# Patient Record
Sex: FEMALE | Race: WHITE | NOT HISPANIC OR LATINO | Employment: OTHER | URBAN - METROPOLITAN AREA
[De-identification: names, ages, dates, MRNs, and addresses within clinical notes are randomized per-mention and may not be internally consistent; named-entity substitution may affect disease eponyms.]

---

## 2017-02-28 ENCOUNTER — OFFICE VISIT (OUTPATIENT)
Dept: FAMILY MEDICINE | Facility: CLINIC | Age: 82
End: 2017-02-28

## 2017-02-28 VITALS
TEMPERATURE: 97.1 F | BODY MASS INDEX: 21.86 KG/M2 | DIASTOLIC BLOOD PRESSURE: 74 MMHG | OXYGEN SATURATION: 97 % | RESPIRATION RATE: 16 BRPM | HEIGHT: 66 IN | HEART RATE: 62 BPM | WEIGHT: 136 LBS | SYSTOLIC BLOOD PRESSURE: 143 MMHG

## 2017-02-28 DIAGNOSIS — H61.23 CERUMINOSIS, BILATERAL: Primary | ICD-10-CM

## 2017-02-28 ASSESSMENT — PAIN SCALES - GENERAL: PAINLEVEL: NO PAIN (0)

## 2017-02-28 NOTE — PROGRESS NOTES
Elizabeth Ruggiero is a 89 year old female who presents today for evaluation and possible removal of bilateral cerumen.  She states the right ear is worse than the left, she has had them lavaged in the past, 6-7 years ago.    Elizabeth resides at [facility] on , she is a  of one year, her   of CHF.   She moved to [facility] 6 months ago, from [place] where she lived with her  of 39 years.  She raised 6 children, a blended family.  Her first   young of hear disease.  She says that the move is an adjustment, but she is doing well.     Her medical history is delightfully benign, she has arthritis in her back and some in her hands, no pain but just some stiffness now and then.    Review Of Systems  Skin: negative  Eyes: negative  Ears/Nose/Throat: negative  Dentition, excellent, has regular dental cleanings  Respiratory: No shortness of breath, dyspnea on exertion, cough, or hemoptysis  Cardiovascular: negative  Gastrointestinal: negative  Genitourinary: negative  Musculoskeletal: negative, as above  Neurologic: negative  Psychiatric: negative  Hematologic/Lymphatic/Immunologic:DVT lower extremity on coumadin  Endocrine: negative    Past Medical History   Diagnosis Date     Arthritis      DVT of lower extremity (deep venous thrombosis) (H)      Past Surgical History   Procedure Laterality Date     Repair left femur       Social History     Social History     Marital status:      Spouse name: N/A     Number of children: 6     Years of education: through 4 years college     Occupational History     Not on file.     Social History Main Topics     Smoking status: Never Smoker     Smokeless tobacco: Not on file     Alcohol use No     Drug use: No     Sexual activity: Not Currently      Comment:   one year ago no other partners     Other Topics Concern     Not on file     Social History Narrative     No narrative on file     History reviewed. No pertinent family history.    /74  "(BP Location: Right arm, Patient Position: Chair, Cuff Size: Adult Regular)  Pulse 62  Temp 97.1  F (36.2  C)  Resp 16  Ht 5' 6\" (167.6 cm)  Wt 136 lb (61.7 kg)  LMP   SpO2 97%  Breastfeeding? No  BMI 21.95 kg/m2    Exam:  Constitutional: healthy, alert and no distress  Head: Normocephalic. No masses, lesions, tenderness or abnormalities  Neck: Neck supple. No adenopathy. Thyroid symmetric, normal size,, Carotids without bruits.  ENT: ENT exam normal, no neck nodes or sinus tenderness.  Bilateral cerument  : Deferred  Musculoskeletal: extremities normal- no gross deformities noted, gait normal and normal muscle tone  Skin: no suspicious lesions or rashes  Psychiatric: mentation appears normal and affect normal/bright  Hematologic/Lymphatic/Immunologic: Normal cervical lymph nodes    Assessment/Plan:  1. Ceruminosis, bilateral    - REMOVE IMPACTED CERUMEN- bilateral ear lavage per NP    RTC prn  "

## 2017-02-28 NOTE — MR AVS SNAPSHOT
"              After Visit Summary   2017    Elizabeth Ruggiero    MRN: [id number]           Patient Information     Date Of Birth          1926        Visit Information        Provider Department      2017 2:00 PM Sheyla Anderson, NP [facility] Nurse Practitioner [facility]        Today's Diagnoses     Ceruminosis, bilateral    -  1       Follow-ups after your visit        Who to contact     Please call your clinic at [phone] to:    Ask questions about your health    Make or cancel appointments    Discuss your medicines    Learn about your test results    Speak to your doctor   If you have compliments or concerns about an experience at your clinic, or if you wish to file a complaint, please contact [facility] Patient Relations at [phone] or email us at [email or web address]         Additional Information About Your Visit        MyChart Information     [employer] is an electronic gateway that provides easy, online access to your medical records. With [employer], you can request a clinic appointment, read your test results, renew a prescription or communicate with your care team.     To sign up for [employer] visit the website at [email or web address]   You will be asked to enter the access code listed below, as well as some personal information. Please follow the directions to create your username and password.     Your access code is: [email or web address]  Expires: 2017  4:47 PM     Your access code will  in 90 days. If you need help or a new code, please contact your [facility] or call [phone] for assistance.        Care EveryWhere ID     This is your Care EveryWhere ID. This could be used by other organizations to access your [facility] medical records  [id number]        Your Vitals Were     Pulse Temperature Respirations Height Pulse Oximetry       62 97.1  F (36.2  C) 16 5' 6\" (167.6 cm) 97%     Breastfeeding? BMI " (Body Mass Index)                No 21.95 kg/m2           Blood Pressure from Last 3 Encounters:   02/28/17 143/74    Weight from Last 3 Encounters:   02/28/17 136 lb (61.7 kg)              We Performed the Following     REMOVE HOA LAZCANO        Primary Care Provider Office Phone # Fax #    Sheyla AndersonFADI [phone]       [facility] [address] [address]        Thank you!     Thank you for choosing [facility] NURSE PRACTITIONER [facility]  for your care. Our goal is always to provide you with excellent care. Hearing back from our patients is one way we can continue to improve our services. Please take a few minutes to complete the written survey that you may receive in the mail after your visit with us. Thank you!             Your Updated Medication List - Protect others around you: Learn how to safely use, store and throw away your medicines at www.disposemymeds.org.          This list is accurate as of: 2/28/17  4:47 PM.  Always use your most recent med list.                   Brand Name Dispense Instructions for use    COUMADIN PO          TYLENOL PO

## 2017-12-05 ENCOUNTER — TELEPHONE (OUTPATIENT)
Dept: FAMILY MEDICINE | Facility: CLINIC | Age: 82
End: 2017-12-05

## 2019-02-15 ENCOUNTER — NURSING HOME VISIT (OUTPATIENT)
Dept: GERIATRICS | Facility: CLINIC | Age: 84
End: 2019-02-15
Payer: COMMERCIAL

## 2019-02-15 VITALS
HEART RATE: 77 BPM | BODY MASS INDEX: 23.31 KG/M2 | SYSTOLIC BLOOD PRESSURE: 107 MMHG | OXYGEN SATURATION: 95 % | TEMPERATURE: 97 F | RESPIRATION RATE: 18 BRPM | WEIGHT: 144.4 LBS | DIASTOLIC BLOOD PRESSURE: 53 MMHG

## 2019-02-15 DIAGNOSIS — Z71.89 ADVANCED DIRECTIVES, COUNSELING/DISCUSSION: ICD-10-CM

## 2019-02-15 DIAGNOSIS — R53.81 PHYSICAL DECONDITIONING: ICD-10-CM

## 2019-02-15 DIAGNOSIS — I48.20 CHRONIC ATRIAL FIBRILLATION (H): ICD-10-CM

## 2019-02-15 DIAGNOSIS — R29.898 TRANSIENT WEAKNESS OF LEFT LOWER EXTREMITY: Primary | ICD-10-CM

## 2019-02-15 DIAGNOSIS — M48.00 SPINAL STENOSIS, UNSPECIFIED SPINAL REGION: ICD-10-CM

## 2019-02-15 DIAGNOSIS — E55.9 VITAMIN D DEFICIENCY: ICD-10-CM

## 2019-02-15 DIAGNOSIS — M81.0 AGE-RELATED OSTEOPOROSIS WITHOUT CURRENT PATHOLOGICAL FRACTURE: ICD-10-CM

## 2019-02-15 PROBLEM — D62 POSTOPERATIVE ANEMIA DUE TO ACUTE BLOOD LOSS: Status: ACTIVE | Noted: 2019-02-15

## 2019-02-15 PROBLEM — I34.0 SEVERE MITRAL REGURGITATION: Status: ACTIVE | Noted: 2019-02-14

## 2019-02-15 PROBLEM — G81.94 ACUTE LEFT HEMIPARESIS (H): Status: ACTIVE | Noted: 2019-02-09

## 2019-02-15 PROCEDURE — 99310 SBSQ NF CARE HIGH MDM 45: CPT | Performed by: NURSE PRACTITIONER

## 2019-02-15 RX ORDER — METOPROLOL TARTRATE 25 MG/1
25 TABLET, FILM COATED ORAL 2 TIMES DAILY
COMMUNITY
End: 2019-05-08

## 2019-02-15 RX ORDER — ALENDRONATE SODIUM 70 MG/1
70 TABLET ORAL
COMMUNITY
Start: 2019-05-05 | End: 2020-11-06

## 2019-02-15 NOTE — PROGRESS NOTES
"[facility]  PRIMARY CARE PROVIDER AND CLINIC: Gelacio Downs MD, [facility]  Chief Complaint   Patient presents with     Hospital F/U     [facility] Medical Record Number:  [id number]  [facility] where encounter [facility] [[id number]]    HPI:    Elizabeth Ruggiero is a 89 year old  (6/19/1926),admitted to the above facility from  [facility].  Hospital stay 2/9/2019 through 2/14/2019.  Admitted to this facility for  rehab, medical management and nursing care.  HPI information obtained from: facility chart records, facility staff, patient report, [facility] Epic chart review and Care Everywhere [facility] chart review.    She has a medical history significant for afib on coumadin, history of DVT, spinal stenosis, osteoporosis and was hospitalized after presenting to the ED with LLE weakness and numbness that started after her yoga class. Her symptoms quickly resolved. CT head negative for acute process. Neurology consulted and felt low risk for stroke. Symptoms felt to be possible radiculopathy or TIA.   INR was 3.7 on admission and coumadin was held X 1 day.     Current issues are:         Transient weakness of left lower extremity-reports feeling well with complete resolution of LLE weakness and numbness. She thinks her symptoms were do to an \"intense yoga class.\"  Chronic atrial fibrillation (H)-INR 2.1 today after coumadin 2.5 mg last night. Usual dose is 3.75 mg on MWFSun, 2.5 mg the other days.No bleeding.  HR: 77-91    Spinal stenosis, unspecified spinal region-denies back pain. Reports this has been well managed with exercise.   Age-related osteoporosis without current pathological fracture  Vitamin D deficiency  Physical deconditioning-up ad marily in her room with walker. Requires supervision while ambulating in the halls. Independent with toileting.     CODE STATUS/ADVANCE DIRECTIVES DISCUSSION:   CPR/Full code   Patient's living condition: [facility]    ALLERGIES:Food  PAST MEDICAL HISTORY:  has a past medical history of Arthritis, Atrial fibrillation (H), DVT of lower extremity (deep venous thrombosis) (H), and Vitamin D deficiency.  PAST SURGICAL HISTORY:  has a past surgical history that includes repair left femur; appendectomy; and fracture surgery.  FAMILY HISTORY: family history includes Breast Cancer in her sister; Colon Cancer in her mother; Diabetes in her sister.  SOCIAL HISTORY:  reports that  has never smoked. She does not have any smokeless tobacco history on file. She reports that she does not drink alcohol or use drugs.    Post Discharge Medication Reconciliation Status: discharge medications reconciled, continue medications without change.  Current Outpatient Medications   Medication Sig Dispense Refill     Acetaminophen (TYLENOL PO) Take 325 mg by mouth every 6 hours as needed        alendronate (FOSAMAX) 70 MG tablet Take 70 mg by mouth every 7 days SUNDAYS       calcium carbonate-vitamin D (OSCAL W/D) 500-200 MG-UNIT tablet Take 1 tablet by mouth 2 times daily       metoprolol tartrate (LOPRESSOR) 25 MG tablet Take 12.5 mg by mouth 2 times daily       Multiple Vitamins-Minerals (OCUVITE EYE + MULTI PO) Take 1 tablet by mouth daily       Warfarin Sodium (COUMADIN PO) Take by mouth daily 3.75 mg MWFSun  2.5 mg TThSat         ROS:  10 point ROS of systems including Constitutional, Eyes, Respiratory, Cardiovascular, Gastroenterology, Genitourinary, Integumentary, Musculoskeletal, Psychiatric were all negative except for pertinent positives noted in my HPI.    Exam:  /53   Pulse 77   Temp 97  F (36.1  C)   Resp 18   Wt 65.5 kg (144 lb 6.4 oz)   SpO2 95%   BMI 23.31 kg/m    GENERAL APPEARANCE:  Alert, in no distress, appears younger than her age  ENT:  Mouth and posterior oropharynx normal, moist mucous membranes, [place]  EYES:  EOM normal, conjunctiva and lids normal, PERRL  NECK:  No adenopathy,masses or thyromegaly  RESP:   respiratory effort and palpation of chest normal, lungs clear to auscultation , no respiratory distress  CV:  Palpation and auscultation of heart done , regular rate and rhythm, no murmur, no edema, +2 pedal pulses  ABDOMEN:  normal bowel sounds, soft, nontender, no hepatosplenomegaly or other masses  M/S:   gait steady with walker. CAR with good strength. No joint inflammation  SKIN:  no rashes or open areas  PSYCH:  oriented X 3, memory impaired , affect and mood normal    Lab/Diagnostic data:  [facility] labs:  PANEL BASIC METABOLIC (BMP) (02/14/2019 4:44 AM CST)  PANEL BASIC METABOLIC (BMP) (02/14/2019 4:44 AM CST)   Component Value Ref Range Performed At Pathologist Signature   Sodium 138 135 - 148 mEq/L [facility]     Potassium 4.4 3.5 - 5.3 mEq/L [facility]     Chloride 105 92 - 108 mEq/L [facility]     CO2 23 22 - 30 mEq/L [facility]     AnGap 10 8 - 16 mEq/L [facility]     Glucose 83 70 - 100 mg/dL [facility]     BUN 28 (H) 8 - 23 mg/dL [facility]     Creatinine 0.96 0.50 - 1.00 mg/dL [facility]     Calcium 9.6 8.2 - 9.6 mg/dL [facility]     eGFR,  59 (L)Comment: Calculated using CKD-EPI equation >=60 ml/min/1.73m2 [facility]     eGFR, Non- 52 (L)Comment: Calculated using CKD-EPI equation >=60 ml/min/1.73m2 [facility]     Basic Metabolic Panel Performed at: [facility]  Comment:   [facility]  [address] [place] [id number]     [facility]      CBC WITH PLTS/AUTO DIFF (02/14/2019 4:44 AM CST)  CBC WITH PLTS/AUTO DIFF (02/14/2019 4:44 AM CST)   Component Value Ref Range Performed At Pathologist Signature   WBC 6.66 4.00 - 10.00 k/cmm [facility]     RBC 3.78 (L) 3.90 - 5.20 m/cmm [facility]     Hgb 11.6 11.5 - 15.7 g/dL [facility]     Hematocrit 35.4 34.0 - 45.0 % [facility]     MCV 93.7 80.0 - 100.0 fL [facility]     MCH 30.7 25.0 - 32.0 pg [facility]     MCHC 32.8 31.0 - 36.0 g/dL [facility]     RDW 14.2 11.5 - 14.5 % [facility]     Plt 151 150 - 400 k/cmm [facility]     MPV 11.5 6.5 - 12.5 fL [facility]     NRBC 0.0 0.0  - 0.0 % [facility]     Automated Abs Neutrophil 3.42Comment: Preliminary ANC, final result to follow. 1.70 - 6.50 k/cmm [facility]     Abs Immature Granulocyte 0.03Comment: The Immature Granulocyte Absolute count contains metamyelocytes and myelocytes. 0.00 - 0.09 k/cmm [facility]     Abs Neutrophil 3.42 1.70 - 6.50 k/cmm [facility]     Abs Lymphocyte 2.00 0.80 - 4.00 k/cmm [facility]     Abs Monocyte 0.92 0.20 - 1.00 k/cmm [facility]     Abs Eosinophil 0.22 0.00 - 0.60 k/cmm [facility]     Abs Basophil 0.07 0.00 - 0.20 k/cmm [facility]     CBC Plt and Diff Performed at: [facility]  Comment:   [facility]  [address] [place] [address]     [facility]        ASSESSMENT / PLAN:  (R29.898) Transient weakness of left lower extremity  (primary encounter diagnosis)  Comment: resolved. Etiology unclear. Possible TIA  Plan: continue current meds. Therapies. Neurology follow up at [facility] in 1 week.     (I48.2) Chronic atrial fibrillation (H)  Comment: rate controlled. INR therapeutic  Plan: continue metoprolol. Coumadin 2.5-3.75 mg daily. INR 2/19/2019    (M48.00) Spinal stenosis, unspecified spinal region  Comment: pain well managed  Plan: tylenol prn. Therapies    (M81.0) Age-related osteoporosis without current pathological fracture  (E55.9) Vitamin D deficiency  Comment: chronic  Plan: continue fosamax, calcium/vitamin D    (R53.81) Physical deconditioning  Comment: due to acute issue and hospitalization  Plan: PHYSICAL THERAPY/OT. Anticipate short rehab stay and return to [place].     (Z71.89) Advanced directives, counseling/discussion  Comment: she does not have a healthcare directive and requests Full Code  Plan: POLST completed        Electronically signed by:  SENIA Lockett CNP

## 2019-02-15 NOTE — LETTER
"    2/15/2019        RE: Elizabeth Ruggiero  [address]  [facility] [address]        [facility]  PRIMARY CARE PROVIDER AND CLINIC: Gelacio Downs MD, [facility]  Chief Complaint   Patient presents with     Hospital F/U     [place] Medical Record Number:  [id number]  [facility] of Service where encounter took place[facility] [[id number]]    HPI:    Elizabeth Ruggiero is a 89 year old  (6/19/1926),admitted to the above facility from  [facility].  Hospital stay 2/9/2019 through 2/14/2019.  Admitted to this facility for  rehab, medical management and nursing care.  HPI information obtained from: facility chart records, facility staff, patient report, [facility] chart review and Care Everywhere [facility] chart review.    She has a medical history significant for afib on coumadin, history of DVT, spinal stenosis, osteoporosis and was hospitalized after presenting to the ED with LLE weakness and numbness that started after her yoga class. Her symptoms quickly resolved. CT head negative for acute process. Neurology consulted and felt low risk for stroke. Symptoms felt to be possible radiculopathy or TIA.   INR was 3.7 on admission and coumadin was held X 1 day.     Current issues are:         Transient weakness of left lower extremity-reports feeling well with complete resolution of LLE weakness and numbness. She thinks her symptoms were do to an \"intense yoga class.\"  Chronic atrial fibrillation (H)-INR 2.1 today after coumadin 2.5 mg last night. Usual dose is 3.75 mg on MWFSun, 2.5 mg the other days.No bleeding.  HR: 77-91    Spinal stenosis, unspecified spinal region-denies back pain. Reports this has been well managed with exercise.   Age-related osteoporosis without current pathological fracture  Vitamin D deficiency  Physical deconditioning-up ad marily in her room with walker. Requires supervision while ambulating in the halls. Independent with toileting.     CODE " STATUS/ADVANCE DIRECTIVES DISCUSSION:   CPR/Full code   Patient's living condition: Lives at [facility]    ALLERGIES:Food  PAST MEDICAL HISTORY:  has a past medical history of Arthritis, Atrial fibrillation (H), DVT of lower extremity (deep venous thrombosis) (H), and Vitamin D deficiency.  PAST SURGICAL HISTORY:  has a past surgical history that includes repair left femur; appendectomy; and fracture surgery.  FAMILY HISTORY: family history includes Breast Cancer in her sister; Colon Cancer in her mother; Diabetes in her sister.  SOCIAL HISTORY:  reports that  has never smoked. She does not have any smokeless tobacco history on file. She reports that she does not drink alcohol or use drugs.    Post Discharge Medication Reconciliation Status: discharge medications reconciled, continue medications without change.  Current Outpatient Medications   Medication Sig Dispense Refill     Acetaminophen (TYLENOL PO) Take 325 mg by mouth every 6 hours as needed        alendronate (FOSAMAX) 70 MG tablet Take 70 mg by mouth every 7 days SUNDAYS       calcium carbonate-vitamin D (OSCAL W/D) 500-200 MG-UNIT tablet Take 1 tablet by mouth 2 times daily       metoprolol tartrate (LOPRESSOR) 25 MG tablet Take 12.5 mg by mouth 2 times daily       Multiple Vitamins-Minerals (OCUVITE EYE + MULTI PO) Take 1 tablet by mouth daily       Warfarin Sodium (COUMADIN PO) Take by mouth daily 3.75 mg MWFSun  2.5 mg TThSat         ROS:  10 point ROS of systems including Constitutional, Eyes, Respiratory, Cardiovascular, Gastroenterology, Genitourinary, Integumentary, Musculoskeletal, Psychiatric were all negative except for pertinent positives noted in my HPI.    Exam:  /53   Pulse 77   Temp 97  F (36.1  C)   Resp 18   Wt 65.5 kg (144 lb 6.4 oz)   SpO2 95%   BMI 23.31 kg/m     GENERAL APPEARANCE:  Alert, in no distress, appears younger than her age  ENT:  Mouth and posterior oropharynx normal, moist mucous membranes,  [place]  EYES:  EOM normal, conjunctiva and lids normal, PERRL  NECK:  No adenopathy,masses or thyromegaly  RESP:  respiratory effort and palpation of chest normal, lungs clear to auscultation , no respiratory distress  CV:  Palpation and auscultation of heart done , regular rate and rhythm, no murmur, no edema, +2 pedal pulses  ABDOMEN:  normal bowel sounds, soft, nontender, no hepatosplenomegaly or other masses  M/S:   gait steady with walker. CAR with good strength. No joint inflammation  SKIN:  no rashes or open areas  PSYCH:  oriented X 3, memory impaired , affect and mood normal    Lab/Diagnostic data:  [facility] labs:  PANEL BASIC METABOLIC (BMP) (02/14/2019 4:44 AM CST)  PANEL BASIC METABOLIC (BMP) (02/14/2019 4:44 AM CST)   Component Value Ref Range Performed At Pathologist [facility]   Sodium 138 135 - 148 mEq/L [facility]     Potassium 4.4 3.5 - 5.3 mEq/L [facility]     Chloride 105 92 - 108 mEq/L [facility]     CO2 23 22 - 30 mEq/L [facility]     AnGap 10 8 - 16 mEq/L [facility]     Glucose 83 70 - 100 mg/dL [facility]     BUN 28 (H) 8 - 23 mg/dL [facility]     Creatinine 0.96 0.50 - 1.00 mg/dL [facility]     Calcium 9.6 8.2 - 9.6 mg/dL [facility]     eGFR,  59 (L)Comment: Calculated using CKD-EPI equation >=60 ml/min/1.73m2 [facility]     eGFR, Non- 52 (L)Comment: Calculated using CKD-EPI equation >=60 ml/min/1.73m2 [facility]     Basic Metabolic Panel Performed at: [facility]  Comment:   [facility]  [address] [place] [address]     [facility]      CBC WITH PLTS/AUTO DIFF (02/14/2019 4:44 AM CST)  CBC WITH PLTS/AUTO DIFF (02/14/2019 4:44 AM CST)   Component Value Ref Range Performed At Pathologist [facility]   WBC 6.66 4.00 - 10.00 k/cmm [facility]     RBC 3.78 (L) 3.90 - 5.20 m/cmm [facility]     Hgb 11.6 11.5 - 15.7 g/dL [facility]     Hematocrit 35.4 34.0 - 45.0 % [facility]     MCV 93.7 80.0 - 100.0 fL [facility]     MCH 30.7 25.0 - 32.0 pg [facility]     MCHC 32.8 31.0 - 36.0 g/dL [facility]     RDW 14.2  11.5 - 14.5 % [facility]     Plt 151 150 - 400 k/cmm [facility]     MPV 11.5 6.5 - 12.5 fL [facility]     NRBC 0.0 0.0 - 0.0 % [facility]     Automated Abs Neutrophil 3.42Comment: Preliminary ANC, final result to follow. 1.70 - 6.50 k/cmm [facility]     Abs Immature Granulocyte 0.03Comment: The Immature Granulocyte Absolute count contains metamyelocytes and myelocytes. 0.00 - 0.09 k/cmm [facility]     Abs Neutrophil 3.42 1.70 - 6.50 k/cmm [facility]     Abs Lymphocyte 2.00 0.80 - 4.00 k/cmm [facility]     Abs Monocyte 0.92 0.20 - 1.00 k/cmm [facility]     Abs Eosinophil 0.22 0.00 - 0.60 k/cmm [facility]     Abs Basophil 0.07 0.00 - 0.20 k/cmm [facility]     CBC Plt and Diff Performed at: [facility]  Comment:   [facility]  [address] [place] [address]     [facility]        ASSESSMENT / PLAN:  (R29.898) Transient weakness of left lower extremity  (primary encounter diagnosis)  Comment: resolved. Etiology unclear. Possible TIA  Plan: continue current meds. Therapies. Neurology follow up at [facility] in 1 week.     (I48.2) Chronic atrial fibrillation (H)  Comment: rate controlled. INR therapeutic  Plan: continue metoprolol. Coumadin 2.5-3.75 mg daily. INR 2/19/2019    (M48.00) Spinal stenosis, unspecified spinal region  Comment: pain well managed  Plan: tylenol prn. Therapies    (M81.0) Age-related osteoporosis without current pathological fracture  (E55.9) Vitamin D deficiency  Comment: chronic  Plan: continue fosamax, calcium/vitamin D    (R53.81) Physical deconditioning  Comment: due to acute issue and hospitalization  Plan: PHYSICAL THERAPY/OT. Anticipate short rehab stay and [place] to [place].     (Z71.89) Advanced directives, counseling/discussion  Comment: she does not have a healthcare directive and requests Full Code  Plan: POLST completed        Electronically signed by:  SENIA Lockett CNP                Sincerely,        SENIA Lockett CNP

## 2019-02-18 VITALS
SYSTOLIC BLOOD PRESSURE: 118 MMHG | OXYGEN SATURATION: 98 % | RESPIRATION RATE: 16 BRPM | HEART RATE: 79 BPM | WEIGHT: 142.6 LBS | TEMPERATURE: 96.1 F | BODY MASS INDEX: 23.02 KG/M2 | DIASTOLIC BLOOD PRESSURE: 82 MMHG

## 2019-02-19 ENCOUNTER — DOCUMENTATION ONLY (OUTPATIENT)
Dept: OTHER | Facility: CLINIC | Age: 84
End: 2019-02-19

## 2019-02-19 ENCOUNTER — DISCHARGE SUMMARY NURSING HOME (OUTPATIENT)
Dept: GERIATRICS | Facility: CLINIC | Age: 84
End: 2019-02-19
Payer: COMMERCIAL

## 2019-02-19 DIAGNOSIS — E55.9 VITAMIN D DEFICIENCY: ICD-10-CM

## 2019-02-19 DIAGNOSIS — I48.20 CHRONIC ATRIAL FIBRILLATION (H): ICD-10-CM

## 2019-02-19 DIAGNOSIS — M48.00 SPINAL STENOSIS, UNSPECIFIED SPINAL REGION: ICD-10-CM

## 2019-02-19 DIAGNOSIS — R53.81 PHYSICAL DECONDITIONING: ICD-10-CM

## 2019-02-19 DIAGNOSIS — R29.898 TRANSIENT WEAKNESS OF LEFT LOWER EXTREMITY: Primary | ICD-10-CM

## 2019-02-19 DIAGNOSIS — M81.0 AGE-RELATED OSTEOPOROSIS WITHOUT CURRENT PATHOLOGICAL FRACTURE: ICD-10-CM

## 2019-02-19 PROCEDURE — 99316 NF DSCHRG MGMT 30 MIN+: CPT | Performed by: NURSE PRACTITIONER

## 2019-02-19 NOTE — PROGRESS NOTES
Documentation of Face-to-Face and Certification for Home Health Services     Patient: Elizabeth Ruggiero   YOB: 1926  MR Number: [id number]  Today's Date: 2/19/2019    I certify that patient: Elizabeth Ruggiero is under my care and that I, or a nurse practitioner or physician's assistant working with me, had a face-to-face encounter that meets the physician face-to-face encounter requirements with this patient on: 2/19/2019.    This encounter with the patient was in whole, or in part, for the following medical condition, which is the primary reason for home health care: LLE weakness, possible TIA.    I certify that, based on my findings, the following services are medically necessary home health services: Nursing, Occupational Therapy and Physical Therapy.    My clinical findings support the need for the above services because: Nurse is needed: To assess VS, neuro status after changes in medications or other medical regimen., To provide assessment and oversight required in the home to assure adherence to the medical plan due to: complex medication regimen, at risk for rehospitalizataion. and To provide caregiver training to assist with: med management.., Occupational Therapy Services are needed to assess and treat cognitive ability and address ADL safety due to impairment in functional status. and Physical Therapy Services are needed to assess and treat the following functional impairments: gait instability, limited endurance, fall risk.    Further, I certify that my clinical findings support that this patient is homebound (i.e. absences from home require considerable and taxing effort and are for medical reasons or Islam services or infrequently or of short duration when for other reasons) because: Requires assistance of another person or specialized equipment to access medical services because patient: Is unable to exit home safely on own due to: gait instability, fall risk. and Is unable to walk  greater than 150 feet without rest...    Based on the above findings. I certify that this patient is confined to the home and needs intermittent skilled nursing care, physical therapy and/or speech therapy.  The patient is under my care, and I have initiated the establishment of the plan of care.  This patient will be followed by a physician who will periodically review the plan of care.  Physician/Provider to provide follow up care: Gelacio Downs    Responsible Medicare certified PECOS Physician: Electronically signed by Dr. Mckenna Hunter MD, and only signing for initial order. Please send all follow up questions and concerns or needed follow up signatures to the PCP Gelacio Downs.    Physician Signature: See electronic signature associated with these discharge orders.  Date: 2/19/2019

## 2019-02-19 NOTE — PROGRESS NOTES
[place] [facility] DISCHARGE SUMMARY    PATIENT'S NAME: Elizabeth Ruggiero  YOB: 1926  MEDICAL RECORD NUMBER:  [id number]  Place of [facility] where encounter took place[facility] [[id number]]    PRIMARY CARE PROVIDER AND CLINIC RESPONSIBLE AFTER TRANSFER: [facility] [address] / [place] [address]     TRANSFERRING PROVIDERS: SENIA Lockett CNP, Mckenna Hunter MD  DATE OF SNF ADMISSION:  February / 14 / 2019  DATE OF SNF (anticipated) DISCHARGE: February / 21 / 2019  DISCHARGE DISPOSITION: Non [facility]G Provider   RECENT HOSPITALIZATION/ED:  [place]  [facility] [place] 2[place]14[place].     CODE STATUS/ADVANCE DIRECTIVES DISCUSSION:   CPR/Full code      Allergies   Allergen Reactions     Food      PN: LW FI1: vegetarian  dairy ok LW FI2:     Condition on Discharge:  Improving.  Cognitive Scores: SLUMS 25/30    Equipment: walker    DISCHARGE DIAGNOSIS:   1. Transient weakness of left lower extremity    2. Chronic atrial fibrillation (H)    3. Spinal stenosis, unspecified spinal region    4. Age-related osteoporosis without current pathological fracture    5. Vitamin D deficiency    6. Physical deconditioning        HPI Nursing Facility Course:  HPI information obtained from: facility chart records, facility staff, patient report, [facility] chart review and Care Everywhere [facility] chart review.  She came to this facility for short term rehab and medical management following hospitalization for LLE weakness and numbness that started after her yoga class. Symptoms quickly resolved while she was in the ED. CT head negative for acute process. Neurology consulted and felt low risk for stroke. Symptoms felt to be possible radiculopathy or TIA. INR was 3.7 on hospital admission and coumadin was held X 1 day.     She has worked with PHYSICAL THERAPY and OT with good progress. Ambulating 150 ft with walker. Independent with toileting and dressing.  Requires supervision to stand by assist with bathing. Garvey 32/56, indicating fair balance.   ASSESSMENT / PLAN:  (R29.898) Transient weakness of left lower extremity  (primary encounter diagnosis)  Comment: no recurrent issues. She's feeling well and ready to return home.   Plan: discharge back to [facility]. Home services and follow up as below.     (I48.2) Chronic atrial fibrillation (H)  Comment: rate controlled: 79-91    BPs: 118/82, 111/75, 115/75  INR 2.6 today on her usual home dose of coumadin.   Plan: continue metoprolol. Continue coumadin 3.75 mg MWFSun, 2.5 mg the other days of the week. INR in 1 week at PCP follow up appointment.     (M48.00) Spinal stenosis, unspecified spinal region  Comment: chronic back pain,  well managed  Plan: continue tylenol prn. Therapies.     (M81.0) Age-related osteoporosis without current pathological fracture  (E55.9) Vitamin D deficiency  Comment: chronic  Plan: continue fosamax, calcium/vitamin D    (R53.81) Physical deconditioning  Comment: progress in therapies as above  Plan: home  therapies for ongoing gait training, strengthening and ADL safety      PAST MEDICAL HISTORY:  has a past medical history of Arthritis, Atrial fibrillation (H), DVT of lower extremity (deep venous thrombosis) (H), and Vitamin D deficiency.    DISCHARGE MEDICATIONS:  Current Outpatient Medications   Medication Sig Dispense Refill     Acetaminophen (TYLENOL PO) Take 325 mg by mouth every 6 hours as needed        alendronate (FOSAMAX) 70 MG tablet Take 70 mg by mouth every 7 days SUNDAYS       calcium carbonate-vitamin D (OSCAL W/D) 500-200 MG-UNIT tablet Take 1 tablet by mouth 2 times daily       metoprolol tartrate (LOPRESSOR) 25 MG tablet Take 12.5 mg by mouth 2 times daily       Multiple Vitamins-Minerals (OCUVITE EYE + MULTI PO) Take 1 tablet by mouth daily       Warfarin Sodium (COUMADIN PO) Take by mouth daily 3.75 mg MWFSun  2.5 mg TThSat         MEDICATION CHANGES/RATIONALE:   Coumadin  dosed for goal INR 2-3  Controlled medications sent with patient:   not applicable/none     ROS:    4 point ROS including Respiratory, CV, GI and , other than that noted in the HPI,  is negative    Physical Exam:   Vitals: /82   Pulse 79   Temp 96.1  F (35.6  C)   Resp 16   Wt 64.7 kg (142 lb 9.6 oz)   SpO2 98%   BMI 23.02 kg/m    BMI= Body mass index is 23.02 kg/m .  GENERAL APPEARANCE:  Alert, in no distress, appears younger than her age  ENT:  Mouth and posterior oropharynx normal, moist mucous membranes, [place]  EYES:  EOM normal, conjunctiva and lids normal  NECK:  No adenopathy,masses or thyromegaly  RESP:  respiratory effort and palpation of chest normal, lungs clear to auscultation , no respiratory distress  CV:  Palpation and auscultation of heart done , regular rate and rhythm, no murmur, no edema, +2 pedal pulses  ABDOMEN:  soft, nontender, no hepatosplenomegaly or other masses  M/S:   gait steady with walker. CAR with good strength. No joint inflammation  SKIN:  no rashes or open areas  PSYCH:  oriented X 3, memory impaired , affect and mood normal     DISCHARGE PLAN:  Occupational Therapy, Physical Therapy, Registered Nurse, Home Health Aide and From:  [facility]  Patient instructed to follow-up with:  PCP in 7 days    Neurology at [facility] as scheduled.     Current [place] scheduled appointments:  No future appointments.  MTM referral needed and placed by this provider: No    Pending labs: None  SNF labs   [facility] labs:  PANEL BASIC METABOLIC (BMP) (02/14/2019 4:44 AM CST)  PANEL BASIC METABOLIC (BMP) (02/14/2019 4:44 AM CST)   Component Value Ref Range Performed At Pathologist Signature   Sodium 138 135 - 148 mEq/L [facility]     Potassium 4.4 3.5 - 5.3 mEq/L [facility]     Chloride 105 92 - 108 mEq/L [facility]     CO2 23 22 - 30 mEq/L [facility]     AnGap 10 8 - 16 mEq/L [facility]     Glucose 83 70 - 100 mg/dL [facility]     BUN 28 (H) 8 - 23 mg/dL [facility]     Creatinine 0.96 0.50 - 1.00 mg/dL [facility]  [facility]     Calcium 9.6 8.2 - 9.6 mg/dL [facility]     eGFR,  59 (L)Comment: Calculated using CKD-EPI equation >=60 ml/min/1.73m2 [facility]     eGFR, Non- 52 (L)Comment: Calculated using CKD-EPI equation >=60 ml/min/1.73m2 [facility]     Basic Metabolic Panel Performed at: [facility]  Comment:   [facility]  [address] [place] [address]      [facility]        CBC WITH PLTS/AUTO DIFF (02/14/2019 4:44 AM CST)         CBC WITH PLTS/AUTO DIFF (02/14/2019 4:44 AM CST)   Component Value Ref Range Performed At [facility]   WBC 6.66 4.00 - 10.00 k/cmm [facility]     RBC 3.78 (L) 3.90 - 5.20 m/cmm [facility]     Hgb 11.6 11.5 - 15.7 g/dL [facility]     Hematocrit 35.4 34.0 - 45.0 % [facility]     MCV 93.7 80.0 - 100.0 fL [facility]     MCH 30.7 25.0 - 32.0 pg [facility]     MCHC 32.8 31.0 - 36.0 g/dL [facility]     RDW 14.2 11.5 - 14.5 % [facility]     Plt 151 150 - 400 k/cmm [facility]     MPV 11.5 6.5 - 12.5 fL [facility]     NRBC 0.0 0.0 - 0.0 % [facility]     Automated Abs Neutrophil 3.42Comment: Preliminary ANC, final result to follow. 1.70 - 6.50 k/cmm [facility]     Abs Immature Granulocyte 0.03Comment: The Immature Granulocyte Absolute count contains metamyelocytes and myelocytes. 0.00 - 0.09 k/cmm [facility]     Abs Neutrophil 3.42 1.70 - 6.50 k/cmm [facility]     Abs Lymphocyte 2.00 0.80 - 4.00 k/cmm [facility]     Abs Monocyte 0.92 0.20 - 1.00 k/cmm [facility]     Abs Eosinophil 0.22 0.00 - 0.60 k/cmm [facility]     Abs Basophil 0.07 0.00 - 0.20 k/cmm [facility]     CBC Plt and Diff Performed at: [facility]  Comment:   [facility]  [address] [place] [address]      [facility]              Discharge Treatments: None    TOTAL DISCHARGE TIME:   Greater than 30 minutes  Electronically signed by:  SENIA Lockett CNP

## 2019-02-19 NOTE — LETTER
2/19/2019        RE: Elizabeth Ruggiero  [address]  [facility] [address]          [place] [facility] DISCHARGE SUMMARY    PATIENT'S NAME: Elizabeth Ruggiero  YOB: 1926  MEDICAL RECORD NUMBER:  [id number]  Place of Service where encounter took place[facility] [[id number]]    PRIMARY CARE PROVIDER AND CLINIC RESPONSIBLE AFTER TRANSFER: [facility] [address] / [place] [address]     TRANSFERRING PROVIDERS: SENIA Lockett CNP, Mckenna Hunter MD  DATE OF SNF ADMISSION:  February / 14 / 2019  DATE OF SNF (anticipated) DISCHARGE: February / 21 / 2019  DISCHARGE DISPOSITION: Non [facility] Provider   RECENT HOSPITALIZATION/ED:  Hospital  [facility] stay 2/9/19 to 2/14/19.     CODE STATUS/ADVANCE DIRECTIVES DISCUSSION:   CPR/Full code      Allergies   Allergen Reactions     Food      PN: LW FI1: vegetarian  dairy ok LW FI2:     Condition on Discharge:  Improving.  Cognitive Scores: SLUMS 25/30    Equipment: walker    DISCHARGE DIAGNOSIS:   1. Transient weakness of left lower extremity    2. Chronic atrial fibrillation (H)    3. Spinal stenosis, unspecified spinal region    4. Age-related osteoporosis without current pathological fracture    5. Vitamin D deficiency    6. Physical deconditioning        HPI Nursing Facility Course:  HPI information obtained from: facility chart records, facility staff, patient report, [facility] chart review and Care Everywhere [facility] chart review.  She came to this facility for short term rehab and medical management following hospitalization for LLE weakness and numbness that started after her yoga class. Symptoms quickly resolved while she was in the ED. CT head negative for acute process. Neurology consulted and felt low risk for stroke. Symptoms felt to be possible radiculopathy or TIA. INR was 3.7 on hospital admission and coumadin was held X 1 day.     She has worked with PHYSICAL THERAPY and OT  with good progress. Ambulating 150 ft with walker. Independent with toileting and dressing. Requires supervision to stand by assist with bathing. Garvey 32/56, indicating fair balance.   ASSESSMENT / PLAN:  (R29.898) Transient weakness of left lower extremity  (primary encounter diagnosis)  Comment: no recurrent issues. She's feeling well and ready to return home.   Plan: discharge back to [facility]. Home services and follow up as below.     (I48.2) Chronic atrial fibrillation (H)  Comment: rate controlled: 79-91    BPs: 118/82, 111/75, 115/75  INR 2.6 today on her usual home dose of coumadin.   Plan: continue metoprolol. Continue coumadin 3.75 mg MWFSun, 2.5 mg the other days of the week. INR in 1 week at PCP follow up appointment.     (M48.00) Spinal stenosis, unspecified spinal region  Comment: chronic back pain,  well managed  Plan: continue tylenol prn. Therapies.     (M81.0) Age-related osteoporosis without current pathological fracture  (E55.9) Vitamin D deficiency  Comment: chronic  Plan: continue fosamax, calcium/vitamin D    (R53.81) Physical deconditioning  Comment: progress in therapies as above  Plan: home  therapies for ongoing gait training, strengthening and ADL safety      PAST MEDICAL HISTORY:  has a past medical history of Arthritis, Atrial fibrillation (H), DVT of lower extremity (deep venous thrombosis) (H), and Vitamin D deficiency.    DISCHARGE MEDICATIONS:  Current Outpatient Medications   Medication Sig Dispense Refill     Acetaminophen (TYLENOL PO) Take 325 mg by mouth every 6 hours as needed        alendronate (FOSAMAX) 70 MG tablet Take 70 mg by mouth every 7 days SUNDAYS       calcium carbonate-vitamin D (OSCAL W/D) 500-200 MG-UNIT tablet Take 1 tablet by mouth 2 times daily       metoprolol tartrate (LOPRESSOR) 25 MG tablet Take 12.5 mg by mouth 2 times daily       Multiple Vitamins-Minerals (OCUVITE EYE + MULTI PO) Take 1 tablet by mouth daily       Warfarin Sodium (COUMADIN PO) Take by  mouth daily 3.75 mg MWFSun  2.5 mg TThSat         MEDICATION CHANGES/RATIONALE:   Coumadin dosed for goal INR 2-3  Controlled medications sent with patient:   not applicable/none     ROS:    4 point ROS including Respiratory, CV, GI and , other than that noted in the HPI,  is negative    Physical Exam:   Vitals: /82   Pulse 79   Temp 96.1  F (35.6  C)   Resp 16   Wt 64.7 kg (142 lb 9.6 oz)   SpO2 98%   BMI 23.02 kg/m     BMI= Body mass index is 23.02 kg/m .  GENERAL APPEARANCE:  Alert, in no distress, appears younger than her age  ENT:  Mouth and posterior oropharynx normal, moist mucous membranes, [place]  EYES:  EOM normal, conjunctiva and lids normal  NECK:  No adenopathy,masses or thyromegaly  RESP:  respiratory effort and palpation of chest normal, lungs clear to auscultation , no respiratory distress  CV:  Palpation and auscultation of heart done , regular rate and rhythm, no murmur, no edema, +2 pedal pulses  ABDOMEN:  soft, nontender, no hepatosplenomegaly or other masses  M/S:   gait steady with walker. CAR with good strength. No joint inflammation  SKIN:  no rashes or open areas  PSYCH:  oriented X 3, memory impaired , affect and mood normal     DISCHARGE PLAN:  Occupational Therapy, Physical Therapy, Registered Nurse, Home Health Aide and From:  [facility]  Patient instructed to follow-up with:  PCP in 7 days    Ne[place] at [facility] as scheduled.     Current [place] scheduled appointments:  No future appointments.  MTM referral needed and placed by this provider: No    Pending labs: None  SNF labs   [facility] labs:  PANEL BASIC METABOLIC (BMP) (02/14/2019 4:44 AM CST)         PANEL BASIC METABOLIC (BMP) (02/14/2019 4:44 AM CST)   Component Value Ref Range Performed At Pathologist Signature   Sodium 138 135 - 148 mEq/L [facility]     Potassium 4.4 3.5 - 5.3 mEq/L [facility]     Chloride 105 92 - 108 mEq/L [facility]     CO2 23 22 - 30 mEq/L [facility]     AnGap 10 8 - 16 mEq/L [facility]     Glucose 83 70 -  100 mg/dL [facility]     BUN 28 (H) 8 - 23 mg/dL [facility]     Creatinine 0.96 0.50 - 1.00 mg/dL [facility]     Calcium 9.6 8.2 - 9.6 mg/dL [facility]     eGFR,  59 (L)Comment: Calculated using CKD-EPI equation >=60 ml/min/1.73m2 [facility]     eGFR, Non- 52 (L)Comment: Calculated using CKD-EPI equation >=60 ml/min/1.73m2 [facility]     Basic Metabolic Panel Performed at: [facility]  Comment:   [facility]  [address] [place] [id number]      [facility]        CBC WITH PLTS/AUTO DIFF (02/14/2019 4:44 AM CST)         CBC WITH PLTS/AUTO DIFF (02/14/2019 4:44 AM CST)   Component Value Ref Range Performed At [facility]   WBC 6.66 4.00 - 10.00 k/cmm [facility]     RBC 3.78 (L) 3.90 - 5.20 m/cmm [facility]     Hgb 11.6 11.5 - 15.7 g/dL [facility]     Hematocrit 35.4 34.0 - 45.0 % [facility]     MCV 93.7 80.0 - 100.0 fL [facility]     MCH 30.7 25.0 - 32.0 pg [facility]     MCHC 32.8 31.0 - 36.0 g/dL [facility]     RDW 14.2 11.5 - 14.5 % [facility]     Plt 151 150 - 400 k/cmm [facility]     MPV 11.5 6.5 - 12.5 fL [facility]     NRBC 0.0 0.0 - 0.0 % [facility]     Automated Abs Neutrophil 3.42Comment: Preliminary ANC, final result to follow. 1.70 - 6.50 k/cmm [facility]     Abs Immature Granulocyte 0.03Comment: The Immature Granulocyte Absolute count contains metamyelocytes and myelocytes. 0.00 - 0.09 k/cmm [facility]     Abs Neutrophil 3.42 1.70 - 6.50 k/cmm [facility]     Abs Lymphocyte 2.00 0.80 - 4.00 k/cmm [facility]     Abs Monocyte 0.92 0.20 - 1.00 k/cmm [facility]     Abs Eosinophil 0.22 0.00 - 0.60 k/cmm [facility]     Abs Basophil 0.07 0.00 - 0.20 k/cmm [facility]     CBC Plt and Diff Performed at: [facility]  Comment:   [facility]  [address] [place] [address]      [facility]              Discharge Treatments: None    TOTAL DISCHARGE TIME:   Greater than 30 minutes  Electronically signed by:  SENIA Lockett CNP        Documentation of Face-to-Face and Certification for Home Health  Services     Patient: Elizabeth Ruggiero   YOB: 1926  MR Number: [id number]  Today's Date: 2/19/2019    I certify that patient: Elizabeth Ruggiero is under my care and that I, or a nurse practitioner or physician's assistant working with me, had a face-to-face encounter that meets the physician face-to-face encounter requirements with this patient on: 2/19/2019.    This encounter with the patient was in whole, or in part, for the following medical condition, which is the primary reason for home health care: LLE weakness, possible TIA.    I certify that, based on my findings, the following services are medically necessary home health services: Nursing, Occupational Therapy and Physical Therapy.    My clinical findings support the need for the above services because: Nurse is needed: To assess VS, neuro status after changes in medications or other medical regimen., To provide assessment and oversight required in the home to assure adherence to the medical plan due to: complex medication regimen, at risk for rehospitalizataion. and To provide caregiver training to assist with: med management.., Occupational Therapy Services are needed to assess and treat cognitive ability and address ADL safety due to impairment in functional status. and Physical Therapy Services are needed to assess and treat the following functional impairments: gait instability, limited endurance, fall risk.    Further, I certify that my clinical findings support that this patient is homebound (i.e. absences from home require considerable and taxing effort and are for medical reasons or Restorationist services or infrequently or of short duration when for other reasons) because: Requires assistance of another person or specialized equipment to access medical services because patient: Is unable to exit home safely on own due to: gait instability, fall risk. and Is unable to walk greater than 150 feet without rest...    Based on the above findings. I  certify that this patient is confined to the home and needs intermittent skilled nursing care, physical therapy and/or speech therapy.  The patient is under my care, and I have initiated the establishment of the plan of care.  This patient will be followed by a physician who will periodically review the plan of care.  Physician/Provider to provide follow up care: Gelacio Downs    Responsible Medicare certified PECOS Physician: Electronically signed by Dr. Mckenna Hunter MD, and only signing for initial order. Please send all follow up questions and concerns or needed follow up signatures to the PCP Gelacio Downs.    Physician Signature: See electronic signature associated with these discharge orders.  Date: 2/19/2019        Sincerely,        SENIA Lockett CNP

## 2019-03-09 ENCOUNTER — HOSPITAL ENCOUNTER (EMERGENCY)
Facility: CLINIC | Age: 84
Discharge: HOME | End: 2019-03-09
Attending: EMERGENCY MEDICINE | Admitting: EMERGENCY MEDICINE
Payer: COMMERCIAL

## 2019-03-09 ENCOUNTER — APPOINTMENT (OUTPATIENT)
Dept: GENERAL RADIOLOGY | Facility: CLINIC | Age: 84
End: 2019-03-09
Attending: EMERGENCY MEDICINE
Payer: COMMERCIAL

## 2019-03-09 VITALS
TEMPERATURE: 97.4 F | SYSTOLIC BLOOD PRESSURE: 120 MMHG | DIASTOLIC BLOOD PRESSURE: 79 MMHG | HEART RATE: 90 BPM | WEIGHT: 138 LBS | RESPIRATION RATE: 10 BRPM | HEIGHT: 66 IN | BODY MASS INDEX: 22.18 KG/M2 | OXYGEN SATURATION: 98 %

## 2019-03-09 DIAGNOSIS — I48.91 ATRIAL FIBRILLATION WITH RAPID VENTRICULAR RESPONSE (H): ICD-10-CM

## 2019-03-09 DIAGNOSIS — R03.0 ELEVATED BLOOD PRESSURE READING: ICD-10-CM

## 2019-03-09 LAB
ANION GAP SERPL CALCULATED.3IONS-SCNC: 6 MMOL/L (ref 3–14)
BASOPHILS # BLD AUTO: 0 10E9/L (ref 0–0.2)
BASOPHILS NFR BLD AUTO: 0.5 %
BUN SERPL-MCNC: 30 MG/DL (ref 7–30)
CALCIUM SERPL-MCNC: 8.8 MG/DL (ref 8.5–10.1)
CHLORIDE SERPL-SCNC: 108 MMOL/L (ref 94–109)
CO2 SERPL-SCNC: 23 MMOL/L (ref 20–32)
CREAT SERPL-MCNC: 1.03 MG/DL (ref 0.52–1.04)
DIFFERENTIAL METHOD BLD: NORMAL
EOSINOPHIL # BLD AUTO: 0.1 10E9/L (ref 0–0.7)
EOSINOPHIL NFR BLD AUTO: 1.1 %
ERYTHROCYTE [DISTWIDTH] IN BLOOD BY AUTOMATED COUNT: 14.6 % (ref 10–15)
GFR SERPL CREATININE-BSD FRML MDRD: 47 ML/MIN/{1.73_M2}
GLUCOSE SERPL-MCNC: 101 MG/DL (ref 70–99)
HCT VFR BLD AUTO: 37.2 % (ref 35–47)
HGB BLD-MCNC: 12.3 G/DL (ref 11.7–15.7)
IMM GRANULOCYTES # BLD: 0 10E9/L (ref 0–0.4)
IMM GRANULOCYTES NFR BLD: 0.3 %
INR PPP: 3.47 (ref 0.86–1.14)
INTERPRETATION ECG - MUSE: NORMAL
LYMPHOCYTES # BLD AUTO: 1.2 10E9/L (ref 0.8–5.3)
LYMPHOCYTES NFR BLD AUTO: 14.2 %
MCH RBC QN AUTO: 30.7 PG (ref 26.5–33)
MCHC RBC AUTO-ENTMCNC: 33.1 G/DL (ref 31.5–36.5)
MCV RBC AUTO: 93 FL (ref 78–100)
MONOCYTES # BLD AUTO: 0.7 10E9/L (ref 0–1.3)
MONOCYTES NFR BLD AUTO: 7.9 %
NEUTROPHILS # BLD AUTO: 6.6 10E9/L (ref 1.6–8.3)
NEUTROPHILS NFR BLD AUTO: 76 %
NRBC # BLD AUTO: 0 10*3/UL
NRBC BLD AUTO-RTO: 0 /100
PLATELET # BLD AUTO: 156 10E9/L (ref 150–450)
POTASSIUM SERPL-SCNC: 4.6 MMOL/L (ref 3.4–5.3)
RBC # BLD AUTO: 4.01 10E12/L (ref 3.8–5.2)
SODIUM SERPL-SCNC: 137 MMOL/L (ref 133–144)
TROPONIN I SERPL-MCNC: <0.015 UG/L (ref 0–0.04)
WBC # BLD AUTO: 8.7 10E9/L (ref 4–11)

## 2019-03-09 PROCEDURE — 25000125 ZZHC RX 250: Performed by: EMERGENCY MEDICINE

## 2019-03-09 PROCEDURE — 85025 COMPLETE CBC W/AUTO DIFF WBC: CPT | Performed by: EMERGENCY MEDICINE

## 2019-03-09 PROCEDURE — 93005 ELECTROCARDIOGRAM TRACING: CPT

## 2019-03-09 PROCEDURE — 71046 X-RAY EXAM CHEST 2 VIEWS: CPT

## 2019-03-09 PROCEDURE — 84484 ASSAY OF TROPONIN QUANT: CPT | Performed by: EMERGENCY MEDICINE

## 2019-03-09 PROCEDURE — 99285 EMERGENCY DEPT VISIT HI MDM: CPT | Mod: 25

## 2019-03-09 PROCEDURE — 80048 BASIC METABOLIC PNL TOTAL CA: CPT | Performed by: EMERGENCY MEDICINE

## 2019-03-09 PROCEDURE — 85610 PROTHROMBIN TIME: CPT | Performed by: EMERGENCY MEDICINE

## 2019-03-09 PROCEDURE — 96374 THER/PROPH/DIAG INJ IV PUSH: CPT

## 2019-03-09 RX ORDER — METOPROLOL TARTRATE 1 MG/ML
5 INJECTION, SOLUTION INTRAVENOUS ONCE
Status: COMPLETED | OUTPATIENT
Start: 2019-03-09 | End: 2019-03-09

## 2019-03-09 RX ADMIN — METOPROLOL TARTRATE 5 MG: 5 INJECTION, SOLUTION INTRAVENOUS at 18:34

## 2019-03-09 ASSESSMENT — ENCOUNTER SYMPTOMS
HEADACHES: 0
FEVER: 0
SHORTNESS OF BREATH: 0
COUGH: 1

## 2019-03-09 ASSESSMENT — MIFFLIN-ST. JEOR: SCORE: 1052.71

## 2019-03-09 NOTE — ED TRIAGE NOTES
Brought in by EMS from [facility] for high blood pressure from RN.  Complex was getting diastolic BP in the 110's, however EMS had BP of 144/89.  Denies any chest pain or shortness of breath.  Does have Hx of atrial fibrillation running 115-130.  Did have an increase of Metoprolol yesterday from 1/2 to full tab and this morning was first time taking full tab.

## 2019-03-09 NOTE — ED PROVIDER NOTES
"  History     Chief Complaint:  Hypertension    HPI   lEizabeth Ruggiero is a 89 year old female, with a history of atrial fibrillation, deep vein thrombosis and who is on coumadin, who presents to the ED via EMS for evaluation of hypertension. The patient reports having an elevated blood pressure 5 days ago along with an extreme cough. The patient states she was seen by a nurse at her facility today where they checked her blood pressure, which was elevated.  They were concerned and called EMS to bring her in to the ED. The patient states she feels fine and denies having headaches, chest pain, shortness of breath, or any other symptoms other than occasionally feeling palpitations.  She continues to have a cough but denies fever.       Allergies:  No known drug allergies     Medications:    Fosamax  Lopressor  Coumadin    Past Medical History:    Osteoporosis  Atrial fibrillation  Arthritis  Deep venous thrombosis    Past Surgical History:    Appendectomy  Left femur repair    Family History:    Colon cancer  Breast cancer  Diabetes    Social History:  Smoking status: Never  Alcohol use: No  Marital Status:   [2]     Review of Systems   Constitutional: Negative for fever.   Respiratory: Positive for cough. Negative for shortness of breath.    Cardiovascular: Negative for chest pain.   Neurological: Negative for headaches.   All other systems reviewed and are negative.      Physical Exam     Patient Vitals for the past 24 hrs:   BP Temp Temp src Pulse Heart Rate Resp SpO2 Height Weight   03/09/19 1900 120/79 -- -- 90 80 10 98 % -- --   03/09/19 1845 112/82 -- -- 99 90 (!) 31 96 % -- --   03/09/19 1836 (!) 101/94 -- -- 94 -- 18 97 % -- --   03/09/19 1733 137/69 97.4  F (36.3  C) Oral 102 -- 14 95 % 1.676 m (5' 6\") 62.6 kg (138 lb)     Physical Exam  Nursing note and vitals reviewed.  Constitutional:  Oriented to person, place, and time. Cooperative.   HENT:   Nose:    Nose normal.   Mouth/Throat:   Mucous membranes " are normal.   Eyes:    Conjunctivae normal and EOM are normal.      Pupils are equal, round, and reactive to light.   Neck:    Trachea normal.   Cardiovascular:  Tachycardic rate, irregularly irregular rhythm, normal heart sounds and normal pulses. No murmur heard.  Pulmonary/Chest:  Effort normal and breath sounds normal.   Abdominal:   Soft. Normal appearance and bowel sounds are normal.      There is no tenderness.      There is no rebound and no CVA tenderness.   Musculoskeletal:  Extremities atraumatic x 4.   Lymphadenopathy:  No cervical adenopathy.   Neurological:   Alert and oriented to person, place, and time. Normal strength.      No cranial nerve deficit or sensory deficit. GCS eye subscore is 4. GCS verbal subscore is 5. GCS motor subscore is 6.   Skin:    Skin is intact. No rash noted.   Psychiatric:   Normal mood and affect.  Emergency Department Course   ECG (17:43:14):  Rate 107 bpm. DE interval *. QRS duration 78. QT/QTc 318/424. P-R-T axes * -41 11. Atrial fibrillation with rapid ventricular response. Left axis deviation. Voltage criteria for left ventricular hypertrophy. Nonspecific ST abnormality. Abnormal ECG.  Interpreted at 1745 by Sandoval Perry MD.    Imaging:  Radiographic findings were communicated with the patient who voiced understanding of the findings.  XR Chest, 2 views:  Two views of the chest are performed. Chronic interstitial  lung changes are present without focal infiltrate or lung  consolidation. Small right and trace left pleural effusions are noted.  Heart is enlarged. No pneumothorax.    Imaging independently reviewed and agree with radiologist interpretation.    Laboratory:  CBC: WNL. (WBC 8.7, HGB 12.3, )     BMP: Glucose 101 (H), GFR 47 (L), o/w WNL (Creatinine 1.03)    Troponin: <0.015    INR: 3.47 (H)    Interventions:  1834: Metoprolol 5 mg IV     Emergency Department Course:  The patient arrived in the emergency department via EMS.  Past medical records,  nursing notes, and vitals reviewed.  1744: I performed an exam of the patient and obtained history, as documented above.  IV inserted and blood drawn.  The patient was sent for a chest x-ray while in the emergency department, findings above.    1915: I rechecked the patient. Findings and plan explained to the Patient. Patient discharged home with instructions regarding supportive care, medications, and reasons to return. The importance of close follow-up was reviewed.   Impression & Plan    Medical Decision Making:  This is a 89-year-old female who was sent here by EMS due to some elevated blood pressures at her residence. She has absolutely no symptoms though of end organ damage, which is reassuring.  She also has normal blood pressure here.  I proceeded with with the above workup nonetheless, and she was provided 1 small dose of IV metoprolol to help slow down her heart rate.  All of her workup is unremarkable, and her blood pressure remained normal and her heart rate returned to a normal rate as well.  Therefore at this point I feel that she is safe for discharge.  I recommended close outpatient follow-up with her physician and certainly returning with any concerns or worsening symptoms.    Diagnosis:    ICD-10-CM   1. Elevated blood pressure reading R03.0   2. Atrial fibrillation with rapid ventricular response (H) I48.91     Disposition:  discharged to home    Patrice George  3/9/2019    EMERGENCY DEPARTMENT  Scribe Disclosure:  I, Patrice George, am serving as a scribe at 5:44 PM on 3/9/2019 to document services personally performed by Sandoval Perry MD found based on my observations and the provider's statements to me.       Sandoval Perry MD  03/09/19 9479

## 2019-03-09 NOTE — ED AVS SNAPSHOT
Emergency Department  [address] [place] [id number]  Phone:  [phone]  Fax:  [phone]                                    Elizabeth Ruggiero   MRN: [id number]    Department:   Emergency Department   Date of Visit:  3/9/2019           After Visit Summary Signature Page    I have received my discharge instructions, and my questions have been answered. I have discussed any challenges I see with this plan with the nurse or doctor.    ..........................................................................................................................................  Patient/Patient Representative Signature      ..........................................................................................................................................  Patient Representative Print Name and Relationship to Patient    ..................................................               ................................................  Date                                   Time    ..........................................................................................................................................  Reviewed by Signature/Title    ...................................................              ..............................................  Date                                               Time          22EPIC Rev 08/18

## 2019-04-23 ENCOUNTER — APPOINTMENT (OUTPATIENT)
Dept: GENERAL RADIOLOGY | Facility: CLINIC | Age: 84
DRG: 871 | End: 2019-04-23
Attending: EMERGENCY MEDICINE
Payer: COMMERCIAL

## 2019-04-23 ENCOUNTER — HOSPITAL ENCOUNTER (INPATIENT)
Facility: CLINIC | Age: 84
LOS: 2 days | Discharge: HOME HEALTH | DRG: 871 | End: 2019-04-25
Attending: EMERGENCY MEDICINE | Admitting: HOSPITALIST
Payer: COMMERCIAL

## 2019-04-23 ENCOUNTER — APPOINTMENT (OUTPATIENT)
Dept: CARDIOLOGY | Facility: CLINIC | Age: 84
DRG: 871 | End: 2019-04-23
Attending: PHYSICIAN ASSISTANT
Payer: COMMERCIAL

## 2019-04-23 DIAGNOSIS — R50.9 FEVER IN ADULT: ICD-10-CM

## 2019-04-23 DIAGNOSIS — J18.9 PNEUMONIA OF RIGHT MIDDLE LOBE DUE TO INFECTIOUS ORGANISM: ICD-10-CM

## 2019-04-23 DIAGNOSIS — Z92.29 HX OF LONG-TERM (CURRENT) USE OF ANTICOAGULANTS: ICD-10-CM

## 2019-04-23 DIAGNOSIS — I48.91 ATRIAL FIBRILLATION WITH RVR (H): ICD-10-CM

## 2019-04-23 DIAGNOSIS — J96.01 ACUTE RESPIRATORY FAILURE WITH HYPOXIA (H): ICD-10-CM

## 2019-04-23 DIAGNOSIS — I48.92 ATRIAL FLUTTER WITH RAPID VENTRICULAR RESPONSE (H): Primary | ICD-10-CM

## 2019-04-23 DIAGNOSIS — A41.9 SEPSIS, DUE TO UNSPECIFIED ORGANISM: ICD-10-CM

## 2019-04-23 LAB
ALBUMIN SERPL-MCNC: 3.3 G/DL (ref 3.4–5)
ALBUMIN UR-MCNC: 30 MG/DL
ALP SERPL-CCNC: 62 U/L (ref 40–150)
ALT SERPL W P-5'-P-CCNC: 57 U/L (ref 0–50)
ANION GAP SERPL CALCULATED.3IONS-SCNC: 10 MMOL/L (ref 3–14)
ANION GAP SERPL CALCULATED.3IONS-SCNC: 13 MMOL/L (ref 3–14)
APPEARANCE UR: CLEAR
APTT PPP: 52 SEC (ref 22–37)
AST SERPL W P-5'-P-CCNC: 70 U/L (ref 0–45)
BASE DEFICIT BLDV-SCNC: 0.5 MMOL/L
BASOPHILS # BLD AUTO: 0 10E9/L (ref 0–0.2)
BASOPHILS NFR BLD AUTO: 0.5 %
BILIRUB SERPL-MCNC: 1.7 MG/DL (ref 0.2–1.3)
BILIRUB UR QL STRIP: NEGATIVE
BUN SERPL-MCNC: 19 MG/DL (ref 7–30)
BUN SERPL-MCNC: 20 MG/DL (ref 7–30)
CA-I SERPL ISE-MCNC: 4.4 MG/DL (ref 4.4–5.2)
CALCIUM SERPL-MCNC: 8.4 MG/DL (ref 8.5–10.1)
CALCIUM SERPL-MCNC: 9 MG/DL (ref 8.5–10.1)
CHLORIDE SERPL-SCNC: 95 MMOL/L (ref 94–109)
CHLORIDE SERPL-SCNC: 97 MMOL/L (ref 94–109)
CO2 SERPL-SCNC: 18 MMOL/L (ref 20–32)
CO2 SERPL-SCNC: 20 MMOL/L (ref 20–32)
COLOR UR AUTO: YELLOW
CREAT SERPL-MCNC: 0.66 MG/DL (ref 0.52–1.04)
CREAT SERPL-MCNC: 0.72 MG/DL (ref 0.52–1.04)
DIFFERENTIAL METHOD BLD: ABNORMAL
EOSINOPHIL # BLD AUTO: 0 10E9/L (ref 0–0.7)
EOSINOPHIL NFR BLD AUTO: 0 %
ERYTHROCYTE [DISTWIDTH] IN BLOOD BY AUTOMATED COUNT: 14.4 % (ref 10–15)
FLUAV+FLUBV AG SPEC QL: NEGATIVE
FLUAV+FLUBV AG SPEC QL: NEGATIVE
GFR SERPL CREATININE-BSD FRML MDRD: 73 ML/MIN/{1.73_M2}
GFR SERPL CREATININE-BSD FRML MDRD: 76 ML/MIN/{1.73_M2}
GLUCOSE SERPL-MCNC: 140 MG/DL (ref 70–99)
GLUCOSE SERPL-MCNC: 238 MG/DL (ref 70–99)
GLUCOSE UR STRIP-MCNC: NEGATIVE MG/DL
HCO3 BLDV-SCNC: 23 MMOL/L (ref 21–28)
HCT VFR BLD AUTO: 39.3 % (ref 35–47)
HGB BLD-MCNC: 13.4 G/DL (ref 11.7–15.7)
HGB UR QL STRIP: ABNORMAL
IMM GRANULOCYTES # BLD: 0 10E9/L (ref 0–0.4)
IMM GRANULOCYTES NFR BLD: 0.4 %
INR PPP: 3.23 (ref 0.86–1.14)
INTERPRETATION ECG - MUSE: NORMAL
KETONES UR STRIP-MCNC: NEGATIVE MG/DL
LACTATE BLD-SCNC: 1.7 MMOL/L (ref 0.7–2)
LACTATE BLD-SCNC: 2.5 MMOL/L (ref 0.7–2)
LACTATE BLD-SCNC: 4.3 MMOL/L (ref 0.7–2)
LEUKOCYTE ESTERASE UR QL STRIP: NEGATIVE
LYMPHOCYTES # BLD AUTO: 1.4 10E9/L (ref 0.8–5.3)
LYMPHOCYTES NFR BLD AUTO: 16.6 %
MCH RBC QN AUTO: 31.2 PG (ref 26.5–33)
MCHC RBC AUTO-ENTMCNC: 34.1 G/DL (ref 31.5–36.5)
MCV RBC AUTO: 92 FL (ref 78–100)
MONOCYTES # BLD AUTO: 1.1 10E9/L (ref 0–1.3)
MONOCYTES NFR BLD AUTO: 13.2 %
MUCOUS THREADS #/AREA URNS LPF: PRESENT /LPF
NEUTROPHILS # BLD AUTO: 5.8 10E9/L (ref 1.6–8.3)
NEUTROPHILS NFR BLD AUTO: 69.3 %
NITRATE UR QL: NEGATIVE
NRBC # BLD AUTO: 0 10*3/UL
NRBC BLD AUTO-RTO: 0 /100
NT-PROBNP SERPL-MCNC: 4923 PG/ML (ref 0–1800)
NT-PROBNP SERPL-MCNC: 5811 PG/ML (ref 0–1800)
O2/TOTAL GAS SETTING VFR VENT: ABNORMAL %
PCO2 BLDV: 33 MM HG (ref 40–50)
PH BLDV: 7.45 PH (ref 7.32–7.43)
PH UR STRIP: 6 PH (ref 5–7)
PLATELET # BLD AUTO: 123 10E9/L (ref 150–450)
PO2 BLDV: 40 MM HG (ref 25–47)
POTASSIUM SERPL-SCNC: 4 MMOL/L (ref 3.4–5.3)
POTASSIUM SERPL-SCNC: 4 MMOL/L (ref 3.4–5.3)
PROT SERPL-MCNC: 6.8 G/DL (ref 6.8–8.8)
RBC # BLD AUTO: 4.29 10E12/L (ref 3.8–5.2)
RBC #/AREA URNS AUTO: 51 /HPF (ref 0–2)
SODIUM SERPL-SCNC: 126 MMOL/L (ref 133–144)
SODIUM SERPL-SCNC: 127 MMOL/L (ref 133–144)
SOURCE: ABNORMAL
SP GR UR STRIP: 1.02 (ref 1–1.03)
SPECIMEN SOURCE: NORMAL
TROPONIN I SERPL-MCNC: 0.03 UG/L (ref 0–0.04)
UROBILINOGEN UR STRIP-MCNC: NORMAL MG/DL (ref 0–2)
WBC # BLD AUTO: 8.3 10E9/L (ref 4–11)
WBC #/AREA URNS AUTO: 1 /HPF (ref 0–5)

## 2019-04-23 PROCEDURE — 83880 ASSAY OF NATRIURETIC PEPTIDE: CPT | Performed by: EMERGENCY MEDICINE

## 2019-04-23 PROCEDURE — 80048 BASIC METABOLIC PNL TOTAL CA: CPT | Performed by: PHYSICIAN ASSISTANT

## 2019-04-23 PROCEDURE — 85610 PROTHROMBIN TIME: CPT | Performed by: EMERGENCY MEDICINE

## 2019-04-23 PROCEDURE — 40000275 ZZH STATISTIC RCP TIME EA 10 MIN

## 2019-04-23 PROCEDURE — 25000125 ZZHC RX 250

## 2019-04-23 PROCEDURE — 99291 CRITICAL CARE FIRST HOUR: CPT | Performed by: PHYSICIAN ASSISTANT

## 2019-04-23 PROCEDURE — 93306 TTE W/DOPPLER COMPLETE: CPT

## 2019-04-23 PROCEDURE — 96361 HYDRATE IV INFUSION ADD-ON: CPT

## 2019-04-23 PROCEDURE — 87804 INFLUENZA ASSAY W/OPTIC: CPT | Performed by: EMERGENCY MEDICINE

## 2019-04-23 PROCEDURE — 25000125 ZZHC RX 250: Performed by: EMERGENCY MEDICINE

## 2019-04-23 PROCEDURE — 83605 ASSAY OF LACTIC ACID: CPT | Performed by: PHYSICIAN ASSISTANT

## 2019-04-23 PROCEDURE — 83605 ASSAY OF LACTIC ACID: CPT | Performed by: EMERGENCY MEDICINE

## 2019-04-23 PROCEDURE — 80053 COMPREHEN METABOLIC PANEL: CPT | Performed by: EMERGENCY MEDICINE

## 2019-04-23 PROCEDURE — 25000128 H RX IP 250 OP 636: Performed by: HOSPITALIST

## 2019-04-23 PROCEDURE — 99207 ZZC APP CREDIT; MD BILLING SHARED VISIT: CPT | Performed by: HOSPITALIST

## 2019-04-23 PROCEDURE — 99285 EMERGENCY DEPT VISIT HI MDM: CPT | Mod: 25

## 2019-04-23 PROCEDURE — 94660 CPAP INITIATION&MGMT: CPT

## 2019-04-23 PROCEDURE — 82803 BLOOD GASES ANY COMBINATION: CPT | Performed by: EMERGENCY MEDICINE

## 2019-04-23 PROCEDURE — 84484 ASSAY OF TROPONIN QUANT: CPT | Performed by: EMERGENCY MEDICINE

## 2019-04-23 PROCEDURE — 25000132 ZZH RX MED GY IP 250 OP 250 PS 637: Performed by: EMERGENCY MEDICINE

## 2019-04-23 PROCEDURE — 25800030 ZZH RX IP 258 OP 636: Performed by: EMERGENCY MEDICINE

## 2019-04-23 PROCEDURE — 71046 X-RAY EXAM CHEST 2 VIEWS: CPT

## 2019-04-23 PROCEDURE — 82330 ASSAY OF CALCIUM: CPT | Performed by: PHYSICIAN ASSISTANT

## 2019-04-23 PROCEDURE — 25000128 H RX IP 250 OP 636: Performed by: EMERGENCY MEDICINE

## 2019-04-23 PROCEDURE — 96375 TX/PRO/DX INJ NEW DRUG ADDON: CPT

## 2019-04-23 PROCEDURE — 87040 BLOOD CULTURE FOR BACTERIA: CPT | Performed by: EMERGENCY MEDICINE

## 2019-04-23 PROCEDURE — 83880 ASSAY OF NATRIURETIC PEPTIDE: CPT | Performed by: PHYSICIAN ASSISTANT

## 2019-04-23 PROCEDURE — 94640 AIRWAY INHALATION TREATMENT: CPT | Mod: 76

## 2019-04-23 PROCEDURE — 87086 URINE CULTURE/COLONY COUNT: CPT | Performed by: EMERGENCY MEDICINE

## 2019-04-23 PROCEDURE — 36415 COLL VENOUS BLD VENIPUNCTURE: CPT

## 2019-04-23 PROCEDURE — 93306 TTE W/DOPPLER COMPLETE: CPT | Mod: 26 | Performed by: INTERNAL MEDICINE

## 2019-04-23 PROCEDURE — 85025 COMPLETE CBC W/AUTO DIFF WBC: CPT | Performed by: EMERGENCY MEDICINE

## 2019-04-23 PROCEDURE — 94640 AIRWAY INHALATION TREATMENT: CPT

## 2019-04-23 PROCEDURE — 81001 URINALYSIS AUTO W/SCOPE: CPT | Performed by: EMERGENCY MEDICINE

## 2019-04-23 PROCEDURE — 21000001 ZZH R&B HEART CARE

## 2019-04-23 PROCEDURE — 93005 ELECTROCARDIOGRAM TRACING: CPT

## 2019-04-23 PROCEDURE — 96365 THER/PROPH/DIAG IV INF INIT: CPT

## 2019-04-23 PROCEDURE — 36415 COLL VENOUS BLD VENIPUNCTURE: CPT | Performed by: PHYSICIAN ASSISTANT

## 2019-04-23 PROCEDURE — 96367 TX/PROPH/DG ADDL SEQ IV INF: CPT

## 2019-04-23 PROCEDURE — 85730 THROMBOPLASTIN TIME PARTIAL: CPT | Performed by: EMERGENCY MEDICINE

## 2019-04-23 RX ORDER — ONDANSETRON 2 MG/ML
4 INJECTION INTRAMUSCULAR; INTRAVENOUS ONCE
Status: COMPLETED | OUTPATIENT
Start: 2019-04-23 | End: 2019-04-23

## 2019-04-23 RX ORDER — DILTIAZEM HYDROCHLORIDE 5 MG/ML
10 INJECTION INTRAVENOUS EVERY 6 HOURS PRN
Status: DISCONTINUED | OUTPATIENT
Start: 2019-04-23 | End: 2019-04-25 | Stop reason: HOSPADM

## 2019-04-23 RX ORDER — HYDRALAZINE HYDROCHLORIDE 20 MG/ML
10 INJECTION INTRAMUSCULAR; INTRAVENOUS EVERY 4 HOURS PRN
Status: DISCONTINUED | OUTPATIENT
Start: 2019-04-23 | End: 2019-04-25 | Stop reason: HOSPADM

## 2019-04-23 RX ORDER — CEFTRIAXONE 2 G/1
2 INJECTION, POWDER, FOR SOLUTION INTRAMUSCULAR; INTRAVENOUS EVERY 24 HOURS
Status: DISCONTINUED | OUTPATIENT
Start: 2019-04-24 | End: 2019-04-25 | Stop reason: HOSPADM

## 2019-04-23 RX ORDER — CEFTRIAXONE 2 G/1
2 INJECTION, POWDER, FOR SOLUTION INTRAMUSCULAR; INTRAVENOUS ONCE
Status: COMPLETED | OUTPATIENT
Start: 2019-04-23 | End: 2019-04-23

## 2019-04-23 RX ORDER — IPRATROPIUM BROMIDE AND ALBUTEROL SULFATE 2.5; .5 MG/3ML; MG/3ML
3 SOLUTION RESPIRATORY (INHALATION)
Status: DISCONTINUED | OUTPATIENT
Start: 2019-04-23 | End: 2019-04-23

## 2019-04-23 RX ORDER — AMOXICILLIN 250 MG
2 CAPSULE ORAL 2 TIMES DAILY PRN
Status: DISCONTINUED | OUTPATIENT
Start: 2019-04-23 | End: 2019-04-25 | Stop reason: HOSPADM

## 2019-04-23 RX ORDER — PROCHLORPERAZINE MALEATE 5 MG
5 TABLET ORAL EVERY 6 HOURS PRN
Status: DISCONTINUED | OUTPATIENT
Start: 2019-04-23 | End: 2019-04-25 | Stop reason: HOSPADM

## 2019-04-23 RX ORDER — BISACODYL 10 MG
10 SUPPOSITORY, RECTAL RECTAL DAILY PRN
Status: DISCONTINUED | OUTPATIENT
Start: 2019-04-23 | End: 2019-04-25 | Stop reason: HOSPADM

## 2019-04-23 RX ORDER — ONDANSETRON 2 MG/ML
4 INJECTION INTRAMUSCULAR; INTRAVENOUS EVERY 6 HOURS PRN
Status: DISCONTINUED | OUTPATIENT
Start: 2019-04-23 | End: 2019-04-25 | Stop reason: HOSPADM

## 2019-04-23 RX ORDER — DILTIAZEM HYDROCHLORIDE 5 MG/ML
10 INJECTION INTRAVENOUS ONCE
Status: COMPLETED | OUTPATIENT
Start: 2019-04-23 | End: 2019-04-23

## 2019-04-23 RX ORDER — PROCHLORPERAZINE 25 MG
12.5 SUPPOSITORY, RECTAL RECTAL EVERY 12 HOURS PRN
Status: DISCONTINUED | OUTPATIENT
Start: 2019-04-23 | End: 2019-04-25 | Stop reason: HOSPADM

## 2019-04-23 RX ORDER — ACETAMINOPHEN 325 MG/1
650 TABLET ORAL ONCE
Status: COMPLETED | OUTPATIENT
Start: 2019-04-23 | End: 2019-04-23

## 2019-04-23 RX ORDER — MV-MIN/FA/VIT K/LUTEIN/ZEAXANT 200MCG-5MG
1 CAPSULE ORAL 2 TIMES DAILY
COMMUNITY
End: 2020-11-06

## 2019-04-23 RX ORDER — METHYLPREDNISOLONE SODIUM SUCCINATE 125 MG/2ML
125 INJECTION, POWDER, LYOPHILIZED, FOR SOLUTION INTRAMUSCULAR; INTRAVENOUS ONCE
Status: COMPLETED | OUTPATIENT
Start: 2019-04-23 | End: 2019-04-23

## 2019-04-23 RX ORDER — LEVALBUTEROL 1.25 MG/.5ML
1.25 SOLUTION, CONCENTRATE RESPIRATORY (INHALATION) EVERY 4 HOURS PRN
Status: DISCONTINUED | OUTPATIENT
Start: 2019-04-23 | End: 2019-04-25 | Stop reason: HOSPADM

## 2019-04-23 RX ORDER — NALOXONE HYDROCHLORIDE 0.4 MG/ML
.1-.4 INJECTION, SOLUTION INTRAMUSCULAR; INTRAVENOUS; SUBCUTANEOUS
Status: DISCONTINUED | OUTPATIENT
Start: 2019-04-23 | End: 2019-04-25 | Stop reason: HOSPADM

## 2019-04-23 RX ORDER — IPRATROPIUM BROMIDE AND ALBUTEROL SULFATE 2.5; .5 MG/3ML; MG/3ML
SOLUTION RESPIRATORY (INHALATION)
Status: COMPLETED
Start: 2019-04-23 | End: 2019-04-23

## 2019-04-23 RX ORDER — ACETAMINOPHEN 325 MG/1
650 TABLET ORAL EVERY 4 HOURS PRN
Status: DISCONTINUED | OUTPATIENT
Start: 2019-04-23 | End: 2019-04-25 | Stop reason: HOSPADM

## 2019-04-23 RX ORDER — AZITHROMYCIN 500 MG/1
500 INJECTION, POWDER, LYOPHILIZED, FOR SOLUTION INTRAVENOUS ONCE
Status: DISCONTINUED | OUTPATIENT
Start: 2019-04-23 | End: 2019-04-23

## 2019-04-23 RX ORDER — ONDANSETRON 4 MG/1
4 TABLET, ORALLY DISINTEGRATING ORAL EVERY 6 HOURS PRN
Status: DISCONTINUED | OUTPATIENT
Start: 2019-04-23 | End: 2019-04-25 | Stop reason: HOSPADM

## 2019-04-23 RX ORDER — SODIUM CHLORIDE 9 MG/ML
INJECTION, SOLUTION INTRAVENOUS CONTINUOUS
Status: DISCONTINUED | OUTPATIENT
Start: 2019-04-23 | End: 2019-04-23

## 2019-04-23 RX ORDER — GUAIFENESIN/DEXTROMETHORPHAN 100-10MG/5
10 SYRUP ORAL EVERY 4 HOURS PRN
Status: DISCONTINUED | OUTPATIENT
Start: 2019-04-23 | End: 2019-04-25 | Stop reason: HOSPADM

## 2019-04-23 RX ORDER — AMOXICILLIN 250 MG
1 CAPSULE ORAL 2 TIMES DAILY PRN
Status: DISCONTINUED | OUTPATIENT
Start: 2019-04-23 | End: 2019-04-25 | Stop reason: HOSPADM

## 2019-04-23 RX ADMIN — DILTIAZEM HYDROCHLORIDE 10 MG: 5 INJECTION INTRAVENOUS at 17:43

## 2019-04-23 RX ADMIN — METHYLPREDNISOLONE SODIUM SUCCINATE 125 MG: 125 INJECTION, POWDER, FOR SOLUTION INTRAMUSCULAR; INTRAVENOUS at 16:44

## 2019-04-23 RX ADMIN — AZITHROMYCIN MONOHYDRATE 500 MG: 500 INJECTION, POWDER, LYOPHILIZED, FOR SOLUTION INTRAVENOUS at 18:21

## 2019-04-23 RX ADMIN — ONDANSETRON 4 MG: 2 INJECTION INTRAMUSCULAR; INTRAVENOUS at 18:52

## 2019-04-23 RX ADMIN — IPRATROPIUM BROMIDE AND ALBUTEROL SULFATE 3 ML: .5; 3 SOLUTION RESPIRATORY (INHALATION) at 19:23

## 2019-04-23 RX ADMIN — IPRATROPIUM BROMIDE AND ALBUTEROL SULFATE 3 ML: .5; 3 SOLUTION RESPIRATORY (INHALATION) at 16:43

## 2019-04-23 RX ADMIN — CEFTRIAXONE SODIUM 2 G: 2 INJECTION, POWDER, FOR SOLUTION INTRAMUSCULAR; INTRAVENOUS at 17:47

## 2019-04-23 RX ADMIN — PROCHLORPERAZINE EDISYLATE 5 MG: 5 INJECTION INTRAMUSCULAR; INTRAVENOUS at 19:26

## 2019-04-23 RX ADMIN — SODIUM CHLORIDE, POTASSIUM CHLORIDE, SODIUM LACTATE AND CALCIUM CHLORIDE 1000 ML: 600; 310; 30; 20 INJECTION, SOLUTION INTRAVENOUS at 16:45

## 2019-04-23 RX ADMIN — ACETAMINOPHEN 650 MG: 325 TABLET, FILM COATED ORAL at 16:44

## 2019-04-23 ASSESSMENT — MIFFLIN-ST. JEOR
SCORE: 1045.75
SCORE: 1061.79

## 2019-04-23 ASSESSMENT — ENCOUNTER SYMPTOMS
VOMITING: 0
NAUSEA: 0
COUGH: 1
WHEEZING: 1
ABDOMINAL PAIN: 0
SHORTNESS OF BREATH: 1

## 2019-04-23 ASSESSMENT — ACTIVITIES OF DAILY LIVING (ADL): ADLS_ACUITY_SCORE: 18

## 2019-04-23 NOTE — ED TRIAGE NOTES
"Pt arrives through triage with daughter. C/O weakness, dehydration, cough, \"her primary is concerned about pneumonia or a virus.\"  "

## 2019-04-23 NOTE — PHARMACY-ADMISSION MEDICATION HISTORY
Admission medication history interview status for the 4/23/2019  admission is complete. See EPIC admission navigator for prior to admission medications     Medication history source reliability:Good    Actions taken by pharmacist (provider contacted, etc): Interviewed patient who manages her own medications at home.     Additional medication history information not noted on PTA med list : Minimal appetite last few days and diarrhea may contribute to her elevated INR on admission. Usually takes alendronate on Sundays, but this week she took it today (Tuesday 4/23).    Medication reconciliation/reorder completed by provider prior to medication history? No    Time spent in this activity: 15 minutes    Prior to Admission medications    Medication Sig Last Dose Taking? Auth Provider   Acetaminophen (TYLENOL PO) Take 325 mg by mouth every 6 hours as needed  4/23/2019 at am Yes Reported, Patient   alendronate (FOSAMAX) 70 MG tablet Take 70 mg by mouth every 7 days SUNDAYS 4/23/2019 at am Yes Reported, Patient   calcium carbonate-vitamin D (OSCAL W/D) 500-200 MG-UNIT tablet Take 1 tablet by mouth 2 times daily 4/23/2019 at am Yes Reported, Patient   metoprolol tartrate (LOPRESSOR) 25 MG tablet Take 12.5 mg by mouth 2 times daily 4/23/2019 at am Yes Reported, Patient   Multiple Vitamins-Minerals (PRESERVISION AREDS 2+MULTI VIT) CAPS Take 1 tablet by mouth 2 times daily 4/23/2019 at am Yes Unknown, Entered By History   Warfarin Sodium (COUMADIN PO) Take by mouth daily 2.5 mg daily 4/22/2019 at pm Yes Reported, Patient     Cheyenne Fuentes, PharmD   [phone]

## 2019-04-23 NOTE — ED PROVIDER NOTES
"  History     Chief Complaint:  Generalized weakness    MIMI Ruggiero is a 89 year old female, with a history of atrial fibrillation, who presents with generalized weakness and cough. The patient states that she has been feeling unwell for approximately a week with cough and cold like symptoms, but 2 days ago, the patient noted some wheezing with cough. She had contacted her doctor who had referred her to the ED for potential pneumonia and dehydration work up and treatment. Here, the patient denies any other associated symptoms including fever, chest pain, abdominal pain, nausea, and vomiting. She additionally denies any changes in shortness of breath with exertion, history of asthma, smoking, or COPD.     Allergies:  Food      Medications:    Fosamax  Metoprolol  Coumadin     Past Medical History:    Arthritis  Atrial fibrillation  DVT  Hyperlipidemia  Acute left hemiparesis    Past Surgical History:    Appendectomy  Fracture surgery  Repair left femur    Family History:    Colon cancer  Breast cancer  Diabetes    Social History:  Negative for alcohol use.  Negative for tobacco use.   Marital Status:   [5]     Review of Systems   Respiratory: Positive for cough, shortness of breath and wheezing.    Cardiovascular: Negative for chest pain.   Gastrointestinal: Negative for abdominal pain, nausea and vomiting.   All other systems reviewed and are negative.      Physical Exam   First Vitals:  BP: 139/84  Heart Rate: 148  Temp: 99.3  F (37.4  C)  Resp: 18  Height: 167.6 cm (5' 6\")  Weight: 63.5 kg (140 lb)  SpO2: 94 %    Physical Exam  General: Alert, appears well-developed and well-nourished. Cooperative.     In mild distress  HEENT:  Head:  Atraumatic  Ears:  External ears are normal  Mouth/Throat:  Oropharynx is without erythema or exudate and mucous membranes are dry.   Eyes:   Conjunctivae normal and EOM are normal. No scleral icterus.  CV:  Tachycardic irregular rate, irregular rhythm, normal heart " sounds and radial pulses are 2+ and symmetric.  No murmur.  Resp:  Breath sounds are coarse bilaterally with inspiratory and expiratory wheezing.     Non-labored, no retractions or accessory muscle use  GI:  Abdomen is soft, no distension, no tenderness. No rebound or guarding.  No CVA tenderness bilaterally  MS:  Normal range of motion. No edema.    Normal strength in all 4 extremities.     Back atraumatic.    No midline cervical, thoracic, or lumbar tenderness  Skin:  Warm and dry.  No rash or lesions noted.  Neuro: Alert. Normal strength.  GCS: 15  Psych:  Normal mood and affect.  Lymph: No anterior or posterior cervical lymphadenopathy noted.       Emergency Department Course   ECG:  Indication: generalized weakness  Time: 1612  Vent. Rate 120 bpm. NV interval *. QRS duration 86. QT/QTc 320/452. P-R-T axis * 86 31. Atrial flutter with variable AV block. Abnormal ECG. Read time: 1615. No significant changes to EKG dated 3/9/19.      Imaging:  Radiographic findings were communicated with the patient who voiced understanding of the findings.  XR Chest 2 views:   Enlarged cardiac silhouette is again seen. Likely mild  pulmonary edema. Patchy opacity in the RIGHT midlung suggests  pneumonia. Likely trace bilateral pleural effusions. No pneumothorax  seen on either side. As per radiology.     Laboratory:  CBC: WBC: 8.3, HGB: 13.4, PLT: 123 (L)  CMP: Glucose 140 (H), Sodium: 127 (L), Bilirubin total: 1.7 (H), Albumin 3.3 (L), ALT: 57 (H), AST: 70 (H), o/w WNL (Creatinine: 0.66)    Blood gas venous: pH: 7.45 (H), CO2: 33 (L)  INR: 3.23 (H)  Influenza A/B: negative   BNP: 4923 (H)  Lactic acid: 1.7  Troponin: 0.032  PTT: 52 (H)  Blood culture: Pending    Interventions:    1643 Duoneb, 3 mL, nebulization  1644 Tylenol, 650 mg, PO  1644 Solu-Medrol, 125 mg, IV  1645 0.9% Sodium Chloride Bolus, 1L, IV   Azithromycin and Rocephin: ordered     Emergency Department Course:  Nursing notes and vitals reviewed. EKG was obtained,  findings above.      1602  I performed an exam of the patient as documented above.     IV inserted. Medicine administered as documented above. Blood drawn. This was sent to the lab for further testing, results above.    The patient was sent for a chest while in the emergency department, findings above.     1705 I rechecked the patient and discussed the results of her workup thus far.     1710  I consulted with Dr. Serrano of the hospitalist services. They are in agreement to accept the patient for admission.    Findings and plan explained to the Patient who consents to admission. Discussed the patient with Dr. Serrano, who will admit the patient to a medical tele bed for further monitoring, evaluation, and treatment.       Impression & Plan      Medical Decision Making:  Patient is a 89-year-old female with a history of atrial fibrillation on chronic anticoagulation who presents with weakness, dehydration, cough, and fatigue.  Patient with diffuse bilateral inspiratory and expiratory wheezing as well as shortness of breath & hypoxia.  On room air, patient had desaturations into the mid 80s.  She was started on 2 L of oxygen by nasal cannula.  Influenza swab negative.  Chest x-ray concerning for right middle lobe pneumonia.  Patient with no prior history of ACS nor heart failure.  Her BNP is mildly elevated at 4923.  Patient has no signs of fluid overload on exam with no lower extremity edema.  I do have concern patient's presentation is due to sepsis and likely right middle lobe pneumonia with a temperature of 101.5  F.  No signs of severe sepsis or septic shock.  She persisted in a fib with RVR here in ED and ultimately was given 10mg IV diltiazem with proper rate control of her a fib. She was given Tylenol as well as antibiotics of ceftriaxone and azithromycin.  We will continue to monitor her INR closely since she is receiving macrolide.  Due to the diffuse wheezing give the patient a dose of methylprednisolone  as well as nebulization treatments here.  She has no prior history of COPD or smoking.  Ultimately, due to combination of symptoms as well as need for oxygen, patient will be admitted to the hospital.  Spoke with Dr. Serrano of the hospitalist service who agreed to inpatient admission at this time.    Diagnosis:    ICD-10-CM    1. Pneumonia of right middle lobe due to infectious organism (H) J18.1 Comprehensive metabolic panel     Troponin I (now)     BNP   2. Fever in adult R50.9    3. Sepsis, due to unspecified organism (H) A41.9    4. Atrial fibrillation with RVR (H) I48.91    5. Hx of long-term (current) use of anticoagulants Z79.01        Disposition:  Admitted to Dr. Serrano     I, Yu Trinidad, am serving as a scribe on 4/23/2019 at 4:35 PM to personally document services performed by Dwight Mendieta MD based on my observations and the provider's statements to me.      Yu Trinidad  4/23/2019    EMERGENCY DEPARTMENT       Dwight Mendieta MD  04/23/19 1817       Dwight Mendieta MD  04/23/19 1819

## 2019-04-23 NOTE — ED NOTES
Bed: ED10  Expected date:   Expected time:   Means of arrival:   Comments:  Triage - virus vs dehydration vs PNA

## 2019-04-23 NOTE — ED NOTES
"[facility]  ED Nurse Handoff Report    ED Chief complaint: Generalized Weakness (Weakness, dehydration, cough, \"her primary is concerned about pneumonia or a virus.\")      ED Diagnosis:   Final diagnoses:   Pneumonia of right middle lobe due to infectious organism (H)   Fever in adult   Sepsis, due to unspecified organism (H)   Atrial fibrillation with RVR (H)   Hx of long-term (current) use of anticoagulants   Acute respiratory failure with hypoxia (H)       Code Status: Needs to be determined    Allergies:   Allergies   Allergen Reactions     Food      PN: LW FI1: vegetarian  dairy ok LW FI2:       Activity level - Baseline/Home:  Independent     Activity Level - Current:   Stand with Assist     Needed?: No    Isolation: No  Infection: Not Applicable  Bariatric?: No    Vital Signs:   Vitals:    04/23/19 1604 04/23/19 1616 04/23/19 1701 04/23/19 1715   BP: 139/84   (!) 126/100   Pulse:    130   Resp: 18  23 14   Temp: 99.3  F (37.4  C) 101.5  F (38.6  C)     TempSrc: Oral Rectal     SpO2: 94%  (!) 86% 95%   Weight: 63.5 kg (140 lb)      Height: 1.676 m (5' 6\")          Cardiac Rhythm: ,   Cardiac  Cardiac Rhythm: Atrial fibrillation    Pain level: 0-10 Pain Scale: 0    Is this patient confused?: No   Does this patient have a guardian?  No         If yes, is there guardianship documents in the Epic \"Code/ACP\" activity?  N/A         Guardian Notified?  N/A  [place] - Suicide Severity Rating Scale Completed?  Yes  If yes, what color did the patient score?  White    Patient Report: Initial Complaint: weakness  Focused Assessment: audible crackles and cough; pt reports increase of weakness in the last couple days \"I am not usually like this and I'm usually up and about\"     Tests Performed: labs (cultures) and imaging   Abnormal Results:   Labs Ordered and Resulted from Time of ED Arrival Up to the Time of Departure from the ED   CBC WITH PLATELETS DIFFERENTIAL - Abnormal; Notable for the " following components:       Result Value    Platelet Count 123 (*)     All other components within normal limits   COMPREHENSIVE METABOLIC PANEL - Abnormal; Notable for the following components:    Sodium 127 (*)     Glucose 140 (*)     Bilirubin Total 1.7 (*)     Albumin 3.3 (*)     ALT 57 (*)     AST 70 (*)     All other components within normal limits   ROUTINE UA WITH MICROSCOPIC - Abnormal; Notable for the following components:    Blood Urine Moderate (*)     Protein Albumin Urine 30 (*)     RBC Urine 51 (*)     Mucous Urine Present (*)     All other components within normal limits   INR - Abnormal; Notable for the following components:    INR 3.23 (*)     All other components within normal limits   PARTIAL THROMBOPLASTIN TIME - Abnormal; Notable for the following components:    PTT 52 (*)     All other components within normal limits   BLOOD GAS VENOUS - Abnormal; Notable for the following components:    Ph Venous 7.45 (*)     PCO2 Venous 33 (*)     All other components within normal limits   NT PROBNP INPATIENT - Abnormal; Notable for the following components:    N-Terminal Pro BNP Inpatient 4,923 (*)     All other components within normal limits   LACTIC ACID WHOLE BLOOD   TROPONIN I   PULSE OXIMETRY NURSING   CARDIAC CONTINUOUS MONITORING   MEASURE URINE OUTPUT   PATIENT CARE ORDER   URINE CULTURE AEROBIC BACTERIAL   BLOOD CULTURE   BLOOD CULTURE   INFLUENZA A/B ANTIGEN       Treatments provided: abx, fluids    Family Comments: daughter at bedside, supportive     OBS brochure/video discussed/provided to patient/family: N/A            ED Medications:   Medications   ipratropium - albuterol 0.5 mg/2.5 mg/3 mL (DUONEB) neb solution 3 mL (3 mLs Nebulization Given 4/23/19 3907)   cefTRIAXone (ROCEPHIN) 2 g vial to attach to  ml bag for ADULTS or NS 50 ml bag for PEDS (has no administration in time range)   azithromycin (ZITHROMAX) 500 mg vial to attach to  mL bag (has no administration in time range)    diltiazem (CARDIZEM) injection 10 mg (has no administration in time range)   lactated ringers BOLUS 1,905 mL (1,000 mLs Intravenous New Bag 4/23/19 1645)   methylPREDNISolone sodium succinate (solu-MEDROL) injection 125 mg (125 mg Intravenous Given 4/23/19 1644)   acetaminophen (TYLENOL) tablet 650 mg (650 mg Oral Given 4/23/19 1644)       Drips infusing?:  Yes    For the majority of the shift this patient was Green.   Interventions performed were .    Severe Sepsis OR Septic Shock Diagnosis Present:   Yes    Per the ED Provider, Time Zero for severe sepsis or septic shock is:      3 Hour Severe Sepsis Bundle Completion:  1. Initial Lactic Acid Result:   Recent Labs   Lab Test 04/23/19  1622   LACT 1.7     2. Blood Cultures before Antibiotics: Yes  3. Broad Spectrum Antibiotics Administered:     Anti-infectives (From now, onward)    Start     Dose/Rate Route Frequency Ordered Stop    04/23/19 1707  cefTRIAXone (ROCEPHIN) 2 g vial to attach to  ml bag for ADULTS or NS 50 ml bag for PEDS      2 g  over 30 Minutes Intravenous ONCE 04/23/19 1706      04/23/19 1707  azithromycin (ZITHROMAX) 500 mg vial to attach to  mL bag      500 mg  over 1 Hours Intravenous ONCE 04/23/19 1706          4. 1000 ml of IV fluids have been given so far      6 Hour Severe Sepsis Bundle Completion:    1. Repeat Lactic Acid Level: Not drawn  2. Patient currently on Vasopressors =  No    To be done/followed up on inpatient unit:  Zithromax needs to be hung     ED NURSE PHONE NUMBER: *[phone]

## 2019-04-24 ENCOUNTER — APPOINTMENT (OUTPATIENT)
Dept: PHYSICAL THERAPY | Facility: CLINIC | Age: 84
DRG: 871 | End: 2019-04-24
Attending: HOSPITALIST
Payer: COMMERCIAL

## 2019-04-24 ENCOUNTER — APPOINTMENT (OUTPATIENT)
Dept: GENERAL RADIOLOGY | Facility: CLINIC | Age: 84
DRG: 871 | End: 2019-04-24
Attending: PHYSICIAN ASSISTANT
Payer: COMMERCIAL

## 2019-04-24 LAB
ANION GAP SERPL CALCULATED.3IONS-SCNC: 8 MMOL/L (ref 3–14)
BACTERIA SPEC CULT: NO GROWTH
BASOPHILS # BLD AUTO: 0 10E9/L (ref 0–0.2)
BASOPHILS NFR BLD AUTO: 1.1 %
BUN SERPL-MCNC: 18 MG/DL (ref 7–30)
CALCIUM SERPL-MCNC: 8.3 MG/DL (ref 8.5–10.1)
CHLORIDE SERPL-SCNC: 101 MMOL/L (ref 94–109)
CO2 SERPL-SCNC: 24 MMOL/L (ref 20–32)
CREAT SERPL-MCNC: 0.69 MG/DL (ref 0.52–1.04)
DIFFERENTIAL METHOD BLD: ABNORMAL
EOSINOPHIL # BLD AUTO: 0 10E9/L (ref 0–0.7)
EOSINOPHIL NFR BLD AUTO: 0 %
ERYTHROCYTE [DISTWIDTH] IN BLOOD BY AUTOMATED COUNT: 14.1 % (ref 10–15)
GFR SERPL CREATININE-BSD FRML MDRD: 75 ML/MIN/{1.73_M2}
GLUCOSE SERPL-MCNC: 134 MG/DL (ref 70–99)
HCT VFR BLD AUTO: 35.2 % (ref 35–47)
HGB BLD-MCNC: 12.1 G/DL (ref 11.7–15.7)
IMM GRANULOCYTES # BLD: 0 10E9/L (ref 0–0.4)
IMM GRANULOCYTES NFR BLD: 0.3 %
INR PPP: 3.44 (ref 0.86–1.14)
LACTATE BLD-SCNC: 1.9 MMOL/L (ref 0.7–2)
LYMPHOCYTES # BLD AUTO: 0.9 10E9/L (ref 0.8–5.3)
LYMPHOCYTES NFR BLD AUTO: 25.9 %
Lab: NORMAL
MCH RBC QN AUTO: 31.3 PG (ref 26.5–33)
MCHC RBC AUTO-ENTMCNC: 34.4 G/DL (ref 31.5–36.5)
MCV RBC AUTO: 91 FL (ref 78–100)
MONOCYTES # BLD AUTO: 0.1 10E9/L (ref 0–1.3)
MONOCYTES NFR BLD AUTO: 3.4 %
NEUTROPHILS # BLD AUTO: 2.4 10E9/L (ref 1.6–8.3)
NEUTROPHILS NFR BLD AUTO: 69.3 %
NRBC # BLD AUTO: 0 10*3/UL
NRBC BLD AUTO-RTO: 0 /100
PLATELET # BLD AUTO: 109 10E9/L (ref 150–450)
POTASSIUM SERPL-SCNC: 4 MMOL/L (ref 3.4–5.3)
RBC # BLD AUTO: 3.87 10E12/L (ref 3.8–5.2)
SODIUM SERPL-SCNC: 133 MMOL/L (ref 133–144)
SPECIMEN SOURCE: NORMAL
WBC # BLD AUTO: 3.5 10E9/L (ref 4–11)

## 2019-04-24 PROCEDURE — 25000132 ZZH RX MED GY IP 250 OP 250 PS 637: Performed by: INTERNAL MEDICINE

## 2019-04-24 PROCEDURE — 97116 GAIT TRAINING THERAPY: CPT | Mod: GP

## 2019-04-24 PROCEDURE — 21000001 ZZH R&B HEART CARE

## 2019-04-24 PROCEDURE — 36415 COLL VENOUS BLD VENIPUNCTURE: CPT | Performed by: PHYSICIAN ASSISTANT

## 2019-04-24 PROCEDURE — 80048 BASIC METABOLIC PNL TOTAL CA: CPT | Performed by: HOSPITALIST

## 2019-04-24 PROCEDURE — 85610 PROTHROMBIN TIME: CPT | Performed by: HOSPITALIST

## 2019-04-24 PROCEDURE — 71046 X-RAY EXAM CHEST 2 VIEWS: CPT

## 2019-04-24 PROCEDURE — 97161 PT EVAL LOW COMPLEX 20 MIN: CPT | Mod: GP

## 2019-04-24 PROCEDURE — 25000132 ZZH RX MED GY IP 250 OP 250 PS 637: Performed by: HOSPITALIST

## 2019-04-24 PROCEDURE — 25800030 ZZH RX IP 258 OP 636: Performed by: HOSPITALIST

## 2019-04-24 PROCEDURE — 36415 COLL VENOUS BLD VENIPUNCTURE: CPT | Performed by: HOSPITALIST

## 2019-04-24 PROCEDURE — 25000128 H RX IP 250 OP 636: Performed by: HOSPITALIST

## 2019-04-24 PROCEDURE — 85025 COMPLETE CBC W/AUTO DIFF WBC: CPT | Performed by: HOSPITALIST

## 2019-04-24 PROCEDURE — 99232 SBSQ HOSP IP/OBS MODERATE 35: CPT | Performed by: INTERNAL MEDICINE

## 2019-04-24 PROCEDURE — 83605 ASSAY OF LACTIC ACID: CPT | Performed by: PHYSICIAN ASSISTANT

## 2019-04-24 RX ORDER — WARFARIN SODIUM 2.5 MG/1
TABLET ORAL
Start: 2019-04-26 | End: 2019-04-24

## 2019-04-24 RX ORDER — METOPROLOL TARTRATE 25 MG/1
25 TABLET, FILM COATED ORAL 2 TIMES DAILY
Status: DISCONTINUED | OUTPATIENT
Start: 2019-04-24 | End: 2019-04-25 | Stop reason: HOSPADM

## 2019-04-24 RX ORDER — CEFUROXIME AXETIL 500 MG/1
500 TABLET ORAL 2 TIMES DAILY
Qty: 12 TABLET | Refills: 0 | Status: ON HOLD | OUTPATIENT
Start: 2019-04-24 | End: 2019-05-01

## 2019-04-24 RX ORDER — WARFARIN SODIUM 2.5 MG/1
TABLET ORAL
Status: ON HOLD
Start: 2019-04-26 | End: 2019-05-01

## 2019-04-24 RX ORDER — AZITHROMYCIN 250 MG/1
250 TABLET, FILM COATED ORAL DAILY
Qty: 3 TABLET | Refills: 0 | Status: SHIPPED | OUTPATIENT
Start: 2019-04-25 | End: 2019-04-29

## 2019-04-24 RX ADMIN — METOPROLOL TARTRATE 25 MG: 25 TABLET ORAL at 21:00

## 2019-04-24 RX ADMIN — METOPROLOL TARTRATE 12.5 MG: 25 TABLET, FILM COATED ORAL at 09:24

## 2019-04-24 RX ADMIN — METOPROLOL TARTRATE 12.5 MG: 25 TABLET, FILM COATED ORAL at 13:33

## 2019-04-24 RX ADMIN — CEFTRIAXONE SODIUM 2 G: 2 INJECTION, POWDER, FOR SOLUTION INTRAMUSCULAR; INTRAVENOUS at 17:28

## 2019-04-24 RX ADMIN — AZITHROMYCIN MONOHYDRATE 250 MG: 500 INJECTION, POWDER, LYOPHILIZED, FOR SOLUTION INTRAVENOUS at 18:24

## 2019-04-24 ASSESSMENT — ACTIVITIES OF DAILY LIVING (ADL)
ADLS_ACUITY_SCORE: 14
ADLS_ACUITY_SCORE: 14
ADLS_ACUITY_SCORE: 18
ADLS_ACUITY_SCORE: 13
ADLS_ACUITY_SCORE: 19
ADLS_ACUITY_SCORE: 13

## 2019-04-24 ASSESSMENT — MIFFLIN-ST. JEOR: SCORE: 1008.75

## 2019-04-24 NOTE — SIGNIFICANT EVENT
Patient in respiratory distress. Course lung sounds, SOB with accessory muscle use, and restless. O2 needs increased from 4L NC to 10L oxymask. RRT called.

## 2019-04-24 NOTE — PROVIDER NOTIFICATION
Patient arrived to floor with course lung sounds, tachypnea, & SOB. Neb given. Spoke with Dr. Serrano, SUKHWINDER zhao'sari.

## 2019-04-24 NOTE — CODE/RAPID RESPONSE
[facility]  House [facility] RRT Note  4/23/2019   Time Called: 1935  RRT called for: respiratory distress  Code Status: Full Code    Assessment & Plan   I was paged to the bedside to evaluate Ms. Elizabeth Ruggiero for an acute episode of respiratory distress. Upon arrival the patient appears distressed, oximask in place 10L, BP 110s/60s, [id number], tachypnea to 30s-40s, and audible respiratory secretions. AAOx3, talking in short sentences. She appears volume up on exam as she has JVD, trace BLE edema, and became slightly hypertensive from earlier at 130s-140s/70s.    Diagnosis:  - Acute hypoxic respiratory failure related acute cardiogenic pulmonary edema and pneumonia  - Lactic acidosis likely related to hypoperfusion from acute pulmonary edema and possibly mixed sepsis due to pneumonia    Interventions ordered/provided:  - Nasopharyngeal suction  - Initiate BiPAP 14/6/35%  - Labs: BMP unchanged, Lactate 1.7 --> 4.3 --> 2.5, pro-BNP 4,923 --> 5,811  - STAT ECHO to evaluate for CHF - EF >70%, mod LVH, hyperndynamic LV fxn,l LA severely dilated, Mod-severe-severe MR with bileaflet prolapse, mild/mod TR, similar to ECHO from 2014.  - Monitor on BiPAP and will reassess her in 1-2 hours to determine if she needs diuresis if BPs allow    At the conclusion of this RRT, the patient appears calm, tachypnea improved to RR ~25, and MAPs 60-65. She will remain on CSC at this time. If she decompensates she will be moved to a higher level of care.    2300 ADDENDUM:  With ECHO returned and EF >70% will withhold diuresis at this time and allow BiPAP. Lactate trending down from 4.3 -->2.5 with BiPAP. Recheck 2 hours after last draw. Continue current abx, could consider changing Rocephin to Cefepime if need to broaden. CXR in AM. I visited pt who was resting in NAD, weaned off BiPAP to oximask. Appears much improved from earlier visit during RRT.     Interval History   Ms. 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     Her history is significant for:  Past Medical History:   Diagnosis Date     Arthritis      Atrial fibrillation (H)      DVT of lower extremity (deep venous thrombosis) (H)      Vitamin D deficiency      Past Surgical History:   Procedure Laterality Date     APPENDECTOMY       FRACTURE SURGERY       repair left femur         Allergies   Allergies   Allergen Reactions     Food      PN: LW FI1: vegetarian  dairy ok LW FI2:       Physical Exam   Physical Exam   Constitutional: She is oriented to person, place, and time. She appears well-developed and well-nourished. She appears distressed.   HENT:   Head: Normocephalic and atraumatic.   Eyes: EOM are normal.   Neck: JVD present.   Cardiovascular: An irregularly irregular rhythm present. Exam reveals distant heart sounds.   Pulmonary/Chest: Accessory muscle usage present. Tachypnea noted. She is in respiratory distress.   Diffuse anterior congestion and rhonchi   Abdominal: Soft. Normal appearance.   Neurological: She is alert and oriented to person, place, and time.   Skin: Skin is warm and intact. She is diaphoretic.   Trace BLE edema.   Psychiatric: Her mood appears anxious.       Vital Signs with Ranges:  Temp:  [97.5  F (36.4  C)-101.5  F (38.6  C)] 97.5  F (36.4  C)  Pulse:  [] 79  Heart Rate:  [] 81  Resp:  [14-38] 24  BP: ()/() 93/49  FiO2 (%):  [35 %] 35 %  SpO2:  [86 %-98 %] 96 %  No intake/output data recorded.    ABG:  -  Recent Labs   Lab 04/23/19  1629   O2PER NC       Troponin:    Recent Labs   Lab Test 04/23/19  1622   TROPI 0.032       IMAGING: (X-ray/CT/MRI)   Recent Results (from the past 24 hour(s))   XR  Chest 2 Views    Narrative    XR CHEST 2 VW 4/23/2019 4:40 PM    COMPARISON: 3/9/2019    HISTORY: Wheezing, fever, shortness of breath.      Impression    IMPRESSION: Enlarged cardiac silhouette is again seen. Likely mild  pulmonary edema. Patchy opacity in the RIGHT midlung suggests  pneumonia. Likely trace bilateral pleural effusions. No pneumothorax  seen on either side.    CHRIS GARDUNO MD       CBC with Diff:  Recent Labs   Lab Test 04/23/19  1622   WBC 8.3   HGB 13.4   MCV 92   *   INR 3.23*      No results found for: RETICABSCT  No results found for: RETP    Lactic Acid:    Lactic Acid   Date Value Ref Range Status   04/23/2019 4.3 (HH) 0.7 - 2.0 mmol/L Final     Comment:     Critical Value called to and read back by  KOTA MANCINI IN [facility] AT 2017 BY MS     04/23/2019 1.7 0.7 - 2.0 mmol/L Final     No results found for: LACTS     Comprehensive Metabolic Panel:  Recent Labs   Lab 04/23/19  2002 04/23/19  1622   * 127*   POTASSIUM 4.0 4.0   CHLORIDE 95 97   CO2 18* 20   ANIONGAP 13 10   * 140*   BUN 20 19   CR 0.72 0.66   GFRESTIMATED 73 76   GFRESTBLACK 84 88   CHRIS 8.4* 9.0   PROTTOTAL  --  6.8   ALBUMIN  --  3.3*   BILITOTAL  --  1.7*   ALKPHOS  --  62   AST  --  70*   ALT  --  57*       INR:    Recent Labs   Lab Test 04/23/19  1622   INR 3.23*       D-DIMER:  No components found for: DDIMER    BNP:  No results found for: BNP    UA:  Recent Labs   Lab 04/23/19  1713   COLOR Yellow   APPEARANCE Clear   URINEGLC Negative   URINEBILI Negative   URINEKETONE Negative   SG 1.019   UBLD Moderate*   URINEPH 6.0   PROTEIN 30*   NITRITE Negative   LEUKEST Negative   RBCU 51*   WBCU 1       Time Spent on this Encounter   I spent 80 minutes (1935 - 2055) of critical care time on the unit/floor managing the care of Elizabeth Ruggiero. Upon evaluation, this patient had a high probability of imminent or life-threatening deterioration due to acute respiratory failure, which required my direct attention,  intervention, and personal management. 100% of my time was spent at the bedside counseling the patient and/or coordinating care regarding services listed in this note. RRT conducted in association with SENIA Torres, CNP.    Debbi Javier PA-C  Hospitalist/House [facility]  Pager: [phone]

## 2019-04-24 NOTE — PHARMACY-ANTICOAGULATION SERVICE
Clinical Pharmacy - Warfarin Dosing Consult     Pharmacy has been consulted to manage this patient s warfarin therapy.  Indication: Atrial Fibrillation  Therapy Goal: INR 2-3  Warfarin Prior to Admission: Yes  Warfarin PTA Regimen: 2.5 mg daily  Significant drug interactions: azithromycin     INR   Date Value Ref Range Status   04/23/2019 3.23 (H) 0.86 - 1.14 Final   03/09/2019 3.47 (H) 0.86 - 1.14 Final       Recommend warfarin no dose today.  Pharmacy will monitor Elizabeth Ruggiero daily and order warfarin doses to achieve specified goal.      Please contact pharmacy as soon as possible if the warfarin needs to be held for a procedure or if the warfarin goals change.

## 2019-04-24 NOTE — PROGRESS NOTES
04/24/19 0910   Quick Adds   Type of Visit Initial PT Evaluation   Living Environment   Lives With alone   Living Arrangements apartment   Home Accessibility no concerns   Transportation Anticipated family or friend will provide   Living Environment Comment resides at the [facility] living. Has a life alert like call pendant that she wears.    Self-Care   Usual Activity Tolerance good   Current Activity Tolerance moderate   Regular Exercise Yes  (exercises from home PT)   Equipment Currently Used at Home walker, rolling   Functional Level Prior   Ambulation 0-->independent   Transferring 0-->independent   Toileting 0-->independent   Bathing 0-->independent   Communication 0-->understands/communicates without difficulty   Swallowing 0-->swallows foods/liquids without difficulty   Cognition 0 - no cognition issues reported   Fall history within last six months yes   Number of times patient has fallen within last six months 1   Which of the above functional risks had a recent onset or change? none   General Information   Onset of Illness/Injury or Date of Surgery - Date 04/24/19   Referring Physician Lobo Serrano MD   Patient/Family Goals Statement to return home with continued home PT.    Pertinent History of Current Problem (include personal factors and/or comorbidities that impact the POC) 89y/o F admitted with acute hypoxic resp. Failure likely secondary to right middle lob pneumonia with sepsis. Atrial fib/flutter with RVR. 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, history of severe MR, and history of TIA    Precautions/Limitations fall precautions   General Observations sitting up in her chair, NAD   Cognitive Status Examination   Orientation orientation to person, place and time   Level of Consciousness alert   Follows Commands and Answers Questions 100% of the time   Personal Safety and Judgment at  "risk behaviors demonstrated   Cognitive Comment pt very vividly verbalizes her past medical history and current medical status   Pain Assessment   Patient Currently in Pain No   Range of Motion (ROM)   ROM Comment ext x 4 WFL   Strength   Strength Comments able to easily move bilateral UE's against gravity, bilateral hip flexion 4-/5, bilateral quads and ankle DF: 5/5   Bed Mobility   Bed Mobility Comments NT-pt in chair at beginning of session and requesting to remain in the chair   Transfer Skills   Transfer Comments sit to stand with SBA with and without a FWW multiple surfaces   Gait   Gait Comments amb with FWW 30 ft with close SBA with FWW without her shoes-pt requesting to put her shoes on that have her right heel lift. Close SBA, flexed forward trunk, decreased stability with turing.    Balance   Balance Comments IND with sitting balance, SBA with static standing balance with FWW   Muscle Tone   Muscle Tone no deficits were identified   General Therapy Interventions   Planned Therapy Interventions balance training;transfer training;gait training;strengthening;bed mobility training   Clinical Impression   Criteria for Skilled Therapeutic Intervention yes, treatment indicated   PT Diagnosis impaired gait   Influenced by the following impairments impaired activity tolerance, impaired strength, impaired balance   Functional limitations due to impairments impaired independence with amb without an assistive device   Clinical Presentation Stable/Uncomplicated   Clinical Presentation Rationale based on clinical judgement   Clinical Decision Making (Complexity) Low complexity   Therapy Frequency` daily   Predicted Duration of Therapy Intervention (days/wks) 4 days   Anticipated Discharge Disposition Home with Home Therapy   Risk & Benefits of therapy have been explained Yes   Patient, Family & other staff in agreement with plan of care Yes   [facility] AM-PAC TM \"6 Clicks\"   2016, [employer] " "[employer], under license to [employer].  All rights reserved.   6 Clicks Short Forms Basic Mobility Inpatient Short Form   [facility] AM-PAC  \"6 Clicks\" V.2 Basic Mobility Inpatient Short Form   1. Turning from your back to your side while in a flat bed without using bedrails? 3 - A Little   2. Moving from lying on your back to sitting on the side of a flat bed without using bedrails? 3 - A Little   3. Moving to and from a bed to a chair (including a wheelchair)? 3 - A Little   4. Standing up from a chair using your arms (e.g., wheelchair, or bedside chair)? 3 - A Little   5. To walk in hospital room? 3 - A Little   6. Climbing 3-5 steps with a railing? 3 - A Little   Basic Mobility Raw Score (Score out of 24.Lower scores equate to lower levels of function) 18   Total Evaluation Time   Total Evaluation Time (Minutes) 15     "

## 2019-04-24 NOTE — PLAN OF CARE
Discharge Planner PT  Pt seen from 0910-0945.   Patient plan for discharge: return home with continued home PT  Current status: PT consult received. Chart reviewed. PT evaluation and treatment completed. 89y/o F admitted with acute hypoxic resp. Failure likely secondary to right middle lob pneumonia with sepsis. Atrial fib/flutter with RVR. PLOF: Pt resides at the [facility] independent living facility. Pt reports she occasionally will use her FWW and currently is receiving home PT.     Currently: Demos bilateral hip weakness. SBA with sit to stand transfers with FWW. Amb 100ft x 2+ 30ft with FWW and close SBA with her shoe on (right heel lift). Trialed amb with railing in the hallway (pt reports she does this at home)-pt demos decreased balance required min A, also demo decreased step length and right hand in high guarded position. Pt abandoned her walker in the room and performed furniture walking to her recliner-required bilateral UE support for stability on furniture. Discussed with pt recommend use of walker for all mobility currently for safety with balance.   Barriers to return to prior living situation: impaired balance, decreased activity tolerance  Recommendations for discharge: home with continued home PT  Rationale for recommendations: Anticipate pt will continue to improve with her mobility for return home to her independent living facility based on her current mobility. Pt was encouraged to walk 3-4x/day with staff while in the hospital to improve her activity tolerance.        Entered by: Agnes Allen 04/24/2019 6:19 PM

## 2019-04-24 NOTE — PROGRESS NOTES
[facility]    Sepsis Evaluation Progress Note    Date of Service: 04/23/2019    I was called to see Elizabeth Ruggiero due to RRT and ordered a lactate. She is known to have an infection.     Physical Exam    Vital Signs:  Temp: 97.5  F (36.4  C) Temp src: Oral BP: 135/65 Pulse: 93 Heart Rate: 80 Resp: 30 SpO2: 91 % O2 Device: Oxymask Oxygen Delivery: 10 LPM    Lab:  Lactic Acid   Date Value Ref Range Status   04/23/2019 4.3 (HH) 0.7 - 2.0 mmol/L Final     Comment:     Critical Value called to and read back by  KOTA MANCINI IN [facility] AT 2017 BY MS         The patient is at baseline mental status.    The rest of their physical exam is significant for JVD, tachypnea, diffusely rhonchorous lungs, and respiratory distress.    Assessment and Plan    The SIRS and exam findings are likely due to hypoperfusion from fluid volume overload, there is no sign of sepsis at this time.    Disposition: The patient will remain on the current unit. We will continue to monitor this patient closely and transfer to CCU if her condition worsens.    Debbi Javier PA-C

## 2019-04-24 NOTE — PLAN OF CARE
Pt has been pain free with V/signs stable and maintain her O2 sats in the mid 90s on Rm air. Tolerated diet well. Pt will not receive any coumadin this evening since am INR 3.44.  Plans were made by Dr Brower and Pt's daughter ( Brittney) that Pt will be able to be discharged tomorrow morning at 9am. Remains in Chronic A-fib in the 90's

## 2019-04-24 NOTE — PROGRESS NOTES
[facility]    Medicine Progress Note - Hospitalist Service       Date of Admission:  4/23/2019  Assessment & Plan   Ms. 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    Acute hypoxic respiratory failure likely secondary to right middle lobe pneumonia with sepsis and reactive airway disease. Chest x-ray showed right middle lobe pneumonia.  -RRT noted last night with patient having elevated lactic acid and requiring BIPAP  -patient feeling much better now and has been weaned off oxygen but still coughing and feeling short of breath  -continue with Rocephin and azithromycin  -monitor one more night in the hospital  -continue p.r.n. cough medication  -PT and SW consulted, patient already has home PT twice per week  -Follow up on blood culture.   -continue xopenex nebs PRN (received one dose of solumedrol in ED but no additional steroids with no ongoing significant wheezing)  -If continues to do well can likely go home tomorrow and continue home PT     Mild hyponatremia, likely poor po intake and SIADH secondary to pneumonia - resolved.       Atrial fibrillation/flutter with rapid ventricular rate.  PTA was on lopressor 25 mg BID (initially thought to be 12.5 BID but patient clarified it was recently increased to 25 mg BID - home medication list updated). INR was supratherapeutic on admission.    -increase metoprolol to new dose of 25 mg BID  -monitor blood pressure and heart rate on this new higher dose  -pharmacy dosing the Coumadin.      History of severe mitral regurgitation.    Elevated BNP. Last echo  was from 2014 with EF of 65% and severe MR.  She has never been evaluated by Cardiology. Echo here showed normal EF, moderate LVH, severely dilated left atrium, moderate to severe mitral regurgitation with possible torn cord attached to anterior leaflet.  -Clinically does not look volume overloaded.     -will need outpatient Cardiology followup.   Osteoporosis.  She is on outpatient Fosamax weekly.     Mild thrombocytopenia. This is likely from pneumonia and sepsis.  Her baseline platelet count has been around 150s to 180s.  -can follow up with PCP to make sure this returns to normal     Microscopic hematuria.  She denies any gross hematuria and this was from a cathed specimen so could be traumatic.  Would suggest repeat a UA with PCP followup and if persistent, she might need urologic evaluation.       Diet: 2 Gram Sodium Diet  Room Service    DVT Prophylaxis: Warfarin  W[place]ley Catheter: not present  Code Status: Full Code      Disposition Plan   Expected discharge: Tomorrow, recommended to prior living arrangement once antibiotic plan established and O2 use less than 1 liters/minute.  Entered: Fadi Brower MD 04/24/2019, 1:09 PM       The patient's care was discussed with the Patient.    Fadi Brower MD  Hospitalist Service  [facility]    ______________________________________________________________________    Interval History   Feeling better. Still has cough and shortness of breath but improving.  No fevers but did feel a little warm.  No chest pain. Tolerating diet without nausea but did have some upset stomach yesterday. Reviewed RRT. Hoping to go home tomorrow.    Data reviewed today: I reviewed all medications, new labs and imaging results over the last 24 hours. I personally reviewed the chest x-ray image(s) showing right middle lobe infiltrate improving.    Physical Exam   Vital Signs: Temp: 97.5  F (36.4  C) Temp src: Oral BP: 123/68 Pulse: 73 Heart Rate: 88 Resp: 18 SpO2: 98 %  O2 Device: None (Room air) Oxygen Delivery: 3 LPM  Weight: 128 lbs 4.92 oz  Constitutional: Awake, alert, cooperative, improving  Respiratory: Crackles right middle lung field, minimal to no wheezing  Cardiovascular: Regular rate and irregularly irregular rhythm, normal S1 and S2, and 3/6 systolic murmur at the apex  GI: Normal bowel sounds, soft, non-distended, non-tender  Skin/Integumen: No rashes, no cyanosis, 1+ nonpitting leg edema bilaterally  Other:      Data   Recent Labs   Lab 04/24/19  0542 04/23/19 2002 04/23/19  1622   WBC 3.5*  --  8.3   HGB 12.1  --  13.4   MCV 91  --  92   *  --  123*   INR 3.44*  --  3.23*    126* 127*   POTASSIUM 4.0 4.0 4.0   CHLORIDE 101 95 97   CO2 24 18* 20   BUN 18 20 19   CR 0.69 0.72 0.66   ANIONGAP 8 13 10   CHRIS 8.3* 8.4* 9.0   * 238* 140*   ALBUMIN  --   --  3.3*   PROTTOTAL  --   --  6.8   BILITOTAL  --   --  1.7*   ALKPHOS  --   --  62   ALT  --   --  57*   AST  --   --  70*   TROPI  --   --  0.032     Recent Results (from the past 24 hour(s))   XR Chest 2 Views    Narrative    XR CHEST 2 VW 4/23/2019 4:40 PM    COMPARISON: 3/9/2019    HISTORY: Wheezing, fever, shortness of breath.      Impression    IMPRESSION: Enlarged cardiac silhouette is again seen. Likely mild  pulmonary edema. Patchy opacity in the RIGHT midlung suggests  pneumonia. Likely trace bilateral pleural effusions. No pneumothorax  seen on either side.    CHRIS GARDUNO MD     Medications     - MEDICATION INSTRUCTIONS -       - MEDICATION INSTRUCTIONS -       - MEDICATION INSTRUCTIONS -       Warfarin Therapy Reminder         azithromycin  250 mg Intravenous Q24H     cefTRIAXone  2 g Intravenous Q24H     metoprolol tartrate  12.5 mg Oral Once     metoprolol tartrate  25 mg Oral BID     sodium chloride (PF)  3 mL Intracatheter Q8H     warfarin-No DOSE today  1 each Does not apply no dose today (warfarin)

## 2019-04-24 NOTE — CONSULTS
Care Transition Initial Assessment - RN        Met with: Patient.  DATA   Active Problems:    Right middle lobe pneumonia (H)    Atrial flutter with rapid ventricular response (H)       Cognitive Status: alert.        Contact information and PCP information verified: Yes. Dr Downs  Lives With: alone    Insurance concerns: No Insurance issues identified  ASSESSMENT  Patient currently receives the following services:  Lives at [facility]. Uses no services. Receives 2 meals/day, otherwise does her own breakfast. Independent with medication set up and all ADL's. Has a [employer] necklace. Her daughter drives to appointments and takes her grocery shopping.   0800 4/25: Addendum: patient did not tell this writer but per PT she has some home PT       Identified issues/concerns regarding health management: none    PLAN  Financial costs for the patient include none .  Patient given options and choices for discharge yes. Declines any additional services .  Patient/family is agreeable to the plan?  Yes:   Patient anticipates discharging to  [facility] .      Patient anticipates needs for home equipment: No  Plan/Disposition: Home resume home PT   Appointments: tbd      Care  (CTS) will continue to follow as needed.

## 2019-04-24 NOTE — H&P
Admitted:     04/23/2019      PRIMARY CARE PHYSICIAN:  Gelacio Downs MD      CHIEF COMPLAINT:  Cough, shortness of breath, weakness.      HISTORY OF PRESENT ILLNESS:  History was reviewed from the patient and her daughter present by the bedside.  Ms. Elizabeth Ruggiero is a pleasant 89-year-old female who looks much younger for her age with past medical history significant for osteoporosis, hypertension, atrial fibrillation, MR who presented to the ER today with above complaints.  Her cough actually has been going on for almost a month, was seen by her primary care provider a month ago and then 5 days ago was suspected to be postnasal drip or viral and she was prescribed over-the-counter Robitussin-DM.  However, over past few days she has had worsening cough, shortness of breath and has been wheezing and feels profoundly weak, so she presented to ER for further evaluation.  She has also been having a fever, was admitted with a temperature of 101.5 in the ER.  She denies any chills or rigors.  No chest pain.  She does not significantly feel short of breath at baseline.  Denies any pain.  Abdomen reports a normal bowel and bladder habit.  No leg swelling.  Here in the ER, she was seen by Dr. Mendieta.  She was noted to be tachycardic, febrile and hypoxic to 86% on room air and a chest x-ray was suggestive of pneumonia.  She was given Rocephin, azithromycin, Solu-Medrol, DuoNebs and a dose of diltiazem as well.  Hospitalist was requested admission for further evaluation.      REVIEW OF SYSTEMS:  A 10-point review of system was done and were negative apart from those mentioned in the history of present illness.      PAST MEDICAL HISTORY:   1.  Atrial fibrillation on chronic anticoagulation.   2.  Hypertension.   3.  Osteoporosis.   4.  History of recurrent DVT.   5.  History of severe MR.   6.  Spinal stenosis.   7.  History of TIA.      MEDICATIONS PRIOR TO ADMISSION:  Medications Prior to Admission   Medication Sig Dispense  Refill Last Dose     Acetaminophen (TYLENOL PO) Take 325 mg by mouth every 6 hours as needed    4/23/2019 at am     alendronate (FOSAMAX) 70 MG tablet Take 70 mg by mouth every 7 days SUNDAYS   4/23/2019 at am     calcium carbonate-vitamin D (OSCAL W/D) 500-200 MG-UNIT tablet Take 1 tablet by mouth 2 times daily   4/23/2019 at am     metoprolol tartrate (LOPRESSOR) 25 MG tablet Take 12.5 mg by mouth 2 times daily   4/23/2019 at am     Multiple Vitamins-Minerals (PRESERVISION AREDS 2+MULTI VIT) CAPS Take 1 tablet by mouth 2 times daily   4/23/2019 at am     Warfarin Sodium (COUMADIN PO) Take by mouth daily 2.5 mg daily   4/22/2019 at pm         ALLERGIES:  No known drug allergies.      SOCIAL HISTORY:  She denies smoking, alcohol or illicit drug use.      FAMILY HISTORY:  Reviewed and not pertinent to current presentation.      PHYSICAL EXAMINATION:   GENERAL:  The patient is conscious, alert, oriented x3, lying comfortably in bed in no apparent distress.   VITAL SIGNS:  Temperature 101.5, heart rate in the 120s, blood pressure is 150/119, saturation 86% on room air, 95% on 2 liters.   HEENT:  Pupils are equal and reactive to light and accommodation.  Extraocular movements are intact.  Oral mucosa is moist.   NECK:  Supple, no raised JVD.   RESPIRATORY:  She has bilateral expiratory wheezing present.  No significant crepitations.   CARDIOVASCULAR:  Normal S1-S2, tachycardic, irregularly irregular rhythm, not sure about murmur, was difficult to appreciate because of the wheezing.   ABDOMEN:  Soft, nontender, nondistended, no guarding, rigidity or rebound tenderness.   LOWER EXTREMITIES:  With no edema.   NEUROLOGIC:  No focal neurological deficits noted.  Cranial nerves II-XII grossly intact.   PSYCHIATRIC:  Normal mood and affect.      LABORATORY AND IMAGING:  Reviewed in Epic.  CMP notable for sodium of 127, creatinine 0.66.  Lactate normal at 1.7.  BNP elevated at 4923.  Troponin I within normal limits at 0.032.   VBG with pCO2 of 33.  CBC with WBC of 8.3, hemoglobin 13.4, platelet 123.  INR supratherapeutic at 3.23.  UA mostly unremarkable except for some microscopic hematuria.  Blood and urine cultures are pending.  Influenza negative.      Chest x-ray reviewed by me shows a right middle lobe infiltrate concerning for pneumonia.      EKG reviewed by me shows atrial flutter with rapid ventricular rate.      ASSESSMENT AND PLAN:  Ms. Elizabeth Ruggiero is a pleasant 89-year-old female with a past medical history significant for hypertension, osteoporosis, history of AFib and recurrent DVT on chronic anticoagulation, history of severe MR, history of TIA, who presented to the ER with complaints of shortness of breath, wheezing and cough and noted with pneumonia and atrial flutter with rapid ventricular rate.     1.  Acute hypoxic respiratory failure likely secondary to right middle lobe pneumonia.    - We will admit her as an inpatient ; she clinically does not look septic and is not in significant respiratory distress, has required a couple of liters of nasal cannula oxygen.  We will try to wean oxygen down.  We will continue with Rocephin and azithromycin p.r.n. cough medication.  We will get a PT evaluation and  for disposition planning.  Follow up on blood culture.     # Mild hyponatremia, likely poor po intake, secondary to pneumonia.    -  Although she has history of severe MR does not clinically look volume overloaded and I wanted to cautiously start NS at 50 ml/hr but on arrival to the floor she was requiring 4 lts NC oxygen and did receive Ringer Lactate bolus in ED  - so will hold off on further IV hydration at this time  - repeat BMP in am     3.  Atrial flutter with rapid ventricular rate.    - We will resume her prior to admission Lopressor 12.5 mg p.o. b.i.d.  Rapid rate is probably precipitated by pneumonia.  Will have diltiazem 10 mg IV h. p.r.n. for heart rate more than 120.  If continues to be in rapid  rate might have to start Cardizem drip.  INR is supratherapeutic.  We will have pharmacy dose the Coumadin.     4.  Elevated BNP.   5.  History of severe mitral regurgitation.    - Clinically does not look volume overloaded.  Last echo was from  with EF of 65% and severe MR.  She has never been evaluated by Cardiology.  We will repeat echocardiogram and she probably will need outpatient Cardiology followup.     6.  Osteoporosis.  She is on outpatient Fosamax weekly.   7.  Mild thrombocytopenia.  Her baseline platelet count has been around 150s to 180s and will repeat a CBC in a.m.   8.  Microscopic hematuria.  She denies any gross hematuria.  Would suggest repeat a UA in few days or on PCP followup and if persistent, she might need urologic evaluation.   9.  CODE STATUS:  This was discussed and she wishes to be full code.   10.  We will have her on Xopenex nebs for wheezing and she did receive a dose of Solu-Medrol in the ER.         WALT JOEL MD             D: 2019   T: 2019   MT: JACKIE      Name:     CHOLO TA   MRN:      [id number]        Account:      [id number]   :      1926        Admitted:     2019                   Document: [id number]       cc: Gelacio Downs MD

## 2019-04-25 VITALS
OXYGEN SATURATION: 94 % | WEIGHT: 145.6 LBS | SYSTOLIC BLOOD PRESSURE: 120 MMHG | BODY MASS INDEX: 23.4 KG/M2 | DIASTOLIC BLOOD PRESSURE: 84 MMHG | RESPIRATION RATE: 18 BRPM | HEART RATE: 90 BPM | HEIGHT: 66 IN | TEMPERATURE: 97.5 F

## 2019-04-25 LAB — INR PPP: 2.54 (ref 0.86–1.14)

## 2019-04-25 PROCEDURE — 99207 ZZC NON-BILLABLE SERV PER CHARTING: CPT | Performed by: INTERNAL MEDICINE

## 2019-04-25 PROCEDURE — 85610 PROTHROMBIN TIME: CPT | Performed by: HOSPITALIST

## 2019-04-25 PROCEDURE — 36415 COLL VENOUS BLD VENIPUNCTURE: CPT | Performed by: HOSPITALIST

## 2019-04-25 PROCEDURE — 25000132 ZZH RX MED GY IP 250 OP 250 PS 637: Performed by: INTERNAL MEDICINE

## 2019-04-25 RX ADMIN — METOPROLOL TARTRATE 25 MG: 25 TABLET ORAL at 08:21

## 2019-04-25 ASSESSMENT — ACTIVITIES OF DAILY LIVING (ADL)
ADLS_ACUITY_SCORE: 17

## 2019-04-25 ASSESSMENT — MIFFLIN-ST. JEOR: SCORE: 1087.19

## 2019-04-25 NOTE — DISCHARGE SUMMARY
[facility]  Hospitalist Discharge Summary       Date of Admission:  4/23/2019  Date of Discharge:  4/25/2019  Discharging Provider: Fadi Brower MD      Discharge Diagnoses   Pneumonia of right middle lobe due to infectious organism (H)  Sepsis, due to unspecified organism (H)  Atrial fibrillation with RVR (H)  Hx of long-term (current) use of anticoagulants  Acute respiratory failure with hypoxia (H)  Atrial flutter with rapid ventricular response (H)    Follow-ups Needed After Discharge   Follow-up Appointments     Follow-up and recommended labs and tests       Follow up with primary care provider, Gelaico Downs, within 7 days to   evaluate medication change and for hospital follow- up.  No follow up labs   or test are needed.  Follow up with INR on Friday 4/26 for continued adjustments of coumadin   for goal INR 2-3.  Follow up with cardiology in 2-4 weeks for continued monitoring of your   mitral regurgitation from the mitral valve in your heart.             Unresulted Labs Ordered in the Past 30 Days of this Admission     Date and Time Order Name Status Description    4/23/2019 1609 Blood culture Preliminary     4/23/2019 1609 Blood culture Preliminary       These results will be called out if they become positive. Patient discharged on azithromycin and ceftin.    Discharge Disposition   Discharged to home with resumption of home care  Condition at discharge: Stable    Hospital Course   Ms. 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    Acute hypoxic respiratory failure likely secondary  to right middle lobe pneumonia with sepsis and reactive airway disease. Chest x-ray showed right middle lobe pneumonia. With IV fluids for sepsis had worsening of respiratory status requiring BIPAP. Improved and off oxygen by the time of discharge.  -discharge with ceftin and azithromycin  -resume home PT twice per week  -continue xopenex nebs PRN (received one dose of solumedrol in ED but no additional steroids with no ongoing significant wheezing)  -follow up with PCP within 1 week for post hospital follow up     Mild hyponatremia, likely poor po intake and SIADH secondary to pneumonia - resolved.       Atrial fibrillation/flutter with rapid ventricular rate.   PTA was on lopressor 25 mg BID (initially thought to be 12.5 BID but patient clarified it was recently increased to 25 mg BID - home medication list updated). INR was supratherapeutic on admission.    -tolerating metoprolol at recently new dose of 25 mg BID well in the hospital  -continue metoprolol at current home dose  -INR slightly supratherapeutic at admission and normalized with a few doses being held, started on azithromycin and ceftin so may be more sensitive to coumadin while on these antibiotics, reduced coumadin dose from 2.5 mg daily to 1.25 mg daily at discharge and should follow up for INR tomorrow and likely again on Monday for adjustments with goal INR 2-3      History of severe mitral regurgitation.    Elevated BNP. Last echo was from 2014 with EF of 65% and severe MR.  She has never been evaluated by Cardiology. Echo here showed normal EF, moderate LVH, severely dilated left atrium, moderate to severe mitral regurgitation with possible torn cord attached to anterior leaflet.  -Clinically does not look volume overloaded.     -will need outpatient Cardiology followup in 2-4 weeks     Osteoporosis.  She is on outpatient Fosamax weekly.     Mild thrombocytopenia. This is likely from pneumonia and sepsis.  Her baseline platelet count has been  around 150s to 180s.  -can follow up with PCP to make sure this returns to normal     Microscopic hematuria.  She denies any gross hematuria and this was from a cathed specimen so could be traumatic.  Would suggest repeat a UA with PCP followup and if persistent, she might need urologic evaluation.      Consultations This Hospital Stay   PHARMACY TO DOSE WARFARIN  SOCIAL WORK IP CONSULT  PHYSICAL THERAPY ADULT IP CONSULT  CARE TRANSITION RN/SW IP CONSULT    Code Status   Full Code    Time Spent on this Encounter   I, Fadi Brower, did not personally see the patient on the day of discharge and will not bill for this visit. Daughter wanted to take home before 9 AM and I was indisposed with another ill patient.  Vitals looks good and no new issues overnight so patient can go home as planned.       Fadi Brower MD  [facility]  ______________________________________________________________________    Physical Exam   Vital Signs: Temp: 97.3  F (36.3  C) Temp src: Oral BP: 120/89 Pulse: 90 Heart Rate: 86 Resp: 18 SpO2: 94 % O2 Device: None (Room air) Oxygen Delivery: 3 LPM  Weight: 145 lbs 9.6 oz       Primary Care Physician   Gelacio Downs    Discharge Orders      Home Care PT Referral for Hospital Discharge      Activity    Your activity upon discharge: activity as tolerated     Reason for your hospital stay    You were admitted with pneumonia in your right lung.  You were treated with antibiotics and you are improving.  You should continue to take the antibiotics as prescribed until they are gone. You can continue your home physical therapy as you have been. We confirmed you are taking metoprolol 25 mg twice daily and you are tolerating that dose and should continue with that dose and follow up with your primary care physician within 1 week for post hospital follow up. Your INR was high and the azithromycin antibiotic can increase coumadin affect so your coumadin dose was decreased to 1.25 mg  daily and you should have your INR rechecked on Friday April 26th for ongoing adjustments. It was a pleasure for our Hospitalist group and me personally to care for you at [facility].  We wish you a speedy recovery and good health!     Follow-up and recommended labs and tests     Follow up with primary care provider, Gelacio Downs, within 7 days to evaluate medication change and for hospital follow- up.  No follow up labs or test are needed.  Follow up with INR on Friday 4/26 for continued adjustments of coumadin for goal INR 2-3.  Follow up with cardiology in 2-4 weeks for continued monitoring of your mitral regurgitation from the mitral valve in your heart.     Full Code     Diet    Follow this diet upon discharge: 2 Gram Sodium Diet to help with blood pressure       Significant Results and Procedures   Most Recent 3 CBC's:  Recent Labs   Lab Test 04/24/19  0542 04/23/19  1622 03/09/19  1815   WBC 3.5* 8.3 8.7   HGB 12.1 13.4 12.3   MCV 91 92 93   * 123* 156     Most Recent 3 BMP's:  Recent Labs   Lab Test 04/24/19  0542 04/23/19 2002 04/23/19  1622    126* 127*   POTASSIUM 4.0 4.0 4.0   CHLORIDE 101 95 97   CO2 24 18* 20   BUN 18 20 19   CR 0.69 0.72 0.66   ANIONGAP 8 13 10   CHRIS 8.3* 8.4* 9.0   * 238* 140*     Most Recent 2 LFT's:  Recent Labs   Lab Test 04/23/19  1622   AST 70*   ALT 57*   ALKPHOS 62   BILITOTAL 1.7*     Most Recent INR's and Anticoagulation Dosing History:  Anticoagulation Dose History     Recent Dosing and Labs Latest Ref Rng & Units 3/9/2019 4/23/2019 4/24/2019 4/25/2019    INR 0.86 - 1.14 3.47(H) 3.23(H) 3.44(H) 2.54(H)        Most Recent 6 Bacteria Isolates From Any Culture (See EPIC Reports for Culture Details):  Recent Labs   Lab Test 04/23/19  1715 04/23/19  1713 04/23/19  1630   CULT No growth after 23 hours No growth No growth after 23 hours   ,   Results for orders placed or performed during the hospital encounter of 04/23/19   XR Chest 2 Views     Narrative    XR CHEST 2 VW 4/23/2019 4:40 PM    COMPARISON: 3/9/2019    HISTORY: Wheezing, fever, shortness of breath.      Impression    IMPRESSION: Enlarged cardiac silhouette is again seen. Likely mild  pulmonary edema. Patchy opacity in the RIGHT midlung suggests  pneumonia. Likely trace bilateral pleural effusions. No pneumothorax  seen on either side.    CHRIS GARDUNO MD   XR Chest 2 Views    Narrative    CHEST TWO VIEWS 4/24/2019 8:15 AM     HISTORY: Follow up pneumonia and pleural effusions    COMPARISON: 4/23/2019    FINDINGS: Hazy opacity right upper lobe is similar to prior. There is  a small right pleural effusion. Left lung is clear. Heart size  enlarged, but stable. No pneumothorax.       Impression    IMPRESSION: Persistent right upper lobe opacity and small right  pleural effusion, not significantly changed.     ALEJANDRA CASTRO MD       Discharge Medications   Current Discharge Medication List      START taking these medications    Details   azithromycin (ZITHROMAX) 250 MG tablet Take 1 tablet (250 mg) by mouth daily for 3 days  Qty: 3 tablet, Refills: 0    Associated Diagnoses: Pneumonia of right middle lobe due to infectious organism (H)      cefuroxime (CEFTIN) 500 MG tablet Take 1 tablet (500 mg) by mouth 2 times daily for 6 days  Qty: 12 tablet, Refills: 0    Associated Diagnoses: Pneumonia of right middle lobe due to infectious organism (H)         CONTINUE these medications which have CHANGED    Details   warfarin (COUMADIN) 2.5 MG tablet 1.25 mg daily, adjust for goal INR 2-3    Associated Diagnoses: Atrial flutter with rapid ventricular response (H)         CONTINUE these medications which have NOT CHANGED    Details   Acetaminophen (TYLENOL PO) Take 325 mg by mouth every 6 hours as needed       alendronate (FOSAMAX) 70 MG tablet Take 70 mg by mouth every 7 days SUNDAYS      calcium carbonate-vitamin D (OSCAL W/D) 500-200 MG-UNIT tablet Take 1 tablet by mouth 2 times daily       metoprolol tartrate (LOPRESSOR) 25 MG tablet Take 25 mg by mouth 2 times daily       Multiple Vitamins-Minerals (PRESERVISION AREDS 2+MULTI VIT) CAPS Take 1 tablet by mouth 2 times daily           Allergies   No Known Allergies

## 2019-04-25 NOTE — PLAN OF CARE
PT: Attempted to see pt this AM. Pt dressed for discharge and reports leaving within the hour. Planning to continue HH PT.    Physical Therapy Discharge Summary    Reason for therapy discharge:    Discharged to home with home therapy.    Progress towards therapy goal(s). See goals on Care Plan in [facility] electronic health record for goal details.  Goals partially met.  Barriers to achieving goals:   discharge from facility.    Therapy recommendation(s):    Continued therapy is recommended.  Rationale/Recommendations:   PT to progress strength and IND with mobility.

## 2019-04-25 NOTE — PLAN OF CARE
A/Ox4. VSS. O2 94% on RA. Up with SBA. Lung sounds are clear with coarse in bases. Discharge medications and instructions given to patient. All questions and concerns addressed. Patient left with nursing staff with daughter.

## 2019-04-25 NOTE — PLAN OF CARE
VSS Stable. A&Ox4 - forgetful at times. Transfer by standby assist, no SOB reported when ambulating around room. Respiratory WNL, lungs coarse, continue to monitor. Afib with CVR . Plan is to discharge today back home.

## 2019-04-25 NOTE — PROGRESS NOTES
Below follow up appointments made for patient.  Communicated to patient and patient states that she will resume home PT provided by her facility upon return to her independent living facility.    Follow up with INR on Friday 4/26 at 3:30pm for continued adjustments of coumadin for goal INR 2-3 at [facility]     Follow up with primary care provider, Gelacio Downs, within 7 days to evaluate medication change and for hospital follow- up.  No follow up labs or test are needed.     *Follow up on Thursday, May 2nd at 11am with Dr. Gelacio Downs at [facility]    [address] [facility] [place] [id number] [phone]      Follow up with cardiology in 2-4 weeks for continued monitoring of your mitral regurgitation from the mitral valve in your heart. SEE APPOINTMENT BELOW    Jun 04, 2019 10:45 AM CDT  New Visit with Tiffany Galvan MD  [facility] ([facility]) [address] [place] [address] [address]  [facility] [id number] [phone] OPT 2

## 2019-04-29 ENCOUNTER — APPOINTMENT (OUTPATIENT)
Dept: ULTRASOUND IMAGING | Facility: CLINIC | Age: 84
DRG: 291 | End: 2019-04-29
Attending: INTERNAL MEDICINE
Payer: COMMERCIAL

## 2019-04-29 ENCOUNTER — APPOINTMENT (OUTPATIENT)
Dept: GENERAL RADIOLOGY | Facility: CLINIC | Age: 84
DRG: 291 | End: 2019-04-29
Attending: EMERGENCY MEDICINE
Payer: COMMERCIAL

## 2019-04-29 ENCOUNTER — HOSPITAL ENCOUNTER (OUTPATIENT)
Facility: CLINIC | Age: 84
Setting detail: OBSERVATION
Discharge: NURSING, REHAB OR LONG-TERM CARE | DRG: 291 | End: 2019-05-01
Attending: EMERGENCY MEDICINE | Admitting: INTERNAL MEDICINE
Payer: COMMERCIAL

## 2019-04-29 DIAGNOSIS — I48.91 ATRIAL FIBRILLATION WITH RVR (H): ICD-10-CM

## 2019-04-29 DIAGNOSIS — R06.02 SHORTNESS OF BREATH: ICD-10-CM

## 2019-04-29 DIAGNOSIS — R09.02 HYPOXIA: ICD-10-CM

## 2019-04-29 DIAGNOSIS — I50.31 ACUTE DIASTOLIC CONGESTIVE HEART FAILURE (H): Primary | ICD-10-CM

## 2019-04-29 DIAGNOSIS — I50.9 ACUTE ON CHRONIC CONGESTIVE HEART FAILURE, UNSPECIFIED HEART FAILURE TYPE (H): ICD-10-CM

## 2019-04-29 DIAGNOSIS — I48.20 CHRONIC ATRIAL FIBRILLATION (H): ICD-10-CM

## 2019-04-29 DIAGNOSIS — I48.91 ATRIAL FIBRILLATION, UNSPECIFIED TYPE (H): ICD-10-CM

## 2019-04-29 DIAGNOSIS — Z87.01 HISTORY OF RECENT PNEUMONIA: ICD-10-CM

## 2019-04-29 DIAGNOSIS — I48.92 ATRIAL FLUTTER WITH RAPID VENTRICULAR RESPONSE (H): ICD-10-CM

## 2019-04-29 DIAGNOSIS — Z92.29 HX OF LONG-TERM (CURRENT) USE OF ANTICOAGULANTS: ICD-10-CM

## 2019-04-29 LAB
ALBUMIN SERPL-MCNC: 3.1 G/DL (ref 3.4–5)
ALBUMIN UR-MCNC: 10 MG/DL
ALP SERPL-CCNC: 127 U/L (ref 40–150)
ALT SERPL W P-5'-P-CCNC: 151 U/L (ref 0–50)
ANION GAP SERPL CALCULATED.3IONS-SCNC: 8 MMOL/L (ref 3–14)
APPEARANCE UR: CLEAR
AST SERPL W P-5'-P-CCNC: 109 U/L (ref 0–45)
BACTERIA SPEC CULT: NO GROWTH
BACTERIA SPEC CULT: NO GROWTH
BASOPHILS # BLD AUTO: 0 10E9/L (ref 0–0.2)
BASOPHILS NFR BLD AUTO: 0.2 %
BILIRUB DIRECT SERPL-MCNC: 0.4 MG/DL (ref 0–0.2)
BILIRUB SERPL-MCNC: 1.7 MG/DL (ref 0.2–1.3)
BILIRUB UR QL STRIP: NEGATIVE
BUN SERPL-MCNC: 21 MG/DL (ref 7–30)
CALCIUM SERPL-MCNC: 9.2 MG/DL (ref 8.5–10.1)
CHLORIDE SERPL-SCNC: 101 MMOL/L (ref 94–109)
CO2 SERPL-SCNC: 22 MMOL/L (ref 20–32)
COLOR UR AUTO: YELLOW
CREAT SERPL-MCNC: 0.76 MG/DL (ref 0.52–1.04)
DIFFERENTIAL METHOD BLD: ABNORMAL
EOSINOPHIL # BLD AUTO: 0 10E9/L (ref 0–0.7)
EOSINOPHIL NFR BLD AUTO: 0.3 %
ERYTHROCYTE [DISTWIDTH] IN BLOOD BY AUTOMATED COUNT: 14.4 % (ref 10–15)
GFR SERPL CREATININE-BSD FRML MDRD: 67 ML/MIN/{1.73_M2}
GLUCOSE SERPL-MCNC: 138 MG/DL (ref 70–99)
GLUCOSE UR STRIP-MCNC: NEGATIVE MG/DL
HCT VFR BLD AUTO: 40 % (ref 35–47)
HGB BLD-MCNC: 13.7 G/DL (ref 11.7–15.7)
HGB UR QL STRIP: ABNORMAL
HYALINE CASTS #/AREA URNS LPF: 3 /LPF (ref 0–2)
IMM GRANULOCYTES # BLD: 0.2 10E9/L (ref 0–0.4)
IMM GRANULOCYTES NFR BLD: 1.4 %
INR PPP: 1.5 (ref 0.86–1.14)
INTERPRETATION ECG - MUSE: NORMAL
KETONES UR STRIP-MCNC: NEGATIVE MG/DL
LACTATE BLD-SCNC: 2 MMOL/L (ref 0.7–2)
LEUKOCYTE ESTERASE UR QL STRIP: NEGATIVE
LYMPHOCYTES # BLD AUTO: 1.7 10E9/L (ref 0.8–5.3)
LYMPHOCYTES NFR BLD AUTO: 14.2 %
Lab: NORMAL
Lab: NORMAL
MAGNESIUM SERPL-MCNC: 2 MG/DL (ref 1.6–2.3)
MCH RBC QN AUTO: 31.2 PG (ref 26.5–33)
MCHC RBC AUTO-ENTMCNC: 34.3 G/DL (ref 31.5–36.5)
MCV RBC AUTO: 91 FL (ref 78–100)
MONOCYTES # BLD AUTO: 0.9 10E9/L (ref 0–1.3)
MONOCYTES NFR BLD AUTO: 7.3 %
MUCOUS THREADS #/AREA URNS LPF: PRESENT /LPF
NEUTROPHILS # BLD AUTO: 8.9 10E9/L (ref 1.6–8.3)
NEUTROPHILS NFR BLD AUTO: 76.6 %
NITRATE UR QL: NEGATIVE
NRBC # BLD AUTO: 0 10*3/UL
NRBC BLD AUTO-RTO: 0 /100
NT-PROBNP SERPL-MCNC: 6838 PG/ML (ref 0–1800)
PH UR STRIP: 5.5 PH (ref 5–7)
PLATELET # BLD AUTO: 208 10E9/L (ref 150–450)
POTASSIUM SERPL-SCNC: 4.6 MMOL/L (ref 3.4–5.3)
PROT SERPL-MCNC: 6.6 G/DL (ref 6.8–8.8)
RBC # BLD AUTO: 4.39 10E12/L (ref 3.8–5.2)
RBC #/AREA URNS AUTO: 2 /HPF (ref 0–2)
SODIUM SERPL-SCNC: 131 MMOL/L (ref 133–144)
SOURCE: ABNORMAL
SP GR UR STRIP: 1.01 (ref 1–1.03)
SPECIMEN SOURCE: NORMAL
SPECIMEN SOURCE: NORMAL
TROPONIN I SERPL-MCNC: 0.02 UG/L (ref 0–0.04)
UROBILINOGEN UR STRIP-MCNC: NORMAL MG/DL (ref 0–2)
WBC # BLD AUTO: 11.6 10E9/L (ref 4–11)
WBC #/AREA URNS AUTO: 1 /HPF (ref 0–5)

## 2019-04-29 PROCEDURE — 87088 URINE BACTERIA CULTURE: CPT | Performed by: EMERGENCY MEDICINE

## 2019-04-29 PROCEDURE — 87186 SC STD MICRODIL/AGAR DIL: CPT | Performed by: EMERGENCY MEDICINE

## 2019-04-29 PROCEDURE — 25000128 H RX IP 250 OP 636: Performed by: EMERGENCY MEDICINE

## 2019-04-29 PROCEDURE — 76705 ECHO EXAM OF ABDOMEN: CPT

## 2019-04-29 PROCEDURE — 71046 X-RAY EXAM CHEST 2 VIEWS: CPT

## 2019-04-29 PROCEDURE — 80048 BASIC METABOLIC PNL TOTAL CA: CPT | Performed by: EMERGENCY MEDICINE

## 2019-04-29 PROCEDURE — 83880 ASSAY OF NATRIURETIC PEPTIDE: CPT | Performed by: EMERGENCY MEDICINE

## 2019-04-29 PROCEDURE — 21000001 ZZH R&B HEART CARE

## 2019-04-29 PROCEDURE — 25000128 H RX IP 250 OP 636: Performed by: INTERNAL MEDICINE

## 2019-04-29 PROCEDURE — 25000132 ZZH RX MED GY IP 250 OP 250 PS 637: Performed by: INTERNAL MEDICINE

## 2019-04-29 PROCEDURE — 99223 1ST HOSP IP/OBS HIGH 75: CPT | Mod: AI | Performed by: INTERNAL MEDICINE

## 2019-04-29 PROCEDURE — 99285 EMERGENCY DEPT VISIT HI MDM: CPT | Mod: 25

## 2019-04-29 PROCEDURE — 25000132 ZZH RX MED GY IP 250 OP 250 PS 637: Performed by: EMERGENCY MEDICINE

## 2019-04-29 PROCEDURE — 99223 1ST HOSP IP/OBS HIGH 75: CPT | Performed by: INTERNAL MEDICINE

## 2019-04-29 PROCEDURE — 93005 ELECTROCARDIOGRAM TRACING: CPT

## 2019-04-29 PROCEDURE — 83735 ASSAY OF MAGNESIUM: CPT | Performed by: EMERGENCY MEDICINE

## 2019-04-29 PROCEDURE — 83605 ASSAY OF LACTIC ACID: CPT | Performed by: EMERGENCY MEDICINE

## 2019-04-29 PROCEDURE — 87086 URINE CULTURE/COLONY COUNT: CPT | Performed by: EMERGENCY MEDICINE

## 2019-04-29 PROCEDURE — 85610 PROTHROMBIN TIME: CPT | Performed by: EMERGENCY MEDICINE

## 2019-04-29 PROCEDURE — 80076 HEPATIC FUNCTION PANEL: CPT | Performed by: EMERGENCY MEDICINE

## 2019-04-29 PROCEDURE — 96375 TX/PRO/DX INJ NEW DRUG ADDON: CPT

## 2019-04-29 PROCEDURE — 94640 AIRWAY INHALATION TREATMENT: CPT

## 2019-04-29 PROCEDURE — 96376 TX/PRO/DX INJ SAME DRUG ADON: CPT

## 2019-04-29 PROCEDURE — 96374 THER/PROPH/DIAG INJ IV PUSH: CPT

## 2019-04-29 PROCEDURE — 36415 COLL VENOUS BLD VENIPUNCTURE: CPT

## 2019-04-29 PROCEDURE — 87040 BLOOD CULTURE FOR BACTERIA: CPT | Performed by: EMERGENCY MEDICINE

## 2019-04-29 PROCEDURE — 85025 COMPLETE CBC W/AUTO DIFF WBC: CPT | Performed by: EMERGENCY MEDICINE

## 2019-04-29 PROCEDURE — 25000125 ZZHC RX 250: Performed by: EMERGENCY MEDICINE

## 2019-04-29 PROCEDURE — G0378 HOSPITAL OBSERVATION PER HR: HCPCS

## 2019-04-29 PROCEDURE — 84484 ASSAY OF TROPONIN QUANT: CPT | Performed by: EMERGENCY MEDICINE

## 2019-04-29 PROCEDURE — 81001 URINALYSIS AUTO W/SCOPE: CPT | Performed by: EMERGENCY MEDICINE

## 2019-04-29 RX ORDER — POTASSIUM CHLORIDE 7.45 MG/ML
10 INJECTION INTRAVENOUS
Status: DISCONTINUED | OUTPATIENT
Start: 2019-04-29 | End: 2019-05-01 | Stop reason: HOSPADM

## 2019-04-29 RX ORDER — POTASSIUM CL/LIDO/0.9 % NACL 10MEQ/0.1L
10 INTRAVENOUS SOLUTION, PIGGYBACK (ML) INTRAVENOUS
Status: DISCONTINUED | OUTPATIENT
Start: 2019-04-29 | End: 2019-05-01 | Stop reason: HOSPADM

## 2019-04-29 RX ORDER — CEFUROXIME AXETIL 500 MG/1
500 TABLET ORAL 2 TIMES DAILY
Status: DISPENSED | OUTPATIENT
Start: 2019-04-29 | End: 2019-05-01

## 2019-04-29 RX ORDER — POLYETHYLENE GLYCOL 3350 17 G/17G
17 POWDER, FOR SOLUTION ORAL DAILY PRN
Status: DISCONTINUED | OUTPATIENT
Start: 2019-04-29 | End: 2019-05-01 | Stop reason: HOSPADM

## 2019-04-29 RX ORDER — DIGOXIN 0.25 MG/ML
250 INJECTION INTRAMUSCULAR; INTRAVENOUS ONCE
Status: COMPLETED | OUTPATIENT
Start: 2019-04-29 | End: 2019-04-29

## 2019-04-29 RX ORDER — FUROSEMIDE 10 MG/ML
20 INJECTION INTRAMUSCULAR; INTRAVENOUS
Status: DISCONTINUED | OUTPATIENT
Start: 2019-04-29 | End: 2019-04-30

## 2019-04-29 RX ORDER — ACETAMINOPHEN 325 MG/1
650 TABLET ORAL EVERY 4 HOURS PRN
Status: DISCONTINUED | OUTPATIENT
Start: 2019-04-29 | End: 2019-05-01 | Stop reason: HOSPADM

## 2019-04-29 RX ORDER — ACETAMINOPHEN 650 MG/1
650 SUPPOSITORY RECTAL EVERY 4 HOURS PRN
Status: DISCONTINUED | OUTPATIENT
Start: 2019-04-29 | End: 2019-05-01 | Stop reason: HOSPADM

## 2019-04-29 RX ORDER — DIGOXIN 125 MCG
125 TABLET ORAL DAILY
Status: DISCONTINUED | OUTPATIENT
Start: 2019-04-30 | End: 2019-05-01 | Stop reason: HOSPADM

## 2019-04-29 RX ORDER — WARFARIN SODIUM 2.5 MG/1
2.5 TABLET ORAL
Status: COMPLETED | OUTPATIENT
Start: 2019-04-29 | End: 2019-04-29

## 2019-04-29 RX ORDER — IPRATROPIUM BROMIDE AND ALBUTEROL SULFATE 2.5; .5 MG/3ML; MG/3ML
3 SOLUTION RESPIRATORY (INHALATION) ONCE
Status: COMPLETED | OUTPATIENT
Start: 2019-04-29 | End: 2019-04-29

## 2019-04-29 RX ORDER — METOPROLOL TARTRATE 1 MG/ML
2.5 INJECTION, SOLUTION INTRAVENOUS EVERY 4 HOURS PRN
Status: DISCONTINUED | OUTPATIENT
Start: 2019-04-29 | End: 2019-05-01 | Stop reason: HOSPADM

## 2019-04-29 RX ORDER — POTASSIUM CHLORIDE 1500 MG/1
20-40 TABLET, EXTENDED RELEASE ORAL
Status: DISCONTINUED | OUTPATIENT
Start: 2019-04-29 | End: 2019-05-01 | Stop reason: HOSPADM

## 2019-04-29 RX ORDER — NALOXONE HYDROCHLORIDE 0.4 MG/ML
.1-.4 INJECTION, SOLUTION INTRAMUSCULAR; INTRAVENOUS; SUBCUTANEOUS
Status: DISCONTINUED | OUTPATIENT
Start: 2019-04-29 | End: 2019-05-01 | Stop reason: HOSPADM

## 2019-04-29 RX ORDER — POTASSIUM CHLORIDE 1.5 G/1.58G
20-40 POWDER, FOR SOLUTION ORAL
Status: DISCONTINUED | OUTPATIENT
Start: 2019-04-29 | End: 2019-05-01 | Stop reason: HOSPADM

## 2019-04-29 RX ORDER — ONDANSETRON 4 MG/1
4 TABLET, ORALLY DISINTEGRATING ORAL EVERY 6 HOURS PRN
Status: DISCONTINUED | OUTPATIENT
Start: 2019-04-29 | End: 2019-05-01 | Stop reason: HOSPADM

## 2019-04-29 RX ORDER — MAGNESIUM SULFATE HEPTAHYDRATE 40 MG/ML
2 INJECTION, SOLUTION INTRAVENOUS DAILY PRN
Status: DISCONTINUED | OUTPATIENT
Start: 2019-04-29 | End: 2019-05-01 | Stop reason: HOSPADM

## 2019-04-29 RX ORDER — PROCHLORPERAZINE MALEATE 5 MG
5 TABLET ORAL EVERY 6 HOURS PRN
Status: DISCONTINUED | OUTPATIENT
Start: 2019-04-29 | End: 2019-05-01 | Stop reason: HOSPADM

## 2019-04-29 RX ORDER — METOPROLOL TARTRATE 25 MG/1
25 TABLET, FILM COATED ORAL 2 TIMES DAILY
Status: DISCONTINUED | OUTPATIENT
Start: 2019-04-29 | End: 2019-05-01 | Stop reason: HOSPADM

## 2019-04-29 RX ORDER — ONDANSETRON 2 MG/ML
4 INJECTION INTRAMUSCULAR; INTRAVENOUS EVERY 6 HOURS PRN
Status: DISCONTINUED | OUTPATIENT
Start: 2019-04-29 | End: 2019-05-01 | Stop reason: HOSPADM

## 2019-04-29 RX ORDER — POTASSIUM CHLORIDE 29.8 MG/ML
20 INJECTION INTRAVENOUS
Status: DISCONTINUED | OUTPATIENT
Start: 2019-04-29 | End: 2019-05-01 | Stop reason: HOSPADM

## 2019-04-29 RX ORDER — MAGNESIUM SULFATE HEPTAHYDRATE 40 MG/ML
4 INJECTION, SOLUTION INTRAVENOUS EVERY 4 HOURS PRN
Status: DISCONTINUED | OUTPATIENT
Start: 2019-04-29 | End: 2019-05-01 | Stop reason: HOSPADM

## 2019-04-29 RX ORDER — FUROSEMIDE 10 MG/ML
20 INJECTION INTRAMUSCULAR; INTRAVENOUS ONCE
Status: COMPLETED | OUTPATIENT
Start: 2019-04-29 | End: 2019-04-29

## 2019-04-29 RX ORDER — FUROSEMIDE 10 MG/ML
40 INJECTION INTRAMUSCULAR; INTRAVENOUS ONCE
Status: DISCONTINUED | OUTPATIENT
Start: 2019-04-29 | End: 2019-04-29

## 2019-04-29 RX ORDER — BISACODYL 10 MG
10 SUPPOSITORY, RECTAL RECTAL DAILY PRN
Status: DISCONTINUED | OUTPATIENT
Start: 2019-04-29 | End: 2019-05-01 | Stop reason: HOSPADM

## 2019-04-29 RX ORDER — PROCHLORPERAZINE 25 MG
12.5 SUPPOSITORY, RECTAL RECTAL EVERY 12 HOURS PRN
Status: DISCONTINUED | OUTPATIENT
Start: 2019-04-29 | End: 2019-05-01 | Stop reason: HOSPADM

## 2019-04-29 RX ORDER — METOPROLOL TARTRATE 25 MG/1
25 TABLET, FILM COATED ORAL ONCE
Status: COMPLETED | OUTPATIENT
Start: 2019-04-29 | End: 2019-04-29

## 2019-04-29 RX ADMIN — DIGOXIN 250 MCG: 0.25 INJECTION INTRAMUSCULAR; INTRAVENOUS at 16:28

## 2019-04-29 RX ADMIN — CEFUROXIME AXETIL 500 MG: 500 TABLET ORAL at 16:28

## 2019-04-29 RX ADMIN — WARFARIN SODIUM 2.5 MG: 2.5 TABLET ORAL at 18:28

## 2019-04-29 RX ADMIN — FUROSEMIDE 20 MG: 10 INJECTION, SOLUTION INTRAVENOUS at 09:42

## 2019-04-29 RX ADMIN — METOPROLOL TARTRATE 25 MG: 25 TABLET ORAL at 20:55

## 2019-04-29 RX ADMIN — FUROSEMIDE 20 MG: 10 INJECTION, SOLUTION INTRAVENOUS at 16:28

## 2019-04-29 RX ADMIN — METOPROLOL TARTRATE 25 MG: 25 TABLET ORAL at 13:28

## 2019-04-29 RX ADMIN — IPRATROPIUM BROMIDE AND ALBUTEROL SULFATE 3 ML: .5; 3 SOLUTION RESPIRATORY (INHALATION) at 08:59

## 2019-04-29 RX ADMIN — METOPROLOL TARTRATE 25 MG: 25 TABLET ORAL at 08:58

## 2019-04-29 ASSESSMENT — ACTIVITIES OF DAILY LIVING (ADL)
AMBULATION: 1-->ASSISTIVE EQUIPMENT
ADLS_ACUITY_SCORE: 19
COGNITION: 0 - NO COGNITION ISSUES REPORTED
RETIRED_EATING: 0-->INDEPENDENT
RETIRED_COMMUNICATION: 0-->UNDERSTANDS/COMMUNICATES WITHOUT DIFFICULTY
ADLS_ACUITY_SCORE: 20
BATHING: 0-->INDEPENDENT
SWALLOWING: 0-->SWALLOWS FOODS/LIQUIDS WITHOUT DIFFICULTY
TOILETING: 0-->INDEPENDENT
DRESS: 0-->INDEPENDENT
TRANSFERRING: 1-->ASSISTIVE EQUIPMENT
FALL_HISTORY_WITHIN_LAST_SIX_MONTHS: YES
NUMBER_OF_TIMES_PATIENT_HAS_FALLEN_WITHIN_LAST_SIX_MONTHS: 1
ADLS_ACUITY_SCORE: 22

## 2019-04-29 ASSESSMENT — MIFFLIN-ST. JEOR: SCORE: 1061.79

## 2019-04-29 NOTE — CONSULTS
Cardiology Consultation      Elizabeth Ruggiero MRN# [id number]   YOB: 1926 Age: 89 year old   Date of Admission: 4/29/2019     Reason for consult:  Diastolic congestive heart failure           Assessment and Plan:     1. Diastolic congestive heart failure exacerbation in the setting of significant valvular heart disease and rapid atrial fibrillation    Agree with metoprolol.  Will likely not get up to a high enough dose without being limited by blood pressure.    Add digoxin cautiously.  Patient has remarkably good kidneys for her age.  Will start with 250 mcg x 1 and then daily 125 mcg to help with heart rate control.    We did discuss with the patient and her daughter that if we are not able to control the heart rates adequately, subsequent steps could include AV node ablation and pacemaker implantation versus consideration for mitral clip procedure.    Continue warfarin anticoagulation for her stroke prophylaxis.               Chief Complaint:   Abdominal Pain and Shortness of Breath           History of Present Illness:   This patient is a very independent and bright 89 year old female who was hospitalized for likely CHF exacerbation, diastolic due to moderately severe-severe mitral regurgitation in the setting of treatment for pneumonia.    She has had her metoprolol titrated with improvement in her heart rates, but blood pressure is limited.  Heart rates are still suboptimally controlled.  The patient currently feels a little bit better than when she first arrived.  Echocardiogram last hospitalization 4/23/2019 again demonstrated her moderately severe-severe mitral regurgitation from partial flail that was seen on outpatient echocardiogram available for review in care everywhere.    Of note, she is followed by her primary physician for her atrial fibrillation and is anticoagulated for that as well as history of DVT.  She has not seen a cardiologist.         Physical Exam:   Vitals were  "reviewed  Blood pressure 114/89, pulse 109, temperature 97.6  F (36.4  C), temperature source Oral, resp. rate 18, height 1.676 m (5' 6\"), weight 63.5 kg (140 lb), SpO2 94 %, not currently breastfeeding.  Temperatures:  Current - Temp: 97.6  F (36.4  C); Max - Temp  Av.9  F (36.6  C)  Min: 97.6  F (36.4  C)  Max: 98.1  F (36.7  C)  Respiration range: Resp  Av.8  Min: 15  Max: 30  Pulse range: Pulse  Av.5  Min: 108  Max: 137  Blood pressure range: Systolic (24hrs), Av , Min:109 , Max:126   ; Diastolic (24hrs), Av, Min:79, Max:98    Pulse oximetry range: SpO2  Av.3 %  Min: 89 %  Max: 95 %    Intake/Output Summary (Last 24 hours) at 2019 1441  Last data filed at 2019 1200  Gross per 24 hour   Intake --   Output 500 ml   Net -500 ml     Constitutional:   awake, alert, cooperative, no apparent distress, and appears younger than stated age.  Applying make-up prior to the interview today.     Eyes:   Lids and lashes normal, pupils equal, round and reactive to light, extra ocular muscles intact, sclera clear, conjunctiva normal     Neck:   supple, symmetrical, trachea midline     Back:   symmetric     Lungs:   Coarse breath sounds     Cardiovascular:   Irregularly irregular, grade 2 out of 6 at apex, poor rate control.     Abdomen:   Benign     Musculoskeletal:   motor strength is 5 out of 5 all extremities bilaterally     Neurologic:   Grossly nonfocal     Skin:   normal skin color, texture, turgor                     Past Medical History:   I have reviewed this patient's past medical history  Past Medical History:   Diagnosis Date     Arthritis      Atrial fibrillation (H)      DVT of lower extremity (deep venous thrombosis) (H)      Vitamin D deficiency              Past Surgical History:   I have reviewed this patient's past surgical history  Past Surgical History:   Procedure Laterality Date     APPENDECTOMY       FRACTURE SURGERY       repair left femur                 Social " History:   I have reviewed this patient's social history  Social History     Tobacco Use     Smoking status: Never Smoker   Substance Use Topics     Alcohol use: No             Family History:   I have reviewed this patient's family history  Family History   Problem Relation Age of Onset     Colon Cancer Mother      Breast Cancer Sister      Diabetes Sister              Allergies:   No Known Allergies          Medications:   I have reviewed this patient's current medications  Medications Prior to Admission   Medication Sig Dispense Refill Last Dose     Acetaminophen (TYLENOL PO) Take 325 mg by mouth every 6 hours as needed     at PRN     alendronate (FOSAMAX) 70 MG tablet Take 70 mg by mouth every 7 days SUNDAYS   4/28/2019 at Unknown time     calcium carbonate-vitamin D (OSCAL W/D) 500-200 MG-UNIT tablet Take 1 tablet by mouth 2 times daily   4/28/2019 at PM     cefuroxime (CEFTIN) 500 MG tablet Take 1 tablet (500 mg) by mouth 2 times daily for 6 days 12 tablet 0 4/28/2019 at PM     metoprolol tartrate (LOPRESSOR) 25 MG tablet Take 25 mg by mouth 2 times daily    4/28/2019 at PM     Multiple Vitamins-Minerals (PRESERVISION AREDS 2+MULTI VIT) CAPS Take 1 tablet by mouth 2 times daily   4/28/2019 at PM     NONFORMULARY Take 3 tablets by mouth daily 50+ Mini Tablets (food-based multivitamin) from [employer]  Does not contain vitamin K        warfarin (COUMADIN) 2.5 MG tablet 1.25 mg daily, adjust for goal INR 2-3   4/28/2019 at PM     Current Facility-Administered Medications Ordered in Epic   Medication Dose Route Frequency Last Rate Last Dose     acetaminophen (TYLENOL) Suppository 650 mg  650 mg Rectal Q4H PRN         acetaminophen (TYLENOL) tablet 650 mg  650 mg Oral Q4H PRN         bisacodyl (DULCOLAX) Suppository 10 mg  10 mg Rectal Daily PRN         cefuroxime (CEFTIN) tablet 500 mg  500 mg Oral BID         digoxin (LANOXIN) injection 250 mcg  250 mcg Intravenous Once         [START ON 4/30/2019] digoxin  (LANOXIN) tablet 125 mcg  125 mcg Oral Daily         furosemide (LASIX) injection 20 mg  20 mg Intravenous BID         magnesium sulfate 2 g in water intermittent infusion  2 g Intravenous Daily PRN         magnesium sulfate 4 g in 100 mL sterile water (premade)  4 g Intravenous Q4H PRN         melatonin tablet 1 mg  1 mg Oral At Bedtime PRN         metoprolol (LOPRESSOR) injection 2.5 mg  2.5 mg Intravenous Q4H PRN         metoprolol tartrate (LOPRESSOR) tablet 25 mg  25 mg Oral BID   25 mg at 04/29/19 1328     naloxone (NARCAN) injection 0.1-0.4 mg  0.1-0.4 mg Intravenous Q2 Min PRN         ondansetron (ZOFRAN-ODT) ODT tab 4 mg  4 mg Oral Q6H PRN        Or     ondansetron (ZOFRAN) injection 4 mg  4 mg Intravenous Q6H PRN         polyethylene glycol (MIRALAX/GLYCOLAX) Packet 17 g  17 g Oral Daily PRN         potassium chloride (KLOR-CON) Packet 20-40 mEq  20-40 mEq Oral or Feeding Tube Q2H PRN         potassium chloride 10 mEq in 100 mL intermittent infusion with 10 mg lidocaine  10 mEq Intravenous Q1H PRN         potassium chloride 10 mEq in 100 mL sterile water intermittent infusion (premix)  10 mEq Intravenous Q1H PRN         potassium chloride 20 mEq in 50 mL intermittent infusion  20 mEq Intravenous Q1H PRN         potassium chloride ER (K-DUR/KLOR-CON M) CR tablet 20-40 mEq  20-40 mEq Oral Q2H PRN         prochlorperazine (COMPAZINE) injection 5 mg  5 mg Intravenous Q6H PRN        Or     prochlorperazine (COMPAZINE) tablet 5 mg  5 mg Oral Q6H PRN        Or     prochlorperazine (COMPAZINE) Suppository 12.5 mg  12.5 mg Rectal Q12H PRN         warfarin (COUMADIN) tablet 2.5 mg  2.5 mg Oral ONCE at 18:00         Warfarin Therapy Reminder (Check START DATE - warfarin may be starting in the FUTURE)  1 each Does not apply Continuous PRN         No current Epic-ordered outpatient medications on file.             Review of Systems:   The 10 point Review of Systems is negative other than noted in the HPI             Data:   All laboratory data reviewed  Results for orders placed or performed during the hospital encounter of 04/29/19 (from the past 24 hour(s))   Basic metabolic panel   Result Value Ref Range    Sodium 131 (L) 133 - 144 mmol/L    Potassium 4.6 3.4 - 5.3 mmol/L    Chloride 101 94 - 109 mmol/L    Carbon Dioxide 22 20 - 32 mmol/L    Anion Gap 8 3 - 14 mmol/L    Glucose 138 (H) 70 - 99 mg/dL    Urea Nitrogen 21 7 - 30 mg/dL    Creatinine 0.76 0.52 - 1.04 mg/dL    GFR Estimate 67 >60 mL/min/[1.73_m2]    GFR Estimate If Black 78 >60 mL/min/[1.73_m2]    Calcium 9.2 8.5 - 10.1 mg/dL   CBC with platelets differential   Result Value Ref Range    WBC 11.6 (H) 4.0 - 11.0 10e9/L    RBC Count 4.39 3.8 - 5.2 10e12/L    Hemoglobin 13.7 11.7 - 15.7 g/dL    Hematocrit 40.0 35.0 - 47.0 %    MCV 91 78 - 100 fl    MCH 31.2 26.5 - 33.0 pg    MCHC 34.3 31.5 - 36.5 g/dL    RDW 14.4 10.0 - 15.0 %    Platelet Count 208 150 - 450 10e9/L    Diff Method Automated Method     % Neutrophils 76.6 %    % Lymphocytes 14.2 %    % Monocytes 7.3 %    % Eosinophils 0.3 %    % Basophils 0.2 %    % Immature Granulocytes 1.4 %    Nucleated RBCs 0 0 /100    Absolute Neutrophil 8.9 (H) 1.6 - 8.3 10e9/L    Absolute Lymphocytes 1.7 0.8 - 5.3 10e9/L    Absolute Monocytes 0.9 0.0 - 1.3 10e9/L    Absolute Eosinophils 0.0 0.0 - 0.7 10e9/L    Absolute Basophils 0.0 0.0 - 0.2 10e9/L    Abs Immature Granulocytes 0.2 0 - 0.4 10e9/L    Absolute Nucleated RBC 0.0    INR   Result Value Ref Range    INR 1.50 (H) 0.86 - 1.14   Hepatic panel   Result Value Ref Range    Bilirubin Direct 0.4 (H) 0.0 - 0.2 mg/dL    Bilirubin Total 1.7 (H) 0.2 - 1.3 mg/dL    Albumin 3.1 (L) 3.4 - 5.0 g/dL    Protein Total 6.6 (L) 6.8 - 8.8 g/dL    Alkaline Phosphatase 127 40 - 150 U/L     (H) 0 - 50 U/L     (H) 0 - 45 U/L   Nt probnp inpatient   Result Value Ref Range    N-Terminal Pro BNP Inpatient 6,838 (H) 0 - 1,800 pg/mL   Troponin I   Result Value Ref Range    Troponin I ES  0.023 0.000 - 0.045 ug/L   Magnesium   Result Value Ref Range    Magnesium 2.0 1.6 - 2.3 mg/dL   EKG 12 lead   Result Value Ref Range    Interpretation ECG Click View Image link to view waveform and result    XR Chest 2 Views    Narrative    CHEST TWO VIEWS   4/29/2019 8:46 AM     HISTORY: Sepsis.    COMPARISON: 4/24/2019.      Impression    IMPRESSION: Increased bilateral patchy groundglass opacities and  interstitial prominence. Increased cardiomegaly. Correlate with CHF.  Mild increased patchy opacity at the lower right lung may also  represent new airspace disease.   Lactic acid whole blood   Result Value Ref Range    Lactic Acid 2.0 0.7 - 2.0 mmol/L   Blood culture   Result Value Ref Range    Specimen Description Blood Right Wrist     Special Requests Aerobic and anaerobic bottles received     Culture Micro No growth after 1 hour    Blood culture   Result Value Ref Range    Specimen Description Blood Left Hand     Special Requests Aerobic and anaerobic bottles received     Culture Micro No growth after 1 hour    Routine UA with microscopic   Result Value Ref Range    Color Urine Yellow     Appearance Urine Clear     Glucose Urine Negative NEG^Negative mg/dL    Bilirubin Urine Negative NEG^Negative    Ketones Urine Negative NEG^Negative mg/dL    Specific Gravity Urine 1.010 1.003 - 1.035    Blood Urine Trace (A) NEG^Negative    pH Urine 5.5 5.0 - 7.0 pH    Protein Albumin Urine 10 (A) NEG^Negative mg/dL    Urobilinogen mg/dL Normal 0.0 - 2.0 mg/dL    Nitrite Urine Negative NEG^Negative    Leukocyte Esterase Urine Negative NEG^Negative    Source Midstream Urine     WBC Urine 1 0 - 5 /HPF    RBC Urine 2 0 - 2 /HPF    Mucous Urine Present (A) NEG^Negative /LPF    Hyaline Casts 3 (H) 0 - 2 /LPF   Urine Culture Aerobic Bacterial   Result Value Ref Range    Specimen Description Midstream Urine     Special Requests Specimen received in preservative     Culture Micro PENDING        No results found for: CHOL  No results  found for: HDL  No results found for: LDL  No results found for: TRIG  No results found for: CHOLHDLRATIO  No results found for: TSH

## 2019-04-29 NOTE — ED NOTES
Bed: ED01  Expected date:   Expected time:   Means of arrival:   Comments:  [facility] [place] - [phone] - [phone] F SOB eta [id number]

## 2019-04-29 NOTE — PHARMACY-ANTICOAGULATION SERVICE
Clinical Pharmacy - Warfarin Dosing Consult     Pharmacy has been consulted to manage this patient s warfarin therapy.  Indication: Atrial Fibrillation  Therapy Goal: INR 2-3  Warfarin Prior to Admission: Yes  Warfarin PTA Regimen: 1.25mg daily    INR   Date Value Ref Range Status   04/29/2019 1.50 (H) 0.86 - 1.14 Final   04/25/2019 2.54 (H) 0.86 - 1.14 Final       Recommend warfarin 2.5  mg today.  Pharmacy will monitor Elizabeth Ruggiero daily and order warfarin doses to achieve specified goal.      Please contact pharmacy as soon as possible if the warfarin needs to be held for a procedure or if the warfarin goals change.

## 2019-04-29 NOTE — H&P
[facility]    History and Physical - Hospitalist Service       Date of Admission:  4/29/2019    Assessment & Plan   Elizabeth Ruggiero is a 89 year old female admitted on 4/29/2019.     Acute diastolic congestive heart failure  Moderate-severe to severe MR  Recent EF >70% though with above valvular abnormalities. NtpBNP 6,838, hypoxic to 89% in Emergency Department. CXR with increased bilateral patchy groundglass opacities and interstitial prominence, increased cardiomegaly. Respiratory status had worsened during past admission associated with IVF, though improved with BiPAP and did not require discharge with diuretics and she was scheduled for outpatient follow-up with cardiology. No prior diagnosis of CHF.   - Cardiology consulted  - Furosemide 20 mg IV BID  - Daily weights  - Strict I/O  - Wean oxygen   - Daily BMP while diuresing    Elevated LFTs  Potentially due to CHF/hepatic congestion. Has been having nausea with poor appetite though no vomiting or abdominal pain.  Had one episode of diarrhea on return home from the hospital but now having formed stools.  No abdominal tenderness on exam  - Diurese as above   - RUQ US  - Trend LFTs    Recent community acquired pneumonia  Recently hospitalized from 4/23-4/25 for RML pneumonia, discharged with cefuroxime and azithromycin. CXR with mild increased patchy opacity in RLL. WBC mildly elevated  - Complete antibiotic course as previously prescribed  - Monitor WBC, fever curve closely to reassess for expanding antibiotic coverage     Atrial fibrillation/flutter   INR subtherapeutic on admission with borderline elevated HR.  - Continue prior to admission metoprolol   - Metoprolol IV PRN for significant elevations  - Continue warfarin with pharmacy to dose        Diet: 2 gram sodium restricted diet   DVT Prophylaxis: Warfarin  Wesley Catheter: not present  Code Status: Full code    Disposition Plan   Expected discharge: Admit to inpatient. Anticipate greater  than or equal to 2 midnights prior to discharge.   Entered: Colt Mirza MD 04/29/2019, 10:24 AM     The patient's care was discussed with the Patient and Patient's daughter .    Colt Mirza MD  [facility]    ______________________________________________________________________    Chief Complaint   Progressive shortness of breath for 3 days    History is obtained from the patient    History of Present Illness   Elizabeth Ruggiero is a 89 year old female who presents with the above chief complaint.    The patient was recently hospitalized from 4/23-4/25 for pneumonia.  Patient reports that her breathing initially felt improved on her return home from the hospital.  The following day she began to feel progressively more short of breath.  She also complained of poor appetite and nausea without vomiting.  She had one episode of diarrhea on return home, since has had a formed stool.  She has continued to have a cough that is intermittently productive of white to yellow sputum.  She denies any chest pain.  She has been compliant with her antibiotics.  She denies any fevers.  She has no clinical history of heart failure, she has known about her insufficient valve for some time but just recently has been referred to cardiology for further evaluation.    In the Emergency Department, the patient was seen by Dr. Dwight Mendieta, with whom I discussed the patient's presenting symptoms and emergency department course.  Initial vital signs were a temperature of 98.1F, , /92, RR 20, SpO2 895 on room air. Work-up included CXR with bilateral patchy groundglass opacities and interstitial prominence, increased cardiomegaly. She received furosemide 20 mg IV and hospitalists were contacted for admission to the hospital.     Review of Systems    Complete 10 point review of systems assessed and negative except as noted in HPI.    Past Medical History    I have reviewed this patient's medical history and  updated it with pertinent information if needed.   Past Medical History:   Diagnosis Date     Arthritis      Atrial fibrillation (H)      DVT of lower extremity (deep venous thrombosis) (H)      Vitamin D deficiency        Past Surgical History   I have reviewed this patient's surgical history and updated it with pertinent information if needed.  Past Surgical History:   Procedure Laterality Date     APPENDECTOMY       FRACTURE SURGERY       repair left femur         Social History   Never smoker. Denies alcohol use.      Lives in the [facility] in independent living.     Family History   I have reviewed this patient's family history and updated it with pertinent information if needed.   Family History   Problem Relation Age of Onset     Colon Cancer Mother      Breast Cancer Sister      Diabetes Sister        Prior to Admission Medications   Prior to Admission Medications   Prescriptions Last Dose Informant Patient Reported? Taking?   Acetaminophen (TYLENOL PO)  at PRN  Yes Yes   Sig: Take 325 mg by mouth every 6 hours as needed    Multiple Vitamins-Minerals (PRESERVISION AREDS 2+MULTI VIT) CAPS 2019 at PM  Yes Yes   Sig: Take 1 tablet by mouth 2 times daily   NONFORMULARY   Yes Yes   Sig: Take 3 tablets by mouth daily 50+ Mini Tablets (food-based multivitamin) from [employer]  Does not contain vitamin K   alendronate (FOSAMAX) 70 MG tablet 2019 at Unknown time  Yes Yes   Sig: Take 70 mg by mouth every 7 days SUNDAYS   calcium carbonate-vitamin D (OSCAL W/D) 500-200 MG-UNIT tablet 2019 at PM  Yes Yes   Sig: Take 1 tablet by mouth 2 times daily   cefuroxime (CEFTIN) 500 MG tablet 2019 at PM  No Yes   Sig: Take 1 tablet (500 mg) by mouth 2 times daily for 6 days   metoprolol tartrate (LOPRESSOR) 25 MG tablet 2019 at PM  Yes Yes   Sig: Take 25 mg by mouth 2 times daily    warfarin (COUMADIN) 2.5 MG tablet 2019 at PM  No Yes   Si.25 mg daily, adjust for goal INR 2-3       Facility-Administered Medications: None     Allergies   No Known Allergies    Physical Exam   Vital Signs: Temp: 98.1  F (36.7  C) Temp src: Oral BP: 122/79 Pulse: 108 Heart Rate: 116 Resp: 15 SpO2: 95 % O2 Device: None (Room air)    Weight: 140 lbs 0 oz    Constitutional: Well-appearing, NAD  Eyes: PERRL, EOMI  HENT: Oropharynx clear, MMM  Respiratory: Bilateral inspiratory crackles at the bases, no wheezes, normal effort at rest   Cardiovascular: Irregularly irregular with borderline elevated heart rate, III/VI systolic murmur heard best at apex. No peripheral edema.   GI: Soft, non-tender, non-distended. No rebound tenderness or guarding.   Skin: Warm, dry  Neurologic: Alert. Responding to questions appropriately. Following commands.  Psychiatric: Normal affect, appropriate      Data   Data reviewed today: I reviewed all medications, new labs and imaging results over the last 24 hours. I personally reviewed the EKG tracing showing sinus rhythm with 1st degree AV block, T wave inversions in II, III, aVF, V1-V6. No significant changes from previous EKG. .    Recent Labs   Lab 04/29/19  0815 04/25/19  0533 04/24/19  0542 04/23/19 2002 04/23/19  1622   WBC 11.6*  --  3.5*  --  8.3   HGB 13.7  --  12.1  --  13.4   MCV 91  --  91  --  92     --  109*  --  123*   INR 1.50* 2.54* 3.44*  --  3.23*   *  --  133 126* 127*   POTASSIUM 4.6  --  4.0 4.0 4.0   CHLORIDE 101  --  101 95 97   CO2 22  --  24 18* 20   BUN 21  --  18 20 19   CR 0.76  --  0.69 0.72 0.66   ANIONGAP 8  --  8 13 10   CHRIS 9.2  --  8.3* 8.4* 9.0   *  --  134* 238* 140*   ALBUMIN 3.1*  --   --   --  3.3*   PROTTOTAL 6.6*  --   --   --  6.8   BILITOTAL 1.7*  --   --   --  1.7*   ALKPHOS 127  --   --   --  62   *  --   --   --  57*   *  --   --   --  70*   TROPI 0.023  --   --   --  0.032       Recent Results (from the past 24 hour(s))   XR Chest 2 Views    Narrative    CHEST TWO VIEWS   4/29/2019 8:46 AM     HISTORY:  Sepsis.    COMPARISON: 4/24/2019.      Impression    IMPRESSION: Increased bilateral patchy groundglass opacities and  interstitial prominence. Increased cardiomegaly. Correlate with CHF.  Mild increased patchy opacity at the lower right lung may also  represent new airspace disease.

## 2019-04-29 NOTE — ED NOTES
"[facility]  ED Nurse Handoff Report    ED Chief complaint: Abdominal Pain and Shortness of Breath      ED Diagnosis:   Final diagnoses:   None       Code Status: Hospitalist to address.     Allergies: No Known Allergies    Activity level - Baseline/Home:  Independent    Activity Level - Current:   Stand with Assist with walker     Needed?: No    Isolation: No  Infection: Not Applicable  Bariatric?: No    Vital Signs:   Vitals:    04/29/19 0806 04/29/19 0810 04/29/19 0830 04/29/19 0930   BP: (!) 126/92 (!) 126/92 (!) 114/91 122/79   Pulse:   116 108   Resp: 20 25 30 15   Temp: 98.1  F (36.7  C)      TempSrc: Oral      SpO2: (!) 89% 92% 95%    Weight: 63.5 kg (140 lb)      Height: 1.676 m (5' 6\")          Cardiac Rhythm: ,   Cardiac  Cardiac Rhythm: Atrial fibrillation    Pain level: 0-10 Pain Scale: 5    Is this patient confused?: No   Does this patient have a guardian?  No         If yes, is there guardianship documents in the Epic \"Code/ACP\" activity?  No         Guardian Notified?  No  [place] - Suicide Severity Rating Scale Completed?  Yes  If yes, what color did the patient score?  White    Patient Report: Initial Complaint: Pt present to the ER via EMS for c/o abd pain, sob, and not feeling well. Pt seen here 1 wk ago, dx with pneumonia- started abx at that time. Still on abx but now having abd pain.  Per EMS pt RA O2 sats were 88%.  Focused Assessment: Per daughter pt was given IVF on admission 1 wk ago and had some pulmonary edema and was on bipap.  Over the past wk pt has had some increased weakness, pt still on abx for pneumonia. Pt A&Ox3, pt up with a walker but normally independent at home. Family would like pt to go to a rehab at discharge if possible.    Tests Performed:   Results for orders placed or performed during the hospital encounter of 04/29/19   XR Chest 2 Views    Narrative    CHEST TWO VIEWS   4/29/2019 8:46 AM     HISTORY: Sepsis.    COMPARISON: 4/24/2019.      " Impression    IMPRESSION: Increased bilateral patchy groundglass opacities and  interstitial prominence. Increased cardiomegaly. Correlate with CHF.  Mild increased patchy opacity at the lower right lung may also  represent new airspace disease.   Basic metabolic panel   Result Value Ref Range    Sodium 131 (L) 133 - 144 mmol/L    Potassium 4.6 3.4 - 5.3 mmol/L    Chloride 101 94 - 109 mmol/L    Carbon Dioxide 22 20 - 32 mmol/L    Anion Gap 8 3 - 14 mmol/L    Glucose 138 (H) 70 - 99 mg/dL    Urea Nitrogen 21 7 - 30 mg/dL    Creatinine 0.76 0.52 - 1.04 mg/dL    GFR Estimate 67 >60 mL/min/[1.73_m2]    GFR Estimate If Black 78 >60 mL/min/[1.73_m2]    Calcium 9.2 8.5 - 10.1 mg/dL   CBC with platelets differential   Result Value Ref Range    WBC 11.6 (H) 4.0 - 11.0 10e9/L    RBC Count 4.39 3.8 - 5.2 10e12/L    Hemoglobin 13.7 11.7 - 15.7 g/dL    Hematocrit 40.0 35.0 - 47.0 %    MCV 91 78 - 100 fl    MCH 31.2 26.5 - 33.0 pg    MCHC 34.3 31.5 - 36.5 g/dL    RDW 14.4 10.0 - 15.0 %    Platelet Count 208 150 - 450 10e9/L    Diff Method Automated Method     % Neutrophils 76.6 %    % Lymphocytes 14.2 %    % Monocytes 7.3 %    % Eosinophils 0.3 %    % Basophils 0.2 %    % Immature Granulocytes 1.4 %    Nucleated RBCs 0 0 /100    Absolute Neutrophil 8.9 (H) 1.6 - 8.3 10e9/L    Absolute Lymphocytes 1.7 0.8 - 5.3 10e9/L    Absolute Monocytes 0.9 0.0 - 1.3 10e9/L    Absolute Eosinophils 0.0 0.0 - 0.7 10e9/L    Absolute Basophils 0.0 0.0 - 0.2 10e9/L    Abs Immature Granulocytes 0.2 0 - 0.4 10e9/L    Absolute Nucleated RBC 0.0    INR   Result Value Ref Range    INR 1.50 (H) 0.86 - 1.14   Lactic acid whole blood   Result Value Ref Range    Lactic Acid 2.0 0.7 - 2.0 mmol/L   Hepatic panel   Result Value Ref Range    Bilirubin Direct 0.4 (H) 0.0 - 0.2 mg/dL    Bilirubin Total 1.7 (H) 0.2 - 1.3 mg/dL    Albumin 3.1 (L) 3.4 - 5.0 g/dL    Protein Total 6.6 (L) 6.8 - 8.8 g/dL    Alkaline Phosphatase 127 40 - 150 U/L     (H) 0 - 50  U/L     (H) 0 - 45 U/L   Nt probnp inpatient   Result Value Ref Range    N-Terminal Pro BNP Inpatient 6,838 (H) 0 - 1,800 pg/mL   Troponin I   Result Value Ref Range    Troponin I ES 0.023 0.000 - 0.045 ug/L   EKG 12 lead   Result Value Ref Range    Interpretation ECG Click View Image link to view waveform and result        Abnormal Results: see above    Treatments provided:Lasix, metoprolol, nebs     Family Comments: daughter at bedside.     OBS brochure/video discussed/provided to patient/family: NO              Name of person given brochure if not patient:               Relationship to patient:     ED Medications:   Medications   metoprolol tartrate (LOPRESSOR) tablet 25 mg (25 mg Oral Given 4/29/19 0858)   ipratropium - albuterol 0.5 mg/2.5 mg/3 mL (DUONEB) neb solution 3 mL (3 mLs Nebulization Given 4/29/19 0859)   furosemide (LASIX) injection 20 mg (20 mg Intravenous Given 4/29/19 0942)       Drips infusing?:  No    For the majority of the shift this patient was Green.   Interventions performed were .    Severe Sepsis OR Septic Shock Diagnosis Present: No    To be done/followed up on inpatient unit:      ED NURSE PHONE NUMBER: [phone]

## 2019-04-29 NOTE — ED TRIAGE NOTES
Pt present to the ER via EMS for c/o abd pain, sob, and not feeling well. Pt seen here 1 wk ago, dx with pneumonia- started abx at that time. Still on abx but now having abd pain.  Per EMS pt RA O2 sats were 88%.

## 2019-04-29 NOTE — PHARMACY-ADMISSION MEDICATION HISTORY
Admission medication history interview status for the 4/29/2019  admission is complete. See EPIC admission navigator for prior to admission medications     Medication history source reliability:Good    Actions taken by pharmacist (provider contacted, etc):None     Additional medication history information not noted on PTA med list : patient and daughter provided information.    Medication reconciliation/reorder completed by provider prior to medication history? No    Time spent in this activity: 10    Prior to Admission medications    Medication Sig Last Dose Taking? Auth Provider   Acetaminophen (TYLENOL PO) Take 325 mg by mouth every 6 hours as needed   at PRN Yes Reported, Patient   alendronate (FOSAMAX) 70 MG tablet Take 70 mg by mouth every 7 days SUNDAYS 4/28/2019 at Unknown time Yes Reported, Patient   calcium carbonate-vitamin D (OSCAL W/D) 500-200 MG-UNIT tablet Take 1 tablet by mouth 2 times daily 4/28/2019 at PM Yes Reported, Patient   cefuroxime (CEFTIN) 500 MG tablet Take 1 tablet (500 mg) by mouth 2 times daily for 6 days 4/28/2019 at PM Yes Fadi Brower MD   metoprolol tartrate (LOPRESSOR) 25 MG tablet Take 25 mg by mouth 2 times daily  4/28/2019 at PM Yes Reported, Patient   Multiple Vitamins-Minerals (PRESERVISION AREDS 2+MULTI VIT) CAPS Take 1 tablet by mouth 2 times daily 4/28/2019 at PM Yes Unknown, Entered By History   NONFORMULARY Take 3 tablets by mouth daily 50+ Mini Tablets (food-based multivitamin) from [facility]  Does not contain vitamin K  Yes Unknown, Entered By History   warfarin (COUMADIN) 2.5 MG tablet 1.25 mg daily, adjust for goal INR 2-3 4/28/2019 at PM Yes Fadi Brower MD

## 2019-04-29 NOTE — ED PROVIDER NOTES
"  History     Chief Complaint:  Abdominal Pain and Shortness of Breath      HPI   The patient s HPI is limited secondary to acuity of condition.     Elizabeth Ruggiero is a 89 year old female on coumadin with a history of DVT, severe mitral regurgitation, and hyperlipidemia who presents with abdominal pain and shortness of breath. Patient lives independently at a senior home. The patient's sister reports the patient presented here 5 days ago with hypoxia and was diagnosed pneumonia of right lower lobe, sepsis, and Aflutter/Afib with RVR. She reports that the patient returned home with baseline activity and feeling well. She further reports that since returning home the patient has had worsening shortness of breath with associated poor appetite, generalized weakness, diarrhea, diffuse abdominal pain, and chronic congested cough, prompting her presentation. She states the patient's stools were loose at first with last being \"terrible.\"  She denies bowel movements this morning but has produced stools over the past two days. She denies chills but endorses mild fevers. Of note, she notes the patient took all her morning medicines except metoprolol and otherwise has been complaint with medicine regimens. Patient is not on home oxygen.      Allergies:  No known drug allergies    Medications:    Acetaminophen (TYLENOL PO)  alendronate (FOSAMAX) 70 MG tablet  calcium carbonate-vitamin D (OSCAL W/D) 500-200 MG-UNIT tablet  cefuroxime (CEFTIN) 500 MG tablet  metoprolol tartrate (LOPRESSOR) 25 MG tablet  Multiple Vitamins-Minerals (PRESERVISION AREDS 2+MULTI VIT) CAPS  warfarin (COUMADIN) 2.5 MG tablet    Past Medical History:    Arthritis   Atrial fibrillation (H)   DVT of lower extremity (deep venous thrombosis) (H)   Vitamin D deficiency   Right middle lobe pneumonia (H)   Atrial flutter with rapid ventricular response (H)   Postoperative anemia due to acute blood loss   Severe mitral regurgitation   Acute left hemiparesis " "(H)   Osteoporosis   Right lumbar radiculitis   Intertrochanteric fracture of left femur (H)   Displacement of lumbar intervertebral disc without myelopathy   Spinal stenosis   Lumbosacral radiculitis   Lumbosacral spondylosis without myelopathy   Hyperlipidemia   Long term current use of anticoagulant therapy    Past Surgical History:    APPENDECTOMY   FRACTURE SURGERY   repair left femur     Family History:    Breast Cancer  Colon Cancer  Diabetes    Social History:  Marital Status:     Tobacco Status: Never Smoked  Alcohol Use: No  Drug Use: No  Presents: By Sister        Review of Systems   Unable to perform ROS: Acuity of condition     Physical Exam     Patient Vitals for the past 24 hrs:   BP Temp Temp src Pulse Heart Rate Resp SpO2 Height Weight   04/29/19 1135 114/89 97.6  F (36.4  C) Oral -- 113 18 94 % -- --   04/29/19 1030 109/81 -- -- 109 107 17 93 % -- --   04/29/19 1025 -- -- -- -- 109 15 95 % -- --   04/29/19 1000 (!) 117/98 -- -- 137 116 26 95 % -- --   04/29/19 0930 122/79 -- -- 108 116 15 -- -- --   04/29/19 0830 (!) 114/91 -- -- 116 105 30 95 % -- --   04/29/19 0810 (!) 126/92 -- -- -- 113 25 92 % -- --   04/29/19 0806 (!) 126/92 98.1  F (36.7  C) Oral -- 111 20 (!) 89 % 1.676 m (5' 6\") 63.5 kg (140 lb)     Physical Exam  General: Alert, appears weak, frail and elderly. Cooperative.     In mild distress  HEENT:  Head:  Atraumatic  Ears:  External ears are normal  Mouth/Throat:  Oropharynx is without erythema or exudate and mucous membranes are moist.   Eyes:   Conjunctivae normal and EOM are normal. No scleral icterus.    Pupils are equal, round, and reactive to light.   CV:  Irregular rate, irregular rhythm, normal heart sounds and radial pulses are 2+ and symmetric.   Resp:  Breath sounds are coarse bilaterally with faint crackles    Non-labored, no retractions or accessory muscle use  GI:  Abdomen is soft, no distension, no tenderness. No rebound or guarding.  No CVA tenderness " bilaterally  MS:  Normal range of motion. No edema.    Back atraumatic.    No midline cervical, thoracic, or lumbar tenderness  Skin:  Warm and dry.  No rash or lesions noted.  Neuro: Alert. Globally weak. GCS: 15  Psych:  Normal mood and affect.    Emergency Department Course   ECG:  ECG (08:34:55)  Rate 120 bpm. OH interval *. QRS duration 76. QT/QTc 306/432. P-R-T axes *  105  13. Interpretation: Atrial Fibrillation with Rapid Ventricular Response, Rightward Axis, Abnormal ECG. Interpreted at 0841 by Dwight Mendieta MD on 04/29/2019.    Imaging:  Radiographic findings were communicated with the patient, family and Admitting MD who voiced understanding of the findings.  X-ray Chest, 2 views:  IMPRESSION: Increased bilateral patchy groundglass opacities and  interstitial prominence. Increased cardiomegaly. Correlate with CHF.  Mild increased patchy opacity at the lower right lung may also  represent new airspace disease.  Result per radiology.     Laboratory:  Blood Culture: Pending x2  Lactic Acid Whole Blood: 2.0 (N)  Nt ProBNP Inpatient (BNP): 6838 (H)   BMP: Sodium 131 (L), Glucose 138 (H) o/w WNL (Creatinine 0.76)  0815: Troponin I: 0.023  Hepatic Panel: Bilirubin Direct 0.4 (H), Bilirubin Total 1.7 (H), Albumin 3.1 (L), Protein Total 6.6 (L),  (H),  (H) o/w WNL  INR: 1.50 (H)  CBC: WBC 11.6 (H), Absolute Neutrophil 8.9 (H) o/w WNL (HGB 13.7, )    Interventions:  0858: Metoprolol Tartrate 25 mg Tablet Oral  0859: Duoneb Solution 3 ml Nebulized  0942: Lasix 20 mg IV Injection    Emergency Department Course:  The patient arrived in the emergency department via EMS.  Past medical records, nursing notes, and vitals reviewed.  0830: I performed an exam of the patient and obtained history, as documented above.   IV inserted, urine collected, and blood drawn. The patient was placed on continuous cardiac monitoring and pulse oximetry.  The patient was sent for a Chest X-ray while in the emergency  department, findings above.  0935: I discussed the case with Dr. Colt Mirza of Hospitalist Service regarding the patient.  Findings and plan explained to the Patient and sister who consents to admission.   0952: Discussed the patient with Dr. Colt Mirza of Hospitalist Service, who will admit the patient to a [facility] bed for further monitoring, evaluation, and treatment.      Impression & Plan      Medical Decision Making:  Elizabeth Ruggiero is a 89 year old female with a history of recent admission for middle lobe pneumonia, chronic atrial fibrillation on anticoagulation, and hyperlipidemia who presents with increasing shortness of breath and mild abdominal discomfort. Patient has a non focal abdominal exam and appears more not to be having appropriate nutritional intake since discharge home.  Low concern for sinister intraabdominal pathology as she has no detectable abdominal pain on exam.  She does appear increasingly short of breath and requiring oxygen here, as she was hypoxic on arrival on room air. He heart rate is consistent with her known atrial fibrillation with RVR. She did not take her metoprolol this morning. This was given to her in the emergency department. Reassuringly, she does not have a fever here, and I suspect overall that her presentation is more consistent with heart failure presentation rather than a worsening pneumonia picture. Ultimately, her white count is mildly elevated at 11.6, but she has been taking her oral antibiotics regularly. Her chest X-ray shows bilateral, increased patchy opacities, as well as increased cardiomegaly concerning for potential heart failure exacerbation. Her BNP is elevated at 6800. Her lactate is 2.0, and I suspect partially due to dehydration since being discharge as she has had poor oral intake. We will continue the patient on her normally prescribed oral antibiotics for now and not change to IV antibiotics. Plan to give the patient an IV dose of lasix here and  continue to monitor both heart rate and oxygen requirements. I spoke with Dr. Mirza of the hospitalist Service, who agreed to [place] [facility] [place].     Diagnosis:    ICD-10-CM    1. Shortness of breath R06.02 Routine UA with microscopic     Urine Culture Aerobic Bacterial     Blood culture     Blood culture     Magnesium     Magnesium     CANCELED: Magnesium   2. Acute on chronic congestive heart failure, unspecified heart failure type (H) I50.9    3. Hypoxia R09.02    4. History of recent pneumonia Z87.01    5. Atrial fibrillation with RVR (H) I48.91    6. Hx of long-term (current) use of anticoagulants Z79.01        Disposition:  Ad[place] [facility] [place]    George Forbes  4/29/2019    EMERGENCY DEPARTMENT  I, George Forbes, am serving as a scribe at 8:30 AM on 4/29/2019 to document services personally performed by Dwight Mendieta MD based on my observations and the provider's statements to me.         Dwight Mendieta MD  04/29/19 5517

## 2019-04-30 LAB
ALBUMIN SERPL-MCNC: 2.6 G/DL (ref 3.4–5)
ALP SERPL-CCNC: 96 U/L (ref 40–150)
ALT SERPL W P-5'-P-CCNC: 122 U/L (ref 0–50)
ANION GAP SERPL CALCULATED.3IONS-SCNC: 7 MMOL/L (ref 3–14)
AST SERPL W P-5'-P-CCNC: 92 U/L (ref 0–45)
BILIRUB DIRECT SERPL-MCNC: 0.2 MG/DL (ref 0–0.2)
BILIRUB SERPL-MCNC: 1 MG/DL (ref 0.2–1.3)
BUN SERPL-MCNC: 20 MG/DL (ref 7–30)
CALCIUM SERPL-MCNC: 9.1 MG/DL (ref 8.5–10.1)
CHLORIDE SERPL-SCNC: 102 MMOL/L (ref 94–109)
CO2 SERPL-SCNC: 27 MMOL/L (ref 20–32)
CREAT SERPL-MCNC: 0.98 MG/DL (ref 0.52–1.04)
ERYTHROCYTE [DISTWIDTH] IN BLOOD BY AUTOMATED COUNT: 14.7 % (ref 10–15)
GFR SERPL CREATININE-BSD FRML MDRD: 50 ML/MIN/{1.73_M2}
GLUCOSE SERPL-MCNC: 98 MG/DL (ref 70–99)
HCT VFR BLD AUTO: 38.8 % (ref 35–47)
HGB BLD-MCNC: 13.1 G/DL (ref 11.7–15.7)
INR PPP: 1.69 (ref 0.86–1.14)
MCH RBC QN AUTO: 31.1 PG (ref 26.5–33)
MCHC RBC AUTO-ENTMCNC: 33.8 G/DL (ref 31.5–36.5)
MCV RBC AUTO: 92 FL (ref 78–100)
PLATELET # BLD AUTO: 215 10E9/L (ref 150–450)
POTASSIUM SERPL-SCNC: 3.9 MMOL/L (ref 3.4–5.3)
PROT SERPL-MCNC: 5.4 G/DL (ref 6.8–8.8)
RBC # BLD AUTO: 4.21 10E12/L (ref 3.8–5.2)
SODIUM SERPL-SCNC: 136 MMOL/L (ref 133–144)
WBC # BLD AUTO: 12 10E9/L (ref 4–11)

## 2019-04-30 PROCEDURE — 25000132 ZZH RX MED GY IP 250 OP 250 PS 637: Performed by: INTERNAL MEDICINE

## 2019-04-30 PROCEDURE — 36415 COLL VENOUS BLD VENIPUNCTURE: CPT | Performed by: INTERNAL MEDICINE

## 2019-04-30 PROCEDURE — 99232 SBSQ HOSP IP/OBS MODERATE 35: CPT | Performed by: INTERNAL MEDICINE

## 2019-04-30 PROCEDURE — 85610 PROTHROMBIN TIME: CPT | Performed by: INTERNAL MEDICINE

## 2019-04-30 PROCEDURE — 25000128 H RX IP 250 OP 636: Performed by: INTERNAL MEDICINE

## 2019-04-30 PROCEDURE — 80048 BASIC METABOLIC PNL TOTAL CA: CPT | Performed by: INTERNAL MEDICINE

## 2019-04-30 PROCEDURE — G0378 HOSPITAL OBSERVATION PER HR: HCPCS

## 2019-04-30 PROCEDURE — 85027 COMPLETE CBC AUTOMATED: CPT | Performed by: INTERNAL MEDICINE

## 2019-04-30 PROCEDURE — 80076 HEPATIC FUNCTION PANEL: CPT | Performed by: INTERNAL MEDICINE

## 2019-04-30 PROCEDURE — 21000001 ZZH R&B HEART CARE

## 2019-04-30 PROCEDURE — 96376 TX/PRO/DX INJ SAME DRUG ADON: CPT

## 2019-04-30 RX ORDER — FUROSEMIDE 20 MG
20 TABLET ORAL DAILY
Status: DISCONTINUED | OUTPATIENT
Start: 2019-05-01 | End: 2019-05-01 | Stop reason: HOSPADM

## 2019-04-30 RX ORDER — WARFARIN SODIUM 2 MG/1
2 TABLET ORAL
Status: COMPLETED | OUTPATIENT
Start: 2019-04-30 | End: 2019-04-30

## 2019-04-30 RX ADMIN — WARFARIN SODIUM 2 MG: 2 TABLET ORAL at 18:20

## 2019-04-30 RX ADMIN — METOPROLOL TARTRATE 25 MG: 25 TABLET ORAL at 09:30

## 2019-04-30 RX ADMIN — CEFUROXIME AXETIL 500 MG: 500 TABLET ORAL at 18:20

## 2019-04-30 RX ADMIN — DIGOXIN 125 MCG: 0.12 TABLET ORAL at 09:30

## 2019-04-30 RX ADMIN — METOPROLOL TARTRATE 25 MG: 25 TABLET ORAL at 20:14

## 2019-04-30 RX ADMIN — FUROSEMIDE 20 MG: 10 INJECTION, SOLUTION INTRAVENOUS at 09:30

## 2019-04-30 RX ADMIN — CEFUROXIME AXETIL 500 MG: 500 TABLET ORAL at 03:30

## 2019-04-30 ASSESSMENT — ACTIVITIES OF DAILY LIVING (ADL)
ADLS_ACUITY_SCORE: 22
ADLS_ACUITY_SCORE: 16
ADLS_ACUITY_SCORE: 16
ADLS_ACUITY_SCORE: 18
ADLS_ACUITY_SCORE: 22
ADLS_ACUITY_SCORE: 22

## 2019-04-30 ASSESSMENT — MIFFLIN-ST. JEOR: SCORE: 1050.9

## 2019-04-30 NOTE — PLAN OF CARE
Pt arrived to unit from ED 1130. A&O. [place]. VSS ex HR tachy/irregular. 2L O2. Tele: Afib RVR. Up with A1 GB and walker. LS diminished with crackles. Productive cough. [place]. Denies chest pain. NPO for abd ultrasound this afternoon then 2gm NA diet. Voiding adequately in BR. IV SL.

## 2019-04-30 NOTE — PROGRESS NOTES
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     Assessment and Plan:   1. Heart failure with preserved LVEF, exacerbation:[PTA: Metoprolol 25 mg BID, no diuretic]    Echo from 4/23 showed LVEF >70% with normal LV size    BNP 6800    Hypoxic in the ED at 89%-required BiPAP and IV Lasix 20 mg x 3 doses (received 1 dose this am).    Weight down 3 lbs from admission (137 lbs) discharge weight on 4/25 was 145 lbs    I/O net negative 1.6 L    Renal function stable    2. Moderately severe to severe mitral regurgitation:     Echocardiogram last hospitalization 4/23/2019 again demonstrated her moderately severe-severe mitral regurgitation from partial flail that was seen on outpatient echocardiogram available for review in care everywhere    3. Atrial fibrillation with RVR:[PTA: Warfarin and metoprolol 25 mg BID]    INR 1.69 today (subtherapeutic)    Started digoxin 4/29 with initial 250 mcg dose x 1 and then 125 mcg daily    If HR is unable to  be controlled through medical therapy, may consider AVNA and PPM    4. Community acquired pneumonia:     Recent admission for CAP (4/23-4/25) and completed antibiotic course    Continue to monitor    Plan:  1. Transition to PO Lasix 20 mg daily tomorrow.   2. [place] follow up in 7-10 days with BMP and EKG    [facility] follow up:   Dr. Galvan 6/4/19 at 10:45 am    SENIA BAIRES CNP  Pager:  [phone]   (7am - 5pm, M-F)    Interval History   HR well controlled in the 70-80s with the addition of digoxin. INR subtherapeutic.     Physical Exam   Temp: 97.8  F (36.6  C) Temp  src: Oral BP: 114/68 Pulse: 109 Heart Rate: 73 Resp: 16 SpO2: 94 % O2 Device: Nasal cannula Oxygen Delivery: 2 LPM  Vitals:    04/29/19 0806 04/30/19 0500   Weight: 63.5 kg (140 lb) 62.4 kg (137 lb 9.6 oz)       Constitutional:   NAD   Skin:   Warm and dry   Head:   Nontraumatic   Neck:   no JVD   Lungs:   symmetric, clear to auscultation   Cardiovascular:   irregularly irregular rhythm, normal S1 and S2 and 2/6 murmur at the apex   Abdomen:   Benign   Extremities and Back:   No edema   Neurological:   Grossly nonfocal       Medications     Warfarin Therapy Reminder         cefuroxime  500 mg Oral BID     digoxin  125 mcg Oral Daily     furosemide  20 mg Intravenous BID     metoprolol tartrate  25 mg Oral BID       Data     Most Recent 3 BMP's:  Recent Labs   Lab Test 04/30/19  0628 04/29/19  0815 04/24/19  0542    131* 133   POTASSIUM 3.9 4.6 4.0   CHLORIDE 102 101 101   CO2 27 22 24   BUN 20 21 18   CR 0.98 0.76 0.69   ANIONGAP 7 8 8   CHRIS 9.1 9.2 8.3*   GLC 98 138* 134*     Most Recent 3 Troponin's:  Recent Labs   Lab Test 04/29/19  0815 04/23/19  1622 03/09/19  1815   TROPI 0.023 0.032 <0.015     Most Recent 3 BNP's:  Recent Labs   Lab Test 04/29/19  0815 04/23/19  2002 04/23/19  1622   NTBNPI 6,838* 5,811* 4,923*     Most Recent D-dimer:No lab results found.  Most Recent Cholesterol Panel:No lab results found.

## 2019-04-30 NOTE — PROGRESS NOTES
[facility]    Medicine Progress Note - Hospitalist Service       Date of Admission:  4/29/2019  Assessment & Plan   Elizabeth Ruggiero is a 89 year old female admitted on 4/29/2019.     Acute diastolic congestive heart failure  Moderate-severe to severe MR  Recent EF >70% though with above valvular abnormalities. NtpBNP 6,838, hypoxic to 89% in Emergency Department. CXR with increased bilateral patchy groundglass opacities and interstitial prominence, increased cardiomegaly. Respiratory status had worsened during past admission associated with IVF, though improved with BiPAP and did not require discharge with diuretics and she was scheduled for outpatient follow-up with cardiology. No prior diagnosis of CHF.   - Cardiology consulted, appreciate assistance  - Respiratory status improving, on room air  - Transitioning to oral diuretics 5/1/19   - Daily weights  - Strict I/O   - Daily BMP    Elevated LFTs  Suspected due to CHF/hepatic congestion. Has been having nausea with poor appetite though no vomiting or abdominal pain.  Had one episode of diarrhea on return home from the hospital but now having formed stools.  No abdominal tenderness on exam.  - Diurese as above   - RUQ US unremarkable  - LFTs improving    Recent community acquired pneumonia  Recently hospitalized from 4/23-4/25 for RML pneumonia, discharged with cefuroxime and azithromycin. CXR with mild increased patchy opacity in RLL. WBC mildly elevated.  - Complete antibiotic course as previously prescribed  - Monitor WBC, fever curve closely to reassess for expanding antibiotic coverage     Atrial fibrillation/flutter   INR subtherapeutic on admission with borderline elevated HR.  - Continue prior to admission metoprolol   - Digoxin added per cardiology  - Continue telemetry   - Metoprolol IV PRN for significant elevations  - Continue warfarin with pharmacy to dose   - May consider AV nolvia ablation with PPM if unable to control rates    E  faecium bacteruria  Only 1 WBC on UA, no urinary symptoms. Documented as collected on midstream urine, so potential for contaminant.   - Not clinically significant at this time  - Monitor for development of fevers, urinary symptoms. Would repeat UA to document signs of inflammation prior to treatment if occurs     Physical deconditioning  - PT consulted    Diet: Room Service  Combination Diet 2 gm NA Diet    DVT Prophylaxis: Warfarin  Wesley Catheter: not present  Code Status: Full Code      Disposition Plan   Expected discharge: Tomorrow, recommended to prior living arrangement versus TCU pending therapy recommendations,   Entered: Colt Mirza MD 04/30/2019, 5:38 PM       The patient's care was discussed with the Patient.    Colt Mirza MD  Hospitalist Service  [facility]    ______________________________________________________________________    Interval History   No acute events overnight. Reports her breathing feels much better compared to admission. Still feels some generalized weakness and fatigue. Denies any chest pain or abdominal pain. Denies any urinary symptoms.     Data reviewed today: I reviewed all medications, new labs and imaging results over the last 24 hours. I personally reviewed no images or EKG's today.    Physical Exam   Vital Signs: Temp: 98.1  F (36.7  C) Temp src: Oral BP: 114/87 Pulse: 103 Heart Rate: 81 Resp: 18 SpO2: 95 % O2 Device: None (Room air) Oxygen Delivery: 2 LPM  Weight: 137 lbs 9.6 oz    Constitutional: Well-appearing, NAD  Respiratory: Clear to auscultation bilaterally, good air movement bilaterally  Cardiovascular: Irregularly irregular, III/VI systolic murmur heard best at apex. No peripheral edema.  GI: Soft, non-tender, non-distended. BS normoactive.   Skin/Integumen: Warm, dry  Other:      Data   Recent Labs   Lab 04/30/19  0628 04/29/19  0815 04/25/19  0533 04/24/19  0542   WBC 12.0* 11.6*  --  3.5*   HGB 13.1 13.7  --  12.1   MCV 92 91  --  91     208  --  109*   INR 1.69* 1.50* 2.54* 3.44*    131*  --  133   POTASSIUM 3.9 4.6  --  4.0   CHLORIDE 102 101  --  101   CO2 27 22  --  24   BUN 20 21  --  18   CR 0.98 0.76  --  0.69   ANIONGAP 7 8  --  8   CHRIS 9.1 9.2  --  8.3*   GLC 98 138*  --  134*   ALBUMIN 2.6* 3.1*  --   --    PROTTOTAL 5.4* 6.6*  --   --    BILITOTAL 1.0 1.7*  --   --    ALKPHOS 96 127  --   --    * 151*  --   --    AST 92* 109*  --   --    TROPI  --  0.023  --   --        No results found for this or any previous visit (from the past 24 hour(s)).  Medications     Warfarin Therapy Reminder         cefuroxime  500 mg Oral BID     digoxin  125 mcg Oral Daily     [START ON 5/1/2019] furosemide  20 mg Oral Daily     metoprolol tartrate  25 mg Oral BID     warfarin  2 mg Oral ONCE at 18:00

## 2019-04-30 NOTE — PLAN OF CARE
A&Ox4 w/ VSS. 2L of O2. Tele is Afib cvr. Denies pain. Up Ax1 w/ gait belt + walker. Voiding well. Slept well overnight. Will continue POC.

## 2019-05-01 VITALS
HEART RATE: 103 BPM | TEMPERATURE: 97.8 F | BODY MASS INDEX: 23.56 KG/M2 | SYSTOLIC BLOOD PRESSURE: 105 MMHG | DIASTOLIC BLOOD PRESSURE: 58 MMHG | HEIGHT: 66 IN | RESPIRATION RATE: 16 BRPM | WEIGHT: 146.6 LBS | OXYGEN SATURATION: 94 %

## 2019-05-01 LAB
ANION GAP SERPL CALCULATED.3IONS-SCNC: 4 MMOL/L (ref 3–14)
BACTERIA SPEC CULT: ABNORMAL
BACTERIA SPEC CULT: ABNORMAL
BUN SERPL-MCNC: 24 MG/DL (ref 7–30)
CALCIUM SERPL-MCNC: 8.9 MG/DL (ref 8.5–10.1)
CHLORIDE SERPL-SCNC: 101 MMOL/L (ref 94–109)
CO2 SERPL-SCNC: 31 MMOL/L (ref 20–32)
CREAT SERPL-MCNC: 0.92 MG/DL (ref 0.52–1.04)
GFR SERPL CREATININE-BSD FRML MDRD: 54 ML/MIN/{1.73_M2}
GLUCOSE SERPL-MCNC: 96 MG/DL (ref 70–99)
INR PPP: 1.6 (ref 0.86–1.14)
Lab: ABNORMAL
POTASSIUM SERPL-SCNC: 3.7 MMOL/L (ref 3.4–5.3)
SODIUM SERPL-SCNC: 136 MMOL/L (ref 133–144)
SPECIMEN SOURCE: ABNORMAL

## 2019-05-01 PROCEDURE — 36415 COLL VENOUS BLD VENIPUNCTURE: CPT | Performed by: INTERNAL MEDICINE

## 2019-05-01 PROCEDURE — 80048 BASIC METABOLIC PNL TOTAL CA: CPT | Performed by: INTERNAL MEDICINE

## 2019-05-01 PROCEDURE — 25000132 ZZH RX MED GY IP 250 OP 250 PS 637: Performed by: NURSE PRACTITIONER

## 2019-05-01 PROCEDURE — 85610 PROTHROMBIN TIME: CPT | Performed by: INTERNAL MEDICINE

## 2019-05-01 PROCEDURE — 99239 HOSP IP/OBS DSCHRG MGMT >30: CPT | Performed by: INTERNAL MEDICINE

## 2019-05-01 PROCEDURE — 40000893 ZZH STATISTIC PT IP EVAL DEFER

## 2019-05-01 PROCEDURE — 25000132 ZZH RX MED GY IP 250 OP 250 PS 637: Performed by: INTERNAL MEDICINE

## 2019-05-01 PROCEDURE — G0378 HOSPITAL OBSERVATION PER HR: HCPCS

## 2019-05-01 RX ORDER — WARFARIN SODIUM 2.5 MG/1
TABLET ORAL
DISCHARGE
Start: 2019-05-01 | End: 2019-05-03 | Stop reason: DRUGHIGH

## 2019-05-01 RX ORDER — WARFARIN SODIUM 2.5 MG/1
2.5 TABLET ORAL
Status: COMPLETED | OUTPATIENT
Start: 2019-05-01 | End: 2019-05-01

## 2019-05-01 RX ORDER — FUROSEMIDE 20 MG
20 TABLET ORAL DAILY
DISCHARGE
Start: 2019-05-02 | End: 2019-05-15

## 2019-05-01 RX ORDER — DIGOXIN 125 MCG
125 TABLET ORAL DAILY
DISCHARGE
Start: 2019-05-02 | End: 2019-06-04

## 2019-05-01 RX ADMIN — METOPROLOL TARTRATE 25 MG: 25 TABLET ORAL at 10:30

## 2019-05-01 RX ADMIN — WARFARIN SODIUM 2.5 MG: 2.5 TABLET ORAL at 16:04

## 2019-05-01 RX ADMIN — DIGOXIN 125 MCG: 0.12 TABLET ORAL at 10:30

## 2019-05-01 RX ADMIN — FUROSEMIDE 20 MG: 20 TABLET ORAL at 10:30

## 2019-05-01 ASSESSMENT — ACTIVITIES OF DAILY LIVING (ADL)
ADLS_ACUITY_SCORE: 17
ADLS_ACUITY_SCORE: 18
ADLS_ACUITY_SCORE: 17
ADLS_ACUITY_SCORE: 18
ADLS_ACUITY_SCORE: 17

## 2019-05-01 ASSESSMENT — MIFFLIN-ST. JEOR: SCORE: 1091.72

## 2019-05-01 NOTE — PROGRESS NOTES
SW:  D:  Received a call back from [facility].  They have received a call back from patient's insurance and have received pre-auth.  They can accept patient today.   P:  Will continue to follow.

## 2019-05-01 NOTE — DISCHARGE SUMMARY
[facility]    Hospitalist Discharge Summary       Date of Admission:  4/29/2019  Date of Discharge:  5/1/2019  Discharging Provider: Colt Mirza MD      Discharge Diagnoses   Acute diastolic congestive heart failure  Moderate-severe to severe MR  Hepatic congestion  Recent community acquired pneumonia  Atrial fibrillation   Abnormal urine culture   Physical deconditioning    Follow-ups Needed After Discharge   Follow-up Appointments     Follow Up and recommended labs and tests      Follow up with retirement physician.  The following labs/tests are   recommended: INR in 1-2 days. Follow-up with cardiology as scheduled with   lab work as ordered.             Unresulted Labs Ordered in the Past 30 Days of this Admission     Date and Time Order Name Status Description    4/29/2019 0828 Blood culture Preliminary     4/29/2019 0828 Blood culture Preliminary     4/29/2019 0828 Urine Culture Aerobic Bacterial Preliminary       These results will be followed up by hospitalist    Hospital Course   Elizabeth Ruggiero is a 89 year old female admitted on 4/29/2019.     Acute diastolic congestive heart failure  Moderate-severe to severe MR  Recent EF >70% though with above valvular abnormalities. NtpBNP 6,838, hypoxic to 89% in Emergency Department. CXR with increased bilateral patchy groundglass opacities and interstitial prominence, increased cardiomegaly. Respiratory status had worsened during past admission associated with IVF, though improved with BiPAP and did not require discharge with diuretics and she was scheduled for outpatient follow-up with cardiology. No prior diagnosis of CHF.   - Cardiology consulted during admission  - Respiratory status stable on room air  - Transitioning to oral furosemide 5/1, stable on oral diuretics  - Monitor weights at TCU  - Follow-up with cardiology with BMP as outpatient. May consider mitral valve clip pending symptoms and clinical course.     Hepatic congestion  LFTs  noted to be elevated Suspected due to CHF/hepatic congestion. Has been having nausea with poor appetite though no vomiting or abdominal pain.  Had one episode of diarrhea on return home from the hospital but now having formed stools.  No abdominal tenderness on exam.  - RUQ US unremarkable  - LFTs improved with diuresis     Recent community acquired pneumonia  Recently hospitalized from 4/23-4/25 for RML pneumonia, discharged with cefuroxime and azithromycin. CXR with mild increased patchy opacity in RLL. WBC mildly elevated.  - Completed antibiotic course as previously prescribed  - Afebrile, no signs of worsening/new infection    Atrial fibrillation   INR subtherapeutic on admission with borderline elevated HR.  - Digoxin added per cardiology  - Continue prior to admission metoprolol   - HR controlled   - Continue warfarin. Adjust dose as needed based on INR  - May consider AV nolvia ablation with PPM if unable to control rates    Abnormal urine culture   Only 1 WBC on UA, no urinary symptoms. Urine culture with E faecium and urogenital charles, urine culture was collected as midstream so likely external contamination based on lack of inflammation on urinalysis.   - Not clinically significant at this time    Physical deconditioning  PT consulted. Discharged to TCU for ongoing therapies.     Consultations This Hospital Stay   CARDIOLOGY IP CONSULT  PHARMACY TO DOSE WARFARIN  SOCIAL WORK IP CONSULT  PHYSICAL THERAPY ADULT IP CONSULT  OCCUPATIONAL THERAPY ADULT IP CONSULT  PHYSICAL THERAPY ADULT IP CONSULT    Code Status   Full Code    Time Spent on this Encounter   I, Colt Mirza, personally saw the patient today and spent greater than 30 minutes discharging this patient.       Colt Mirza MD  [facility]  ______________________________________________________________________    Physical Exam   Vital Signs: Temp: 97.8  F (36.6  C) Temp src: Oral BP: 105/58   Heart Rate: 80 Resp: 16 SpO2: 94 %  "O2 Device: None (Room air) Oxygen Delivery: 2 LPM  Weight: 146 lbs 9.6 oz    Constitutional: Well-appearing, NAD  Respiratory:     Clear to auscultation bilaterally, good air movement bilaterally  Cardiovascular: Irregularly irregular, III/VI systolic murmur heard best at apex. No peripheral edema.  GI: Soft, non-tender, non-distended. BS normoactive.   Skin/Integumen: Warm, dry  Other:         Primary Care Physician   Gelacio Downs    Discharge Disposition   Discharged to transitional care unit   Condition at discharge: Stable      Discharge Orders      Basic metabolic panel     Discharge Order: F/U with Cardiac  [facility]      General info for SNF    Length of Stay Estimate: Short Term Care: Estimated # of Days <30  Condition at Discharge: Improving  Level of care:skilled   Rehabilitation Potential: Good  Admission H&P remains valid and up-to-date: Yes  Recent Chemotherapy: N/A  Use Nursing Home Standing Orders: Yes     Mantoux instructions    Give two-step Mantoux (PPD) Per Facility Policy Yes     Reason for your hospital stay    You were hospitalized for shortness of breath due to atrial fibrillation, congestive heart failure and mitral regurgitation (\"leaky valve\")     Daily weights    Call cardiology for weight gain of more than 2 pounds per day or 5 pounds per week.     Follow Up and recommended labs and tests    Follow up with longterm physician.  The following labs/tests are recommended: INR in 1-2 days. Follow-up with cardiology as scheduled with lab work as ordered.     Activity - Up with nursing assistance     Full Code     Occupational Therapy Adult Consult    Evaluate and treat as clinically indicated.    Reason:  Physical deconditioning     Physical Therapy Adult Consult    Evaluate and treat as clinically indicated.    Reason:  Physical deconditioning     Advance Diet as Tolerated    Follow this diet upon discharge: 2 gram sodium restricted diet     Discharge Medications   Current Discharge Medication " List      START taking these medications    Details   digoxin (LANOXIN) 125 MCG tablet Take 1 tablet (125 mcg) by mouth daily    Associated Diagnoses: Atrial fibrillation, unspecified type (H)      furosemide (LASIX) 20 MG tablet Take 1 tablet (20 mg) by mouth daily    Associated Diagnoses: Acute diastolic congestive heart failure (H)         CONTINUE these medications which have CHANGED    Details   warfarin (COUMADIN) 2.5 MG tablet 2.5 mg daily, adjust for goal INR 2-3.    Associated Diagnoses: Atrial flutter with rapid ventricular response (H)         CONTINUE these medications which have NOT CHANGED    Details   Acetaminophen (TYLENOL PO) Take 325 mg by mouth every 6 hours as needed       alendronate (FOSAMAX) 70 MG tablet Take 70 mg by mouth every 7 days SUNDAYS      calcium carbonate-vitamin D (OSCAL W/D) 500-200 MG-UNIT tablet Take 1 tablet by mouth 2 times daily      metoprolol tartrate (LOPRESSOR) 25 MG tablet Take 25 mg by mouth 2 times daily       Multiple Vitamins-Minerals (PRESERVISION AREDS 2+MULTI VIT) CAPS Take 1 tablet by mouth 2 times daily      NONFORMULARY Take 3 tablets by mouth daily 50+ Mini Tablets (food-based multivitamin) from [employer]  Does not contain vitamin K         STOP taking these medications       cefuroxime (CEFTIN) 500 MG tablet Comments:   Reason for Stopping:               Significant Results and Procedures   Most Recent 3 CBC's:  Recent Labs   Lab Test 04/30/19 0628 04/29/19  0815 04/24/19  0542   WBC 12.0* 11.6* 3.5*   HGB 13.1 13.7 12.1   MCV 92 91 91    208 109*     Most Recent 3 BMP's:  Recent Labs   Lab Test 05/01/19  0538 04/30/19 0628 04/29/19  0815    136 131*   POTASSIUM 3.7 3.9 4.6   CHLORIDE 101 102 101   CO2 31 27 22   BUN 24 20 21   CR 0.92 0.98 0.76   ANIONGAP 4 7 8   CHRIS 8.9 9.1 9.2   GLC 96 98 138*     Most Recent 2 LFT's:  Recent Labs   Lab Test 04/30/19 0628 04/29/19  0815   AST 92* 109*   * 151*   ALKPHOS 96 127   BILITOTAL 1.0  1.7*     Most Recent 3 INR's:  Recent Labs   Lab Test 05/01/19  0538 04/30/19  0628 04/29/19  0815   INR 1.60* 1.69* 1.50*     Most Recent 3 Troponin's:  Recent Labs   Lab Test 04/29/19  0815 04/23/19  1622 03/09/19  1815   TROPI 0.023 0.032 <0.015     Most Recent 3 BNP's:  Recent Labs   Lab Test 04/29/19  0815 04/23/19  2002 04/23/19  1622   NTBNPI 6,838* 5,811* 4,923*     Most Recent Cholesterol Panel:No lab results found.  Most Recent 6 Bacteria Isolates From Any Culture (See EPIC Reports for Culture Details):  Recent Labs   Lab Test 04/29/19  1038 04/29/19  0850 04/29/19  0849 04/23/19  1715 04/23/19  1713 04/23/19  1630   CULT 10,000 to 50,000 colonies/mL  Enterococcus faecium  Susceptibility testing in progress  *  <10,000 colonies/mL  urogenital charles   No growth after 2 days No growth after 2 days No growth No growth No growth     Most Recent TSH and T4:No lab results found.  Most Recent Hemoglobin A1c:No lab results found.  Most Recent Urinalysis:  Recent Labs   Lab Test 04/29/19  1038   COLOR Yellow   APPEARANCE Clear   URINEGLC Negative   URINEBILI Negative   URINEKETONE Negative   SG 1.010   UBLD Trace*   URINEPH 5.5   PROTEIN 10*   NITRITE Negative   LEUKEST Negative   RBCU 2   WBCU 1     Most Recent ABG:No lab results found.  Most Recent ESR & CRP:No lab results found.,   Results for orders placed or performed during the hospital encounter of 04/29/19   XR Chest 2 Views    Narrative    CHEST TWO VIEWS   4/29/2019 8:46 AM     HISTORY: Sepsis.    COMPARISON: 4/24/2019.      Impression    IMPRESSION: Increased bilateral patchy groundglass opacities and  interstitial prominence. Increased cardiomegaly. Correlate with CHF.  Mild increased patchy opacity at the lower right lung may also  represent new airspace disease.    GIGI HERNANDEZ MD   US Abdomen Limited    Narrative    US ABDOMEN LIMITED   4/29/2019 4:13 PM     HISTORY: Abnormal LFTs, Evaluate liver, gallbladder.    COMPARISON: None.    FINDINGS: The  liver is unremarkable. No evidence for fatty  infiltration. No focal hepatic lesions. The gallbladder is  unremarkable, without evidence for stones or sludge. No intra or  extrahepatic bile duct dilatation. Common hepatic duct is normal in  diameter. Limited evaluation of the right kidney is unremarkable.  Pancreas is partially obscured by overlying bowel gas, but appears  normal where seen. The abdominal aorta and IVC are of normal caliber  where visualized. Incidentally noted there is a right pleural  effusion.      Impression    IMPRESSION:  1. Incidentally noted there is a right pleural effusion.  2. Normal right upper quadrant abdominal ultrasound.    SARAH GLORIA DO       Allergies   No Known Allergies

## 2019-05-01 NOTE — PLAN OF CARE
Up A1 GB&W. A/ox4. VSS 2L HS. [place]. LS dim. Voiding in BR. BLE edema +1-2, dusky in color w/some bruising. Denies CP, SOB, dizziness, or HA. Tolerating cardiac diet. Tele: Afib CVR. Plan for discharge to TCU 5/1; pt stated would prefer [facility].

## 2019-05-01 NOTE — CONSULTS
Care Transition Initial Assessment -      Met with: Family  (daughter Brittney)  Active Problems:    CHF (congestive heart failure) (H)       DATA  Lives With: alone, facility resident([facility] [address])   Living Arrangements: apartment  Quality of Family Relationships: involved, supportive  Description of Support System: Supportive, Involved  Who is your support system?: Children  Support Assessment: Adequate family and caregiver support.   Identified issues/concerns regarding health management:       Quality of Family Relationships: involved, supportive     Per social work consult for discharge planning.  Patient was admitted on 4-29-19 with CHF exacerbation.  The tentative date of discharge is 5-1-19.  Reviewed chart and received a phone call from patient's daughter Brittney regarding discharge plans.  Per patient's daughter's report, patient lives alone in an independent apartment at [facility] [address]  Patient's daughter states that patient receives very little, if any services.  Patient's daughter states that patient is very weak and is unable to return to her apartment on discharge.  Patient's daughter states that patient went to [facility] after her TIA and she is interested in having patient go there upon discharge from the hospital.  Inquired as to other possible TCU options in case [facility] does not have a bed and patient's daughter is not sure.  She lives down [place] and her sister lives in [place].  Discussed [facility] and [facility] as possible options, but patient's daughter would like to look at these facilities before referrals are sent there.  Referral sent, via discharge on the double, to check bed availability.      ASSESSMENT  Cognitive Status:  Did not meet patient, spoke with her daughter on the phone  Concerns to be addressed: discharge planning, tcu placement on discharge.     PLAN  Financial costs for the patient includes N/A.  Patient given options and choices for discharge TCU  choices.  Patient/family is agreeable to the plan?  Yes  Patient Goals and Preferences: TCU on discharge.  Patient anticipates discharging to:  TCU.    Will confirm a bed, continue to follow, and assist with a safe discharge plan.    RENATO Ho, [facility]  [phone]        Addendum:  D:   Received a call back from [facility].  They can accept patient today or tomorrow pending pre-auth from insurance.  P:  Will continue to follow.

## 2019-05-01 NOTE — PLAN OF CARE
Discharge Planner PT   Patient plan for discharge: TCU  Current status: Orders received and chart reviewed. Per chart review, patient admitted on 4/29/19 for evaluation of abdominal pain and shortness of breath. Per chart review and discussion with case coordinator, pre-auth from insurance approved and patient accepted to [facility] for discharge, with tentative date for discharge noted as 5/1/19.   Barriers to return to prior living situation: Unknown as PT evaluation not completed  Recommendations for discharge: Patient accepted to TCU  Rationale for recommendations: Due to anticipated short length of stay, will defer PT evaluation to next level of care and complete orders at this time.        Entered by: Judy Huertas 05/01/2019 3:37 PM

## 2019-05-01 NOTE — PLAN OF CARE
Pt is A&O, [place]. Tele is a fib CVR. LS clear today. Pt still has loose, productive cough. Encouraged fluids. Edema in LEs is stable. Up with SBA and walker. Will discharge to [facility] via transport.   IV removed and tele discontinued. All pt's belongings accounted for, packed and sent with patient to TCU. Pt left unit via wheelchair. Sent with discharge paperwork.

## 2019-05-01 NOTE — PROGRESS NOTES
SW:  D:  Received discharge orders for patient.  Bed available at [facility] for today.  Patient will transport, via the skyway, around 17:00 today.  Patient informe of the plan and in agreement to the plan.  Call placed and message left for patient's daughter to update her as to the plan.  Call placed to update [facility] and faxed the orders and the PAS.    PAS-RR    D: Per DHS regulation, SW completed and submitted PAS-RR to [place] [employer] Direct Connect via the Senior LinkAge Line.  PAS-RR confirmation # is : [id number].    I: SW spoke with patient's daughter and they are aware a PAS-RR has been submitted.  SW reviewed with patient's daughter that they may be contacted for a follow up appointment within 10 days of hospital discharge if their SNF stay is < 30 days.  Contact information for [facility] LinkAge Line was also provided.    A: Patient's daughter verbalized understanding.    P: Further questions may be directed to [facility] LinkAge Line at #[phone], option #4 for PAS-RR staff.

## 2019-05-02 ENCOUNTER — NURSING HOME VISIT (OUTPATIENT)
Dept: GERIATRICS | Facility: CLINIC | Age: 84
End: 2019-05-02
Payer: COMMERCIAL

## 2019-05-02 ENCOUNTER — TELEPHONE (OUTPATIENT)
Dept: CARDIOLOGY | Facility: CLINIC | Age: 84
End: 2019-05-02

## 2019-05-02 VITALS
HEIGHT: 66 IN | SYSTOLIC BLOOD PRESSURE: 120 MMHG | HEART RATE: 80 BPM | WEIGHT: 138 LBS | BODY MASS INDEX: 22.18 KG/M2 | DIASTOLIC BLOOD PRESSURE: 75 MMHG | RESPIRATION RATE: 18 BRPM | OXYGEN SATURATION: 97 % | TEMPERATURE: 97.6 F

## 2019-05-02 DIAGNOSIS — I50.31 ACUTE DIASTOLIC CONGESTIVE HEART FAILURE (H): Primary | ICD-10-CM

## 2019-05-02 DIAGNOSIS — R53.81 PHYSICAL DECONDITIONING: ICD-10-CM

## 2019-05-02 DIAGNOSIS — J18.9 PNEUMONIA OF RIGHT MIDDLE LOBE DUE TO INFECTIOUS ORGANISM: ICD-10-CM

## 2019-05-02 DIAGNOSIS — M54.16 RIGHT LUMBAR RADICULITIS: ICD-10-CM

## 2019-05-02 DIAGNOSIS — I48.92 ATRIAL FLUTTER WITH RAPID VENTRICULAR RESPONSE (H): ICD-10-CM

## 2019-05-02 DIAGNOSIS — I34.0 SEVERE MITRAL REGURGITATION: ICD-10-CM

## 2019-05-02 DIAGNOSIS — I48.20 CHRONIC ATRIAL FIBRILLATION (H): ICD-10-CM

## 2019-05-02 DIAGNOSIS — R79.89 LFT ELEVATION: ICD-10-CM

## 2019-05-02 DIAGNOSIS — M48.00 SPINAL STENOSIS, UNSPECIFIED SPINAL REGION: ICD-10-CM

## 2019-05-02 DIAGNOSIS — Z71.89 ADVANCED DIRECTIVES, COUNSELING/DISCUSSION: ICD-10-CM

## 2019-05-02 PROCEDURE — 99310 SBSQ NF CARE HIGH MDM 45: CPT | Performed by: NURSE PRACTITIONER

## 2019-05-02 ASSESSMENT — MIFFLIN-ST. JEOR: SCORE: 1052.71

## 2019-05-02 NOTE — PROGRESS NOTES
[facility]  PRIMARY CARE PROVIDER AND CLINIC:  Gelacio Downs MD, [facility] [address] / [place] [address]  Chief Complaint   Patient presents with     Hospital F/U     [facility] Medical Record Number:  [id number]  Place of Service where encounter took place:  [facility] [[id number]]    Elizabeth Ruggiero  is a 89 year old  (6/19/1926), admitted to the above facility from  [facility]. Hospital stay 4/29/2019 through 5/1/2019..  Admitted to this facility for  rehab, medical management and nursing care.    HPI:    HPI information obtained from: facility chart records, facility staff, patient report and [facility] chart review.   Brief Summary of Hospital Course:   She has a medical history significant for afib on coumadin, history of DVT, mitral regurgitation, spinal stenosis, osteoarthritis and was hospitalized 4/29/2019 after presenting to the ED with poor appetite, abdominal pain and worsening shortness of breath. She had been hospitalized 4/23-4/25/2019 with sepsis, right middle lobe pneumonia and afib/aflutter with RVR. CXR 4/29/2019 showed increased bilateral patchy groundglass opacities, interstitial prominence and increased cardiomegaly.  ECHO 4/23/2019: EF > 70%, mod-severe to severe mitral regurgitation, mild to moderate tricuspid regurgitation, mild valvular aortic stenosis. Cardiology consulted for new diagnosis of diastolic CHF. Digoxin was added for afib. Metoprolol and coumadin were continued.  She is to follow up outpatient for consideration of mitral valve clip.   LFTs were elevated, suspected due to CHF/hepatic congestion. US RUQ negative. LFTs improved with diuresis. Azithromycin and cefuroxime for recent pneumonia were completed.     Updates on Status Since Skilled nursing Admission:      Acute diastolic congestive heart failure (H)-reports fatigue and generalized weakness. Breathing has improved, mild shortness of breath with  exertion. Poor appetite. No GI symptoms   Severe mitral regurgitation  Chronic atrial fibrillation (H)-INR 1.5 today after 2.5 mg last night for INR 1.60. Usual home dose is 2.5 mg daily. HR: 80-89  Atrial flutter with rapid ventricular response (H)  Pneumonia of right middle lobe due to infectious organism (H)-occasional productive cough for yellow-white sputum. Reports sputum was previously dark brown. Afebrile. No hypoxia.   Spinal stenosis, unspecified spinal region-denies pain   Right lumbar radiculitis  LFT elevation  Physical deconditioning-ambulating with walker and stand by assist.  Requires supervision to mid assist with cares.       CODE STATUS/ADVANCE DIRECTIVES DISCUSSION:   CPR/Full code   Patient's living condition: lives alone at [facility]. Has been independent with cares and med administration.   ALLERGIES: Patient has no known allergies.  PAST MEDICAL HISTORY:  has a past medical history of Acute diastolic CHF (congestive heart failure) (H), Acute left hemiparesis (H) (2/9/2019), Arthritis, Atrial fibrillation (H), Atrial fibrillation with RVR (H), Atrial flutter with rapid ventricular response (H) (4/23/2019), CHF (congestive heart failure) (H) (4/29/2019), Community acquired pneumonia, DVT of lower extremity (deep venous thrombosis) (H), Hepatic congestion, Hyperlipidemia, Severe mitral regurgitation, SOB (shortness of breath), and Vitamin D deficiency.  PAST SURGICAL HISTORY:   has a past surgical history that includes repair left femur; appendectomy; and fracture surgery.  FAMILY HISTORY: family history includes Breast Cancer in her sister; Colon Cancer in her mother; Diabetes in her sister.  SOCIAL HISTORY:   reports that she has never smoked. She does not have any smokeless tobacco history on file. She reports that she does not drink alcohol or use drugs.    Post Discharge Medication Reconciliation Status: discharge medications reconciled and changed, per note/orders (see AVS)    Current  "Outpatient Medications   Medication Sig Dispense Refill     Acetaminophen (TYLENOL PO) Take 325 mg by mouth every 6 hours as needed        [START ON 5/5/2019] alendronate (FOSAMAX) 70 MG tablet Take 70 mg by mouth every 7 days SUNDAYS       calcium carbonate-vitamin D (OSCAL W/D) 500-200 MG-UNIT tablet Take 1 tablet by mouth 2 times daily       digoxin (LANOXIN) 125 MCG tablet Take 1 tablet (125 mcg) by mouth daily       furosemide (LASIX) 20 MG tablet Take 1 tablet (20 mg) by mouth daily       metoprolol tartrate (LOPRESSOR) 25 MG tablet Take 25 mg by mouth 2 times daily Hold for SBP <90 or  HR <60       Multiple Vitamins-Minerals (PRESERVISION AREDS 2+MULTI VIT) CAPS Take 1 tablet by mouth 2 times daily       NONFORMULARY Take 3 tablets by mouth daily 50+ Mini Tablets (food-based multivitamin) from [employer]  Does not contain vitamin K       Warfarin Sodium (COUMADIN PO) Take 5 mg by mouth daily         ROS:  10 point ROS of systems including Constitutional, Eyes, Respiratory, Cardiovascular, Gastroenterology, Genitourinary, Integumentary, Musculoskeletal, Psychiatric were all negative except for pertinent positives noted in my HPI.    Vitals:  /75   Pulse 80   Temp 97.6  F (36.4  C)   Resp 18   Ht 1.676 m (5' 6\")   Wt 62.6 kg (138 lb)   SpO2 97%   BMI 22.27 kg/m    Exam:  GENERAL APPEARANCE:  Alert, in no distress  ENT:  Mouth and posterior oropharynx normal, moist mucous membranes, [place]  EYES:  EOM normal, conjunctiva and lids normal, PERRL  NECK:  No adenopathy,masses or thyromegaly  RESP:  respiratory effort and palpation of chest normal, no respiratory distress, few crackles bilaterally, no wheezes  CV:  Palpation and auscultation of heart done , irregular rate and rhythm, 3/6 murmur, +2 pedal pulses, peripheral edema trace in both LE  ABDOMEN:  normal bowel sounds, soft, nontender, no hepatosplenomegaly or other masses  M/S:   gait steady with walker. CAR with good strength. No joint " inflammation  SKIN:  no rashes or open areas  PSYCH:  oriented X 3, normal insight, judgement and memory, affect and mood normal    Lab/Diagnostic data:  Lab Results   Component Value Date    WBC 12.0 04/30/2019     Lab Results   Component Value Date    RBC 4.21 04/30/2019     Lab Results   Component Value Date    HGB 13.1 04/30/2019     Lab Results   Component Value Date    HCT 38.8 04/30/2019     Lab Results   Component Value Date    MCV 92 04/30/2019     Lab Results   Component Value Date    MCH 31.1 04/30/2019     Lab Results   Component Value Date    MCHC 33.8 04/30/2019     Lab Results   Component Value Date    RDW 14.7 04/30/2019     Lab Results   Component Value Date     04/30/2019     Last Comprehensive Metabolic Panel:  Sodium   Date Value Ref Range Status   05/01/2019 136 133 - 144 mmol/L Final     Potassium   Date Value Ref Range Status   05/01/2019 3.7 3.4 - 5.3 mmol/L Final     Chloride   Date Value Ref Range Status   05/01/2019 101 94 - 109 mmol/L Final     Carbon Dioxide   Date Value Ref Range Status   05/01/2019 31 20 - 32 mmol/L Final     Anion Gap   Date Value Ref Range Status   05/01/2019 4 3 - 14 mmol/L Final     Glucose   Date Value Ref Range Status   05/01/2019 96 70 - 99 mg/dL Final     Urea Nitrogen   Date Value Ref Range Status   05/01/2019 24 7 - 30 mg/dL Final     Creatinine   Date Value Ref Range Status   05/01/2019 0.92 0.52 - 1.04 mg/dL Final     GFR Estimate   Date Value Ref Range Status   05/01/2019 54 (L) >60 mL/min/[1.73_m2] Final     Comment:     Non  GFR Calc  Starting 12/18/2018, serum creatinine based estimated GFR (eGFR) will be   calculated using the Chronic Kidney Disease Epidemiology Collaboration   (CKD-EPI) equation.       Calcium   Date Value Ref Range Status   05/01/2019 8.9 8.5 - 10.1 mg/dL Final           ASSESSMENT / PLAN:  (I50.31) Acute diastolic congestive heart failure (H)  (primary encounter diagnosis)  (I34.0) Severe mitral  regurgitation  Comment: volume status improved. Very fatigued  Plan: continue lasix. Continue digoxin and metoprolol. Daily weight. Compression stockings during the day. Low sodium diet. BMP. Follow up with Cardiology 5/7/2019.     (I48.2) Chronic atrial fibrillation (H)  (I48.92) Atrial flutter with rapid ventricular response (H)  Comment: rate controlled. INR subtherapeutic  Plan: continue digoxin, metoprolol. Coumadin 5 mg tonight. INR in am.     (J18.1) Pneumonia of right middle lobe due to infectious organism (H)  Comment: appears to be resolving. Completed antibiotics while inpatient  Plan: monitor symptoms     (M48.00) Spinal stenosis, unspecified spinal region  (M54.16) Right lumbar radiculitis  Comment: no acute issues  Plan: therapies. Tylenol prn     (R94.5) LFT elevation  Comment: felt to be related to congestion/CHF. Poor appetite with no other GI symptoms   Plan: repeat hepatic panel. Dietician to consult for supplements.     (R53.81) Physical deconditioning  Comment: due to acute illness, recurrent hospitalizations  Plan: PHYSICAL THERAPY/OT. [place].     (Z71.89) Advanced directives, counseling/discussion  Comment: she has a POLST on file and confirms Full Code          Total time spent with patient visit at the skilled nursing facility was 42 mins including patient visit and review of past records. Greater than 50% of total time spent with counseling and coordinating care due to complexity of care  Electronically signed by:  SENIA Lockett CNP

## 2019-05-02 NOTE — TELEPHONE ENCOUNTER
Patient was evaluated by cardiology while inpatient for increased SOB after recent hospitalization for pneumonia. Diuresed 3 lbs and transitioned to po Lasix. [facility] with Care Coordinator. RN confirmed with Care Coordinator that patient has a scheduled F/U appt on 5/7/19 with [facility], with labs prior. RN confirmed with Care Coordinator that patient is weighed daily, to call clinic with a weight gain of 2 lbs overnight or 5 lbs in a week and to bring list of daily weights/vitals, last progress note, MAR, active orders, and provider order sheet to appt. MARQUITA Das RN.

## 2019-05-02 NOTE — LETTER
5/2/2019        RE: Elizabeth Ruggiero  [address]  [facility] [address]        [place] [facility]  PRIMARY CARE PROVIDER AND CLINIC:  Gelacio Downs MD, [facility] [address] / [place] [address]  Chief Complaint   Patient presents with     Hospital F/U     [facility] Medical Record Number:  [id number]  Place of Service where encounter took place:  [facility] ([facility] [[id number]]    Elizabeth Ruggiero  is a 89 year old  (6/19/1926), admitted to the above facility from  [facility]. Hospital stay 4/29/2019 through 5/1/2019..  Admitted to this facility for  rehab, medical management and nursing care.    HPI:    HPI information obtained from: facility chart records, facility staff, patient report and [facility] chart review.   Brief Summary of Hospital Course:   She has a medical history significant for afib on coumadin, history of DVT, mitral regurgitation, spinal stenosis, osteoarthritis and was hospitalized 4/29/2019 after presenting to the ED with poor appetite, abdominal pain and worsening shortness of breath. She had been hospitalized 4/23-4/25/2019 with sepsis, right middle lobe pneumonia and afib/aflutter with RVR. CXR 4/29/2019 showed increased bilateral patchy groundglass opacities, interstitial prominence and increased cardiomegaly.  ECHO 4/23/2019: EF > 70%, mod-severe to severe mitral regurgitation, mild to moderate tricuspid regurgitation, mild valvular aortic stenosis. Cardiology consulted for new diagnosis of diastolic CHF. Digoxin was added for afib. Metoprolol and coumadin were continued.  She is to follow up outpatient for consideration of mitral valve clip.   LFTs were elevated, suspected due to CHF/hepatic congestion. US RUQ negative. LFTs improved with diuresis. Azithromycin and cefuroxime for recent pneumonia were completed.     Updates on Status Since Skilled nursing Admission:      Acute diastolic congestive heart failure  (H)-reports fatigue and generalized weakness. Breathing has improved, mild shortness of breath with exertion. Poor appetite. No GI symptoms   Severe mitral regurgitation  Chronic atrial fibrillation (H)-INR 1.5 today after 2.5 mg last night for INR 1.60. Usual home dose is 2.5 mg daily. HR: 80-89  Atrial flutter with rapid ventricular response (H)  Pneumonia of right middle lobe due to infectious organism (H)-occasional productive cough for yellow-white sputum. Reports sputum was previously dark brown. Afebrile. No hypoxia.   Spinal stenosis, unspecified spinal region-denies pain   Right lumbar radiculitis  LFT elevation  Physical deconditioning-ambulating with walker and stand by assist.  Requires supervision to mid assist with cares.       CODE STATUS/ADVANCE DIRECTIVES DISCUSSION:   CPR/Full code   Patient's living condition: lives alone at [facility]. Has been independent with cares and med administration.   ALLERGIES: Patient has no known allergies.  PAST MEDICAL HISTORY:  has a past medical history of Acute diastolic CHF (congestive heart failure) (H), Acute left hemiparesis (H) (2/9/2019), Arthritis, Atrial fibrillation (H), Atrial fibrillation with RVR (H), Atrial flutter with rapid ventricular response (H) (4/23/2019), CHF (congestive heart failure) (H) (4/29/2019), Community acquired pneumonia, DVT of lower extremity (deep venous thrombosis) (H), Hepatic congestion, Hyperlipidemia, Severe mitral regurgitation, SOB (shortness of breath), and Vitamin D deficiency.  PAST SURGICAL HISTORY:   has a past surgical history that includes repair left femur; appendectomy; and fracture surgery.  FAMILY HISTORY: family history includes Breast Cancer in her sister; Colon Cancer in her mother; Diabetes in her sister.  SOCIAL HISTORY:   reports that she has never smoked. She does not have any smokeless tobacco history on file. She reports that she does not drink alcohol or use drugs.    Post Discharge Medication  "Reconciliation Status: discharge medications reconciled and changed, per note/orders (see AVS)    Current Outpatient Medications   Medication Sig Dispense Refill     Acetaminophen (TYLENOL PO) Take 325 mg by mouth every 6 hours as needed        [START ON 5/5/2019] alendronate (FOSAMAX) 70 MG tablet Take 70 mg by mouth every 7 days SUNDAYS       calcium carbonate-vitamin D (OSCAL W/D) 500-200 MG-UNIT tablet Take 1 tablet by mouth 2 times daily       digoxin (LANOXIN) 125 MCG tablet Take 1 tablet (125 mcg) by mouth daily       furosemide (LASIX) 20 MG tablet Take 1 tablet (20 mg) by mouth daily       metoprolol tartrate (LOPRESSOR) 25 MG tablet Take 25 mg by mouth 2 times daily Hold for SBP <90 or  HR <60       Multiple Vitamins-Minerals (PRESERVISION AREDS 2+MULTI VIT) CAPS Take 1 tablet by mouth 2 times daily       NONFORMULARY Take 3 tablets by mouth daily 50+ Mini Tablets (food-based multivitamin) from [employer]  Does not contain vitamin K       Warfarin Sodium (COUMADIN PO) Take 5 mg by mouth daily         ROS:  10 point ROS of systems including Constitutional, Eyes, Respiratory, Cardiovascular, Gastroenterology, Genitourinary, Integumentary, Musculoskeletal, Psychiatric were all negative except for pertinent positives noted in my HPI.    Vitals:  /75   Pulse 80   Temp 97.6  F (36.4  C)   Resp 18   Ht 1.676 m (5' 6\")   Wt 62.6 kg (138 lb)   SpO2 97%   BMI 22.27 kg/m     Exam:  GENERAL APPEARANCE:  Alert, in no distress  ENT:  Mouth and posterior oropharynx normal, moist mucous membranes, [place]  EYES:  EOM normal, conjunctiva and lids normal, PERRL  NECK:  No adenopathy,masses or thyromegaly  RESP:  respiratory effort and palpation of chest normal, no respiratory distress, few crackles bilaterally, no wheezes  CV:  Palpation and auscultation of heart done , irregular rate and rhythm, 3/6 murmur, +2 pedal pulses, peripheral edema trace in both LE  ABDOMEN:  normal bowel sounds, soft, nontender, no " hepatosplenomegaly or other masses  M/S:   gait steady with walker. CAR with good strength. No joint inflammation  SKIN:  no rashes or open areas  PSYCH:  oriented X 3, normal insight, judgement and memory, affect and mood normal    Lab/Diagnostic data:  Lab Results   Component Value Date    WBC 12.0 04/30/2019     Lab Results   Component Value Date    RBC 4.21 04/30/2019     Lab Results   Component Value Date    HGB 13.1 04/30/2019     Lab Results   Component Value Date    HCT 38.8 04/30/2019     Lab Results   Component Value Date    MCV 92 04/30/2019     Lab Results   Component Value Date    MCH 31.1 04/30/2019     Lab Results   Component Value Date    MCHC 33.8 04/30/2019     Lab Results   Component Value Date    RDW 14.7 04/30/2019     Lab Results   Component Value Date     04/30/2019     Last Comprehensive Metabolic Panel:  Sodium   Date Value Ref Range Status   05/01/2019 136 133 - 144 mmol/L Final     Potassium   Date Value Ref Range Status   05/01/2019 3.7 3.4 - 5.3 mmol/L Final     Chloride   Date Value Ref Range Status   05/01/2019 101 94 - 109 mmol/L Final     Carbon Dioxide   Date Value Ref Range Status   05/01/2019 31 20 - 32 mmol/L Final     Anion Gap   Date Value Ref Range Status   05/01/2019 4 3 - 14 mmol/L Final     Glucose   Date Value Ref Range Status   05/01/2019 96 70 - 99 mg/dL Final     Urea Nitrogen   Date Value Ref Range Status   05/01/2019 24 7 - 30 mg/dL Final     Creatinine   Date Value Ref Range Status   05/01/2019 0.92 0.52 - 1.04 mg/dL Final     GFR Estimate   Date Value Ref Range Status   05/01/2019 54 (L) >60 mL/min/[1.73_m2] Final     Comment:     Non  GFR Calc  Starting 12/18/2018, serum creatinine based estimated GFR (eGFR) will be   calculated using the Chronic Kidney Disease Epidemiology Collaboration   (CKD-EPI) equation.       Calcium   Date Value Ref Range Status   05/01/2019 8.9 8.5 - 10.1 mg/dL Final           ASSESSMENT / PLAN:  (I50.31) Acute  diastolic congestive heart failure (H)  (primary encounter diagnosis)  (I34.0) Severe mitral regurgitation  Comment: volume status improved. Very fatigued  Plan: continue lasix. Continue digoxin and metoprolol. Daily weight. Compression stockings during the day. Low sodium diet. BMP. Follow up with Cardiology 5/7/2019.     (I48.2) Chronic atrial fibrillation (H)  (I48.92) Atrial flutter with rapid ventricular response (H)  Comment: rate controlled. INR subtherapeutic  Plan: continue digoxin, metoprolol. Coumadin 5 mg tonight. INR in am.     (J18.1) Pneumonia of right middle lobe due to infectious organism (H)  Comment: appears to be resolving. Completed antibiotics while inpatient  Plan: monitor symptoms     (M48.00) Spinal stenosis, unspecified spinal region  (M54.16) Right lumbar radiculitis  Comment: no acute issues  Plan: therapies. Tylenol prn     (R94.5) LFT elevation  Comment: felt to be related to congestion/CHF. Poor appetite with no other GI symptoms   Plan: repeat hepatic panel. Dietician to consult for supplements.     (R53.81) Physical deconditioning  Comment: due to acute illness, recurrent hospitalizations  Plan: PHYSICAL THERAPY/OT. [place].     (Z71.89) Advanced directives, counseling/discussion  Comment: she has a POLST on file and confirms Full Code          Total time spent with patient visit at the skilled nursing facility was 42 mins including patient visit and review of past records. Greater than 50% of total time spent with counseling and coordinating care due to complexity of care  Electronically signed by:  SENIA Lockett CNP                     Sincerely,        SENIA Lockett CNP

## 2019-05-03 ENCOUNTER — NURSING HOME VISIT (OUTPATIENT)
Dept: GERIATRICS | Facility: CLINIC | Age: 84
End: 2019-05-03
Payer: COMMERCIAL

## 2019-05-03 VITALS
OXYGEN SATURATION: 92 % | BODY MASS INDEX: 21.6 KG/M2 | SYSTOLIC BLOOD PRESSURE: 94 MMHG | TEMPERATURE: 97.8 F | WEIGHT: 134.4 LBS | RESPIRATION RATE: 18 BRPM | HEART RATE: 74 BPM | HEIGHT: 66 IN | DIASTOLIC BLOOD PRESSURE: 59 MMHG

## 2019-05-03 DIAGNOSIS — Z79.01 LONG TERM (CURRENT) USE OF ANTICOAGULANTS: ICD-10-CM

## 2019-05-03 DIAGNOSIS — Z51.81 ENCOUNTER FOR THERAPEUTIC DRUG MONITORING: ICD-10-CM

## 2019-05-03 DIAGNOSIS — I48.20 CHRONIC ATRIAL FIBRILLATION (H): Primary | ICD-10-CM

## 2019-05-03 PROCEDURE — 99308 SBSQ NF CARE LOW MDM 20: CPT | Performed by: NURSE PRACTITIONER

## 2019-05-03 ASSESSMENT — MIFFLIN-ST. JEOR: SCORE: 1036.38

## 2019-05-03 NOTE — PROGRESS NOTES
"[facility]  [facility] Medical Record Number:  [id number]  [facility] of Service where encounter took place[facility] [[id number]]    HPI:    Elizabeth Ruggiero is a 89 year old  (6/19/1926), who is being seen today for an episodic care visit at the above location.   HPI information obtained from: facility chart records, facility staff, patient report and [facility] chart review. Today's concern is INR/Coumadin management for A. Fib    ROS/Subjective:  Bleeding Signs/Symptoms:  None  Thromboembolic Signs/Symptoms:  None  Medication Changes:  No  Dietary Changes:  No  Activity Changes: No  Bacterial/Viral Infection:  No  Missed Coumadin Doses:  None  On ASA: No  Other Concerns:  No    OBJECTIVE:  BP 94/59   Pulse 74   Temp 97.8  F (36.6  C)   Resp 18   Ht 1.676 m (5' 6\")   Wt 61 kg (134 lb 6.4 oz)   SpO2 92%   BMI 21.69 kg/m    Last INR: 1.50  on 5/2/2019   INR Today:  1.3  Current Dose:  5 mg last night   Usual home dose is 1.25 mg daily.     ASSESSMENT:     Chronic atrial fibrillation (H)  Long term (current) use of anticoagulants  Encounter for therapeutic drug monitoring  Subtherapeutic INR for goal of 2-3    PLAN:   New Dose: 5 mg daily    Next INR: 5/5/2019      Electronically signed by:  SENIA Lockett CNP  "

## 2019-05-05 LAB
BACTERIA SPEC CULT: NO GROWTH
BACTERIA SPEC CULT: NO GROWTH
Lab: NORMAL
Lab: NORMAL
SPECIMEN SOURCE: NORMAL
SPECIMEN SOURCE: NORMAL

## 2019-05-06 ENCOUNTER — TRANSFERRED RECORDS (OUTPATIENT)
Dept: HEALTH INFORMATION MANAGEMENT | Facility: CLINIC | Age: 84
End: 2019-05-06

## 2019-05-06 ENCOUNTER — NURSING HOME VISIT (OUTPATIENT)
Dept: GERIATRICS | Facility: CLINIC | Age: 84
End: 2019-05-06
Payer: COMMERCIAL

## 2019-05-06 VITALS
HEART RATE: 77 BPM | SYSTOLIC BLOOD PRESSURE: 103 MMHG | RESPIRATION RATE: 18 BRPM | DIASTOLIC BLOOD PRESSURE: 61 MMHG | HEIGHT: 66 IN | OXYGEN SATURATION: 97 % | BODY MASS INDEX: 21.53 KG/M2 | WEIGHT: 134 LBS | TEMPERATURE: 97.6 F

## 2019-05-06 VITALS
DIASTOLIC BLOOD PRESSURE: 61 MMHG | RESPIRATION RATE: 18 BRPM | BODY MASS INDEX: 21.53 KG/M2 | HEIGHT: 66 IN | OXYGEN SATURATION: 97 % | TEMPERATURE: 97.6 F | HEART RATE: 77 BPM | SYSTOLIC BLOOD PRESSURE: 103 MMHG | WEIGHT: 134 LBS

## 2019-05-06 DIAGNOSIS — Z51.81 ENCOUNTER FOR THERAPEUTIC DRUG MONITORING: ICD-10-CM

## 2019-05-06 DIAGNOSIS — Z79.01 LONG TERM (CURRENT) USE OF ANTICOAGULANTS: ICD-10-CM

## 2019-05-06 DIAGNOSIS — I48.20 CHRONIC ATRIAL FIBRILLATION (H): Primary | ICD-10-CM

## 2019-05-06 LAB
ALBUMIN SERPL-MCNC: 3.1 G/DL (ref 3.4–5)
ALP SERPL-CCNC: 76 U/L (ref 40–150)
ALT SERPL-CCNC: 65 U/L (ref 0–50)
ANION GAP SERPL CALCULATED.3IONS-SCNC: 9 MMOL/L (ref 3–14)
AST SERPL-CCNC: 45 U/L (ref 0–45)
BILIRUB SERPL-MCNC: 1.3 MG/DL (ref 0.2–1.3)
BUN SERPL-MCNC: 24 MG/DL (ref 7–30)
CALCIUM SERPL-MCNC: 9.5 MG/DL (ref 8.5–10.1)
CHLORIDE SERPLBLD-SCNC: 104 MMOL/L (ref 94–109)
CO2 SERPL-SCNC: 25 MMOL/L (ref 20–32)
CREAT SERPL-MCNC: 0.89 MG/DL (ref 0.52–1.04)
ERYTHROCYTE [DISTWIDTH] IN BLOOD BY AUTOMATED COUNT: 15.1 % (ref 10–15)
GFR SERPL CREATININE-BSD FRML MDRD: 56 ML/MIN/1.73M2
GLUCOSE SERPL-MCNC: 137 MG/DL (ref 70–99)
HCT VFR BLD AUTO: 43.7 % (ref 35–47)
HEMOGLOBIN: 14.6 G/DL (ref 11.7–15.7)
MCH RBC QN AUTO: 31.4 PG (ref 26.5–33)
MCHC RBC AUTO-ENTMCNC: 33.4 G/DL (ref 31.5–36.5)
MCV RBC AUTO: 94 FL (ref 78–100)
PLATELET # BLD AUTO: 244 10^9/L (ref 150–450)
POTASSIUM SERPL-SCNC: 3.9 MMOL/L (ref 3.4–5.3)
PROT SERPL-MCNC: 6.9 G/DL (ref 6.8–8.8)
RBC # BLD AUTO: 4.65 10^12/L (ref 3.8–5.2)
SODIUM SERPL-SCNC: 138 MMOL/L (ref 133–144)
WBC # BLD AUTO: 8.9 10^9/L (ref 4–11)

## 2019-05-06 PROCEDURE — 99308 SBSQ NF CARE LOW MDM 20: CPT | Performed by: NURSE PRACTITIONER

## 2019-05-06 ASSESSMENT — MIFFLIN-ST. JEOR
SCORE: 1034.57
SCORE: 1034.57

## 2019-05-06 NOTE — PROGRESS NOTES
"[facility]  [place] Medical Record Number:  [id number]  [facility] of Service where encounter took place[facility] [[id number]]    HPI:    Elizabeth Ruggiero is a 89 year old  (6/19/1926), who is being seen today for an episodic care visit at the above location.   HPI information obtained from: facility chart records, facility staff, patient report and [facility] chart review. Today's concern is INR/Coumadin management for A. Fib    ROS/Subjective:  Bleeding Signs/Symptoms:  None  Thromboembolic Signs/Symptoms:  None  Medication Changes:  No  Dietary Changes:  No  Activity Changes: No  Bacterial/Viral Infection:  No  Missed Coumadin Doses:  None  On ASA: No  Other Concerns:  No    OBJECTIVE:  /61   Pulse 77   Temp 97.6  F (36.4  C)   Resp 18   Ht 1.676 m (5' 6\")   Wt 60.8 kg (134 lb)   SpO2 97%   BMI 21.63 kg/m    Last INR: 2.3 on 5/5/2019  INR Today:  2.7  Current Dose:  2.5 mg daily       ASSESSMENT:     Chronic atrial fibrillation (H)  Long term (current) use of anticoagulants  Encounter for therapeutic drug monitoring  Therapeutic INR for goal of 2-3    PLAN:   New Dose: 2 mg daily    Next INR: 5/9/2019      Electronically signed by:  SENIA Lockett CNP   "

## 2019-05-07 ENCOUNTER — NURSING HOME VISIT (OUTPATIENT)
Dept: GERIATRICS | Facility: CLINIC | Age: 84
End: 2019-05-07
Payer: COMMERCIAL

## 2019-05-07 ENCOUNTER — TELEPHONE (OUTPATIENT)
Dept: CARDIOLOGY | Facility: CLINIC | Age: 84
End: 2019-05-07

## 2019-05-07 DIAGNOSIS — M81.0 AGE-RELATED OSTEOPOROSIS WITHOUT CURRENT PATHOLOGICAL FRACTURE: ICD-10-CM

## 2019-05-07 DIAGNOSIS — R53.81 PHYSICAL DECONDITIONING: ICD-10-CM

## 2019-05-07 DIAGNOSIS — I34.0 SEVERE MITRAL REGURGITATION: ICD-10-CM

## 2019-05-07 DIAGNOSIS — I48.92 ATRIAL FLUTTER WITH RAPID VENTRICULAR RESPONSE (H): ICD-10-CM

## 2019-05-07 DIAGNOSIS — I50.31 ACUTE DIASTOLIC CONGESTIVE HEART FAILURE (H): Primary | ICD-10-CM

## 2019-05-07 DIAGNOSIS — M48.00 SPINAL STENOSIS, UNSPECIFIED SPINAL REGION: ICD-10-CM

## 2019-05-07 PROCEDURE — 99306 1ST NF CARE HIGH MDM 50: CPT | Performed by: INTERNAL MEDICINE

## 2019-05-07 NOTE — TELEPHONE ENCOUNTER
Dottie, from [facility] called. Questioning the need for lab due tomorrow of BMP prior to OV with [facility]. Had a CMP done at [facility] yesterday, 5/6/19. Will fax over the results. Will cancel BMP for tomorrow. Patient will still come to follow up OV with [facility] post hospital.   Team 4 updated.

## 2019-05-07 NOTE — PROGRESS NOTES
Elizabeth Ruggiero is a 89 year old female seen May 7, 2019 at [facility] where she was admitted this week after [facility] hospitalization for acute diastolic CHF  Patient had been living at the [facility] in [place] apartment, active and independent.   She was hospitalized and had a TCU stay in February after episode of LLE weakness of unclear etiology.    She was admitted again in April with RML pneumonia with sepsis, and atrial fib with RVR.  She discharged to home on 4/25, but presented again on 4/29 with worsening dyspnea and weakness.    CXR showed CM, bilateral patchy groundglass opacities and interstitial prominence, and she required BiPAP.   She was diuresed with furosemide and seen by Cardiology  She was started on digoxin for VR control, and is being considered for a mitral valve clip once she recovers    Reviewed with patient today.     Patient is seen in her room, up to chair   Reports she is feeling much better, no current dyspnea, CP or other CV symptoms.    Has been working with therapies.    Notes left lumbar pain radiating down LLE.   Feels Tylenol will help this before she is scheduled to work with therapies this afternoon.       Past Medical History:   Diagnosis Date     (HFpEF) heart failure with preserved ejection fraction (H)      Acute diastolic CHF (congestive heart failure) (H)      Acute left hemiparesis (H) 2/9/2019     Acute left hemiparesis (H)      Arthritis      Atrial fibrillation (H)      Atrial fibrillation with RVR (H)      Atrial flutter with rapid ventricular response (H) 4/23/2019     CHF (congestive heart failure) (H) 4/29/2019     Community acquired pneumonia      Diastolic CHF (H)      DVT of lower extremity (deep venous thrombosis) (H)     recurrent     Hepatic congestion     improved with diuretics for CHF     Hyperlipidemia      Pneumonia      Severe mitral regurgitation      SOB (shortness of breath)      TIA (transient ischemic attack)      Vitamin D deficiency        Past Surgical  "History:   Procedure Laterality Date     APPENDECTOMY       FRACTURE SURGERY       repair left femur         Family History   Problem Relation Age of Onset     Colon Cancer Mother      Breast Cancer Sister      Diabetes Sister        Social History     Tobacco Use     Smoking status: Never Smoker   Substance Use Topics     Alcohol use: No      SH:  [place]ives alone[place] apartment at the [facility].    She has 2 daughters, Aimee    Review Of Systems  Skin: negative   Eyes: impaired vision  Ears/Nose/Throat: hearing loss  Respiratory: No shortness of breath, dyspnea on exertion, cough, or hemoptysis  Cardiovascular: as above  Gastrointestinal: poor appetite and hepatic congestion, improved with diuresis     Genitourinary: negative  Musculoskeletal: generalized weakness and fatigue; previously ambulatory without device      Neurologic: negative, SLUMS 25/30   Psychiatric: negative  Hematologic/Lymphatic/Immunologic: negative  Endocrine: negative      GENERAL APPEARANCE: alert and no distress, looks younger than her stated age  /61   Pulse 77   Temp 97.6  F (36.4  C)   Resp 18   Ht 1.676 m (5' 6\")   Wt 60.8 kg (134 lb)   SpO2 97%   BMI 21.63 kg/m     HEENT: normocephalic, no lesion or abnormalities  NECK: no adenopathy, no asymmetry, masses, or scars and thyroid normal to palpation  RESP: decreased BS, coarse rhonchi bilaterally  CV: irregular rate and rhythm, normal S1 S2, 2/6 HSM  ABDOMEN:  soft, nontender, no HSM or masses and bowel sounds normal  MS: extremities normal- no extremity edema  SKIN: no suspicious lesions or rashes  NEURO: Normal strength and tone, sensory exam grossly normal, and speech normal  PSYCH: affect okay  LYMPHATICS: No cervical,  or supraclavicular nodes    Last Comprehensive Metabolic Panel:  Sodium   Date Value Ref Range Status   05/06/2019 138 133 - 144 mmol/L Final     Potassium   Date Value Ref Range Status   05/06/2019 3.9 3.4 - 5.3 mmol/L Final     Chloride   Date " Value Ref Range Status   05/06/2019 104 94 - 109 mmol/L Final     Carbon Dioxide   Date Value Ref Range Status   05/06/2019 25 20 - 32 mmol/L Final     Anion Gap   Date Value Ref Range Status   05/06/2019 9 3 - 14 mmol/L Final     Glucose   Date Value Ref Range Status   05/06/2019 137 (A) 70 - 99 mg/dL Final     Urea Nitrogen   Date Value Ref Range Status   05/06/2019 24 7 - 30 mg/dL Final     Creatinine   Date Value Ref Range Status   05/06/2019 0.89 0.52 - 1.04 mg/dL Final     GFR Estimate   Date Value Ref Range Status   05/06/2019 56 >60 ml/min/1.73m2 Final     Calcium   Date Value Ref Range Status   05/06/2019 9.5 8.5 - 10.1 mg/dL Final     Lab Results   Component Value Date    AST 45 05/06/2019     Lab Results   Component Value Date    ALT 65 05/06/2019      BILITOTAL 1.3 05/06/2019     Lab Results   Component Value Date    ALBUMIN 3.1 05/06/2019     Lab Results   Component Value Date    PROTTOTAL 6.9 05/06/2019      Lab Results   Component Value Date    ALKPHOS 76 05/06/2019     Lab Results   Component Value Date    WBC 8.9 05/06/2019      HGB 14.6 05/06/2019      MCV 94 05/06/2019       05/06/2019     ECHO 4/23/19 Interpretation Summary  Hyperdynamic left ventricular functionThe visual ejection fraction isestimated at >70%.The left ventricle is normal in size.There is moderate  concentric left ventricular hypertrophy.  The right ventricular systolic function is normal.  The left atrium is severely dilated.  The mitral valve leaflets appear thickened. Bi leaflet prolapse.Possible torn cord attached to anterior mitral leaflet.There is mod-severe to severe (3-4+) mitral regurgitation.Systolic flow reversal suggested in pulmonary vein.  Flail segement cannot be excluded.  There is mild to moderate (1-2+) tricuspid regurgitation.  Mild valvular aortic stenosis.  Similar findings were on echo dated 08/16/2014 ([facility])     IMP/PLAN:   (I50.31) Acute diastolic congestive heart failure (H)  (primary  encounter diagnosis)  Comment: improved after diuresis; weights 133-134#    Plan: furosemide 20 mg/day; follow weights, BMP      (I34.0) Severe mitral regurgitation  Comment: present many years, but recent worsening of symptoms    Plan: follow up with Cardiology Clinic for consideration for mitral valve clip       (I48.92) Atrial flutter with rapid ventricular response (H)  Comment:   Pulse Readings from Last 4 Encounters:   05/06/19 77   05/06/19 77   05/03/19 74   05/02/19 80      Plan: rate up today, but metoprolol held this morning secondary to low bps, was 88/57 this AM.   Now that she is on digoxin, will decrease metoprolol to 12.5 mg bid and follow.   Goal is to continue to have hold parameters while here, but send home on dose she tolerates.      Warfarin per INR for stroke prophylaxis.       (M48.00) Spinal stenosis, unspecified spinal region  Comment: back and LLE pain      Plan: acetaminophen prn, follow        (R53.81) Physical deconditioning  Comment: after acute illnesses  Plan: PHYSICAL THERAPY / OCCUPATIONAL THERAPY for strengthening, balance, gait, ADLs.  [place]      (M81.0) Age-related osteoporosis without current pathological fracture  Comment: on calcium and vit D   Plan: weekly alendronate.         Mckenna Hunter MD

## 2019-05-07 NOTE — LETTER
5/7/2019        RE: Elizabeth Ruggiero  [address]  [facility] [address]        Elizabeth Ruggiero is a 89 year old female seen May 7, 2019 at [facility] where she was admitted this week after [facility] hospitalization for acute diastolic CHF  Patient had been living at the [facility] in [place] apartment, [place] and independent.   She was hospitalized and had a TCU stay in February after episode of LLE weakness of unclear etiology.    She was admitted again in April with RML pneumonia with sepsis, and atrial fib with RVR.  She discharged to home on 4/25, but presented again on 4/29 with worsening dyspnea and weakness.    CXR showed CM, bilateral patchy groundglass opacities and interstitial prominence, and she required BiPAP.   She was diuresed with furosemide and seen by Cardiology  She was started on digoxin for VR control, and is being considered for a mitral valve clip once she recovers    Reviewed with patient today.     Patient is seen in her room, up to chair   Reports she is feeling much better, no current dyspnea, CP or other CV symptoms.    Has been working with therapies.    Notes left lumbar pain radiating down LLE.   Feels Tylenol will help this before she is scheduled to work with therapies this afternoon.       Past Medical History:   Diagnosis Date     (HFpEF) heart failure with preserved ejection fraction (H)      Acute diastolic CHF (congestive heart failure) (H)      Acute left hemiparesis (H) 2/9/2019     Acute left hemiparesis (H)      Arthritis      Atrial fibrillation (H)      Atrial fibrillation with RVR (H)      Atrial flutter with rapid ventricular response (H) 4/23/2019     CHF (congestive heart failure) (H) 4/29/2019     Community acquired pneumonia      Diastolic CHF (H)      DVT of lower extremity (deep venous thrombosis) (H)     recurrent     Hepatic congestion     improved with diuretics for CHF     Hyperlipidemia      Pneumonia      Severe mitral regurgitation      SOB (shortness of  "breath)      TIA (transient ischemic attack)      Vitamin D deficiency        Past Surgical History:   Procedure Laterality Date     APPENDECTOMY       FRACTURE SURGERY       repair left femur         Family History   Problem Relation Age of Onset     Colon Cancer Mother      Breast Cancer Sister      Diabetes Sister        Social History     Tobacco Use     Smoking status: Never Smoker   Substance Use Topics     Alcohol use: No      SH:  [place]ives alone[place] apartment at the [facility].    She has 2 daughters, Brittney and Cecy    Review Of Systems  Skin: negative   Eyes: impaired vision  Ears/Nose/Throat: hearing loss  Respiratory: No shortness of breath, dyspnea on exertion, cough, or hemoptysis  Cardiovascular: as above  Gastrointestinal: poor appetite and hepatic congestion, improved with diuresis     Genitourinary: negative  Musculoskeletal: generalized weakness and fatigue; previously ambulatory without device      Neurologic: negative, SLUMS 25/30   Psychiatric: negative  Hematologic/Lymphatic/Immunologic: negative  Endocrine: negative      GENERAL APPEARANCE: alert and no distress, looks younger than her stated age  /61   Pulse 77   Temp 97.6  F (36.4  C)   Resp 18   Ht 1.676 m (5' 6\")   Wt 60.8 kg (134 lb)   SpO2 97%   BMI 21.63 kg/m      HEENT: normocephalic, no lesion or abnormalities  NECK: no adenopathy, no asymmetry, masses, or scars and thyroid normal to palpation  RESP: decreased BS, coarse rhonchi bilaterally  CV: irregular rate and rhythm, normal S1 S2, 2/6 HSM  ABDOMEN:  soft, nontender, no HSM or masses and bowel sounds normal  MS: extremities normal- no extremity edema  SKIN: no suspicious lesions or rashes  NEURO: Normal strength and tone, sensory exam grossly normal, and speech normal  PSYCH: affect okay  LYMPHATICS: No cervical,  or supraclavicular nodes    Last Comprehensive Metabolic Panel:  Sodium   Date Value Ref Range Status   05/06/2019 138 133 - 144 mmol/L Final "     Potassium   Date Value Ref Range Status   05/06/2019 3.9 3.4 - 5.3 mmol/L Final     Chloride   Date Value Ref Range Status   05/06/2019 104 94 - 109 mmol/L Final     Carbon Dioxide   Date Value Ref Range Status   05/06/2019 25 20 - 32 mmol/L Final     Anion Gap   Date Value Ref Range Status   05/06/2019 9 3 - 14 mmol/L Final     Glucose   Date Value Ref Range Status   05/06/2019 137 (A) 70 - 99 mg/dL Final     Urea Nitrogen   Date Value Ref Range Status   05/06/2019 24 7 - 30 mg/dL Final     Creatinine   Date Value Ref Range Status   05/06/2019 0.89 0.52 - 1.04 mg/dL Final     GFR Estimate   Date Value Ref Range Status   05/06/2019 56 >60 ml/min/1.73m2 Final     Calcium   Date Value Ref Range Status   05/06/2019 9.5 8.5 - 10.1 mg/dL Final     Lab Results   Component Value Date    AST 45 05/06/2019     Lab Results   Component Value Date    ALT 65 05/06/2019      BILITOTAL 1.3 05/06/2019     Lab Results   Component Value Date    ALBUMIN 3.1 05/06/2019     Lab Results   Component Value Date    PROTTOTAL 6.9 05/06/2019      Lab Results   Component Value Date    ALKPHOS 76 05/06/2019     Lab Results   Component Value Date    WBC 8.9 05/06/2019      HGB 14.6 05/06/2019      MCV 94 05/06/2019       05/06/2019     ECHO 4/23/19 Interpretation Summary  Hyperdynamic left ventricular functionThe visual ejection fraction isestimated at >70%.The left ventricle is normal in size.There is moderate  concentric left ventricular hypertrophy.  The right ventricular systolic function is normal.  The left atrium is severely dilated.  The mitral valve leaflets appear thickened. Bi leaflet prolapse.Possible torn cord attached to anterior mitral leaflet.There is mod-severe to severe (3-4+) mitral regurgitation.Systolic flow reversal suggested in pulmonary vein.  Flail segement cannot be excluded.  There is mild to moderate (1-2+) tricuspid regurgitation.  Mild valvular aortic stenosis.  Similar findings were on echo dated  08/16/2014 ([facility])     IMP/PLAN:   (I50.31) Acute diastolic congestive heart failure (H)  (primary encounter diagnosis)  Comment: improved after diuresis; weights 133-134#    Plan: furosemide 20 mg/day; follow weights, BMP      (I34.0) Severe mitral regurgitation  Comment: present many years, but recent worsening of symptoms    Plan: follow up with Cardiology Clinic for consideration for mitral valve clip       (I48.92) Atrial flutter with rapid ventricular response (H)  Comment:   Pulse Readings from Last 4 Encounters:   05/06/19 77   05/06/19 77   05/03/19 74   05/02/19 80      Plan: rate up today, but metoprolol held this morning secondary to low bps, was 88/57 this AM.   Now that she is on digoxin, will decrease metoprolol to 12.5 mg bid and follow.   Goal is to continue to have hold parameters while here, but send home on dose she tolerates.      Warfarin per INR for stroke prophylaxis.       (M48.00) Spinal stenosis, unspecified spinal region  Comment: back and LLE pain      Plan: acetaminophen prn, follow        (R53.81) Physical deconditioning  Comment: after acute illnesses  Plan: PHYSICAL THERAPY / OCCUPATIONAL THERAPY for strengthening, balance, gait, ADLs.  [place]      (M81.0) Age-related osteoporosis without current pathological fracture  Comment: on calcium and vit D   Plan: weekly alendronate.         Mckenna Hunter MD       Sincerely,        Mckenna Hunter MD

## 2019-05-08 ENCOUNTER — OFFICE VISIT (OUTPATIENT)
Dept: CARDIOLOGY | Facility: CLINIC | Age: 84
End: 2019-05-08
Payer: COMMERCIAL

## 2019-05-08 VITALS
DIASTOLIC BLOOD PRESSURE: 67 MMHG | HEIGHT: 66 IN | SYSTOLIC BLOOD PRESSURE: 96 MMHG | HEART RATE: 85 BPM | BODY MASS INDEX: 21.53 KG/M2 | WEIGHT: 134 LBS

## 2019-05-08 DIAGNOSIS — I34.0 SEVERE MITRAL REGURGITATION: Primary | ICD-10-CM

## 2019-05-08 DIAGNOSIS — I48.91 ATRIAL FIBRILLATION, UNSPECIFIED TYPE (H): ICD-10-CM

## 2019-05-08 PROCEDURE — 93000 ELECTROCARDIOGRAM COMPLETE: CPT | Performed by: NURSE PRACTITIONER

## 2019-05-08 PROCEDURE — 99214 OFFICE O/P EST MOD 30 MIN: CPT | Performed by: NURSE PRACTITIONER

## 2019-05-08 RX ORDER — METOPROLOL TARTRATE 25 MG/1
25 TABLET, FILM COATED ORAL EVERY EVENING
Status: DISCONTINUED | OUTPATIENT
Start: 2019-05-08 | End: 2019-05-09 | Stop reason: DRUGHIGH

## 2019-05-08 ASSESSMENT — MIFFLIN-ST. JEOR: SCORE: 1034.57

## 2019-05-08 NOTE — PATIENT INSTRUCTIONS
1. Decrease morning dose of Metoprolol to 12.5 mg. Continue evening Metoprolol dose at 25 mg daily    2. Follow-up in 1 week with labs.

## 2019-05-08 NOTE — LETTER
5/8/2019    Gelacio Downs MD  [facility] [address]  [facility] [id number]    RE: Elizabeth Ruggiero       Dear Colleague,    I had the pleasure of seeing Elizabeth Ruggiero in the [facility].    HPI and Plan:   I had the pleasure of seeing Elizabeth Ruggiero today in cardiology clinic follow up diastolic heart failure and atrial fibrillation. She is a pleasant 89 year old patient of Dr. Galvan.     Ms. Ruggiero's a past medical history significant for chronic atrial fibrillation/flutter, diastolic heart failure, hepatic congestion, deep vein thrombosis, hypertension, severe mitral regurgitation, history of TIA, and osteoporosis.    The patient has presented to the emergency room several times this year.  On March 9, 2019, she presented to the emergency room for hypertension.  On arrival her blood pressure was 137/69.  She received a small dose of IV metoprolol which lowered her blood pressure as well as heart rate.  She again returned to the emergency room on April 23, 2018 for generalized weakness and shortness of breath.  She was noted to have a temperature of 101.5.She was diagnosed and treated for pneumonia.  She was discharged on April 25, 2019.    She she was readmitted April 29, 2019 for acute diastolic heart failure exacerbation.  She was hypoxic in the emergency room with a oxygen saturation of 89%.  She was placed on BiPAP in given IV Lasix.  She diuresed well for a net negative of 1.6 L. Her BNP was notably elevated at 6800.  Her LFTs were elevated.  Her echocardiogram showed a normal left ventricular systolic function with an ejection fraction >70% with moderately severe to severe mitral regurgitation. Metoprolol was initiated for rate control as well as digoxin 125 mcg daily.  She was also transition to oral Lasix 20 mg daily.    Today she presents to the cardiology clinic in follow-up for diastolic heart failure and atrial fibrillation.  Following discharge, she went to  [facility] for transitional care.  She has been doing well since discharge without recurrent shortness of breath.  She has maintained her dry weight at 134 pounds. She denies cough, PND, orthopnea and lower extremity edema as well as abdominal distention.      Her blood pressure has been well controlled and at times hypotensive with her lowest blood pressure of 88/67.   with hypotensive blood pressures, [facility] will hold metoprolol and furosemide.    Labs Reviewed: Sodium 138, potassium 3.9, creatinine 0.89, GFR 56, ALT 65    Physical Exam  Please see Below     Assessment and Plan  1. Diastolic heart failure with an EF >70%.  She appears euvolemic by exam today.  She is maintaining her dry weight and has a normal JVP today.   I will have her continue her current Lasix dose at this time.  I will see her back in 1 week with a BMP.    2.  Severe mitral regurgitation.  Recent echocardiogram notes severe mitral regurgitation with bileaflet prolapse.  Possible torn cord attached to the anterior mitral leaflet.  Systolic flow reversal suggested in pulmonary vein.  Follow-up with Dr. Waldron as scheduled to discuss if you  would benefit from a mitral clip.    3.  Chronic atrial fibrillation/flutter.  On warfarin therapy for anticoagulation.  Most recent INR at 1.6.  Continue digoxin 125 mcg daily and metoprolol tartrate.    4. Hypertension.  Well-controlled.  Given her morning low blood pressures, we will reduce her a.m. dose of metoprolol to 12.5 mg and continue the p.m. dose of 25 mg.  I will see her in 1 week to evaluate her heart rate and blood pressure.    5. History of DVT.  On chronic anticoagulation as stated above.    Thank you for allowing me to care for Elizabeth Ruggiero today.    SENIA Hernandez, CNP  Cardiology    Voice recognition software was used for this note, I have reviewed this note, but errors may have been missed.    Orders Placed This Encounter   Procedures     Basic metabolic panel     INR/ANTICOAG REFERRAL      Follow-Up with Cardiac Advanced Practice Provider     EKG 12-lead complete w/read - Clinics (performed today)     Orders Placed This Encounter   Medications     metoprolol tartrate (LOPRESSOR) half-tab 12.5 mg     metoprolol tartrate (LOPRESSOR) tablet 25 mg     Medications Discontinued During This Encounter   Medication Reason     metoprolol tartrate (LOPRESSOR) 25 MG tablet          CURRENT MEDICATIONS:  Current Outpatient Medications   Medication Sig Dispense Refill     Acetaminophen (TYLENOL PO) Take 325 mg by mouth every 6 hours as needed        alendronate (FOSAMAX) 70 MG tablet Take 70 mg by mouth every 7 days SUNDAYS       calcium carbonate-vitamin D (OSCAL W/D) 500-200 MG-UNIT tablet Take 1 tablet by mouth 2 times daily       digoxin (LANOXIN) 125 MCG tablet Take 1 tablet (125 mcg) by mouth daily       furosemide (LASIX) 20 MG tablet Take 1 tablet (20 mg) by mouth daily       Multiple Vitamins-Minerals (PRESERVISION AREDS 2+MULTI VIT) CAPS Take 1 tablet by mouth 2 times daily       Warfarin Sodium (COUMADIN PO) Take 2 mg by mouth daily          ALLERGIES   No Known Allergies    PAST MEDICAL HISTORY:  Past Medical History:   Diagnosis Date     (HFpEF) heart failure with preserved ejection fraction (H)      Acute diastolic CHF (congestive heart failure) (H)      Acute left hemiparesis (H) 2/9/2019     Acute left hemiparesis (H)      Arthritis      Atrial fibrillation (H)      Atrial fibrillation with RVR (H)      Atrial flutter with rapid ventricular response (H) 4/23/2019     CHF (congestive heart failure) (H) 4/29/2019     Community acquired pneumonia      Diastolic CHF (H)      DVT of lower extremity (deep venous thrombosis) (H)     recurrent     Hepatic congestion     improved with diuretics for CHF     Hyperlipidemia      Pneumonia      Severe mitral regurgitation      SOB (shortness of breath)      TIA (transient ischemic attack)      Vitamin D deficiency        PAST SURGICAL HISTORY:  Past Surgical  History:   Procedure Laterality Date     APPENDECTOMY       FRACTURE SURGERY       repair left femur         FAMILY HISTORY:  Family History   Problem Relation Age of Onset     Colon Cancer Mother      Breast Cancer Sister      Diabetes Sister        SOCIAL HISTORY:  Social History     Socioeconomic History     Marital status:      Spouse name: None     Number of children: 6     Years of education: through 4 years college     Highest education level: None   Occupational History     None   Social Needs     Financial resource strain: None     Food insecurity:     Worry: None     Inability: None     Transportation needs:     Medical: None     Non-medical: None   Tobacco Use     Smoking status: Never Smoker   Substance and Sexual Activity     Alcohol use: No     Drug use: No     Sexual activity: Not Currently     Comment:   one year ago no other partners   Lifestyle     Physical activity:     Days per week: None     Minutes per session: None     Stress: None   Relationships     Social connections:     Talks on phone: None     Gets together: None     Attends Yazdanism service: None     Active member of club or organization: None     Attends meetings of clubs or organizations: None     Relationship status: None     Intimate partner violence:     Fear of current or ex partner: None     Emotionally abused: None     Physically abused: None     Forced sexual activity: None   Other Topics Concern     None   Social History Narrative     None       Review of Systems:  Skin:  Negative       Eyes:  Positive for glasses for reading   ENT:  Positive for hearing loss    Respiratory:  Negative       Cardiovascular:  Negative      Gastroenterology: Positive for nausea    Genitourinary:  not assessed      Musculoskeletal:  Positive for back pain    Neurologic:  Negative headaches    Psychiatric:         Heme/Lymph/Imm:  Negative allergies    Endocrine:           Physical Exam:  Vitals: BP 96/67   Pulse 85   Ht 1.676  "kirk (5' 6\")   Wt 60.8 kg (134 lb)   BMI 21.63 kg/m       Constitutional:  cooperative;well developed;well nourished thin;frail      Skin:  warm and dry to the touch          Head:  normocephalic        Eyes:  pupils equal and round;sclera white        Lymph:      ENT:  no pallor or cyanosis        Neck:  carotid pulses are full and equal bilaterally;JVP normal;no carotid bruit        Respiratory:  normal breath sounds, clear to auscultation, normal A-P diameter, normal symmetry, normal respiratory excursion, no use of accessory muscles         Cardiac: normal S1 and S2 irregularly irregular rhythm     systolic murmur;LLSB;grade 2        pulses full and equal     2+             2+                    GI:  abdomen soft;non-tender        Extremities and Muscular Skeletal:  no edema;no deformities, clubbing, cyanosis, erythema observed              Neurological:  affect appropriate;no gross motor deficits        Psych:  Alert and Oriented x 3    Encounter Diagnoses   Name Primary?     Atrial fibrillation, unspecified type (H)      Severe mitral regurgitation Yes       Recent Lab Results:  LIPID RESULTS:  No results found for: CHOL, HDL, LDL, TRIG, CHOLHDLRATIO    LIVER ENZYME RESULTS:  Lab Results   Component Value Date    AST 45 05/06/2019    ALT 65 (A) 05/06/2019       CBC RESULTS:  Lab Results   Component Value Date    WBC 8.9 05/06/2019    RBC 4.65 05/06/2019    HGB 14.6 05/06/2019    HCT 43.7 05/06/2019    MCV 94 05/06/2019    MCH 31.4 05/06/2019    MCHC 33.4 05/06/2019    RDW 15.1 (A) 05/06/2019     05/06/2019       BMP RESULTS:  Lab Results   Component Value Date     05/06/2019    POTASSIUM 3.9 05/06/2019    CHLORIDE 104 05/06/2019    CO2 25 05/06/2019    ANIONGAP 9 05/06/2019     (A) 05/06/2019    BUN 24 05/06/2019    CR 0.89 05/06/2019    GFRESTIMATED 56 05/06/2019    GFRESTBLACK 65 05/06/2019    CHRIS 9.5 05/06/2019        A1C RESULTS:  No results found for: A1C    INR RESULTS:  Lab Results "   Component Value Date    INR 1.60 (H) 05/01/2019    INR 1.69 (H) 04/30/2019           Thank you for allowing me to participate in the care of your patient.    Sincerely,     SENIA Hernandez [facility]

## 2019-05-08 NOTE — PROGRESS NOTES
HPI and Plan:   I had the pleasure of seeing Elizabeth Ruggiero today in cardiology clinic follow up diastolic heart failure and atrial fibrillation. She is a pleasant 89 year old patient of Dr. Galvan.     Ms. Ruggiero's a past medical history significant for chronic atrial fibrillation/flutter, diastolic heart failure, hepatic congestion, deep vein thrombosis, hypertension, severe mitral regurgitation, history of TIA, and osteoporosis.    The patient has presented to the emergency room several times this year.  On March 9, 2019, she presented to the emergency room for hypertension.  On arrival her blood pressure was 137/69.  She received a small dose of IV metoprolol which lowered her blood pressure as well as heart rate.  She again returned to the emergency room on April 23, 2018 for generalized weakness and shortness of breath.  She was noted to have a temperature of 101.5.She was diagnosed and treated for pneumonia.  She was discharged on April 25, 2019.    She she was readmitted April 29, 2019 for acute diastolic heart failure exacerbation.  She was hypoxic in the emergency room with a oxygen saturation of 89%.  She was placed on BiPAP in given IV Lasix.  She diuresed well for a net negative of 1.6 L. Her BNP was notably elevated at 6800.  Her LFTs were elevated.  Her echocardiogram showed a normal left ventricular systolic function with an ejection fraction >70% with moderately severe to severe mitral regurgitation. Metoprolol was initiated for rate control as well as digoxin 125 mcg daily.  She was also transition to oral Lasix 20 mg daily.    Today she presents to the cardiology clinic in follow-up for diastolic heart failure and atrial fibrillation.  Following discharge, she went to [facility] for transitional care.  She has been doing well since discharge without recurrent shortness of breath.  She has maintained her dry weight at 134 pounds. She denies cough, PND, orthopnea and lower extremity edema as well as  abdominal distention.      Her blood pressure has been well controlled and at times hypotensive with her lowest blood pressure of 88/67.   with hypotensive blood pressures, [facility] will hold metoprolol and furosemide.    Labs Reviewed: Sodium 138, potassium 3.9, creatinine 0.89, GFR 56, ALT 65    Physical Exam  Please see Below     Assessment and Plan  1. Diastolic heart failure with an EF >70%.  She appears euvolemic by exam today.  She is maintaining her dry weight and has a normal JVP today.   I will have her continue her current Lasix dose at this time.  I will see her back in 1 week with a BMP.    2.  Severe mitral regurgitation.  Recent echocardiogram notes severe mitral regurgitation with bileaflet prolapse.  Possible torn cord attached to the anterior mitral leaflet.  Systolic flow reversal suggested in pulmonary vein.  Follow-up with Dr. Waldron as scheduled to discuss if you  would benefit from a mitral clip.    3.  Chronic atrial fibrillation/flutter.  On warfarin therapy for anticoagulation.  Most recent INR at 1.6.  Continue digoxin 125 mcg daily and metoprolol tartrate.    4. Hypertension.  Well-controlled.  Given her morning low blood pressures, we will reduce her a.m. dose of metoprolol to 12.5 mg and continue the p.m. dose of 25 mg.  I will see her in 1 week to evaluate her heart rate and blood pressure.    5. History of DVT.  On chronic anticoagulation as stated above.    Thank you for allowing me to care for Elizabeth Ruggiero today.    SENIA Hernandez, CNP  Cardiology    Voice recognition software was used for this note, I have reviewed this note, but errors may have been missed.    Orders Placed This Encounter   Procedures     Basic metabolic panel     INR/ANTICOAG REFERRAL     Follow-Up with Cardiac Advanced Practice Provider     EKG 12-lead complete w/read - Clinics (performed today)     Orders Placed This Encounter   Medications     metoprolol tartrate (LOPRESSOR) half-tab 12.5 mg     metoprolol  tartrate (LOPRESSOR) tablet 25 mg     Medications Discontinued During This Encounter   Medication Reason     metoprolol tartrate (LOPRESSOR) 25 MG tablet          CURRENT MEDICATIONS:  Current Outpatient Medications   Medication Sig Dispense Refill     Acetaminophen (TYLENOL PO) Take 325 mg by mouth every 6 hours as needed        alendronate (FOSAMAX) 70 MG tablet Take 70 mg by mouth every 7 days SUNDAYS       calcium carbonate-vitamin D (OSCAL W/D) 500-200 MG-UNIT tablet Take 1 tablet by mouth 2 times daily       digoxin (LANOXIN) 125 MCG tablet Take 1 tablet (125 mcg) by mouth daily       furosemide (LASIX) 20 MG tablet Take 1 tablet (20 mg) by mouth daily       Multiple Vitamins-Minerals (PRESERVISION AREDS 2+MULTI VIT) CAPS Take 1 tablet by mouth 2 times daily       Warfarin Sodium (COUMADIN PO) Take 2 mg by mouth daily          ALLERGIES   No Known Allergies    PAST MEDICAL HISTORY:  Past Medical History:   Diagnosis Date     (HFpEF) heart failure with preserved ejection fraction (H)      Acute diastolic CHF (congestive heart failure) (H)      Acute left hemiparesis (H) 2/9/2019     Acute left hemiparesis (H)      Arthritis      Atrial fibrillation (H)      Atrial fibrillation with RVR (H)      Atrial flutter with rapid ventricular response (H) 4/23/2019     CHF (congestive heart failure) (H) 4/29/2019     Community acquired pneumonia      Diastolic CHF (H)      DVT of lower extremity (deep venous thrombosis) (H)     recurrent     Hepatic congestion     improved with diuretics for CHF     Hyperlipidemia      Pneumonia      Severe mitral regurgitation      SOB (shortness of breath)      TIA (transient ischemic attack)      Vitamin D deficiency        PAST SURGICAL HISTORY:  Past Surgical History:   Procedure Laterality Date     APPENDECTOMY       FRACTURE SURGERY       repair left femur         FAMILY HISTORY:  Family History   Problem Relation Age of Onset     Colon Cancer Mother      Breast Cancer Sister       "Diabetes Sister        SOCIAL HISTORY:  Social History     Socioeconomic History     Marital status:      Spouse name: None     Number of children: 6     Years of education: through 4 years college     Highest education level: None   Occupational History     None   Social Needs     Financial resource strain: None     Food insecurity:     Worry: None     Inability: None     Transportation needs:     Medical: None     Non-medical: None   Tobacco Use     Smoking status: Never Smoker   Substance and Sexual Activity     Alcohol use: No     Drug use: No     Sexual activity: Not Currently     Comment:   one year ago no other partners   Lifestyle     Physical activity:     Days per week: None     Minutes per session: None     Stress: None   Relationships     Social connections:     Talks on phone: None     Gets together: None     Attends Scientology service: None     Active member of club or organization: None     Attends meetings of clubs or organizations: None     Relationship status: None     Intimate partner violence:     Fear of current or ex partner: None     Emotionally abused: None     Physically abused: None     Forced sexual activity: None   Other Topics Concern     None   Social History Narrative     None       Review of Systems:  Skin:  Negative       Eyes:  Positive for glasses for reading   ENT:  Positive for hearing loss    Respiratory:  Negative       Cardiovascular:  Negative      Gastroenterology: Positive for nausea    Genitourinary:  not assessed      Musculoskeletal:  Positive for back pain    Neurologic:  Negative headaches    Psychiatric:         Heme/Lymph/Imm:  Negative allergies    Endocrine:           Physical Exam:  Vitals: BP 96/67   Pulse 85   Ht 1.676 m (5' 6\")   Wt 60.8 kg (134 lb)   BMI 21.63 kg/m      Constitutional:  cooperative;well developed;well nourished thin;frail      Skin:  warm and dry to the touch          Head:  normocephalic        Eyes:  pupils equal and " round;sclera white        Lymph:      ENT:  no pallor or cyanosis        Neck:  carotid pulses are full and equal bilaterally;JVP normal;no carotid bruit        Respiratory:  normal breath sounds, clear to auscultation, normal A-P diameter, normal symmetry, normal respiratory excursion, no use of accessory muscles         Cardiac: normal S1 and S2 irregularly irregular rhythm     systolic murmur;LLSB;grade 2        pulses full and equal     2+             2+                    GI:  abdomen soft;non-tender        Extremities and Muscular Skeletal:  no edema;no deformities, clubbing, cyanosis, erythema observed              Neurological:  affect appropriate;no gross motor deficits        Psych:  Alert and Oriented x 3    Encounter Diagnoses   Name Primary?     Atrial fibrillation, unspecified type (H)      Severe mitral regurgitation Yes       Recent Lab Results:  LIPID RESULTS:  No results found for: CHOL, HDL, LDL, TRIG, CHOLHDLRATIO    LIVER ENZYME RESULTS:  Lab Results   Component Value Date    AST 45 05/06/2019    ALT 65 (A) 05/06/2019       CBC RESULTS:  Lab Results   Component Value Date    WBC 8.9 05/06/2019    RBC 4.65 05/06/2019    HGB 14.6 05/06/2019    HCT 43.7 05/06/2019    MCV 94 05/06/2019    MCH 31.4 05/06/2019    MCHC 33.4 05/06/2019    RDW 15.1 (A) 05/06/2019     05/06/2019       BMP RESULTS:  Lab Results   Component Value Date     05/06/2019    POTASSIUM 3.9 05/06/2019    CHLORIDE 104 05/06/2019    CO2 25 05/06/2019    ANIONGAP 9 05/06/2019     (A) 05/06/2019    BUN 24 05/06/2019    CR 0.89 05/06/2019    GFRESTIMATED 56 05/06/2019    GFRESTBLACK 65 05/06/2019    CHRIS 9.5 05/06/2019        A1C RESULTS:  No results found for: A1C    INR RESULTS:  Lab Results   Component Value Date    INR 1.60 (H) 05/01/2019    INR 1.69 (H) 04/30/2019           CC  MD [facility]  [address] [place] [address]

## 2019-05-09 ENCOUNTER — DISCHARGE SUMMARY NURSING HOME (OUTPATIENT)
Dept: GERIATRICS | Facility: CLINIC | Age: 84
End: 2019-05-09
Payer: COMMERCIAL

## 2019-05-09 VITALS
HEIGHT: 66 IN | HEART RATE: 76 BPM | OXYGEN SATURATION: 97 % | RESPIRATION RATE: 18 BRPM | TEMPERATURE: 97.8 F | SYSTOLIC BLOOD PRESSURE: 90 MMHG | BODY MASS INDEX: 21.21 KG/M2 | DIASTOLIC BLOOD PRESSURE: 55 MMHG | WEIGHT: 132 LBS

## 2019-05-09 DIAGNOSIS — J18.9 PNEUMONIA OF RIGHT MIDDLE LOBE DUE TO INFECTIOUS ORGANISM: ICD-10-CM

## 2019-05-09 DIAGNOSIS — R53.81 PHYSICAL DECONDITIONING: ICD-10-CM

## 2019-05-09 DIAGNOSIS — R79.89 LFT ELEVATION: ICD-10-CM

## 2019-05-09 DIAGNOSIS — M81.0 AGE-RELATED OSTEOPOROSIS WITHOUT CURRENT PATHOLOGICAL FRACTURE: ICD-10-CM

## 2019-05-09 DIAGNOSIS — I34.0 SEVERE MITRAL REGURGITATION: ICD-10-CM

## 2019-05-09 DIAGNOSIS — M48.00 SPINAL STENOSIS, UNSPECIFIED SPINAL REGION: ICD-10-CM

## 2019-05-09 DIAGNOSIS — I48.20 CHRONIC ATRIAL FIBRILLATION (H): ICD-10-CM

## 2019-05-09 DIAGNOSIS — M54.16 RIGHT LUMBAR RADICULITIS: ICD-10-CM

## 2019-05-09 DIAGNOSIS — I48.92 ATRIAL FLUTTER WITH RAPID VENTRICULAR RESPONSE (H): ICD-10-CM

## 2019-05-09 DIAGNOSIS — I50.31 ACUTE DIASTOLIC CONGESTIVE HEART FAILURE (H): Primary | ICD-10-CM

## 2019-05-09 PROCEDURE — 99316 NF DSCHRG MGMT 30 MIN+: CPT | Performed by: NURSE PRACTITIONER

## 2019-05-09 RX ORDER — METOPROLOL TARTRATE 25 MG/1
12.5 TABLET, FILM COATED ORAL 2 TIMES DAILY
COMMUNITY
End: 2020-10-23

## 2019-05-09 ASSESSMENT — MIFFLIN-ST. JEOR: SCORE: 1025.5

## 2019-05-09 NOTE — PROGRESS NOTES
[place] GERIATRIC SERVICES DISCHARGE SUMMARY  PATIENT'S NAME: Elizabeth Ruggiero  YOB: 1926  MEDICAL RECORD NUMBER:  [id number]  Place of Service where encounter took place:  [facility] [[id number]]    PRIMARY CARE PROVIDER AND CLINIC RESPONSIBLE AFTER TRANSFER:   Gelacio Downs MD, [facility] [address] / [place] [address]    Assisted Living: [facility] [address]     Transferring providers: Adriano CONN CNP, Mckenna Hunter MD  Recent Hospitalization/ED:  [facility] stay 4/29/2019 to 5/1/2019.  Date of SNF Admission: May / 01 / 2019  Date of SNF (anticipated) Discharge: May / 11 / 2019  Discharged to: [facility] [address]  Cognitive Scores: SLUMS: 25/30, CPT: 5.1/5.6 and Safety Questionnaire: 16/20  DME: Walker    CODE STATUS/ADVANCE DIRECTIVES DISCUSSION:  Full Code   ALLERGIES: Patient has no known allergies.    DISCHARGE DIAGNOSIS/NURSING FACILITY COURSE:   She came to this facility for short term rehab and medical management following hospitalization after presenting to the ED with poor appetite, abdominal pain and worsening shortness of breath. She had been hospitalized 4/23-4/25/2019 with sepsis, right middle lobe pneumonia and afib/aflutter with RVR. CXR 4/29/2019 showed increased bilateral patchy groundglass opacities, interstitial prominence and increased cardiomegaly.  ECHO 4/23/2019: EF > 70%, mod-severe to severe mitral regurgitation, mild to moderate tricuspid regurgitation, mild valvular aortic stenosis. Cardiology consulted for new diagnosis of diastolic CHF. Digoxin was added for afib. Metoprolol and coumadin were continued.  She is to follow up outpatient for consideration of mitral valve clip. LFTs were elevated, suspected due to CHF/hepatic congestion. US RUQ negative. LFTs improved with diuresis. Azithromycin and cefuroxime for recent pneumonia were completed while inpatient.     She has worked with PHYSICAL THERAPY and  OT with good results. Ambulating 515 ft with walker and supervision. Independent with toileting and dressing, requires stand by to min assist with bathing. TUG 19 seconds, indicating fair balance.   ASSESSMENT / PLAN:  (I50.31) Acute diastolic congestive heart failure (H)  (primary encounter diagnosis)  (I34.0) Severe mitral regurgitation  Comment: volume status improved with weight stable at 139 lbs. She was [facility] by [facility] 5/8/2019.   She is feeling well and eager to return home.   Plan: discharge back to [facility]. Continue lasix, digoxin, metoprolol. Home services and follow up as below.     (I48.2) Chronic atrial fibrillation (H)  (I48.92) Atrial flutter with rapid ventricular response (H)  Comment: rate controlled: 67-79  Hypotension improved after metoprolol decreased 5/7/2019. Recent readings: 101/61, 100/57, 88/57, 90/55  INR therapeutic today at 2.2   Plan: continue metoprolol 12.5 mg bid, continue digoxin. Avoid hypotension due to advanced age and fall risk. Coumadin 2.5 mg daily, which is her usual home dose. Home care nurse to draw INR 5/13/2019 with results to PCP.     (J18.1) Pneumonia of right middle lobe due to infectious organism (H)  Comment: appears resolved  Plan: monitor for recurrent symptoms     (M48.00) Spinal stenosis, unspecified spinal region  (M54.16) Right lumbar radiculitis  Comment: pain controlled.   Plan: continue tylenol.  Home therapies.     (R94.5) LFT elevation  Comment: improved, ALT remains slightly elevated at 65  Plan: follow up labs per PCP    (M81.0) Age-related osteoporosis without current pathological fracture  Comment: chronic   Plan: continue calcium/vitamin D and weekly Fosamax.     (R53.81) Physical deconditioning  Comment: progress in therapies as above  Plan: home therapies for ongoing gait training, strengthening and ADL safety.       Past Medical History:  has a past medical history of (HFpEF) heart failure with preserved ejection fraction (H), Acute  "diastolic CHF (congestive heart failure) (H), Acute left hemiparesis (H) (2/9/2019), Acute left hemiparesis (H), Arthritis, Atrial fibrillation (H), Atrial fibrillation with RVR (H), Atrial flutter with rapid ventricular response (H) (4/23/2019), CHF (congestive heart failure) (H) (4/29/2019), Community acquired pneumonia, Diastolic CHF (H), DVT of lower extremity (deep venous thrombosis) (H), Hepatic congestion, Hyperlipidemia, Pneumonia, Severe mitral regurgitation, SOB (shortness of breath), TIA (transient ischemic attack), and Vitamin D deficiency.    Discharge Medications:  Current Outpatient Medications   Medication Sig Dispense Refill     Acetaminophen (TYLENOL PO) Take 325 mg by mouth every 6 hours as needed        alendronate (FOSAMAX) 70 MG tablet Take 70 mg by mouth every 7 days SUNDAYS       calcium carbonate-vitamin D (OSCAL W/D) 500-200 MG-UNIT tablet Take 1 tablet by mouth 2 times daily       digoxin (LANOXIN) 125 MCG tablet Take 1 tablet (125 mcg) by mouth daily       furosemide (LASIX) 20 MG tablet Take 1 tablet (20 mg) by mouth daily       metoprolol tartrate (LOPRESSOR) 25 MG tablet Take 12.5 mg by mouth 2 times daily       Multiple Vitamins-Minerals (PRESERVISION AREDS 2+MULTI VIT) CAPS Take 1 tablet by mouth 2 times daily       Warfarin Sodium (COUMADIN PO) Take 2.5 mg by mouth daily          Medication Changes/Rationale:   Metoprolol decreased due to hypotension  Coumadin dosed for goal INR 2-3    Controlled medications sent with patient:   not applicable/none     ROS:   4 point ROS including Respiratory, CV, GI and , other than that noted in the HPI,  is negative    Physical Exam:   Vitals: BP 90/55   Pulse 76   Temp 97.8  F (36.6  C)   Resp 18   Ht 1.676 m (5' 6\")   Wt 59.9 kg (132 lb)   SpO2 97%   BMI 21.31 kg/m    BMI= Body mass index is 21.31 kg/m .  GENERAL APPEARANCE:  Alert, in no distress  ENT:  Mouth and posterior oropharynx normal, moist mucous membranes, [place]  EYES:  EOM " normal, conjunctiva and lids normal, PERRL  NECK:  No adenopathy,masses or thyromegaly  RESP:  respiratory effort and palpation of chest normal, no respiratory distress, lungs clear to auscultation,  no wheezes  CV:  Palpation and auscultation of heart done , irregular rate and rhythm, 3/6 murmur, +2 pedal pulses, no LE edema  ABDOMEN:  soft, nontender, no hepatosplenomegaly or other masses  M/S:   gait steady with walker. CAR with good strength. No joint inflammation  SKIN:  no rashes or open areas  PSYCH:  oriented X 3, normal insight, judgement and memory, affect and mood normal      SNF labs: Labs done in SNF are in [place] EPIC. Please refer to them using [employer] system Everywhere.      DISCHARGE PLAN:  Follow up labs: INR 5/13/2019 to be drawn by home care nurse with results to PCP  Medical Follow Up:     Follow up with primary care provider in 1 week  Follow up with Cardiology as scheduled  MTM referral needed and placed by this provider: No  Current [place] scheduled appointments:  Next 5 appointments (look out 90 days)    May 15, 2019  2:30 PM CDT  Return Visit with SENIA Hernandez CNP  [facility] ([facility]) [address] [place] [id number] [phone] OPT 2         Discharge Services: Home Care:  Occupational Therapy, Physical Therapy, Registered Nurse and Home Health Aide from [facility]      TOTAL DISCHARGE TIME:   Greater than 30 minutes  Electronically signed by:  SENIA Lockett CNP

## 2019-05-09 NOTE — LETTER
5/9/2019        RE: Elizabeth Ruggiero  [address]  [place] [place] [address]        [place] GERIATRIC SERVICES DISCHARGE SUMMARY  PATIENT'S NAME: Elizabeth Ruggiero  YOB: 1926  MEDICAL RECORD NUMBER:  [id number]  Place of Service where encounter took place:  [facility] [[id number]]    PRIMARY CARE PROVIDER AND CLINIC RESPONSIBLE AFTER TRANSFER:   Gelacio Downs MD, [facility] [address] / [place] [address]    Assisted Living: [facility] [address]     Transferring providers: Adriano CONN CNP, Mckenna Hunter MD  Recent Hospitalization/ED:  [facility] stay 4/29/2019 to 5/1/2019.  Date of SNF Admission: May / 01 / 2019  Date of SNF (anticipated) Discharge: May / 11 / 2019  Discharged to: [facility] [address]  Cognitive Scores: SLUMS: 25/30, CPT: 5.1/5.6 and Safety Questionnaire: 16/20  DME: Walker    CODE STATUS/ADVANCE DIRECTIVES DISCUSSION:  Full Code   ALLERGIES: Patient has no known allergies.    DISCHARGE DIAGNOSIS/NURSING FACILITY COURSE:   She came to this facility for short term rehab and medical management following hospitalization after presenting to the ED with poor appetite, abdominal pain and worsening shortness of breath. She had been hospitalized 4/23-4/25/2019 with sepsis, right middle lobe pneumonia and afib/aflutter with RVR. CXR 4/29/2019 showed increased bilateral patchy groundglass opacities, interstitial prominence and increased cardiomegaly.  ECHO 4/23/2019: EF > 70%, mod-severe to severe mitral regurgitation, mild to moderate tricuspid regurgitation, mild valvular aortic stenosis. Cardiology consulted for new diagnosis of diastolic CHF. Digoxin was added for afib. Metoprolol and coumadin were continued.  She is to follow up outpatient for consideration of mitral valve clip. LFTs were elevated, suspected due to CHF/hepatic congestion. US RUQ negative. LFTs improved with diuresis. Azithromycin and cefuroxime for  recent pneumonia were completed while inpatient.     She has worked with PHYSICAL THERAPY and OT with good results. Ambulating 515 ft with walker and supervision. Independent with toileting and dressing, requires stand by to min assist with bathing. TUG 19 seconds, indicating fair balance.   ASSESSMENT / PLAN:  (I50.31) Acute diastolic congestive heart failure (H)  (primary encounter diagnosis)  (I34.0) Severe mitral regurgitation  Comment: volume status improved with weight stable at 139 lbs. She was [facility] by [facility] 5/8/2019.   She is feeling well and eager to return home.   Plan: discharge back to [facility]. Continue lasix, digoxin, metoprolol. Home services and follow up as below.     (I48.2) Chronic atrial fibrillation (H)  (I48.92) Atrial flutter with rapid ventricular response (H)  Comment: rate controlled: 67-79  Hypotension improved after metoprolol decreased 5/7/2019. Recent readings: 101/61, 100/57, 88/57, 90/55  INR therapeutic today at 2.2   Plan: continue metoprolol 12.5 mg bid, continue digoxin. Avoid hypotension due to advanced age and fall risk. Coumadin 2.5 mg daily, which is her usual home dose. Home care nurse to draw INR 5/13/2019 with results to PCP.     (J18.1) Pneumonia of right middle lobe due to infectious organism (H)  Comment: appears resolved  Plan: monitor for recurrent symptoms     (M48.00) Spinal stenosis, unspecified spinal region  (M54.16) Right lumbar radiculitis  Comment: pain controlled.   Plan: continue tylenol.  Home therapies.     (R94.5) LFT elevation  Comment: improved, ALT remains slightly elevated at 65  Plan: follow up labs per PCP    (M81.0) Age-related osteoporosis without current pathological fracture  Comment: chronic   Plan: continue calcium/vitamin D and weekly Fosamax.     (R53.81) Physical deconditioning  Comment: progress in therapies as above  Plan: home therapies for ongoing gait training, strengthening and ADL safety.       Past Medical History:  has a  "past medical history of (HFpEF) heart failure with preserved ejection fraction (H), Acute diastolic CHF (congestive heart failure) (H), Acute left hemiparesis (H) (2/9/2019), Acute left hemiparesis (H), Arthritis, Atrial fibrillation (H), Atrial fibrillation with RVR (H), Atrial flutter with rapid ventricular response (H) (4/23/2019), CHF (congestive heart failure) (H) (4/29/2019), Community acquired pneumonia, Diastolic CHF (H), DVT of lower extremity (deep venous thrombosis) (H), Hepatic congestion, Hyperlipidemia, Pneumonia, Severe mitral regurgitation, SOB (shortness of breath), TIA (transient ischemic attack), and Vitamin D deficiency.    Discharge Medications:  Current Outpatient Medications   Medication Sig Dispense Refill     Acetaminophen (TYLENOL PO) Take 325 mg by mouth every 6 hours as needed        alendronate (FOSAMAX) 70 MG tablet Take 70 mg by mouth every 7 days SUNDAYS       calcium carbonate-vitamin D (OSCAL W/D) 500-200 MG-UNIT tablet Take 1 tablet by mouth 2 times daily       digoxin (LANOXIN) 125 MCG tablet Take 1 tablet (125 mcg) by mouth daily       furosemide (LASIX) 20 MG tablet Take 1 tablet (20 mg) by mouth daily       metoprolol tartrate (LOPRESSOR) 25 MG tablet Take 12.5 mg by mouth 2 times daily       Multiple Vitamins-Minerals (PRESERVISION AREDS 2+MULTI VIT) CAPS Take 1 tablet by mouth 2 times daily       Warfarin Sodium (COUMADIN PO) Take 2.5 mg by mouth daily          Medication Changes/Rationale:   Metoprolol decreased due to hypotension  Coumadin dosed for goal INR 2-3    Controlled medications sent with patient:   not applicable/none     ROS:   4 point ROS including Respiratory, CV, GI and , other than that noted in the HPI,  is negative    Physical Exam:   Vitals: BP 90/55   Pulse 76   Temp 97.8  F (36.6  C)   Resp 18   Ht 1.676 m (5' 6\")   Wt 59.9 kg (132 lb)   SpO2 97%   BMI 21.31 kg/m     BMI= Body mass index is 21.31 kg/m .  GENERAL APPEARANCE:  Alert, in no " distress  ENT:  Mouth and posterior oropharynx normal, moist mucous membranes, [place]  EYES:  EOM normal, conjunctiva and lids normal, PERRL  NECK:  No adenopathy,masses or thyromegaly  RESP:  respiratory effort and palpation of chest normal, no respiratory distress,  lungs clear to auscultation,  no wheezes  CV:  Palpation and auscultation of heart done , irregular rate and rhythm, 3/6 murmur, +2 pedal pulses, no LE edema  ABDOMEN:  soft, nontender, no hepatosplenomegaly or other masses  M/S:   gait steady with walker. CAR with good strength. No joint inflammation  SKIN:  no rashes or open areas  PSYCH:  oriented X 3, normal insight, judgement and memory, affect and mood normal      SNF labs: Labs done in SNF are in [place] EPIC. Please refer to them using [employer] Everywhere.      DISCHARGE PLAN:  Follow up labs: INR 5/13/2019 to be drawn by home care nurse with results to PCP  Medical Follow Up:     Follow up with primary care provider in 1 week  Follow up with Cardiology as scheduled  MTM referral needed and placed by this provider: No  Current [place] scheduled appointments:  Next 5 appointments (look out 90 days)    May 15, 2019  2:30 PM CDT  Return Visit with SENIA Hernandez CNP  [facility] ([facility]) [address] [place] [id number] [phone] OPT 2         Discharge Services: Home Care:  Occupational Therapy, Physical Therapy, Registered Nurse and Home Health Aide from [facility]      TOTAL DISCHARGE TIME:   Greater than 30 minutes  Electronically signed by:  SENIA Lockett CNP          Documentation of Face-to-Face and Certification for Home Health Services     Patient: Elizabeth Ruggiero   YOB: 1926  MR Number: [id number]  Today's Date: 5/9/2019    I certify that patient: Elizabeth Ruggiero is under my care and that I, or a nurse practitioner or physician's assistant working with me, had a face-to-face encounter  that meets the physician face-to-face encounter requirements with this patient on: 5/9/2019.    This encounter with the patient was in whole, or in part, for the following medical condition, which is the primary reason for home health care: CHF.    I certify that, based on my findings, the following services are medically necessary home health services: Nursing, Occupational Therapy and Physical Therapy.    My clinical findings support the need for the above services because: Nurse is needed: To assess VS, weight, respiratory status after changes in medications or other medical regimen., To provide assessment and oversight required in the home to assure adherence to the medical plan due to: complex medication regimen, at risk for rehospitalization. and To provide caregiver training to assist with: med management. **Please draw INR 5/13/2019 with results to PCP**.., Occupational Therapy Services are needed to assess and treat cognitive ability and address ADL safety due to impairment in functional status., Physical Therapy Services are needed to assess and treat the following functional impairments: gait instability, fall risk. and HHA    Further, I certify that my clinical findings support that this patient is homebound (i.e. absences from home require considerable and taxing effort and are for medical reasons or [employer] services or infrequently or of short duration when for other reasons) because: Requires assistance of another person or specialized equipment to access medical services because patient: Is unable to exit home safely on own due to: gait instability, fall risk. and Is unable to walk greater than 515 feet without rest...    Based on the above findings. I certify that this patient is confined to the home and needs intermittent skilled nursing care, physical therapy and/or speech therapy.  The patient is under my care, and I have initiated the establishment of the plan of care.  This patient will be  followed by a physician who will periodically review the plan of care.  Physician/Provider to provide follow up care: Gelacio Downs    Responsible Medicare certified [employer] Physician: Electronically signed by Dr. Mckenna Hunter MD, and only signing for initial order. Please send all follow up questions and concerns or needed follow up signatures to the PCP Gelacio Downs.    Physician Signature: See electronic signature associated with these discharge orders.  Date: 5/9/2019        Sincerely,        SENIA Lockett CNP

## 2019-05-13 ENCOUNTER — TELEPHONE (OUTPATIENT)
Dept: CARDIOLOGY | Facility: CLINIC | Age: 84
End: 2019-05-13

## 2019-05-15 ENCOUNTER — OFFICE VISIT (OUTPATIENT)
Dept: CARDIOLOGY | Facility: CLINIC | Age: 84
End: 2019-05-15
Attending: NURSE PRACTITIONER
Payer: COMMERCIAL

## 2019-05-15 VITALS
SYSTOLIC BLOOD PRESSURE: 122 MMHG | DIASTOLIC BLOOD PRESSURE: 78 MMHG | HEIGHT: 66 IN | HEART RATE: 76 BPM | BODY MASS INDEX: 22.18 KG/M2 | WEIGHT: 138 LBS

## 2019-05-15 DIAGNOSIS — I48.91 ATRIAL FIBRILLATION, UNSPECIFIED TYPE (H): ICD-10-CM

## 2019-05-15 DIAGNOSIS — I50.31 ACUTE DIASTOLIC CONGESTIVE HEART FAILURE (H): Primary | ICD-10-CM

## 2019-05-15 LAB
ANION GAP SERPL CALCULATED.3IONS-SCNC: 14 MMOL/L (ref 6–17)
BUN SERPL-MCNC: 25 MG/DL (ref 7–30)
CALCIUM SERPL-MCNC: 9.5 MG/DL (ref 8.5–10.5)
CHLORIDE SERPL-SCNC: 103 MMOL/L (ref 98–107)
CO2 SERPL-SCNC: 26 MMOL/L (ref 23–29)
CREAT SERPL-MCNC: 1.12 MG/DL (ref 0.7–1.3)
GFR SERPL CREATININE-BSD FRML MDRD: 46 ML/MIN/{1.73_M2}
GLUCOSE SERPL-MCNC: 111 MG/DL (ref 70–105)
POTASSIUM SERPL-SCNC: 4 MMOL/L (ref 3.5–5.1)
SODIUM SERPL-SCNC: 139 MMOL/L (ref 136–145)

## 2019-05-15 PROCEDURE — 80048 BASIC METABOLIC PNL TOTAL CA: CPT | Performed by: NURSE PRACTITIONER

## 2019-05-15 PROCEDURE — 36415 COLL VENOUS BLD VENIPUNCTURE: CPT | Performed by: NURSE PRACTITIONER

## 2019-05-15 PROCEDURE — 99214 OFFICE O/P EST MOD 30 MIN: CPT | Performed by: NURSE PRACTITIONER

## 2019-05-15 RX ORDER — FUROSEMIDE 20 MG
20 TABLET ORAL
Status: ON HOLD | COMMUNITY
Start: 2019-05-15 | End: 2020-07-27

## 2019-05-15 ASSESSMENT — MIFFLIN-ST. JEOR: SCORE: 1052.71

## 2019-05-15 NOTE — PATIENT INSTRUCTIONS
1. Move Lasix 20 mg to every other day. If you begin to have symptoms of shortness of breath, please call me at [phone]

## 2019-05-15 NOTE — LETTER
5/15/2019    Gelacio Downs MD  [facility] [address]  [facility] [address]    RE: Elizabeth Ruggiero       Dear Colleague,    I had the pleasure of seeing Elizabeth Ruggiero in the [facility].    HPI and Plan:   I had the pleasure of seeing Elizabeth Ruggiero today in cardiology clinic follow up diastolic heart failure. She is a pleasant 89 year old patient of Dr. Galvan.     Ms. Ruggiero's a past medical history significant for chronic atrial fibrillation/flutter, diastolic heart failure, hepatic congestion, deep vein thrombosis, hypertension, severe mitral regurgitation, history of TIA, and osteoporosis.    The patient has presented to the emergency room several times this year.  On March 9, 2019, she presented to the emergency room for hypertension.  On arrival her blood pressure was 137/69.  She received a small dose of IV metoprolol which lowered her blood pressure as well as heart rate.  She again returned to the emergency room on April 23, 2018 for generalized weakness and shortness of breath.  She was noted to have a temperature of 101.5.She was diagnosed and treated for pneumonia.  She was discharged on April 25, 2019.    She she was readmitted April 29, 2019 for acute diastolic heart failure exacerbation.  She was hypoxic in the emergency room with a oxygen saturation of 89%.  She was placed on BiPAP in given IV Lasix.  She diuresed well for a net negative of 1.6 L. Her BNP was notably elevated at 6800.  Her LFTs were elevated.  Her echocardiogram showed a normal left ventricular systolic function with an ejection fraction >70% with moderately severe to severe mitral regurgitation. Metoprolol was initiated for rate control as well as digoxin 125 mcg daily.  She was also transition to oral Lasix 20 mg daily.    She followed up after discharge with complaints of hypotension.  her metoprolol dosage was reduced from 25 mg twice daily to 12.5 mg in the a.m. and 25 mg in the  p.m.    Today she presents to the cardiology clinic in follow-up for diastolic heart failure.  She has continued to do well since her last office visit.  She denies recurrent shortness of breath.  She continues to maintain her dry weight at 133-134 pounds. She denies cough, PND, orthopnea and lower extremity edema as well as abdominal distention.      Her blood pressure has been well controlled since the reduction of her morning metoprolol dosage.  She adds that she continued to have occasional lower blood pressures therefore her Lasix was changed to every other day at [facility].  Her home blood pressures range between 110-120/60-70.  She is tolerating her medications well.    Labs Reviewed: Sodium 139, potassium 4.0, creatinine 1.12, GFR 46    Physical Exam  Please see Below     Assessment and Plan  1. Diastolic heart failure with an EF >70%.  She appears euvolemic by exam today.  She is maintaining her dry weight and has a normal JVP today.   She continues to deny any lower extremity edema as well as abdominal distention.  Her GFR is slightly reduced at 56 from last week to 46 this week.  She is currently doing Lasix daily however if her blood pressure appears low she will take it every other day.  We will go ahead and move her Lasix dosage to 20 mg every other day.  If she becomes short of breath with this change, she will call the team for nurses and we will likely resume Lasix 20 mg daily.  We could also consider spironolactone in addition to or rather than Lasix at that time. She is scheduled to see Dr. Galvan in 2 weeks.     2.  Severe mitral regurgitation.  Recent echocardiogram notes severe mitral regurgitation with bileaflet prolapse.  Possible torn cord attached to the anterior mitral leaflet.  Systolic flow reversal suggested in pulmonary vein.  Follow-up with Dr. Waldron as scheduled to discuss if you  would benefit from a mitral clip.    3.  Chronic atrial fibrillation/flutter.  On  warfarin therapy for anticoagulation.  Most recent INR at 1.6.  Continue digoxin 125 mcg daily and metoprolol tartrate.    4. Hypertension.  Well-controlled.  Continue metoprolol as scheduled.    5. History of DVT.  On chronic anticoagulation as stated above.    Thank you for allowing me to care for Elizabeth Ruggiero today.    SENIA Hernandez, CNP  Cardiology    Voice recognition software was used for this note, I have reviewed this note, but errors may have been missed.    No orders of the defined types were placed in this encounter.    Orders Placed This Encounter   Medications     furosemide (LASIX) 20 MG tablet     Sig: Take 1 tablet (20 mg) by mouth every other day     Medications Discontinued During This Encounter   Medication Reason     furosemide (LASIX) 20 MG tablet          CURRENT MEDICATIONS:  Current Outpatient Medications   Medication Sig Dispense Refill     Acetaminophen (TYLENOL PO) Take 325 mg by mouth every 6 hours as needed        alendronate (FOSAMAX) 70 MG tablet Take 70 mg by mouth every 7 days SUNDAYS       calcium carbonate-vitamin D (OSCAL W/D) 500-200 MG-UNIT tablet Take 1 tablet by mouth 2 times daily       digoxin (LANOXIN) 125 MCG tablet Take 1 tablet (125 mcg) by mouth daily       furosemide (LASIX) 20 MG tablet Take 1 tablet (20 mg) by mouth every other day       metoprolol tartrate (LOPRESSOR) 25 MG tablet Take 12.5 mg by mouth 2 times daily       Multiple Vitamins-Minerals (PRESERVISION AREDS 2+MULTI VIT) CAPS Take 1 tablet by mouth 2 times daily       Warfarin Sodium (COUMADIN PO) Take 2.5 mg by mouth daily          ALLERGIES   No Known Allergies    PAST MEDICAL HISTORY:  Past Medical History:   Diagnosis Date     (HFpEF) heart failure with preserved ejection fraction (H)      Acute diastolic CHF (congestive heart failure) (H)      Acute left hemiparesis (H) 2/9/2019     Arthritis      Atrial fibrillation (H)      Atrial fibrillation with RVR (H)      Atrial flutter (H)      Atrial  flutter with rapid ventricular response (H) 2019     CHF (congestive heart failure) (H) 2019     Community acquired pneumonia      Diastolic CHF (H)      DVT of lower extremity (deep venous thrombosis) (H)     recurrent     Hepatic congestion     improved with diuretics for CHF     Hyperlipidemia      Pneumonia      Severe mitral regurgitation      SOB (shortness of breath)      TIA (transient ischemic attack)      Vitamin D deficiency        PAST SURGICAL HISTORY:  Past Surgical History:   Procedure Laterality Date     APPENDECTOMY       FRACTURE SURGERY       repair left femur         FAMILY HISTORY:  Family History   Problem Relation Age of Onset     Colon Cancer Mother      Breast Cancer Sister      Diabetes Sister        SOCIAL HISTORY:  Social History     Socioeconomic History     Marital status:      Spouse name: None     Number of children: 6     Years of education: through 4 years college     Highest education level: None   Occupational History     None   Social Needs     Financial resource strain: None     Food insecurity:     Worry: None     Inability: None     Transportation needs:     Medical: None     Non-medical: None   Tobacco Use     Smoking status: Never Smoker     Smokeless tobacco: Never Used   Substance and Sexual Activity     Alcohol use: No     Drug use: No     Sexual activity: Not Currently     Comment:   one year ago no other partners   Lifestyle     Physical activity:     Days per week: None     Minutes per session: None     Stress: None   Relationships     Social connections:     Talks on phone: None     Gets together: None     Attends Anabaptist service: None     Active member of club or organization: None     Attends meetings of clubs or organizations: None     Relationship status: None     Intimate partner violence:     Fear of current or ex partner: None     Emotionally abused: None     Physically abused: None     Forced sexual activity: None   Other Topics  "Concern     Parent/sibling w/ CABG, MI or angioplasty before 65F 55M? Not Asked   Social History Narrative     None       Review of Systems:  Skin:  Negative       Eyes:  Positive for glasses for reading   ENT:  Positive for hearing loss    Respiratory:  Negative       Cardiovascular:  Negative   Feels like strength is improving, more active.  Gastroenterology: Positive for nausea    Genitourinary:  Positive for nocturia(X1)    Musculoskeletal:  Positive for back pain    Neurologic:  Negative headaches    Psychiatric:  Negative      Heme/Lymph/Imm:  Negative allergies    Endocrine:  Negative        Physical Exam:  Vitals: /78   Pulse 76   Ht 1.676 m (5' 6\")   Wt 62.6 kg (138 lb)   BMI 22.27 kg/m       Constitutional:  well nourished;well developed;cooperative thin;frail      Skin:  warm and dry to the touch          Head:  normocephalic        Eyes:  pupils equal and round;sclera white        Lymph:      ENT:  no pallor or cyanosis        Neck:  carotid pulses are full and equal bilaterally;JVP normal;no carotid bruit        Respiratory:  normal breath sounds, clear to auscultation, normal A-P diameter, normal symmetry, normal respiratory excursion, no use of accessory muscles         Cardiac: normal S1 and S2 irregularly irregular rhythm     systolic murmur;LLSB;grade 2        pulses full and equal     2+             2+                    GI:  abdomen soft;non-tender        Extremities and Muscular Skeletal:  no edema;no deformities, clubbing, cyanosis, erythema observed              Neurological:  affect appropriate;no gross motor deficits        Psych:  Alert and Oriented x 3    Encounter Diagnoses   Name Primary?     Atrial fibrillation, unspecified type (H)      Acute diastolic congestive heart failure (H) Yes       Recent Lab Results:  LIPID RESULTS:  No results found for: CHOL, HDL, LDL, TRIG, CHOLHDLRATIO    LIVER ENZYME RESULTS:  Lab Results   Component Value Date    AST 45 05/06/2019    ALT 65 " (A) 05/06/2019       CBC RESULTS:  Lab Results   Component Value Date    WBC 8.9 05/06/2019    RBC 4.65 05/06/2019    HGB 14.6 05/06/2019    HCT 43.7 05/06/2019    MCV 94 05/06/2019    MCH 31.4 05/06/2019    MCHC 33.4 05/06/2019    RDW 15.1 (A) 05/06/2019     05/06/2019       BMP RESULTS:  Lab Results   Component Value Date     05/15/2019    POTASSIUM 4.0 05/15/2019    CHLORIDE 103 05/15/2019    CO2 26 05/15/2019    ANIONGAP 14 05/15/2019     (H) 05/15/2019    BUN 25 05/15/2019    CR 1.12 05/15/2019    GFRESTIMATED 46 (L) 05/15/2019    GFRESTBLACK 55 (L) 05/15/2019    CHRIS 9.5 05/15/2019        A1C RESULTS:  No results found for: A1C    INR RESULTS:  Lab Results   Component Value Date    INR 1.60 (H) 05/01/2019    INR 1.69 (H) 04/30/2019           CC  Gelacio Downs MD  [facility]  [address] [place] [address]                      Thank you for allowing me to participate in the care of your patient.      Sincerely,     SENIA Hernandez CNP     [facility]    cc:   SENIA Hernandez CNP  [address]  [place] [address]

## 2019-05-15 NOTE — LETTER
5/15/2019    Gelacio Downs MD  [facility] [address]  [facility] [address]    RE: Elizabeth Ruggiero       Dear Colleague,    I had the pleasure of seeing Elizabeth Ruggiero in the [facility].    HPI and Plan:   I had the pleasure of seeing Elizabeth Ruggiero today in cardiology clinic follow up diastolic heart failure. She is a pleasant 89 year old patient of Dr. Galvan.     Ms. Ruggiero's a past medical history significant for chronic atrial fibrillation/flutter, diastolic heart failure, hepatic congestion, deep vein thrombosis, hypertension, severe mitral regurgitation, history of TIA, and osteoporosis.    The patient has presented to the emergency room several times this year.  On March 9, 2019, she presented to the emergency room for hypertension.  On arrival her blood pressure was 137/69.  She received a small dose of IV metoprolol which lowered her blood pressure as well as heart rate.  She again returned to the emergency room on April 23, 2018 for generalized weakness and shortness of breath.  She was noted to have a temperature of 101.5.She was diagnosed and treated for pneumonia.  She was discharged on April 25, 2019.    She she was readmitted April 29, 2019 for acute diastolic heart failure exacerbation.  She was hypoxic in the emergency room with a oxygen saturation of 89%.  She was placed on BiPAP in given IV Lasix.  She diuresed well for a net negative of 1.6 L. Her BNP was notably elevated at 6800.  Her LFTs were elevated.  Her echocardiogram showed a normal left ventricular systolic function with an ejection fraction >70% with moderately severe to severe mitral regurgitation. Metoprolol was initiated for rate control as well as digoxin 125 mcg daily.  She was also transition to oral Lasix 20 mg daily.    She followed up after discharge with complaints of hypotension.  her metoprolol dosage was reduced from 25 mg twice daily to 12.5 mg in the a.m. and 25 mg in the  p.m.    Today she presents to the cardiology clinic in follow-up for diastolic heart failure.  She has continued to do well since her last office visit.  She denies recurrent shortness of breath.  She continues to maintain her dry weight at 133-134 pounds. She denies cough, PND, orthopnea and lower extremity edema as well as abdominal distention.      Her blood pressure has been well controlled since the reduction of her morning metoprolol dosage.  She adds that she continued to have occasional lower blood pressures therefore her Lasix was changed to every other day at [facility].  Her home blood pressures range between 110-120/60-70.  She is tolerating her medications well.    Labs Reviewed: Sodium 139, potassium 4.0, creatinine 1.12, GFR 46    Physical Exam  Please see Below     Assessment and Plan  1. Diastolic heart failure with an EF >70%.  She appears euvolemic by exam today.  She is maintaining her dry weight and has a normal JVP today.   She continues to deny any lower extremity edema as well as abdominal distention.  Her GFR is slightly reduced at 56 from last week to 46 this week.  She is currently doing Lasix daily however if her blood pressure appears low she will take it every other day.  We will go ahead and move her Lasix dosage to 20 mg every other day.  If she becomes short of breath with this change, she will call the team for nurses and we will likely resume Lasix 20 mg daily.  We could also consider spironolactone in addition to or rather than Lasix at that time. She is scheduled to see Dr. Galvan in 2 weeks.     2.  Severe mitral regurgitation.  Recent echocardiogram notes severe mitral regurgitation with bileaflet prolapse.  Possible torn cord attached to the anterior mitral leaflet.  Systolic flow reversal suggested in pulmonary vein.  Follow-up with Dr. Waldron as scheduled to discuss if you  would benefit from a mitral clip.    3.  Chronic atrial fibrillation/flutter.  On  warfarin therapy for anticoagulation.  Most recent INR at 1.6.  Continue digoxin 125 mcg daily and metoprolol tartrate.    4. Hypertension.  Well-controlled.  Continue metoprolol as scheduled.    5. History of DVT.  On chronic anticoagulation as stated above.    Thank you for allowing me to care for Elizabeth Ruggiero today.    SENIA Hernandez, CNP  Cardiology    Voice recognition software was used for this note, I have reviewed this note, but errors may have been missed.    No orders of the defined types were placed in this encounter.    Orders Placed This Encounter   Medications     furosemide (LASIX) 20 MG tablet     Sig: Take 1 tablet (20 mg) by mouth every other day     Medications Discontinued During This Encounter   Medication Reason     furosemide (LASIX) 20 MG tablet          CURRENT MEDICATIONS:  Current Outpatient Medications   Medication Sig Dispense Refill     Acetaminophen (TYLENOL PO) Take 325 mg by mouth every 6 hours as needed        alendronate (FOSAMAX) 70 MG tablet Take 70 mg by mouth every 7 days SUNDAYS       calcium carbonate-vitamin D (OSCAL W/D) 500-200 MG-UNIT tablet Take 1 tablet by mouth 2 times daily       digoxin (LANOXIN) 125 MCG tablet Take 1 tablet (125 mcg) by mouth daily       furosemide (LASIX) 20 MG tablet Take 1 tablet (20 mg) by mouth every other day       metoprolol tartrate (LOPRESSOR) 25 MG tablet Take 12.5 mg by mouth 2 times daily       Multiple Vitamins-Minerals (PRESERVISION AREDS 2+MULTI VIT) CAPS Take 1 tablet by mouth 2 times daily       Warfarin Sodium (COUMADIN PO) Take 2.5 mg by mouth daily          ALLERGIES   No Known Allergies    PAST MEDICAL HISTORY:  Past Medical History:   Diagnosis Date     (HFpEF) heart failure with preserved ejection fraction (H)      Acute diastolic CHF (congestive heart failure) (H)      Acute left hemiparesis (H) 2/9/2019     Arthritis      Atrial fibrillation (H)      Atrial fibrillation with RVR (H)      Atrial flutter (H)      Atrial  flutter with rapid ventricular response (H) 2019     CHF (congestive heart failure) (H) 2019     Community acquired pneumonia      Diastolic CHF (H)      DVT of lower extremity (deep venous thrombosis) (H)     recurrent     Hepatic congestion     improved with diuretics for CHF     Hyperlipidemia      Pneumonia      Severe mitral regurgitation      SOB (shortness of breath)      TIA (transient ischemic attack)      Vitamin D deficiency        PAST SURGICAL HISTORY:  Past Surgical History:   Procedure Laterality Date     APPENDECTOMY       FRACTURE SURGERY       repair left femur         FAMILY HISTORY:  Family History   Problem Relation Age of Onset     Colon Cancer Mother      Breast Cancer Sister      Diabetes Sister        SOCIAL HISTORY:  Social History     Socioeconomic History     Marital status:      Spouse name: None     Number of children: 6     Years of education: through 4 years college     Highest education level: None   Occupational History     None   Social Needs     Financial resource strain: None     Food insecurity:     Worry: None     Inability: None     Transportation needs:     Medical: None     Non-medical: None   Tobacco Use     Smoking status: Never Smoker     Smokeless tobacco: Never Used   Substance and Sexual Activity     Alcohol use: No     Drug use: No     Sexual activity: Not Currently     Comment:   one year ago no other partners   Lifestyle     Physical activity:     Days per week: None     Minutes per session: None     Stress: None   Relationships     Social connections:     Talks on phone: None     Gets together: None     Attends Jew service: None     Active member of club or organization: None     Attends meetings of clubs or organizations: None     Relationship status: None     Intimate partner violence:     Fear of current or ex partner: None     Emotionally abused: None     Physically abused: None     Forced sexual activity: None   Other Topics  "Concern     Parent/sibling w/ CABG, MI or angioplasty before 65F 55M? Not Asked   Social History Narrative     None       Review of Systems:  Skin:  Negative       Eyes:  Positive for glasses for reading   ENT:  Positive for hearing loss    Respiratory:  Negative       Cardiovascular:  Negative   Feels like strength is improving, more active.  Gastroenterology: Positive for nausea    Genitourinary:  Positive for nocturia(X1)    Musculoskeletal:  Positive for back pain    Neurologic:  Negative headaches    Psychiatric:  Negative      Heme/Lymph/Imm:  Negative allergies    Endocrine:  Negative        Physical Exam:  Vitals: /78   Pulse 76   Ht 1.676 m (5' 6\")   Wt 62.6 kg (138 lb)   BMI 22.27 kg/m       Constitutional:  well nourished;well developed;cooperative thin;frail      Skin:  warm and dry to the touch          Head:  normocephalic        Eyes:  pupils equal and round;sclera white        Lymph:      ENT:  no pallor or cyanosis        Neck:  carotid pulses are full and equal bilaterally;JVP normal;no carotid bruit        Respiratory:  normal breath sounds, clear to auscultation, normal A-P diameter, normal symmetry, normal respiratory excursion, no use of accessory muscles         Cardiac: normal S1 and S2 irregularly irregular rhythm     systolic murmur;LLSB;grade 2        pulses full and equal     2+             2+                    GI:  abdomen soft;non-tender        Extremities and Muscular Skeletal:  no edema;no deformities, clubbing, cyanosis, erythema observed              Neurological:  affect appropriate;no gross motor deficits        Psych:  Alert and Oriented x 3    Encounter Diagnoses   Name Primary?     Atrial fibrillation, unspecified type (H)      Acute diastolic congestive heart failure (H) Yes       Recent Lab Results:  LIPID RESULTS:  No results found for: CHOL, HDL, LDL, TRIG, CHOLHDLRATIO    LIVER ENZYME RESULTS:  Lab Results   Component Value Date    AST 45 05/06/2019    ALT 65 " (A) 05/06/2019       CBC RESULTS:  Lab Results   Component Value Date    WBC 8.9 05/06/2019    RBC 4.65 05/06/2019    HGB 14.6 05/06/2019    HCT 43.7 05/06/2019    MCV 94 05/06/2019    MCH 31.4 05/06/2019    MCHC 33.4 05/06/2019    RDW 15.1 (A) 05/06/2019     05/06/2019       BMP RESULTS:  Lab Results   Component Value Date     05/15/2019    POTASSIUM 4.0 05/15/2019    CHLORIDE 103 05/15/2019    CO2 26 05/15/2019    ANIONGAP 14 05/15/2019     (H) 05/15/2019    BUN 25 05/15/2019    CR 1.12 05/15/2019    GFRESTIMATED 46 (L) 05/15/2019    GFRESTBLACK 55 (L) 05/15/2019    CHRIS 9.5 05/15/2019        A1C RESULTS:  No results found for: A1C    INR RESULTS:  Lab Results   Component Value Date    INR 1.60 (H) 05/01/2019    INR 1.69 (H) 04/30/2019         Thank you for allowing me to participate in the care of your patient.    Sincerely,     SENIA Hernandez [facility]

## 2019-05-15 NOTE — PROGRESS NOTES
HPI and Plan:   I had the pleasure of seeing Elizabeth Ruggiero today in cardiology clinic follow up diastolic heart failure. She is a pleasant 89 year old patient of Dr. Galvan.     Ms. Ruggiero's a past medical history significant for chronic atrial fibrillation/flutter, diastolic heart failure, hepatic congestion, deep vein thrombosis, hypertension, severe mitral regurgitation, history of TIA, and osteoporosis.    The patient has presented to the emergency room several times this year.  On March 9, 2019, she presented to the emergency room for hypertension.  On arrival her blood pressure was 137/69.  She received a small dose of IV metoprolol which lowered her blood pressure as well as heart rate.  She again returned to the emergency room on April 23, 2018 for generalized weakness and shortness of breath.  She was noted to have a temperature of 101.5.She was diagnosed and treated for pneumonia.  She was discharged on April 25, 2019.    She she was readmitted April 29, 2019 for acute diastolic heart failure exacerbation.  She was hypoxic in the emergency room with a oxygen saturation of 89%.  She was placed on BiPAP in given IV Lasix.  She diuresed well for a net negative of 1.6 L. Her BNP was notably elevated at 6800.  Her LFTs were elevated.  Her echocardiogram showed a normal left ventricular systolic function with an ejection fraction >70% with moderately severe to severe mitral regurgitation. Metoprolol was initiated for rate control as well as digoxin 125 mcg daily.  She was also transition to oral Lasix 20 mg daily.    She followed up after discharge with complaints of hypotension.  her metoprolol dosage was reduced from 25 mg twice daily to 12.5 mg in the a.m. and 25 mg in the p.m.    Today she presents to the cardiology clinic in follow-up for diastolic heart failure.  She has continued to do well since her last office visit.  She denies recurrent shortness of breath.  She continues to maintain her dry weight at  133-134 pounds. She denies cough, PND, orthopnea and lower extremity edema as well as abdominal distention.      Her blood pressure has been well controlled since the reduction of her morning metoprolol dosage.  She adds that she continued to have occasional lower blood pressures therefore her Lasix was changed to every other day at [facility].  Her home blood pressures range between 110-120/60-70.  She is tolerating her medications well.    Labs Reviewed: Sodium 139, potassium 4.0, creatinine 1.12, GFR 46    Physical Exam  Please see Below     Assessment and Plan  1. Diastolic heart failure with an EF >70%.  She appears euvolemic by exam today.  She is maintaining her dry weight and has a normal JVP today.   She continues to deny any lower extremity edema as well as abdominal distention.  Her GFR is slightly reduced at 56 from last week to 46 this week.  She is currently doing Lasix daily however if her blood pressure appears low she will take it every other day.  We will go ahead and move her Lasix dosage to 20 mg every other day.  If she becomes short of breath with this change, she will call the team for nurses and we will likely resume Lasix 20 mg daily.  We could also consider spironolactone in addition to or rather than Lasix at that time. She is scheduled to see Dr. Galvan in 2 weeks.     2.  Severe mitral regurgitation.  Recent echocardiogram notes severe mitral regurgitation with bileaflet prolapse.  Possible torn cord attached to the anterior mitral leaflet.  Systolic flow reversal suggested in pulmonary vein.  Follow-up with Dr. Waldron as scheduled to discuss if you  would benefit from a mitral clip.    3.  Chronic atrial fibrillation/flutter.  On warfarin therapy for anticoagulation.  Most recent INR at 1.6.  Continue digoxin 125 mcg daily and metoprolol tartrate.    4. Hypertension.  Well-controlled.  Continue metoprolol as scheduled.    5. History of DVT.  On chronic anticoagulation as  stated above.    Thank you for allowing me to care for Elizabeth Ruggiero today.    SENIA Hernandez, CNP  Cardiology    Voice recognition software was used for this note, I have reviewed this note, but errors may have been missed.    No orders of the defined types were placed in this encounter.    Orders Placed This Encounter   Medications     furosemide (LASIX) 20 MG tablet     Sig: Take 1 tablet (20 mg) by mouth every other day     Medications Discontinued During This Encounter   Medication Reason     furosemide (LASIX) 20 MG tablet          CURRENT MEDICATIONS:  Current Outpatient Medications   Medication Sig Dispense Refill     Acetaminophen (TYLENOL PO) Take 325 mg by mouth every 6 hours as needed        alendronate (FOSAMAX) 70 MG tablet Take 70 mg by mouth every 7 days SUNDAYS       calcium carbonate-vitamin D (OSCAL W/D) 500-200 MG-UNIT tablet Take 1 tablet by mouth 2 times daily       digoxin (LANOXIN) 125 MCG tablet Take 1 tablet (125 mcg) by mouth daily       furosemide (LASIX) 20 MG tablet Take 1 tablet (20 mg) by mouth every other day       metoprolol tartrate (LOPRESSOR) 25 MG tablet Take 12.5 mg by mouth 2 times daily       Multiple Vitamins-Minerals (PRESERVISION AREDS 2+MULTI VIT) CAPS Take 1 tablet by mouth 2 times daily       Warfarin Sodium (COUMADIN PO) Take 2.5 mg by mouth daily          ALLERGIES   No Known Allergies    PAST MEDICAL HISTORY:  Past Medical History:   Diagnosis Date     (HFpEF) heart failure with preserved ejection fraction (H)      Acute diastolic CHF (congestive heart failure) (H)      Acute left hemiparesis (H) 2/9/2019     Arthritis      Atrial fibrillation (H)      Atrial fibrillation with RVR (H)      Atrial flutter (H)      Atrial flutter with rapid ventricular response (H) 4/23/2019     CHF (congestive heart failure) (H) 4/29/2019     Community acquired pneumonia      Diastolic CHF (H)      DVT of lower extremity (deep venous thrombosis) (H)     recurrent     Hepatic  congestion     improved with diuretics for CHF     Hyperlipidemia      Pneumonia      Severe mitral regurgitation      SOB (shortness of breath)      TIA (transient ischemic attack)      Vitamin D deficiency        PAST SURGICAL HISTORY:  Past Surgical History:   Procedure Laterality Date     APPENDECTOMY       FRACTURE SURGERY       repair left femur         FAMILY HISTORY:  Family History   Problem Relation Age of Onset     Colon Cancer Mother      Breast Cancer Sister      Diabetes Sister        SOCIAL HISTORY:  Social History     Socioeconomic History     Marital status:      Spouse name: None     Number of children: 6     Years of education: through 4 years college     Highest education level: None   Occupational History     None   Social Needs     Financial resource strain: None     Food insecurity:     Worry: None     Inability: None     Transportation needs:     Medical: None     Non-medical: None   Tobacco Use     Smoking status: Never Smoker     Smokeless tobacco: Never Used   Substance and Sexual Activity     Alcohol use: No     Drug use: No     Sexual activity: Not Currently     Comment:   one year ago no other partners   Lifestyle     Physical activity:     Days per week: None     Minutes per session: None     Stress: None   Relationships     Social connections:     Talks on phone: None     Gets together: None     Attends Cheondo[employer] service: None     Active member of club or organization: None     Attends meetings of clubs or organizations: None     Relationship status: None     Intimate partner violence:     Fear of current or ex partner: None     Emotionally abused: None     Physically abused: None     Forced sexual activity: None   Other Topics Concern     Parent/sibling w/ CABG, MI or angioplasty before 65F 55M? Not Asked   Social History Narrative     None       Review of Systems:  Skin:  Negative       Eyes:  Positive for glasses for reading   ENT:  Positive for hearing loss   "  Respiratory:  Negative       Cardiovascular:  Negative   Feels like strength is improving, more active.  Gastroenterology: Positive for nausea    Genitourinary:  Positive for nocturia(X1)    Musculoskeletal:  Positive for back pain    Neurologic:  Negative headaches    Psychiatric:  Negative      Heme/Lymph/Imm:  Negative allergies    Endocrine:  Negative        Physical Exam:  Vitals: /78   Pulse 76   Ht 1.676 m (5' 6\")   Wt 62.6 kg (138 lb)   BMI 22.27 kg/m      Constitutional:  well nourished;well developed;cooperative thin;frail      Skin:  warm and dry to the touch          Head:  normocephalic        Eyes:  pupils equal and round;sclera white        Lymph:      ENT:  no pallor or cyanosis        Neck:  carotid pulses are full and equal bilaterally;JVP normal;no carotid bruit        Respiratory:  normal breath sounds, clear to auscultation, normal A-P diameter, normal symmetry, normal respiratory excursion, no use of accessory muscles         Cardiac: normal S1 and S2 irregularly irregular rhythm     systolic murmur;LLSB;grade 2        pulses full and equal     2+             2+                    GI:  abdomen soft;non-tender        Extremities and Muscular Skeletal:  no edema;no deformities, clubbing, cyanosis, erythema observed              Neurological:  affect appropriate;no gross motor deficits        Psych:  Alert and Oriented x 3    Encounter Diagnoses   Name Primary?     Atrial fibrillation, unspecified type (H)      Acute diastolic congestive heart failure (H) Yes       Recent Lab Results:  LIPID RESULTS:  No results found for: CHOL, HDL, LDL, TRIG, CHOLHDLRATIO    LIVER ENZYME RESULTS:  Lab Results   Component Value Date    AST 45 05/06/2019    ALT 65 (A) 05/06/2019       CBC RESULTS:  Lab Results   Component Value Date    WBC 8.9 05/06/2019    RBC 4.65 05/06/2019    HGB 14.6 05/06/2019    HCT 43.7 05/06/2019    MCV 94 05/06/2019    MCH 31.4 05/06/2019    MCHC 33.4 05/06/2019    RDW 15.1 " (A) 05/06/2019     05/06/2019       BMP RESULTS:  Lab Results   Component Value Date     05/15/2019    POTASSIUM 4.0 05/15/2019    CHLORIDE 103 05/15/2019    CO2 26 05/15/2019    ANIONGAP 14 05/15/2019     (H) 05/15/2019    BUN 25 05/15/2019    CR 1.12 05/15/2019    GFRESTIMATED 46 (L) 05/15/2019    GFRESTBLACK 55 (L) 05/15/2019    CHRIS 9.5 05/15/2019        A1C RESULTS:  No results found for: A1C    INR RESULTS:  Lab Results   Component Value Date    INR 1.60 (H) 05/01/2019    INR 1.69 (H) 04/30/2019           CC  [facility]  [id number] [id number]

## 2019-06-04 ENCOUNTER — OFFICE VISIT (OUTPATIENT)
Dept: CARDIOLOGY | Facility: CLINIC | Age: 84
End: 2019-06-04
Payer: COMMERCIAL

## 2019-06-04 VITALS
HEIGHT: 66 IN | OXYGEN SATURATION: 97 % | DIASTOLIC BLOOD PRESSURE: 96 MMHG | SYSTOLIC BLOOD PRESSURE: 138 MMHG | WEIGHT: 136.5 LBS | BODY MASS INDEX: 21.94 KG/M2 | HEART RATE: 82 BPM

## 2019-06-04 DIAGNOSIS — I48.92 ATRIAL FLUTTER WITH RAPID VENTRICULAR RESPONSE (H): ICD-10-CM

## 2019-06-04 DIAGNOSIS — I48.91 ATRIAL FIBRILLATION, UNSPECIFIED TYPE (H): ICD-10-CM

## 2019-06-04 DIAGNOSIS — I34.0 MITRAL VALVE INSUFFICIENCY, UNSPECIFIED ETIOLOGY: Primary | ICD-10-CM

## 2019-06-04 PROCEDURE — 99214 OFFICE O/P EST MOD 30 MIN: CPT | Performed by: INTERNAL MEDICINE

## 2019-06-04 RX ORDER — DIGOXIN 125 MCG
125 TABLET ORAL DAILY
Qty: 30 TABLET | Refills: 11 | Status: SHIPPED | OUTPATIENT
Start: 2019-06-04 | End: 2020-11-06

## 2019-06-04 RX ORDER — MULTIPLE VITAMINS W/ MINERALS TAB 9MG-400MCG
1 TAB ORAL DAILY
Status: ON HOLD | COMMUNITY
End: 2023-01-01

## 2019-06-04 ASSESSMENT — MIFFLIN-ST. JEOR: SCORE: 1045.91

## 2019-06-04 NOTE — PROGRESS NOTES
CARDIOLOGY CLINIC VISIT  DATE OF SERVICE:  June 4, 2019      HISTORY OF PRESENT ILLNESS:  Ms. Ruggiero is a pleasant 89-year-old woman with past medical history significant for chronic atrial fibrillation/flutter, diastolic heart failure, history of DVT, hypertension and severe mitral regurgitation who presents to clinic today for follow-up.  She was last seen by Ector Carvalho CNP in May 2019 and this is my first visit with Ms. Ruggiero.    In brief review Ms. Ruggiero has had several hospitalizations this year.  In April she presented to the emergency department for generalized weakness and shortness of breath.  She was febrile and was diagnosed with a pneumonia.  She was given a significant amount of IV fluids during that hospitalization.  She returned on April 29, 2019 with shortness of breath and volume overload.  She was hypoxic requiring BiPAP and IV Lasix.  An echocardiogram was performed which demonstrated hyperdynamic dynamic LV function with a thickened mitral valve leaflet, bileaflet prolapse and moderately severe to severe mitral regurgitation.  She diuresed well and her atrial fibrillation with RVR was well controlled with metoprolol along with digoxin.    In her follow-up visit with Beverly, she reported feeling well.  Her weights were stable.  She denied any chest pain or shortness of breath or lower extremity edema. She was having some lower BP's and her lasix was switched to every other day.    In follow up today, Elizabeth reports feeling well.  She checks her weights daily; stable at 135-136 pounds.  She watches her sodium intake.  She denies any exertional chest pain, chest discomfort, or shortness of breath.  She denies any orthopnea, PND or lower extremity edema.  She denies any heart palpitations or light-headedness.  She does note some abdominal discomfort she noted just this morning-thinks it might be related to gas.      PAST MEDICAL HISTORY:  1.  Chronic atrial fibrillation/atrial flutter  2.   CHFpEF  3.  Hx of DVT  4.  Dyslipidemia  5.  Thickened mitral valve leaflets with bileaflet mitral valve prolapse with moderately severe mitral regurgitation, moderate-severe MAC   6.  Hx of TIA    MEDICATIONS:  Current Outpatient Medications   Medication     Acetaminophen (TYLENOL PO)     alendronate (FOSAMAX) 70 MG tablet     B Complex Vitamins (VITAMIN B COMPLEX PO)     calcium carbonate-vitamin D (OSCAL W/D) 500-200 MG-UNIT tablet     digoxin (LANOXIN) 125 MCG tablet     furosemide (LASIX) 20 MG tablet     metoprolol tartrate (LOPRESSOR) 25 MG tablet     Multiple Vitamins-Minerals (PRESERVISION AREDS 2+MULTI VIT) CAPS     multivitamin w/minerals (MULTI-VITAMIN) tablet     VITAMIN D, CHOLECALCIFEROL, PO     Warfarin Sodium (COUMADIN PO)     No current facility-administered medications for this visit.        ALLERGIES:  No Known Allergies    SOCIAL HISTORY:  I have reviewed this patient's social history and updated it with pertinent information if needed. Elizabeth Ruggiero  reports that she has never smoked. She has never used smokeless tobacco. She reports that she does not drink alcohol or use drugs.    FAMILY HISTORY:  I have reviewed this patient's family history and updated it with pertinent information if needed.   Family History   Problem Relation Age of Onset     Colon Cancer Mother      Breast Cancer Sister      Diabetes Sister        REVIEW OF SYSTEMS:  Complete review of systems obtained and the pertinent positives outlined in history of present illness above.  The remainder of systems is negative.    PHYSICAL EXAM:      BP: (!) 138/96 Pulse: 82     SpO2: 97 %      Vital Signs with Ranges  Pulse:  [82] 82  BP: (138)/(96) 138/96  SpO2:  [97 %] 97 %  136 lbs 8 oz    Constitutional: awake, alert, no distress  Eyes: PERRL, sclera nonicteric  ENT: trachea midline  Respiratory: CTAB  Cardiovascular:   Irregularly irregular, III/VI holosystolic murmur, No JBD  GI: nondistended, nontender, bowel sounds  present  Lymph/Hematologic: no lymphadenopathy  Skin: dry, no rash  Musculoskeletal: good muscle tone, strength 5/5 in upper and lower extremities  Neurologic: no focal deficits  Neuropsychiatric: appropriate affact    DATA:  Reviewed in Epic        ASSESSMENT:  1.  Chronic atrial fibrillation; rate controlled on metoprolol and digoxin.  On coumadin and tolerating without difficulty.  2.  CHFpEF:  Occurred in the setting of fluid resuscitation for pneumonia.  Currently euvolemic at the current weight on lasix 20 mg PO every other day  3.  Moderately severe MR:   Moderate-severe MAC, significantly thickened mitral valve leaflets with prolapse.  Normal LV dimensions, EF >70%.  Asymptomatic.  4.  Hx of DVT  5.  Hx of TIA    RECOMMENDATIONS:  1. Reviewed recent hospitalizations.  Volume overload occurred in the setting of fluid resuscitation; she has not had difficulty with recurrent heart failure/volume overload.  Reviewed the pathophysiology of her mitral valve disease.  She has significant MAC and leaflets are thickened on TTE; unclear whether she would be a mitraclip candidate.  In any case, she is currently asymptomatic and LV size/function are normal and will treat medically at this time.  If she has recurrent issues with CHF, can revisit whether she might be a candidate for mitraclip.  2. Continue metoprolol, digoxin, coumadin for atrial fibrillation.   Rates adequately controlled on this regimen.  3. Plan for follow up with Beverly Carvalho CNP in 3 months with a BMP prior to that visit to reassess volume status and symptoms.  If she is still doing well at that point, will plan for 6 month follow ups.      Tiffany Galvan MD  Cardiology - [facility]  Pager:  [phone]  June 4, 2019

## 2019-06-04 NOTE — PATIENT INSTRUCTIONS
-Continue current meds  -Call with weight gain greater than 3#/day or 5#/week  -Watch sodium intake (<2000 mg sodium)  -Follow up in 3 months Beverly Carvalho CNP with a BMP (blood test) prior

## 2019-06-04 NOTE — LETTER
6/4/2019    Gelacio Downs MD  [facility] [address]  [facility] [address]    RE: Elizabeth Ruggiero       Dear Colleague,    I had the pleasure of seeing Elizabeth Ruggiero in the [facility].    CARDIOLOGY CLINIC VISIT  DATE OF SERVICE:  June 4, 2019      HISTORY OF PRESENT ILLNESS:  Ms. Ruggiero is a pleasant 89-year-old woman with past medical history significant for chronic atrial fibrillation/flutter, diastolic heart failure, history of DVT, hypertension and severe mitral regurgitation who presents to clinic today for follow-up.  She was last seen by Ector Carvalho CNP in May 2019 and this is my first visit with Ms. Ruggiero.    In brief review Ms. Ruggiero has had several hospitalizations this year.  In April she presented to the emergency department for generalized weakness and shortness of breath.  She was febrile and was diagnosed with a pneumonia.  She was given a significant amount of IV fluids during that hospitalization.  She returned on April 29, 2019 with shortness of breath and volume overload.  She was hypoxic requiring BiPAP and IV Lasix.  An echocardiogram was performed which demonstrated hyperdynamic dynamic LV function with a thickened mitral valve leaflet, bileaflet prolapse and moderately severe to severe mitral regurgitation.  She diuresed well and her atrial fibrillation with RVR was well controlled with metoprolol along with digoxin.    In her follow-up visit with Beverly, she reported feeling well.  Her weights were stable.  She denied any chest pain or shortness of breath or lower extremity edema. She was having some lower BP's and her lasix was switched to every other day.    In follow up today, Elizabeth reports feeling well.  She checks her weights daily; stable at 135-136 pounds.  She watches her sodium intake.  She denies any exertional chest pain, chest discomfort, or shortness of breath.  She denies any orthopnea, PND or lower extremity edema.  She denies any  heart palpitations or light-headedness.  She does note some abdominal discomfort she noted just this morning-thinks it might be related to gas.      PAST MEDICAL HISTORY:  1.  Chronic atrial fibrillation/atrial flutter  2.  CHFpEF  3.  Hx of DVT  4.  Dyslipidemia  5.  Thickened mitral valve leaflets with bileaflet mitral valve prolapse with moderately severe mitral regurgitation, moderate-severe MAC   6.  Hx of TIA    MEDICATIONS:  Current Outpatient Medications   Medication     Acetaminophen (TYLENOL PO)     alendronate (FOSAMAX) 70 MG tablet     B Complex Vitamins (VITAMIN B COMPLEX PO)     calcium carbonate-vitamin D (OSCAL W/D) 500-200 MG-UNIT tablet     digoxin (LANOXIN) 125 MCG tablet     furosemide (LASIX) 20 MG tablet     metoprolol tartrate (LOPRESSOR) 25 MG tablet     Multiple Vitamins-Minerals (PRESERVISION AREDS 2+MULTI VIT) CAPS     multivitamin w/minerals (MULTI-VITAMIN) tablet     VITAMIN D, CHOLECALCIFEROL, PO     Warfarin Sodium (COUMADIN PO)     No current facility-administered medications for this visit.        ALLERGIES:  No Known Allergies    SOCIAL HISTORY:  I have reviewed this patient's social history and updated it with pertinent information if needed. Elizabeth Ruggiero  reports that she has never smoked. She has never used smokeless tobacco. She reports that she does not drink alcohol or use drugs.    FAMILY HISTORY:  I have reviewed this patient's family history and updated it with pertinent information if needed.   Family History   Problem Relation Age of Onset     Colon Cancer Mother      Breast Cancer Sister      Diabetes Sister        REVIEW OF SYSTEMS:  Complete review of systems obtained and the pertinent positives outlined in history of present illness above.  The remainder of systems is negative.    PHYSICAL EXAM:      BP: (!) 138/96 Pulse: 82     SpO2: 97 %      Vital Signs with Ranges  Pulse:  [82] 82  BP: (138)/(96) 138/96  SpO2:  [97 %] 97 %  136 lbs 8 oz    Constitutional: awake,  alert, no distress  Eyes: PERRL, sclera nonicteric  ENT: trachea midline  Respiratory: CTAB  Cardiovascular:   Irregularly irregular, III/VI holosystolic murmur, No JBD  GI: nondistended, nontender, bowel sounds present  Lymph/Hematologic: no lymphadenopathy  Skin: dry, no rash  Musculoskeletal: good muscle tone, strength 5/5 in upper and lower extremities  Neurologic: no focal deficits  Neuropsychiatric: appropriate affact    DATA:  Reviewed in Epic        ASSESSMENT:  1.  Chronic atrial fibrillation; rate controlled on metoprolol and digoxin.  On coumadin and tolerating without difficulty.  2.  CHFpEF:  Occurred in the setting of fluid resuscitation for pneumonia.  Currently euvolemic at the current weight on lasix 20 mg PO every other day  3.  Moderately severe MR:   Moderate-severe MAC, significantly thickened mitral valve leaflets with prolapse.  Normal LV dimensions, EF >70%.  Asymptomatic.  4.  Hx of DVT  5.  Hx of TIA    RECOMMENDATIONS:  1. Reviewed recent hospitalizations.  Volume overload occurred in the setting of fluid resuscitation; she has not had difficulty with recurrent heart failure/volume overload.  Reviewed the pathophysiology of her mitral valve disease.  She has significant MAC and leaflets are thickened on TTE; unclear whether she would be a mitraclip candidate.  In any case, she is currently asymptomatic and LV size/function are normal and will treat medically at this time.  If she has recurrent issues with CHF, can revisit whether she might be a candidate for mitraclip.  2. Continue metoprolol, digoxin, coumadin for atrial fibrillation.   Rates adequately controlled on this regimen.  3. Plan for follow up with Beverly Carvalho CNP in 3 months with a BMP prior to that visit to reassess volume status and symptoms.  If she is still doing well at that point, will plan for 6 month follow ups.      Tiffany Galvan MD  Cardiology - [facility]  Pager:  [phone]  June 4, 2019    Thank you for  allowing me to participate in the care of your patient.    Sincerely,     Tiffany Galvan MD     [facility]

## 2019-06-04 NOTE — LETTER
6/4/2019    Gelacio Downs MD  [facility] [address]  [facility] [address]    RE: Elizabeth Ruggiero       Dear Colleague,    I had the pleasure of seeing Elizabeth Ruggiero in the [facility].    CARDIOLOGY CLINIC VISIT  DATE OF SERVICE:  June 4, 2019      HISTORY OF PRESENT ILLNESS:  Ms. Ruggiero is a pleasant 89-year-old woman with past medical history significant for chronic atrial fibrillation/flutter, diastolic heart failure, history of DVT, hypertension and severe mitral regurgitation who presents to clinic today for follow-up.  She was last seen by Ector Carvalho CNP in May 2019 and this is my first visit with Ms. Ruggiero.    In brief review Ms. Ruggiero has had several hospitalizations this year.  In April she presented to the emergency department for generalized weakness and shortness of breath.  She was febrile and was diagnosed with a pneumonia.  She was given a significant amount of IV fluids during that hospitalization.  She returned on April 29, 2019 with shortness of breath and volume overload.  She was hypoxic requiring BiPAP and IV Lasix.  An echocardiogram was performed which demonstrated hyperdynamic dynamic LV function with a thickened mitral valve leaflet, bileaflet prolapse and moderately severe to severe mitral regurgitation.  She diuresed well and her atrial fibrillation with RVR was well controlled with metoprolol along with digoxin.    In her follow-up visit with Beverly, she reported feeling well.  Her weights were stable.  She denied any chest pain or shortness of breath or lower extremity edema. She was having some lower BP's and her lasix was switched to every other day.    In follow up today, Elizabeth reports feeling well.  She checks her weights daily; stable at 135-136 pounds.  She watches her sodium intake.  She denies any exertional chest pain, chest discomfort, or shortness of breath.  She denies any orthopnea, PND or lower extremity edema.  She denies any  heart palpitations or light-headedness.  She does note some abdominal discomfort she noted just this morning-thinks it might be related to gas.      PAST MEDICAL HISTORY:  1.  Chronic atrial fibrillation/atrial flutter  2.  CHFpEF  3.  Hx of DVT  4.  Dyslipidemia  5.  Thickened mitral valve leaflets with bileaflet mitral valve prolapse with moderately severe mitral regurgitation, moderate-severe MAC   6.  Hx of TIA    MEDICATIONS:  Current Outpatient Medications   Medication     Acetaminophen (TYLENOL PO)     alendronate (FOSAMAX) 70 MG tablet     B Complex Vitamins (VITAMIN B COMPLEX PO)     calcium carbonate-vitamin D (OSCAL W/D) 500-200 MG-UNIT tablet     digoxin (LANOXIN) 125 MCG tablet     furosemide (LASIX) 20 MG tablet     metoprolol tartrate (LOPRESSOR) 25 MG tablet     Multiple Vitamins-Minerals (PRESERVISION AREDS 2+MULTI VIT) CAPS     multivitamin w/minerals (MULTI-VITAMIN) tablet     VITAMIN D, CHOLECALCIFEROL, PO     Warfarin Sodium (COUMADIN PO)     No current facility-administered medications for this visit.        ALLERGIES:  No Known Allergies    SOCIAL HISTORY:  I have reviewed this patient's social history and updated it with pertinent information if needed. Elizabeth Ruggiero  reports that she has never smoked. She has never used smokeless tobacco. She reports that she does not drink alcohol or use drugs.    FAMILY HISTORY:  I have reviewed this patient's family history and updated it with pertinent information if needed.   Family History   Problem Relation Age of Onset     Colon Cancer Mother      Breast Cancer Sister      Diabetes Sister        REVIEW OF SYSTEMS:  Complete review of systems obtained and the pertinent positives outlined in history of present illness above.  The remainder of systems is negative.    PHYSICAL EXAM:      BP: (!) 138/96 Pulse: 82     SpO2: 97 %      Vital Signs with Ranges  Pulse:  [82] 82  BP: (138)/(96) 138/96  SpO2:  [97 %] 97 %  136 lbs 8 oz    Constitutional: awake,  alert, no distress  Eyes: PERRL, sclera nonicteric  ENT: trachea midline  Respiratory: CTAB  Cardiovascular:   Irregularly irregular, III/VI holosystolic murmur, No JBD  GI: nondistended, nontender, bowel sounds present  Lymph/Hematologic: no lymphadenopathy  Skin: dry, no rash  Musculoskeletal: good muscle tone, strength 5/5 in upper and lower extremities  Neurologic: no focal deficits  Neuropsychiatric: appropriate affact    DATA:  Reviewed in Epic        ASSESSMENT:  1.  Chronic atrial fibrillation; rate controlled on metoprolol and digoxin.  On coumadin and tolerating without difficulty.  2.  CHFpEF:  Occurred in the setting of fluid resuscitation for pneumonia.  Currently euvolemic at the current weight on lasix 20 mg PO every other day  3.  Moderately severe MR:   Moderate-severe MAC, significantly thickened mitral valve leaflets with prolapse.  Normal LV dimensions, EF >70%.  Asymptomatic.  4.  Hx of DVT  5.  Hx of TIA    RECOMMENDATIONS:  1. Reviewed recent hospitalizations.  Volume overload occurred in the setting of fluid resuscitation; she has not had difficulty with recurrent heart failure/volume overload.  Reviewed the pathophysiology of her mitral valve disease.  She has significant MAC and leaflets are thickened on TTE; unclear whether she would be a mitraclip candidate.  In any case, she is currently asymptomatic and LV size/function are normal and will treat medically at this time.  If she has recurrent issues with CHF, can revisit whether she might be a candidate for mitraclip.  2. Continue metoprolol, digoxin, coumadin for atrial fibrillation.   Rates adequately controlled on this regimen.  3. Plan for follow up with Beverly Carvalho CNP in 3 months with a BMP prior to that visit to reassess volume status and symptoms.  If she is still doing well at that point, will plan for 6 month follow ups.      Tiffany Galvan MD  Cardiology - [facility]  Pager:  [phone]  June 4, 2019    Thank you for  allowing me to participate in the care of your patient.      Sincerely,     Tiffany Galvan MD     [facility]    cc:   Gelacio Downs MD  [facility]  [address] [place] [address]

## 2019-06-17 ENCOUNTER — TELEPHONE (OUTPATIENT)
Dept: CARDIOLOGY | Facility: CLINIC | Age: 84
End: 2019-06-17

## 2019-06-17 NOTE — TELEPHONE ENCOUNTER
Received call from Bess MATTA at [facility] stating patient sets up and takes medications herself but she is struggling with the furosemide being every other day as then the days changes every week. Bess RN believes patient would do better if it was set days (ie. Monday, Wednesday, Friday, Saturday). Bess MATTA wondering if Dr. Galvan would be ok with that and what days/how many days a week should patient be taking it.     Last seen 6/4/19 with Dr. Galvan   2.  CHFpEF:  Occurred in the setting of fluid resuscitation for pneumonia.  Currently euvolemic at the current weight on lasix 20 mg PO every other day      Will route to Dr. Galvan to see what she recommends for furosemide dosing.

## 2019-06-26 NOTE — TELEPHONE ENCOUNTER
Dr. Galvan's response:   Yes-okay to take lasix scheduled on Mondays, Wednesdays and Fridays.     Thanks,   Tiffany Galvan MD     Tried to call Bess MATTA to informed her Dr. Galvan was ok with patient taking Lasix M,W and F. No answer. Left detailed VM informing her. Left team 4 direct number to call back with any further questions or concerns. Med list updated.

## 2019-08-23 ENCOUNTER — OFFICE VISIT (OUTPATIENT)
Dept: CARDIOLOGY | Facility: CLINIC | Age: 84
End: 2019-08-23
Attending: INTERNAL MEDICINE
Payer: COMMERCIAL

## 2019-08-23 VITALS
HEIGHT: 66 IN | WEIGHT: 138 LBS | HEART RATE: 73 BPM | BODY MASS INDEX: 22.18 KG/M2 | SYSTOLIC BLOOD PRESSURE: 122 MMHG | DIASTOLIC BLOOD PRESSURE: 78 MMHG

## 2019-08-23 DIAGNOSIS — I34.0 MITRAL VALVE INSUFFICIENCY, UNSPECIFIED ETIOLOGY: ICD-10-CM

## 2019-08-23 LAB
ANION GAP SERPL CALCULATED.3IONS-SCNC: 11.3 MMOL/L (ref 6–17)
BUN SERPL-MCNC: 18 MG/DL (ref 7–30)
CALCIUM SERPL-MCNC: 10 MG/DL (ref 8.5–10.5)
CHLORIDE SERPL-SCNC: 99 MMOL/L (ref 98–107)
CO2 SERPL-SCNC: 28 MMOL/L (ref 23–29)
CREAT SERPL-MCNC: 1.18 MG/DL (ref 0.7–1.3)
GFR SERPL CREATININE-BSD FRML MDRD: 43 ML/MIN/{1.73_M2}
GLUCOSE SERPL-MCNC: 160 MG/DL (ref 70–105)
POTASSIUM SERPL-SCNC: 4.3 MMOL/L (ref 3.5–5.1)
SODIUM SERPL-SCNC: 134 MMOL/L (ref 136–145)

## 2019-08-23 PROCEDURE — 99214 OFFICE O/P EST MOD 30 MIN: CPT | Performed by: NURSE PRACTITIONER

## 2019-08-23 PROCEDURE — 36415 COLL VENOUS BLD VENIPUNCTURE: CPT | Performed by: INTERNAL MEDICINE

## 2019-08-23 PROCEDURE — 80048 BASIC METABOLIC PNL TOTAL CA: CPT | Performed by: INTERNAL MEDICINE

## 2019-08-23 ASSESSMENT — MIFFLIN-ST. JEOR: SCORE: 1047.71

## 2019-08-23 NOTE — PROGRESS NOTES
HPI and Plan:   I had the pleasure of seeing Elizabeth Ruggiero today in cardiology clinic follow up diastolic heart failure. She is a pleasant 89 year old patient of Dr. Galvan.     Ms. Ruggiero's a past medical history significant for chronic atrial fibrillation/flutter, diastolic heart failure, hepatic congestion, deep vein thrombosis, hypertension, severe mitral regurgitation, history of TIA, and osteoporosis.    The patient has presented to the emergency room several times this year.  On March 9, 2019, she presented to the emergency room for hypertension.  On arrival her blood pressure was 137/69.  She received a small dose of IV metoprolol which lowered her blood pressure as well as heart rate.  She again returned to the emergency room on April 23, 2018 for generalized weakness and shortness of breath.  She was noted to have a temperature of 101.5.She was diagnosed and treated for pneumonia.  She was discharged on April 25, 2019.    She she was readmitted April 29, 2019 for acute diastolic heart failure exacerbation.  She was hypoxic in the emergency room with a oxygen saturation of 89%.  She was placed on BiPAP in given IV Lasix.  She diuresed well for a net negative of 1.6 L. Her BNP was notably elevated at 6800.  Her LFTs were elevated.  Her echocardiogram showed a normal left ventricular systolic function with an ejection fraction >70% with moderately severe to severe mitral regurgitation. Metoprolol was initiated for rate control as well as digoxin 125 mcg daily.  She was also transition to oral Lasix 20 mg daily.    She followed up after discharge with complaints of hypotension.  her metoprolol dosage was reduced from 25 mg twice daily to 12.5 mg in the a.m. and 25 mg in the p.m. She was last seen by Dr. Galvan on June 4, 2019. At that time, she was doing well without recurrent CHF symptoms.    Today she presents to the cardiology clinic in follow-up for diastolic heart failure.  She has continued to do well  since her last office visit.  She denies recurrent shortness of breath.  She continues to maintain her dry weight at 133-134 pounds. She denies cough, PND, orthopnea and lower extremity edema as well as abdominal distention.  Her blood pressure is well controlled at 122/78. Her heart rate is 73 bpm.     Labs Reviewed: Sodium 134, potassium 4.3, creatinine 1.18, GFR 43    Physical Exam  Please see Below     Assessment and Plan  1. Diastolic heart failure with an EF >70%. She does not appear to be in heart failure at this time. She appears euvolemic by exam today.  She is maintaining her dry weight and has a normal JVP today.   She continues to deny shortness of breath and peripheral edema.  Her GFR is slightly reduced at 56 from last week to 46 this week.  Continue Lasix 20 mg every other day. Continue metoprolol. I have asked her to follow-up in 6 months with a BMP. Or sooner if she develops CHF symptoms.     2.  Severe mitral regurgitation.  Recent echocardiogram notes severe mitral regurgitation with bileaflet prolapse.  Possible torn cord attached to the anterior mitral leaflet.  Systolic flow reversal suggested in pulmonary vein.  Will continue to monitor as above. If you develop worsening shortness of breath please call and we will revisit mitraclip.     3.  Chronic atrial fibrillation/flutter.  On warfarin therapy for anticoagulation.  Most recent INR at 1.6.  Continue digoxin 125 mcg daily and metoprolol tartrate.    4. Hypertension.  Well-controlled.  Continue metoprolol as scheduled.    5. History of DVT.  On chronic anticoagulation as stated above.    Thank you for allowing me to care for Elizabeth Ruggiero today.    SENIA Hernandez, CNP  Cardiology    Voice recognition software was used for this note, I have reviewed this note, but errors may have been missed.    Orders Placed This Encounter   Procedures     Basic metabolic panel     Follow-Up with Cardiac Advanced Practice Provider     No orders of the  defined types were placed in this encounter.    There are no discontinued medications.      CURRENT MEDICATIONS:  Current Outpatient Medications   Medication Sig Dispense Refill     Acetaminophen (TYLENOL PO) Take 325 mg by mouth every 6 hours as needed        alendronate (FOSAMAX) 70 MG tablet Take 70 mg by mouth every 7 days SUNDAYS       B Complex Vitamins (VITAMIN B COMPLEX PO) Take 2 capsules by mouth       calcium carbonate-vitamin D (OSCAL W/D) 500-200 MG-UNIT tablet Take 1 tablet by mouth 2 times daily       digoxin (LANOXIN) 125 MCG tablet Take 1 tablet (125 mcg) by mouth daily 30 tablet 11     furosemide (LASIX) 20 MG tablet Take 20 mg by mouth Every Mon, Wed, Fri Morning       metoprolol tartrate (LOPRESSOR) 25 MG tablet Take 12.5 mg by mouth 2 times daily       Multiple Vitamins-Minerals (PRESERVISION AREDS 2+MULTI VIT) CAPS Take 1 tablet by mouth 2 times daily       multivitamin w/minerals (MULTI-VITAMIN) tablet Take 3 tablets by mouth daily       VITAMIN D, CHOLECALCIFEROL, PO Take by mouth daily       Warfarin Sodium (COUMADIN PO) Take 2.5 mg by mouth daily          ALLERGIES   No Known Allergies    PAST MEDICAL HISTORY:  Past Medical History:   Diagnosis Date     (HFpEF) heart failure with preserved ejection fraction (H)      Acute diastolic CHF (congestive heart failure) (H)      Acute left hemiparesis (H) 2/9/2019     Arthritis      Atrial fibrillation (H)      Atrial fibrillation with RVR (H)      Atrial flutter (H)      Atrial flutter with rapid ventricular response (H) 4/23/2019     Community acquired pneumonia      Diastolic CHF (H) 04/29/2019     DVT of lower extremity (deep venous thrombosis) (H)     recurrent     Hepatic congestion     improved with diuretics for CHF     HTN (hypertension)      Hyperlipidemia      Mitral valve insufficiency      Pneumonia      Severe mitral regurgitation      SOB (shortness of breath)      TIA (transient ischemic attack)      Vitamin D deficiency        PAST  SURGICAL HISTORY:  Past Surgical History:   Procedure Laterality Date     APPENDECTOMY       FRACTURE SURGERY       repair left femur         FAMILY HISTORY:  Family History   Problem Relation Age of Onset     Colon Cancer Mother      Breast Cancer Sister      Diabetes Sister        SOCIAL HISTORY:  Social History     Socioeconomic History     Marital status:      Spouse name: None     Number of children: 6     Years of education: through 4 years college     Highest education level: None   Occupational History     None   Social Needs     Financial resource strain: None     Food insecurity:     Worry: None     Inability: None     Transportation needs:     Medical: None     Non-medical: None   Tobacco Use     Smoking status: Never Smoker     Smokeless tobacco: Never Used   Substance and Sexual Activity     Alcohol use: No     Drug use: No     Sexual activity: Not Currently     Comment:   one year ago no other partners   Lifestyle     Physical activity:     Days per week: None     Minutes per session: None     Stress: None   Relationships     Social connections:     Talks on phone: None     Gets together: None     Attends Baptism service: None     Active member of club or organization: None     Attends meetings of clubs or organizations: None     Relationship status: None     Intimate partner violence:     Fear of current or ex partner: None     Emotionally abused: None     Physically abused: None     Forced sexual activity: None   Other Topics Concern     Parent/sibling w/ CABG, MI or angioplasty before 65F 55M? Not Asked   Social History Narrative     None       Review of Systems:  Skin:  Negative       Eyes:  Positive for glasses for reading   ENT:  Positive for hearing loss    Respiratory:  Negative       Cardiovascular:  Negative      Gastroenterology: Negative      Genitourinary:  Positive for nocturia(X1)    Musculoskeletal:  Positive for back pain    Neurologic:  Negative headaches   "  Psychiatric:  Negative      Heme/Lymph/Imm:  Negative allergies    Endocrine:  Negative        Physical Exam:  Vitals: /78   Pulse 73   Ht 1.676 m (5' 6\")   Wt 62.6 kg (138 lb)   BMI 22.27 kg/m      Constitutional:  well nourished;well developed;cooperative thin;frail      Skin:  warm and dry to the touch          Head:  normocephalic        Eyes:  pupils equal and round;sclera white        Lymph:      ENT:  no pallor or cyanosis        Neck:  carotid pulses are full and equal bilaterally, JVP normal, no carotid bruit        Respiratory:  normal breath sounds, clear to auscultation, normal A-P diameter, normal symmetry, normal respiratory excursion, no use of accessory muscles         Cardiac: normal S1 and S2 irregularly irregular rhythm     systolic murmur;LLSB;grade 2        pulses full and equal     2+             2+                    GI:  abdomen soft;non-tender        Extremities and Muscular Skeletal:  no edema;no deformities, clubbing, cyanosis, erythema observed              Neurological:  affect appropriate;no gross motor deficits        Psych:  Alert and Oriented x 3    Encounter Diagnosis   Name Primary?     Mitral valve insufficiency, unspecified etiology        Recent Lab Results:  LIPID RESULTS:  No results found for: CHOL, HDL, LDL, TRIG, CHOLHDLRATIO    LIVER ENZYME RESULTS:  Lab Results   Component Value Date    AST 45 05/06/2019    ALT 65 (A) 05/06/2019       CBC RESULTS:  Lab Results   Component Value Date    WBC 8.9 05/06/2019    RBC 4.65 05/06/2019    HGB 14.6 05/06/2019    HCT 43.7 05/06/2019    MCV 94 05/06/2019    MCH 31.4 05/06/2019    MCHC 33.4 05/06/2019    RDW 15.1 (A) 05/06/2019     05/06/2019       BMP RESULTS:  Lab Results   Component Value Date     (L) 08/23/2019    POTASSIUM 4.3 08/23/2019    CHLORIDE 99 08/23/2019    CO2 28 08/23/2019    ANIONGAP 11.3 08/23/2019     (H) 08/23/2019    BUN 18 08/23/2019    CR 1.18 08/23/2019    GFRESTIMATED 43 (L) " 08/23/2019    GFRESTBLACK 52 (L) 08/23/2019    CHRIS 10.0 08/23/2019        A1C RESULTS:  No results found for: A1C    INR RESULTS:  Lab Results   Component Value Date    INR 1.60 (H) 05/01/2019    INR 1.69 (H) 04/30/2019           CC  [facility]  [address] [place] [address]

## 2019-08-23 NOTE — LETTER
8/23/2019    Gelacio Downs MD  [facility] [address]  [facility] [address]    RE: Elizabeth Ruggiero       Dear Colleague,    I had the pleasure of seeing Elizabeth Ruggiero in the [facility].    HPI and Plan:   I had the pleasure of seeing Elizabeth Ruggiero today in cardiology clinic follow up diastolic heart failure. She is a pleasant 89 year old patient of Dr. Galvan.     Ms. Ruggiero's a past medical history significant for chronic atrial fibrillation/flutter, diastolic heart failure, hepatic congestion, deep vein thrombosis, hypertension, severe mitral regurgitation, history of TIA, and osteoporosis.    The patient has presented to the emergency room several times this year.  On March 9, 2019, she presented to the emergency room for hypertension.  On arrival her blood pressure was 137/69.  She received a small dose of IV metoprolol which lowered her blood pressure as well as heart rate.  She again returned to the emergency room on April 23, 2018 for generalized weakness and shortness of breath.  She was noted to have a temperature of 101.5.She was diagnosed and treated for pneumonia.  She was discharged on April 25, 2019.    She she was readmitted April 29, 2019 for acute diastolic heart failure exacerbation.  She was hypoxic in the emergency room with a oxygen saturation of 89%.  She was placed on BiPAP in given IV Lasix.  She diuresed well for a net negative of 1.6 L. Her BNP was notably elevated at 6800.  Her LFTs were elevated.  Her echocardiogram showed a normal left ventricular systolic function with an ejection fraction >70% with moderately severe to severe mitral regurgitation. Metoprolol was initiated for rate control as well as digoxin 125 mcg daily.  She was also transition to oral Lasix 20 mg daily.    She followed up after discharge with complaints of hypotension.  her metoprolol dosage was reduced from 25 mg twice daily to 12.5 mg in the a.m. and 25 mg in the p.m. She  was last seen by Dr. Galvan on June 4, 2019. At that time, she was doing well without recurrent CHF symptoms.    Today she presents to the cardiology clinic in follow-up for diastolic heart failure.  She has continued to do well since her last office visit.  She denies recurrent shortness of breath.  She continues to maintain her dry weight at 133-134 pounds. She denies cough, PND, orthopnea and lower extremity edema as well as abdominal distention.  Her blood pressure is well controlled at 122/78. Her heart rate is 73 bpm.     Labs Reviewed: Sodium 134, potassium 4.3, creatinine 1.18, GFR 43    Physical Exam  Please see Below     Assessment and Plan  1. Diastolic heart failure with an EF >70%. She does not appear to be in heart failure at this time. She appears euvolemic by exam today.  She is maintaining her dry weight and has a normal JVP today.   She continues to deny shortness of breath and peripheral edema.  Her GFR is slightly reduced at 56 from last week to 46 this week.  Continue Lasix 20 mg every other day. Continue metoprolol. I have asked her to follow-up in 6 months with a BMP. Or sooner if she develops CHF symptoms.     2.  Severe mitral regurgitation.  Recent echocardiogram notes severe mitral regurgitation with bileaflet prolapse.  Possible torn cord attached to the anterior mitral leaflet.  Systolic flow reversal suggested in pulmonary vein.  Will continue to monitor as above. If you develop worsening shortness of breath please call and we will revisit mitraclip.     3.  Chronic atrial fibrillation/flutter.  On warfarin therapy for anticoagulation.  Most recent INR at 1.6.  Continue digoxin 125 mcg daily and metoprolol tartrate.    4. Hypertension.  Well-controlled.  Continue metoprolol as scheduled.    5. History of DVT.  On chronic anticoagulation as stated above.    Thank you for allowing me to care for Elizabeth Ruggiero today.    Beverly Carvalho, APRN, CNP  Cardiology    Voice recognition software was  used for this note, I have reviewed this note, but errors may have been missed.    Orders Placed This Encounter   Procedures     Basic metabolic panel     Follow-Up with Cardiac Advanced Practice Provider     No orders of the defined types were placed in this encounter.    There are no discontinued medications.      CURRENT MEDICATIONS:  Current Outpatient Medications   Medication Sig Dispense Refill     Acetaminophen (TYLENOL PO) Take 325 mg by mouth every 6 hours as needed        alendronate (FOSAMAX) 70 MG tablet Take 70 mg by mouth every 7 days SUNDAYS       B Complex Vitamins (VITAMIN B COMPLEX PO) Take 2 capsules by mouth       calcium carbonate-vitamin D (OSCAL W/D) 500-200 MG-UNIT tablet Take 1 tablet by mouth 2 times daily       digoxin (LANOXIN) 125 MCG tablet Take 1 tablet (125 mcg) by mouth daily 30 tablet 11     furosemide (LASIX) 20 MG tablet Take 20 mg by mouth Every Mon, Wed, Fri Morning       metoprolol tartrate (LOPRESSOR) 25 MG tablet Take 12.5 mg by mouth 2 times daily       Multiple Vitamins-Minerals (PRESERVISION AREDS 2+MULTI VIT) CAPS Take 1 tablet by mouth 2 times daily       multivitamin w/minerals (MULTI-VITAMIN) tablet Take 3 tablets by mouth daily       VITAMIN D, CHOLECALCIFEROL, PO Take by mouth daily       Warfarin Sodium (COUMADIN PO) Take 2.5 mg by mouth daily          ALLERGIES   No Known Allergies    PAST MEDICAL HISTORY:  Past Medical History:   Diagnosis Date     (HFpEF) heart failure with preserved ejection fraction (H)      Acute diastolic CHF (congestive heart failure) (H)      Acute left hemiparesis (H) 2/9/2019     Arthritis      Atrial fibrillation (H)      Atrial fibrillation with RVR (H)      Atrial flutter (H)      Atrial flutter with rapid ventricular response (H) 4/23/2019     Community acquired pneumonia      Diastolic CHF (H) 04/29/2019     DVT of lower extremity (deep venous thrombosis) (H)     recurrent     Hepatic congestion     improved with diuretics for CHF      HTN (hypertension)      Hyperlipidemia      Mitral valve insufficiency      Pneumonia      Severe mitral regurgitation      SOB (shortness of breath)      TIA (transient ischemic attack)      Vitamin D deficiency        PAST SURGICAL HISTORY:  Past Surgical History:   Procedure Laterality Date     APPENDECTOMY       FRACTURE SURGERY       repair left femur         FAMILY HISTORY:  Family History   Problem Relation Age of Onset     Colon Cancer Mother      Breast Cancer Sister      Diabetes Sister        SOCIAL HISTORY:  Social History     Socioeconomic History     Marital status:      Spouse name: None     Number of children: 6     Years of education: through 4 years college     Highest education level: None   Occupational History     None   Social Needs     Financial resource strain: None     Food insecurity:     Worry: None     Inability: None     Transportation needs:     Medical: None     Non-medical: None   Tobacco Use     Smoking status: Never Smoker     Smokeless tobacco: Never Used   Substance and Sexual Activity     Alcohol use: No     Drug use: No     Sexual activity: Not Currently     Comment:   one year ago no other partners   Lifestyle     Physical activity:     Days per week: None     Minutes per session: None     Stress: None   Relationships     Social connections:     Talks on phone: None     Gets together: None     Attends Gnosticism service: None     Active member of club or organization: None     Attends meetings of clubs or organizations: None     Relationship status: None     Intimate partner violence:     Fear of current or ex partner: None     Emotionally abused: None     Physically abused: None     Forced sexual activity: None   Other Topics Concern     Parent/sibling w/ CABG, MI or angioplasty before 65F 55M? Not Asked   Social History Narrative     None       Review of Systems:  Skin:  Negative       Eyes:  Positive for glasses for reading   ENT:  Positive for hearing  "loss    Respiratory:  Negative       Cardiovascular:  Negative      Gastroenterology: Negative      Genitourinary:  Positive for nocturia(X1)    Musculoskeletal:  Positive for back pain    Neurologic:  Negative headaches    Psychiatric:  Negative      Heme/Lymph/Imm:  Negative allergies    Endocrine:  Negative        Physical Exam:  Vitals: /78   Pulse 73   Ht 1.676 m (5' 6\")   Wt 62.6 kg (138 lb)   BMI 22.27 kg/m       Constitutional:  well nourished;well developed;cooperative thin;frail      Skin:  warm and dry to the touch          Head:  normocephalic        Eyes:  pupils equal and round;sclera white        Lymph:      ENT:  no pallor or cyanosis        Neck:  carotid pulses are full and equal bilaterally, JVP normal, no carotid bruit        Respiratory:  normal breath sounds, clear to auscultation, normal A-P diameter, normal symmetry, normal respiratory excursion, no use of accessory muscles         Cardiac: normal S1 and S2 irregularly irregular rhythm     systolic murmur;LLSB;grade 2        pulses full and equal     2+             2+                    GI:  abdomen soft;non-tender        Extremities and Muscular Skeletal:  no edema;no deformities, clubbing, cyanosis, erythema observed              Neurological:  affect appropriate;no gross motor deficits        Psych:  Alert and Oriented x 3    Encounter Diagnosis   Name Primary?     Mitral valve insufficiency, unspecified etiology        Recent Lab Results:  LIPID RESULTS:  No results found for: CHOL, HDL, LDL, TRIG, CHOLHDLRATIO    LIVER ENZYME RESULTS:  Lab Results   Component Value Date    AST 45 05/06/2019    ALT 65 (A) 05/06/2019       CBC RESULTS:  Lab Results   Component Value Date    WBC 8.9 05/06/2019    RBC 4.65 05/06/2019    HGB 14.6 05/06/2019    HCT 43.7 05/06/2019    MCV 94 05/06/2019    MCH 31.4 05/06/2019    MCHC 33.4 05/06/2019    RDW 15.1 (A) 05/06/2019     05/06/2019       BMP RESULTS:  Lab Results   Component Value Date "     (L) 08/23/2019    POTASSIUM 4.3 08/23/2019    CHLORIDE 99 08/23/2019    CO2 28 08/23/2019    ANIONGAP 11.3 08/23/2019     (H) 08/23/2019    BUN 18 08/23/2019    CR 1.18 08/23/2019    GFRESTIMATED 43 (L) 08/23/2019    GFRESTBLACK 52 (L) 08/23/2019    CHRIS 10.0 08/23/2019        A1C RESULTS:  No results found for: A1C    INR RESULTS:  Lab Results   Component Value Date    INR 1.60 (H) 05/01/2019    INR 1.69 (H) 04/30/2019           CC  Gelacio Downs MD  [facility]  [address] [place] [address]                  Thank you for allowing me to participate in the care of your patient.    Sincerely,     SENIA Hernandez CNP     [facility]

## 2020-01-31 ENCOUNTER — OFFICE VISIT (OUTPATIENT)
Dept: CARDIOLOGY | Facility: CLINIC | Age: 85
End: 2020-01-31
Attending: NURSE PRACTITIONER
Payer: COMMERCIAL

## 2020-01-31 VITALS
HEART RATE: 76 BPM | WEIGHT: 127 LBS | HEIGHT: 66 IN | BODY MASS INDEX: 20.41 KG/M2 | SYSTOLIC BLOOD PRESSURE: 138 MMHG | DIASTOLIC BLOOD PRESSURE: 87 MMHG

## 2020-01-31 DIAGNOSIS — I34.0 MITRAL VALVE INSUFFICIENCY, UNSPECIFIED ETIOLOGY: ICD-10-CM

## 2020-01-31 LAB
ANION GAP SERPL CALCULATED.3IONS-SCNC: 13.2 MMOL/L (ref 6–17)
BUN SERPL-MCNC: 23 MG/DL (ref 7–30)
CALCIUM SERPL-MCNC: 10.4 MG/DL (ref 8.5–10.5)
CHLORIDE SERPL-SCNC: 100 MMOL/L (ref 98–107)
CO2 SERPL-SCNC: 28 MMOL/L (ref 23–29)
CREAT SERPL-MCNC: 1.12 MG/DL (ref 0.7–1.3)
GFR SERPL CREATININE-BSD FRML MDRD: 45 ML/MIN/{1.73_M2}
GLUCOSE SERPL-MCNC: 132 MG/DL (ref 70–105)
POTASSIUM SERPL-SCNC: 4.2 MMOL/L (ref 3.5–5.1)
SODIUM SERPL-SCNC: 137 MMOL/L (ref 136–145)

## 2020-01-31 PROCEDURE — 80048 BASIC METABOLIC PNL TOTAL CA: CPT | Performed by: NURSE PRACTITIONER

## 2020-01-31 PROCEDURE — 36415 COLL VENOUS BLD VENIPUNCTURE: CPT | Performed by: NURSE PRACTITIONER

## 2020-01-31 PROCEDURE — 99214 OFFICE O/P EST MOD 30 MIN: CPT | Performed by: NURSE PRACTITIONER

## 2020-01-31 RX ORDER — MEDROXYPROGESTERONE ACETATE 10 MG
10 TABLET ORAL DAILY
Status: ON HOLD | COMMUNITY
End: 2020-07-25

## 2020-01-31 ASSESSMENT — MIFFLIN-ST. JEOR: SCORE: 997.82

## 2020-01-31 NOTE — LETTER
1/31/2020    Gelacio Downs MD  [facility] [address]  [facility] [address]    RE: Elizabeth Ruggiero       Dear Colleague,    I had the pleasure of seeing Elizabeth Ruggiero in the [facility].    HPI and Plan:   I had the pleasure of seeing Elizabeth Ruggiero today in cardiology clinic follow up diastolic heart failure. She is a pleasant 89 year old patient of Dr. Galvan.     Ms. Ruggiero's a past medical history significant for chronic atrial fibrillation/flutter, diastolic heart failure, hepatic congestion, deep vein thrombosis, hypertension, severe mitral regurgitation, history of TIA, and osteoporosis.    The patient has presented to the emergency room several times this year.  On March 9, 2019, she presented to the emergency room for hypertension.  On arrival her blood pressure was 137/69.  She received a small dose of IV metoprolol which lowered her blood pressure as well as heart rate.  She again returned to the emergency room on April 23, 2018 for generalized weakness and shortness of breath.  She was noted to have a temperature of 101.5.She was diagnosed and treated for pneumonia.  She was discharged on April 25, 2019.    She she was readmitted April 29, 2019 for acute diastolic heart failure exacerbation.  She was hypoxic in the emergency room with a oxygen saturation of 89%.  She was placed on BiPAP in given IV Lasix.  She diuresed well for a net negative of 1.6 L. Her BNP was notably elevated at 6800.  Her LFTs were elevated.  Her echocardiogram showed a normal left ventricular systolic function with an ejection fraction >70% with moderately severe to severe mitral regurgitation. Metoprolol was initiated for rate control as well as digoxin 125 mcg daily.  She was also transition to oral Lasix 20 mg daily.    She followed up after discharge with complaints of hypotension.  her metoprolol dosage was reduced from 25 mg twice daily to 12.5 mg in the a.m. and 25 mg in the p.m. She  was last seen by Dr. Galvan on June 4, 2019. At that time, she was doing well without recurrent CHF symptoms.    Today she presents to the cardiology clinic in follow-up for diastolic heart failure.  She has continued to do well since her last office visit.  She denies recurrent shortness of breath.  She continues to maintain her dry weight.. She denies cough, PND, orthopnea and lower extremity edema as well as abdominal distention.  Her blood pressure is 128/87. Her heart rate is 76 bpm.     Physical Exam  Please see Below     Assessment and Plan  1. Diastolic heart failure with an EF >70%. She does not appear to be in heart failure at this time. There is no evidence of volume overload.  Continue Lasix 20 mg every other day. Continue metoprolol. I have asked her to follow-up in one year however, she would like to see Dr. Galvan in 6 months.     2.  Severe mitral regurgitation.  Recent echocardiogram notes severe mitral regurgitation with bileaflet prolapse.  Possible torn cord attached to the anterior mitral leaflet.  Systolic flow reversal suggested in pulmonary vein.  Will continue to monitor as above. If you develop worsening shortness of breath please call and we will revisit mitraclip.     3.  Chronic atrial fibrillation/flutter.  On warfarin therapy for anticoagulation. Continue digoxin 125 mcg daily and metoprolol tartrate.    4. Hypertension.  Well-controlled.  Continue metoprolol as scheduled.    5. History of DVT.  On chronic anticoagulation as stated above.    Thank you for allowing me to care for Elizabeth Ruggiero today.    SENIA Hernandez, CNP  Cardiology    Voice recognition software was used for this note, I have reviewed this note, but errors may have been missed.    Orders Placed This Encounter   Procedures     Follow-Up with Cardiologist     Orders Placed This Encounter   Medications     medroxyPROGESTERone (PROVERA) 10 MG tablet     Sig: Take 10 mg by mouth daily     There are no discontinued  medications.      CURRENT MEDICATIONS:  Current Outpatient Medications   Medication Sig Dispense Refill     Acetaminophen (TYLENOL PO) Take 325 mg by mouth every 6 hours as needed        alendronate (FOSAMAX) 70 MG tablet Take 70 mg by mouth every 7 days SUNDAYS       B Complex Vitamins (VITAMIN B COMPLEX PO) Take 2 capsules by mouth       calcium carbonate-vitamin D (OSCAL W/D) 500-200 MG-UNIT tablet Take 1 tablet by mouth 2 times daily       digoxin (LANOXIN) 125 MCG tablet Take 1 tablet (125 mcg) by mouth daily 30 tablet 11     furosemide (LASIX) 20 MG tablet Take 20 mg by mouth Every Mon, Wed, Fri Morning       medroxyPROGESTERone (PROVERA) 10 MG tablet Take 10 mg by mouth daily       metoprolol tartrate (LOPRESSOR) 25 MG tablet Take 12.5 mg by mouth 2 times daily       Multiple Vitamins-Minerals (PRESERVISION AREDS 2+MULTI VIT) CAPS Take 1 tablet by mouth 2 times daily       multivitamin w/minerals (MULTI-VITAMIN) tablet Take 3 tablets by mouth daily       VITAMIN D, CHOLECALCIFEROL, PO Take by mouth daily       Warfarin Sodium (COUMADIN PO) Take 2.5 mg by mouth daily          ALLERGIES   No Known Allergies    PAST MEDICAL HISTORY:  Past Medical History:   Diagnosis Date     (HFpEF) heart failure with preserved ejection fraction (H)      Acute diastolic CHF (congestive heart failure) (H)      Acute left hemiparesis (H) 2/9/2019     Arthritis      Atrial fibrillation (H)      Atrial fibrillation with RVR (H)      Atrial flutter (H)      Atrial flutter with rapid ventricular response (H) 4/23/2019     Community acquired pneumonia      Diastolic CHF (H) 04/29/2019     DVT of lower extremity (deep venous thrombosis) (H)     recurrent     Hepatic congestion     improved with diuretics for CHF     HTN (hypertension)      Hyperlipidemia      Mitral valve insufficiency      Pneumonia      [facility]2020    [facility]     Severe mitral regurgitation      SOB (shortness of  breath)      TIA (transient ischemic attack)      Vitamin D deficiency        PAST SURGICAL HISTORY:  Past Surgical History:   Procedure Laterality Date     APPENDECTOMY       FRACTURE SURGERY       repair left femur         FAMILY HISTORY:  Family History   Problem Relation Age of Onset     Colon Cancer Mother      Breast Cancer Sister      Diabetes Sister        SOCIAL HISTORY:  Social History     Socioeconomic History     Marital status:      Spouse name: None     Number of children: 6     Years of education: through 4 years college     Highest education level: None   Occupational History     None   Social Needs     Financial resource strain: None     Food insecurity:     Worry: None     Inability: None     Transportation needs:     Medical: None     Non-medical: None   Tobacco Use     Smoking status: Never Smoker     Smokeless tobacco: Never Used   Substance and Sexual Activity     Alcohol use: No     Drug use: No     Sexual activity: Not Currently     Comment:   one year ago no other partners   Lifestyle     Physical activity:     Days per week: None     Minutes per session: None     Stress: None   Relationships     Social connections:     Talks on phone: None     Gets together: None     Attends Sikh service: None     Active member of club or organization: None     Attends meetings of clubs or organizations: None     Relationship status: None     Intimate partner violence:     Fear of current or ex partner: None     Emotionally abused: None     Physically abused: None     Forced sexual activity: None   Other Topics Concern     Parent/sibling w/ CABG, MI or angioplasty before 65F 55M? Not Asked   Social History Narrative     None       Review of Systems:  Skin:  Negative       Eyes:  Positive for glasses for reading   ENT:  Positive for hearing loss    Respiratory:  Negative       Cardiovascular:  Negative      Gastroenterology: Negative      Genitourinary:  Positive for nocturia(X1)   "  Musculoskeletal:  Positive for back pain    Neurologic:  Negative headaches    Psychiatric:  Negative      Heme/Lymph/Imm:  Negative allergies    Endocrine:  Negative        Physical Exam:  Vitals: /87   Pulse 76   Ht 1.676 m (5' 6\")   Wt 57.6 kg (127 lb)   BMI 20.50 kg/m       Constitutional:  well nourished;well developed;cooperative thin;frail      Skin:  warm and dry to the touch          Head:  normocephalic        Eyes:  pupils equal and round;sclera white        Lymph:      ENT:  no pallor or cyanosis        Neck:  carotid pulses are full and equal bilaterally, JVP normal, no carotid bruit        Respiratory:  normal breath sounds, clear to auscultation, normal A-P diameter, normal symmetry, normal respiratory excursion, no use of accessory muscles         Cardiac: normal S1 and S2 irregularly irregular rhythm     systolic murmur;LLSB;grade 2        pulses full and equal     2+             2+                    GI:  abdomen soft;non-tender        Extremities and Muscular Skeletal:  no edema;no deformities, clubbing, cyanosis, erythema observed              Neurological:  affect appropriate;no gross motor deficits        Psych:  Alert and Oriented x 3    Encounter Diagnosis   Name Primary?     Mitral valve insufficiency, unspecified etiology        Recent Lab Results:  LIPID RESULTS:  No results found for: CHOL, HDL, LDL, TRIG, CHOLHDLRATIO    LIVER ENZYME RESULTS:  Lab Results   Component Value Date    AST 45 05/06/2019    ALT 65 (A) 05/06/2019       CBC RESULTS:  Lab Results   Component Value Date    WBC 8.9 05/06/2019    RBC 4.65 05/06/2019    HGB 14.6 05/06/2019    HCT 43.7 05/06/2019    MCV 94 05/06/2019    MCH 31.4 05/06/2019    MCHC 33.4 05/06/2019    RDW 15.1 (A) 05/06/2019     05/06/2019       BMP RESULTS:  Lab Results   Component Value Date     01/31/2020    POTASSIUM 4.2 01/31/2020    CHLORIDE 100 01/31/2020    CO2 28 01/31/2020    ANIONGAP 13.2 01/31/2020     (H) " 01/31/2020    BUN 23 01/31/2020    CR 1.12 01/31/2020    GFRESTIMATED 45 (L) 01/31/2020    GFRESTBLACK 55 (L) 01/31/2020    CHRIS 10.4 01/31/2020        A1C RESULTS:  No results found for: A1C    INR RESULTS:  Lab Results   Component Value Date    INR 1.60 (H) 05/01/2019    INR 1.69 (H) 04/30/2019           CC  MD [facility]  [address] [address]                      Thank you for allowing me to participate in the care of your patient.      Sincerely,     SENIA Hernandez [facility]    cc:   MD [facility]  [address] [address]

## 2020-01-31 NOTE — LETTER
1/31/2020    Gelacio Downs MD  [facility] [address]  [facility] [address]    RE: Elizabeth Ruggiero       Dear Colleague,    I had the pleasure of seeing Elizabeth Ruggiero in the [facility].    HPI and Plan:   I had the pleasure of seeing Elizabeth Ruggiero today in cardiology clinic follow up diastolic heart failure. She is a pleasant 89 year old patient of Dr. Galvan.     Ms. Ruggiero's a past medical history significant for chronic atrial fibrillation/flutter, diastolic heart failure, hepatic congestion, deep vein thrombosis, hypertension, severe mitral regurgitation, history of TIA, and osteoporosis.    The patient has presented to the emergency room several times this year.  On March 9, 2019, she presented to the emergency room for hypertension.  On arrival her blood pressure was 137/69.  She received a small dose of IV metoprolol which lowered her blood pressure as well as heart rate.  She again returned to the emergency room on April 23, 2018 for generalized weakness and shortness of breath.  She was noted to have a temperature of 101.5.She was diagnosed and treated for pneumonia.  She was discharged on April 25, 2019.    She she was readmitted April 29, 2019 for acute diastolic heart failure exacerbation.  She was hypoxic in the emergency room with a oxygen saturation of 89%.  She was placed on BiPAP in given IV Lasix.  She diuresed well for a net negative of 1.6 L. Her BNP was notably elevated at 6800.  Her LFTs were elevated.  Her echocardiogram showed a normal left ventricular systolic function with an ejection fraction >70% with moderately severe to severe mitral regurgitation. Metoprolol was initiated for rate control as well as digoxin 125 mcg daily.  She was also transition to oral Lasix 20 mg daily.    She followed up after discharge with complaints of hypotension.  her metoprolol dosage was reduced from 25 mg twice daily to 12.5 mg in the a.m. and 25 mg in the p.m. She  was last seen by Dr. Galvan on June 4, 2019. At that time, she was doing well without recurrent CHF symptoms.    Today she presents to the cardiology clinic in follow-up for diastolic heart failure.  She has continued to do well since her last office visit.  She denies recurrent shortness of breath.  She continues to maintain her dry weight.. She denies cough, PND, orthopnea and lower extremity edema as well as abdominal distention.  Her blood pressure is 128/87. Her heart rate is 76 bpm.     Physical Exam  Please see Below     Assessment and Plan  1. Diastolic heart failure with an EF >70%. She does not appear to be in heart failure at this time. There is no evidence of volume overload.  Continue Lasix 20 mg every other day. Continue metoprolol. I have asked her to follow-up in one year however, she would like to see Dr. Galvan in 6 months.     2.  Severe mitral regurgitation.  Recent echocardiogram notes severe mitral regurgitation with bileaflet prolapse.  Possible torn cord attached to the anterior mitral leaflet.  Systolic flow reversal suggested in pulmonary vein.  Will continue to monitor as above. If you develop worsening shortness of breath please call and we will revisit mitraclip.     3.  Chronic atrial fibrillation/flutter.  On warfarin therapy for anticoagulation. Continue digoxin 125 mcg daily and metoprolol tartrate.    4. Hypertension.  Well-controlled.  Continue metoprolol as scheduled.    5. History of DVT.  On chronic anticoagulation as stated above.    Thank you for allowing me to care for Elizabeth Ruggiero today.    SENIA Hernandez, CNP  Cardiology    Voice recognition software was used for this note, I have reviewed this note, but errors may have been missed.    Orders Placed This Encounter   Procedures     Follow-Up with Cardiologist     Orders Placed This Encounter   Medications     medroxyPROGESTERone (PROVERA) 10 MG tablet     Sig: Take 10 mg by mouth daily     There are no discontinued  medications.      CURRENT MEDICATIONS:  Current Outpatient Medications   Medication Sig Dispense Refill     Acetaminophen (TYLENOL PO) Take 325 mg by mouth every 6 hours as needed        alendronate (FOSAMAX) 70 MG tablet Take 70 mg by mouth every 7 days SUNDAYS       B Complex Vitamins (VITAMIN B COMPLEX PO) Take 2 capsules by mouth       calcium carbonate-vitamin D (OSCAL W/D) 500-200 MG-UNIT tablet Take 1 tablet by mouth 2 times daily       digoxin (LANOXIN) 125 MCG tablet Take 1 tablet (125 mcg) by mouth daily 30 tablet 11     furosemide (LASIX) 20 MG tablet Take 20 mg by mouth Every Mon, Wed, Fri Morning       medroxyPROGESTERone (PROVERA) 10 MG tablet Take 10 mg by mouth daily       metoprolol tartrate (LOPRESSOR) 25 MG tablet Take 12.5 mg by mouth 2 times daily       Multiple Vitamins-Minerals (PRESERVISION AREDS 2+MULTI VIT) CAPS Take 1 tablet by mouth 2 times daily       multivitamin w/minerals (MULTI-VITAMIN) tablet Take 3 tablets by mouth daily       VITAMIN D, CHOLECALCIFEROL, PO Take by mouth daily       Warfarin Sodium (COUMADIN PO) Take 2.5 mg by mouth daily          ALLERGIES   No Known Allergies    PAST MEDICAL HISTORY:  Past Medical History:   Diagnosis Date     (HFpEF) heart failure with preserved ejection fraction (H)      Acute diastolic CHF (congestive heart failure) (H)      Acute left hemiparesis (H) 2/9/2019     Arthritis      Atrial fibrillation (H)      Atrial fibrillation with RVR (H)      Atrial flutter (H)      Atrial flutter with rapid ventricular response (H) 4/23/2019     Community acquired pneumonia      Diastolic CHF (H) 04/29/2019     DVT of lower extremity (deep venous thrombosis) (H)     recurrent     Hepatic congestion     improved with diuretics for CHF     HTN (hypertension)      Hyperlipidemia      Mitral valve insufficiency      Pneumonia      Postmenopausal bleeding 01/2020    [facility]     Severe mitral regurgitation      SOB (shortness of  breath)      TIA (transient ischemic attack)      Vitamin D deficiency        PAST SURGICAL HISTORY:  Past Surgical History:   Procedure Laterality Date     APPENDECTOMY       FRACTURE SURGERY       repair left femur         FAMILY HISTORY:  Family History   Problem Relation Age of Onset     Colon Cancer Mother      Breast Cancer Sister      Diabetes Sister        SOCIAL HISTORY:  Social History     Socioeconomic History     Marital status:      Spouse name: None     Number of children: 6     Years of education: through 4 years college     Highest education level: None   Occupational History     None   Social Needs     Financial resource strain: None     Food insecurity:     Worry: None     Inability: None     Transportation needs:     Medical: None     Non-medical: None   Tobacco Use     Smoking status: Never Smoker     Smokeless tobacco: Never Used   Substance and Sexual Activity     Alcohol use: No     Drug use: No     Sexual activity: Not Currently     Comment:   one year ago no other partners   Lifestyle     Physical activity:     Days per week: None     Minutes per session: None     Stress: None   Relationships     Social connections:     Talks on phone: None     Gets together: None     Attends Zoroastrianism service: None     Active member of club or organization: None     Attends meetings of clubs or organizations: None     Relationship status: None     Intimate partner violence:     Fear of current or ex partner: None     Emotionally abused: None     Physically abused: None     Forced sexual activity: None   Other Topics Concern     Parent/sibling w/ CABG, MI or angioplasty before 65F 55M? Not Asked   Social History Narrative     None       Review of Systems:  Skin:  Negative       Eyes:  Positive for glasses for reading   ENT:  Positive for hearing loss    Respiratory:  Negative       Cardiovascular:  Negative      Gastroenterology: Negative      Genitourinary:  Positive for nocturia(X1)   "  Musculoskeletal:  Positive for back pain    Neurologic:  Negative headaches    Psychiatric:  Negative      Heme/Lymph/Imm:  Negative allergies    Endocrine:  Negative        Physical Exam:  Vitals: /87   Pulse 76   Ht 1.676 m (5' 6\")   Wt 57.6 kg (127 lb)   BMI 20.50 kg/m       Constitutional:  well nourished;well developed;cooperative thin;frail      Skin:  warm and dry to the touch          Head:  normocephalic        Eyes:  pupils equal and round;sclera white        Lymph:      ENT:  no pallor or cyanosis        Neck:  carotid pulses are full and equal bilaterally, JVP normal, no carotid bruit        Respiratory:  normal breath sounds, clear to auscultation, normal A-P diameter, normal symmetry, normal respiratory excursion, no use of accessory muscles         Cardiac: normal S1 and S2 irregularly irregular rhythm     systolic murmur;LLSB;grade 2        pulses full and equal     2+             2+                    GI:  abdomen soft;non-tender        Extremities and Muscular Skeletal:  no edema;no deformities, clubbing, cyanosis, erythema observed              Neurological:  affect appropriate;no gross motor deficits        Psych:  Alert and Oriented x 3    Encounter Diagnosis   Name Primary?     Mitral valve insufficiency, unspecified etiology        Recent Lab Results:  LIPID RESULTS:  No results found for: CHOL, HDL, LDL, TRIG, CHOLHDLRATIO    LIVER ENZYME RESULTS:  Lab Results   Component Value Date    AST 45 05/06/2019    ALT 65 (A) 05/06/2019       CBC RESULTS:  Lab Results   Component Value Date    WBC 8.9 05/06/2019    RBC 4.65 05/06/2019    HGB 14.6 05/06/2019    HCT 43.7 05/06/2019    MCV 94 05/06/2019    MCH 31.4 05/06/2019    MCHC 33.4 05/06/2019    RDW 15.1 (A) 05/06/2019     05/06/2019       BMP RESULTS:  Lab Results   Component Value Date     01/31/2020    POTASSIUM 4.2 01/31/2020    CHLORIDE 100 01/31/2020    CO2 28 01/31/2020    ANIONGAP 13.2 01/31/2020     (H) " 01/31/2020    BUN 23 01/31/2020    CR 1.12 01/31/2020    GFRESTIMATED 45 (L) 01/31/2020    GFRESTBLACK 55 (L) 01/31/2020    CHRIS 10.4 01/31/2020        A1C RESULTS:  No results found for: A1C    INR RESULTS:  Lab Results   Component Value Date    INR 1.60 (H) 05/01/2019    INR 1.69 (H) 04/30/2019         Thank you for allowing me to participate in the care of your patient.    Sincerely,     SENIA Hernandez [facility]

## 2020-02-01 NOTE — PROGRESS NOTES
HPI and Plan:   I had the pleasure of seeing Elizabeth Ruggiero today in cardiology clinic follow up diastolic heart failure. She is a pleasant 89 year old patient of Dr. Galvan.     Ms. Ruggiero's a past medical history significant for chronic atrial fibrillation/flutter, diastolic heart failure, hepatic congestion, deep vein thrombosis, hypertension, severe mitral regurgitation, history of TIA, and osteoporosis.    The patient has presented to the emergency room several times this year.  On March 9, 2019, she presented to the emergency room for hypertension.  On arrival her blood pressure was 137/69.  She received a small dose of IV metoprolol which lowered her blood pressure as well as heart rate.  She again returned to the emergency room on April 23, 2018 for generalized weakness and shortness of breath.  She was noted to have a temperature of 101.5.She was diagnosed and treated for pneumonia.  She was discharged on April 25, 2019.    She she was readmitted April 29, 2019 for acute diastolic heart failure exacerbation.  She was hypoxic in the emergency room with a oxygen saturation of 89%.  She was placed on BiPAP in given IV Lasix.  She diuresed well for a net negative of 1.6 L. Her BNP was notably elevated at 6800.  Her LFTs were elevated.  Her echocardiogram showed a normal left ventricular systolic function with an ejection fraction >70% with moderately severe to severe mitral regurgitation. Metoprolol was initiated for rate control as well as digoxin 125 mcg daily.  She was also transition to oral Lasix 20 mg daily.    She followed up after discharge with complaints of hypotension.  her metoprolol dosage was reduced from 25 mg twice daily to 12.5 mg in the a.m. and 25 mg in the p.m. She was last seen by Dr. Galvan on June 4, 2019. At that time, she was doing well without recurrent CHF symptoms.    Today she presents to the cardiology clinic in follow-up for diastolic heart failure.  She has continued to do well  since her last office visit.  She denies recurrent shortness of breath.  She continues to maintain her dry weight.. She denies cough, PND, orthopnea and lower extremity edema as well as abdominal distention.  Her blood pressure is 128/87. Her heart rate is 76 bpm.     Physical Exam  Please see Below     Assessment and Plan  1. Diastolic heart failure with an EF >70%. She does not appear to be in heart failure at this time. There is no evidence of volume overload.  Continue Lasix 20 mg every other day. Continue metoprolol. I have asked her to follow-up in one year however, she would like to see Dr. Galvan in 6 months.     2.  Severe mitral regurgitation.  Recent echocardiogram notes severe mitral regurgitation with bileaflet prolapse.  Possible torn cord attached to the anterior mitral leaflet.  Systolic flow reversal suggested in pulmonary vein.  Will continue to monitor as above. If you develop worsening shortness of breath please call and we will revisit mitraclip.     3.  Chronic atrial fibrillation/flutter.  On warfarin therapy for anticoagulation. Continue digoxin 125 mcg daily and metoprolol tartrate.    4. Hypertension.  Well-controlled.  Continue metoprolol as scheduled.    5. History of DVT.  On chronic anticoagulation as stated above.    Thank you for allowing me to care for Elizabeth Ruggiero today.    SENIA Hernandez, CNP  Cardiology    Voice recognition software was used for this note, I have reviewed this note, but errors may have been missed.    Orders Placed This Encounter   Procedures     Follow-Up with Cardiologist     Orders Placed This Encounter   Medications     medroxyPROGESTERone (PROVERA) 10 MG tablet     Sig: Take 10 mg by mouth daily     There are no discontinued medications.      CURRENT MEDICATIONS:  Current Outpatient Medications   Medication Sig Dispense Refill     Acetaminophen (TYLENOL PO) Take 325 mg by mouth every 6 hours as needed        alendronate (FOSAMAX) 70 MG tablet Take 70 mg  by mouth every 7 days SUNDAYS       B Complex Vitamins (VITAMIN B COMPLEX PO) Take 2 capsules by mouth       calcium carbonate-vitamin D (OSCAL W/D) 500-200 MG-UNIT tablet Take 1 tablet by mouth 2 times daily       digoxin (LANOXIN) 125 MCG tablet Take 1 tablet (125 mcg) by mouth daily 30 tablet 11     furosemide (LASIX) 20 MG tablet Take 20 mg by mouth Every Mon, Wed, Fri Morning       medroxyPROGESTERone (PROVERA) 10 MG tablet Take 10 mg by mouth daily       metoprolol tartrate (LOPRESSOR) 25 MG tablet Take 12.5 mg by mouth 2 times daily       Multiple Vitamins-Minerals (PRESERVISION AREDS 2+MULTI VIT) CAPS Take 1 tablet by mouth 2 times daily       multivitamin w/minerals (MULTI-VITAMIN) tablet Take 3 tablets by mouth daily       VITAMIN D, CHOLECALCIFEROL, PO Take by mouth daily       Warfarin Sodium (COUMADIN PO) Take 2.5 mg by mouth daily          ALLERGIES   No Known Allergies    PAST MEDICAL HISTORY:  Past Medical History:   Diagnosis Date     (HFpEF) heart failure with preserved ejection fraction (H)      Acute diastolic CHF (congestive heart failure) (H)      Acute left hemiparesis (H) 2/9/2019     Arthritis      Atrial fibrillation (H)      Atrial fibrillation with RVR (H)      Atrial flutter (H)      Atrial flutter with rapid ventricular response (H) 4/23/2019     Community acquired pneumonia      Diastolic CHF (H) 04/29/2019     DVT of lower extremity (deep venous thrombosis) (H)     recurrent     Hepatic congestion     improved with diuretics for CHF     HTN (hypertension)      Hyperlipidemia      Mitral valve insufficiency      Pneumonia      Postmenopausal bleeding 01/2020    [facility]     Severe mitral regurgitation      SOB (shortness of breath)      TIA (transient ischemic attack)      Vitamin D deficiency        PAST SURGICAL HISTORY:  Past Surgical History:   Procedure Laterality Date     APPENDECTOMY       FRACTURE SURGERY       repair left femur         FAMILY  HISTORY:  Family History   Problem Relation Age of Onset     Colon Cancer Mother      Breast Cancer Sister      Diabetes Sister        SOCIAL HISTORY:  Social History     Socioeconomic History     Marital status:      Spouse name: None     Number of children: 6     Years of education: through 4 years college     Highest education level: None   Occupational History     None   Social Needs     Financial resource strain: None     Food insecurity:     Worry: None     Inability: None     Transportation needs:     Medical: None     Non-medical: None   Tobacco Use     Smoking status: Never Smoker     Smokeless tobacco: Never Used   Substance and Sexual Activity     Alcohol use: No     Drug use: No     Sexual activity: Not Currently     Comment:   one year ago no other partners   Lifestyle     Physical activity:     Days per week: None     Minutes per session: None     Stress: None   Relationships     Social connections:     Talks on phone: None     Gets together: None     Attends Baptism service: None     Active member of club or organization: None     Attends meetings of clubs or organizations: None     Relationship status: None     Intimate partner violence:     Fear of current or ex partner: None     Emotionally abused: None     Physically abused: None     Forced sexual activity: None   Other Topics Concern     Parent/sibling w/ CABG, MI or angioplasty before 65F 55M? Not Asked   Social History Narrative     None       Review of Systems:  Skin:  Negative       Eyes:  Positive for glasses for reading   ENT:  Positive for hearing loss    Respiratory:  Negative       Cardiovascular:  Negative      Gastroenterology: Negative      Genitourinary:  Positive for nocturia(X1)    Musculoskeletal:  Positive for back pain    Neurologic:  Negative headaches    Psychiatric:  Negative      Heme/Lymph/Imm:  Negative allergies    Endocrine:  Negative        Physical Exam:  Vitals: /87   Pulse 76   Ht 1.676 m  "(5' 6\")   Wt 57.6 kg (127 lb)   BMI 20.50 kg/m      Constitutional:  well nourished;well developed;cooperative thin;frail      Skin:  warm and dry to the touch          Head:  normocephalic        Eyes:  pupils equal and round;sclera white        Lymph:      ENT:  no pallor or cyanosis        Neck:  carotid pulses are full and equal bilaterally, JVP normal, no carotid bruit        Respiratory:  normal breath sounds, clear to auscultation, normal A-P diameter, normal symmetry, normal respiratory excursion, no use of accessory muscles         Cardiac: normal S1 and S2 irregularly irregular rhythm     systolic murmur;LLSB;grade 2        pulses full and equal     2+             2+                    GI:  abdomen soft;non-tender        Extremities and Muscular Skeletal:  no edema;no deformities, clubbing, cyanosis, erythema observed              Neurological:  affect appropriate;no gross motor deficits        Psych:  Alert and Oriented x 3    Encounter Diagnosis   Name Primary?     Mitral valve insufficiency, unspecified etiology        Recent Lab Results:  LIPID RESULTS:  No results found for: CHOL, HDL, LDL, TRIG, CHOLHDLRATIO    LIVER ENZYME RESULTS:  Lab Results   Component Value Date    AST 45 05/06/2019    ALT 65 (A) 05/06/2019       CBC RESULTS:  Lab Results   Component Value Date    WBC 8.9 05/06/2019    RBC 4.65 05/06/2019    HGB 14.6 05/06/2019    HCT 43.7 05/06/2019    MCV 94 05/06/2019    MCH 31.4 05/06/2019    MCHC 33.4 05/06/2019    RDW 15.1 (A) 05/06/2019     05/06/2019       BMP RESULTS:  Lab Results   Component Value Date     01/31/2020    POTASSIUM 4.2 01/31/2020    CHLORIDE 100 01/31/2020    CO2 28 01/31/2020    ANIONGAP 13.2 01/31/2020     (H) 01/31/2020    BUN 23 01/31/2020    CR 1.12 01/31/2020    GFRESTIMATED 45 (L) 01/31/2020    GFRESTBLACK 55 (L) 01/31/2020    CHRIS 10.4 01/31/2020        A1C RESULTS:  No results found for: A1C    INR RESULTS:  Lab Results   Component Value " Date    INR 1.60 (H) 05/01/2019    INR 1.69 (H) 04/30/2019           CC  MD BHUPINDER [facility]  [address] [place] [address]

## 2020-07-13 ENCOUNTER — APPOINTMENT (OUTPATIENT)
Dept: CT IMAGING | Facility: CLINIC | Age: 85
End: 2020-07-13
Attending: EMERGENCY MEDICINE
Payer: COMMERCIAL

## 2020-07-13 ENCOUNTER — HOSPITAL ENCOUNTER (EMERGENCY)
Facility: CLINIC | Age: 85
Discharge: HOME | End: 2020-07-13
Attending: EMERGENCY MEDICINE | Admitting: EMERGENCY MEDICINE
Payer: COMMERCIAL

## 2020-07-13 VITALS
OXYGEN SATURATION: 98 % | RESPIRATION RATE: 18 BRPM | TEMPERATURE: 97.8 F | SYSTOLIC BLOOD PRESSURE: 136 MMHG | DIASTOLIC BLOOD PRESSURE: 86 MMHG | HEART RATE: 78 BPM

## 2020-07-13 DIAGNOSIS — S32.010A COMPRESSION FRACTURE OF L1 VERTEBRA, INITIAL ENCOUNTER (H): ICD-10-CM

## 2020-07-13 LAB
ALBUMIN SERPL-MCNC: 3.5 G/DL (ref 3.4–5)
ALBUMIN UR-MCNC: NEGATIVE MG/DL
ALP SERPL-CCNC: 57 U/L (ref 40–150)
ALT SERPL W P-5'-P-CCNC: 32 U/L (ref 0–50)
ANION GAP SERPL CALCULATED.3IONS-SCNC: 8 MMOL/L (ref 3–14)
APPEARANCE UR: CLEAR
AST SERPL W P-5'-P-CCNC: 22 U/L (ref 0–45)
BASOPHILS # BLD AUTO: 0 10E9/L (ref 0–0.2)
BASOPHILS NFR BLD AUTO: 0.2 %
BILIRUB SERPL-MCNC: 1.3 MG/DL (ref 0.2–1.3)
BILIRUB UR QL STRIP: NEGATIVE
BUN SERPL-MCNC: 20 MG/DL (ref 7–30)
CALCIUM SERPL-MCNC: 9.4 MG/DL (ref 8.5–10.1)
CHLORIDE SERPL-SCNC: 107 MMOL/L (ref 94–109)
CO2 SERPL-SCNC: 22 MMOL/L (ref 20–32)
COLOR UR AUTO: ABNORMAL
CREAT BLD-MCNC: 1.1 MG/DL (ref 0.52–1.04)
CREAT SERPL-MCNC: 1 MG/DL (ref 0.52–1.04)
DIFFERENTIAL METHOD BLD: NORMAL
EOSINOPHIL # BLD AUTO: 0.1 10E9/L (ref 0–0.7)
EOSINOPHIL NFR BLD AUTO: 0.5 %
ERYTHROCYTE [DISTWIDTH] IN BLOOD BY AUTOMATED COUNT: 13.6 % (ref 10–15)
GFR SERPL CREATININE-BSD FRML MDRD: 46 ML/MIN/{1.73_M2}
GFR SERPL CREATININE-BSD FRML MDRD: 48 ML/MIN/{1.73_M2}
GLUCOSE SERPL-MCNC: 103 MG/DL (ref 70–99)
GLUCOSE UR STRIP-MCNC: NEGATIVE MG/DL
HCT VFR BLD AUTO: 39.5 % (ref 35–47)
HGB BLD-MCNC: 13.2 G/DL (ref 11.7–15.7)
HGB UR QL STRIP: ABNORMAL
IMM GRANULOCYTES # BLD: 0 10E9/L (ref 0–0.4)
IMM GRANULOCYTES NFR BLD: 0.3 %
INR PPP: 3.21 (ref 0.86–1.14)
KETONES UR STRIP-MCNC: NEGATIVE MG/DL
LEUKOCYTE ESTERASE UR QL STRIP: NEGATIVE
LIPASE SERPL-CCNC: 99 U/L (ref 73–393)
LYMPHOCYTES # BLD AUTO: 1.2 10E9/L (ref 0.8–5.3)
LYMPHOCYTES NFR BLD AUTO: 11.4 %
MCH RBC QN AUTO: 32.3 PG (ref 26.5–33)
MCHC RBC AUTO-ENTMCNC: 33.4 G/DL (ref 31.5–36.5)
MCV RBC AUTO: 97 FL (ref 78–100)
MONOCYTES # BLD AUTO: 1 10E9/L (ref 0–1.3)
MONOCYTES NFR BLD AUTO: 9.7 %
MUCOUS THREADS #/AREA URNS LPF: PRESENT /LPF
NEUTROPHILS # BLD AUTO: 8.2 10E9/L (ref 1.6–8.3)
NEUTROPHILS NFR BLD AUTO: 77.9 %
NITRATE UR QL: NEGATIVE
NRBC # BLD AUTO: 0 10*3/UL
NRBC BLD AUTO-RTO: 0 /100
PH UR STRIP: 5 PH (ref 5–7)
PLATELET # BLD AUTO: 175 10E9/L (ref 150–450)
POTASSIUM SERPL-SCNC: 4 MMOL/L (ref 3.4–5.3)
PROT SERPL-MCNC: 7.1 G/DL (ref 6.8–8.8)
RBC # BLD AUTO: 4.09 10E12/L (ref 3.8–5.2)
RBC #/AREA URNS AUTO: 3 /HPF (ref 0–2)
SODIUM SERPL-SCNC: 137 MMOL/L (ref 133–144)
SOURCE: ABNORMAL
SP GR UR STRIP: 1.02 (ref 1–1.03)
UROBILINOGEN UR STRIP-MCNC: NORMAL MG/DL (ref 0–2)
WBC # BLD AUTO: 10.6 10E9/L (ref 4–11)
WBC #/AREA URNS AUTO: 1 /HPF (ref 0–5)

## 2020-07-13 PROCEDURE — 25000125 ZZHC RX 250: Performed by: EMERGENCY MEDICINE

## 2020-07-13 PROCEDURE — 85025 COMPLETE CBC W/AUTO DIFF WBC: CPT | Performed by: EMERGENCY MEDICINE

## 2020-07-13 PROCEDURE — 81001 URINALYSIS AUTO W/SCOPE: CPT | Performed by: EMERGENCY MEDICINE

## 2020-07-13 PROCEDURE — 82565 ASSAY OF CREATININE: CPT

## 2020-07-13 PROCEDURE — 85610 PROTHROMBIN TIME: CPT | Performed by: EMERGENCY MEDICINE

## 2020-07-13 PROCEDURE — 74177 CT ABD & PELVIS W/CONTRAST: CPT

## 2020-07-13 PROCEDURE — 80053 COMPREHEN METABOLIC PANEL: CPT | Performed by: EMERGENCY MEDICINE

## 2020-07-13 PROCEDURE — 99285 EMERGENCY DEPT VISIT HI MDM: CPT | Mod: 25

## 2020-07-13 PROCEDURE — 72131 CT LUMBAR SPINE W/O DYE: CPT

## 2020-07-13 PROCEDURE — 25000128 H RX IP 250 OP 636: Performed by: EMERGENCY MEDICINE

## 2020-07-13 PROCEDURE — 83690 ASSAY OF LIPASE: CPT | Performed by: EMERGENCY MEDICINE

## 2020-07-13 PROCEDURE — 25000132 ZZH RX MED GY IP 250 OP 250 PS 637: Performed by: EMERGENCY MEDICINE

## 2020-07-13 RX ORDER — IOPAMIDOL 755 MG/ML
64 INJECTION, SOLUTION INTRAVASCULAR ONCE
Status: COMPLETED | OUTPATIENT
Start: 2020-07-13 | End: 2020-07-13

## 2020-07-13 RX ADMIN — SODIUM CHLORIDE 60 ML: 9 INJECTION, SOLUTION INTRAVENOUS at 16:30

## 2020-07-13 RX ADMIN — ACETAMINOPHEN AND CODEINE PHOSPHATE 2 TABLET: 300; 30 TABLET ORAL at 18:42

## 2020-07-13 RX ADMIN — IOPAMIDOL 64 ML: 755 INJECTION, SOLUTION INTRAVENOUS at 16:30

## 2020-07-13 NOTE — ED NOTES
Attempted to collect UA but small amount of urine voided was not collected in the hat. Pt able to stand and pivot to bedside commode with assistance, though pt complained of pain doing so. Water provided with MD approval to encourage additional urine output.

## 2020-07-13 NOTE — ED AVS SNAPSHOT
Emergency Department  [address] [place] [id number]  Phone:  [phone]  Fax:  [phone]                                    Elizabeth Ruggiero   MRN: [id number]    Department:   Emergency Department   Date of Visit:  7/13/2020           After Visit Summary Signature Page    I have received my discharge instructions, and my questions have been answered. I have discussed any challenges I see with this plan with the nurse or doctor.    ..........................................................................................................................................  Patient/Patient Representative Signature      ..........................................................................................................................................  Patient Representative Print Name and Relationship to Patient    ..................................................               ................................................  Date                                   Time    ..........................................................................................................................................  Reviewed by Signature/Title    ...................................................              ..............................................  Date                                               Time          22EPIC Rev 08/18

## 2020-07-13 NOTE — ED PROVIDER NOTES
History     Chief Complaint:  Back Pain and Abdominal Pain    HPI   Elizabeth Ruggiero is a 89 year old female who presents with midline mild to severe low back pain radiating bilat to abdomen onset in the last 2-3 days, worse with mov't/walking, alleviated somewhat by lying still.  Denies chest pain, fever, chills, changes in urinatiion, focal weakness, n/t, bowel or bladder dysfunction, fall, trauma, n/v.    Allergies:  No Known Allergies     Medications:    alendronate   digoxin  furosemide   medroxyPROGESTERone   metoprolol tartrate   Coumadin    Past Medical History:    Congestive heart failure  Pneumonia  Atrial flutter  Mitral Regurgitation  Acute left hemiparesis  Osteopetrosis  Vitamin D deficiency  Right lumbar radiculitis  DVT  Femur fracture  Spinal stenosis  Lumbosacral radiculitis  Lumbosacral spondylosis without myelopathy  Hyperlipidemia  Hypertension  TIA    Past Surgical History:    Appendectomy  Left femur repair    Family History:    Mother: colon cancer  Sister: breast cancer, diabetes    Social History:  Negative for tobacco use.  Negative for alcohol use.  Negative for drug use.  Marital Status:   [5]       Review of Systems  Complete ROS completed and negative other than pertinent positives and negatives noted in HPI      Physical Exam     Patient Vitals for the past 24 hrs:   BP Temp Temp src Pulse Resp SpO2   07/13/20 1651 -- -- -- -- -- 98 %   07/13/20 1515 -- 97.8  F (36.6  C) Oral -- -- --   07/13/20 1509 136/86 -- -- 78 -- --   07/13/20 1507 -- -- -- -- 18 97 %       Physical Exam  Constitutional: Well developed, nontox appearance  Head: Atraumatic.   Neck:  no stridor  Eyes: no scleral icterus  Cardiovascular: RRR, 2+ bilat radial, DP pulses  Pulmonary/Chest: nml resp effort  Abdominal: ND, soft, NT, no rebound or guarding   Back: T spine NT and without crepitus, mild lower L spine tenderness w/o crepitus or step-offs  : no CVA tenderness bilat  Ext: Warm, well perfused, bilat LE  edema (baseline per patient)  Neurological: A&O, symmetric facies, moves ext x4, 5/5 strength BLE, sensation grossly intact  Skin: Skin is warm and dry.   Psychiatric: Behavior is normal. Thought content normal.   Nursing note and vitals reviewed.        Emergency Department Course     Imaging:  Radiology findings were communicated with the patient who voiced understanding of the findings.    CT Abdomen/Pelvis with contrast:   1.  No acute findings in the abdomen or pelvis to explain the   patient's symptoms.   2.  Mild to moderate intra and extrahepatic biliary duct dilatation,   likely in part related to the patient's age. Correlation with liver   function tests may be helpful. No obstructing mass or stone is   identified on this exam.   3.  Multiple chronic findings as detailed above. As per radiology.    CT Lumbar Spine with contrast:   1. Findings that appear most consistent with a subtle acute minimally   displaced L1 superior endplate compression fracture.   2. No other definite acute fracture. Age indeterminate but potentially   chronic mild T11 compression deformity.   3. Multilevel degenerative disc disease and facet arthropathy of the   lumbar spine, with varying degrees of spinal and neural foraminal   stenosis, as detailed.   4. Levoconvex scoliosis with multilevel spondylolisthesis, as   described. As per radiology.    Laboratory:  Laboratory findings were communicated with the patient who voiced understanding of the findings.    CBC: WBC: 10.6, HGB: 13.2, PLT: 175  CMP: Glucose 103 (H), GFR estimate 48 (L), o/w WNL (Creatinine: 1.00)  Lipase: 99  INR: 3.21 (H)  Creatinine POCT: Creatinine 1.1 (H), GFR estimate 46 (L)    UA with microscopic: Blood small (A), RBC 3 (H), Mucous present (A), o/w WNL    Interventions:  1842 Tylenol 2 tablets oral    Emergency Department Course:  Past medical records, nursing notes, and vitals reviewed.    1528 I performed an exam of the patient as documented above.        IV was inserted and blood was drawn for laboratory testing, results above.     The patient provided a urine sample here in the emergency department. This was sent for laboratory  testing, findings above.     The patient was sent for a CT while in the emergency department, results above.      I rechecked the patient and discussed the results of her workup thus far.     Findings and plan explained to the Patient. Patient discharged home with instructions regarding supportive care, medications, and reasons to return. The importance of close follow-up was reviewed.    I personally reviewed the imaging results with the Patient and answered all related questions prior to discharge.     Impression & Plan     Medical Decision Makin y.o.F presenting with back pain    DDx includes nontraumatic back pain, spontaneous vertebral body fracture, intra-abdominal pathology such as abscess, vascular etiology although less likely, UTI, muscular back pain.  Labs as noted above and fairly unremarkable.  Imaging sig for likely spontaneous L1 fracture superior endplate fracture.  Pain meds given as noted above with improvement and pt ambulated w/o difficulty.  At this time I feel the pt is safe for discharge.  Recommendations given regarding follow up with PCP and return to the emergency department as needed for new or worsening symptoms.  Pt counseled on all results, disposition and diagnosis.  They are understanding and agreeable to plan. Patient discharged in stable condition.      Diagnosis:    ICD-10-CM    1. Compression fracture of L1 vertebra, initial encounter (H)  S32.010A        Disposition:  Discharged to home.    Discharge Medications:  New Prescriptions    ACETAMINOPHEN-CODEINE (TYLENOL #3) 300-30 MG TABLET    Take 1-2 tablets by mouth every 6 hours as needed for breakthrough pain or severe pain       Scribe Disclosure:  I, Pranav Whitehead, am serving as a scribe at 3:51 PM on 2020 to document services  personally performed by Zi Azevedo MD based on my observations and the provider's statements to me.          Zi Azevedo MD  07/14/20 1009

## 2020-07-13 NOTE — ED TRIAGE NOTES
Pt coming from home with back pain, increasing for two days, also having some discomfort across lower abdomen, thinks she may be a little constipated, last BM was yesterday. No dysuria.

## 2020-07-13 NOTE — ED NOTES
Bed: ED14  Expected date:   Expected time:   Means of arrival:   Comments:  [facility] [place] 412 89F back pain non traumatic - ETA 4min

## 2020-07-14 NOTE — ED NOTES
Ambulated pt with walker per MD. Pt able to stand and walk without assistance from this writer. Pt walked steadily and denied dizziness/lightheadedness/weakness. Pt states her pain is currently 4/10 while walking versus 9/10 earlier in her visit. Pt states she has a walker at home. RN aware.

## 2020-07-14 NOTE — DISCHARGE INSTRUCTIONS
1. -Take acetaminophen 500 to 1000 mg by mouth every 4 to 6 hours as needed for pain or fever.  Do not take more than 4000 mg in 24 hours.  Do not take within 6 hours of another acetaminophen containing medication such as norco (vicodin) or percocet.  2. Please follow-up with your primary doctor as needed.  3. Please return to the ED as needed for new or worsening symptoms such as severe and uncontrollable pain, focal weakness, bowel or bladder incontinence, fever greater than 100.4 F, any other concerning symptoms.

## 2020-07-25 ENCOUNTER — HOSPITAL ENCOUNTER (OUTPATIENT)
Facility: CLINIC | Age: 85
Setting detail: OBSERVATION
Discharge: NURSING, REHAB OR LONG-TERM CARE | End: 2020-07-28
Attending: NURSE PRACTITIONER | Admitting: HOSPITALIST
Payer: COMMERCIAL

## 2020-07-25 DIAGNOSIS — M54.59 INTRACTABLE LOW BACK PAIN: ICD-10-CM

## 2020-07-25 DIAGNOSIS — I50.31 ACUTE DIASTOLIC CONGESTIVE HEART FAILURE (H): ICD-10-CM

## 2020-07-25 PROBLEM — M54.9 BACK PAIN: Status: ACTIVE | Noted: 2020-07-25

## 2020-07-25 LAB
ALBUMIN UR-MCNC: 30 MG/DL
ANION GAP SERPL CALCULATED.3IONS-SCNC: 9 MMOL/L (ref 3–14)
APPEARANCE UR: CLEAR
BILIRUB UR QL STRIP: NEGATIVE
BUN SERPL-MCNC: 17 MG/DL (ref 7–30)
CALCIUM SERPL-MCNC: 9 MG/DL (ref 8.5–10.1)
CHLORIDE SERPL-SCNC: 95 MMOL/L (ref 94–109)
CO2 SERPL-SCNC: 25 MMOL/L (ref 20–32)
COLOR UR AUTO: YELLOW
CREAT SERPL-MCNC: 0.79 MG/DL (ref 0.52–1.04)
DIGOXIN SERPL-MCNC: 0.9 UG/L (ref 0.5–2)
ERYTHROCYTE [DISTWIDTH] IN BLOOD BY AUTOMATED COUNT: 13.9 % (ref 10–15)
GFR SERPL CREATININE-BSD FRML MDRD: 64 ML/MIN/{1.73_M2}
GLUCOSE SERPL-MCNC: 112 MG/DL (ref 70–99)
GLUCOSE UR STRIP-MCNC: NEGATIVE MG/DL
HCT VFR BLD AUTO: 38.8 % (ref 35–47)
HGB BLD-MCNC: 13 G/DL (ref 11.7–15.7)
HGB UR QL STRIP: ABNORMAL
INR PPP: 4.38 (ref 0.86–1.14)
KETONES UR STRIP-MCNC: NEGATIVE MG/DL
LEUKOCYTE ESTERASE UR QL STRIP: ABNORMAL
MCH RBC QN AUTO: 31.9 PG (ref 26.5–33)
MCHC RBC AUTO-ENTMCNC: 33.5 G/DL (ref 31.5–36.5)
MCV RBC AUTO: 95 FL (ref 78–100)
NITRATE UR QL: NEGATIVE
PH UR STRIP: 6 PH (ref 5–7)
PLATELET # BLD AUTO: 208 10E9/L (ref 150–450)
POTASSIUM SERPL-SCNC: 3.9 MMOL/L (ref 3.4–5.3)
RBC # BLD AUTO: 4.07 10E12/L (ref 3.8–5.2)
RBC #/AREA URNS AUTO: 7 /HPF (ref 0–2)
SODIUM SERPL-SCNC: 129 MMOL/L (ref 133–144)
SOURCE: ABNORMAL
SP GR UR STRIP: 1.02 (ref 1–1.03)
SQUAMOUS #/AREA URNS AUTO: <1 /HPF (ref 0–1)
UROBILINOGEN UR STRIP-MCNC: NORMAL MG/DL (ref 0–2)
WBC # BLD AUTO: 11 10E9/L (ref 4–11)
WBC #/AREA URNS AUTO: 4 /HPF (ref 0–5)

## 2020-07-25 PROCEDURE — 85027 COMPLETE CBC AUTOMATED: CPT | Performed by: HOSPITALIST

## 2020-07-25 PROCEDURE — G0378 HOSPITAL OBSERVATION PER HR: HCPCS

## 2020-07-25 PROCEDURE — 25000132 ZZH RX MED GY IP 250 OP 250 PS 637: Performed by: NURSE PRACTITIONER

## 2020-07-25 PROCEDURE — 85610 PROTHROMBIN TIME: CPT | Performed by: HOSPITALIST

## 2020-07-25 PROCEDURE — 81001 URINALYSIS AUTO W/SCOPE: CPT | Performed by: HOSPITALIST

## 2020-07-25 PROCEDURE — 36415 COLL VENOUS BLD VENIPUNCTURE: CPT | Performed by: HOSPITALIST

## 2020-07-25 PROCEDURE — 84300 ASSAY OF URINE SODIUM: CPT | Performed by: HOSPITALIST

## 2020-07-25 PROCEDURE — 80048 BASIC METABOLIC PNL TOTAL CA: CPT | Performed by: HOSPITALIST

## 2020-07-25 PROCEDURE — 99285 EMERGENCY DEPT VISIT HI MDM: CPT | Mod: 25

## 2020-07-25 PROCEDURE — 25000128 H RX IP 250 OP 636: Performed by: NURSE PRACTITIONER

## 2020-07-25 PROCEDURE — C9803 HOPD COVID-19 SPEC COLLECT: HCPCS

## 2020-07-25 PROCEDURE — 80162 ASSAY OF DIGOXIN TOTAL: CPT | Performed by: HOSPITALIST

## 2020-07-25 PROCEDURE — 25000132 ZZH RX MED GY IP 250 OP 250 PS 637: Performed by: HOSPITALIST

## 2020-07-25 PROCEDURE — U0003 INFECTIOUS AGENT DETECTION BY NUCLEIC ACID (DNA OR RNA); SEVERE ACUTE RESPIRATORY SYNDROME CORONAVIRUS 2 (SARS-COV-2) (CORONAVIRUS DISEASE [COVID-19]), AMPLIFIED PROBE TECHNIQUE, MAKING USE OF HIGH THROUGHPUT TECHNOLOGIES AS DESCRIBED BY CMS-2020-01-R: HCPCS | Performed by: NURSE PRACTITIONER

## 2020-07-25 PROCEDURE — 99220 ZZC INITIAL OBSERVATION CARE,LEVL III: CPT | Performed by: HOSPITALIST

## 2020-07-25 RX ORDER — METOCLOPRAMIDE HYDROCHLORIDE 5 MG/ML
5 INJECTION INTRAMUSCULAR; INTRAVENOUS EVERY 6 HOURS PRN
Status: DISCONTINUED | OUTPATIENT
Start: 2020-07-25 | End: 2020-07-28 | Stop reason: HOSPADM

## 2020-07-25 RX ORDER — ONDANSETRON 2 MG/ML
4 INJECTION INTRAMUSCULAR; INTRAVENOUS EVERY 6 HOURS PRN
Status: DISCONTINUED | OUTPATIENT
Start: 2020-07-25 | End: 2020-07-28 | Stop reason: HOSPADM

## 2020-07-25 RX ORDER — WARFARIN SODIUM 2.5 MG/1
1.25 TABLET ORAL DAILY
COMMUNITY
End: 2020-08-04 | Stop reason: ALTCHOICE

## 2020-07-25 RX ORDER — HYDROCODONE BITARTRATE AND ACETAMINOPHEN 5; 325 MG/1; MG/1
1-2 TABLET ORAL EVERY 4 HOURS PRN
Status: DISCONTINUED | OUTPATIENT
Start: 2020-07-25 | End: 2020-07-27

## 2020-07-25 RX ORDER — ACETAMINOPHEN 500 MG
1000 TABLET ORAL DAILY PRN
Status: ON HOLD | COMMUNITY
End: 2020-07-27

## 2020-07-25 RX ORDER — AMOXICILLIN 250 MG
1 CAPSULE ORAL 2 TIMES DAILY
Status: DISCONTINUED | OUTPATIENT
Start: 2020-07-25 | End: 2020-07-28 | Stop reason: HOSPADM

## 2020-07-25 RX ORDER — WARFARIN SODIUM 2.5 MG/1
2.5 TABLET ORAL DAILY
COMMUNITY
End: 2020-08-04 | Stop reason: ALTCHOICE

## 2020-07-25 RX ORDER — AMOXICILLIN 250 MG
2 CAPSULE ORAL 2 TIMES DAILY
Status: DISCONTINUED | OUTPATIENT
Start: 2020-07-25 | End: 2020-07-28 | Stop reason: HOSPADM

## 2020-07-25 RX ORDER — BISACODYL 10 MG
10 SUPPOSITORY, RECTAL RECTAL DAILY PRN
Status: DISCONTINUED | OUTPATIENT
Start: 2020-07-25 | End: 2020-07-28 | Stop reason: HOSPADM

## 2020-07-25 RX ORDER — ONDANSETRON 4 MG/1
4 TABLET, ORALLY DISINTEGRATING ORAL EVERY 6 HOURS PRN
Status: DISCONTINUED | OUTPATIENT
Start: 2020-07-25 | End: 2020-07-28 | Stop reason: HOSPADM

## 2020-07-25 RX ORDER — FUROSEMIDE 20 MG
20 TABLET ORAL
Status: DISCONTINUED | OUTPATIENT
Start: 2020-07-27 | End: 2020-07-25

## 2020-07-25 RX ORDER — MEDROXYPROGESTERONE ACETATE 10 MG
10 TABLET ORAL DAILY
Status: DISCONTINUED | OUTPATIENT
Start: 2020-07-25 | End: 2020-07-26

## 2020-07-25 RX ORDER — AMOXICILLIN 250 MG
1 CAPSULE ORAL 2 TIMES DAILY PRN
Status: DISCONTINUED | OUTPATIENT
Start: 2020-07-25 | End: 2020-07-28 | Stop reason: HOSPADM

## 2020-07-25 RX ORDER — METHOCARBAMOL 500 MG/1
500 TABLET, FILM COATED ORAL ONCE
Status: COMPLETED | OUTPATIENT
Start: 2020-07-25 | End: 2020-07-25

## 2020-07-25 RX ORDER — NALOXONE HYDROCHLORIDE 0.4 MG/ML
.1-.4 INJECTION, SOLUTION INTRAMUSCULAR; INTRAVENOUS; SUBCUTANEOUS
Status: DISCONTINUED | OUTPATIENT
Start: 2020-07-25 | End: 2020-07-28 | Stop reason: HOSPADM

## 2020-07-25 RX ORDER — DIGOXIN 125 MCG
125 TABLET ORAL DAILY
Status: DISCONTINUED | OUTPATIENT
Start: 2020-07-25 | End: 2020-07-28 | Stop reason: HOSPADM

## 2020-07-25 RX ORDER — LIDOCAINE 40 MG/G
CREAM TOPICAL
Status: DISCONTINUED | OUTPATIENT
Start: 2020-07-25 | End: 2020-07-28 | Stop reason: HOSPADM

## 2020-07-25 RX ORDER — AMOXICILLIN 250 MG
2 CAPSULE ORAL 2 TIMES DAILY PRN
Status: DISCONTINUED | OUTPATIENT
Start: 2020-07-25 | End: 2020-07-28 | Stop reason: HOSPADM

## 2020-07-25 RX ORDER — ONDANSETRON 4 MG/1
4 TABLET, ORALLY DISINTEGRATING ORAL ONCE
Status: COMPLETED | OUTPATIENT
Start: 2020-07-25 | End: 2020-07-25

## 2020-07-25 RX ORDER — LIDOCAINE 4 G/G
2 PATCH TOPICAL ONCE
Status: COMPLETED | OUTPATIENT
Start: 2020-07-25 | End: 2020-07-26

## 2020-07-25 RX ORDER — LIDOCAINE 4 G/G
2 PATCH TOPICAL
Status: DISCONTINUED | OUTPATIENT
Start: 2020-07-25 | End: 2020-07-28 | Stop reason: HOSPADM

## 2020-07-25 RX ORDER — METOCLOPRAMIDE 5 MG/1
5 TABLET ORAL EVERY 6 HOURS PRN
Status: DISCONTINUED | OUTPATIENT
Start: 2020-07-25 | End: 2020-07-28 | Stop reason: HOSPADM

## 2020-07-25 RX ORDER — PROCHLORPERAZINE 25 MG
12.5 SUPPOSITORY, RECTAL RECTAL EVERY 12 HOURS PRN
Status: DISCONTINUED | OUTPATIENT
Start: 2020-07-25 | End: 2020-07-28 | Stop reason: HOSPADM

## 2020-07-25 RX ORDER — PROCHLORPERAZINE MALEATE 5 MG
5 TABLET ORAL EVERY 6 HOURS PRN
Status: DISCONTINUED | OUTPATIENT
Start: 2020-07-25 | End: 2020-07-28 | Stop reason: HOSPADM

## 2020-07-25 RX ORDER — HYDROCODONE BITARTRATE AND ACETAMINOPHEN 5; 325 MG/1; MG/1
1 TABLET ORAL ONCE
Status: COMPLETED | OUTPATIENT
Start: 2020-07-25 | End: 2020-07-25

## 2020-07-25 RX ORDER — ACETAMINOPHEN 325 MG/1
650 TABLET ORAL EVERY 4 HOURS PRN
Status: DISCONTINUED | OUTPATIENT
Start: 2020-07-25 | End: 2020-07-27

## 2020-07-25 RX ORDER — ACETAMINOPHEN 650 MG/1
650 SUPPOSITORY RECTAL EVERY 4 HOURS PRN
Status: DISCONTINUED | OUTPATIENT
Start: 2020-07-25 | End: 2020-07-27

## 2020-07-25 RX ORDER — MORPHINE SULFATE 2 MG/ML
1 INJECTION, SOLUTION INTRAMUSCULAR; INTRAVENOUS
Status: DISCONTINUED | OUTPATIENT
Start: 2020-07-25 | End: 2020-07-28 | Stop reason: HOSPADM

## 2020-07-25 RX ADMIN — HYDROCODONE BITARTRATE AND ACETAMINOPHEN 1 TABLET: 5; 325 TABLET ORAL at 18:25

## 2020-07-25 RX ADMIN — DOCUSATE SODIUM 50 MG AND SENNOSIDES 8.6 MG 2 TABLET: 8.6; 5 TABLET, FILM COATED ORAL at 21:40

## 2020-07-25 RX ADMIN — HYDROCODONE BITARTRATE AND ACETAMINOPHEN 2 TABLET: 5; 325 TABLET ORAL at 22:41

## 2020-07-25 RX ADMIN — DIGOXIN 125 MCG: 125 TABLET ORAL at 18:24

## 2020-07-25 RX ADMIN — DOCUSATE SODIUM 50 MG AND SENNOSIDES 8.6 MG 2 TABLET: 8.6; 5 TABLET, FILM COATED ORAL at 18:25

## 2020-07-25 RX ADMIN — METHOCARBAMOL 500 MG: 500 TABLET, FILM COATED ORAL at 14:35

## 2020-07-25 RX ADMIN — ONDANSETRON 4 MG: 4 TABLET, ORALLY DISINTEGRATING ORAL at 14:05

## 2020-07-25 RX ADMIN — LIDOCAINE 2 PATCH: 560 PATCH PERCUTANEOUS; TOPICAL; TRANSDERMAL at 12:05

## 2020-07-25 RX ADMIN — HYDROCODONE BITARTRATE AND ACETAMINOPHEN 1 TABLET: 5; 325 TABLET ORAL at 14:35

## 2020-07-25 ASSESSMENT — MIFFLIN-ST. JEOR: SCORE: 979.21

## 2020-07-25 ASSESSMENT — ENCOUNTER SYMPTOMS
SHORTNESS OF BREATH: 0
CONSTIPATION: 1
HEADACHES: 0
WOUND: 0
CHILLS: 0
FEVER: 0
BACK PAIN: 1
NUMBNESS: 0

## 2020-07-25 NOTE — ED NOTES
"[facility]  ED Nurse Handoff Report    ED Chief complaint: Back Pain      ED Diagnosis:   Final diagnoses:   Intractable low back pain       Code Status: See prior    Allergies: No Known Allergies    Patient Story: Pt recently Dx with compression fx of L1. Has been having back pain since dx and has not been able to tolerate pain regardless of intervention. She has been taking Tylenol #3 and then was put on [place] yesterday. She lives independently at an assisted living. Today she woke up with increased pain. She took pain medication at 9am and vomited. Family and staff sent her in for reevaluation of her pain. Her daughter states she has not been able to get out of bed and able to help herself. She has been having constipation from pain medication and is on miralax + Senna. Daughter concerned about the level care she needs and where she is currently living.  Focused Assessment:  Pt alert, follows commands. Lower back pain worst with movement. VSS. Able to sit up in bed. Attempted to ambulate. Pt was able to stand but refused to walk because of pain and nausea.     Treatments and/or interventions provided: Lidocaine patch. Norco. Muscle relaxer.  Patient's response to treatments and/or interventions: No improvement with patch. Will reassess oral medications.    To be done/followed up on inpatient unit:  n/a    Does this patient have any cognitive concerns?: no    Activity level - Baseline/Home:  Walker  Activity Level - Current:   Walker    Patient's Preferred language: English   Needed?: No    Isolation: None  Infection: Not Applicable  Bariatric?: No    Vital Signs:   Vitals:    07/25/20 1143 07/25/20 1351   BP: (!) 128/93 119/76   Pulse: 71 72   Resp: 16 18   Temp: 98.1  F (36.7  C)    TempSrc: Oral    SpO2: 93% 96%   Weight: 56.2 kg (124 lb)    Height: 1.676 m (5' 6\")        Cardiac Rhythm:     Was the PSS-3 completed:   Yes  What interventions are required if any?               Family " Comments: Daughter called with update  OBS brochure/video discussed/provided to patient/family: Yes              Name of person given brochure if not patient: na              Relationship to patient: na    For the majority of the shift this patient's behavior was Green.   Behavioral interventions performed were na.    ED NURSE PHONE NUMBER: [id number]

## 2020-07-25 NOTE — PHARMACY-ADMISSION MEDICATION HISTORY
"Pharmacy Medication History  Admission medication history interview status for the 7/25/2020  admission is complete. See EPIC admission navigator for prior to admission medications     Medication history sources: Patient and Pharmacy ([facility])  Medication history source reliability: Moderate  Adherence assessment: Good    Significant changes made to the medication list:  Medications removed:  - Tylenol #3  - Vitamin B complex  - Medroxyprogesterone    Medication added: \"tea for constipation\" - listed under \"Unknown to Patient\"      Additional medication history information:   None    Medication reconciliation completed by provider prior to medication history? Yes    Time spent in this activity: 25 minutes      Prior to Admission medications    Medication Sig Last Dose Taking? Auth Provider   acetaminophen (TYLENOL) 500 MG tablet Take 1,000 mg by mouth daily as needed for mild pain  at PRN Yes Unknown, Entered By History   alendronate (FOSAMAX) 70 MG tablet Take 70 mg by mouth every 7 days SUNDAYS 7/19/2020 at Unknown time Yes Reported, Patient   calcium carbonate-vitamin D (OSCAL W/D) 500-200 MG-UNIT tablet Take 1 tablet by mouth daily  7/25/2020 at Unknown time Yes Reported, Patient   digoxin (LANOXIN) 125 MCG tablet Take 1 tablet (125 mcg) by mouth daily 7/24/2020 at pm Yes Tiffany Galvan MD   furosemide (LASIX) 20 MG tablet Take 20 mg by mouth Every Mon, Wed, Fri Morning 7/24/2020 at Unknown time Yes Beverly Carvalho APRN CNP   metoprolol tartrate (LOPRESSOR) 25 MG tablet Take 12.5 mg by mouth 2 times daily 7/25/2020 at x1 Yes Reported, Patient   Multiple Vitamins-Minerals (PRESERVISION AREDS 2+MULTI VIT) CAPS Take 1 tablet by mouth 2 times daily 7/25/2020 at x1 Yes Unknown, Entered By History   multivitamin w/minerals (MULTI-VITAMIN) tablet Take 1 tablet by mouth daily  7/25/2020 at Unknown time Yes Reported, Patient   UNKNOWN TO PATIENT \"Tea for constipation\" per patient. Drink 1 cup of tea daily as needed " for constipation  at PRN Yes Unknown, Entered By History   VITAMIN D, CHOLECALCIFEROL, PO Take 1 tablet by mouth daily Patient unsure of tablet strength 7/25/2020 at Unknown time Yes Reported, Patient   warfarin ANTICOAGULANT (COUMADIN) 2.5 MG tablet Take 2.5 mg by mouth daily Except Wednesdays and Saturdays 7/24/2020 at Unknown time Yes Unknown, Entered By History   warfarin ANTICOAGULANT (COUMADIN) 2.5 MG tablet Take 1.25 mg by mouth daily On Wednesdays and Saturdays 7/22/2020 Yes Unknown, Entered By History

## 2020-07-25 NOTE — ED NOTES
Bed: ED28  Expected date:   Expected time:   Means of arrival:   Comments:  [facility] [place]  - [phone] - 89 F chronic back pain negative covid eta 1136

## 2020-07-25 NOTE — PROGRESS NOTES
RECEIVING UNIT ED HANDOFF REVIEW    ED Nurse Handoff Report was reviewed by: Pastora Hunter RN on July 25, 2020 at 4:04 PM

## 2020-07-25 NOTE — PLAN OF CARE
Arrived to the floor from the ED at 1630.  A&OX4, Kashia.  Patient states at rest her back pain is not too bad but states it increases with sharp muscle spasms.  PRN Norco given. PRN senna given for constipation. Tolerated regular diet for dinner.  Coccyx and mid-spine has red/blanchable areas, mepilex covering and bilateral heels and elbows red/blanchable, pillows used and turned and repositioned.

## 2020-07-25 NOTE — PHARMACY-ANTICOAGULATION SERVICE
Clinical Pharmacy - Warfarin Dosing Consult     Pharmacy has been consulted to manage this patient s warfarin therapy.  Indication: Atrial Fibrillation  Therapy Goal: INR 2-3  Warfarin Prior to Admission: Yes  Warfarin PTA Regimen: 1.25mg on wed/sat and 2.5mg row  Recent documented change in oral intake/nutrition: No    INR   Date Value Ref Range Status   07/25/2020 4.38 (H) 0.86 - 1.14 Final   07/13/2020 3.21 (H) 0.86 - 1.14 Final       Recommend to hold warfarin this shawn.  Pharmacy will monitor Elizabeth Ruggiero daily and order warfarin doses to achieve specified goal.      Please contact pharmacy as soon as possible if the warfarin needs to be held for a procedure or if the warfarin goals change.

## 2020-07-25 NOTE — ED PROVIDER NOTES
History     Chief Complaint:  Back Pain    HPI   Elizabeth Ruggiero is a 89 year old female taking Coumadin with a history of atrial fibrillation, congestive heart failure, DVT, and arthritis who presents to the emergency department for evaluation of low back pain. The patient was recently seen on 07/13/2020 where she had a lumbar spine CT that reveled lumbar fracture, results below. Her pain has gradually worsened since then. The patient did receive a prescription for Tylenol #3 and has been taking this, but notes that she has been feeling very constipated. Yesterday, the staff at the patient's independent nursing home called her primary doctor and she was given a prescription for Norco. The daughter of the patient and staff at the nursing home wished for the patient to be evaluated for her increasing back pain. Currently, the patient states that she experiencing her normal level of back pain. She denies any new falls or injury to her back.     [facility] Emergency Department 07/13/2020  CT Lumbar Spine W Contrast  1. Findings that appear most consistent with a subtle acute minimally   displaced L1 superior endplate compression fracture.   2. No other definite acute fracture. Age indeterminate but potentially   chronic mild T11 compression deformity.   3. Multilevel degenerative disc disease and facet arthropathy of the   lumbar spine, with varying degrees of spinal and neural foraminal   stenosis, as detailed.   4. Levoconvex scoliosis with multilevel spondylolisthesis, as   described. As per radiology.    Allergies:  No Known Drug Allergies    Medications:    Fosamax  Oscal  Lanoxin  Lasix  Provera  Lopressor  Coumadin    Past Medical History:    Heart failure with preserved ejection fraction  Acute diastolic CHF  Acute left hemiparesis  Arthritis  Atrial fibrillation  Atrial flutter  Community acquired pneumonia  DVT of lower extremity  Hepatic congestion  Hypertension  Hyperlipidemia  Mitral valve  "insufficiency  Post menopausal bleeding  Shortness of breath  Transient ischemic attack  Vitamin D deficiency  Spinal stenosis  Osteoporosis    Past Surgical History:    Appendectomy  Left femur repair    Family History:    Mother: Colon cacner  Sister(s): diabetes, breast cancer    Social History:  The patient presents alone.  Smoking Status: Never  Smokeless Tobacco: Never  Alcohol Use: No  Drug Use: No  Marital Status:        Review of Systems   Constitutional: Negative for chills and fever.   Respiratory: Negative for shortness of breath.    Cardiovascular: Negative for chest pain.   Gastrointestinal: Positive for constipation.   Musculoskeletal: Positive for back pain.   Skin: Negative for wound.   Neurological: Negative for numbness and headaches.   All other systems reviewed and are negative.      Physical Exam   Vitals:  Patient Vitals for the past 24 hrs:   BP Temp Temp src Pulse Resp SpO2 Height Weight   07/25/20 1351 119/76 -- -- 72 18 96 % -- --   07/25/20 1143 (!) 128/93 98.1  F (36.7  C) Oral 71 16 93 % 1.676 m (5' 6\") 56.2 kg (124 lb)       Physical Exam  General: Alert, No obvious discomfort, well kept  Eyes: PERRL, conjunctivae pink no scleral icterus or conjunctival injection  ENT:   Moist mucus membranes, posterior oropharynx clear without erythema or exudates, No lymphadenopathy, Normal voice  Resp:  Lungs clear to auscultation bilaterally, no crackles/rubs/wheezes. Good air movement  CV:  Normal rate and rhythm, no murmurs/rubs/gallops  GI:  Abdomen soft and non-distended.  Normoactive BS.  No tenderness, guarding or rebound, No masses  Skin:  Warm, dry.  No rashes or petechiae  Musculoskeletal: No peripheral edema or calf tenderness, Normal gross ROM, unable to elicit tenderness along spine and paravertebral area. Pain with movement, appears to have spasming.   Neuro: Alert and oriented to person/place/time, normal sensation  Psychiatric: Normal affect, cooperative, good eye contact "     Emergency Department Course   Laboratory:  Asymptomatic Covid-19 Virus by PCR: Pending    Interventions:  1205 Lidocaine 2 patches Transdermal  1405 Zofran 4 mg PO  1435 Robaxin 500 mg PO  1435 Norco 325 mg PO    Emergency Department Course:  Past medical records, nursing notes, and vitals reviewed.    1147 I performed an exam of the patient as documented above.     1423 I rechecked the patient and discussed the results of her workup thus far.     1425 I spoke with Dr. Hdz, hospitalist, who agreed to admit the patient.     Findings and plan explained to the Patient who consents to admission. Discussed the patient with Dr. Hdz, who will admit the patient to a observation bed for further monitoring, evaluation, and treatment.    Impression & Plan    Covid-19  Elizabeth Ruggiero was evaluated during a global COVID-19 pandemic, which necessitated consideration that the patient might be at risk for infection with the SARS-CoV-2 virus that causes COVID-19.   Applicable protocols for evaluation were followed during the patient's care.   COVID-19 was considered as part of the patient's evaluation. The plan for testing is:  a test was obtained during this visit.    Medical Decision Making:  Elizabeth Ruggiero is a 89 year old female who presents today for evaluation of ongoing lower back pain.  She has a recently diagnosed compression fracture in her back.  She lives at a assisted living facility and manages her own care and medications.  She does have someone set up her medications but she administers them to herself.  Today when family were visiting they felt that she needed to be brought to the emergency department for evaluation of her back pain.  She has had no new falls.  She denies any loss of bowel or bladder control.  She does not have pain at rest and I was unable to elicit pain well at rest.  Her discomfort comes with movement.  She does appear to be having some paravertebral spasming.  No indication for further  imaging.  At this point plan will be to admit patient for intractable back pain with likely need for placement.  I discussed the case with the hospitalist who does accept patient to his service.        Diagnosis:    ICD-10-CM    1. Intractable low back pain  M54.5        Disposition:  Admitted to the hospital under the care of Олег Estrella, am serving as a scribe on 7/25/2020 at 11:43 AM to personally document services performed by Lucas Collins APRN* based on my observations and the provider's statements to me.   Олег Pichardo  7/25/2020    EMERGENCY DEPARTMENT       Lucas Collins APRN CNP  07/25/20 1211

## 2020-07-25 NOTE — H&P
[facility]    History and Physical - Hospitalist Service       Date of Admission:  7/25/2020    Assessment & Plan   Elizabeth Ruggiero is a 89 year old female with back pain due to a acute minimally  displaced L1 superior endplate compression fracture seen on a 7/13 CT scan.  She has been at her AL facility taking Tylenol #3 but is unable to ambulate.  She is being admitted for pain control and transitional care unit placement.    Acute back pain likely due to L1 compression fracture   DJD of the spine   Osteoporosis   Opioid induced constipation    Admit to observation for transitional care unit placement     Consult CC/SW    Physcial therapy evaluation     Try Norco and lidocaine patches for pain     Bowel regimen     Hold alendronate and vitamin D, calcium while on observation     Chronic compensated diastolic congestive heart failure   Atrial fibrillation on warfarin   Hypertension     Continue digoxin, furosemide, metoprolol    Check INR, consult pharmacist to dose warfarin     Check a digoxin level     History of recurrent deep venous thrombosis     Continue anticoagulation      Diet: regular   DVT Prophylaxis: warfarin   Wesley Catheter: not present  Code Status: Full        Disposition Plan   Expected discharge: Tomorrow, recommended to transitional care unit once adequate pain management/ tolerating PO medications.  Entered: Zi Hdz MD 07/25/2020, 3:01 PM     The patient's care was discussed with the Patient.    Zi Hdz MD  [facility]    ______________________________________________________________________    Chief Complaint   Back pain     History is obtained from the patient, ER provider and [facility].    History of Present Illness   Elizabeth Ruggiero is a 89 year old female who has chronic diastolic congestive heart failure and is on warfarin for atrial fibrillation. She also has osteoporosis.  She lives at an AL facility.  A few weeks ago she developed  severe mid back pain.  She has not fallen or had any trauma.  On 7/13 she had a CT which showed:  1. Findings that appear most consistent with a subtle acute minimally  displaced L1 superior endplate compression fracture.  2. No other definite acute fracture. Age indeterminate but potentially  chronic mild T11 compression deformity.  3. Multilevel degenerative disc disease and facet arthropathy of the  lumbar spine, with varying degrees of spinal and neural foraminal  stenosis, as detailed.  4. Levoconvex scoliosis with multilevel spondylolisthesis, as  described.    The patient has been using Tylenol #3 but is still having severe pain and has developed constipation.  Yesterday she was prescribed Norco but it is unclear whether she has taken it.  Today her family visited her and the patient was unable to get out of bed due to the pain.  She was taken to the ED where she was stable and was given a Norco and lidocaine patches.  She is going to be admitted for pain control and placement.    The patient denies fever, cough, chills, abdominal pain, dysuria.      Review of Systems    The 10 point Review of Systems is negative other than noted in the HPI or here.     Past Medical History    I have reviewed this patient's medical history and updated it with pertinent information if needed.   Past Medical History:   Diagnosis Date     (HFpEF) heart failure with preserved ejection fraction (H)      Acute diastolic CHF (congestive heart failure) (H)      Acute left hemiparesis (H) 2/9/2019     Arthritis      Atrial fibrillation (H)      Atrial fibrillation with RVR (H)      Atrial flutter (H)      Atrial flutter with rapid ventricular response (H) 4/23/2019     Community acquired pneumonia      Diastolic CHF (H) 04/29/2019     DVT of lower extremity (deep venous thrombosis) (H)     recurrent     Hepatic congestion     improved with diuretics for CHF     HTN (hypertension)      Hyperlipidemia      Mitral valve insufficiency       Pneumonia      Postmenopausal bleeding 01/2020    [facility], [facility]     Severe mitral regurgitation      SOB (shortness of breath)      TIA (transient ischemic attack)      Vitamin D deficiency        Past Surgical History   I have reviewed this patient's surgical history and updated it with pertinent information if needed.  Past Surgical History:   Procedure Laterality Date     APPENDECTOMY       FRACTURE SURGERY       repair left femur         Social History   I have reviewed this patient's social history and updated it with pertinent information if needed.      Family History   I have reviewed this patient's family history and updated it with pertinent information if needed.  Family History   Problem Relation Age of Onset     Colon Cancer Mother      Breast Cancer Sister      Diabetes Sister        Prior to Admission Medications   Prior to Admission Medications   Prescriptions Last Dose Informant Patient Reported? Taking?   Acetaminophen (TYLENOL PO)  Self Yes No   Sig: Take 325 mg by mouth every 6 hours as needed    B Complex Vitamins (VITAMIN B COMPLEX PO)   Yes No   Sig: Take 2 capsules by mouth   Multiple Vitamins-Minerals (PRESERVISION AREDS 2+MULTI VIT) CAPS  Self Yes No   Sig: Take 1 tablet by mouth 2 times daily   VITAMIN D, CHOLECALCIFEROL, PO   Yes No   Sig: Take by mouth daily   Warfarin Sodium (COUMADIN PO)   Yes No   Sig: Take 2.5 mg by mouth daily    acetaminophen-codeine (TYLENOL #3) 300-30 MG tablet   No No   Sig: Take 1-2 tablets by mouth every 6 hours as needed for breakthrough pain or severe pain   alendronate (FOSAMAX) 70 MG tablet  Self Yes No   Sig: Take 70 mg by mouth every 7 days SUNDAYS   calcium carbonate-vitamin D (OSCAL W/D) 500-200 MG-UNIT tablet  Self Yes No   Sig: Take 1 tablet by mouth 2 times daily   digoxin (LANOXIN) 125 MCG tablet   No No   Sig: Take 1 tablet (125 mcg) by mouth daily   furosemide (LASIX) 20 MG tablet   Yes No   Sig: Take 20 mg by mouth  Every Mon, Wed, Fri Morning   medroxyPROGESTERone (PROVERA) 10 MG tablet   Yes No   Sig: Take 10 mg by mouth daily   metoprolol tartrate (LOPRESSOR) 25 MG tablet   Yes No   Sig: Take 12.5 mg by mouth 2 times daily   multivitamin w/minerals (MULTI-VITAMIN) tablet   Yes No   Sig: Take 3 tablets by mouth daily      Facility-Administered Medications: None     Allergies   No Known Allergies    Physical Exam   Vital Signs: Temp: 98.1  F (36.7  C) Temp src: Oral BP: 119/76 Pulse: 72   Resp: 18 SpO2: 96 % O2 Device: None (Room air)    Weight: 124 lbs 0 oz    Constitutional: awake, alert, cooperative, no apparent distress  Eyes: Lids and lashes normal, pupils equal, round, sclera clear, conjunctiva normal  ENT: Normocephalic, without obvious abnormality, atraumatic.  Respiratory: No increased work of breathing, good air exchange, clear to auscultation bilaterally, no crackles or wheezing  Cardiovascular:regular rate and rhythm, normal S1 and S2, no S3 or S4, and no murmur noted  GI: normal bowel sounds, soft, non-distended, non-tender  Skin: no rashes  Musculoskeletal: There is no redness, warmth, or swelling of the joints. She is tender over the mid spine around L1  Neurologic: Awake, alert, oriented to name, place and time.  Cranial nerves II-XII are grossly intact.  Motor is 5 out of 5 bilaterally. No foot drop.  Neuropsychiatric: General: normal, calm and normal eye contact    Data   Data reviewed today: I reviewed all medications, new labs and imaging results over the last 24 hours. I personally reviewed no images or EKG's today.    No lab results found in last 7 days.    No results found for this or any previous visit (from the past 24 hour(s)).

## 2020-07-26 ENCOUNTER — APPOINTMENT (OUTPATIENT)
Dept: GENERAL RADIOLOGY | Facility: CLINIC | Age: 85
End: 2020-07-26
Attending: HOSPITALIST
Payer: COMMERCIAL

## 2020-07-26 ENCOUNTER — APPOINTMENT (OUTPATIENT)
Dept: PHYSICAL THERAPY | Facility: CLINIC | Age: 85
End: 2020-07-26
Attending: HOSPITALIST
Payer: COMMERCIAL

## 2020-07-26 LAB
ANION GAP SERPL CALCULATED.3IONS-SCNC: 6 MMOL/L (ref 3–14)
BUN SERPL-MCNC: 17 MG/DL (ref 7–30)
CALCIUM SERPL-MCNC: 8.8 MG/DL (ref 8.5–10.1)
CHLORIDE SERPL-SCNC: 96 MMOL/L (ref 94–109)
CO2 SERPL-SCNC: 26 MMOL/L (ref 20–32)
CORTIS SERPL-MCNC: 16 UG/DL (ref 4–22)
CREAT SERPL-MCNC: 0.88 MG/DL (ref 0.52–1.04)
GFR SERPL CREATININE-BSD FRML MDRD: 56 ML/MIN/{1.73_M2}
GLUCOSE SERPL-MCNC: 97 MG/DL (ref 70–99)
INR PPP: 5.07 (ref 0.86–1.14)
NT-PROBNP SERPL-MCNC: 5964 PG/ML (ref 0–1800)
OSMOLALITY SERPL: 270 MMOL/KG (ref 280–301)
POTASSIUM SERPL-SCNC: 3.9 MMOL/L (ref 3.4–5.3)
SARS-COV-2 RNA SPEC QL NAA+PROBE: NOT DETECTED
SODIUM SERPL-SCNC: 128 MMOL/L (ref 133–144)
SODIUM UR-SCNC: 12 MMOL/L
SPECIMEN SOURCE: NORMAL
T4 FREE SERPL-MCNC: 1.11 NG/DL (ref 0.76–1.46)
TSH SERPL DL<=0.005 MIU/L-ACNC: 6.24 MU/L (ref 0.4–4)

## 2020-07-26 PROCEDURE — 96374 THER/PROPH/DIAG INJ IV PUSH: CPT

## 2020-07-26 PROCEDURE — 83930 ASSAY OF BLOOD OSMOLALITY: CPT | Performed by: HOSPITALIST

## 2020-07-26 PROCEDURE — 25000132 ZZH RX MED GY IP 250 OP 250 PS 637: Performed by: HOSPITALIST

## 2020-07-26 PROCEDURE — 36415 COLL VENOUS BLD VENIPUNCTURE: CPT | Performed by: HOSPITALIST

## 2020-07-26 PROCEDURE — G0378 HOSPITAL OBSERVATION PER HR: HCPCS

## 2020-07-26 PROCEDURE — 97161 PT EVAL LOW COMPLEX 20 MIN: CPT | Mod: GP | Performed by: PHYSICAL THERAPIST

## 2020-07-26 PROCEDURE — 99207 ZZC CDG-CODE CATEGORY CHANGED: CPT | Performed by: HOSPITALIST

## 2020-07-26 PROCEDURE — 85610 PROTHROMBIN TIME: CPT | Performed by: HOSPITALIST

## 2020-07-26 PROCEDURE — 83880 ASSAY OF NATRIURETIC PEPTIDE: CPT | Performed by: HOSPITALIST

## 2020-07-26 PROCEDURE — 97530 THERAPEUTIC ACTIVITIES: CPT | Mod: GP | Performed by: PHYSICAL THERAPIST

## 2020-07-26 PROCEDURE — 97116 GAIT TRAINING THERAPY: CPT | Mod: GP | Performed by: PHYSICAL THERAPIST

## 2020-07-26 PROCEDURE — 73502 X-RAY EXAM HIP UNI 2-3 VIEWS: CPT

## 2020-07-26 PROCEDURE — 82533 TOTAL CORTISOL: CPT | Performed by: HOSPITALIST

## 2020-07-26 PROCEDURE — 80048 BASIC METABOLIC PNL TOTAL CA: CPT | Performed by: HOSPITALIST

## 2020-07-26 PROCEDURE — 84439 ASSAY OF FREE THYROXINE: CPT | Performed by: HOSPITALIST

## 2020-07-26 PROCEDURE — 99226 ZZC SUBSEQUENT OBSERVATION CARE,LEVEL III: CPT | Performed by: HOSPITALIST

## 2020-07-26 PROCEDURE — 84443 ASSAY THYROID STIM HORMONE: CPT | Performed by: HOSPITALIST

## 2020-07-26 PROCEDURE — 25000128 H RX IP 250 OP 636: Performed by: HOSPITALIST

## 2020-07-26 RX ORDER — FUROSEMIDE 10 MG/ML
40 INJECTION INTRAMUSCULAR; INTRAVENOUS EVERY 12 HOURS
Status: DISCONTINUED | OUTPATIENT
Start: 2020-07-26 | End: 2020-07-27

## 2020-07-26 RX ADMIN — DIGOXIN 125 MCG: 125 TABLET ORAL at 08:35

## 2020-07-26 RX ADMIN — HYDROCODONE BITARTRATE AND ACETAMINOPHEN 1 TABLET: 5; 325 TABLET ORAL at 03:25

## 2020-07-26 RX ADMIN — HYDROCODONE BITARTRATE AND ACETAMINOPHEN 1 TABLET: 5; 325 TABLET ORAL at 08:35

## 2020-07-26 RX ADMIN — LIDOCAINE 2 PATCH: 560 PATCH PERCUTANEOUS; TOPICAL; TRANSDERMAL at 20:51

## 2020-07-26 RX ADMIN — HYDROCODONE BITARTRATE AND ACETAMINOPHEN 1 TABLET: 5; 325 TABLET ORAL at 22:12

## 2020-07-26 RX ADMIN — METOPROLOL TARTRATE 12.5 MG: 25 TABLET, FILM COATED ORAL at 08:35

## 2020-07-26 RX ADMIN — ONDANSETRON 4 MG: 4 TABLET, ORALLY DISINTEGRATING ORAL at 12:40

## 2020-07-26 RX ADMIN — HYDROCODONE BITARTRATE AND ACETAMINOPHEN 1 TABLET: 5; 325 TABLET ORAL at 12:41

## 2020-07-26 RX ADMIN — MAGNESIUM HYDROXIDE 30 ML: 400 SUSPENSION ORAL at 08:35

## 2020-07-26 RX ADMIN — METOPROLOL TARTRATE 12.5 MG: 25 TABLET, FILM COATED ORAL at 20:42

## 2020-07-26 RX ADMIN — FUROSEMIDE 40 MG: 10 INJECTION, SOLUTION INTRAVENOUS at 15:23

## 2020-07-26 RX ADMIN — BISACODYL 10 MG: 10 SUPPOSITORY RECTAL at 20:42

## 2020-07-26 RX ADMIN — DOCUSATE SODIUM 50 MG AND SENNOSIDES 8.6 MG 2 TABLET: 8.6; 5 TABLET, FILM COATED ORAL at 20:42

## 2020-07-26 RX ADMIN — DOCUSATE SODIUM 50 MG AND SENNOSIDES 8.6 MG 2 TABLET: 8.6; 5 TABLET, FILM COATED ORAL at 08:35

## 2020-07-26 NOTE — PLAN OF CARE
A&OX4, forgetful and very [place].  Up with 1 and walker.  Taking Norco for pain.  Voiding bedside commode.  Bruising to left buttocks/hip noted today, hospitalist updated and X-ray done, MD aware.  BNP elevated and Na+ 128, hospitalist is aware of both, Lasix changed to IV and I&O's ordered along with daily weight.  Coccyx, mid-back, bilateral heels and elbows red/blanchable, turned and repositoned frequently, pillows in use.  Purewick started this evening due to increased urination from IV lasix.

## 2020-07-26 NOTE — PROGRESS NOTES
-adequate pain control on oral analgesics : met     Pt a&ox4 - repeats questions, VSS on RA, up with 1, voiding adequately in BSC, norco an lido patches for pain, regular diet, turn and repo q 2 - redness on bilateral heels, bilateral elbows, coccyx and spine. Patient slept between cares.

## 2020-07-26 NOTE — PROGRESS NOTES
[facility]      OUTPATIENT PHYSICAL THERAPY EVALUATION  PLAN OF TREATMENT FOR OUTPATIENT REHABILITATION  (COMPLETE FOR INITIAL CLAIMS ONLY)  Patient's Last Name, First Name, M.I.  YOB: 1926  Elizabeth Ruggiero                           Provider's Name  [facility] Medical Record No.  [id number]                               Onset Date:  07/25/20   Start of Care Date:  07/26/20      Type:     _X_PT   ___OT   ___SLP Medical Diagnosis:  L1 compression fracture                        PT Diagnosis:  Difficulty ambulating   Visits from SOC:  1   _________________________________________________________________________________  Plan of Treatment/Functional Goals    Planned Interventions:  ,    bed mobility training, gait training, strengthening, transfer training, progressive activity/exercise, home program guidelines,       Goals: See Physical Therapy Goals on Care Plan in [employer] electronic health record.    Therapy Frequency: 3x/week  Predicted Duration of Therapy Intervention: 5 days  _________________________________________________________________________________    I CERTIFY THE NEED FOR THESE SERVICES FURNISHED UNDER        THIS PLAN OF TREATMENT AND WHILE UNDER MY CARE     (Physician co-signature of this document indicates review and certification of the therapy plan).                Certification date from: 07/26/20, Certification date to: 07/31/20    Referring Physician: Zi Hdz MD            Initial Assessment        See Physical Therapy evaluation dated 07/26/20 in Epic electronic health record.

## 2020-07-26 NOTE — PROGRESS NOTES
[facility]    Medicine Progress Note - Hospitalist Service       Date of Admission:  7/25/2020  Assessment & Plan   Elizabeth Ruggiero is a 89 year old female with back pain due to a acute minimally  displaced L1 superior endplate compression fracture seen on a 7/13 CT scan.  She has been at her AL facility taking Tylenol #3 but is unable to ambulate.  She is being admitted for pain control and transitional care unit placement.  She also has hyponatremia and elevated INR.    Acute back pain likely due to L1 compression fracture   DJD of the spine   Osteoporosis   Opioid induced constipation    Admitted to observation for transitional care unit placement     Consult CC/SW    Physcial therapy evaluation     Continue Norco and lidocaine patches for pain     Bowel regimen     Holding alendronate and vitamin D, calcium while on observation     Chronic diastolic congestive heart failure ?Acute exacerbation  Atrial fibrillation on warfarin   Hypertension   Her BNP is 6000 and Na is 128.  On exam I do not hear rales and she does not have leg edema.  She is on nasal canula but unclear whether she needs it.    Continue digoxin,metoprolol    Change furosemide to intravenous    Chest X-ray    Monitor weight, urine output, electrolytes and renal function     Hyponatremia   Urine OSM is 270, urine Na 12 and BNP 6000.  Suspect due to congestive heart failure     Will diurese and monitor     History of recurrent deep venous thrombosis   Elevated INR  INR   Date Value Ref Range Status   07/26/2020 5.07 (HH) 0.86 - 1.14 Final     Comment:     Critical Value called to and read back by  HONG BOSE RN STATION 55 @ 4113 BY MARK.       Hold warfarin and monitor INR     Diet: Regular Diet Adult    DVT Prophylaxis: Pneumatic Compression Devices  Wesley Catheter: not present  Code Status: Full Code         Disposition Plan   Expected discharge: Tomorrow, recommended to transitional care unit once safe disposition plan/ TCU bed  available.  Entered: Zi Hdz MD 07/26/2020, 1:48 PM       The patient's care was discussed with the Patient.    Zi Hdz MD  Hospitalist Service  [facility]    ______________________________________________________________________    Interval History   Still having some back pain.  No chest pain or shortness of breath.    Data reviewed today: I reviewed all medications, new labs and imaging results over the last 24 hours. I personally reviewed no images or EKG's today.    Physical Exam   Vital Signs: Temp: 97.6  F (36.4  C) Temp src: Oral BP: 111/62 Pulse: 58   Resp: 16 SpO2: 97 % O2 Device: Nasal cannula Oxygen Delivery: 2 LPM  Weight: 124 lbs 0 oz  Constitutional: awake, alert, cooperative, no apparent distress  Respiratory: No increased work of breathing, good air exchange, clear to auscultation bilaterally, no crackles or wheezing  Cardiovascular: regular rate and rhythm, normal S1 and S2, no S3 or S4, and no murmur noted  GI:  normal bowel sounds, soft, non-distended, non-tender  Neuropsychiatric: General: normal, calm and normal eye contact    Data   Recent Labs   Lab 07/26/20  0644 07/25/20  1638   WBC  --  11.0   HGB  --  13.0   MCV  --  95   PLT  --  208   INR 5.07* 4.38*   * 129*   POTASSIUM 3.9 3.9   CHLORIDE 96 95   CO2 26 25   BUN 17 17   CR 0.88 0.79   ANIONGAP 6 9   CHRIS 8.8 9.0   GLC 97 112*     Recent Results (from the past 24 hour(s))   XR Pelvis w Hip Left G/E 2 Views    Narrative    Examination:  XR PELVIS AND HIP LEFT 2 VIEWS    Date:  7/26/2020 10:34 AM     Clinical Information: Pain, bruising.     Additional Information: none    Comparison: CT abdomen and pelvis 7/13/2020.      Impression    Impression:  1.  No displaced or visible nondisplaced fracture in the pelvis.  2.  Normal hip joint alignment. The pubic symphysis and SI joints  appear intact.  3.  Prior left femur fixation of an intramedullary nail, with a  proximal supplementary screw,  and single distal interlocking screw.  There is chronic lucency around the femoral neck component (2 mm), and  around the greater trochanteric portion of the intramedullary nail (4  mm). The distal interlocking screw is well-seated, not loose. The  intratrochanteric fracture is healed, without acute abnormality.  4.  Marked thoracolumbar scoliosis with advanced degenerative disc  changes.    TAY GLORIA MD     Medications       digoxin  125 mcg Oral Daily     lidocaine  2 patch Transdermal Q24h    And     lidocaine   Transdermal Q8H     metoprolol tartrate  12.5 mg Oral BID     senna-docusate  1 tablet Oral BID    Or     senna-docusate  2 tablet Oral BID     sodium chloride (PF)  3 mL Intracatheter Q8H     warfarin-No DOSE today  1 each Does not apply no dose today (warfarin)

## 2020-07-26 NOTE — PLAN OF CARE
"Discharge Planner PT   Patient plan for discharge: Not stated  Current status: PT orders received, eval completed, treatment initiated. Pt is a 89yo female admitted under observation status for evaluation of back pain likely d/t L1 superior end plate compression fracture.     Needs extensive encouragement and education on importance  of mobility and assessing for disch needs, pt eventually agreeable. Educated on spine precautions. Pt requires Campos for bed mobility, Campos to stand from low surfaces, CGA from elevated surfaces. Pt needs CGA/Campos to ambulate 50' in room with FWW, slightly unsteady but no LOB, forward flexed kyphotic posture. After activity pt reports \"I guess it does feel good once you're up\"; encouraged pt to use bathroom for toileting with assist and ambulate with nursing staff, goal of meals up in chair. RN updated.  Barriers to return to prior living situation: level of assist, fall risk, lives alone  Recommendations for discharge: TCU  Rationale for recommendations: Pt is below baseline limited by pain, decreased activity tolerance and decreased balance, thus pt would benefit from continued PT while IP and at TCU to improve functional strength, balance, activity tolerance, safety and IND with functional mobility and reduce falls risk prior to returning home alone.       Entered by: Zeynep Nicolas 07/26/2020 1:37 PM       "

## 2020-07-26 NOTE — PROGRESS NOTES
No call back regarding Na+ 129, original page was sent at [phone].  Hospitalist did put an order in for a BMP for tomorrow morning.

## 2020-07-26 NOTE — CONSULTS
Care Transition Initial Assessment -      Met with: Family  (daughter Britteny)  Active Problems:    Back pain       DATA  Lives With: alone, facility resident([facility] on [address]   Living Arrangements: independent living facility  Quality of Family Relationships: involved, supportive  Description of Support System: Supportive, Involved  Who is your support system?: Children  Support Assessment: Adequate family and caregiver support.   Identified issues/concerns regarding health management:   Quality of Family Relationships: involved, supportive  Transportation Anticipated: family or friend will provide(daughter's drive)    Per care transitions consult for discharge planning and TCU placement on discharge.  Patient was admitted on 7-25-20 with back pain.  The tentative date of discharge is 7-27-20.   Patient was admitted under observation status.  Reviewed chart and received a call from patient's daughter Brittney wanting to discuss discharge plans.  Per Brittney's report, patient lives alone in an independent apartment at [facility] [address]  Patient uses no assistive device within her apartment and she uses a rolling walker for the hallways and for community ambulation.  Patient has a shower chair and a raised toilet seat in the bathroom.  Patient is independent with ADL's and IADL's including setting up medications.  Patient receives meals from the apartment.  Patient does her own laundry and she receives housekeeping services.  Patient's daughter assists with driving.  Reviewed the therapy discharge recommendations of TCU placement on discharge and patient's daughter is in agreement.  Patient has [employer] Medicare Advantage therefore if she has a skilled need they should waive the 3 day stay and patient should have TCU coverage.  Patient has been to [facility] in the past and this would be the first choice.  Referral sent, via discharge on the double, to check bed availability.  Patient will need pre-auth prior  to discharge.  Patient is asking for an update once a bed is confirmed.      ASSESSMENT  Cognitive Status:  Did not meet patient, spoke with patient's daughter on the phone  Concerns to be addressed: discharge planning, TCU placement on discharge.     PLAN  Financial costs for the patient includes N/A.  Patient given options and choices for discharge TCU choices.  Patient/family is agreeable to the plan?  Yes  Transportation/person available to transport on day of discharge  is [employer] and have they been notified/set up TBD  Patient Goals and Preferences: TCU placement on discharge.  Patient anticipates discharging to:  TCU.    Will confirm a bed, continue to follow, and assist with a safe discharge plan.      RENATO Cordoba, [facility]  Lead   [phone]  [facility]

## 2020-07-26 NOTE — PROVIDER NOTIFICATION
Brief update:    Page regarding abnormal urinalysis.    Patient with 7 red cells on high-powered field, prior history of mild hematuria as well.  On anticoagulation for recurrent DVTs.    No change to management currently.  Defer to rounding provider regarding need for cystoscopy based on patient's symptoms, though likely hematuria secondary to chronic anticoagulation.    Lucas Richardson MD  11:12 PM

## 2020-07-26 NOTE — PROGRESS NOTES
Tried to call pharmacy to ask about Provera still needing to be verified?  There was no answer, will pass this onto next shift.

## 2020-07-26 NOTE — PROGRESS NOTES
07/26/20 0945   Quick Adds   Quick Adds Certification   Type of Visit Initial PT Evaluation   Living Environment   Lives With alone;facility resident  ([facility] [address])   Living Arrangements independent living facility   Home Accessibility no concerns   Transportation Anticipated family or friend will provide  (daughter's drive)   Living Environment Comment Pt states she is in the independent part of [facility]; walk-in shower with chair, RTS   Self-Care   Usual Activity Tolerance good   Current Activity Tolerance fair   Equipment Currently Used at Home walker, rolling;shower chair   Activity/Exercise/Self-Care Comment IND without AD within her apartment, uses FWW for longer hallway distances or when she leaves with family   Functional Level Prior   Ambulation 0-->independent   Transferring 0-->independent   Toileting 1-->assistive equipment   Bathing 1-->assistive equipment   Fall history within last six months no   Which of the above functional risks had a recent onset or change? ambulation;transferring;toileting;bathing;dressing   Prior Functional Level Comment IND with ADLs/IADLs; gets meals from ILF otherwise states she sets up her own medications, dtrs assist in driving; has housekeeping assist but does her own laundry   General Information   Onset of Illness/Injury or Date of Surgery - Date 07/25/20   Referring Physician iZ Hdz MD   Patient/Family Goals Statement To go home   Pertinent History of Current Problem (include personal factors and/or comorbidities that impact the POC)  Pt is a 89yo female admitted under observation status for evaluation of back pain likely d/t L1 superior end plate compression fracture.    Precautions/Limitations fall precautions;spinal precautions   General Observations Pt resting in bed   General Info Comments Activity: ambulate with assistance 4 times daily   Cognitive Status Examination   Orientation orientation to person, place and time   Level of  "Consciousness alert   Follows Commands and Answers Questions 100% of the time   Personal Safety and Judgment intact   Pain Assessment   Patient Currently in Pain   (\"so bad\" but does not rate)   Integumentary/Edema   Integumentary/Edema Comments ecchymosis to L lateral hip/gluteal region, RN aware   Posture    Posture Forward head position;Protracted shoulders;Kyphosis   Range of Motion (ROM)   ROM Comment BLE WFL for mobility   Strength   Strength Comments appears functionally weak with transfers, unable to formally assess d/t pain   Bed Mobility   Bed Mobility Comments Campos supine>sit   Transfer Skills   Transfer Comments Campos sit>stand to FWW   Gait   Gait Comments 3' forward with FWW and CGA/Campos for balance, forward flexed posture, shuffled gait   Balance   Balance Comments decreased standing balance without UE support   Sensory Examination   Sensory Perception Comments denies N/T   General Therapy Interventions   Planned Therapy Interventions bed mobility training;gait training;strengthening;transfer training;progressive activity/exercise;home program guidelines   Clinical Impression   Criteria for Skilled Therapeutic Intervention yes, treatment indicated   PT Diagnosis Difficulty ambulating   Influenced by the following impairments Pain, decreased balance, decreased activity tolerance   Functional limitations due to impairments Decreased safety and IND with functional transfers, gait   Clinical Presentation Stable/Uncomplicated   Clinical Decision Making (Complexity) Low complexity   Therapy Frequency 3x/week   Predicted Duration of Therapy Intervention (days/wks) 5 days   Anticipated Discharge Disposition Transitional Care Facility;Home with Home Therapy   Risk & Benefits of therapy have been explained Yes   Patient, Family & other staff in agreement with plan of care Yes   Therapy Certification   Start of care date 07/26/20   Certification date from 07/26/20   Certification date to 07/31/20   Medical " "Diagnosis L1 compression fracture   [facility] TM \"6 Clicks\"   2016, Trustees of [facility], under license to [employer].  All rights reserved.   6 Clicks Short Forms Basic Mobility Inpatient Short Form   [facility]  \"6 Clicks\" V.2 Basic Mobility Inpatient Short Form   1. Turning from your back to your side while in a flat bed without using bedrails? 3 - A Little   2. Moving from lying on your back to sitting on the side of a flat bed without using bedrails? 3 - A Little   3. Moving to and from a bed to a chair (including a wheelchair)? 3 - A Little   4. Standing up from a chair using your arms (e.g., wheelchair, or bedside chair)? 3 - A Little   5. To walk in hospital room? 3 - A Little   6. Climbing 3-5 steps with a railing? 3 - A Little   Basic Mobility Raw Score (Score out of 24.Lower scores equate to lower levels of function) 18   Total Evaluation Time   Total Evaluation Time (Minutes) 10     "

## 2020-07-26 NOTE — PROVIDER NOTIFICATION
Paged hospitalist to inform that new brusing noted on left buttocks/posterior hip area, this was not noted yesterday.

## 2020-07-27 ENCOUNTER — APPOINTMENT (OUTPATIENT)
Dept: PHYSICAL THERAPY | Facility: CLINIC | Age: 85
End: 2020-07-27
Payer: COMMERCIAL

## 2020-07-27 LAB
ANION GAP SERPL CALCULATED.3IONS-SCNC: 2 MMOL/L (ref 3–14)
BUN SERPL-MCNC: 18 MG/DL (ref 7–30)
CALCIUM SERPL-MCNC: 9.4 MG/DL (ref 8.5–10.1)
CHLORIDE SERPL-SCNC: 96 MMOL/L (ref 94–109)
CO2 SERPL-SCNC: 35 MMOL/L (ref 20–32)
CREAT SERPL-MCNC: 0.98 MG/DL (ref 0.52–1.04)
GFR SERPL CREATININE-BSD FRML MDRD: 50 ML/MIN/{1.73_M2}
GLUCOSE SERPL-MCNC: 93 MG/DL (ref 70–99)
INR PPP: 3.33 (ref 0.86–1.14)
MAGNESIUM SERPL-MCNC: 2 MG/DL (ref 1.6–2.3)
POTASSIUM SERPL-SCNC: 4.1 MMOL/L (ref 3.4–5.3)
SODIUM SERPL-SCNC: 133 MMOL/L (ref 133–144)

## 2020-07-27 PROCEDURE — 97116 GAIT TRAINING THERAPY: CPT | Mod: GP | Performed by: PHYSICAL THERAPIST

## 2020-07-27 PROCEDURE — 99225 ZZC SUBSEQUENT OBSERVATION CARE,LEVEL II: CPT | Performed by: PHYSICIAN ASSISTANT

## 2020-07-27 PROCEDURE — G0378 HOSPITAL OBSERVATION PER HR: HCPCS

## 2020-07-27 PROCEDURE — 97530 THERAPEUTIC ACTIVITIES: CPT | Mod: GP | Performed by: PHYSICAL THERAPIST

## 2020-07-27 PROCEDURE — 36415 COLL VENOUS BLD VENIPUNCTURE: CPT | Performed by: HOSPITALIST

## 2020-07-27 PROCEDURE — 25000132 ZZH RX MED GY IP 250 OP 250 PS 637: Performed by: PHYSICIAN ASSISTANT

## 2020-07-27 PROCEDURE — 25000132 ZZH RX MED GY IP 250 OP 250 PS 637: Performed by: HOSPITALIST

## 2020-07-27 PROCEDURE — 25000128 H RX IP 250 OP 636: Performed by: HOSPITALIST

## 2020-07-27 PROCEDURE — 80048 BASIC METABOLIC PNL TOTAL CA: CPT | Performed by: HOSPITALIST

## 2020-07-27 PROCEDURE — 83735 ASSAY OF MAGNESIUM: CPT | Performed by: HOSPITALIST

## 2020-07-27 PROCEDURE — 96376 TX/PRO/DX INJ SAME DRUG ADON: CPT

## 2020-07-27 PROCEDURE — 85610 PROTHROMBIN TIME: CPT | Performed by: HOSPITALIST

## 2020-07-27 RX ORDER — ACETAMINOPHEN 325 MG/1
975 TABLET ORAL 3 TIMES DAILY
Status: DISCONTINUED | OUTPATIENT
Start: 2020-07-27 | End: 2020-07-28 | Stop reason: HOSPADM

## 2020-07-27 RX ORDER — OXYCODONE HYDROCHLORIDE 5 MG/1
5 TABLET ORAL EVERY 6 HOURS PRN
Status: DISCONTINUED | OUTPATIENT
Start: 2020-07-27 | End: 2020-07-28 | Stop reason: HOSPADM

## 2020-07-27 RX ORDER — FUROSEMIDE 20 MG
20 TABLET ORAL DAILY
Status: ON HOLD | DISCHARGE
Start: 2020-07-27 | End: 2020-10-09

## 2020-07-27 RX ORDER — LIDOCAINE 4 G/G
2 PATCH TOPICAL EVERY 24 HOURS
DISCHARGE
Start: 2020-07-27 | End: 2020-08-10

## 2020-07-27 RX ORDER — OXYCODONE HYDROCHLORIDE 5 MG/1
5 TABLET ORAL EVERY 6 HOURS PRN
Qty: 12 TABLET | Refills: 0 | Status: SHIPPED | OUTPATIENT
Start: 2020-07-27 | End: 2020-07-30

## 2020-07-27 RX ORDER — FUROSEMIDE 20 MG
20 TABLET ORAL DAILY
Status: DISCONTINUED | OUTPATIENT
Start: 2020-07-27 | End: 2020-07-28 | Stop reason: HOSPADM

## 2020-07-27 RX ORDER — ACETAMINOPHEN 325 MG/1
975 TABLET ORAL 3 TIMES DAILY
Status: ON HOLD | DISCHARGE
Start: 2020-07-27 | End: 2020-10-11

## 2020-07-27 RX ADMIN — DIGOXIN 125 MCG: 125 TABLET ORAL at 09:22

## 2020-07-27 RX ADMIN — FUROSEMIDE 20 MG: 20 TABLET ORAL at 09:22

## 2020-07-27 RX ADMIN — OXYCODONE HYDROCHLORIDE 5 MG: 5 TABLET ORAL at 15:55

## 2020-07-27 RX ADMIN — ACETAMINOPHEN 975 MG: 325 TABLET, FILM COATED ORAL at 21:37

## 2020-07-27 RX ADMIN — OXYCODONE HYDROCHLORIDE 5 MG: 5 TABLET ORAL at 09:21

## 2020-07-27 RX ADMIN — ACETAMINOPHEN 975 MG: 325 TABLET, FILM COATED ORAL at 09:21

## 2020-07-27 RX ADMIN — FUROSEMIDE 40 MG: 10 INJECTION, SOLUTION INTRAVENOUS at 02:15

## 2020-07-27 RX ADMIN — DOCUSATE SODIUM 50 MG AND SENNOSIDES 8.6 MG 2 TABLET: 8.6; 5 TABLET, FILM COATED ORAL at 09:21

## 2020-07-27 RX ADMIN — OXYCODONE HYDROCHLORIDE 5 MG: 5 TABLET ORAL at 21:37

## 2020-07-27 RX ADMIN — LIDOCAINE 2 PATCH: 560 PATCH PERCUTANEOUS; TOPICAL; TRANSDERMAL at 21:34

## 2020-07-27 RX ADMIN — ACETAMINOPHEN 975 MG: 325 TABLET, FILM COATED ORAL at 15:54

## 2020-07-27 RX ADMIN — DOCUSATE SODIUM 50 MG AND SENNOSIDES 8.6 MG 1 TABLET: 8.6; 5 TABLET, FILM COATED ORAL at 21:38

## 2020-07-27 ASSESSMENT — MIFFLIN-ST. JEOR: SCORE: 1038.18

## 2020-07-27 NOTE — PLAN OF CARE
Pt A&O, VSS on RA. CMS intact. Voiding adequate. IV SL.    -adequate pain control on oral analgesics : goal met

## 2020-07-27 NOTE — UTILIZATION REVIEW
Concurrent stay review; Secondary Review Determination    Under the authority of the Utilization Management Committee, the utilization review process indicated a secondary review on the above patient. The review outcome is based on review of the medical records, discussions with staff, and applying clinical experience noted on the date of the review.    (x) Observation Status Appropriate - Concurrent stay review        RATIONALE FOR DETERMINATION: 89-year-old lady with significant osteoporosis suffered an acute minimally displaced L1 superior endplate compression fracture noted on scan 7/13/2020.  Mild to moderate narcotics patient has struggled with ambulation due to pain therefore transferred to the hospital to evaluate and arrange higher level of care.  Patient able to ambulate with pain medications and assistance and is awaiting placement.    Patient is clinically improving and there is no clear indication to change patient's status to inpatient. The severity of illness, intensity of service provided, expected LOS and risk for adverse outcome make the care appropriate for observation.    This document was produced using voice recognition software    The information on this document is developed by the utilization review team in order for the business office to ensure compliance. This only denotes the appropriateness of proper admission status and does not reflect the quality of care rendered.    The definitions of Inpatient Status and Observation Status used in making the determination above are those provided in the CMS Coverage Manual, Chapter 1 and Chapter 6, section 70.4.    Sincerely,    Tee Beal MD  Utilization Review  Physician Advisor  [facility].

## 2020-07-27 NOTE — PROGRESS NOTES
SW:    D: Call placed to patient's daughter Brittney regarding [facility] placement. She has concerns regarding incidents at facility and COVID. Reported that as of last week, [facility] had been reporting no COVID on the TCU unit. CELSO offered to call and Brittney agreed. Spoke to admissions who confirmed that the TCU continues to remain COVID free but was unable to inform SW of incident report. CELSO went on [facility] website for information. Call placed to daughter to report findings and she is in agreement to sent patient there pending insurance authorization.    Update 1617: Call placed to [facility] to follow up on authorization. Message left. Daughter called to follow up and CELSO explained that the insurance is still pending and likelyhood of discharge is tomorrow.     P: Will contact daughter when insurance is authorized and plan in place for transport.    JONEL De Leon    [facility]

## 2020-07-27 NOTE — PLAN OF CARE
"Discharge Planner PT   Patient plan for discharge: None stated.   Current status: Pt in supine upon arrival of therapist, reported nausea and denied back pain. Pt completed bed mobility with Campos. Transfers and ambulation of 40 feet with FWW and CGA. Pt declined further ambulation as pt was frustrated with therapist making pt ambulate stating, \"I don't feel good I'm going to die.\" Pt assisted back to supine at end of session.  Barriers to return to prior living situation: Level of assist, Pain, Fall risk   Recommendations for discharge: TCU  Rationale for recommendations: Patient will benefit from continued skilled PT intervention while IP and at TCU in order to progress independence and safety with functional mobility.       Entered by: Nguyen Chaudhry 07/27/2020 5:45 PM       "

## 2020-07-27 NOTE — PROGRESS NOTES
adequate pain control on oral analgesics : goal met    Pt a&ox4 - forgetful at times, VSS on 1L - except slightly hypotensive pt asymptomatic, norco and lidocaine patches for pain, had 1 BM overnight, purewick in place - adequate output, IV lasix, IVSL, regular diet, CMS intact, turn and repo q 2 - coccyx, spine, bilateral elbows and heel red blanchable, patient slept between cares.

## 2020-07-27 NOTE — PROGRESS NOTES
[facility]    Medicine Progress Note - Hospitalist Service       Date of Admission:  7/25/2020    Assessment & Plan   Elizabeth Ruggiero is a 89 year old female with back pain due to a acute minimally  displaced L1 superior endplate compression fracture seen on a 7/13 CT scan.  She has been at her AL facility taking Tylenol #3 but is unable to ambulate.  She is being admitted for pain control and transitional care unit placement.     Acute back pain likely due to L1 compression fracture   DJD of the spine   Osteoporosis   Opioid induced constipation  Admitted to observation for transitional care unit placement. Pt declining narcotic pain medication, however when asked to mobilize will c/o severe, uncontrolled pain.    - PT consult, recommending TCU  - Change norco to scheduled tylenol 975mg TID, PRN oxycodone 5mg  - Bowel regimen  - SW following, anticipate discharge to TCU pending insurance authorization  - Holding alendronate and vitamin D, calcium while on observation      Chronic diastolic congestive heart failure ?Acute exacerbation  Atrial fibrillation on warfarin   Hypertension   Her BNP is 6000.  On exam pt is without evidence of volume overload, slightly hypoxic on admission. PTA furosemide is presently ordered as MWF dosing. Diuresed with BID lasix, resolution of hypoxia. I/O net -3.5L since admission. Clinically comfortable.  - Change furosemide to 20mg daily  - Continue PTA digoxin,metoprolol  - Strict I&O     Hyponatremia, resolved   Na 128. Urine OSM is 270, urine Na 12 and BNP 6000. Suspect due to congestive heart failure, resolved with diuresis.      History of recurrent deep venous thrombosis   Elevated INR  Coumadin held on admission. INR 5.07--3.33.  - Pharmacy consulted to dose warfarin, no dose today       Diet: Regular Diet Adult  Advance Diet as Tolerated    DVT Prophylaxis: Warfarin  Wesley Catheter: not present  Code Status: Full Code           Disposition Plan   Expected  "discharge: today v tomorrow, recommended to transitional care unit once safe disposition plan/ TCU bed available.  Entered: Sher Lucero PA-C 07/27/2020, 12:16 PM       The patient's care was discussed with the Attending Physician, Dr. Martin, Bedside Nurse, Care Coordinator/ and Patient.    Sher Lucero PA-C  Hospitalist Service  [facility]    ______________________________________________________________________    Interval History   Pt seen & evaluated. Complaining of severe, intractable pain this morning. States \"I can't get out of bed I'm in too much pain.\" When indicating she has been declining pain medications, patient changes subject and asks about the \"virus status\" at the rehab.     Data reviewed today: I reviewed all medications, new labs and imaging results over the last 24 hours. I personally reviewed no images or EKG's today.    Physical Exam   Vital Signs: Temp: 97.4  F (36.3  C) Temp src: Oral BP: 105/60 Pulse: 65   Resp: 18 SpO2: 94 % O2 Device: Nasal cannula Oxygen Delivery: 1 LPM  Weight: 127 lbs 6.4 oz  GEN: well-developed, well-nourished, appears comfortable  HEENT: NCAT, EOM intact bilaterally, sclera clear, conjunctiva normal, nose & mouth patent, mucous membranes moist  CHEST: lungs CTA bilaterally, no increased work of breathing, no wheeze, crackles, rhonchi  HEART: RRR, S1 & S2, no murmur  ABD: soft, nontender, nondistended, no guarding or rigidity, +BS in all 4 quadrants  MSK: AROM bilateral UE/LE, pedal & radial pulses 2+ bilaterally  NEURO: awake, alert. CN II-XII grossly intact. Sensation grossly intact to light touch.   SKIN: warm & dry without rash, no pedal edema      Data   Recent Labs   Lab 07/27/20  0700 07/26/20  0644 07/25/20  1638   WBC  --   --  11.0   HGB  --   --  13.0   MCV  --   --  95   PLT  --   --  208   INR 3.33* 5.07* 4.38*    128* 129*   POTASSIUM 4.1 3.9 3.9   CHLORIDE 96 96 95   CO2 35* 26 25   BUN 18 17 17   CR 0.98 0.88 " 0.79   ANIONGAP 2* 6 9   CHRIS 9.4 8.8 9.0   GLC 93 97 112*     No results found for this or any previous visit (from the past 24 hour(s)).  Medications       acetaminophen  975 mg Oral TID     digoxin  125 mcg Oral Daily     furosemide  20 mg Oral Daily     lidocaine  2 patch Transdermal Q24h    And     lidocaine   Transdermal Q8H     metoprolol tartrate  12.5 mg Oral BID     senna-docusate  1 tablet Oral BID    Or     senna-docusate  2 tablet Oral BID     sodium chloride (PF)  3 mL Intracatheter Q8H

## 2020-07-28 VITALS
HEART RATE: 80 BPM | WEIGHT: 129.9 LBS | RESPIRATION RATE: 17 BRPM | HEIGHT: 66 IN | BODY MASS INDEX: 20.88 KG/M2 | DIASTOLIC BLOOD PRESSURE: 70 MMHG | SYSTOLIC BLOOD PRESSURE: 114 MMHG | OXYGEN SATURATION: 97 % | TEMPERATURE: 97.3 F

## 2020-07-28 LAB — INR PPP: 2.9 (ref 0.86–1.14)

## 2020-07-28 PROCEDURE — 25000132 ZZH RX MED GY IP 250 OP 250 PS 637: Performed by: HOSPITALIST

## 2020-07-28 PROCEDURE — 25000132 ZZH RX MED GY IP 250 OP 250 PS 637: Performed by: PHYSICIAN ASSISTANT

## 2020-07-28 PROCEDURE — 99217 ZZC OBSERVATION CARE DISCHARGE: CPT | Performed by: PHYSICIAN ASSISTANT

## 2020-07-28 PROCEDURE — G0378 HOSPITAL OBSERVATION PER HR: HCPCS

## 2020-07-28 PROCEDURE — 85610 PROTHROMBIN TIME: CPT | Performed by: HOSPITALIST

## 2020-07-28 PROCEDURE — 36415 COLL VENOUS BLD VENIPUNCTURE: CPT | Performed by: HOSPITALIST

## 2020-07-28 RX ORDER — WARFARIN SODIUM 1 MG/1
1 TABLET ORAL
Status: DISCONTINUED | OUTPATIENT
Start: 2020-07-28 | End: 2020-07-28 | Stop reason: HOSPADM

## 2020-07-28 RX ADMIN — OXYCODONE HYDROCHLORIDE 5 MG: 5 TABLET ORAL at 14:53

## 2020-07-28 RX ADMIN — ACETAMINOPHEN 975 MG: 325 TABLET, FILM COATED ORAL at 08:49

## 2020-07-28 RX ADMIN — ACETAMINOPHEN 975 MG: 325 TABLET, FILM COATED ORAL at 16:23

## 2020-07-28 RX ADMIN — FUROSEMIDE 20 MG: 20 TABLET ORAL at 08:49

## 2020-07-28 RX ADMIN — DIGOXIN 125 MCG: 125 TABLET ORAL at 08:49

## 2020-07-28 RX ADMIN — OXYCODONE HYDROCHLORIDE 5 MG: 5 TABLET ORAL at 08:49

## 2020-07-28 RX ADMIN — DOCUSATE SODIUM 50 MG AND SENNOSIDES 8.6 MG 2 TABLET: 8.6; 5 TABLET, FILM COATED ORAL at 08:49

## 2020-07-28 ASSESSMENT — MIFFLIN-ST. JEOR: SCORE: 1005.97

## 2020-07-28 NOTE — PLAN OF CARE
A&Ox4, forgetful and [place]. VSS on RA. Denies pain. Purewick in place. A1 with GB and walker. Continue to monitor.

## 2020-07-28 NOTE — PROGRESS NOTES
SW:    D: Received a message for CELSO to contact patient's daughter. Call placed and updated her that we continue to wait for authorization.     Update 1354: Auth received. [facility] can accept patient for today and will update CELSO with time. Paged for orders for today. Updated bedside nurse. Call placed to patient's daughter to update her as well. Will call her back when a time is determined. [facility] confirmed patient to be picked up today at 1700 via transport aid. Call placed [facility] to confirm. She is in agreement.  Orders received for discharge today and faxed to [facility]. PAS completed.    PAS-RR    D: Per DHS regulation, CELSO completed and submitted PAS-RR to [place] [employer] Direct Connect via the Senior LinkAge Line.  PAS-RR confirmation # is : [id number]     I: CELSO spoke with daughter and they are aware a PAS-RR has been submitted.  CELSO reviewed with daughter that they may be contacted for a follow up appointment within 10 days of hospital discharge if their SNF stay is < 30 days.  Contact information for [facility] LinkAge Line was also provided.    A: daughter verbalized understanding.    P: Further questions may be directed to [facility] LinkAge Line at #[phone], option #4 for PAS-RR staff.      P: Will continue to follow    JONEL De Leon    [facility]

## 2020-07-28 NOTE — PROGRESS NOTES
[facility]    Medicine Progress Note - Hospitalist Service       Date of Admission:  7/25/2020  Assessment & Plan   Elizabeth Ruggiero is a 89 year old female with back pain due to a acute minimally  displaced L1 superior endplate compression fracture seen on a 7/13 CT scan.  She has been at her AL facility taking Tylenol #3 but is unable to ambulate.  She is being admitted for pain control and transitional care unit placement.     Acute back pain likely due to L1 compression fracture   DJD of the spine   Osteoporosis   Opioid induced constipation  Admitted to observation for transitional care unit placement. Pt declining narcotic pain medication, however when asked to mobilize will c/o severe, uncontrolled pain.    - PT consult, recommending TCU  - Pain control with scheduled tylenol 975mg TID, PRN oxycodone 5mg  - Bowel regimen  - SW following, anticipate discharge to TCU pending insurance authorization  - Holding alendronate and vitamin D, calcium while on observation      Chronic diastolic congestive heart failure ?Acute exacerbation  Atrial fibrillation on warfarin   Hypertension   Her BNP is 6000.  On exam pt is without evidence of volume overload, slightly hypoxic on admission. PTA furosemide is presently ordered as MWF dosing. Diuresed with BID lasix, resolution of hypoxia. I/O net -3.3L since admission. Clinically comfortable.  - Change furosemide to 20mg daily  - Continue PTA digoxin,metoprolol  - Strict I&O     Hyponatremia, resolved   Na 128. Urine OSM is 270, urine Na 12 and BNP 6000. Suspect due to congestive heart failure, resolved with diuresis.      History of recurrent deep venous thrombosis   Elevated INR  Coumadin held on admission. INR 5.07--3.33--2.9  - Pharmacy consulted to dose warfarin       Diet: Regular Diet Adult  Advance Diet as Tolerated    DVT Prophylaxis: Pneumatic Compression Devices and Ambulate every shift  Wesley Catheter: not present  Code Status: Full Code            Disposition Plan   Expected discharge: today, recommended to transitional care unit once safe disposition plan/ TCU bed available.  Entered: Sher Lucero PA-C 07/28/2020, 12:11 PM       The patient's care was discussed with the Attending Physician, Dr. Martin, Bedside Nurse, Care Coordinator/ and Patient.    Sher Lucero PA-C  Hospitalist Service  [facility]    ______________________________________________________________________    Interval History   Pt seen & evaluated. Much more comfortable today, indicates pain regimen is working well. Denies cp, sob. Awaiting insurance auth for TCU. No concerns per bedside RN.    Data reviewed today: I reviewed all medications, new labs and imaging results over the last 24 hours. I personally reviewed no images or EKG's today.    Physical Exam   Vital Signs: Temp: 97.2  F (36.2  C) Temp src: Oral BP: 100/53 Pulse: 70   Resp: 16 SpO2: 93 % O2 Device: None (Room air)    Weight: 129 lbs 14.4 oz  GEN: well-developed, well-nourished, appears comfortable  HEENT: NCAT, EOM intact bilaterally, sclera clear, conjunctiva normal, nose & mouth patent, mucous membranes moist  CHEST: lungs CTA bilaterally, no increased work of breathing, no wheeze, crackles, rhonchi  HEART: RRR, S1 & S2, no murmur  ABD: soft, nontender, nondistended, no guarding or rigidity  MSK: AROM bilateral UE/LE, pedal & radial pulses 2+ bilaterally  NEURO: awake, alert. CN II-XII grossly intact. Sensation grossly intact to light touch.   SKIN: warm & dry without rash, no pedal edema    Data   Recent Labs   Lab 07/28/20  0703 07/27/20  0700 07/26/20  0644 07/25/20  1638   WBC  --   --   --  11.0   HGB  --   --   --  13.0   MCV  --   --   --  95   PLT  --   --   --  208   INR 2.90* 3.33* 5.07* 4.38*   NA  --  133 128* 129*   POTASSIUM  --  4.1 3.9 3.9   CHLORIDE  --  96 96 95   CO2  --  35* 26 25   BUN  --  18 17 17   CR  --  0.98 0.88 0.79   ANIONGAP  --  2* 6 9   CHRIS  --  9.4 8.8  9.0   [facility]  --  93 97 112*     No results found for this or any previous visit (from the past 24 hour(s)).  Medications     Warfarin Therapy Reminder         acetaminophen  975 mg Oral TID     digoxin  125 mcg Oral Daily     furosemide  20 mg Oral Daily     lidocaine  2 patch Transdermal Q24h    And     lidocaine   Transdermal Q8H     metoprolol tartrate  12.5 mg Oral BID     senna-docusate  1 tablet Oral BID    Or     senna-docusate  2 tablet Oral BID     sodium chloride (PF)  3 mL Intracatheter Q8H     warfarin ANTICOAGULANT  1 mg Oral ONCE at 18:00

## 2020-07-28 NOTE — DISCHARGE SUMMARY
[facility]    Discharge Summary  Hospitalist    Date of Admission:  7/25/2020  Date of Discharge:  7/28/2020  Discharging Provider: Sher Lucero PA-C    Discharge Diagnoses      Intractable low back pain  Acute diastolic congestive heart failure (H)    History of Present Illness   Elizabeth Ruggiero is an 89 year old female who presented with back pain and inability to ambulate.    HPI from admission H&P:  Elizabeth Ruggiero is a 89 year old female who has chronic diastolic congestive heart failure and is on warfarin for atrial fibrillation. She also has osteoporosis.  She lives at an AL facility.  A few weeks ago she developed severe mid back pain.  She has not fallen or had any trauma.  On 7/13 she had a CT which showed:  1. Findings that appear most consistent with a subtle acute minimally  displaced L1 superior endplate compression fracture.  2. No other definite acute fracture. Age indeterminate but potentially  chronic mild T11 compression deformity.  3. Multilevel degenerative disc disease and facet arthropathy of the  lumbar spine, with varying degrees of spinal and neural foraminal  stenosis, as detailed.  4. Levoconvex scoliosis with multilevel spondylolisthesis, as  described.     The patient has been using Tylenol #3 but is still having severe pain and has developed constipation.  Yesterday she was prescribed Norco but it is unclear whether she has taken it.  Today her family visited her and the patient was unable to get out of bed due to the pain.  She was taken to the ED where she was stable and was given a Norco and lidocaine patches.  She is going to be admitted for pain control and placement.    The patient denies fever, cough, chills, abdominal pain, dysuria.      Hospital Course   Elizabeth Ruggiero was admitted on 7/25/2020.  The following problems were addressed during her hospitalization:    Acute back pain likely due to L1 compression fracture   DJD of the spine   Osteoporosis   Opioid  induced constipation  Admitted to observation for transitional care unit placement. Pt declining narcotic pain medication, however when asked to mobilize will c/o severe, uncontrolled pain.    - PT consult, recommending TCU  - Pain control with scheduled tylenol 975mg TID, PRN oxycodone 5mg  - Bowel regimen  - Holding alendronate and vitamin D, calcium while on observation, resume at discharge      Chronic diastolic congestive heart failure ?Acute exacerbation  Atrial fibrillation on warfarin   Hypertension   Her BNP is 6000.  On exam pt is without evidence of volume overload, slightly hypoxic on admission. PTA furosemide is presently ordered as MWF dosing. Diuresed with BID lasix, resolution of hypoxia. I/O net -3.3L since admission. Clinically comfortable.  - Change furosemide to 20mg daily  - Continue PTA digoxin,metoprolol  - Strict I&O     Hyponatremia, resolved   Na 128. Urine OSM is 270, urine Na 12 and BNP 6000. Suspect due to congestive heart failure, resolved with diuresis.      History of recurrent deep venous thrombosis   Elevated INR  Coumadin held on admission. INR 5.07--3.33--2.9  - Resume PTA warfarin    Sher Lucero PA-C    Significant Results and Procedures   As noted    Pending Results   These results will be followed up by n/a  Unresulted Labs Ordered in the Past 30 Days of this Admission     No orders found from 6/25/2020 to 7/26/2020.          Code Status   Full Code       Primary Care Physician   Gelacio Downs    Physical Exam   Temp: 97.2  F (36.2  C) Temp src: Oral BP: 100/53 Pulse: 70   Resp: 16 SpO2: 93 % O2 Device: None (Room air)    Vitals:    07/25/20 1143 07/27/20 0700 07/28/20 0624   Weight: 56.2 kg (124 lb) 62.1 kg (137 lb) 58.9 kg (129 lb 14.4 oz)     Vital Signs with Ranges  Temp:  [97.2  F (36.2  C)-97.8  F (36.6  C)] 97.2  F (36.2  C)  Pulse:  [46-70] 70  Resp:  [12-18] 16  BP: ()/(44-63) 100/53  SpO2:  [93 %-96 %] 93 %  I/O last 3 completed shifts:  In: 860  [P.O.:860]  Out: 900 [Urine:900]    Discharge Disposition   Discharged to rehabilitation facility  Condition at discharge: Stable    Consultations This Hospital Stay   PHYSICAL THERAPY ADULT IP CONSULT  CARE TRANSITION RN/SW IP CONSULT  PHARMACY TO DOSE WARFARIN  PHYSICAL THERAPY ADULT IP CONSULT  OCCUPATIONAL THERAPY ADULT IP CONSULT  PHARMACY TO DOSE WARFARIN    Time Spent on this Encounter   ISher PA-C, personally saw the patient today and spent less than or equal to 30 minutes discharging this patient.    Discharge Orders      General info for SNF    Length of Stay Estimate: Short Term Care: Estimated # of Days <30  Condition at Discharge: Stable  Level of care:skilled   Rehabilitation Potential: Fair  Admission H&P remains valid and up-to-date: Yes  Recent Chemotherapy: N/A  Use Nursing Home Standing Orders: Yes     Mantoux instructions    Give two-step Mantoux (PPD) Per Facility Policy Yes     Intake and output    Every shift     Daily weights    Call Provider for weight gain of more than 2 pounds per day or 5 pounds per week.     Activity - Up with nursing assistance     Physical Therapy Adult Consult    Evaluate and treat as clinically indicated.    Reason:  Back pain, deconditioning     Occupational Therapy Adult Consult    Evaluate and treat as clinically indicated.    Reason:  Back pain, deconditioning     Advance Diet as Tolerated    Follow this diet upon discharge: Orders Placed This Encounter      Regular Diet Adult     Discharge Medications   Current Discharge Medication List      START taking these medications    Details   Lidocaine (LIDOCARE) 4 % Patch Place 2 patches onto the skin every 24 hours To prevent lidocaine toxicity, patient should be patch free for 12 hrs daily.    Associated Diagnoses: Intractable low back pain      oxyCODONE (ROXICODONE) 5 MG tablet Take 1 tablet (5 mg) by mouth every 6 hours as needed for pain  Qty: 12 tablet, Refills: 0    Associated Diagnoses: Intractable  "low back pain         CONTINUE these medications which have CHANGED    Details   acetaminophen (TYLENOL) 325 MG tablet Take 3 tablets (975 mg) by mouth 3 times daily    Associated Diagnoses: Intractable low back pain      furosemide (LASIX) 20 MG tablet Take 1 tablet (20 mg) by mouth daily    Associated Diagnoses: Acute diastolic congestive heart failure (H)         CONTINUE these medications which have NOT CHANGED    Details   alendronate (FOSAMAX) 70 MG tablet Take 70 mg by mouth every 7 days SUNDAYS      calcium carbonate-vitamin D (OSCAL W/D) 500-200 MG-UNIT tablet Take 1 tablet by mouth daily       digoxin (LANOXIN) 125 MCG tablet Take 1 tablet (125 mcg) by mouth daily  Qty: 30 tablet, Refills: 11    Associated Diagnoses: Atrial fibrillation, unspecified type (H)      metoprolol tartrate (LOPRESSOR) 25 MG tablet Take 12.5 mg by mouth 2 times daily      Multiple Vitamins-Minerals (PRESERVISION AREDS 2+MULTI VIT) CAPS Take 1 tablet by mouth 2 times daily      multivitamin w/minerals (MULTI-VITAMIN) tablet Take 1 tablet by mouth daily       UNKNOWN TO PATIENT \"Tea for constipation\" per patient. Drink 1 cup of tea daily as needed for constipation      VITAMIN D, CHOLECALCIFEROL, PO Take 1 tablet by mouth daily Patient unsure of tablet strength      !! warfarin ANTICOAGULANT (COUMADIN) 2.5 MG tablet Take 2.5 mg by mouth daily Except Wednesdays and Saturdays      !! warfarin ANTICOAGULANT (COUMADIN) 2.5 MG tablet Take 1.25 mg by mouth daily On Wednesdays and Saturdays       !! - Potential duplicate medications found. Please discuss with provider.        Allergies   No Known Allergies  Data   Most Recent 3 CBC's:  Recent Labs   Lab Test 07/25/20  1638 07/13/20  1546 05/06/19   WBC 11.0 10.6 8.9   HGB 13.0 13.2 14.6   MCV 95 97 94    175 244      Most Recent 3 BMP's:  Recent Labs   Lab Test 07/27/20  0700 07/26/20  0644 07/25/20  1638    128* 129*   POTASSIUM 4.1 3.9 3.9   CHLORIDE 96 96 95   CO2 35* 26 25 "   BUN 18 17 17   CR 0.98 0.88 0.79   ANIONGAP 2* 6 9   CHRIS 9.4 8.8 9.0   GLC 93 97 112*     Most Recent 2 LFT's:  Recent Labs   Lab Test 07/13/20  1546 05/06/19   AST 22 45   ALT 32 65*   ALKPHOS 57 76   BILITOTAL 1.3 1.3     Most Recent INR's and Anticoagulation Dosing History:  Anticoagulation Dose History     Recent Dosing and Labs Latest Ref Rng & Units 4/30/2019 5/1/2019 7/13/2020 7/25/2020 7/26/2020 7/27/2020 7/28/2020    Warfarin 2 mg - 2 mg - - - - - -    Warfarin 2.5 mg - - 2.5 mg - - - - -    INR 0.86 - 1.14 1.69(H) 1.60(H) 3.21(H) 4.38(H) 5.07(HH) 3.33(H) 2.90(H)        Most Recent 3 Troponin's:  Recent Labs   Lab Test 04/29/19  0815 04/23/19  1622 03/09/19  1815   TROPI 0.023 0.032 <0.015     Most Recent Cholesterol Panel:No lab results found.  Most Recent 6 Bacteria Isolates From Any Culture (See EPIC Reports for Culture Details):  Recent Labs   Lab Test 04/29/19  1038 04/29/19  0850 04/29/19  0849 04/23/19  1715 04/23/19  1713 04/23/19  1630   CULT 10,000 to 50,000 colonies/mL  Enterococcus faecium  *  <10,000 colonies/mL  urogenital charles   No growth No growth No growth No growth No growth     Most Recent TSH, T4 and A1c Labs:  Recent Labs   Lab Test 07/26/20  0644   TSH 6.24*   T4 1.11     Results for orders placed or performed during the hospital encounter of 07/25/20   XR Pelvis w Hip Left G/E 2 Views    Narrative    Examination:  XR PELVIS AND HIP LEFT 2 VIEWS    Date:  7/26/2020 10:34 AM     Clinical Information: Pain, bruising.     Additional Information: none    Comparison: CT abdomen and pelvis 7/13/2020.      Impression    Impression:  1.  No displaced or visible nondisplaced fracture in the pelvis.  2.  Normal hip joint alignment. The pubic symphysis and SI joints  appear intact.  3.  Prior left femur fixation of an intramedullary nail, with a  proximal supplementary screw, and single distal interlocking screw.  There is chronic lucency around the femoral neck component (2 mm), and  around  the greater trochanteric portion of the intramedullary nail (4  mm). The distal interlocking screw is well-seated, not loose. The  intratrochanteric fracture is healed, without acute abnormality.  4.  Marked thoracolumbar scoliosis with advanced degenerative disc  changes.    TAY GLORIA MD

## 2020-07-28 NOTE — PROGRESS NOTES
0972-1285: Pt alert and oriented x 4. Pain managed with scheduled tylenol and prn oxycodone. Up with one assist. Discharged to TCU via w/c ride.

## 2020-07-28 NOTE — PROGRESS NOTES
Observation Goal  -adequate pain control on oral analgesics MET    Pt A&O, very forgetful. VSS on RA. CMS intact. Turn and repo. Adequate output, purewick utilized. Pain managed with oxy. Waiting for insurance authorization for TCU.

## 2020-07-28 NOTE — PLAN OF CARE
Obs goals:   -adequate pain control on oral analgesics - met     Pt is AOx4, forgetful, CMS intact, BP soft, intake encouraged, pt encouraged to ambulate, ADLs encouraged, pt requested to use purewick due to incontinence, education provided, pain is managed with oxy 5mg and tylenol, assist 1, pending placement.

## 2020-07-29 ENCOUNTER — NURSING HOME VISIT (OUTPATIENT)
Dept: GERIATRICS | Facility: CLINIC | Age: 85
End: 2020-07-29
Payer: COMMERCIAL

## 2020-07-29 VITALS
BODY MASS INDEX: 21.63 KG/M2 | RESPIRATION RATE: 20 BRPM | TEMPERATURE: 98 F | SYSTOLIC BLOOD PRESSURE: 122 MMHG | DIASTOLIC BLOOD PRESSURE: 71 MMHG | WEIGHT: 134 LBS | OXYGEN SATURATION: 94 % | HEART RATE: 71 BPM

## 2020-07-29 DIAGNOSIS — I34.0 MITRAL VALVE INSUFFICIENCY, UNSPECIFIED ETIOLOGY: ICD-10-CM

## 2020-07-29 DIAGNOSIS — I50.32 CHRONIC DIASTOLIC HEART FAILURE (H): Primary | ICD-10-CM

## 2020-07-29 DIAGNOSIS — Z86.718 HISTORY OF RECURRENT DEEP VEIN THROMBOSIS (DVT): ICD-10-CM

## 2020-07-29 DIAGNOSIS — E78.5 HYPERLIPIDEMIA, UNSPECIFIED HYPERLIPIDEMIA TYPE: ICD-10-CM

## 2020-07-29 DIAGNOSIS — M48.00 SPINAL STENOSIS, UNSPECIFIED SPINAL REGION: ICD-10-CM

## 2020-07-29 DIAGNOSIS — R53.81 PHYSICAL DECONDITIONING: ICD-10-CM

## 2020-07-29 DIAGNOSIS — I48.20 CHRONIC ATRIAL FIBRILLATION (H): ICD-10-CM

## 2020-07-29 DIAGNOSIS — S32.010D COMPRESSION FRACTURE OF L1 VERTEBRA WITH ROUTINE HEALING, SUBSEQUENT ENCOUNTER: ICD-10-CM

## 2020-07-29 PROCEDURE — 99310 SBSQ NF CARE HIGH MDM 45: CPT | Performed by: NURSE PRACTITIONER

## 2020-07-29 RX ORDER — AMOXICILLIN 250 MG
1 CAPSULE ORAL 2 TIMES DAILY
COMMUNITY
End: 2020-08-10

## 2020-07-29 NOTE — PROGRESS NOTES
"[facility]  PRIMARY CARE PROVIDER AND CLINIC:  Gelacio Downs MD, [facility] [address] / [place] [address]  Chief Complaint   Patient presents with     Hospital F/U     [facility] Record Number:  [id number]  Place of Service where encounter took place:  [facility] [[id number]]    Elizabeth Ruggiero  is a 89 year old  (6/19/1926), admitted to the above facility from  [facility]. Hospital stay 7/25/20 through 7/28/20..  Admitted to this facility for  rehab, medical management and nursing care.    HPI:    HPI information obtained from: facility chart records, facility staff, patient report and [facility] chart review.     Brief Summary of Hospital Course:     This is a 89-year-old female, with a past medical history significant for chronic diastolic heart failure with EF > 70% on 4/23/19, severe mitral regurgitation, atrial fibrillation on anticoagulation, recurrent DVT, spinal stenosis, PMR and hyperlipidemia  who was admitted to [facility] with back pain and inability to ambulate. Of note, was seen at [facility] ED on 7/13/20 with low back pain. A lumbar spine CT revealed \"Findings that appear most consistent with a subtle acute minimally displaced L1 superior endplate compression fracture .. Age indeterminate but potentially chronic mild T11 compression deformity\".  Provided with Acetaminophen with Codeine and discharged back home. Received Hydrocodone-Acetaminophen order from PCP, but continued to have pain.  During this hospitalization, an x-ray of the pelvis and left hip was performed with no acute findings. Declined narcotic medication, but called out in pain with mobility. Noted to hypoxic with BNP 6000 at admission. Diuresed with BID Furosemide with resolution of hypoxia. Labs revealed 128, urine osmolarity 270, urine Sodium 12. Hyponatremia thought to be due to congestive heart failure and resolved " Returned call no answer will try again later, /srb   with diuresis. A TCU stay was recommended for ongoing physical rehabilitation.     Updates on Status Since Skilled nursing Admission: ***    CODE STATUS/ADVANCE DIRECTIVES DISCUSSION:   CPR/Full code   Patient's living condition: lives alone  ALLERGIES: Patient has no known allergies.  PAST MEDICAL HISTORY:  has a past medical history of (HFpEF) heart failure with preserved ejection fraction (H), Acute diastolic CHF (congestive heart failure) (H), Acute left hemiparesis (H) (2/9/2019), Arthritis, Atrial fibrillation (H), Atrial fibrillation with RVR (H), Atrial flutter (H), Atrial flutter with rapid ventricular response (H) (4/23/2019), Community acquired pneumonia, Diastolic CHF (H) (04/29/2019), DVT of lower extremity (deep venous thrombosis) (H), Hepatic congestion, HTN (hypertension), Hyperlipidemia, Mitral valve insufficiency, Pneumonia, Postmenopausal bleeding (01/2020), Severe mitral regurgitation, SOB (shortness of breath), TIA (transient ischemic attack), and Vitamin D deficiency.  PAST SURGICAL HISTORY:   has a past surgical history that includes repair left femur; appendectomy; and fracture surgery.  FAMILY HISTORY: family history includes Breast Cancer in her sister; Colon Cancer in her mother; Diabetes in her sister.  SOCIAL HISTORY:   reports that she has never smoked. She has never used smokeless tobacco. She reports that she does not drink alcohol or use drugs.    Post Discharge Medication Reconciliation Status: {[facility] Rec (Provider):323538}  ***  Current Outpatient Medications   Medication Sig Dispense Refill     acetaminophen (TYLENOL) 325 MG tablet Take 3 tablets (975 mg) by mouth 3 times daily       alendronate (FOSAMAX) 70 MG tablet Take 70 mg by mouth every 7 days SUNDAYS       calcium carbonate-vitamin D (OSCAL W/D) 500-200 MG-UNIT tablet Take 1 tablet by mouth daily        digoxin (LANOXIN) 125 MCG tablet Take 1 tablet (125 mcg) by mouth daily 30 tablet 11     furosemide (LASIX) 20 MG  "tablet Take 1 tablet (20 mg) by mouth daily       Lidocaine (LIDOCARE) 4 % Patch Place 2 patches onto the skin every 24 hours To prevent lidocaine toxicity, patient should be patch free for 12 hrs daily.       metoprolol tartrate (LOPRESSOR) 25 MG tablet Take 12.5 mg by mouth 2 times daily       Multiple Vitamins-Minerals (PRESERVISION AREDS 2+MULTI VIT) CAPS Take 1 tablet by mouth 2 times daily       multivitamin w/minerals (MULTI-VITAMIN) tablet Take 1 tablet by mouth daily        oxyCODONE (ROXICODONE) 5 MG tablet Take 1 tablet (5 mg) by mouth every 6 hours as needed for pain 12 tablet 0     UNKNOWN TO PATIENT \"Tea for constipation\" per patient. Drink 1 cup of tea daily as needed for constipation       VITAMIN D, CHOLECALCIFEROL, PO Take 1 tablet by mouth daily Patient unsure of tablet strength       warfarin ANTICOAGULANT (COUMADIN) 2.5 MG tablet Take 2.5 mg by mouth daily Except Wednesdays and Saturdays       warfarin ANTICOAGULANT (COUMADIN) 2.5 MG tablet Take 1.25 mg by mouth daily On Wednesdays and Saturdays       {Providers Please double check the med list (in the plan section >> meds & orders tab) and Discontinue any of the meds flagged by the TC to be discontinued}  ***  ROS:  {ROS [facility]:248341}    Vitals:  /71   Pulse 71   Temp 98  F (36.7  C)   Resp 20   Wt 60.8 kg (134 lb)   SpO2 94%   BMI 21.63 kg/m    Exam:  {Nursing home physical exam :144939}    Lab/Diagnostic data:  {fgslab:218042}    ASSESSMENT/PLAN:  {[facility] DX INITIAL:633153}    Total time spent with patient visit at the skilled nursing facility was {1/2/3/4/5:508776} including {1/2/3/4/5:243187}. Greater than 50% of total time spent with counseling and coordinating care due to ***.     Electronically signed by:  SENIA Naylor CNP                 "

## 2020-07-30 ENCOUNTER — HOSPITAL LABORATORY (OUTPATIENT)
Dept: OTHER | Facility: CLINIC | Age: 85
End: 2020-07-30

## 2020-07-30 DIAGNOSIS — M54.59 INTRACTABLE LOW BACK PAIN: ICD-10-CM

## 2020-07-30 RX ORDER — OXYCODONE HYDROCHLORIDE 5 MG/1
TABLET ORAL
Qty: 30 TABLET | Refills: 0 | Status: SHIPPED | OUTPATIENT
Start: 2020-07-30 | End: 2020-08-10

## 2020-07-30 NOTE — PROGRESS NOTES
[facility]  INITIAL VISIT NOTE  July 31, 2020    PRIMARY CARE PROVIDER AND CLINIC:  [facility] [address] / [place] [address]    CHIEF COMPLAINT:  Hospital follow-up/Initial visit    HPI:    Elizabeth Ruggiero is a 89 year old  (6/19/1926) female who was seen at [place] [facility] on July 31, 2020 for an initial visit.     Medical history is notable for paroxysmal atrial fibrillation, chronic heart failure with preserved ejection fraction, mild aortic stenosis, moderately severe to severe mitral regurgitation, low back pain, TIA, hypertension, dyslipidemia, osteoporosis, and pneumonia.    She was hospitalized at [facility] from July 25 through July 28, 2020 for intractable low back pain for several weeks.  There was no history of fall or trauma.  Recent CT lumbar spine on July 13 had revealed subacute minimally displaced L1 superior endplate compression fracture, chronic mild T11 compression deformity, multilevel degenerative disc disease, and levoconvex scoliosis with multilevel spondylolisthesis.  Patient received hydrocodone-acetaminophen order from PCP but continued to have pain.  During this hospitalization, x-ray of the hip/pelvis showed no acute fracture.  BMP was remarkable for low sodium level of 129.  CBC was normal.  UA was essentially unremarkable except for 30 mg of protein.  Test for COVID-19 was negative.  INR was supratherapeutic at 4.38.  Pain was controlled with scheduled acetaminophen 975 mg p.o. 3 times daily, lidocaine patch, and PRN oxycodone 5 mg.  She was placed on bowel regimen for opiate-induced constipation.  She was slightly hypoxic on admission and BNP was 5964, therefore she was treated with twice daily furosemide with I/O net of -3.3 L.  Hyponatremia was attributed to CHF and normalized with diuretic therapy. Notably urine osmolality was 207 and urine sodium was 12.  Cortisol level was normal at 16.  TSH was slightly  elevated at 6.24 with normal free T4 of 1.11.  INR upon discharge from hospital was 2.9.    Patient is admitted to this facility for medical management, nursing care, and rehab.     Patient was seen today in her room, while sitting in the chair.  She appears frail but comfortable.  She is somewhat hard of hearing but easily able to communicate.  She reports no significant low back pain at this juncture.  She denies fever, chills, chest pain, palpitation, dyspnea, nausea, vomiting, abdominal pain, diarrhea, urinary symptoms.  She had a bowel movement yesterday.  Today's INR is 1.7.    CODE STATUS:   CPR/Full code     PAST MEDICAL HISTORY:   Chronic heart failure with preserved ejection fraction; LVEF more than 70% in April 2019  Paroxysmal atrial fibrillation, on warfarin  Recurrent DVT, on warfarin  Atrial flutter  Hypertension  Dyslipidemia  Moderately severe to severe mitral regurgitation  Mild aortic stenosis  TIA  Low back pain  Lumbar spine degenerative disc disease  PMR  Pneumonia  Osteoporosis  T11 and L1 compression fracture  Vitamin D deficiency    Past Medical History:   Diagnosis Date     (HFpEF) heart failure with preserved ejection fraction (H)      Acute diastolic CHF (congestive heart failure) (H)      Acute left hemiparesis (H) 2/9/2019     Arthritis      Atrial fibrillation (H)      Atrial fibrillation with RVR (H)      Atrial flutter (H)      Atrial flutter with rapid ventricular response (H) 4/23/2019     Community acquired pneumonia      Diastolic CHF (H) 04/29/2019     DVT of lower extremity (deep venous thrombosis) (H)     recurrent     Hepatic congestion     improved with diuretics for CHF     HTN (hypertension)      Hyperlipidemia      Mitral valve insufficiency      Pneumonia      Postmenopausal bleeding 01/2020    [facility]     Severe mitral regurgitation      SOB (shortness of breath)      TIA (transient ischemic attack)      Vitamin D deficiency        PAST  SURGICAL HISTORY:   Past Surgical History:   Procedure Laterality Date     APPENDECTOMY       FRACTURE SURGERY       repair left femur         FAMILY HISTORY:   Family History   Problem Relation Age of Onset     Colon Cancer Mother      Breast Cancer Sister      Diabetes Sister        SOCIAL HISTORY:  Patient is  and lives at the [facility].  She does use a walker occasionally for ambulation.    Social History     Tobacco Use     Smoking status: Never Smoker     Smokeless tobacco: Never Used   Substance Use Topics     Alcohol use: No       MEDICATIONS:  Current Outpatient Medications   Medication Sig Dispense Refill     acetaminophen (TYLENOL) 325 MG tablet Take 3 tablets (975 mg) by mouth 3 times daily       alendronate (FOSAMAX) 70 MG tablet Take 70 mg by mouth every 7 days SUNDAYS       calcium carbonate-vitamin D (OSCAL W/D) 500-200 MG-UNIT tablet Take 1 tablet by mouth daily        digoxin (LANOXIN) 125 MCG tablet Take 1 tablet (125 mcg) by mouth daily 30 tablet 11     furosemide (LASIX) 20 MG tablet Take 1 tablet (20 mg) by mouth daily       Lidocaine (LIDOCARE) 4 % Patch Place 2 patches onto the skin every 24 hours To prevent lidocaine toxicity, patient should be patch free for 12 hrs daily.       metoprolol tartrate (LOPRESSOR) 25 MG tablet Take 12.5 mg by mouth 2 times daily       Multiple Vitamins-Minerals (PRESERVISION AREDS 2+MULTI VIT) CAPS Take 1 tablet by mouth 2 times daily       multivitamin w/minerals (MULTI-VITAMIN) tablet Take 1 tablet by mouth daily        oxyCODONE (ROXICODONE) 5 MG tablet TAKE 1 TABLET BY MOUTH EVERY 6 HOURS AS NEEDED FOR PAIN 30 tablet 0     senna-docusate (SENOKOT-S/PERICOLACE) 8.6-50 MG tablet Take 1 tablet by mouth 2 times daily       VITAMIN D, CHOLECALCIFEROL, PO Take 1,000 Units by mouth daily        warfarin ANTICOAGULANT (COUMADIN) 2.5 MG tablet Take 2.5 mg by mouth daily Except Wednesdays and Saturdays       warfarin ANTICOAGULANT (COUMADIN) 2.5 MG  tablet Take 1.25 mg by mouth daily On Wednesdays and Saturdays         ALLERGIES:  No Known Allergies    ROS:  10 point ROS neg other than the symptoms noted above in the HPI.    PHYSICAL EXAM:  Vital signs were reviewed in the chart.  Blood pressure 111/64, heart 63, respiratory rate 18, temperature 97  F, oxygen saturation 94%, weight 121.8 LBS  Gen: Frail appearing but in no acute distress  HEENT: Normocephalic; oropharynx clear  Card: Normal S1, S2, regular rate and rhythm, grade 2/6 systolic murmur best audible in LUSB  Resp: Lungs clear to auscultation bilaterally  GI: Abdomen soft, non-tender, non-distended, +BS  Ext: No significant LE edema  Neuro: CX II-XII grossly intact; ROM in all four extremities grossly intact  Psych: Alert and oriented x3; normal affect  Skin: No acute rash    LABORATORY/IMAGING DATA:    Most Recent 3 CBC's:  Recent Labs   Lab Test 07/25/20  1638 07/13/20  1546 05/06/19   WBC 11.0 10.6 8.9   HGB 13.0 13.2 14.6   MCV 95 97 94    175 244     Most Recent 3 BMP's:  Recent Labs   Lab Test 07/27/20  0700 07/26/20  0644 07/25/20  1638    128* 129*   POTASSIUM 4.1 3.9 3.9   CHLORIDE 96 96 95   CO2 35* 26 25   BUN 18 17 17   CR 0.98 0.88 0.79   ANIONGAP 2* 6 9   CHRIS 9.4 8.8 9.0   GLC 93 97 112*     Most Recent 2 LFT's:  Recent Labs   Lab Test 07/13/20  1546 05/06/19   AST 22 45   ALT 32 65*   ALKPHOS 57 76   BILITOTAL 1.3 1.3     Most Recent 3 INR's:  Recent Labs   Lab Test 07/28/20  0703 07/27/20  0700 07/26/20  0644   INR 2.90* 3.33* 5.07*       ASSESSMENT/PLAN:  Acute back pain with L1 compression fracture,  Degenerative disc disease of the lumbar spine,  Osteoporosis,  Physical deconditioning.  Patient currently has no significant back pain.  Plan:  Pain management with acetaminophen 975 mg p.o. 3 times daily, lidocaine patch, and PRN oxycodone 5 mg p.o. every 6 hours  Continue calcium with vitamin D supplements as well as alendronate 70 mg p.o. every Sunday  PT/OT evaluation  and therapy    Acute on chronic heart failure with preserved ejection fraction, LVEF more than 70% in April 2019,  Moderately severe to severe mitral regurgitation,  Mild aortic stenosis.  Patient was diuresed with BID furosemide and was discharged on furosemide 20 mg p.o. daily.  Previously she was on furosemide 20 mg p.o. 3 times a week.  She currently weighs at 121.8 LBS.  Plan:  Continue metoprolol 12.5 mg p.o. twice daily and furosemide 20 mg p.o. daily  Monitor weight, volume status, and renal function    Hyponatremia.  Due to acute CHF.  Normalized with diuretic therapy  Plan  Monitor sodium level periodically  Next BMP is scheduled for August 3    Paroxysmal atrial fibrillation,  History of recurrent DVT.  Heart rate is controlled.  Today's INR is 1.7  Plan:  Continue metoprolol 12.5 mg p.o. twice daily and digoxin 125 mcg p.o. daily  Resume warfarin 2.5 mg p.o. every Monday, Tuesday, Thursday, Friday, and Sunday, and 1.25 mg p.o. every Wednesday and Saturday.  Monitor INR for target range of 2-3  Check INR on Monday, August 3  Monitor heart rate    Opiate-induced constipation.  Patient had a bowel movement yesterday.  Plan:  Continue current bowel regimen    Abnormal thyroid function.  TSH was slightly low at 3.24 on July 26 but free T4 was normal at 1.11, suggestive of possible subclinical hypothyroidism.  Plan:  Repeat TSH in 4 to 6 weeks    Orders written by provider at facility:  Warfarin 2.5 mg p.o. every Monday, Tuesday, Thursday, Friday, Sunday and 1.25 mg p.o. every Wednesday and Saturday  Check INR on Monday, August 3      Electronically signed by:  Brenda Núñez MD

## 2020-07-30 NOTE — PLAN OF CARE
Physical Therapy Discharge Summary    Reason for therapy discharge:    Discharged to transitional care facility.    Progress towards therapy goal(s). See goals on Care Plan in [facility] electronic health record for goal details.  Goals not met.  Barriers to achieving goals:   discharge from facility.    Therapy recommendation(s):    Continued therapy is recommended.  Rationale/Recommendations:  Pt is below baseline and would benefit from continued PT at TCU to improve functional strength, balance, activity tolerance, safety and IND with functional mobility and reduce falls risk.

## 2020-07-30 NOTE — PROGRESS NOTES
"[facility]  PRIMARY CARE PROVIDER AND CLINIC:  [facility] MD, [facility] [address] / [place] [address]  Chief Complaint   Patient presents with     Hospital F/U     [facility] Medical Record Number:  [id number]  Place of Service where encounter took place:  [facility] [[id number]]    Elizabeth Ruggiero  is a 89 year old  (6/19/1926), admitted to the above facility from  [facility]. Hospital stay 7/25/20 through 7/28/20..  Admitted to this facility for  rehab, medical management and nursing care.    HPI:    HPI information obtained from: facility chart records, facility staff, patient report and [facility] chart review.     Brief Summary of Hospital Course:     This is a 89-year-old female, with a past medical history significant for chronic diastolic heart failure with EF > 70% on 4/23/19, severe mitral regurgitation, chronic atrial fibrillation on anticoagulation, recurrent DVT, hepatic congestion, spinal stenosis, TIA and hyperlipidemia  who was admitted to [facility] with back pain and inability to ambulate. Of note, was seen at [facility] ED on 7/13/20 with low back pain. A lumbar spine CT revealed \"Findings that appear most consistent with a subtle acute minimally displaced L1 superior endplate compression fracture .. Age indeterminate but potentially chronic mild T11 compression deformity\".  Provided with Acetaminophen with Codeine and discharged back home. Received Hydrocodone-Acetaminophen order from PCP, but continued to have pain.  During this hospitalization, an x-ray of the pelvis and left hip was performed with no acute findings. Declined narcotic medication, but called out in pain with mobility. Noted to hypoxic with BNP 6000 at admission. Diuresed with BID Furosemide with resolution of hypoxia. Labs revealed 128, urine osmolarity 270, urine Sodium 12. Hyponatremia thought to be due to " congestive heart failure and resolved with diuresis. A TCU stay was recommended for ongoing physical rehabilitation.     Updates on Status Since Skilled nursing Admission:     Initially is bothered because her hair hasn't been done since March. Later complains of abdominal discomfort. Describes as cramping. Had a bowel movement today, but hadn't had one in a few days. States she hasn't seen a doctor since she got to the facility and would like to know what's going on. Wants to know how long she'll be at the facility. Initially states she lives in [place], but later states she moved to a [facility] before her  passed because he didn't think she should live in a big house all by herself. Uses a walker for long distances. Has 2 daughters, requests Brittney be contacted as she is less busy. Denies shortness of breath.    CODE STATUS/ADVANCE DIRECTIVES DISCUSSION:   CPR/Full code - Patient requests to ask her daughter  Patient's living condition: lives alone  ALLERGIES: Patient has no known allergies.  PAST MEDICAL HISTORY:  has a past medical history of (HFpEF) heart failure with preserved ejection fraction (H), Acute diastolic CHF (congestive heart failure) (H), Acute left hemiparesis (H) (2/9/2019), Arthritis, Atrial fibrillation (H), Atrial fibrillation with RVR (H), Atrial flutter (H), Atrial flutter with rapid ventricular response (H) (4/23/2019), Community acquired pneumonia, Diastolic CHF (H) (04/29/2019), DVT of lower extremity (deep venous thrombosis) (H), Hepatic congestion, HTN (hypertension), Hyperlipidemia, Mitral valve insufficiency, Pneumonia, Postmenopausal bleeding (01/2020), Severe mitral regurgitation, SOB (shortness of breath), TIA (transient ischemic attack), and Vitamin D deficiency.  PAST SURGICAL HISTORY:   has a past surgical history that includes repair left femur; appendectomy; and fracture surgery.  FAMILY HISTORY: family history includes Breast Cancer in her sister; Colon Cancer in her  mother; Diabetes in her sister.  SOCIAL HISTORY:   reports that she has never smoked. She has never used smokeless tobacco. She reports that she does not drink alcohol or use drugs.    Post Discharge Medication Reconciliation Status: discharge medications reconciled and changed, per note/orders    Current Outpatient Medications   Medication Sig Dispense Refill     acetaminophen (TYLENOL) 325 MG tablet Take 3 tablets (975 mg) by mouth 3 times daily       calcium carbonate-vitamin D (OSCAL W/D) 500-200 MG-UNIT tablet Take 1 tablet by mouth daily        digoxin (LANOXIN) 125 MCG tablet Take 1 tablet (125 mcg) by mouth daily 30 tablet 11     furosemide (LASIX) 20 MG tablet Take 1 tablet (20 mg) by mouth daily       Lidocaine (LIDOCARE) 4 % Patch Place 2 patches onto the skin every 24 hours To prevent lidocaine toxicity, patient should be patch free for 12 hrs daily.       metoprolol tartrate (LOPRESSOR) 25 MG tablet Take 12.5 mg by mouth 2 times daily       Multiple Vitamins-Minerals (PRESERVISION AREDS 2+MULTI VIT) CAPS Take 1 tablet by mouth 2 times daily       multivitamin w/minerals (MULTI-VITAMIN) tablet Take 1 tablet by mouth daily        oxyCODONE (ROXICODONE) 5 MG tablet Take 1 tablet (5 mg) by mouth every 6 hours as needed for pain 12 tablet 0     senna-docusate (SENOKOT-S/PERICOLACE) 8.6-50 MG tablet Take 1 tablet by mouth 2 times daily       VITAMIN D, CHOLECALCIFEROL, PO Take 1,000 Units by mouth daily        warfarin ANTICOAGULANT (COUMADIN) 2.5 MG tablet Take 2.5 mg by mouth daily Except Wednesdays and Saturdays       warfarin ANTICOAGULANT (COUMADIN) 2.5 MG tablet Take 1.25 mg by mouth daily On Wednesdays and Saturdays       alendronate (FOSAMAX) 70 MG tablet Take 70 mg by mouth every 7 days SUNDAYS       ROS:  4 point ROS including Respiratory, CV, GI and , other than that noted in the HPI,  is negative    Vitals:  /71   Pulse 71   Temp 98  F (36.7  C)   Resp 20   Wt 60.8 kg (134 lb)   SpO2  94%   BMI 21.63 kg/m    Exam Limited due to COVID-19 Pandemic:  GENERAL APPEARANCE:  Alert, in no distress  ENT:  Mouth and posterior oropharynx normal, moist mucous membranes  EYES:  EOM, conjunctivae, lids, pupils and irises normal  RESP:  no respiratory distress  ABDOMEN:  normal bowel sounds, soft, nontender, no hepatosplenomegaly or other masses  PSYCH:  oriented to person    Lab/Diagnostic data:  Labs done in SNF are in [place] EPIC. Please refer to them using [employer] Everywhere.    ASSESSMENT/PLAN:  Acute on Chronic Diastolic Heart Failure with EF 70% on 4/23/19 with Moderate-to-Severe Mitral Regurgitation. Elevated BNP with hypoxia during hospitalization. Required increased Furosemide during hospitalization. Discharged on Furosemide 20 mg PO every day, which is increased from PTA dose Furosemide 20 mg on M, W, F. [facility]. Weight 129 lbs on 7/28/20 at hospital discharge -> 133.6 lbs on 7/29/20 at TCU admission. Likely difference in scale. Monitor weights daily.  Also on Metoprolol.     Acute Back Pain with L1 Compression Fracture, Degenerative Disc of the Spine, Lumbar Stenosis and Osteoporosis. No recent fall. Declined narcotics during hospitalization, but was discharged on Oxycodone. Acetaminophen scheduled and Lidocaine Patch.  Takes Alendronate, Calcium-Vitamin D and Vitamin D. Physical and Occupational Therapy ordered. PCP had placed consult at [facility] PTA for possible kyphoplasty.     Constipation. PCP instructed patient to take Miralax and Senna PTA. Also required suppositories. Was drinking herbal tea. Per patient report, had a bowel movement earlier today. Has been eating prunes. Will schedule Senna-S 1 tab PO BID. Adjust treatment accordingly.     Hyponatremia. Thought to be secondary to heart failure and resolved with diuresis. Last Sodium 133 on 7/27/20. Will repeat BMP on 8/3/20 to ensure stability.      Chronic Atrial Fibrillation on  Anticoagulation. Warfarin on hold during hospitalization due to supratherapeutic INR. PTA Warfarin 1.25 mg on W, Sat and 2.5 mg all other days. Today, INR 2.0. Will continue home dose of Warfarin. Repeat INR on 7/31/20. Takes Metoprolol and Digoxin. Monitor heart rate daily.     History of Recurrent Deep Vein Thrombosis. On Warfarin as noted above.    Hypertension and Hyperlipidemia. Monitor blood pressure daily. Takes Furosemide and Metoprolol.     Abnormal Thyroid Function Test. TSH elevated at 6.24 on 7/26/20. Free T4 1.11. Follow-up outpatient as indicated.    Physical Deconditioning. Secondary to recent hospitalization and co-morbidities. Physical and Occupational Therapy ordered for deconditioning.     New Medications: Furosemide changed to 20 mg daily, Senna-S, Lidocaine Patch    Total time spent with patient visit at the skilled nursing facility was 35 min including patient visit and review of past records. Greater than 50% of total time spent with counseling and coordinating care due to review of hospitalization, review of labs, clarifying orders at facility, dosing INR, adding Senna-S for constipation, explaining role of NP and MD in TCU, reviewing expected TCU stay and discussion with staff in regards to patient's stay and orders.     ADDENDUM 7/30/20: Spoke to daughter, Brittney. Daughter reports patient has been more anxious, lonely and depressed since COVID restrictions started at patient's AL in March. Also exacerbated by most recent hospitalizations. Would be interested in ACP seeing while patient is in TCU, but believes patient would not talk to anyone if she believed they were checking in on her mental health. Also question if patient can get a hair cut. Reports pain clinic appointment is on 8/5/20 and both daughters will be escorting patient to the appointment. Confirms FULL CODE status. Will ask SW to arrange ACP visit as well as see if [facility] [facility] is available.    Electronically signed  by:  SENIA Naylor CNP

## 2020-07-31 ENCOUNTER — NURSING HOME VISIT (OUTPATIENT)
Dept: GERIATRICS | Facility: CLINIC | Age: 85
End: 2020-07-31
Payer: COMMERCIAL

## 2020-07-31 VITALS
SYSTOLIC BLOOD PRESSURE: 125 MMHG | BODY MASS INDEX: 19.58 KG/M2 | HEART RATE: 64 BPM | OXYGEN SATURATION: 97 % | WEIGHT: 121.8 LBS | DIASTOLIC BLOOD PRESSURE: 76 MMHG | RESPIRATION RATE: 22 BRPM | HEIGHT: 66 IN | TEMPERATURE: 97.5 F

## 2020-07-31 DIAGNOSIS — I35.0 AORTIC VALVE STENOSIS, ETIOLOGY OF CARDIAC VALVE DISEASE UNSPECIFIED: ICD-10-CM

## 2020-07-31 DIAGNOSIS — I34.0 MITRAL VALVE INSUFFICIENCY, UNSPECIFIED ETIOLOGY: ICD-10-CM

## 2020-07-31 DIAGNOSIS — K59.03 THERAPEUTIC OPIOID INDUCED CONSTIPATION: ICD-10-CM

## 2020-07-31 DIAGNOSIS — M80.00XD AGE-RELATED OSTEOPOROSIS WITH CURRENT PATHOLOGICAL FRACTURE WITH ROUTINE HEALING, SUBSEQUENT ENCOUNTER: ICD-10-CM

## 2020-07-31 DIAGNOSIS — S32.010D COMPRESSION FRACTURE OF L1 VERTEBRA WITH ROUTINE HEALING, SUBSEQUENT ENCOUNTER: ICD-10-CM

## 2020-07-31 DIAGNOSIS — T40.2X5A THERAPEUTIC OPIOID INDUCED CONSTIPATION: ICD-10-CM

## 2020-07-31 DIAGNOSIS — Z86.718 HISTORY OF RECURRENT DEEP VEIN THROMBOSIS (DVT): ICD-10-CM

## 2020-07-31 DIAGNOSIS — M54.50 ACUTE LOW BACK PAIN WITHOUT SCIATICA, UNSPECIFIED BACK PAIN LATERALITY: Primary | ICD-10-CM

## 2020-07-31 DIAGNOSIS — I50.33 ACUTE ON CHRONIC HEART FAILURE WITH PRESERVED EJECTION FRACTION (H): ICD-10-CM

## 2020-07-31 DIAGNOSIS — R94.6 ABNORMAL FINDING ON THYROID FUNCTION TEST: ICD-10-CM

## 2020-07-31 DIAGNOSIS — E87.1 HYPONATREMIA: ICD-10-CM

## 2020-07-31 DIAGNOSIS — R53.81 PHYSICAL DECONDITIONING: ICD-10-CM

## 2020-07-31 DIAGNOSIS — I48.0 PAROXYSMAL ATRIAL FIBRILLATION (H): ICD-10-CM

## 2020-07-31 LAB
SARS-COV-2 RNA SPEC QL NAA+PROBE: NOT DETECTED
SPECIMEN SOURCE: NORMAL

## 2020-07-31 PROCEDURE — 99305 1ST NF CARE MODERATE MDM 35: CPT | Mod: AI | Performed by: INTERNAL MEDICINE

## 2020-07-31 ASSESSMENT — MIFFLIN-ST. JEOR: SCORE: 969.23

## 2020-08-03 ENCOUNTER — HOSPITAL LABORATORY (OUTPATIENT)
Dept: OTHER | Facility: CLINIC | Age: 85
End: 2020-08-03

## 2020-08-03 ENCOUNTER — NURSING HOME VISIT (OUTPATIENT)
Dept: GERIATRICS | Facility: CLINIC | Age: 85
End: 2020-08-03
Payer: COMMERCIAL

## 2020-08-03 VITALS
RESPIRATION RATE: 18 BRPM | BODY MASS INDEX: 20.66 KG/M2 | WEIGHT: 128 LBS | OXYGEN SATURATION: 95 % | SYSTOLIC BLOOD PRESSURE: 110 MMHG | HEART RATE: 61 BPM | DIASTOLIC BLOOD PRESSURE: 62 MMHG | TEMPERATURE: 97.5 F

## 2020-08-03 DIAGNOSIS — I48.20 CHRONIC ATRIAL FIBRILLATION (H): ICD-10-CM

## 2020-08-03 DIAGNOSIS — E78.5 HYPERLIPIDEMIA, UNSPECIFIED HYPERLIPIDEMIA TYPE: ICD-10-CM

## 2020-08-03 DIAGNOSIS — M54.50 ACUTE BILATERAL LOW BACK PAIN WITHOUT SCIATICA: ICD-10-CM

## 2020-08-03 DIAGNOSIS — E87.1 HYPONATREMIA: ICD-10-CM

## 2020-08-03 DIAGNOSIS — F41.9 ANXIETY: ICD-10-CM

## 2020-08-03 DIAGNOSIS — I50.32 CHRONIC DIASTOLIC HEART FAILURE (H): Primary | ICD-10-CM

## 2020-08-03 DIAGNOSIS — I10 BENIGN ESSENTIAL HYPERTENSION: ICD-10-CM

## 2020-08-03 DIAGNOSIS — K59.00 CONSTIPATION, UNSPECIFIED CONSTIPATION TYPE: ICD-10-CM

## 2020-08-03 DIAGNOSIS — G47.00 INSOMNIA, UNSPECIFIED TYPE: ICD-10-CM

## 2020-08-03 LAB
ANION GAP SERPL CALCULATED.3IONS-SCNC: 4 MMOL/L (ref 3–14)
BUN SERPL-MCNC: 19 MG/DL (ref 7–30)
CALCIUM SERPL-MCNC: 9.5 MG/DL (ref 8.5–10.1)
CHLORIDE SERPL-SCNC: 102 MMOL/L (ref 94–109)
CO2 SERPL-SCNC: 28 MMOL/L (ref 20–32)
CREAT SERPL-MCNC: 0.86 MG/DL (ref 0.52–1.04)
ERYTHROCYTE [DISTWIDTH] IN BLOOD BY AUTOMATED COUNT: 14.6 % (ref 10–15)
GFR SERPL CREATININE-BSD FRML MDRD: 58 ML/MIN/{1.73_M2}
GLUCOSE SERPL-MCNC: 130 MG/DL (ref 70–99)
HCT VFR BLD AUTO: 43.5 % (ref 35–47)
HGB BLD-MCNC: 13.9 G/DL (ref 11.7–15.7)
MCH RBC QN AUTO: 31.4 PG (ref 26.5–33)
MCHC RBC AUTO-ENTMCNC: 32 G/DL (ref 31.5–36.5)
MCV RBC AUTO: 98 FL (ref 78–100)
PLATELET # BLD AUTO: 228 10E9/L (ref 150–450)
POTASSIUM SERPL-SCNC: 4.1 MMOL/L (ref 3.4–5.3)
RBC # BLD AUTO: 4.42 10E12/L (ref 3.8–5.2)
SODIUM SERPL-SCNC: 134 MMOL/L (ref 133–144)
WBC # BLD AUTO: 10.5 10E9/L (ref 4–11)

## 2020-08-03 PROCEDURE — 99309 SBSQ NF CARE MODERATE MDM 30: CPT | Performed by: NURSE PRACTITIONER

## 2020-08-03 NOTE — PROGRESS NOTES
[facility]  [facility] [facility] Record Number:  [id number]  Place of Service where encounter took place:  [facility] [[id number]]  Chief Complaint   Patient presents with     Nursing Home Acute     HPI:    Elizabeth Ruggiero  is a 89 year old (6/19/1926), who is being seen today for an episodic care visit.  HPI information obtained from: facility chart records, facility staff, patient report and [facility] chart review. Today's concern is:    Chronic Diastolic Heart Failure with EF 70% on 4/23/19 with Moderate-to-Severe Mitral Regurgitation. No reports of shortness of breath. Noticing she is urinating more often and wonders if it's related to the pill she takes 3 times per week. Upon review of weights since TCU admission, 133.6 lbs on 7/29/20 -> 128.2 lbs on 8/3/20.      Acute Back Pain with L1 Compression Fracture, Degenerative Disc of the Spine, Lumbar Stenosis and Osteoporosis. Continues to have back pain in the lower back. Reports it hurts on both sides. Woke up this morning in pain and just received her pain medication. Per review of documentation, ambulating 85 feet with FWW and CGA. Has utilized Oxycodone 3 times in the past 5 days at night only.      Constipation. Reports having a bowel movement this morning. Upon review of documentation, having at least daily bowel movements since 7/31/20.      Hyponatremia. Sodium 134 on 8/3/20. Previous Sodium 133 on 7/27/20.      Chronic Atrial Fibrillation on Anticoagulation with History of Recurrent Deep Vein Thrombosis. Heart rate  over the past 5 days, most ~ 60. Warfarin on hold during hospitalization due to supratherapeutic INR. PTA Warfarin 1.25 mg on W, Sat and 2.5 mg all other days. Warfarin and INR dosing has been as follows:    INR 2.4 on 8/3/20   INR 1.7 on 7/31/20 Warfarin 1.25 mg on W, Sat and 2.5 mg all other days  INR 2.0 on 7/29/20 Warfarin 1.25 mg on W, Sat and 2.5 mg all other days     Hypertension and  Hyperlipidemia. Upon review of blood pressures over the past 5 days, systolic range from 108-154, most < 140. Diastolic range from 62-87.     Anxiety. Reported by daughter via telephone on 7/30/20. Largely in part due to COVID pandemic and visitor restrictions at [place].    Insomnia. Per staff report, at care conference, requested something to help with sleep.       Past Medical and Surgical History reviewed in Epic today.    MEDICATIONS:  Current Outpatient Medications   Medication Sig Dispense Refill     acetaminophen (TYLENOL) 325 MG tablet Take 3 tablets (975 mg) by mouth 3 times daily       alendronate (FOSAMAX) 70 MG tablet Take 70 mg by mouth every 7 days SUNDAYS       alum & mag hydroxide-simethicone (MAALOX) 200-200-20 MG/5ML SUSP suspension Take 30 mLs by mouth 4 times daily as needed for indigestion       calcium carbonate-vitamin D (OSCAL W/D) 500-200 MG-UNIT tablet Take 1 tablet by mouth daily        digoxin (LANOXIN) 125 MCG tablet Take 1 tablet (125 mcg) by mouth daily 30 tablet 11     furosemide (LASIX) 20 MG tablet Take 1 tablet (20 mg) by mouth daily       Lidocaine (LIDOCARE) 4 % Patch Place 2 patches onto the skin every 24 hours To prevent lidocaine toxicity, patient should be patch free for 12 hrs daily.       melatonin 3 MG tablet Take 1 mg by mouth At Bedtime       metoprolol tartrate (LOPRESSOR) 25 MG tablet Take 12.5 mg by mouth 2 times daily       Multiple Vitamins-Minerals (PRESERVISION AREDS 2+MULTI VIT) CAPS Take 1 tablet by mouth 2 times daily       multivitamin w/minerals (MULTI-VITAMIN) tablet Take 1 tablet by mouth daily        oxyCODONE (ROXICODONE) 5 MG tablet TAKE 1 TABLET BY MOUTH EVERY 6 HOURS AS NEEDED FOR PAIN 30 tablet 0     senna-docusate (SENOKOT-S/PERICOLACE) 8.6-50 MG tablet Take 1 tablet by mouth 2 times daily       VITAMIN D, CHOLECALCIFEROL, PO Take 1,000 Units by mouth daily        Warfarin Therapy Reminder 1 each continuous prn See facility for INR dosing       REVIEW OF  SYSTEMS:  4 point ROS including Respiratory, CV, GI and , other than that noted in the HPI,  is negative    Objective:  /62   Pulse 61   Temp 97.5  F (36.4  C)   Resp 18   Wt 58.1 kg (128 lb)   SpO2 95%   BMI 20.66 kg/m    Exam Limited due to COVID-19 Pandemic:  GENERAL APPEARANCE:  Alert, in no distress  ENT:  Mouth and posterior oropharynx normal, moist mucous membranes  EYES:  EOM, conjunctivae, lids, pupils and irises normal  RESP:  no respiratory distress  PSYCH:  oriented to person    Labs:   Labs done in SNF are in [place] EPIC. Please refer to them using [employer] Everywhere.    ASSESSMENT/PLAN:  Acute on Chronic Diastolic Heart Failure with EF 70% on 4/23/19 with Moderate-to-Severe Mitral Regurgitation. Elevated BNP with hypoxia during hospitalization. Required increased Furosemide during hospitalization. Discharged on Furosemide 20 mg PO every day, which is increased from PTA dose Furosemide 20 mg on M, W, F. Followed at [facility]. Weights stable since hospital discharge. Monitor daily. Continue Metoprolol as ordered.     Acute Back Pain with L1 Compression Fracture, Degenerative Disc of the Spine, Lumbar Stenosis and Osteoporosis. No recent fall. Declined narcotics during hospitalization, but was discharged on Oxycodone. Acetaminophen scheduled and Lidocaine Patch. Has been utilizing Oxycodone primarily at night. Takes Alendronate, Calcium-Vitamin D and Vitamin D. Physical and Occupational Therapy ordered. Has appointment at [facility] on 8/5/20 for possible kyphoplasty.      Constipation. Stable on Senna-S as ordered.     Hyponatremia. Mild. Thought to be secondary to heart failure and resolved with diuresis. Continue to monitor periodically to ensure stability.       Chronic Atrial Fibrillation on Anticoagulation. PTA Warfarin 1.25 mg on W, Sat and 2.5 mg all other days. Will order Warfarin 1.25 mg PO today and 2.5 mg PO on 8/4/20. Repeat INR on 8/5/20.  Takes Metoprolol and Digoxin. Monitor heart rate daily.      History of Recurrent Deep Vein Thrombosis. On Warfarin as noted above.     Hypertension and Hyperlipidemia. Monitor blood pressure daily. Takes Furosemide and Metoprolol.      Abnormal Thyroid Function Test. TSH elevated at 6.24 on 7/26/20. Free T4 1.11. Follow-up outpatient as indicated.    Anxiety. Order placed for ACP while in TCU. Daughter questions if her mother will talk to anyone, but requests order be placed. Previously declined pharmacological intervention at this time.    Insomnia. Continue Melatonin as ordered.     Physical Deconditioning. Secondary to recent hospitalization and co-morbidities. Physical and Occupational Therapy ordered for deconditioning.     Electronically signed by:  SENIA Naylor CNP     ADDENDUM 8/4/20: Spoke to patient's daughter, Brittney, who reports highly acidic food upsets patient. Requests high fat and high protein diet with limited processed foods. Wants to ensure she receives a shower prior to her pain clinic appointment tomorrow and a medication list is provided to the clinic. Is planning to take patient home on Thursday.

## 2020-08-04 RX ORDER — LANOLIN ALCOHOL/MO/W.PET/CERES
1 CREAM (GRAM) TOPICAL AT BEDTIME
COMMUNITY
End: 2020-08-10

## 2020-08-04 RX ORDER — MAGNESIUM HYDROXIDE/ALUMINUM HYDROXICE/SIMETHICONE 120; 1200; 1200 MG/30ML; MG/30ML; MG/30ML
30 SUSPENSION ORAL 4 TIMES DAILY PRN
COMMUNITY
End: 2020-11-06

## 2020-08-06 ENCOUNTER — DISCHARGE SUMMARY NURSING HOME (OUTPATIENT)
Dept: GERIATRICS | Facility: CLINIC | Age: 85
End: 2020-08-06
Payer: COMMERCIAL

## 2020-08-06 VITALS
DIASTOLIC BLOOD PRESSURE: 73 MMHG | OXYGEN SATURATION: 95 % | BODY MASS INDEX: 20.82 KG/M2 | HEART RATE: 71 BPM | RESPIRATION RATE: 16 BRPM | SYSTOLIC BLOOD PRESSURE: 124 MMHG | TEMPERATURE: 97.3 F | WEIGHT: 129 LBS

## 2020-08-06 DIAGNOSIS — E87.1 HYPONATREMIA: ICD-10-CM

## 2020-08-06 DIAGNOSIS — R53.81 PHYSICAL DECONDITIONING: ICD-10-CM

## 2020-08-06 DIAGNOSIS — K59.00 CONSTIPATION, UNSPECIFIED CONSTIPATION TYPE: ICD-10-CM

## 2020-08-06 DIAGNOSIS — I48.20 CHRONIC ATRIAL FIBRILLATION (H): ICD-10-CM

## 2020-08-06 DIAGNOSIS — I10 BENIGN ESSENTIAL HYPERTENSION: ICD-10-CM

## 2020-08-06 DIAGNOSIS — S32.010D COMPRESSION FRACTURE OF L1 VERTEBRA WITH ROUTINE HEALING, SUBSEQUENT ENCOUNTER: ICD-10-CM

## 2020-08-06 DIAGNOSIS — M54.59 INTRACTABLE LOW BACK PAIN: ICD-10-CM

## 2020-08-06 DIAGNOSIS — I50.32 CHRONIC DIASTOLIC HEART FAILURE (H): Primary | ICD-10-CM

## 2020-08-06 DIAGNOSIS — G47.00 INSOMNIA, UNSPECIFIED TYPE: ICD-10-CM

## 2020-08-06 DIAGNOSIS — F41.9 ANXIETY: ICD-10-CM

## 2020-08-06 PROCEDURE — 99316 NF DSCHRG MGMT 30 MIN+: CPT | Performed by: NURSE PRACTITIONER

## 2020-08-06 NOTE — PROGRESS NOTES
"[facility] DISCHARGE SUMMARY  PATIENT'S NAME: Elizabeth Ruggiero  YOB: 1926  MEDICAL RECORD NUMBER:  [id number]  Place of Service where encounter took place[facility] [[id number]]    PRIMARY CARE PROVIDER AND CLINIC RESPONSIBLE AFTER TRANSFER:   Gelacio Downs MD, [facility] [id number] / [place] [address]    Non-[facility] Provider     Transferring providers: SENIA Naylor CNP, Brenda Núñez MD  Recent Hospitalization/ED:  [facility] stay 7/25/20 to 7/28/20.  Date of SNF Admission: July / 28 / 2020  Date of SNF (anticipated) Discharge: August / 10 / 2020  Discharged to: previous assisted living  Cognitive Scores: Has refused participation in cognitive screens despite multiple attempts  Based on clinical judgment, recommend daily assistance to ensure safety and total assistance with all higher level problem solving  Physical Function: Ambulating 85 feet with 2WW and SBA. Transfers, bed mobility, grooming and hygiene with modified independence. Maximum assistance with lower body dressing. Recommend total assistance with IADLs.     CODE STATUS/ADVANCE DIRECTIVES DISCUSSION:  Full Code   ALLERGIES: Patient has no known allergies.    DISCHARGE DIAGNOSIS/NURSING FACILITY COURSE:   This is a 89-year-old female, with a past medical history significant for chronic diastolic heart failure with EF > 70% on 4/23/19, severe mitral regurgitation, chronic atrial fibrillation on anticoagulation, recurrent DVT, hepatic congestion, spinal stenosis, TIA and hyperlipidemia  who was admitted to [facility] with back pain and inability to ambulate. Of note, was seen at [facility] ED on 7/13/20 with low back pain. A lumbar spine CT revealed \"Findings that appear most consistent with a subtle acute minimally displaced L1 superior endplate compression fracture .. Age indeterminate but potentially " "chronic mild T11 compression deformity\".  Provided with Acetaminophen with Codeine and discharged back home. Received Hydrocodone-Acetaminophen order from PCP, but continued to have pain.  During this hospitalization, an x-ray of the pelvis and left hip was performed with no acute findings. Declined narcotic medication, but called out in pain with mobility. Noted to hypoxic with BNP 6000 at admission. Diuresed with BID Furosemide with resolution of hypoxia. Labs revealed 128, urine osmolarity 270, urine Sodium 12. Hyponatremia thought to be due to congestive heart failure and resolved with diuresis. A TCU stay was recommended for ongoing physical rehabilitation.     Acute on Chronic Diastolic Heart Failure with EF 70% on 4/23/19 with Moderate-to-Severe Mitral Regurgitation. Elevated BNP with hypoxia during hospitalization. Required increased Furosemide during hospitalization. Discharged on Furosemide 20 mg PO every day, which is increased from PTA dose Furosemide 20 mg on M, W, F. Followed at [facility].  Upon review of weights since TCU admission, 133.6 lbs on 7/29/20 -> 128.2 lbs on 8/3/20 -> 129.4 lbs on 8/6/20. Continue Metoprolol as ordered.     Acute Back Pain with L1 Compression Fracture, Degenerative Disc of the Spine, Lumbar Stenosis and Osteoporosis. No recent fall. Declined narcotics during hospitalization, but was discharged on Oxycodone. Acetaminophen scheduled and Lidocaine Patch. Has utilized Oxycodone, primarily at night, 2 of the past 5 nights. Takes Alendronate, Calcium-Vitamin D and Vitamin D. Physical and Occupational Therapy ordered. Had appointment at [facility] on 8/5/20 for possible kyphoplasty. Patient reports an MRI was recommended and this will be arranged in the coming weeks.      Constipation. Stable on Senna-S as ordered.     Hyponatremia. Mild. Thought to be secondary to heart failure and resolved with diuresis. Last Sodium 134 on 8/3/20. Continue to " monitor periodically to ensure stability.       Chronic Atrial Fibrillation on Anticoagulation. Over the past 5 days, heart rate range , most upper 50s to 80s.Takes Metoprolol and Digoxin. Monitor heart rate daily. Warfarin on hold during hospitalization due to supratherapeutic INR. PTA Warfarin 1.25 mg on W, Sat and 2.5 mg all other days. Warfarin and INR dosing has been as follows:     INR 2.7 on 8/5/20 Warfarin 2.5 mg on Th, Sat and 1.25 mg on all other days  INR 2.4 on 8/3/20 Warfarin 1.25 mg on 8/3/20 and 2.5 on 8/4/20  INR 1.7 on 7/31/20 Warfarin 1.25 mg on W, Sat and 2.5 mg all other days  INR 2.0 on 7/29/20 Warfarin 1.25 mg on W, Sat and 2.5 mg all other days    Next INR on 8/10/20 ADDENDUM 8/10/20: INR 2.3 on 8/10/20. Take Warfarin 2.5 mg on 8/10/20 and Warfarin 2.5 mg on 8/11/20. Repeat INR on 8/12/20     History of Recurrent Deep Vein Thrombosis. On Warfarin as noted above.     Hypertension and Hyperlipidemia. Upon review of blood pressure over the past 5 days, systolic range from 102-154, most < 140. Diastolic range from 62-77. Takes Furosemide and Metoprolol.      Abnormal Thyroid Function Test. TSH elevated at 6.24 on 7/26/20. Free T4 1.11. Follow-up outpatient as indicated.     Anxiety. Order placed for in-house psych while in TCU. Daughter questions if her mother will talk to anyone, but requests order be placed. Daughter reports patient has been more anxious, lonely and depressed since COVID restrictions started at patient's [place] in March. Also exacerbated by most recent hospitalizations. Declined pharmacological intervention at this time.     Insomnia. Continue Melatonin as ordered which is new during TCU stay.     Physical Deconditioning. Secondary to recent hospitalization and co-morbidities. Home Physical and Occupational Therapy ordered for deconditioning.     Past Medical History:  has a past medical history of (HFpEF) heart failure with preserved ejection fraction (H), Acute diastolic CHF  (congestive heart failure) (H), Acute left hemiparesis (H) (2/9/2019), Arthritis, Atrial fibrillation (H), Atrial fibrillation with RVR (H), Atrial flutter (H), Atrial flutter with rapid ventricular response (H) (4/23/2019), Community acquired pneumonia, Diastolic CHF (H) (04/29/2019), DVT of lower extremity (deep venous thrombosis) (H), Hepatic congestion, HTN (hypertension), Hyperlipidemia, Mitral valve insufficiency, Pneumonia, Postmenopausal bleeding (01/2020), Severe mitral regurgitation, SOB (shortness of breath), TIA (transient ischemic attack), and Vitamin D deficiency.    Discharge Medications:  Current Outpatient Medications   Medication Sig Dispense Refill     acetaminophen (TYLENOL) 325 MG tablet Take 3 tablets (975 mg) by mouth 3 times daily       alendronate (FOSAMAX) 70 MG tablet Take 70 mg by mouth every 7 days SUNDAYS       alum & mag hydroxide-simethicone (MAALOX) 200-200-20 MG/5ML SUSP suspension Take 30 mLs by mouth 4 times daily as needed for indigestion       calcium carbonate-vitamin D (OSCAL W/D) 500-200 MG-UNIT tablet Take 1 tablet by mouth daily        digoxin (LANOXIN) 125 MCG tablet Take 1 tablet (125 mcg) by mouth daily 30 tablet 11     furosemide (LASIX) 20 MG tablet Take 1 tablet (20 mg) by mouth daily       Lidocaine (LIDOCARE) 4 % Patch Place 2 patches onto the skin every 24 hours To prevent lidocaine toxicity, patient should be patch free for 12 hrs daily. 60 patch 0     melatonin 3 MG tablet Take 1 tablet (3 mg) by mouth At Bedtime 30 tablet 0     metoprolol tartrate (LOPRESSOR) 25 MG tablet Take 12.5 mg by mouth 2 times daily       Multiple Vitamins-Minerals (PRESERVISION AREDS 2+MULTI VIT) CAPS Take 1 tablet by mouth 2 times daily       multivitamin w/minerals (MULTI-VITAMIN) tablet Take 1 tablet by mouth daily        oxyCODONE (ROXICODONE) 5 MG tablet Take 1 tablet (5 mg) by mouth every 6 hours as needed for severe pain 10 tablet 0     senna-docusate (SENOKOT-S/PERICOLACE)  8.6-50 MG tablet Take 1 tablet by mouth 2 times daily 60 tablet 0     VITAMIN D, CHOLECALCIFEROL, PO Take 1,000 Units by mouth daily        warfarin ANTICOAGULANT (COUMADIN) 2.5 MG tablet Take 2.5 mg by mouth daily Take Warfarin 2.5 mg on 8/10/20 and 2.5 mg on 8/11/20. Repeat INR on 8/12/20       Medication Changes/Rationale:     See above    Controlled medications sent with patient:   Medication Oxycodone [facility]     ROS:   4 point ROS including Respiratory, CV, GI and , other than that noted in the HPI,  is negative    Physical Exam Limited due to COVID-19 Pandemic:   Vitals: /73   Pulse 71   Temp 97.3  F (36.3  C)   Resp 16   Wt 58.5 kg (129 lb)   SpO2 95%   BMI 20.82 kg/m    BMI= Body mass index is 20.82 kg/m .  GENERAL APPEARANCE:  Alert, in no distress  ENT:  Mouth and posterior oropharynx normal, moist mucous membranes  EYES:  EOM, conjunctivae, lids, pupils and irises normal  RESP:  no respiratory distress  PSYCH:  oriented to person    SNF labs: Labs done in SNF are in [place] EPIC. Please refer to them using [employer] Everywhere.    DISCHARGE PLAN:    Follow up labs: INR due 8/10/20    Medical Follow Up:      Follow up with primary care provider in 1 week    MTM referral needed and placed by this provider: No      Discharge Services: Home Care:  Occupational Therapy, Physical Therapy, Registered Nurse and Home Health Aide    Discharge Instructions to Patient at Discharge:     TG shapes for swelling in bilateral lower extremities. On in am. Off in pm.    Daily weights - Call MD with weight gain of more than 2 pounds in 24 hours or 5 pounds per week.     Regular diet, Regular (DD4) texture, Regular consistency     TOTAL DISCHARGE TIME:   Greater than 30 minutes  Electronically signed by:  SENIA Naylor CNP        Documentation of Face-to-Face and Certification for Home Health Services     Patient: Elizabeth Ruggiero   Date of Birth:  6/19/1926  MR Number: [id number]  Today's Date: 8/7/2020    I certify that patient: Elizabeth Ruggiero is under my care and that I, or a nurse practitioner or physician's assistant working with me, had a face-to-face encounter that meets the physician face-to-face encounter requirements with this patient on: 8/6/20.    This encounter with the patient was in whole, or in part, for the following medical condition, which is the primary reason for home health care: Acute Back Pain with L1 Compression Fracture, Degenerative Disc of the Spine, Lumbar Stenosis and Osteoporosis    I certify that, based on my findings, the following services are medically necessary home health services: Nursing, Occupational Therapy and Physical Therapy.    My clinical findings support the need for the above services because: Nurse is needed: To assess pain after changes in medications or other medical regimen.., Occupational Therapy Services are needed to assess and treat cognitive ability and address ADL safety due to impairment in Acute Back Pain with L1 Compression Fracture, Degenerative Disc of the Spine, Lumbar Stenosis and Osteoporosis. and Physical Therapy Services are needed to assess and treat the following functional impairments: Acute Back Pain with L1 Compression Fracture, Degenerative Disc of the Spine, Lumbar Stenosis and Osteoporosis.    Further, I certify that my clinical findings support that this patient is homebound (i.e. absences from home require considerable and taxing effort and are for medical reasons or Worship services or infrequently or of short duration when for other reasons) because: Requires assistance of another person or specialized equipment to access medical services because patient: Requires supervision of another for safe transfer...    Based on the above findings. I certify that this patient is confined to the home and needs intermittent skilled nursing care, physical therapy and/or speech therapy.  The  patient is under my care, and I have initiated the establishment of the plan of care.  This patient will be followed by a physician who will periodically review the plan of care.  Physician/Provider to provide follow up care: Gelacio Downs    Responsible Medicare certified PECOS Physician: Dr. Brenda Núñez  Physician Signature: See electronic signature associated with these discharge orders.  Date: 8/7/2020

## 2020-08-07 ENCOUNTER — HOSPITAL LABORATORY (OUTPATIENT)
Dept: OTHER | Facility: CLINIC | Age: 85
End: 2020-08-07

## 2020-08-07 PROBLEM — I50.32 DIASTOLIC DYSFUNCTION WITH CHRONIC HEART FAILURE (H): Status: ACTIVE | Noted: 2019-05-23

## 2020-08-07 PROBLEM — I10 ESSENTIAL HYPERTENSION: Status: ACTIVE | Noted: 2019-03-11

## 2020-08-08 LAB
SARS-COV-2 RNA SPEC QL NAA+PROBE: NOT DETECTED
SPECIMEN SOURCE: NORMAL

## 2020-08-08 NOTE — PATIENT INSTRUCTIONS
[facility] Discharge Orders    Name: Elizabeth Ruggiero  : 1926  Planned Discharge Date: 8/10/20  Discharged to: previous assisted living    MEDICAL FOLLOW UP  Follow up with PCP in 1-2 weeks   Follow up with [facility] as directed by them    FUTURE LABS: INR due 8/10/20 prior to TCU discharge - Take Warfarin 2.5 mg on 8/10/20 and 2.5 mg on 20 . Repeat INR on 20    ORDER CHANGES:  Start Lidoderm Patch. Apply 2 patches to your lower back in the morning. Remove in the evening 12 hours later  Oxycodone 5 mg by mouth every 6 hours as needed for pain.     DISCHARGE MEDICATIONS:  The patient s pharmacy is authorized to dispense a 30-day supply of medications. Refill requests should be directed to the primary provider, Gelacio Downs   Oxycodone to be e-prescribed to [facility]  Current Outpatient Medications   Medication Sig Dispense Refill     acetaminophen (TYLENOL) 325 MG tablet Take 3 tablets (975 mg) by mouth 3 times daily       alendronate (FOSAMAX) 70 MG tablet Take 70 mg by mouth every 7 days SUNDAYS       alum & mag hydroxide-simethicone (MAALOX) 200-200-20 MG/5ML SUSP suspension Take 30 mLs by mouth 4 times daily as needed for indigestion       calcium carbonate-vitamin D (OSCAL W/D) 500-200 MG-UNIT tablet Take 1 tablet by mouth daily        digoxin (LANOXIN) 125 MCG tablet Take 1 tablet (125 mcg) by mouth daily 30 tablet 11     furosemide (LASIX) 20 MG tablet Take 1 tablet (20 mg) by mouth daily       Lidocaine (LIDOCARE) 4 % Patch Place 2 patches onto the skin every 24 hours To prevent lidocaine toxicity, patient should be patch free for 12 hrs daily. 60 patch 0     melatonin 3 MG tablet Take 1 tablet (3 mg) by mouth At Bedtime 30 tablet 0     metoprolol tartrate (LOPRESSOR) 25 MG tablet Take 12.5 mg by mouth 2 times daily       Multiple Vitamins-Minerals (PRESERVISION AREDS 2+MULTI VIT) CAPS Take 1 tablet by mouth 2 times daily       multivitamin  w/minerals (MULTI-VITAMIN) tablet Take 1 tablet by mouth daily        oxyCODONE (ROXICODONE) 5 MG tablet Take 1 tablet (5 mg) by mouth every 6 hours as needed for severe pain 10 tablet 0     senna-docusate (SENOKOT-S/PERICOLACE) 8.6-50 MG tablet Take 1 tablet by mouth 2 times daily 60 tablet 0     VITAMIN D, CHOLECALCIFEROL, PO Take 1,000 Units by mouth daily        warfarin ANTICOAGULANT (COUMADIN) 2.5 MG tablet Take 2.5 mg by mouth daily Take Warfarin 2.5 mg on 8/10/20 and 2.5 mg on 8/11/20. Repeat INR on 8/12/20         SERVICES:  Home Care:  Occupational Therapy, Physical Therapy, Registered Nurse and Home Health Aide    ADDITIONAL INSTRUCTIONS:    TG shapes for swelling in bilateral lower extremities. On in am. Off in pm.    Daily weights - Call MD with weight gain of more than 2 pounds in 24 hours or 5 pounds per week.     Regular diet, Regular (DD4) texture, Regular consistency   SENIA Naylor CNP  This document was electronically signed on August 7, 2020

## 2020-08-10 RX ORDER — LIDOCAINE 4 G/G
2 PATCH TOPICAL EVERY 24 HOURS
Qty: 60 PATCH | Refills: 0 | Status: ON HOLD | OUTPATIENT
Start: 2020-08-10 | End: 2020-10-09

## 2020-08-10 RX ORDER — WARFARIN SODIUM 2.5 MG/1
2.5 TABLET ORAL DAILY
Status: ON HOLD | COMMUNITY
End: 2020-10-09

## 2020-08-10 RX ORDER — OXYCODONE HYDROCHLORIDE 5 MG/1
5 TABLET ORAL EVERY 6 HOURS PRN
Qty: 10 TABLET | Refills: 0 | Status: ON HOLD | OUTPATIENT
Start: 2020-08-10 | End: 2020-10-11

## 2020-08-10 RX ORDER — LANOLIN ALCOHOL/MO/W.PET/CERES
3 CREAM (GRAM) TOPICAL AT BEDTIME
Qty: 30 TABLET | Refills: 0 | Status: SHIPPED | OUTPATIENT
Start: 2020-08-10 | End: 2020-11-06

## 2020-08-10 RX ORDER — AMOXICILLIN 250 MG
1 CAPSULE ORAL 2 TIMES DAILY
Qty: 60 TABLET | Refills: 0 | Status: ON HOLD | OUTPATIENT
Start: 2020-08-10 | End: 2020-10-09

## 2020-08-25 ENCOUNTER — RECORDS - HEALTHEAST (OUTPATIENT)
Dept: LAB | Facility: CLINIC | Age: 85
End: 2020-08-25

## 2020-08-26 LAB — INR PPP: 3.2 (ref 0.9–1.1)

## 2020-10-06 ENCOUNTER — RECORDS - HEALTHEAST (OUTPATIENT)
Dept: LAB | Facility: CLINIC | Age: 85
End: 2020-10-06

## 2020-10-06 LAB
ALBUMIN UR-MCNC: ABNORMAL MG/DL
APPEARANCE UR: CLEAR
BACTERIA #/AREA URNS HPF: ABNORMAL HPF
BILIRUB UR QL STRIP: NEGATIVE
COLOR UR AUTO: YELLOW
GLUCOSE UR STRIP-MCNC: NEGATIVE MG/DL
HGB UR QL STRIP: NEGATIVE
HYALINE CASTS #/AREA URNS LPF: ABNORMAL LPF
KETONES UR STRIP-MCNC: NEGATIVE MG/DL
LEUKOCYTE ESTERASE UR QL STRIP: ABNORMAL
NITRATE UR QL: NEGATIVE
PH UR STRIP: 5.5 [PH] (ref 4.5–8)
RBC #/AREA URNS AUTO: ABNORMAL HPF
SP GR UR STRIP: 1.02 (ref 1–1.03)
SQUAMOUS #/AREA URNS AUTO: ABNORMAL LPF
UROBILINOGEN UR STRIP-ACNC: ABNORMAL
WBC #/AREA URNS AUTO: ABNORMAL HPF

## 2020-10-07 LAB — BACTERIA SPEC CULT: NO GROWTH

## 2020-10-09 ENCOUNTER — HOSPITAL ENCOUNTER (OUTPATIENT)
Facility: CLINIC | Age: 85
Setting detail: OBSERVATION
Discharge: NURSING, REHAB OR LONG-TERM CARE | End: 2020-10-11
Attending: EMERGENCY MEDICINE | Admitting: HOSPITALIST
Payer: COMMERCIAL

## 2020-10-09 ENCOUNTER — APPOINTMENT (OUTPATIENT)
Dept: ULTRASOUND IMAGING | Facility: CLINIC | Age: 85
End: 2020-10-09
Attending: HOSPITALIST
Payer: COMMERCIAL

## 2020-10-09 DIAGNOSIS — I50.32 DIASTOLIC DYSFUNCTION WITH CHRONIC HEART FAILURE (H): Primary | ICD-10-CM

## 2020-10-09 DIAGNOSIS — M54.59 INTRACTABLE LOW BACK PAIN: ICD-10-CM

## 2020-10-09 DIAGNOSIS — E87.1 HYPONATREMIA: ICD-10-CM

## 2020-10-09 LAB
ALBUMIN SERPL-MCNC: 3.7 G/DL (ref 3.4–5)
ALBUMIN UR-MCNC: NEGATIVE MG/DL
ALP SERPL-CCNC: 72 U/L (ref 40–150)
ALT SERPL W P-5'-P-CCNC: 119 U/L (ref 0–50)
ANION GAP SERPL CALCULATED.3IONS-SCNC: 6 MMOL/L (ref 3–14)
APPEARANCE UR: CLEAR
AST SERPL W P-5'-P-CCNC: 106 U/L (ref 0–45)
BILIRUB SERPL-MCNC: 2.4 MG/DL (ref 0.2–1.3)
BILIRUB UR QL STRIP: NEGATIVE
BUN SERPL-MCNC: 18 MG/DL (ref 7–30)
CALCIUM SERPL-MCNC: 8.9 MG/DL (ref 8.5–10.1)
CHLORIDE SERPL-SCNC: 94 MMOL/L (ref 94–109)
CO2 SERPL-SCNC: 27 MMOL/L (ref 20–32)
COLOR UR AUTO: ABNORMAL
CREAT SERPL-MCNC: 0.78 MG/DL (ref 0.52–1.04)
CREAT UR-MCNC: 19 MG/DL
ERYTHROCYTE [DISTWIDTH] IN BLOOD BY AUTOMATED COUNT: 14.2 % (ref 10–15)
GFR SERPL CREATININE-BSD FRML MDRD: 65 ML/MIN/{1.73_M2}
GLUCOSE SERPL-MCNC: 89 MG/DL (ref 70–99)
GLUCOSE UR STRIP-MCNC: NEGATIVE MG/DL
HCT VFR BLD AUTO: 40 % (ref 35–47)
HGB BLD-MCNC: 13.5 G/DL (ref 11.7–15.7)
HGB UR QL STRIP: ABNORMAL
INR PPP: 3.12 (ref 0.86–1.14)
INTERPRETATION ECG - MUSE: NORMAL
KETONES UR STRIP-MCNC: NEGATIVE MG/DL
LEUKOCYTE ESTERASE UR QL STRIP: ABNORMAL
MCH RBC QN AUTO: 31.7 PG (ref 26.5–33)
MCHC RBC AUTO-ENTMCNC: 33.8 G/DL (ref 31.5–36.5)
MCV RBC AUTO: 94 FL (ref 78–100)
MUCOUS THREADS #/AREA URNS LPF: PRESENT /LPF
NITRATE UR QL: NEGATIVE
PH UR STRIP: 6 PH (ref 5–7)
PLATELET # BLD AUTO: 175 10E9/L (ref 150–450)
POTASSIUM SERPL-SCNC: 3.8 MMOL/L (ref 3.4–5.3)
PROT SERPL-MCNC: 7 G/DL (ref 6.8–8.8)
RBC # BLD AUTO: 4.26 10E12/L (ref 3.8–5.2)
RBC #/AREA URNS AUTO: 2 /HPF (ref 0–2)
SARS-COV-2 RNA SPEC QL NAA+PROBE: NOT DETECTED
SODIUM SERPL-SCNC: 127 MMOL/L (ref 133–144)
SODIUM UR-SCNC: 15 MMOL/L
SOURCE: ABNORMAL
SP GR UR STRIP: 1 (ref 1–1.03)
SPECIMEN SOURCE: NORMAL
TSH SERPL DL<=0.005 MIU/L-ACNC: 2.22 MU/L (ref 0.4–4)
UROBILINOGEN UR STRIP-MCNC: NORMAL MG/DL (ref 0–2)
WBC # BLD AUTO: 9.8 10E9/L (ref 4–11)
WBC #/AREA URNS AUTO: 1 /HPF (ref 0–5)

## 2020-10-09 PROCEDURE — 99220 PR INITIAL OBSERVATION CARE,LEVEL III: CPT | Performed by: HOSPITALIST

## 2020-10-09 PROCEDURE — 80053 COMPREHEN METABOLIC PANEL: CPT | Performed by: EMERGENCY MEDICINE

## 2020-10-09 PROCEDURE — 82570 ASSAY OF URINE CREATININE: CPT | Performed by: EMERGENCY MEDICINE

## 2020-10-09 PROCEDURE — 85610 PROTHROMBIN TIME: CPT | Performed by: EMERGENCY MEDICINE

## 2020-10-09 PROCEDURE — 258N000003 HC RX IP 258 OP 636: Performed by: EMERGENCY MEDICINE

## 2020-10-09 PROCEDURE — 84443 ASSAY THYROID STIM HORMONE: CPT | Performed by: EMERGENCY MEDICINE

## 2020-10-09 PROCEDURE — G0378 HOSPITAL OBSERVATION PER HR: HCPCS

## 2020-10-09 PROCEDURE — 85027 COMPLETE CBC AUTOMATED: CPT | Performed by: EMERGENCY MEDICINE

## 2020-10-09 PROCEDURE — 99285 EMERGENCY DEPT VISIT HI MDM: CPT | Mod: 25

## 2020-10-09 PROCEDURE — 96360 HYDRATION IV INFUSION INIT: CPT

## 2020-10-09 PROCEDURE — 93005 ELECTROCARDIOGRAM TRACING: CPT

## 2020-10-09 PROCEDURE — 84300 ASSAY OF URINE SODIUM: CPT | Performed by: EMERGENCY MEDICINE

## 2020-10-09 PROCEDURE — 96361 HYDRATE IV INFUSION ADD-ON: CPT

## 2020-10-09 PROCEDURE — 81001 URINALYSIS AUTO W/SCOPE: CPT | Performed by: EMERGENCY MEDICINE

## 2020-10-09 PROCEDURE — 258N000003 HC RX IP 258 OP 636: Performed by: HOSPITALIST

## 2020-10-09 PROCEDURE — 250N000013 HC RX MED GY IP 250 OP 250 PS 637: Performed by: HOSPITALIST

## 2020-10-09 PROCEDURE — 76705 ECHO EXAM OF ABDOMEN: CPT

## 2020-10-09 PROCEDURE — C9803 HOPD COVID-19 SPEC COLLECT: HCPCS

## 2020-10-09 PROCEDURE — U0003 INFECTIOUS AGENT DETECTION BY NUCLEIC ACID (DNA OR RNA); SEVERE ACUTE RESPIRATORY SYNDROME CORONAVIRUS 2 (SARS-COV-2) (CORONAVIRUS DISEASE [COVID-19]), AMPLIFIED PROBE TECHNIQUE, MAKING USE OF HIGH THROUGHPUT TECHNOLOGIES AS DESCRIBED BY CMS-2020-01-R: HCPCS | Performed by: EMERGENCY MEDICINE

## 2020-10-09 RX ORDER — SODIUM CHLORIDE 9 MG/ML
INJECTION, SOLUTION INTRAVENOUS CONTINUOUS
Status: ACTIVE | OUTPATIENT
Start: 2020-10-09 | End: 2020-10-09

## 2020-10-09 RX ORDER — WARFARIN SODIUM 1 MG/1
1 TABLET ORAL
Status: COMPLETED | OUTPATIENT
Start: 2020-10-09 | End: 2020-10-09

## 2020-10-09 RX ORDER — DIGOXIN 125 MCG
125 TABLET ORAL DAILY
Status: DISCONTINUED | OUTPATIENT
Start: 2020-10-10 | End: 2020-10-11 | Stop reason: HOSPADM

## 2020-10-09 RX ORDER — SENNOSIDES A AND B 8.6 MG/1
1 TABLET, FILM COATED ORAL 2 TIMES DAILY PRN
COMMUNITY
End: 2023-01-01

## 2020-10-09 RX ORDER — AMOXICILLIN 250 MG
1 CAPSULE ORAL 2 TIMES DAILY PRN
Status: DISCONTINUED | OUTPATIENT
Start: 2020-10-09 | End: 2020-10-11 | Stop reason: HOSPADM

## 2020-10-09 RX ORDER — WARFARIN SODIUM 2.5 MG/1
2.5 TABLET ORAL SEE ADMIN INSTRUCTIONS
COMMUNITY
End: 2020-11-06

## 2020-10-09 RX ORDER — SODIUM CHLORIDE 9 MG/ML
INJECTION, SOLUTION INTRAVENOUS ONCE
Status: COMPLETED | OUTPATIENT
Start: 2020-10-09 | End: 2020-10-09

## 2020-10-09 RX ORDER — PROCHLORPERAZINE MALEATE 5 MG
5 TABLET ORAL EVERY 6 HOURS PRN
Status: DISCONTINUED | OUTPATIENT
Start: 2020-10-09 | End: 2020-10-11 | Stop reason: HOSPADM

## 2020-10-09 RX ORDER — FUROSEMIDE 20 MG
20 TABLET ORAL EVERY OTHER DAY
Status: ON HOLD | COMMUNITY
End: 2020-10-11

## 2020-10-09 RX ORDER — AMOXICILLIN 250 MG
2 CAPSULE ORAL 2 TIMES DAILY PRN
Status: DISCONTINUED | OUTPATIENT
Start: 2020-10-09 | End: 2020-10-11 | Stop reason: HOSPADM

## 2020-10-09 RX ORDER — PROCHLORPERAZINE 25 MG
12.5 SUPPOSITORY, RECTAL RECTAL EVERY 12 HOURS PRN
Status: DISCONTINUED | OUTPATIENT
Start: 2020-10-09 | End: 2020-10-11 | Stop reason: HOSPADM

## 2020-10-09 RX ORDER — ACETAMINOPHEN 325 MG/1
650 TABLET ORAL EVERY 4 HOURS PRN
Status: DISCONTINUED | OUTPATIENT
Start: 2020-10-09 | End: 2020-10-11 | Stop reason: HOSPADM

## 2020-10-09 RX ORDER — NALOXONE HYDROCHLORIDE 0.4 MG/ML
.1-.4 INJECTION, SOLUTION INTRAMUSCULAR; INTRAVENOUS; SUBCUTANEOUS
Status: DISCONTINUED | OUTPATIENT
Start: 2020-10-09 | End: 2020-10-11 | Stop reason: HOSPADM

## 2020-10-09 RX ORDER — ONDANSETRON 2 MG/ML
4 INJECTION INTRAMUSCULAR; INTRAVENOUS EVERY 6 HOURS PRN
Status: DISCONTINUED | OUTPATIENT
Start: 2020-10-09 | End: 2020-10-11 | Stop reason: HOSPADM

## 2020-10-09 RX ORDER — ONDANSETRON 4 MG/1
4 TABLET, ORALLY DISINTEGRATING ORAL EVERY 6 HOURS PRN
Status: DISCONTINUED | OUTPATIENT
Start: 2020-10-09 | End: 2020-10-11 | Stop reason: HOSPADM

## 2020-10-09 RX ORDER — BISACODYL 10 MG
10 SUPPOSITORY, RECTAL RECTAL DAILY PRN
Status: DISCONTINUED | OUTPATIENT
Start: 2020-10-09 | End: 2020-10-11 | Stop reason: HOSPADM

## 2020-10-09 RX ORDER — WARFARIN SODIUM 2.5 MG/1
1.25 TABLET ORAL SEE ADMIN INSTRUCTIONS
COMMUNITY
End: 2022-05-11

## 2020-10-09 RX ORDER — ACETAMINOPHEN 650 MG/1
650 SUPPOSITORY RECTAL EVERY 4 HOURS PRN
Status: DISCONTINUED | OUTPATIENT
Start: 2020-10-09 | End: 2020-10-11 | Stop reason: HOSPADM

## 2020-10-09 RX ADMIN — SODIUM CHLORIDE: 9 INJECTION, SOLUTION INTRAVENOUS at 10:06

## 2020-10-09 RX ADMIN — SODIUM CHLORIDE: 9 INJECTION, SOLUTION INTRAVENOUS at 12:26

## 2020-10-09 RX ADMIN — METOPROLOL TARTRATE 12.5 MG: 25 TABLET, FILM COATED ORAL at 21:12

## 2020-10-09 RX ADMIN — WARFARIN SODIUM 1 MG: 1 TABLET ORAL at 18:07

## 2020-10-09 ASSESSMENT — ENCOUNTER SYMPTOMS
WEAKNESS: 1
SPEECH DIFFICULTY: 1
ABDOMINAL PAIN: 0
VOMITING: 0
COUGH: 0
DIARRHEA: 0
HEMATURIA: 0
DIFFICULTY URINATING: 0
CONFUSION: 1
HEADACHES: 1
SHORTNESS OF BREATH: 1
FATIGUE: 1
DYSURIA: 0
FREQUENCY: 0
APPETITE CHANGE: 1

## 2020-10-09 ASSESSMENT — MIFFLIN-ST. JEOR: SCORE: 974.67

## 2020-10-09 NOTE — LETTER
Transition Communication Hand-off for Care Transitions to Next Level of Care Provider    Name: Elizabeth Ruggiero  : 1926  MRN #: [id number]  Primary Care Provider: Gelacio Downs     [facility] [address] [facility] [address]     Reason for Hospitalization:  Hyponatremia [E87.1]  Admit Date/Time: 10/9/2020  9:17 AM  Discharge Date: 10/11/2020  Payor Source: Payor: [facility] / Plan: [employer] MEDICARE ADVANTAGE / Product Type: HMO /     Readmission Assessment Measure (JENNIFER) Risk Score/category: average           Reason for Communication Hand-off Referral: Fragility  Difficulty understanding plan of care  Multiple providers/specialties    Discharge Plan: patient under observation at [facility] for ongoing AMS and weakness found to have Hyponatremia secondary to poor PO intake.  MD monitored labs Intake/Output.  Plan for discharge back to [facility] on  with repeat BMP/INR results to Dr. Downs in the next few days ~10/14. Patient's daughter is having a conference with the assisted living to increase services for 3x daily meal set up and encouragement of PO intake/fluids.  Plan is to watch LE for edema lasix PRN.         Concern for non-adherence with plan of care:   Y/N n  Discharge Needs Assessment:  Needs      Most Recent Value   Equipment Currently Used at Home  grab bar, toilet, grab bar, tub/shower, walker, standard, shower chair          Already enrolled in Tele-monitoring program and name of program:  n/a  Follow-up specialty is recommended: No    Follow-up plan:  No future appointments.    Any outstanding tests or procedures:        Referrals     Future Labs/Procedures    Home care nursing referral     Comments:    Resume home care services    Your provider has ordered home care nursing services. If you have not been contacted within 2 days of your discharge please call the inpatient department phone number at [phone] .    Resume Homecare    Home  Care PT Referral for Hospital Discharge     Comments:    PT to eval and treat    Your provider has ordered home care - physical therapy. If you have not been contacted within 2 days of your discharge please call the department phone number listed on the top of this document.  Referral to be sent to [facility] for physical therapy at the [facility]            [place] Recommendations:  Follow up with PCP and repeat labs. Ongoing care discussions with family and primary MD Nohemy Wallace RN    AVS/Discharge Summary is the source of truth; this is a helpful guide for improved communication of patient story

## 2020-10-09 NOTE — PROGRESS NOTES
RECEIVING UNIT ED HANDOFF REVIEW    ED Nurse Handoff Report was reviewed by: Hallie Solis RN on October 9, 2020 at 11:36 AM

## 2020-10-09 NOTE — PHARMACY-ADMISSION MEDICATION HISTORY
Pharmacy Medication History  Admission medication history interview status for the 10/9/2020  admission is complete. See EPIC admission navigator for prior to admission medications       Medication history sources: med list from [facility] ([phone])   Location of interview: patient room  Medication history source reliability: Good  Adherence assessment: Moderate, pt normally has her meds set up by staff, but takes on her own. She is currently confused.  Her pill tray was not lining up to the correct days, so I could not assess when she last had doses    Significant changes made to the medication list:  Changed lasix from daily to every other day. Changed senna s to senna. Removed lidopatch      Additional medication history information:   none    Medication reconciliation completed by provider prior to medication history? No    Time spent in this activity: 30 minutes      Prior to Admission medications    Medication Sig Last Dose Taking? Auth Provider   acetaminophen (TYLENOL) 325 MG tablet Take 3 tablets (975 mg) by mouth 3 times daily  Yes Sher Lucero PA-C   alendronate (FOSAMAX) 70 MG tablet Take 70 mg by mouth every 7 days SUNDAYS at 7:30am  Yes Reported, Patient   alum & mag hydroxide-simethicone (MAALOX) 200-200-20 MG/5ML SUSP suspension Take 30 mLs by mouth 4 times daily as needed for indigestion  Yes Reported, Patient   calcium carbonate-vitamin D (OSCAL W/D) 500-200 MG-UNIT tablet Take 1 tablet by mouth daily   Yes Reported, Patient   digoxin (LANOXIN) 125 MCG tablet Take 1 tablet (125 mcg) by mouth daily  Yes Tiffany Galvan MD   furosemide (LASIX) 20 MG tablet Take 20 mg by mouth every other day  Yes Unknown, Entered By History   melatonin 3 MG tablet Take 1 tablet (3 mg) by mouth At Bedtime  Yes Renée Marie APRN CNP   metoprolol tartrate (LOPRESSOR) 25 MG tablet Take 12.5 mg by mouth 2 times daily 1/2 x 25mg = 12.5mg  Yes Reported, Patient   Multiple Vitamins-Minerals (PRESERVISION  AREDS 2+MULTI VIT) CAPS Take 1 tablet by mouth 2 times daily  Yes Unknown, Entered By History   multivitamin w/minerals (MULTI-VITAMIN) tablet Take 1 tablet by mouth daily   Yes Reported, Patient   omeprazole (PRILOSEC) 20 MG DR capsule Take 20 mg by mouth daily before breakfast  Yes Unknown, Entered By History   oxyCODONE (ROXICODONE) 5 MG tablet Take 1 tablet (5 mg) by mouth every 6 hours as needed for severe pain  Yes Renée Marie, APRN CNP   senna (SENOKOT) 8.6 MG tablet Take 1 tablet by mouth 2 times daily as needed for constipation  Yes Unknown, Entered By History   VITAMIN D, CHOLECALCIFEROL, PO Take 1,000 Units by mouth daily   Yes Reported, Patient   warfarin ANTICOAGULANT (COUMADIN) 2.5 MG tablet Take 2.5 mg by mouth See Admin Instructions Tuesday, Friday, Sunday evenings  Yes Unknown, Entered By History   warfarin ANTICOAGULANT (COUMADIN) 2.5 MG tablet Take 1.25 mg by mouth See Admin Instructions Monday, Wednesday, Thursday, Saturday evenings  Yes Unknown, Entered By History

## 2020-10-09 NOTE — PROGRESS NOTES
"Writer called patients daughter Brittney by phone [phone] explained \"what is outpatient observation\" and answered all questions.    Per Brittney her mother resides at.the [facility] [address] Ass[address] living receives the following at [facility]  Medication set up and management  Daily Vitals  Attendant assistance 24/7  Homecare RN for INR draws (Brittney is not sure who this provider is).    They will be expanding services for their mother as follows:  Caregiver assist 3x daily with meals for meal set up to encourage eating/drinking.    Writer explained to Brittney that PT/OT are going to see her mother possibly tomorrow for further recommendations. We will continue to follow for further discharge planning needs as identified.      "

## 2020-10-09 NOTE — PHARMACY-ANTICOAGULATION SERVICE
Clinical Pharmacy - Warfarin Dosing Consult     Pharmacy has been consulted to manage this patient s warfarin therapy.  Indication: Atrial Fibrillation  Therapy Goal: INR 2-3  Warfarin Prior to Admission: Yes  Warfarin PTA Regimen: 2.5 mg Tues,Fri, Sunday, 1.25 mg ROW  Recent documented change in oral intake/nutrition: Unknown    INR   Date Value Ref Range Status   10/09/2020 3.12 (H) 0.86 - 1.14 Final   07/28/2020 2.90 (H) 0.86 - 1.14 Final       Recommend warfarin 1 mg today.  Pharmacy will monitor Elizabeth Ruggiero daily and order warfarin doses to achieve specified goal.      Please contact pharmacy as soon as possible if the warfarin needs to be held for a procedure or if the warfarin goals change.

## 2020-10-09 NOTE — PLAN OF CARE
Pt arrived from ED around 1200, VSS on RA. [place], utlized pocket talker. Denies pain. A/ox4, forgetful at times, and confused. Regular diet, poor appetite. Blanchable redness to coccyx, pt moves well in bed. Continent. PIV infusing NS at 50ml/hr. Na 127. LFTs elevated, US abd obtained today. Labs to be drawn in AM. A1 Gb/W, uses walker at baseline. Discharge pending.

## 2020-10-09 NOTE — PLAN OF CARE
OT and PT: orders received. Pt just admitted this morning. Will hold OT and PT evals today to allow for medical mgmt prior to initiating therapy eval

## 2020-10-09 NOTE — ED TRIAGE NOTES
Pt had appointment yesterday at [facility] - got a call for low sodium - told to come for eval. And be admitted

## 2020-10-09 NOTE — H&P
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    Generalized weakness and mild confusion likely secondary to hyponatremia  -will admit as observation  -sodium at the clinic was 122, here 127; likely prerenal secondary to poor PO intake with concurrent diuretics with Lasix; normal TSH  -will hold off on PTA Lasix; start normal saline at 50 ML per hour X 10 and repeat BMP in a.m.  -her mentation has improved, no focal neurological deficits, no concern for stroke or TIA at this time  -will get PT/OT evaluation and  for disposition planning    Abnormal LFTs  -LFTs on admission noted with bilirubin 2.4 and slightly elevated AST//119 with normal alkaline phosphatase  -she has been having some epigastric abdominal discomfort and has had poor PO intake with nausea and vomiting  -will get a RUQ ultrasound; monitor LFTs    COVID status  -low risk, asymptomatic    Diastolic CHF  severe MR  -last Echo from April 2019 was noted with EF more than 70%, moderate to severe MR in mild to moderate TR  -she is very susceptible to fluid overload  -will monitor volume status closely while giving IV hydration as above  -plan to resume Lasix when clinically stable with improving sodium    Atrial fibrillation  history of recurrent DVT  -INR 3.12 on presentation  -will have pharmacy dose Coumadin  -resume PTA Lopressor, digoxin when verified by pharmacy    # DVT prophylaxis: on Coumadin  # Code status: full code    Care plan discussed with patient and her  daughter over the phone           Primary Care Physician:   Gelacio Downs [phone]         Chief Complaint:     Abnormal labs    History is obtained from the patient and also from her daughter Brittney over the phone         History of Present Illness:     Elizabeth Ruggiero is a 89 year old female with PMH significant for  HTN, osteoporosis, history of AFib and 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 Elizabeth lives in a senior living and about two days ago while her daughter was talking to her over the phone, daughter noted that she sounded confused and was talking gibberish. Family talked to the nursing home staff and neck states he was taken to her PCP where she was noted to be somnolent and tired; TIA was considered but workup ended until routine labs which was noted with low sodium of 122 which was thought to be the likely cause for her mild confusion and fatigue; she was sent to the ER for further evaluation. Family further notes that she has had poor PO intake has been nauseous and had one episode of vomiting two days ago.    The patient denies any fever, chills, rigors, chest pain or shortness of breath.  No bowel or bladder disturbances.    In ED patient was seen by Dr Jones; she was given fluid bolus and hospitalist was requested admission for further evaluation.               Past Medical History:     Atrial fibrillation on chronic anticoagulation  history of recurrent DVT  Hypertension  moderate to severe MR  diastolic CHF  Osteoporosis  history of TIA  vitamin D deficiency          Past Surgical History:     Past Surgical History:   Procedure Laterality Date     APPENDECTOMY       FRACTURE SURGERY       repair left femur                Home Medications:     Prior to Admission Medications   Prescriptions Last Dose Informant Patient Reported? Taking?   Lidocaine (LIDOCARE)  4 % Patch   No No   Sig: Place 2 patches onto the skin every 24 hours To prevent lidocaine toxicity, patient should be patch free for 12 hrs daily.   Multiple Vitamins-Minerals (PRESERVISION AREDS 2+MULTI VIT) CAPS  Self Yes No   Sig: Take 1 tablet by mouth 2 times daily   VITAMIN D, CHOLECALCIFEROL, PO  Self Yes No   Sig: Take 1,000 Units by mouth daily    acetaminophen (TYLENOL) 325 MG tablet   No No   Sig: Take 3 tablets (975 mg) by mouth 3 times daily   alendronate (FOSAMAX) 70 MG tablet  Self Yes No   Sig: Take 70 mg by mouth every 7 days SUNDAYS   alum & mag hydroxide-simethicone (MAALOX) 200-200-20 MG/5ML SUSP suspension   Yes No   Sig: Take 30 mLs by mouth 4 times daily as needed for indigestion   calcium carbonate-vitamin D (OSCAL W/D) 500-200 MG-UNIT tablet  Self Yes No   Sig: Take 1 tablet by mouth daily    digoxin (LANOXIN) 125 MCG tablet  Self No No   Sig: Take 1 tablet (125 mcg) by mouth daily   furosemide (LASIX) 20 MG tablet   No No   Sig: Take 1 tablet (20 mg) by mouth daily   melatonin 3 MG tablet   No No   Sig: Take 1 tablet (3 mg) by mouth At Bedtime   metoprolol tartrate (LOPRESSOR) 25 MG tablet  Self Yes No   Sig: Take 12.5 mg by mouth 2 times daily   multivitamin w/minerals (MULTI-VITAMIN) tablet  Self Yes No   Sig: Take 1 tablet by mouth daily    oxyCODONE (ROXICODONE) 5 MG tablet   No No   Sig: Take 1 tablet (5 mg) by mouth every 6 hours as needed for severe pain   senna-docusate (SENOKOT-S/PERICOLACE) 8.6-50 MG tablet   No No   Sig: Take 1 tablet by mouth 2 times daily   warfarin ANTICOAGULANT (COUMADIN) 2.5 MG tablet   Yes Yes   Sig: Take 2.5 mg by mouth See Admin Instructions Tuesday, Friday, Sunday   warfarin ANTICOAGULANT (COUMADIN) 2.5 MG tablet   Yes Yes   Sig: Take 1.25 mg by mouth See Admin Instructions Monday, Wednesday, Thursday, Saturday      Facility-Administered Medications: None            Allergies:   No Known Allergies         Social History:   Elizabeth Ruggiero  reports that she  "has never smoked. She has never used smokeless tobacco. She reports that she does not drink alcohol or use drugs.              Family History:   Elizabeth Ruggiero family history includes Breast Cancer in her sister; Colon Cancer in her mother; Diabetes in her sister.    Family history was reviewed by myself and not pertinent to current presentation.           Review of Systems:   A10 point Review of Systems was done and were negative other than noted in the HPI.             Physical Exam:   Blood pressure 111/66, pulse 71, temperature 97.6  F (36.4  C), temperature source Oral, resp. rate 18, height 1.676 m (5' 6\"), weight 55.8 kg (123 lb), SpO2 95 %, not currently breastfeeding.  123 lbs 0 oz        Constitutional: Alert, awake and orienetd X 3; lying comfortably in bed in no apparent distress; slightly somnolent    HEENT: Pupils equal and reactive to light and accomodation, EOMI intact; neck supple no raised JVD or rigidity    Oral cavity:  dry oral mucosa   Cardiovascular: Normal s1 s2, irregularly regular rate and rhythm, systolic murmur   Lungs: B/l clear to auscultation, no wheezes or crepitations   Abdomen: Soft, nt, nd, no guarding, rigidity or rebound; BS +   LE :  mild bilateral edema   Musculoskeletal: Power 5/5 in all extremities   Neuro: No focal neurological deficits noted, CN II to XII grossly intact   Psychiatry: normal mood and affect  Skin: No obvious skin rashes or ulcers             Data:   All new lab and imaging data was reviewed in Epic.   Significant labs and imagings include:    CMP notable for sodium 127; abnormal LFTs with bilirubin 2.4    normal TSH 2.05  urine sodium 15  normal CBC  INR 3.12  normal UA             Lobo Serrano MD  Hospitalist  "

## 2020-10-09 NOTE — ED PROVIDER NOTES
History     Chief Complaint:  Abnormal Labs      The history is provided by the patient and a relative.      Elizabeth Ruggiero is a 89 year old female on Warfarin with a history of CHF, atrial fibrillation, and DVT who presents with confusion and fatigue. Of note, the patient saw Dr. Downs through [facility] yesterday for these symptoms after she awoke on 10/7 unable to articulate her speech. Dr. Downs was initially concerned for a mini stroke but after lab work came back demonstrating diminished sodium levels, Dr. Downs said that this in combination with her dehydration could have caused these symptoms. Patient was also taken off her Digoxin during this visit. The daughter states that the patient's cognition was improved yesterday though she was not completely back to normal. Today, however, the patient has been feeling very weak, particularly in her knees. The daughter also mentions that the patient commonly complains to her that her left leg feels weaker than her right which has apparently been going on for some time. Patient additionally endorses occasional shortness of breath and mild headaches. She had one episode of emesis on 10/7 but none since. Daughter notes a recent fall where patient rolled backwards off a stool onto some blankets, but sustained no injury from this. Patient also does mention that she has not been eating as much nor drinking as much water as usual. She denies any abdominal pain, diarrhea, urinary symptoms, cough, or chest pain. The patient lives alone, and has her medications set up for her by staff, which she administers herself.     10/8/20 [facility] CMP  CMP: Sodium 122 (L), Chloride 91 (L), Total CO2 20 (L), Glucose 109 (H), BUN/Creat ratio 25 (H), GFR 59 (L), o/w WNL (Creatinine 0.89)    Allergies:  No Known Drug Allergies    Medications:    Furosemide  Metoprolol tartrate  Warfarin  Omeprazole    Past Medical History:    Diastolic dysfunction with chronic heart failure  CHF  Atrial flutter with  "RVR  Severe mitral regurgitation  Acute left hemiparesis  Osteoporosis  Recurrent DVT  Hyperlipidemia  TIA  Chronic spinal stenosis    Past Surgical History:    Appendectomy  Left femur repair  Breast biopsy    Family History:    Mother - colon cancer  Sister - breast cancer, diabetes    Social History:  The patient was accompanied to the ED by her daughter.  Smoking Status: Never smoker  Smokeless Tobacco: Never used  Alcohol Use: No  Drug Use: No  Marital Status:      Review of Systems   Constitutional: Positive for appetite change (eating and drinking less) and fatigue.   Respiratory: Positive for shortness of breath (occasional). Negative for cough.    Cardiovascular: Negative for chest pain.   Gastrointestinal: Negative for abdominal pain, diarrhea and vomiting (1 episode on 10/7).   Genitourinary: Negative for decreased urine volume, difficulty urinating, dysuria, frequency, hematuria and urgency.   Neurological: Positive for speech difficulty (since resolved/improved), weakness and headaches.   Psychiatric/Behavioral: Positive for confusion.   All other systems reviewed and are negative.    Physical Exam     Patient Vitals for the past 24 hrs:   BP Temp Temp src Pulse Resp SpO2 Height Weight   10/09/20 0924 134/80 97.6  F (36.4  C) Oral 72 18 95 % 1.676 m (5' 6\") 55.8 kg (123 lb)       Physical Exam  General: alert, pleasant, hard of hearing.  HENT: mucous membranes moist  CV: regular rhythm, irregularly regular rate, 2/6 systolic ejection murmur  Resp: normal effort, clear throughout, no crackles or wheezing  GI: abdomen soft and nontender, no guarding  MSK: no bony tenderness  Skin: appropriately warm and dry. Small abrasion over left shoulder  Neuro: alert, clear speech, oriented.  Generally weak.  Psych: normal mood and affect    Emergency Department Course     ECG:  Indication: Weakness, Murmur  Completed at 1002.  Read at 1014.   Atrial fibrillation  Lateral infarct, age undetermined   Rate 74 " bpm. IA interval *. QRS duration 78. QT/QTc 386/428. P-R-T axes * 91 26.  No significant change when compared to EKG dated 4/29/2019.  Agree with computer interpretation.    Laboratory:  Laboratory findings were communicated with the patient and daughter who voiced understanding of the findings.    CBC: WBC 9.8, HGB 13.5,   CMP: Sodium 127 (L), Bilirubin total 2.4 (H),  (H),  (H), o/w WNL (Creatinine 0.78)    TSH: 2.22    INR: 3.12 (H)    UA with micro: Small blood (A), Trace leukocyte esterase (A), Mucous present (A), o/w WNL  Sodium Random Urine: 15  Creatinine Random Urine: 19    COVID-19 Virus (Coronavirus) by Nasopharyngeal (NP) Swab: Pending    Procedures  None.    Interventions:  1006 NS 1L IV Bolus    Emergency Department Course:  Past medical records, nursing notes, and vitals reviewed.    0930 I performed an exam of the patient as documented above.     1002 EKG obtained in the ED, see results above.     IV was inserted and blood was drawn for laboratory testing, results above.    The patient was swabbed for COVID-19, noted above.    The patient provided a urine sample here in the emergency department. This was sent for laboratory testing, findings above.    1107 I rechecked the patient and discussed the results of her workup thus far.     1113 I spoke with Dr. Serrano of the hospitalist service regarding patient's presentation, findings, and plan of care.    Findings and plan explained to the Patient and daughter who consents to admission. Discussed the patient with Dr. Serrano, who will admit the patient to a medical bed for further monitoring, evaluation, and treatment.    I personally reviewed the laboratory results with the Patient and mother and answered all related questions prior to admission.     Impression & Plan       Covid-19  Elizabeth Ruggiero was evaluated during a global COVID-19 pandemic, which necessitated consideration that the patient might be at risk for infection with  the SARS-CoV-2 virus that causes COVID-19. Applicable protocols for evaluation were followed during the patient's care.   COVID-19 was considered as part of the patient's evaluation. A test was obtained during this visit.    Medical Decision Making:  Elizabeth Ruggiero is a 89-year-old female on warfarin for atrial fibrillation, history of heart failure with preserved ejection fraction, DVT, who presents with confusion and fatigue. On exam, the patient is alert but generally weak. She has a nonfocal neurological exam. Vital signs are within normal limits. Labs obtained a little under 24 hours ago demonstrate a sodium of 122, which is increased to 127 without intervention. I suspect that hyponatremia is the cause of her fatigue and confusion. Labs are otherwise notable for mildly elevated bilirubin and ALT greater than AST. Patient has history of hepatic congestion, which could be explanation. She has a benign abdominal exam. I discussed this finding with Lobo Serrano MD, and plan is to obtain right upper quadrant ultrasound while she is in the hospital. On my exam, she appears quite alert, though daughter states that she is not quite back to her baseline level of alertness. Given that she continues to feel very weak and is at high risk for fall, I recommended observation stay to continue to monitor her sodium levels as well as administer an infusion of sodium chloride. The patient and her daughter are in agreement this plan.    Diagnosis:    ICD-10-CM    1. Hyponatremia  E87.1        Disposition:  Admitted to Dr. Serrano.      Scribe Disclosure:  Brook DEMPSEY, am serving as a scribe at 9:30 AM on 10/9/2020 to document services personally performed by Vicky Jones MD based on my observations and the provider's statements to me.     Brook Dolan  10/9/2020   [facility] EMERGENCY DEPT       Vicky Jones MD  10/09/20 [id number]

## 2020-10-09 NOTE — ED NOTES
"[facility]  ED Nurse Handoff Report    ED Chief complaint: Abnormal Labs      ED Diagnosis:   Final diagnoses:   None       Code Status: Full Code    Allergies: No Known Allergies    Patient Story: Elizabeth comes to the ER today for low NA that was drawn yesterday. The patients daughter states that she has been feeling weak and confused with a poor appetite. NA today is 127 and she will be getting IVF shortly, await ua samply. VSS.      Treatments and/or interventions provided: IVF  Patient's response to treatments and/or interventions: good    To be done/followed up on inpatient unit:  monitor NA levels, ua collection    Does this patient have any cognitive concerns?: Forgetful    Activity level - Baseline/Home:  Independent  Activity Level - Current:   Stand with Assist    Patient's Preferred language: English   Needed?: No    Isolation: None  Infection: Not Applicable  Bariatric?: No    Vital Signs:   Vitals:    10/09/20 0924   BP: 134/80   Pulse: 72   Resp: 18   Temp: 97.6  F (36.4  C)   TempSrc: Oral   SpO2: 95%   Weight: 55.8 kg (123 lb)   Height: 1.676 m (5' 6\")       Cardiac Rhythm:     Was the PSS-3 completed:   Yes  What interventions are required if any?               Family Comments: daughter at bedside  OBS brochure/video discussed/provided to patient/family: N/A              Name of person given brochure if not patient: na              Relationship to patient: na    For the majority of the shift this patient's behavior was Green.   Behavioral interventions performed were na.    ED NURSE PHONE NUMBER: [phone]         "

## 2020-10-10 ENCOUNTER — APPOINTMENT (OUTPATIENT)
Dept: PHYSICAL THERAPY | Facility: CLINIC | Age: 85
End: 2020-10-10
Attending: HOSPITALIST
Payer: COMMERCIAL

## 2020-10-10 LAB
ALBUMIN SERPL-MCNC: 3.2 G/DL (ref 3.4–5)
ALP SERPL-CCNC: 67 U/L (ref 40–150)
ALT SERPL W P-5'-P-CCNC: 102 U/L (ref 0–50)
ANION GAP SERPL CALCULATED.3IONS-SCNC: 7 MMOL/L (ref 3–14)
AST SERPL W P-5'-P-CCNC: 78 U/L (ref 0–45)
BILIRUB DIRECT SERPL-MCNC: 0.5 MG/DL (ref 0–0.2)
BILIRUB SERPL-MCNC: 1.5 MG/DL (ref 0.2–1.3)
BUN SERPL-MCNC: 20 MG/DL (ref 7–30)
CALCIUM SERPL-MCNC: 8.8 MG/DL (ref 8.5–10.1)
CHLORIDE SERPL-SCNC: 102 MMOL/L (ref 94–109)
CO2 SERPL-SCNC: 24 MMOL/L (ref 20–32)
CREAT SERPL-MCNC: 0.85 MG/DL (ref 0.52–1.04)
GFR SERPL CREATININE-BSD FRML MDRD: 59 ML/MIN/{1.73_M2}
GLUCOSE SERPL-MCNC: 100 MG/DL (ref 70–99)
INR PPP: 2.54 (ref 0.86–1.14)
POTASSIUM SERPL-SCNC: 3.9 MMOL/L (ref 3.4–5.3)
PROT SERPL-MCNC: 6 G/DL (ref 6.8–8.8)
SODIUM SERPL-SCNC: 133 MMOL/L (ref 133–144)

## 2020-10-10 PROCEDURE — 99225 PR SUBSEQUENT OBSERVATION CARE,LEVEL II: CPT | Performed by: HOSPITALIST

## 2020-10-10 PROCEDURE — 80048 BASIC METABOLIC PNL TOTAL CA: CPT | Performed by: HOSPITALIST

## 2020-10-10 PROCEDURE — 97161 PT EVAL LOW COMPLEX 20 MIN: CPT | Mod: GP | Performed by: PHYSICAL THERAPIST

## 2020-10-10 PROCEDURE — 97530 THERAPEUTIC ACTIVITIES: CPT | Mod: GP | Performed by: PHYSICAL THERAPIST

## 2020-10-10 PROCEDURE — 97116 GAIT TRAINING THERAPY: CPT | Mod: GP,59 | Performed by: PHYSICAL THERAPIST

## 2020-10-10 PROCEDURE — 250N000013 HC RX MED GY IP 250 OP 250 PS 637: Performed by: HOSPITALIST

## 2020-10-10 PROCEDURE — 36415 COLL VENOUS BLD VENIPUNCTURE: CPT | Performed by: HOSPITALIST

## 2020-10-10 PROCEDURE — 80076 HEPATIC FUNCTION PANEL: CPT | Performed by: HOSPITALIST

## 2020-10-10 PROCEDURE — 85610 PROTHROMBIN TIME: CPT | Performed by: HOSPITALIST

## 2020-10-10 PROCEDURE — G0378 HOSPITAL OBSERVATION PER HR: HCPCS

## 2020-10-10 RX ORDER — ACETAMINOPHEN 325 MG/1
975 TABLET ORAL 3 TIMES DAILY
Status: DISCONTINUED | OUTPATIENT
Start: 2020-10-10 | End: 2020-10-11 | Stop reason: HOSPADM

## 2020-10-10 RX ADMIN — WARFARIN SODIUM 1.25 MG: 2.5 TABLET ORAL at 18:27

## 2020-10-10 RX ADMIN — ACETAMINOPHEN 975 MG: 325 TABLET, FILM COATED ORAL at 21:31

## 2020-10-10 RX ADMIN — DIGOXIN 125 MCG: 125 TABLET ORAL at 08:28

## 2020-10-10 RX ADMIN — ACETAMINOPHEN 975 MG: 325 TABLET, FILM COATED ORAL at 15:58

## 2020-10-10 RX ADMIN — OMEPRAZOLE 20 MG: 20 CAPSULE, DELAYED RELEASE ORAL at 08:28

## 2020-10-10 RX ADMIN — METOPROLOL TARTRATE 12.5 MG: 25 TABLET, FILM COATED ORAL at 21:31

## 2020-10-10 RX ADMIN — METOPROLOL TARTRATE 12.5 MG: 25 TABLET, FILM COATED ORAL at 08:28

## 2020-10-10 NOTE — PLAN OF CARE
A/O, VSS on RA. Confused. C/o intermittent pain to L leg, on scheduled tylenol, heat packs used in addition. New PIV placed, SL. A1 GB/W to BR. Ambulated in halls. LFTs trending down. Na 133. Blanchable red coccyx. Discharge pending.

## 2020-10-10 NOTE — PROGRESS NOTES
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     Generalized weakness and mild confusion likely secondary to hyponatremia  -sodium at the clinic was 122, here on presentation 127; likely prerenal secondary to poor PO intake with concurrent diuretics with Lasix; normal TSH  - will continue to hold off on PTA Lasix for now (PTA regimen 20 mg q 48 hrs)  - sodium improved to 133 with IV hydration; elvia repeat BMP in am, if Sodium stable will plan to resume Lasix at decreased dose of 10 mg q 48 hrs  -her mentation has improved to baseline, no focal neurological deficits, no concern for stroke or TIA at this time  - evaluated by PT and rec home with assist; SW following for disposition planning     Abnormal LFTs  -LFTs on admission noted with bilirubin 2.4---1.5 and slightly elevated AST//119--->78/102 with normal alkaline phosphatase  - PTA she has been having some epigastric abdominal discomfort and has had poor PO intake with nausea and vomiting  - RUQ US noted with minimal extrahepatic bile duct enlargement, probably  related to age at 7 mm; will have LFTs on follow up and if trending up, will need MRCP eval for choledocholithiasis     COVID status---negative 10/09     Diastolic CHF  severe MR  - last Echo from April 2019 was noted with EF more than 70%, moderate to severe MR in mild to moderate TR  -she is very susceptible to fluid overload  -will monitor volume status closely  -plan to resume Lasix at adjusted dosage as above if stable to improved  "sodium     Atrial fibrillation  history of recurrent DVT  -INR 3.12 on presentation---2.54  -pharmacy to dose Coumadin  - continue PTA Lopressor, digoxin      # DVT prophylaxis: on Coumadin  # Code status: full code    Disposition: likely in am to [facility] with assist if sodium stable; d/w patient's daughter Brittney in room and SW; detention currently unable to provide the 24 hrs assist                      Physical Exam:      Blood pressure 119/57, pulse 82, temperature 96.2  F (35.7  C), temperature source Oral, resp. rate 16, height 1.676 m (5' 6\"), weight 55.8 kg (123 lb), SpO2 95 %, not currently breastfeeding.  Vitals:    10/09/20 0924   Weight: 55.8 kg (123 lb)     Vital Signs with Ranges  Temp:  [96.2  F (35.7  C)-98.4  F (36.9  C)] 96.2  F (35.7  C)  Pulse:  [71-92] 82  Resp:  [14-20] 16  BP: (105-145)/(45-79) 119/57  SpO2:  [90 %-95 %] 95 %  I/O's Last 24 hours  No intake/output data recorded.    Constitutional: Alert, awake and orienetd X 3; resting comfortably in no apparent distress; more awake   HEENT: Pupils equal and reactive to light and accomodation, EOMI intact; neck supple no raised JVD or rigidity    Oral cavity:  moist oral mucosa   Cardiovascular: Normal s1 s2, irregularly regular rate and rhythm, systolic murmur   Lungs: B/l clear to auscultation, no wheezes or crepitations   Abdomen: Soft, nt, nd, no guarding, rigidity or rebound; BS +   LE :  mild bilateral edema   Musculoskeletal: Power 5/5 in all extremities   Neuro: No focal neurological deficits noted, CN II to XII grossly intact   Psychiatry: normal mood and affect  Skin: No obvious skin rashes or ulcers                Medications:          digoxin  125 mcg Oral Daily     metoprolol tartrate  12.5 mg Oral BID     omeprazole  20 mg Oral QAM AC     warfarin ANTICOAGULANT  1.25 mg Oral ONCE at 18:00     PRN Meds: acetaminophen, acetaminophen, bisacodyl, magnesium hydroxide, melatonin, naloxone, ondansetron **OR** ondansetron, prochlorperazine **OR** " prochlorperazine **OR** prochlorperazine, senna-docusate **OR** senna-docusate, Warfarin Therapy Reminder         Data:      All new lab and imaging data was reviewed.   Recent Labs   Lab Test 10/10/20  0715 10/09/20  0927 08/03/20  1230 07/28/20  0703 07/25/20  1638 07/25/20  1638   WBC  --  9.8 10.5  --   --  11.0   HGB  --  13.5 13.9  --   --  13.0   MCV  --  94 98  --   --  95   PLT  --  175 228  --   --  208   INR 2.54* 3.12*  --  2.90*   < > 4.38*    < > = values in this interval not displayed.      Recent Labs   Lab Test 10/10/20  0715 10/09/20  0927 08/03/20  1230    127* 134   POTASSIUM 3.9 3.8 4.1   CHLORIDE 102 94 102   CO2 24 27 28   BUN 20 18 19   CR 0.85 0.78 0.86   ANIONGAP 7 6 4   CHRIS 8.8 8.9 9.5   * 89 130*     Recent Labs   Lab Test 04/29/19  0815 04/23/19  1622 03/09/19  1815   TROPI 0.023 0.032 <0.015

## 2020-10-10 NOTE — PROGRESS NOTES
Problem: Patient Care Overview  Goal: Plan of Care Review  Outcome: Outcome(s) achieved Date Met: 04/08/18  Pt vitals within normal limits. Pt voiding, passing stool, and feeding. Pt discharged this afternoon per physician's order. Pt stable at this time. Mother and father verbalized understanding that the pt must be seen in the pediatrician's office this Tuesday for a follow up visit. Pt stable at this time.          Yousufr met with patient and her daughter Brittney at the bedside.  Plan is for discharge back to the [facility] on [facility] 10/11 pending lab stability.    Per Brittney they are having a meeting with the [facility] on Monday for adding on additional services.  Yousufr went over PT recommendations for home.  Per Brittney she would like Homecare PT to start up as the patient just discharged from this service.    Brittney will  her mother and drive her to the [facility] and would like a call in the a.m. with the update.   called [facility] on [address] phone [phone] and talked to SIGRID Rogel.  Per Pebbles, the patient has INR and any labs drawn by their  In home [facility], she states if the patient requires additional services (PT this would be through the inside [facility] homecare [facility] and they will set the appointment up with them once orders are in).    Pebbles is requesting a call tomorrow with update and fax orders to [phone]. Any new meds or changes in med that require filling can be done through our discharge RX.    Writer to follow with MD tomorrow and update patients daughter and [facility] RN Pebbles with discharge update.

## 2020-10-10 NOTE — PROVIDER NOTIFICATION
MD Notification    Notified Person: MD    Notified Person Name: Lashell    Notification Date/Time: 10/10/20, 0615    Notification Interaction: phone    Purpose of Notification: FYI pt received 500 extra ml NS.     Orders Received: n/a    Comments:

## 2020-10-10 NOTE — PLAN OF CARE
OT: Orders received. Chart reviewed and discussed with care team.  OT not indicated due to Pt prior level of assist. Per pt, pt receives assist for meals, medication management, and 24/7 assist at [facility], pt has no IP OT needs at this time.  Defer discharge recommendations to PT.  Will complete orders.      OT: Attempted to see pt for OT eval. Pt just getting breakfast and meds, and wanting to eat. Per discussion w/RN, will hold this am to allow and try back this afternoon as schedules allow.     Nilda Parks, OT on 10/10/2020 at 8:33 AM

## 2020-10-10 NOTE — PLAN OF CARE
Physical Therapy Discharge Summary    Reason for therapy discharge:    All goals and outcomes met, no further needs identified.    Progress towards therapy goal(s). See goals on Care Plan in [facility] electronic health record for goal details.  Goals met    Therapy recommendation(s):    Pt is at baseline with mobility and transfers.

## 2020-10-10 NOTE — PLAN OF CARE
A&Ox4, forgetful at times. VSS on RA. [place], uses pocket talker. Denies pain. Regular diet, adequate intake. Blanchable redness to coccyx, turning with pillows in use per pt request, pt moves well in bed. Continent. New PIV placed, PIV infusing NS at 50ml/hr. A1 Gb/W, uses walker at baseline. Discharge pending.

## 2020-10-10 NOTE — PLAN OF CARE
A/O x4. VSS. Denies pain and N/V. RPIV infiltrated, stopped, warm compress applied with relief. NS was infused for 500ml more than was ordered. MD notified. WENDI singleton'd. Up Ax1, GB/W. Continent of B/B. Plan pending.

## 2020-10-10 NOTE — PROGRESS NOTES
10/10/20 1412   Quick Adds   Type of Visit Initial PT Evaluation   Living Environment   People in home facility resident   Current Living Arrangements assisted living   Home Accessibility no concerns;wheelchair accessible   Transportation Anticipated family or friend will provide   Self-Care   Usual Activity Tolerance moderate   Current Activity Tolerance moderate   Regular Exercise Yes   Exercise Amount/Frequency 3-5 times/wk;20 mins   Equipment Currently Used at Home grab bar, toilet;grab bar, tub/shower;walker, standard;shower chair   Disability/Function   Hearing Difficulty or Deaf yes   Describe hearing loss bilateral hearing loss   Use of hearing assistive devices bilateral hearing aids   Wear Glasses or Blind yes   Vision Management reading glasses   Fall history within last six months no   General Information   Onset of Illness/Injury or Date of Surgery 10/09/20   Referring Physician Lobo Serrano MD   Patient/Family Therapy Goals Statement (PT) Wants to return to living facility.    Pertinent History of Current Problem (include personal factors and/or comorbidities that impact the POC) Pt is a 89 y/o female admitted for hyponatremia.    Existing Precautions/Restrictions fall   Cognition   Orientation Status (Cognition) oriented x 4   Pain Assessment   Patient Currently in Pain No   Integumentary/Edema   Integumentary/Edema Comments L LE dark redness distal 1/3 of tibia to ankle.    Posture    Posture Kyphosis;Protracted shoulders;Forward head position   Range of Motion (ROM)   ROM Comment Demonstrated B UE and LE ROM WFL.   Strength   Strength Comments 5/5 B ankle DF/PF, demonstrated antigravity strength B via SLR.    Bed Mobility   Comment (Bed Mobility) Supine HOB 50 deg>sitting EOB, ind.    Transfers   Transfers sit-stand transfer   Sit-Stand Transfer   Sit-Stand [place] (Transfers) modified independence;contact guard   Assistive Device (Sit-Stand Transfers) walker, front-wheeled  "  Sit/Stand Transfer Comments Sit<>stand to FWW, CGA.   Gait/Stairs (Locomotion)   [place] Level (Gait) modified independence;contact guard   Assistive Device (Gait) walker, front-wheeled   Distance in Feet (Required for LE Total Joints) 200'   Balance   Balance Comments Able to maintain seated balance unsupported, able to maintain standing balance in FWW.    Sensory Examination   Sensory Perception Comments Denies any numbness or tingling in extremities.    Clinical Impression   Criteria for Skilled Therapeutic Intervention yes, treatment indicated   PT Diagnosis (PT) Impaired gait.    Influenced by the following impairments impaired activity tolerance, fatigue   Functional limitations due to impairments Decreased functional independence.    Clinical Presentation Stable/Uncomplicated   Clinical Presentation Rationale Per MR and clinical judgement.    Clinical Decision Making (Complexity) low complexity   Therapy Frequency (PT) One time eval and treatment only   Predicted Duration of Therapy Intervention (days/wks) 1 day   Planned Therapy Interventions (PT) gait training;ROM (range of motion);strengthening;stretching;transfer training;patient/family education   Risk & Benefits of therapy have been explained evaluation/treatment results reviewed;care plan/treatment goals reviewed;risks/benefits reviewed;current/potential barriers reviewed;participants voiced agreement with care plan;participants included;patient;daughter   PT Discharge Planning    PT Discharge Recommendation (DC Rec) home   PT Rationale for DC Rec Pt is at baseline with mobility and currently receiving 24/7 attendend assist at prior living facility.    [facility] [employer] TM \"6 Clicks\"   2016, Trustees of [facility], under license to [employer].  All rights reserved.   6 Clicks Short Forms Basic Mobility Inpatient Short Form   [facility] AM-PAC  \"6 Clicks\" V.2 Basic Mobility Inpatient Short Form   1. Turning from your back " to your side while in a flat bed without using bedrails? 4 - None   2. Moving from lying on your back to sitting on the side of a flat bed without using bedrails? 4 - None   3. Moving to and from a bed to a chair (including a wheelchair)? 3 - A Little   4. Standing up from a chair using your arms (e.g., wheelchair, or bedside chair)? 3 - A Little   5. To walk in hospital room? 3 - A Little   6. Climbing 3-5 steps with a railing? 2 - A Lot   Basic Mobility Raw Score (Score out of 24.Lower scores equate to lower levels of function) 19   Total Evaluation Time   Total Evaluation Time (Minutes) 10

## 2020-10-11 VITALS
SYSTOLIC BLOOD PRESSURE: 132 MMHG | DIASTOLIC BLOOD PRESSURE: 79 MMHG | HEIGHT: 66 IN | BODY MASS INDEX: 19.77 KG/M2 | HEART RATE: 86 BPM | TEMPERATURE: 96.5 F | OXYGEN SATURATION: 93 % | WEIGHT: 123 LBS | RESPIRATION RATE: 16 BRPM

## 2020-10-11 LAB
ANION GAP SERPL CALCULATED.3IONS-SCNC: 6 MMOL/L (ref 3–14)
BUN SERPL-MCNC: 22 MG/DL (ref 7–30)
CALCIUM SERPL-MCNC: 8.7 MG/DL (ref 8.5–10.1)
CHLORIDE SERPL-SCNC: 103 MMOL/L (ref 94–109)
CO2 SERPL-SCNC: 23 MMOL/L (ref 20–32)
CREAT SERPL-MCNC: 0.76 MG/DL (ref 0.52–1.04)
GFR SERPL CREATININE-BSD FRML MDRD: 67 ML/MIN/{1.73_M2}
GLUCOSE SERPL-MCNC: 101 MG/DL (ref 70–99)
INR PPP: 2.26 (ref 0.86–1.14)
POTASSIUM SERPL-SCNC: 3.9 MMOL/L (ref 3.4–5.3)
SODIUM SERPL-SCNC: 132 MMOL/L (ref 133–144)

## 2020-10-11 PROCEDURE — 99217 PR OBSERVATION CARE DISCHARGE: CPT | Performed by: HOSPITALIST

## 2020-10-11 PROCEDURE — 36415 COLL VENOUS BLD VENIPUNCTURE: CPT | Performed by: HOSPITALIST

## 2020-10-11 PROCEDURE — 250N000013 HC RX MED GY IP 250 OP 250 PS 637: Performed by: HOSPITALIST

## 2020-10-11 PROCEDURE — 80048 BASIC METABOLIC PNL TOTAL CA: CPT | Performed by: HOSPITALIST

## 2020-10-11 PROCEDURE — G0378 HOSPITAL OBSERVATION PER HR: HCPCS

## 2020-10-11 PROCEDURE — 85610 PROTHROMBIN TIME: CPT | Performed by: HOSPITALIST

## 2020-10-11 RX ORDER — WARFARIN SODIUM 2 MG/1
2 TABLET ORAL
Status: DISCONTINUED | OUTPATIENT
Start: 2020-10-11 | End: 2020-10-11 | Stop reason: HOSPADM

## 2020-10-11 RX ORDER — ACETAMINOPHEN 325 MG/1
650 TABLET ORAL 3 TIMES DAILY
Start: 2020-10-11 | End: 2021-09-25

## 2020-10-11 RX ORDER — FUROSEMIDE 20 MG
10 TABLET ORAL EVERY OTHER DAY
Status: ON HOLD
Start: 2020-10-11 | End: 2020-10-27

## 2020-10-11 RX ADMIN — ACETAMINOPHEN 975 MG: 325 TABLET, FILM COATED ORAL at 08:30

## 2020-10-11 RX ADMIN — OMEPRAZOLE 20 MG: 20 CAPSULE, DELAYED RELEASE ORAL at 08:31

## 2020-10-11 RX ADMIN — METOPROLOL TARTRATE 12.5 MG: 25 TABLET, FILM COATED ORAL at 08:30

## 2020-10-11 RX ADMIN — DIGOXIN 125 MCG: 125 TABLET ORAL at 08:31

## 2020-10-11 NOTE — PLAN OF CARE
Patient discharged at 11:28 AM to Discharged to [facility].  IV was discontinued. Pain at time of discharge was 0/10. Belongings returned to patient.  Discharge instructions and medications reviewed with patient.  Patient verbalized understanding and all questions were answered.  Prescriptions given to patient.  At time of discharge, patient condition was stable and left the unit  escorted by Daughter. Dtr requesting if a L shoe lift found to call dtkarla Lugo.

## 2020-10-11 NOTE — PROGRESS NOTES
Patient seen and examined    - no acute issues overnight  - sodium stable 133--132, suggested to encourage PO hydration  - will decrease Lasix to 10 mg po q 48 hrs    Discharge home today with home care PT    Please see discharge summary for details

## 2020-10-11 NOTE — PLAN OF CARE
"A/O x2-3, forgetful. VSS. PIV removed d/t patient complaint of pain, pt stated, \"If you don't take it out, I'll pull it out myself.\" Denies pain and N/V. Continent. Up ax1 GB/W. Plan to discharge to previous facility today at 11.   "

## 2020-10-11 NOTE — DISCHARGE SUMMARY
[facility]  Discharge Summary        Elizabeth Ruggiero MRN# [id number]   YOB: 1926 Age: 89 year old     Date of Admission:  10/9/2020  Date of Discharge:  10/11/2020  Admitting Physician:  Lobo Serrano MD  Discharge Physician: Lobo Serrano MD  Discharging Service: Hospitalist     Primary Provider: Gelacio Downs  Primary Care Physician Phone Number: [phone]         Discharge Diagnoses/Problem Oriented Hospital Course (Providers):    Elizabeth Ruggiero was admitted on 10/9/2020 by Lobo Serrano MD and I would refer you to their history and physical.  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     Generalized weakness and mild confusion likely secondary to hyponatremia  -sodium at the clinic was 122, here on presentation 127; likely prerenal secondary to poor PO intake with concurrent diuretics with Lasix; normal TSH  - sodium improved to 133--132 with IV hydration  - will resume Lasix at decreased dose of 10 mg q 48 hrs; encouraged PO intake  - her mentation has improved to baseline, no focal neurological deficits, no concern for stroke or TIA at this time  - evaluated by PT and rec home with assist; also ordered home care PT     Abnormal LFTs  -LFTs on admission noted with bilirubin 2.4---1.5 and slightly elevated AST//119--->78/102 with normal alkaline phosphatase  - PTA she has been having some epigastric abdominal discomfort and has had poor PO intake with nausea and vomiting  - RUQ US noted with minimal extrahepatic bile duct enlargement, probably related  to age at 7 mm; will have LFTs on follow up and if trending up, will need MRCP eval for  choledocholithiasis  - decreased her PTA Tylenol from 975 tid to 650 mg po tid     COVID status---negative 10/09     Diastolic CHF  severe MR  - last Echo from April 2019 was noted with EF more than 70%, moderate to severe MR in mild to moderate TR  -she is very susceptible to fluid overload; needs close volume status monitoring  -PTA Lasix dosage adjusted as above     Atrial fibrillation  history of recurrent DVT  -INR 3.12 on presentation---2.54  -pharmacy dosed Coumadin  - continue PTA Lopressor, digoxin      # Code status: full code            Brief Hospital Stay Summary Sent Home With Patient in AVS:               Pending Results:        Unresulted Labs Ordered in the Past 30 Days of this Admission     No orders found from 9/9/2020 to 10/10/2020.            Discharge Instructions and Follow-Up:      Follow-up Appointments     Follow-up and recommended labs and tests       Follow up with primary care provider, Gelacio Downs, within 7 days for   hospital follow- up.  The following labs/tests are recommended: repeat BMP   and LFTs.  Repeat BMP and INR in 3 days by home care nursing.                 Discharge Disposition:      Discharged to home        Discharge Medications:        Current Discharge Medication List      CONTINUE these medications which have CHANGED    Details   acetaminophen (TYLENOL) 325 MG tablet Take 2 tablets (650 mg) by mouth 3 times daily    Associated Diagnoses: Intractable low back pain      furosemide (LASIX) 20 MG tablet Take 0.5 tablets (10 mg) by mouth every other day    Associated Diagnoses: Diastolic dysfunction with chronic heart failure (H)         CONTINUE these medications which have NOT CHANGED    Details   alendronate (FOSAMAX) 70 MG tablet Take 70 mg by mouth every 7 days SUNDAYS at 7:30am      alum & mag hydroxide-simethicone (MAALOX) 200-200-20 MG/5ML SUSP suspension Take 30 mLs by mouth 4 times daily as needed for indigestion      calcium carbonate-vitamin D (OSCAL W/D)  "500-200 MG-UNIT tablet Take 1 tablet by mouth daily       digoxin (LANOXIN) 125 MCG tablet Take 1 tablet (125 mcg) by mouth daily  Qty: 30 tablet, Refills: 11    Associated Diagnoses: Atrial fibrillation, unspecified type (H)      melatonin 3 MG tablet Take 1 tablet (3 mg) by mouth At Bedtime  Qty: 30 tablet, Refills: 0    Associated Diagnoses: Insomnia, unspecified type      metoprolol tartrate (LOPRESSOR) 25 MG tablet Take 12.5 mg by mouth 2 times daily 1/2 x 25mg = 12.5mg      Multiple Vitamins-Minerals (PRESERVISION AREDS 2+MULTI VIT) CAPS Take 1 tablet by mouth 2 times daily      multivitamin w/minerals (MULTI-VITAMIN) tablet Take 1 tablet by mouth daily       omeprazole (PRILOSEC) 20 MG DR capsule Take 20 mg by mouth daily before breakfast      senna (SENOKOT) 8.6 MG tablet Take 1 tablet by mouth 2 times daily as needed for constipation      VITAMIN D, CHOLECALCIFEROL, PO Take 1,000 Units by mouth daily       !! warfarin ANTICOAGULANT (COUMADIN) 2.5 MG tablet Take 2.5 mg by mouth See Admin Instructions Tuesday, Friday, Sunday evenings      !! warfarin ANTICOAGULANT (COUMADIN) 2.5 MG tablet Take 1.25 mg by mouth See Admin Instructions Monday, Wednesday, Thursday, Saturday evenings       !! - Potential duplicate medications found. Please discuss with provider.      STOP taking these medications       oxyCODONE (ROXICODONE) 5 MG tablet Comments:   Reason for Stopping:                 Allergies:       No Known Allergies        Consultations This Hospital Stay:      No consultations were requested during this admission        Condition and Physical on Discharge:      Discharge condition: Stable   Vitals: Blood pressure 132/79, pulse 86, temperature 96.5  F (35.8  C), temperature source Oral, resp. rate 16, height 1.676 m (5' 6\"), weight 55.8 kg (123 lb), SpO2 93 %, not currently breastfeeding.     Constitutional: Alert, awake and orienetd X 3; resting comfortably in no apparent distress; more awake   HEENT: Pupils " equal and reactive to light and accomodation, EOMI intact; neck supple no raised JVD or rigidity    Oral cavity:  moist oral mucosa   Cardiovascular: Normal s1 s2, irregularly regular rate and rhythm, systolic murmur   Lungs: B/l clear to auscultation, no wheezes or crepitations   Abdomen: Soft, nt, nd, no guarding, rigidity or rebound; BS +   LE : Trace bilateral edema   Musculoskeletal: Power 5/5 in all extremities   Neuro: No focal neurological deficits noted, CN II to XII grossly intact   Psychiatry: normal mood and affect  Skin: No obvious skin rashes or ulcers               Discharge Time:      Greater than 30 minutes.        Image Results From This Hospital Stay (For Non-EPIC Providers):        Results for orders placed or performed during the hospital encounter of 10/09/20   US Abdomen Limited    Narrative    ULTRASOUND ABDOMEN LIMITED  10/9/2020 1:15 PM     HISTORY: Abnormal LFTs.    COMPARISON: None.    FINDINGS:  Liver is unremarkable in echogenicity without focal  lesions. Gallbladder is normal without stones or sludge. Extrahepatic  bile duct is 7 mm, minimally enlarged probably due to age. Pancreas is  normal where visualized. Right kidney is normal in size. There is no  hydronephrosis. Right pleural effusion partially visualized.      Impression    IMPRESSION: Minimal extrahepatic bile duct enlargement, probably  related to age at 7 mm.  If there is a history of elevated bilirubin,  and choledocholithiasis is suspected, an MRCP can be performed. Right  pleural effusion partially visualized. Otherwise unremarkable study.    RANJIT KING MD           Most Recent Lab Results In EPIC (For Non-EPIC Providers):    Most Recent 3 CBC's:  Recent Labs   Lab Test 10/09/20  0927 08/03/20  1230 07/25/20  1638   WBC 9.8 10.5 11.0   HGB 13.5 13.9 13.0   MCV 94 98 95    228 208      Most Recent 3 BMP's:  Recent Labs   Lab Test 10/11/20  0657 10/10/20  0715 10/09/20  0927   * 133 127*   POTASSIUM 3.9 3.9  3.8   CHLORIDE 103 102 94   CO2 23 24 27   BUN 22 20 18   CR 0.76 0.85 0.78   ANIONGAP 6 7 6   CHRIS 8.7 8.8 8.9   * 100* 89     Most Recent 3 Troponin's:  Recent Labs   Lab Test 04/29/19  0815 04/23/19  1622 03/09/19  1815   TROPI 0.023 0.032 <0.015     Most Recent 3 INR's:  Recent Labs   Lab Test 10/11/20  0657 10/10/20  0715 10/09/20  0927   INR 2.26* 2.54* 3.12*     Most Recent 2 LFT's:  Recent Labs   Lab Test 10/10/20  0715 10/09/20  0927   AST 78* 106*   * 119*   ALKPHOS 67 72   BILITOTAL 1.5* 2.4*     Most Recent Cholesterol Panel:No lab results found.  Most Recent 6 Bacteria Isolates From Any Culture (See EPIC Reports for Culture Details):  Recent Labs   Lab Test 04/29/19  1038 04/29/19  0850 04/29/19  0849 04/23/19  1715 04/23/19  1713 04/23/19  1630   CULT 10,000 to 50,000 colonies/mL  Enterococcus faecium  *  <10,000 colonies/mL  urogenital charles   No growth No growth No growth No growth No growth     Most Recent TSH, T4 and HgbA1c:   Recent Labs   Lab Test 10/09/20  0927 07/26/20  0644   TSH 2.22 6.24*   T4  --  1.11

## 2020-10-11 NOTE — PROGRESS NOTES
Care Management Discharge Note    Discharge Planning:  Expected Discharge Date: 10/11/20  Concerns to be Addressed:   n/a      Anticipated Discharge Disposition:  Back to the [facility] [address] with daughter Brittney transportinig  Anticipated Discharge Services: homecare RN lab draws, HC PT through [facility] ([facility] currently not allowing outside agency daughter okay with this agency)   Anticipated Discharge DME:  n/a    Patient/family educated on Medicare website which has current facility and service quality ratings:  n/a  Referrals Placed by CM/SW:  CM RN  Education Provided on the Discharge Plan:  Yes with andree Lugo 10/10/20  Patient/Family in Agreement with the Plan:  Yes     Disposition Comments.  Writer called andree Lugo today 10/11, Brittney will be coming in around ~10:30 to pick her mom up. Writer to fax orders to [facility] [address] at [phone] called and left a message for Jeanette at [phone] that orders were sent and to call back with questions.  Patient to have resumption of RN for labs, and PT with face to face through [facility]. No further needs identified. Hand off to Dr. Downs.           Nohemy Wallace, RN

## 2020-10-11 NOTE — PLAN OF CARE
A&Ox 2-3, forgetful. VSS on RA. [place]. Denies pain. Regular diet, adequate intake. Blanchable redness to coccyx, independently positioning. A1 GB/W, uses walker at baseline. Continent. PIV with dried blood, SL. Discharge planned for AM, daughter called this evening - worried about pt's continued/worsening mentation.

## 2020-10-13 ENCOUNTER — RECORDS - HEALTHEAST (OUTPATIENT)
Dept: LAB | Facility: CLINIC | Age: 85
End: 2020-10-13

## 2020-10-14 LAB
ALBUMIN SERPL-MCNC: 3.6 G/DL (ref 3.5–5)
ALP SERPL-CCNC: 78 U/L (ref 45–120)
ALT SERPL W P-5'-P-CCNC: 74 U/L (ref 0–45)
ANION GAP SERPL CALCULATED.3IONS-SCNC: 8 MMOL/L (ref 5–18)
AST SERPL W P-5'-P-CCNC: 51 U/L (ref 0–40)
BILIRUB DIRECT SERPL-MCNC: 0.6 MG/DL
BILIRUB SERPL-MCNC: 1.6 MG/DL (ref 0–1)
BUN SERPL-MCNC: 24 MG/DL (ref 8–28)
CALCIUM SERPL-MCNC: 9.2 MG/DL (ref 8.5–10.5)
CHLORIDE BLD-SCNC: 101 MMOL/L (ref 98–107)
CO2 SERPL-SCNC: 25 MMOL/L (ref 22–31)
CREAT SERPL-MCNC: 0.91 MG/DL (ref 0.6–1.1)
GFR SERPL CREATININE-BSD FRML MDRD: 58 ML/MIN/1.73M2
GLUCOSE BLD-MCNC: 104 MG/DL (ref 70–125)
INR PPP: 1.93 (ref 0.9–1.1)
POTASSIUM BLD-SCNC: 4.3 MMOL/L (ref 3.5–5)
PROT SERPL-MCNC: 6.4 G/DL (ref 6–8)
SODIUM SERPL-SCNC: 134 MMOL/L (ref 136–145)

## 2020-10-20 ENCOUNTER — RECORDS - HEALTHEAST (OUTPATIENT)
Dept: LAB | Facility: CLINIC | Age: 85
End: 2020-10-20

## 2020-10-20 LAB
ALBUMIN UR-MCNC: ABNORMAL MG/DL
APPEARANCE UR: CLEAR
BACTERIA #/AREA URNS HPF: ABNORMAL HPF
BILIRUB UR QL STRIP: NEGATIVE
CAOX CRY #/AREA URNS HPF: PRESENT /[HPF]
COLOR UR AUTO: YELLOW
GLUCOSE UR STRIP-MCNC: NEGATIVE MG/DL
GRAN CASTS #/AREA URNS LPF: ABNORMAL LPF
HGB UR QL STRIP: NEGATIVE
HYALINE CASTS #/AREA URNS LPF: ABNORMAL LPF
KETONES UR STRIP-MCNC: NEGATIVE MG/DL
LEUKOCYTE ESTERASE UR QL STRIP: ABNORMAL
NITRATE UR QL: NEGATIVE
PH UR STRIP: 6 [PH] (ref 4.5–8)
RBC #/AREA URNS AUTO: ABNORMAL HPF
SP GR UR STRIP: 1.02 (ref 1–1.03)
SQUAMOUS #/AREA URNS AUTO: ABNORMAL LPF
UROBILINOGEN UR STRIP-ACNC: ABNORMAL
WBC #/AREA URNS AUTO: ABNORMAL HPF

## 2020-10-21 LAB
ANION GAP SERPL CALCULATED.3IONS-SCNC: 9 MMOL/L (ref 5–18)
BACTERIA SPEC CULT: NO GROWTH
BUN SERPL-MCNC: 21 MG/DL (ref 8–28)
CALCIUM SERPL-MCNC: 9 MG/DL (ref 8.5–10.5)
CHLORIDE BLD-SCNC: 95 MMOL/L (ref 98–107)
CO2 SERPL-SCNC: 20 MMOL/L (ref 22–31)
CREAT SERPL-MCNC: 0.82 MG/DL (ref 0.6–1.1)
GFR SERPL CREATININE-BSD FRML MDRD: >60 ML/MIN/1.73M2
GLUCOSE BLD-MCNC: 136 MG/DL (ref 70–125)
INR PPP: 3.35 (ref 0.9–1.1)
POTASSIUM BLD-SCNC: 4.5 MMOL/L (ref 3.5–5)
SODIUM SERPL-SCNC: 124 MMOL/L (ref 136–145)

## 2020-10-23 ENCOUNTER — APPOINTMENT (OUTPATIENT)
Dept: GENERAL RADIOLOGY | Facility: CLINIC | Age: 85
DRG: 292 | End: 2020-10-23
Attending: EMERGENCY MEDICINE
Payer: COMMERCIAL

## 2020-10-23 ENCOUNTER — HOSPITAL ENCOUNTER (INPATIENT)
Facility: CLINIC | Age: 85
LOS: 2 days | Discharge: NURSING, REHAB OR LONG-TERM CARE | DRG: 292 | End: 2020-10-27
Attending: EMERGENCY MEDICINE | Admitting: INTERNAL MEDICINE
Payer: COMMERCIAL

## 2020-10-23 DIAGNOSIS — M62.81 GENERALIZED MUSCLE WEAKNESS: ICD-10-CM

## 2020-10-23 DIAGNOSIS — E87.1 HYPONATREMIA: ICD-10-CM

## 2020-10-23 DIAGNOSIS — I50.32 DIASTOLIC DYSFUNCTION WITH CHRONIC HEART FAILURE (H): Primary | ICD-10-CM

## 2020-10-23 DIAGNOSIS — R79.89 ELEVATED TROPONIN: ICD-10-CM

## 2020-10-23 DIAGNOSIS — R79.89 ABNORMAL LIVER FUNCTION TESTS: ICD-10-CM

## 2020-10-23 LAB
ALBUMIN SERPL-MCNC: 3.3 G/DL (ref 3.4–5)
ALBUMIN UR-MCNC: 30 MG/DL
ALP SERPL-CCNC: 89 U/L (ref 40–150)
ALT SERPL W P-5'-P-CCNC: 123 U/L (ref 0–50)
ANION GAP SERPL CALCULATED.3IONS-SCNC: 10 MMOL/L (ref 3–14)
APPEARANCE UR: CLEAR
AST SERPL W P-5'-P-CCNC: 77 U/L (ref 0–45)
BASOPHILS # BLD AUTO: 0 10E9/L (ref 0–0.2)
BASOPHILS NFR BLD AUTO: 0.5 %
BILIRUB SERPL-MCNC: 2.3 MG/DL (ref 0.2–1.3)
BILIRUB UR QL STRIP: NEGATIVE
BUN SERPL-MCNC: 20 MG/DL (ref 7–30)
CALCIUM SERPL-MCNC: 9 MG/DL (ref 8.5–10.1)
CHLORIDE SERPL-SCNC: 95 MMOL/L (ref 94–109)
CO2 SERPL-SCNC: 22 MMOL/L (ref 20–32)
COLOR UR AUTO: YELLOW
CREAT SERPL-MCNC: 0.79 MG/DL (ref 0.52–1.04)
DIFFERENTIAL METHOD BLD: NORMAL
DIGOXIN SERPL-MCNC: 1.3 UG/L (ref 0.5–2)
EOSINOPHIL # BLD AUTO: 0.1 10E9/L (ref 0–0.7)
EOSINOPHIL NFR BLD AUTO: 0.6 %
ERYTHROCYTE [DISTWIDTH] IN BLOOD BY AUTOMATED COUNT: 14.1 % (ref 10–15)
GFR SERPL CREATININE-BSD FRML MDRD: 64 ML/MIN/{1.73_M2}
GLUCOSE SERPL-MCNC: 105 MG/DL (ref 70–99)
GLUCOSE UR STRIP-MCNC: NEGATIVE MG/DL
HCT VFR BLD AUTO: 40.8 % (ref 35–47)
HGB BLD-MCNC: 13.9 G/DL (ref 11.7–15.7)
HGB UR QL STRIP: NEGATIVE
IMM GRANULOCYTES # BLD: 0 10E9/L (ref 0–0.4)
IMM GRANULOCYTES NFR BLD: 0.1 %
INR PPP: 3.49 (ref 0.86–1.14)
INTERPRETATION ECG - MUSE: NORMAL
KETONES UR STRIP-MCNC: NEGATIVE MG/DL
LEUKOCYTE ESTERASE UR QL STRIP: NEGATIVE
LIPASE SERPL-CCNC: 157 U/L (ref 73–393)
LYMPHOCYTES # BLD AUTO: 1.3 10E9/L (ref 0.8–5.3)
LYMPHOCYTES NFR BLD AUTO: 15 %
MCH RBC QN AUTO: 31.4 PG (ref 26.5–33)
MCHC RBC AUTO-ENTMCNC: 34.1 G/DL (ref 31.5–36.5)
MCV RBC AUTO: 92 FL (ref 78–100)
MONOCYTES # BLD AUTO: 1 10E9/L (ref 0–1.3)
MONOCYTES NFR BLD AUTO: 11.6 %
MUCOUS THREADS #/AREA URNS LPF: PRESENT /LPF
NEUTROPHILS # BLD AUTO: 6.3 10E9/L (ref 1.6–8.3)
NEUTROPHILS NFR BLD AUTO: 72.2 %
NITRATE UR QL: NEGATIVE
NRBC # BLD AUTO: 0 10*3/UL
NRBC BLD AUTO-RTO: 0 /100
NT-PROBNP SERPL-MCNC: 9146 PG/ML (ref 0–1800)
PH UR STRIP: 6 PH (ref 5–7)
PLATELET # BLD AUTO: 179 10E9/L (ref 150–450)
POTASSIUM SERPL-SCNC: 4.3 MMOL/L (ref 3.4–5.3)
PROCALCITONIN SERPL-MCNC: <0.05 NG/ML
PROT SERPL-MCNC: 6.6 G/DL (ref 6.8–8.8)
RBC # BLD AUTO: 4.43 10E12/L (ref 3.8–5.2)
RBC #/AREA URNS AUTO: 4 /HPF (ref 0–2)
SARS-COV-2 RNA SPEC QL NAA+PROBE: NORMAL
SODIUM SERPL-SCNC: 127 MMOL/L (ref 133–144)
SOURCE: ABNORMAL
SP GR UR STRIP: 1.02 (ref 1–1.03)
SPECIMEN SOURCE: NORMAL
SQUAMOUS #/AREA URNS AUTO: <1 /HPF (ref 0–1)
TROPONIN I SERPL-MCNC: 0.07 UG/L (ref 0–0.04)
TROPONIN I SERPL-MCNC: 0.08 UG/L (ref 0–0.04)
UROBILINOGEN UR STRIP-MCNC: NORMAL MG/DL (ref 0–2)
WBC # BLD AUTO: 8.8 10E9/L (ref 4–11)
WBC #/AREA URNS AUTO: 1 /HPF (ref 0–5)

## 2020-10-23 PROCEDURE — C9803 HOPD COVID-19 SPEC COLLECT: HCPCS

## 2020-10-23 PROCEDURE — 84145 PROCALCITONIN (PCT): CPT | Performed by: EMERGENCY MEDICINE

## 2020-10-23 PROCEDURE — U0003 INFECTIOUS AGENT DETECTION BY NUCLEIC ACID (DNA OR RNA); SEVERE ACUTE RESPIRATORY SYNDROME CORONAVIRUS 2 (SARS-COV-2) (CORONAVIRUS DISEASE [COVID-19]), AMPLIFIED PROBE TECHNIQUE, MAKING USE OF HIGH THROUGHPUT TECHNOLOGIES AS DESCRIBED BY CMS-2020-01-R: HCPCS | Performed by: EMERGENCY MEDICINE

## 2020-10-23 PROCEDURE — 71046 X-RAY EXAM CHEST 2 VIEWS: CPT

## 2020-10-23 PROCEDURE — 99285 EMERGENCY DEPT VISIT HI MDM: CPT | Mod: 25

## 2020-10-23 PROCEDURE — 93005 ELECTROCARDIOGRAM TRACING: CPT

## 2020-10-23 PROCEDURE — 96374 THER/PROPH/DIAG INJ IV PUSH: CPT

## 2020-10-23 PROCEDURE — 83690 ASSAY OF LIPASE: CPT | Performed by: EMERGENCY MEDICINE

## 2020-10-23 PROCEDURE — 250N000011 HC RX IP 250 OP 636: Performed by: INTERNAL MEDICINE

## 2020-10-23 PROCEDURE — 85610 PROTHROMBIN TIME: CPT | Performed by: EMERGENCY MEDICINE

## 2020-10-23 PROCEDURE — 250N000013 HC RX MED GY IP 250 OP 250 PS 637: Performed by: INTERNAL MEDICINE

## 2020-10-23 PROCEDURE — 84484 ASSAY OF TROPONIN QUANT: CPT | Performed by: EMERGENCY MEDICINE

## 2020-10-23 PROCEDURE — 83880 ASSAY OF NATRIURETIC PEPTIDE: CPT | Performed by: EMERGENCY MEDICINE

## 2020-10-23 PROCEDURE — 99220 PR INITIAL OBSERVATION CARE,LEVEL III: CPT | Performed by: INTERNAL MEDICINE

## 2020-10-23 PROCEDURE — 80053 COMPREHEN METABOLIC PANEL: CPT | Performed by: EMERGENCY MEDICINE

## 2020-10-23 PROCEDURE — 36415 COLL VENOUS BLD VENIPUNCTURE: CPT | Performed by: INTERNAL MEDICINE

## 2020-10-23 PROCEDURE — 80162 ASSAY OF DIGOXIN TOTAL: CPT | Performed by: EMERGENCY MEDICINE

## 2020-10-23 PROCEDURE — 999N001017 HC STATISTIC GLUCOSE BY METER IP

## 2020-10-23 PROCEDURE — G0378 HOSPITAL OBSERVATION PER HR: HCPCS

## 2020-10-23 PROCEDURE — 81001 URINALYSIS AUTO W/SCOPE: CPT | Performed by: EMERGENCY MEDICINE

## 2020-10-23 PROCEDURE — 85025 COMPLETE CBC W/AUTO DIFF WBC: CPT | Performed by: EMERGENCY MEDICINE

## 2020-10-23 PROCEDURE — 84484 ASSAY OF TROPONIN QUANT: CPT | Performed by: INTERNAL MEDICINE

## 2020-10-23 RX ORDER — FUROSEMIDE 10 MG/ML
20 INJECTION INTRAMUSCULAR; INTRAVENOUS
Status: DISCONTINUED | OUTPATIENT
Start: 2020-10-23 | End: 2020-10-26

## 2020-10-23 RX ORDER — ONDANSETRON 4 MG/1
4 TABLET, ORALLY DISINTEGRATING ORAL EVERY 6 HOURS PRN
Status: DISCONTINUED | OUTPATIENT
Start: 2020-10-23 | End: 2020-10-27 | Stop reason: HOSPADM

## 2020-10-23 RX ORDER — ACETAMINOPHEN 650 MG/1
650 SUPPOSITORY RECTAL EVERY 4 HOURS PRN
Status: DISCONTINUED | OUTPATIENT
Start: 2020-10-23 | End: 2020-10-27 | Stop reason: HOSPADM

## 2020-10-23 RX ORDER — ONDANSETRON 2 MG/ML
4 INJECTION INTRAMUSCULAR; INTRAVENOUS EVERY 6 HOURS PRN
Status: DISCONTINUED | OUTPATIENT
Start: 2020-10-23 | End: 2020-10-27 | Stop reason: HOSPADM

## 2020-10-23 RX ORDER — LIDOCAINE 40 MG/G
CREAM TOPICAL
Status: DISCONTINUED | OUTPATIENT
Start: 2020-10-23 | End: 2020-10-27 | Stop reason: HOSPADM

## 2020-10-23 RX ORDER — ACETAMINOPHEN 325 MG/1
650 TABLET ORAL EVERY 4 HOURS PRN
Status: DISCONTINUED | OUTPATIENT
Start: 2020-10-23 | End: 2020-10-27 | Stop reason: HOSPADM

## 2020-10-23 RX ORDER — NALOXONE HYDROCHLORIDE 0.4 MG/ML
.1-.4 INJECTION, SOLUTION INTRAMUSCULAR; INTRAVENOUS; SUBCUTANEOUS
Status: DISCONTINUED | OUTPATIENT
Start: 2020-10-23 | End: 2020-10-27 | Stop reason: HOSPADM

## 2020-10-23 RX ORDER — ALENDRONATE SODIUM 70 MG/1
70 TABLET ORAL
Status: DISCONTINUED | OUTPATIENT
Start: 2020-10-23 | End: 2020-10-23

## 2020-10-23 RX ORDER — WARFARIN SODIUM 2.5 MG/1
2.5 TABLET ORAL SEE ADMIN INSTRUCTIONS
Status: DISCONTINUED | OUTPATIENT
Start: 2020-10-23 | End: 2020-10-23 | Stop reason: DRUGHIGH

## 2020-10-23 RX ORDER — VITAMIN B COMPLEX
1000 TABLET ORAL DAILY
Status: DISCONTINUED | OUTPATIENT
Start: 2020-10-24 | End: 2020-10-24

## 2020-10-23 RX ORDER — DIGOXIN 125 MCG
125 TABLET ORAL DAILY
Status: DISCONTINUED | OUTPATIENT
Start: 2020-10-24 | End: 2020-10-27 | Stop reason: HOSPADM

## 2020-10-23 RX ADMIN — FUROSEMIDE 20 MG: 10 INJECTION, SOLUTION INTRAVENOUS at 22:55

## 2020-10-23 RX ADMIN — METOPROLOL TARTRATE 12.5 MG: 25 TABLET, FILM COATED ORAL at 22:55

## 2020-10-23 ASSESSMENT — ENCOUNTER SYMPTOMS
APPETITE CHANGE: 1
WEAKNESS: 1

## 2020-10-23 NOTE — ED PROVIDER NOTES
History     Chief Complaint:  Generalized Weakness       HPI   Elizabeth Ruggiero is a 89 year old female on coumadin with a history of CHF, Afib, DVT, hypertension, hyperlipidemia, TIA, and mitral valve insufficiency who presents with generalized weakness. EMS reports that the patient has had increasing weakness for the past week. Staff at the nursing home notes that the patient has not been eating and was unable to get out of bed this morning without assistance. After talking with the patient's daughter, she confirms that the patient has been getting increasingly weak this past week and has had 3 minor falls but did not suffer any injuries. Denies chest pain, cough, shortness of breath, fever, sore throat, vomiting, diarrhea.    Allergies:  No Known Allergies     Medications:    Digoxin   Lasix   Lopressor   Omeprazole  Coumadin     Past Medical History:    (HFpEF) heart failure with preserved ejection fraction   Acute diastolic CHF (congestive heart failure)    Arthritis   Atrial fibrillation   Atrial flutter   DVT of lower extremity (deep venous thrombosis)   Hepatic congestion   HTN (hypertension)   Hyperlipidemia   Mitral valve insufficiency   Postmenopausal bleeding   Severe mitral regurgitation   TIA (transient ischemic attack)   Vitamin D deficiency     Past Surgical History:    Appendectomy   fracture surgery   Repair left femur     Family History:    Colon cancer   Breast cancer   Diabetes     Social History:  Smoking Status: Never Smoker  Smokeless Tobacco: Never Used  Alcohol Use: No  Drug Use: No  PCP: Gelacio Downs     Review of Systems   Constitutional: Positive for appetite change (decreased).   Neurological: Positive for weakness.   All other systems reviewed and are negative.    Physical Exam     Patient Vitals for the past 24 hrs:   BP Temp Temp src Pulse Resp SpO2   10/23/20 1536 -- 97.8  F (36.6  C) Oral -- -- --   10/23/20 1430 117/74 -- -- 69 -- --   10/23/20 1427 -- -- -- -- 24 97 %         Physical Exam    General: Sitting up in bed, hard of hearing  Eyes:  The pupils are equal and round    Conjunctivae and sclerae are normal  ENT:    Wearing a mask  Neck:  Normal range of motion  CV:  Irregular rate and rhythm     Skin warm and well perfused   Resp:  Non labored breathing on room air    No tachypnea    No cough heard  GI:  Abdomen is soft, there is no rigidity    No distension    No rebound tenderness     No abdominal tenderness  MS:  Mild bilateral LE edema  Skin:  No rash or acute skin lesions noted  Neuro:   Awake, alert.      Speech is normal and fluent.    Face is symmetric.     Moves all extremities symmetrically    Has generalized weakness   Psych: Normal affect.  Appropriate interactions.     Emergency Department Course   ECG:  ECG taken at 1446  Atrial fibrillation   Lateral infarct, age undetermined   Abnormal ECG  Rate 66 bpm. AK interval * ms. QRS duration 70 ms. QT/QTc 366/383 ms. P-R-T axes * 99 15.   No significant change when compared to EKG dated 10/9/20.     Imaging:   Imaging findings were communicated with the patient who voiced understanding of the findings.    XR Chest PA & LAT:  Hazy opacity right lower lobe may represent atelectasis or   pneumonia. Enlarged heart with bilateral pleural effusions likely   related to CHF, as per radiology.      Laboratory:  Laboratory findings were communicated with the patient who voiced understanding of the findings.    CBC:  WBC 8.8, HGB 13.9, , o/w WNL     CMP:  (L), Glucose 105 (H), Bilirubin total 2.3 (H), Albumin 3.3 (L), Protein total 6.6 (L),  (H), AST 77 (H), Creatinine 0.79 o/w wnl      Lipase: 157      INR: 3.49 (H)    Digoxin: 1.3     Troponin(1429): 0.072 (H)      UA with micro: Albumin 30, RBC 4 (H), Mucous urine present, o/w negative    Asymptomatic COVID PCR: pending    Emergency Department Course:  Past medical records, nursing notes, and vitals reviewed.    1429 I performed an exam of the patient as  documented above.    IV was inserted and blood was drawn for laboratory testing, results above.      2265 I spoke with the patient's daughter regarding patient's presentation, findings, and plan of care.      1800: Patient rechecked and updated.       Findings and plan explained to the Patient who consents to admission. Discussed the patient with Dr. Pabon, who will admit the patient to an observation bed for further monitoring, evaluation, and treatment.      Impression & Plan   Covid-19  Elizabeth Ruggiero was evaluated during a global COVID-19 pandemic, which necessitated consideration that the patient might be at risk for infection with the SARS-CoV-2 virus that causes COVID-19.   Applicable protocols for evaluation were followed during the patient's care.   COVID-19 was considered as part of the patient's evaluation. The plan for testing is:  a test was obtained during this visit.    Medical Decision Making:  Elizabeth Ruggiero is a 89 year old female who presents to the emergency department today with generalized weakness. Patient with no focal weakness on exam. Doubt CVA. Likely multifactorial causing gen weakness. Troponin minimally elevated, likely from known CHF history. Less likely ACS, PE. No abdominal tenderness. LFTs minimally elevated, similar to last hospitalization. MRCP was recommended if labs continue to be elevated as US was inconclusive at that time. Doubt pneumonia, covid. Requiring more assistance at this time. Discussed with hospitalist for admission. Daughter in agreement with staying in hospital.    Discharge Diagnosis:    ICD-10-CM    1. Generalized muscle weakness  M62.81 UA with Microscopic   2. Elevated troponin  R77.8    3. Hyponatremia  E87.1    4. Abnormal liver function tests  R94.5        Disposition:  Admitted.    Scribe Disclosure:  Primo DEMPSEY, am serving as a scribe at 2:29 PM on 10/23/2020 to document services personally performed by Shagufta Rodriguez MD based on my observations and  the provider's statements to me.      10/23/2020   Shagufta Rodriguez MD Goertz, Maria Kristine, MD  10/23/20 [id number]

## 2020-10-23 NOTE — ED TRIAGE NOTES
Pt BIBA from the [facility] living. Per EMS report pt has had increasing weakness and dependence on staff. Pt typically only has her medications set up by staff, but has required more services in the past week. Pt has not been eating and staff has brought her food, but pt is not eating it. Pt was not able to get out of bed this morning and required help. Pt reports was nauseated this morning. Pt has had two falls in the past week.

## 2020-10-23 NOTE — PROGRESS NOTES
RECEIVING UNIT ED HANDOFF REVIEW    ED Nurse Handoff Report was reviewed by: Debbi Metcalf RN on October 23, 2020 at 6:32 PM

## 2020-10-23 NOTE — PHARMACY-ADMISSION MEDICATION HISTORY
Pharmacy Medication History  Admission medication history interview status for the 10/23/2020  admission is complete. See EPIC admission navigator for prior to admission medications       Medication history sources: Patient  Location of interview: Phone  Medication history source reliability: Good  Adherence assessment: N/A    Significant changes made to the medication list:  None      Additional medication history information:   Unable to confirm last doses with patient, med list updated per assisted living who sets up her medications. Patient self administers her meds.     Medication reconciliation completed by provider prior to medication history? No    Time spent in this activity: 20 minutes      Prior to Admission medications    Medication Sig Last Dose Taking? Auth Provider   acetaminophen (TYLENOL) 325 MG tablet Take 2 tablets (650 mg) by mouth 3 times daily 10/23/2020 at AM Yes Lobo Serrano MD   alendronate (FOSAMAX) 70 MG tablet Take 70 mg by mouth every 7 days SUNDAYS at 7:30am Past Week at Unknown time Yes Reported, Patient   alum & mag hydroxide-simethicone (MAALOX) 200-200-20 MG/5ML SUSP suspension Take 30 mLs by mouth 4 times daily as needed for indigestion PRN at PRN Yes Reported, Patient   calcium carbonate-vitamin D (OSCAL W/D) 500-200 MG-UNIT tablet Take 1 tablet by mouth daily   Yes Reported, Patient   digoxin (LANOXIN) 125 MCG tablet Take 1 tablet (125 mcg) by mouth daily  Yes Tiffany Galvan MD   furosemide (LASIX) 20 MG tablet Take 0.5 tablets (10 mg) by mouth every other day Past Week at Unknown time Yes Lobo Serrano MD   melatonin 3 MG tablet Take 1 tablet (3 mg) by mouth At Bedtime 10/22/2020 at HS Yes Renée Marie APRN CNP   metoprolol tartrate (LOPRESSOR) 12.5 mg TABS half-tab Take 12.5 mg by mouth 2 times daily 10/23/2020 at AM Yes Unknown, Entered By History   Multiple Vitamins-Minerals (PRESERVISION AREDS 2+MULTI VIT) CAPS Take 1 tablet by mouth 2 times  daily 10/23/2020 at AM Yes Unknown, Entered By History   multivitamin w/minerals (MULTI-VITAMIN) tablet Take 1 tablet by mouth daily   Yes Reported, Patient   omeprazole (PRILOSEC) 20 MG DR capsule Take 20 mg by mouth daily before breakfast  Yes Unknown, Entered By History   senna (SENOKOT) 8.6 MG tablet Take 1 tablet by mouth 2 times daily as needed for constipation PRN at PRN Yes Unknown, Entered By History   VITAMIN D, CHOLECALCIFEROL, PO Take 1,000 Units by mouth daily   Yes Reported, Patient   warfarin ANTICOAGULANT (COUMADIN) 2.5 MG tablet Take 2.5 mg by mouth See Admin Instructions Tuesday, Friday, Sunday evenings 10/20/2020 Yes Unknown, Entered By History   warfarin ANTICOAGULANT (COUMADIN) 2.5 MG tablet Take 1.25 mg by mouth See Admin Instructions Monday, Wednesday, Thursday, Saturday evenings 10/22/2020 at Unknown time Yes Unknown, Entered By History

## 2020-10-23 NOTE — ED NOTES
Bed: ED26  Expected date: 10/23/20  Expected time: 1:57 PM  Means of arrival: Ambulance  Comments:  417 89f weak, not eating ETA [id number]

## 2020-10-23 NOTE — H&P
[facility]    History and Physical  Hospitalist       Date of Admission:  10/23/2020  Date of Service (when I saw the patient): 10/23/20    Assessment & Plan   Elizabeth Ruggiero is a 89 year old female who presents with     Weakness, fatigue; suspect multifactorial.  - repeat CBC/other labs a.m.  - hold any potentially sedating meds    Elevated troponins; unclear cause, but suspect due to current CHF episode.  - trend Q 8 hours  - check ECHO in a.m.    Probable acute exacerbation of chronic diastolic CHF.  - ECHO in a.m.  - daily weights/I's and O's  - lasix 20 mg IV BID to start; BMP in a.m.    A-fib., chronic.  INR supra-therapeutic.  - resume coumadin; Pharm.D. to monitor/dose  - telemetry  - resume PTA digoxin    Hypertension; pressures stable.  - resume PTA metoprolol    Elevated LFT's w/ abnormal CT abdomen/pelvis 7/2020 and U/S 10/20.  - repeat LFT's in a.m.  - GI consult w/ Dr. Hoover 10/24  - NPO after MN    Hyponatremia; likely due to mild volume overload.  - lasix as above; recheck BMP 10/24.    Edema; probably somewhat worse than her usual.  - lasix as above; elevate legs if able.  - begin TEDs in a.m.    Nausea; cause not clear; may be related to abdominal process or CHF or meds.  Not bothering her at the moment.  - PRN zofran; monitor    Deconditioning; multifactorial.  - PT/OT eval's 10/24        DVT Prophylaxis: Warfarin and Ambulate every shift  Code Status: Full Code    Disposition: Expected discharge in 1-2 days.      MARQUITA Pabon MD, FACP     Internal Medicine Hospitalist    Primary Care Physician   Gelacio Downs    Chief Complaint   Weakness, fatigue and decreased appetite for 2 or more weeks.    History was obtained from the ED provider, the EHR and from patient.    History of Present Illness   Elizabeth Ruggiero is a pleasant 89 year old woman with a history of CHF, A-fib, DVT, hypertension, dyslipidemia, TIA, and mitral valve insufficiency who presented today with generalized  weakness.  EMS reports that the patient has had increasing weakness for the past week.  Staff at her facility ([facility]) noted that the patient has not been eating and was unable to get out of bed this morning without assistance.  Per the patient's daughter, Elizabeth has become increasingly weak this past week and has had 3 minor falls but did not suffer any injuries. Denies chest pain, cough, shortness of breath, fever, sore throat, vomiting, diarrhea.      Past Medical History    I have reviewed this patient's medical history and updated it with pertinent information if needed.   Past Medical History:   Diagnosis Date     (HFpEF) heart failure with preserved ejection fraction (H)      Acute diastolic CHF (congestive heart failure) (H)      Acute left hemiparesis (H) 2/9/2019     Arthritis      Atrial fibrillation (H)      Atrial fibrillation with RVR (H)      Atrial flutter (H)      Atrial flutter with rapid ventricular response (H) 4/23/2019     Community acquired pneumonia      Diastolic CHF (H) 04/29/2019     DVT of lower extremity (deep venous thrombosis) (H)     recurrent     Hepatic congestion     improved with diuretics for CHF     HTN (hypertension)      Hyperlipidemia      Mitral valve insufficiency      Pneumonia      Postmenopausal bleeding 01/2020    [facility]     Severe mitral regurgitation      SOB (shortness of breath)      TIA (transient ischemic attack)      Vitamin D deficiency        Past Surgical History   I have reviewed this patient's surgical history and updated it with pertinent information if needed.  Past Surgical History:   Procedure Laterality Date     APPENDECTOMY       FRACTURE SURGERY       repair left femur         Prior to Admission Medications   Prior to Admission Medications   Prescriptions Last Dose Informant Patient Reported? Taking?   Multiple Vitamins-Minerals (PRESERVISION AREDS 2+MULTI VIT) CAPS  Self Yes Yes   Sig: Take 1 tablet by mouth 2 times  daily   VITAMIN D, CHOLECALCIFEROL, PO  Self Yes Yes   Sig: Take 1,000 Units by mouth daily    acetaminophen (TYLENOL) 325 MG tablet   No Yes   Sig: Take 2 tablets (650 mg) by mouth 3 times daily   alendronate (FOSAMAX) 70 MG tablet  Self Yes Yes   Sig: Take 70 mg by mouth every 7 days SUNDAYS at 7:30am   alum & mag hydroxide-simethicone (MAALOX) 200-200-20 MG/5ML SUSP suspension PRN at PRN  Yes Yes   Sig: Take 30 mLs by mouth 4 times daily as needed for indigestion   calcium carbonate-vitamin D (OSCAL W/D) 500-200 MG-UNIT tablet  Self Yes Yes   Sig: Take 1 tablet by mouth daily    digoxin (LANOXIN) 125 MCG tablet  Self No Yes   Sig: Take 1 tablet (125 mcg) by mouth daily   furosemide (LASIX) 20 MG tablet   No Yes   Sig: Take 0.5 tablets (10 mg) by mouth every other day   melatonin 3 MG tablet   No Yes   Sig: Take 1 tablet (3 mg) by mouth At Bedtime   metoprolol tartrate (LOPRESSOR) 12.5 mg TABS half-tab   Yes Yes   Sig: Take 12.5 mg by mouth 2 times daily   multivitamin w/minerals (MULTI-VITAMIN) tablet  Self Yes Yes   Sig: Take 1 tablet by mouth daily    omeprazole (PRILOSEC) 20 MG DR capsule   Yes Yes   Sig: Take 20 mg by mouth daily before breakfast   senna (SENOKOT) 8.6 MG tablet PRN at PRN  Yes Yes   Sig: Take 1 tablet by mouth 2 times daily as needed for constipation   warfarin ANTICOAGULANT (COUMADIN) 2.5 MG tablet   Yes Yes   Sig: Take 2.5 mg by mouth See Admin Instructions Tuesday, Friday, Sunday evenings   warfarin ANTICOAGULANT (COUMADIN) 2.5 MG tablet   Yes Yes   Sig: Take 1.25 mg by mouth See Admin Instructions Monday, Wednesday, Thursday, Saturday evenings      Facility-Administered Medications: None     Allergies   No Known Allergies    Social History   I have reviewed this patient's social history and updated it with pertinent information if needed. Elizabeth Ruggiero  reports that she has never smoked. She has never used smokeless tobacco. She reports that she does not drink alcohol or use  "drugs.  Update: recently [facility] at [facility]    Family History   I have reviewed this patient's family history and updated it with pertinent information if needed.   Family History   Problem Relation Age of Onset     Colon Cancer Mother      Breast Cancer Sister      Diabetes Sister        Review of Systems   The 10 point Review of Systems is negative other than noted in the HPI or here. Still having some back pain related to her recent fracture.  No f/c/SOB; no chest or abdominal Sx.  ? Chronic bilateral leg swelling.    Physical Exam   Temp: 97.8  F (36.6  C) Temp src: Oral BP: 117/74 Pulse: 69   Resp: 24 SpO2: 97 %      Vital Signs with Ranges  Temp:  [97.8  F (36.6  C)] 97.8  F (36.6  C)  Pulse:  [69] 69  Resp:  [24] 24  BP: (117)/(74) 117/74  SpO2:  [97 %] 97 %  0 lbs 0 oz    Constitutional: awake, lying in bed; in no apparent distress  Eyes: sclerae clear; PERRLA/EOMI  HEENT: AT/NC; moist mucous membranes, normal dentition.  Neck: supple, good ROM; no LA, no bruits, possible mild increase in JVD; no thyromegaly    Respiratory: good air entry bilaterally; faint inspiratory rhonchi Right base; no wheezing.  Back: no obvious abnormalities  Cardiovascular: Regular rate and rhythm; S1, S2 noted; no murmur, rub or gallop  GI: abdomen flat, + bowel sounds, soft, non-tender, non-distended; no masses or organomegaly  Skin: no rash, no cyanosis; multiple scabbed areas on upper back and on Right cheek; chronic venous stasis changes of Left lower leg.    Musculoskeletal: 1+ edema Right lower leg, and trace on left; no obvious joint abnormalities  Neurologic: alert; oriented to person, date, place (needed a little help with date and location \"[facility]\"); follows directions well; no focal motor or sensory deficits  Psychiatric: no obvious signs of anxiety or depression    Data   Data reviewed today:  I personally reviewed all recent lab/imaging results.    Recent Labs   Lab 10/23/20  1429   WBC 8.8   HGB 13.9   MCV " 92      INR 3.49*   *   POTASSIUM 4.3   CHLORIDE 95   CO2 22   BUN 20   CR 0.79   ANIONGAP 10   CHRIS 9.0   *   ALBUMIN 3.3*   PROTTOTAL 6.6*   BILITOTAL 2.3*   ALKPHOS 89   *   AST 77*   LIPASE 157   TROPI 0.072*       Recent Results (from the past 24 hour(s))   Chest XR,  PA & LAT    Narrative    CHEST TWO VIEWS 10/23/2020 4:37 PM     HISTORY: Generalized weakness.    COMPARISON: 4/29/2019    FINDINGS: The heart is enlarged. There are bilateral pleural  effusions, right greater than left. Hazy airspace opacity present in  the right lower lobe. No pneumothorax.       Impression    IMPRESSION: Hazy opacity right lower lobe may represent atelectasis or  pneumonia. Enlarged heart with bilateral pleural effusions likely  related to CHF.     ALEJANDRA CASTRO MD

## 2020-10-23 NOTE — ED NOTES
[facility]  ED Nurse Handoff Report    ED Chief complaint: Generalized Weakness      ED Diagnosis:   Final diagnoses:   None       Code Status: Full Code    Allergies: No Known Allergies    Patient Story: Pt comes in with generalized weakness. EMS reports that the patient has had increasing weakness for the past week. Staff at the nursing home notes that the patient has not been eating and was unable to get out of bed this morning without assistance. After talking with the patient's daughter, she confirms that the patient has been getting increasingly weak this past week and has had 3 minor falls but did not suffer any injuries.   Focused Assessment:  Resp: Tachypnea, Cardiac: WNL Neuro: Alert and oriented, increased weakness.    Treatments and/or interventions provided:   Patient's response to treatments and/or interventions:     To be done/followed up on inpatient unit:      Does this patient have any cognitive concerns?: None    Activity level - Baseline/Home:  Stand with Assist  Activity Level - Current:   Stand with Assist    Patient's Preferred language: English   Needed?: No    Isolation: None  Infection: Not Applicable  Patient tested for COVID 19 prior to admission: YES  Bariatric?: No    Vital Signs:   Vitals:    10/23/20 1427 10/23/20 1430 10/23/20 1536   BP:  117/74    Pulse:  69    Resp: 24     Temp:   97.8  F (36.6  C)   TempSrc:   Oral   SpO2: 97%         Cardiac Rhythm:     Was the PSS-3 completed:   Yes  What interventions are required if any?               Family Comments:   OBS brochure/video discussed/provided to patient/family: N/A              Name of person given brochure if not patient:               Relationship to patient:     For the majority of the shift this patient's behavior was Green.   Behavioral interventions performed were .    ED NURSE PHONE NUMBER:

## 2020-10-24 ENCOUNTER — APPOINTMENT (OUTPATIENT)
Dept: CARDIOLOGY | Facility: CLINIC | Age: 85
DRG: 292 | End: 2020-10-24
Attending: INTERNAL MEDICINE
Payer: COMMERCIAL

## 2020-10-24 LAB
ALBUMIN SERPL-MCNC: 3 G/DL (ref 3.4–5)
ALP SERPL-CCNC: 77 U/L (ref 40–150)
ALT SERPL W P-5'-P-CCNC: 110 U/L (ref 0–50)
ANION GAP SERPL CALCULATED.3IONS-SCNC: 8 MMOL/L (ref 3–14)
AST SERPL W P-5'-P-CCNC: 68 U/L (ref 0–45)
BILIRUB DIRECT SERPL-MCNC: 0.6 MG/DL (ref 0–0.2)
BILIRUB SERPL-MCNC: 1.8 MG/DL (ref 0.2–1.3)
BUN SERPL-MCNC: 21 MG/DL (ref 7–30)
CALCIUM SERPL-MCNC: 8.7 MG/DL (ref 8.5–10.1)
CHLORIDE SERPL-SCNC: 97 MMOL/L (ref 94–109)
CO2 SERPL-SCNC: 24 MMOL/L (ref 20–32)
CREAT SERPL-MCNC: 0.88 MG/DL (ref 0.52–1.04)
GFR SERPL CREATININE-BSD FRML MDRD: 56 ML/MIN/{1.73_M2}
GLUCOSE SERPL-MCNC: 92 MG/DL (ref 70–99)
INR PPP: 3.44 (ref 0.86–1.14)
LABORATORY COMMENT REPORT: NORMAL
POTASSIUM SERPL-SCNC: 4.2 MMOL/L (ref 3.4–5.3)
PROT SERPL-MCNC: 5.9 G/DL (ref 6.8–8.8)
SARS-COV-2 RNA SPEC QL NAA+PROBE: NEGATIVE
SODIUM SERPL-SCNC: 129 MMOL/L (ref 133–144)
SPECIMEN SOURCE: NORMAL
TROPONIN I SERPL-MCNC: 0.09 UG/L (ref 0–0.04)
TROPONIN I SERPL-MCNC: 0.09 UG/L (ref 0–0.04)

## 2020-10-24 PROCEDURE — 80076 HEPATIC FUNCTION PANEL: CPT | Performed by: INTERNAL MEDICINE

## 2020-10-24 PROCEDURE — 93306 TTE W/DOPPLER COMPLETE: CPT

## 2020-10-24 PROCEDURE — 250N000011 HC RX IP 250 OP 636: Performed by: INTERNAL MEDICINE

## 2020-10-24 PROCEDURE — 250N000013 HC RX MED GY IP 250 OP 250 PS 637: Performed by: INTERNAL MEDICINE

## 2020-10-24 PROCEDURE — 258N000001 HC RX 258: Performed by: INTERNAL MEDICINE

## 2020-10-24 PROCEDURE — 96376 TX/PRO/DX INJ SAME DRUG ADON: CPT

## 2020-10-24 PROCEDURE — G0378 HOSPITAL OBSERVATION PER HR: HCPCS

## 2020-10-24 PROCEDURE — 99226 PR SUBSEQUENT OBSERVATION CARE,LEVEL III: CPT | Performed by: INTERNAL MEDICINE

## 2020-10-24 PROCEDURE — 84484 ASSAY OF TROPONIN QUANT: CPT | Performed by: INTERNAL MEDICINE

## 2020-10-24 PROCEDURE — 36415 COLL VENOUS BLD VENIPUNCTURE: CPT | Performed by: INTERNAL MEDICINE

## 2020-10-24 PROCEDURE — 80048 BASIC METABOLIC PNL TOTAL CA: CPT | Performed by: INTERNAL MEDICINE

## 2020-10-24 PROCEDURE — 93306 TTE W/DOPPLER COMPLETE: CPT | Mod: 26 | Performed by: INTERNAL MEDICINE

## 2020-10-24 PROCEDURE — 85610 PROTHROMBIN TIME: CPT | Performed by: INTERNAL MEDICINE

## 2020-10-24 PROCEDURE — 99207 PR CDG-MDM COMPONENT: MEETS HIGH - UP CODED: CPT | Performed by: INTERNAL MEDICINE

## 2020-10-24 RX ADMIN — METOPROLOL TARTRATE 12.5 MG: 25 TABLET, FILM COATED ORAL at 08:07

## 2020-10-24 RX ADMIN — DIGOXIN 125 MCG: 125 TABLET ORAL at 08:07

## 2020-10-24 RX ADMIN — FUROSEMIDE 20 MG: 10 INJECTION, SOLUTION INTRAVENOUS at 08:07

## 2020-10-24 RX ADMIN — FUROSEMIDE 20 MG: 10 INJECTION, SOLUTION INTRAVENOUS at 16:27

## 2020-10-24 RX ADMIN — OMEPRAZOLE 20 MG: 20 CAPSULE, DELAYED RELEASE ORAL at 07:17

## 2020-10-24 RX ADMIN — METOPROLOL TARTRATE 12.5 MG: 25 TABLET, FILM COATED ORAL at 21:23

## 2020-10-24 RX ADMIN — DEXTROSE AND SODIUM CHLORIDE: 5; 450 INJECTION, SOLUTION INTRAVENOUS at 16:39

## 2020-10-24 NOTE — PLAN OF CARE
A&Ox4. VSS. Denies pain. Up SBA+GB&W.  Slight blanchable redness to bottom. Small open areas w/out drainage to L cheek and upper back x2. Mepilex placed on back; CDI.  BLE edema +1-2. PIV SL. NPO since MN for possibility of GI procedure. Awaiting echo results & GI consult.

## 2020-10-24 NOTE — PROVIDER NOTIFICATION
"MD Notification    Person notified: MD     Person Name: Dr. Pabon    Date/Time: 10/24/2020 @ 2:50 PM    Interaction: Web based page.     Purpose of Notification:  Pt's daughter called x2 RE: pt's NPO diet order. She is \"Gravely concerned\" that her mother is dehydrated and in much distress due to being dehydrated. She is requesting to talk to MD for an update on POC.        Orders Received:    Dr. Pabon requested we page Dr. Hoover about diet orders. If NPO continues start D5NS at 50 mL/hr. Provider to also update Echo to Stat Echo.     "

## 2020-10-24 NOTE — CONSULTS
[facility]  Gastroenterology Consultation         Elizabeth Ruggiero  [address]   [place] [address]  89 year old female    Admission Date/Time: 10/23/2020  Primary Care Provider: Gelacio Downs  Referring / Attending Physician: Dr. Pabon    We were asked to see the patient in consultation by Dr. Mixon for evaluation of generalized weakness elevated liver function tests.      CC: Abnormal liver function tests    HPI:  Elizabeth Ruggiero is a 89 year old female who was admitted due to symptoms of significant weakness persistent nausea lack of energy.  Patient has minimally elevated troponins patient has history of congestive heart failure chronic atrial fibrillation patient has supratherapeutic INR on Coumadin history of hypertension patient has elevated AST and ALT for last 1 month.  Patient previous work-up include CT scan and ultrasound of the abdomen patient has minimally dilated bile duct of 6 mm which is nonspecific.  Patient is laying fairly comfortably patient is more complaining of being hungry and want to eat patient is n.p.o. since yesterday patient's nausea has improved patient has echocardiogram done.  Patient is denying any history of fever chills chest pain or any other significant new GI complaints.  Her past medical history is significant for pretension dyslipidemia TIA mitral valve insufficiency chronic anticoagulation atrial fibrillation DVT.  No other significant new GI complaint rest of review of system is negative.    ROS: A comprehensive ten point review of systems was negative aside from those in mentioned in the HPI.      PAST MED HX:  I have reviewed this patient's medical history and updated it with pertinent information if needed.   Past Medical History:   Diagnosis Date     (HFpEF) heart failure with preserved ejection fraction (H)      Acute diastolic CHF (congestive heart failure) (H)      Acute left hemiparesis (H) 2/9/2019     Arthritis      Atrial fibrillation  (H)      Atrial fibrillation with RVR (H)      Atrial flutter (H)      Atrial flutter with rapid ventricular response (H) 4/23/2019     Community acquired pneumonia      Diastolic CHF (H) 04/29/2019     DVT of lower extremity (deep venous thrombosis) (H)     recurrent     Hepatic congestion     improved with diuretics for CHF     HTN (hypertension)      Hyperlipidemia      Mitral valve insufficiency      Pneumonia      Postmenopausal bleeding 01/2020    [facility]     Severe mitral regurgitation      SOB (shortness of breath)      TIA (transient ischemic attack)      Vitamin D deficiency        MEDICATIONS:   Prior to Admission Medications   Prescriptions Last Dose Informant Patient Reported? Taking?   Multiple Vitamins-Minerals (PRESERVISION AREDS 2+MULTI VIT) CAPS 10/23/2020 at AM Nursing Home Yes Yes   Sig: Take 1 tablet by mouth 2 times daily   VITAMIN D, CHOLECALCIFEROL, PO  Nursing Home Yes Yes   Sig: Take 1,000 Units by mouth daily    acetaminophen (TYLENOL) 325 MG tablet 10/23/2020 at AM Nursing Home No Yes   Sig: Take 2 tablets (650 mg) by mouth 3 times daily   alendronate (FOSAMAX) 70 MG tablet Past Week at Unknown time Nursing Home Yes Yes   Sig: Take 70 mg by mouth every 7 days SUNDAYS at 7:30am   alum & mag hydroxide-simethicone (MAALOX) 200-200-20 MG/5ML SUSP suspension PRN at PRN Nursing Home Yes Yes   Sig: Take 30 mLs by mouth 4 times daily as needed for indigestion   calcium carbonate-vitamin D (OSCAL W/D) 500-200 MG-UNIT tablet  Nursing Home Yes Yes   Sig: Take 1 tablet by mouth daily    digoxin (LANOXIN) 125 MCG tablet  Nursing Home No Yes   Sig: Take 1 tablet (125 mcg) by mouth daily   furosemide (LASIX) 20 MG tablet Past Week at Unknown time senior care No Yes   Sig: Take 0.5 tablets (10 mg) by mouth every other day   melatonin 3 MG tablet 10/22/2020 at  Nursing Home No Yes   Sig: Take 1 tablet (3 mg) by mouth At Bedtime   metoprolol tartrate (LOPRESSOR) 12.5 mg  TABS half-tab 10/23/2020 at AM Nursing Home Yes Yes   Sig: Take 12.5 mg by mouth 2 times daily   multivitamin w/minerals (MULTI-VITAMIN) tablet  Nursing Home Yes Yes   Sig: Take 1 tablet by mouth daily    omeprazole (PRILOSEC) 20 MG DR capsule  Nursing Home Yes Yes   Sig: Take 20 mg by mouth daily before breakfast   senna (SENOKOT) 8.6 MG tablet PRN at PRN Nursing Home Yes Yes   Sig: Take 1 tablet by mouth 2 times daily as needed for constipation   warfarin ANTICOAGULANT (COUMADIN) 2.5 MG tablet 10/20/2020 Nursing Home Yes Yes   Sig: Take 2.5 mg by mouth See Admin Instructions Tuesday, Friday, Sunday evenings   warfarin ANTICOAGULANT (COUMADIN) 2.5 MG tablet 10/22/2020 at Unknown time Nursing Home Yes Yes   Sig: Take 1.25 mg by mouth See Admin Instructions Monday, Wednesday, Thursday, Saturday evenings      Facility-Administered Medications: None       ALLERGIES: No Known Allergies    SOCIAL HISTORY:  Social History     Tobacco Use     Smoking status: Never Smoker     Smokeless tobacco: Never Used   Substance Use Topics     Alcohol use: No     Drug use: No       FAMILY HISTORY:  Family History   Problem Relation Age of Onset     Colon Cancer Mother      Breast Cancer Sister      Diabetes Sister        PHYSICAL EXAM:   General awake alert responding appropriately  Vital Signs with Ranges  Temp: 95.2  F (35.1  C) Temp src: Oral BP: 119/66 Pulse: 67   Resp: 18 SpO2: 93 % O2 Device: None (Room air)    I/O last 3 completed shifts:  In: -   Out: 1100 [Urine:1100]    Constitutional: healthy, alert and no distress   Cardiovascular: negative, PMI normal. No lifts, heaves, or thrills. RRR. No murmurs, clicks gallops or rub  Respiratory: negative, Percussion normal. Good diaphragmatic excursion. Lungs clear  Head: Normocephalic. No masses, lesions, tenderness or abnormalities  Neck: Neck supple. No adenopathy. Thyroid symmetric, normal size,, Carotids without bruits.  Abdomen: Abdomen soft, non-tender. BS normal. No masses,  organomegaly  SKIN: no suspicious lesions or rashes          ADDITIONAL COMMENTS:   I reviewed the patient's new clinical lab test results.   Recent Labs   Lab Test 10/24/20  0551 10/23/20  1429 10/11/20  0657 10/09/20  0927 10/09/20  0927 08/03/20  1230   WBC  --  8.8  --   --  9.8 10.5   HGB  --  13.9  --   --  13.5 13.9   MCV  --  92  --   --  94 98   PLT  --  179  --   --  175 228   INR 3.44* 3.49* 2.26*   < > 3.12*  --     < > = values in this interval not displayed.     Recent Labs   Lab Test 10/24/20  0551 10/23/20  1429 10/11/20  0657   POTASSIUM 4.2 4.3 3.9   CHLORIDE 97 95 103   CO2 24 22 23   BUN 21 20 22   ANIONGAP 8 10 6     Recent Labs   Lab Test 10/24/20  0551 10/23/20  1657 10/23/20  1429 10/10/20  0715 10/09/20  1153 07/25/20  2100 07/25/20  2100 07/13/20  1546 07/13/20  1546   ALBUMIN 3.0*  --  3.3* 3.2*  --    < >  --   --  3.5   BILITOTAL 1.8*  --  2.3* 1.5*  --    < >  --   --  1.3   *  --  123* 102*  --    < >  --   --  32   AST 68*  --  77* 78*  --    < >  --   --  22   PROTEIN  --  30*  --   --  Negative  --  30*   < >  --    LIPASE  --   --  157  --   --   --   --   --  99    < > = values in this interval not displayed.       I reviewed the patient's new imaging results.        CONSULTATION ASSESSMENT AND PLAN:    Active Problems:    Hyponatremia    Assessment: Very pleasant 89-year-old female with complicated past medical history for multiple chronic health problem patient is currently on Coumadin weakness congestive heart failure patient has minimally elevated liver function tests findings are suggestive of possible hepatitis findings can include passive congestion of the liver versus drug-induced liver injury there is no clinical signs of biliary obstruction.  Patient currently is on Coumadin patient has minimally elevated troponins.  I will recommend the patient to be evaluated further in cardiology.  Once patient is stable from cardiology standpoint patient may benefit from  further evaluation with upper GI endoscopy and endoscopic ultrasound however patient is not interested in any endoscopic evaluation.  In the meantime I will recommend the patient to have blood work to check for chronic hepatitis.  Continue on supportive care GI will continue to follow along we will consider further intervention if change in status.  Patient can be started on regular diet.  If patient's nausea recur patient can be started on full liquid diet.  Continue on symptomatic treatment continue on Zofran.  I suspect patient's GI symptoms are probably from congestive heart failure and cardiac in origin.               Power Hoover MD, FACP  [facility].  Office: [phone]  Cell : [phone]

## 2020-10-24 NOTE — PLAN OF CARE
A&Ox4. VSS on RA. Denies pain. Up SBA pivot to BSC. Slight blanchable redness to bottom, encouraged sleeping on side, pt agreeable. Small open areas w/out drainage to L cheek and upper back x2. BLE edema +1-2. PIV SL. NPO since MN for possibility of GI procedure, yet to see pt. Plan for echo today. Slept between cares.

## 2020-10-24 NOTE — PHARMACY
Fosamax 70mg weekly order discontinued per [facility] Nonessential medication Policy.     Priscila, PharmD

## 2020-10-24 NOTE — PROVIDER NOTIFICATION
MD Notification    Person notified:MD    Person Name:Dr. Hoover    Date/Time:10-24-20@358pm     Interaction:phone page    Purpose of Notification:NPO status    Orders Received:Diet changed to regular    Comments:Can also have continuous IVF since patient has been NPO since MN

## 2020-10-24 NOTE — PHARMACY-ANTICOAGULATION SERVICE
Clinical Pharmacy - Warfarin Dosing Consult     Pharmacy has been consulted to manage this patient s warfarin therapy.  Indication: Atrial Fibrillation  Therapy Goal: INR 2-3  Provider/Team: Dr. Gelacio Downs  OP [facility]: [facility]  Warfarin Prior to Admission: Yes  Warfarin PTA Regimen: 1.25 mg by mouth  Monday, Wednesday, Thursday, Saturday evenings. 2.5mg rest of the days of the week  Recent documented change in oral intake/nutrition: Yes(Decreased appetite)    INR   Date Value Ref Range Status   10/23/2020 3.49 (H) 0.86 - 1.14 Final   10/11/2020 2.26 (H) 0.86 - 1.14 Final       Recommend no warfarin today due to high INR and dietary intake change.  Pharmacy will monitor Elizabeth Ruggiero daily and order warfarin doses to achieve specified goal.      Please contact pharmacy as soon as possible if the warfarin needs to be held for a procedure or if the warfarin goals change.

## 2020-10-25 ENCOUNTER — APPOINTMENT (OUTPATIENT)
Dept: PHYSICAL THERAPY | Facility: CLINIC | Age: 85
DRG: 292 | End: 2020-10-25
Payer: COMMERCIAL

## 2020-10-25 LAB
ALBUMIN SERPL-MCNC: 3 G/DL (ref 3.4–5)
ALP SERPL-CCNC: 76 U/L (ref 40–150)
ALT SERPL W P-5'-P-CCNC: 93 U/L (ref 0–50)
ANION GAP SERPL CALCULATED.3IONS-SCNC: 6 MMOL/L (ref 3–14)
AST SERPL W P-5'-P-CCNC: 57 U/L (ref 0–45)
BILIRUB SERPL-MCNC: 1.6 MG/DL (ref 0.2–1.3)
BUN SERPL-MCNC: 22 MG/DL (ref 7–30)
CALCIUM SERPL-MCNC: 8.8 MG/DL (ref 8.5–10.1)
CHLORIDE SERPL-SCNC: 100 MMOL/L (ref 94–109)
CO2 SERPL-SCNC: 27 MMOL/L (ref 20–32)
CREAT SERPL-MCNC: 0.78 MG/DL (ref 0.52–1.04)
GFR SERPL CREATININE-BSD FRML MDRD: 65 ML/MIN/{1.73_M2}
GLUCOSE BLDC GLUCOMTR-MCNC: 158 MG/DL (ref 70–99)
GLUCOSE SERPL-MCNC: 114 MG/DL (ref 70–99)
INR PPP: 2.83 (ref 0.86–1.14)
POTASSIUM SERPL-SCNC: 3.6 MMOL/L (ref 3.4–5.3)
PROT SERPL-MCNC: 5.8 G/DL (ref 6.8–8.8)
SODIUM SERPL-SCNC: 133 MMOL/L (ref 133–144)

## 2020-10-25 PROCEDURE — 36415 COLL VENOUS BLD VENIPUNCTURE: CPT | Performed by: INTERNAL MEDICINE

## 2020-10-25 PROCEDURE — 258N000001 HC RX 258: Performed by: INTERNAL MEDICINE

## 2020-10-25 PROCEDURE — 99222 1ST HOSP IP/OBS MODERATE 55: CPT | Performed by: INTERNAL MEDICINE

## 2020-10-25 PROCEDURE — 80053 COMPREHEN METABOLIC PANEL: CPT | Performed by: INTERNAL MEDICINE

## 2020-10-25 PROCEDURE — 97530 THERAPEUTIC ACTIVITIES: CPT | Mod: GP | Performed by: PHYSICAL THERAPIST

## 2020-10-25 PROCEDURE — 250N000011 HC RX IP 250 OP 636: Performed by: INTERNAL MEDICINE

## 2020-10-25 PROCEDURE — 97116 GAIT TRAINING THERAPY: CPT | Mod: GP | Performed by: PHYSICAL THERAPIST

## 2020-10-25 PROCEDURE — 99233 SBSQ HOSP IP/OBS HIGH 50: CPT | Performed by: INTERNAL MEDICINE

## 2020-10-25 PROCEDURE — 85610 PROTHROMBIN TIME: CPT | Performed by: INTERNAL MEDICINE

## 2020-10-25 PROCEDURE — 96376 TX/PRO/DX INJ SAME DRUG ADON: CPT

## 2020-10-25 PROCEDURE — 250N000013 HC RX MED GY IP 250 OP 250 PS 637: Performed by: INTERNAL MEDICINE

## 2020-10-25 PROCEDURE — G0378 HOSPITAL OBSERVATION PER HR: HCPCS

## 2020-10-25 PROCEDURE — 97161 PT EVAL LOW COMPLEX 20 MIN: CPT | Mod: GP | Performed by: PHYSICAL THERAPIST

## 2020-10-25 PROCEDURE — 120N000001 HC R&B MED SURG/OB

## 2020-10-25 RX ORDER — WARFARIN SODIUM 2 MG/1
2 TABLET ORAL
Status: COMPLETED | OUTPATIENT
Start: 2020-10-25 | End: 2020-10-25

## 2020-10-25 RX ORDER — SODIUM CHLORIDE 9 MG/ML
INJECTION, SOLUTION INTRAVENOUS CONTINUOUS
Status: DISCONTINUED | OUTPATIENT
Start: 2020-10-25 | End: 2020-10-27 | Stop reason: HOSPADM

## 2020-10-25 RX ADMIN — DIGOXIN 125 MCG: 125 TABLET ORAL at 08:27

## 2020-10-25 RX ADMIN — FUROSEMIDE 20 MG: 10 INJECTION, SOLUTION INTRAVENOUS at 16:26

## 2020-10-25 RX ADMIN — WARFARIN SODIUM 2 MG: 2 TABLET ORAL at 18:37

## 2020-10-25 RX ADMIN — DEXTROSE AND SODIUM CHLORIDE: 5; 450 INJECTION, SOLUTION INTRAVENOUS at 11:20

## 2020-10-25 RX ADMIN — METOPROLOL TARTRATE 12.5 MG: 25 TABLET, FILM COATED ORAL at 08:27

## 2020-10-25 RX ADMIN — METOPROLOL TARTRATE 12.5 MG: 25 TABLET, FILM COATED ORAL at 22:40

## 2020-10-25 RX ADMIN — OMEPRAZOLE 20 MG: 20 CAPSULE, DELAYED RELEASE ORAL at 07:47

## 2020-10-25 RX ADMIN — FUROSEMIDE 20 MG: 10 INJECTION, SOLUTION INTRAVENOUS at 08:28

## 2020-10-25 ASSESSMENT — ACTIVITIES OF DAILY LIVING (ADL)
TOILETING_ISSUES: NO
DOING_ERRANDS_INDEPENDENTLY_DIFFICULTY: YES
DIFFICULTY_EATING/SWALLOWING: NO
ADLS_ACUITY_SCORE: 18
WALKING_OR_CLIMBING_STAIRS_DIFFICULTY: YES
WALKING_OR_CLIMBING_STAIRS: AMBULATION DIFFICULTY, REQUIRES EQUIPMENT
ADLS_ACUITY_SCORE: 18
DRESSING/BATHING_DIFFICULTY: NO
DIFFICULTY_COMMUNICATING: NO
CONCENTRATING,_REMEMBERING_OR_MAKING_DECISIONS_DIFFICULTY: NO

## 2020-10-25 NOTE — PLAN OF CARE
A&Ox4. VSS on RA. Denies pain. Tolerating regular diet. Up SBA to BSC, declined purewick overnight. Tolerating regular diet. Small amt blanchable redness to bottom w/ Mepilex in place, pt self repositioning adequately. Back abrasions KEKE. BLE edema +1-2. Tele: A-fib CVR. Echo completed, non significant. Plan pending, pt would benefit from cardiology consult. Slept between cares.

## 2020-10-25 NOTE — PROGRESS NOTES
10/25/20 0911   Quick Adds   Type of Visit Initial PT Evaluation   Living Environment   People in home facility resident   Current Living Arrangements assisted living   Home Accessibility wheelchair accessible;no concerns   Transportation Anticipated family or friend will provide   Living Environment Comments Pt lives in [facility]   Self-Care   Usual Activity Tolerance moderate   Current Activity Tolerance fair   Regular Exercise Yes   Activity/Exercise Type   (pt reports was recieving PT at [facility])   Equipment Currently Used at Home walker, rolling   Activity/Exercise/Self-Care Comment Pt reports she has assist with meals, cleaning, medication, and wears a pendant she can call for help with. She generally walks with a walker a mod IND   Disability/Function   Hearing Difficulty or Deaf yes   Walking or Climbing Stairs Difficulty yes   Walking or Climbing Stairs ambulation difficulty, requires equipment   Doing Errands Independently Difficulty (such as shopping) yes   Fall history within last six months yes   Number of times patient has fallen within last six months 3   Change in Functional Status Since Onset of Current Illness/Injury yes   General Information   Onset of Illness/Injury or Date of Surgery 10/23/20   Referring Physician Dr Thom Pabon   Patient/Family Therapy Goals Statement (PT) to go home when feeling better   Pertinent History of Current Problem (include personal factors and/or comorbidities that impact the POC) Pt admitted to OBS with weakness, thought to be multi-factorial, elevated troponins. Lives in [facility] and has been getting weaker, was unable to get out of chair on day brought to hospital.   Existing Precautions/Restrictions fall   Weight-Bearing Status - LUE full weight-bearing   Weight-Bearing Status - RUE full weight-bearing   Weight-Bearing Status - LLE full weight-bearing   Weight-Bearing Status - RLE full weight-bearing   Cognition   Orientation Status (Cognition) oriented x 4    Affect/Mental Status (Cognition) WNL   Follows Commands (Cognition) WNL   Safety Deficit (Cognition) moderate deficit   Memory Deficit (Cognition) minimal deficit   Pain Assessment   Patient Currently in Pain No   Integumentary/Edema   Integumentary/Edema Comments Pt has covered wound on coccyx as well as scrapes on back   Posture    Posture Forward head position;Protracted shoulders   Range of Motion (ROM)   ROM Quick Adds ROM WFL   Strength   Strength Comments LE strength symmetrical and grossly 4/5   Bed Mobility   Comment (Bed Mobility) supine to/from sit with SBA and rail   Transfers   Transfers sit-stand transfer   Transfer Safety Concerns Noted decreased balance during turns;stepping too close to front of assistive device;losing balance backward   Impairments Contributing to Impaired Transfers impaired balance;decreased strength  (impaired safety)   Transfer Safety Comments Pt transferred bed to commode with CGA to min assist, tries to sit too soon twice.   Sit-Stand Transfer   Sit-Stand [place] (Transfers) minimum assist (75% patient effort)   Assistive Device (Sit-Stand Transfers) walker, front-wheeled   Gait/Stairs (Locomotion)   [place] Level (Gait) minimum assist (75% patient effort)   Assistive Device (Gait) walker, front-wheeled   Distance in Feet (Required for LE Total Joints) 10'   Pattern (Gait) step-to   Deviations/Abnormal Patterns (Gait) stride length decreased   Comment (Gait/Stairs) fatigues quickly   Balance   Balance Comments unsteady standing balance   Sensory Examination   Sensory Perception WNL   Coordination   Coordination no deficits were identified   Muscle Tone   Muscle Tone no deficits were identified   Clinical Impression   Criteria for Skilled Therapeutic Intervention yes, treatment indicated   PT Diagnosis (PT) impaired functional mobility   Influenced by the following impairments decreased strength, decreased balance, decreased safety awareness   Functional limitations  "due to impairments fall risk, decreased IND with all mobility, inability to ambulate household distances   Clinical Presentation Stable/Uncomplicated   Clinical Presentation Rationale per medical record and clinical judgment   Clinical Decision Making (Complexity) low complexity   Therapy Frequency (PT) 3x/week   Predicted Duration of Therapy Intervention (days/wks) 3 days   Planned Therapy Interventions (PT) balance training;gait training;transfer training;strengthening   Risk & Benefits of therapy have been explained evaluation/treatment results reviewed   PT Discharge Planning    PT Discharge Recommendation (DC Rec) Transitional Care Facility   PT Rationale for DC Rec PT - pt admitted for the second time this month with weakness, multifactorial. She lives in [facility] where she has assist with medication set up, daily vitals, meals and assist as needed via pendant. However, pt reports that she generally is on her own when walking (with her walker). Currently, pt is weak and only able to ambulate 10' with wh walker and min assist, poor safety judgment when turning to sit on the bed and nearly fell as she was in a hurry, needed min assist and many cues to safely sit on the bed. Anticipate she will benefit from cont PT at TCU to address deficits in strength, balance and endurance prior to returning to [facility]. If pt is able to have assist with all mobility she could return to [facility].   [employer] AM-PAC TM \"6 Clicks\"   2016, Trustees of [employer], under license to [employer].  All rights reserved.   6 Clicks Short Forms Basic Mobility Inpatient Short Form   [employer] AM-PAC  \"6 Clicks\" V.2 Basic Mobility Inpatient Short Form   1. Turning from your back to your side while in a flat bed without using bedrails? 3 - A Little   2. Moving from lying on your back to sitting on the side of a flat bed without using bedrails? 3 - A Little   3. Moving to and from a bed to a chair (including a wheelchair)? 3 - A " Little   4. Standing up from a chair using your arms (e.g., wheelchair, or bedside chair)? 3 - A Little   5. To walk in hospital room? 3 - A Little   6. Climbing 3-5 steps with a railing? 2 - A Lot   Basic Mobility Raw Score (Score out of 24.Lower scores equate to lower levels of function) 17   Total Evaluation Time   Total Evaluation Time (Minutes) 15

## 2020-10-25 NOTE — PLAN OF CARE
A/Ox4. VSS. Denies pain and nausea. SBA with GB/W. Up to bedside commode. Tele: Afib. Regular diet. IVF 50ml/hr. Slight blanchable redness to bottom, Mepilex in place, protective, CDI. Back abrasions open to air, no drainage. BLE edema 1-2+. Echo completed this shift. Plan pending.

## 2020-10-25 NOTE — CONSULTS
Care Management Initial Consult    General Information  Assessment completed with:: Children, Patient, (Daughter, Brittney )  Type of CM/SW Visit: Initial Assessment              Advance Care Planning: Advance Care Planning Reviewed: present on chart          Communication Assessment  Patient's communication style: spoken language (English or Bilingual)  Hearing Difficulty or Deaf: yes Wear Glasses or Blind: yes    Cognitive  Cognitive/Neuro/Behavioral: .WDL except  Level of Consciousness: alert  Arousal Level: opens eyes spontaneously  Orientation: disoriented to, situation  Mood/Behavior: calm  Best Language: 0 - No aphasia  Speech: clear, spontaneous    Living Environment:   People in home: alone, facility resident     Current living Arrangements: assisted living  Name of Facility: ([facility] [address]       Family/Social Support:  Care provided by:  [facility] and children  Provides care for:  No one     Who is your support system?: Children     Description of Support System: Supportive, Involved     Current Resources:   Skilled Home Care Services: N/A   Community Resources:  N/A  Equipment currently used at home: walker, rolling  Supplies currently used at home:      Employment:  Employment Status:  Retired        Financial/Environmental Concerns:  Concerns for patient decline in the last few months, needing higher level of care than assisted living    Lifestyle & Psychosocial Needs:        Socioeconomic History     Marital status:      Spouse name: Not on file     Number of children: 6     Years of education: through 4 years college     Highest education level: Not on file     Tobacco Use     Smoking status: Never Smoker     Smokeless tobacco: Never Used   Substance and Sexual Activity     Alcohol use: No     Drug use: No     Sexual activity: Not Currently     Comment:   one year ago no other partners       Functional Status:  Prior to admission patient needed assistance:   [facility]       Mental Health  Status:  WDL                 Additional Information:  Per Care Transitions consult for discharge planning. Patient admitted to [facility] on 10/23/20 with generalized muscle weakness. The tentative date of discharge is 10/26 or 10/27/20.  Patient lives at the [facility] [address] [facility] with no stairs to enter. Reviewed chart and spoke with patient and patients daughterBrittney ([phone]) regarding discharge planning and therapy recommendations for transitional care. TCU options were given and patient and daughter gave choices; [facility]. Initial SNF referral sent via [facility].   If patient goes to [place], transport will be arranged with [place].   DaughterBrittney has concerns about patient decline since July and is aware that patient will either need more services at her [facility] or possibly move to LTC. Patient currently has only once a day check in and a call faub on her neck if she needs assistance at her [facility].   Patient/family are in agreement with this plan.       RENATO Paula

## 2020-10-25 NOTE — PROGRESS NOTES
[facility]  Hospitalist Progress Note  Name: Elizabeth Ruggiero    MRN: [id number]  Physician:  Aniceto Soria DO, FHM (Text Page)    Summary of Stay: Elizabeth Ruggiero is a 89 year old female who was admitted on 10/23/2020. She presented with weakness and some shortness of breath.    Assessment & Plan      Acute on chronic HFpEF in the setting of valvular heart disease + diastolic dysfunction  Parox afib  Severe MVR  Flat troponin elevation, not felt NSTEMI:  -  Lasix 20 mg IV BID  -  Continue warfarin, pharmacy assisting in monitoring.  -  Continue rate control meds (beta blocker + dig)  -  Appreciate cardiology assistance.     Elevated liver function tests:  -   mild to moderate intra and extrahepatic biliary duct dilation -unclear if these are age-related normal findings.  Congestive hepatopathy also on the differential.  Appreciate GI consult.  Patient not interested in procedures.   Monitor for now.    Generalized weakness:  -  Felt likely mainly related to CHF.  Treat CHF and monitor/have therapy work with her.    Hyponatremia:  -  Improved, likely multifactorial with CHF contributing.  Plan to treat CHF and monitor.       COVID Status:  COVID-19 PCR Results    COVID-19 PCR Results 7/25/20 7/30/20 8/7/20 10/9/20 10/23/20 10/23/20        1712 1712   COVID-19 Virus PCR to [facility] - Result Not Detected Not Detected Not Detected Not Detected Test received-See reflex to IDDL test SARS CoV2 (COVID-19) Virus RT-PCR    COVID-19 Virus PCR to [facility] - Source Nasopharyngeal Nasopharyngeal Nasopharyngeal Nasopharyngeal Nasopharyngeal    SARS-CoV-2 Virus Specimen Source      Nasopharyngeal   SARS-CoV-2 PCR Result      NEGATIVE      Comments are available for some flowsheets but are not being displayed.         COVID-19 Antibody Results, Testing for Immunity    COVID-19 Antibody Results, Testing for Immunity   No data to display.            Diet: Regular Diet Adult    DVT Prophylaxis: Warfarin  Maryjane  Catheter: not present  Code Status: Full Code      Disposition Plan   Likely needs a couple more nights at least in the hospital.  May need more TCU/rehab.     Entered: Aniceto JHONNY Estella 10/25/2020, 6:09 PM       Interval History   Assumed care, history reviewed.  Ms. Ruggiero still with some nausea/SOB when up moving around.  Feels better at rest in bed.  No chest pain.    -Data reviewed today: I reviewed all new labs and imaging reports over the last 24 hours. I personally reviewed no images or EKG's today.    Physical Exam   Temp: (P) 95.6  F (35.3  C) Temp src: (P) Oral BP: (P) 139/89 Pulse: 62   Resp: (P) 16 SpO2: (P) 94 % O2 Device: (P) None (Room air)    Vitals:    10/24/20 0559   Weight: 62.7 kg (138 lb 3.2 oz)     Vital Signs with Ranges  Temp:  [95.2  F (35.1  C)-96.1  F (35.6  C)] (P) 95.6  F (35.3  C)  Pulse:  [62-68] 62  Resp:  [16-18] (P) 16  BP: (112-121)/(64-71) (P) 139/89  SpO2:  [93 %-96 %] (P) 94 %  I/O last 3 completed shifts:  In: 480 [P.O.:480]  Out: 1950 [Urine:1950]    GEN:  Alert, oriented, appears comfortable, no overt distress at rest in bed.  HEENT:  Normocephalic/atraumatic, no scleral icterus, no nasal discharge, mouth moist.  CV:  Irreg with normal rate, III/VI murmur.  No rub.  LUNGS:  Clear to auscultation upper with bibasilar crackles.  Symmetric chest rise on inhalation noted.  ABD:  Active bowel sounds, soft, non-tender/non-distended.  No guarding/rigidity.  EXT:  Trace edema.  No cyanosis.  No acute joint synovitis noted.  SKIN:  Dry to touch, no exanthems noted in the visualized areas.    Medications     sodium chloride 10 mL/hr at 10/25/20 1313     Warfarin Therapy Reminder         digoxin  125 mcg Oral Daily     furosemide  20 mg Intravenous BID     metoprolol tartrate  12.5 mg Oral BID     omeprazole  20 mg Oral QAM AC     sodium chloride (PF)  3 mL Intracatheter Q8H     warfarin ANTICOAGULANT  2 mg Oral ONCE at 18:00     Data     Recent Labs   Lab 10/23/20  1429   WBC 8.8   HGB  13.9   HCT 40.8   MCV 92        Recent Labs   Lab 10/25/20  0555 10/24/20  0551 10/23/20  1429    129* 127*   POTASSIUM 3.6 4.2 4.3   CHLORIDE 100 97 95   CO2 27 24 22   ANIONGAP 6 8 10   * 92 105*   BUN 22 21 20   CR 0.78 0.88 0.79   GFRESTIMATED 65 56* 64   GFRESTBLACK 76 65 74   CHRIS 8.8 8.7 9.0   PROTTOTAL 5.8* 5.9* 6.6*   ALBUMIN 3.0* 3.0* 3.3*   BILITOTAL 1.6* 1.8* 2.3*   ALKPHOS 76 77 89   AST 57* 68* 77*   ALT 93* 110* 123*     Recent Labs   Lab 10/25/20  0555 10/24/20  0551 10/23/20  1429   INR 2.83* 3.44* 3.49*     Recent Labs   Lab 10/24/20  1412 10/24/20  0551 10/23/20  2234   TROPI 0.085* 0.089* 0.082*       No results found for this or any previous visit (from the past 24 hour(s)).

## 2020-10-25 NOTE — PROGRESS NOTES
[facility]    Hospitalist Progress Note    Date of Service (when I saw the patient): 10/25/2020    Assessment & Plan   Elizabeth Ruggiero is a 89 year old female who presents with:     Weakness, fatigue; suspect multifactorial.  - repeat CBC/other labs a.m.  - hold any potentially sedating meds     Elevated troponins; unclear cause, but suspect due to recurrent diastolic CHF episode.  - trend Q 8 hours  - check ECHO today; depending on findings, may request Cardiology consult to review other options with Elizabeth.   - - Note: Elizabeth has followed with Dr. Galvan and SENIA Hernandez CNP in Cardiology clinic and Mitraclip has been discussed as a possible option to help with her CHF.     Probable acute exacerbation of chronic diastolic CHF.  - ECHO in a.m.  - daily weights/I's and O's  - lasix 20 mg IV BID to start; BMP in a.m.     A-fib., chronic.  INR supra-therapeutic.  - resume coumadin; Pharm.D. to monitor/dose  - telemetry  - resume PTA digoxin     Hypertension; pressures stable.  - resume PTA metoprolol     Elevated LFT's w/ abnormal CT abdomen/pelvis 7/2020 and U/S 10/20.  - repeat LFT's in a.m.  - GI consult w/ Dr. Hoover today     Hyponatremia; improved.  - lasix as above; recheck BMP 10/25     Edema; probably somewhat worse than her usual.  - lasix as above; elevate legs if able.  - begin TEDs in a.m.     Nausea; cause not clear; may be related to abdominal process or CHF or meds.  Not bothering her at the moment.  - PRN zofran; monitor     Deconditioning; multifactorial.  - PT/OT eval's        DVT Prophylaxis: Warfarin and Ambulate every shift  Code Status: Full Code     Disposition: Expected discharge in 1-2 days.      MARQUITA Pabon MD, FACP     Internal Medicine Hospitalist  Text Page (7am - 6pm)    Interval History   Doing well today; breathing may be somewhat better.  No abdominal pain, n/v; up walking with assist.    Data reviewed today: I reviewed all new labs and imaging results  over the last 24 hours. I personally reviewed     Physical Exam   Temp: 95.2  F (35.1  C) Temp src: Oral BP: 112/64 Pulse: 67   Resp: 18 SpO2: 96 % O2 Device: None (Room air)    Vitals:    10/24/20 0559   Weight: 62.7 kg (138 lb 3.2 oz)     Vital Signs with Ranges  Temp:  [95.2  F (35.1  C)-97.2  F (36.2  C)] 95.2  F (35.1  C)  Pulse:  [61-69] 67  Resp:  [18] 18  BP: (111-119)/(61-73) 112/64  SpO2:  [93 %-96 %] 96 %  I/O last 3 completed shifts:  In: 480 [P.O.:480]  Out: 2600 [Urine:2600]    Constitutional: awake, lying in bed; in no apparent distress  Eyes: sclerae clear; PERRLA/EOMI  HEENT: AT/NC; moist mucous membranes, normal dentition.  Neck: supple, good ROM; no LA, no bruits, possible mild increase in JVD; no thyromegaly    Respiratory: good air entry bilaterally; faint inspiratory rhonchi Right base; no wheezing.  Back: no obvious abnormalities  Cardiovascular: Regular rate and rhythm; S1, S2 noted; no murmur, rub or gallop  GI: abdomen flat, + bowel sounds, soft, non-tender, non-distended; no masses or organomegaly  Skin: no rash, no cyanosis; multiple scabbed areas on upper back and on Right cheek; chronic venous stasis changes of Left lower leg.    Musculoskeletal: 1+ edema Right lower leg, and trace on left; no obvious joint abnormalities  Neurologic: awake, follows directions well; no focal motor or sensory deficits  Psychiatric: no obvious signs of anxiety or depression      Medications     dextrose 5% and 0.45% NaCl 50 mL/hr at 10/24/20 1639     Warfarin Therapy Reminder         digoxin  125 mcg Oral Daily     furosemide  20 mg Intravenous BID     metoprolol tartrate  12.5 mg Oral BID     omeprazole  20 mg Oral QAM AC     sodium chloride (PF)  3 mL Intracatheter Q8H       Data   Recent Labs   Lab 10/24/20  1412 10/24/20  0551 10/23/20  2234 10/23/20  1429   WBC  --   --   --  8.8   HGB  --   --   --  13.9   MCV  --   --   --  92   PLT  --   --   --  179   INR  --  3.44*  --  3.49*   NA  --  129*  --   127*   POTASSIUM  --  4.2  --  4.3   CHLORIDE  --  97  --  95   CO2  --  24  --  22   BUN  --  21  --  20   CR  --  0.88  --  0.79   ANIONGAP  --  8  --  10   CHRIS  --  8.7  --  9.0   GLC  --  92  --  105*   ALBUMIN  --  3.0*  --  3.3*   PROTTOTAL  --  5.9*  --  6.6*   BILITOTAL  --  1.8*  --  2.3*   ALKPHOS  --  77  --  89   ALT  --  110*  --  123*   AST  --  68*  --  77*   LIPASE  --   --   --  157   TROPI 0.085* 0.089* 0.082* 0.072*

## 2020-10-25 NOTE — CONSULTS
[facility]    Cardiology Consultation     Date of Admission:  10/23/2020    Assessment & Plan   Elizabeth Ruggiero is a 89 year old female who was admitted on 10/23/2020. I was asked to see the patient for heart failure.      1. 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  2. Acute on chronic heart failure with preserved ejection fraction in the setting of significant valvular heart disease and diastolic dysfunction   3. Moderate TR and pulmonary HTN (estimaited pulmonary artery systolic pressure 50 mmhg)  4. Permanent atrial fibrillation on therapeutic anticoagulation with Coumadin and rate control with digoxin  5. Hypertension  6. Elevated troponin (flat at 0.08.)  7. Elevated liver function tests (mild to moderate intra and extrahepatic biliary duct dilation -unclear if these are age-related normal findings GI to evaluate.  Congestive hepatopathy also on the differential)    She is currently being diuresed with IV Lasix 20 mg twice daily.  She remains rate controlled with digoxin 125 mcg daily.  Patient is admitted with weakness.  On clinical examination she is hypervolemic with lower extremity edema, crackles at the bases, and a holosystolic murmur consistent with severe mitral regurgitation.  Notably, she does not appear to be in any acute respiratory distress and appears fairly comfortable on examination.  I discussed with her and her daughter the constellation of findings including evidence of heart failure in the setting of severe mitral valve regurgitation.  She additionally has evidence of moderate pulmonary hypertension in the setting of valvular heart disease.  In the past, they had discussed the possibility of MitraClip procedure in the cardiology clinic but were awaiting any clinical symptoms prior to proceeding.  The patient denies any significant shortness of breath with her usual level of activity, she clearly has evidence  of heart failure on examination and on her blood work.  Her atrial fibrillation is chronic/permanent.  She is appropriately anticoagulated and rate controlled with low-dose metoprolol and digoxin.  She had only been on small oral dose of Lasix daily.    I indicated to both the patient and her daughter that the more immediate goals would be to help with her hypervolemia and she is having good diuresis with Lasix IV 20 mg twice daily.  At this time, I think it would be reasonable to consider a mitral clip procedure but we would need to have further assessment of the valve with a transesophageal echocardiogram to assess for candidacy.  I briefly discussed the role of surgery in the situation, my concerns would be not only age but also her nutritional status and associated likely recovery from a major open heart procedure.  They indicated that they would like to think about this further with her other daughters and her son-in-law.  I indicated that there is no urgent decision that needs to be made at this time.  She is on appropriate therapies for hypervolemia and heart failure symptoms.  We could help with medical management by increasing her diuretics at time of discharge, but I did  that she is at risk for having recurrent heart failure hospitalizations in the setting of severe mitral regurgitation.  If she is otherwise healthy and has a good functional status MitraClip procedure could theoretically improve morbidity related to severe mitral valve regurgitation.      Recommendations:  1. Agree with IV diuresis with Lasix 20 mg twice daily.  We will reassess her volume status tomorrow and transition to oral diuresis when more euvolemic  2. Keep potassium above 4 and magnesium above 2, strict intake and output, 2 L fluid restriction.   3. Agree with focus on her nutritional status as she has decreased muscle mass.   4. The patient and her daughters will discuss further if they would like to proceed with  consideration of a MitraClip, could consider a transesophageal echocardiogram when she is closer to euvolemia if they decide on going down that interventional route.  5. Continue with her home medications of metoprolol and digoxin for chronic atrial fibrillation.  Her renal function is appropriate and her digoxin level 2 days ago was 1.3.    Danny Jerry MD    Code Status    Full Code    Reason for Consult   Reason for consult: Heart failure exacerbation    Primary Care Physician   Gelacio Downs    Chief Complaint   Dyspnea    History is obtained from the patient and electronic health record    History of Present Illness   Elizabeth Ruggiero is a 89 year old female who presents with history of recurrent heart failure admissions, permanent atrial fibrillation, DVT, moderate to severe mitral regurgitation in the setting of bileaflet prolapse and left atrial enlargement.  She presented on October 23 with generalized weakness.  She had endorsed increasing weakness over the past 1 week prior to admission.  The staff at her assisted living facility had noted the patient has not been eating and was unable to get out of bed the morning of admission.  She reportedly has had 3 minor mechanical falls that did not result in any injury.  She otherwise denied any chest pain, shortness of breath, fever, chills, dizziness or lightheadedness on admission.  She is hard of hearing and questioning was somewhat limited.  But on direct questioning regarding her functional capacity she denies any significant shortness of breath with her usual level of walking she walks with the assistance of a walker around her apartment.  She denies any PND, orthopnea, but has noticed more lower extremity edema and hand swelling.  She otherwise has been in her usual state of health she recalls having a discussion regarding an interventional procedure to address her leaky valve, but is unsure if this would be appropriate given her age and other medical  history.    Past Medical History   I have reviewed this patient's medical history and updated it with pertinent information if needed.   Past Medical History:   Diagnosis Date     (HFpEF) heart failure with preserved ejection fraction (H)      Acute diastolic CHF (congestive heart failure) (H)      Acute left hemiparesis (H) 2/9/2019     Arthritis      Atrial fibrillation (H)      Atrial fibrillation with RVR (H)      Atrial flutter (H)      Atrial flutter with rapid ventricular response (H) 4/23/2019     Community acquired pneumonia      Diastolic CHF (H) 04/29/2019     DVT of lower extremity (deep venous thrombosis) (H)     recurrent     Hepatic congestion     improved with diuretics for CHF     HTN (hypertension)      Hyperlipidemia      Mitral valve insufficiency      Pneumonia      Postmenopausal bleeding 01/2020    [facility]     Severe mitral regurgitation      SOB (shortness of breath)      TIA (transient ischemic attack)      Vitamin D deficiency        Past Surgical History   I have reviewed this patient's surgical history and updated it with pertinent information if needed.  Past Surgical History:   Procedure Laterality Date     APPENDECTOMY       FRACTURE SURGERY       repair left femur         Prior to Admission Medications   Prior to Admission Medications   Prescriptions Last Dose Informant Patient Reported? Taking?   Multiple Vitamins-Minerals (PRESERVISION AREDS 2+MULTI VIT) CAPS 10/23/2020 at AM Nursing Home Yes Yes   Sig: Take 1 tablet by mouth 2 times daily   VITAMIN D, CHOLECALCIFEROL, PO  Nursing Home Yes Yes   Sig: Take 1,000 Units by mouth daily    acetaminophen (TYLENOL) 325 MG tablet 10/23/2020 at AM Nursing Home No Yes   Sig: Take 2 tablets (650 mg) by mouth 3 times daily   alendronate (FOSAMAX) 70 MG tablet Past Week at Unknown time Nursing Home Yes Yes   Sig: Take 70 mg by mouth every 7 days SUNDAYS at 7:30am   alum & mag hydroxide-simethicone (MAALOX)  200-200-20 MG/5ML SUSP suspension PRN at PRN Nursing Home Yes Yes   Sig: Take 30 mLs by mouth 4 times daily as needed for indigestion   calcium carbonate-vitamin D (OSCAL W/D) 500-200 MG-UNIT tablet  Nursing Home Yes Yes   Sig: Take 1 tablet by mouth daily    digoxin (LANOXIN) 125 MCG tablet  Nursing Home No Yes   Sig: Take 1 tablet (125 mcg) by mouth daily   furosemide (LASIX) 20 MG tablet Past Week at Unknown time group home No Yes   Sig: Take 0.5 tablets (10 mg) by mouth every other day   melatonin 3 MG tablet 10/22/2020 at HS Nursing Home No Yes   Sig: Take 1 tablet (3 mg) by mouth At Bedtime   metoprolol tartrate (LOPRESSOR) 12.5 mg TABS half-tab 10/23/2020 at AM Nursing Home Yes Yes   Sig: Take 12.5 mg by mouth 2 times daily   multivitamin w/minerals (MULTI-VITAMIN) tablet  Nursing Home Yes Yes   Sig: Take 1 tablet by mouth daily    omeprazole (PRILOSEC) 20 MG DR capsule  Nursing Home Yes Yes   Sig: Take 20 mg by mouth daily before breakfast   senna (SENOKOT) 8.6 MG tablet PRN at PRN Nursing Home Yes Yes   Sig: Take 1 tablet by mouth 2 times daily as needed for constipation   warfarin ANTICOAGULANT (COUMADIN) 2.5 MG tablet 10/20/2020 Nursing Home Yes Yes   Sig: Take 2.5 mg by mouth See Admin Instructions Tuesday, Friday, Sunday evenings   warfarin ANTICOAGULANT (COUMADIN) 2.5 MG tablet 10/22/2020 at Unknown time Nursing Home Yes Yes   Sig: Take 1.25 mg by mouth See Admin Instructions Monday, Wednesday, Thursday, Saturday evenings      Facility-Administered Medications: None     Allergies   No Known Allergies    Social History   I have reviewed this patient's social history and updated it with pertinent information if needed. Elizabeth Ruggiero  reports that she has never smoked. She has never used smokeless tobacco. She reports that she does not drink alcohol or use drugs.    Family History   I have reviewed this patient's family history and updated it with pertinent information if needed.   Family History    Problem Relation Age of Onset     Colon Cancer Mother      Breast Cancer Sister      Diabetes Sister        Review of Systems   The 10 point Review of Systems is negative other than noted in the HPI or here.     Physical Exam   Temp: 95.2  F (35.1  C) Temp src: Oral BP: 112/64 Pulse: 67   Resp: 18 SpO2: 96 % O2 Device: None (Room air)    Vital Signs with Ranges  Temp:  [95.2  F (35.1  C)-97.2  F (36.2  C)] 95.2  F (35.1  C)  Pulse:  [61-69] 67  Resp:  [18] 18  BP: (111-119)/(61-73) 112/64  SpO2:  [93 %-96 %] 96 %  138 lbs 3.2 oz    Constitutional: awake, alert, cooperative, no apparent distress, and appears stated age. hard of hearing.  ENT: Normocephalic, without obvious abnormality, atraumatic  Respiratory: Bibasilar crackles  Cardiovascular: Elevated JVP approximately 12 cm, irregularly irregular, holosystolic 3 out of 6 murmur over the apex, separate 2 out of 6 systolic murmur over the left lower sternal border.   GI: Abdomen is soft and nontender  Musculoskeletal: Bilateral lower extremities are warm and well perfused, 1+ bilateral pitting edema.    Data   Results for orders placed or performed during the hospital encounter of 10/23/20 (from the past 24 hour(s))   Troponin I   Result Value Ref Range    Troponin I ES 0.085 (H) 0.000 - 0.045 ug/L   Echocardiogram Complete    Narrative    [id number]  [id number]  [id number]  [id number]^HERBERT^KRISHNA^DANG           [facility]  Echocardiography Laboratory  [address] [place] [id number]        Name: CHOLO TA  MRN: [id number]  : 1926  Study Date: 10/24/2020 03:32 PM  Age: 89 yrs  Gender: Female  Patient Location: [facility]  Reason For Study: CHF  Ordering Physician: KRISHNA GODINEZ  Performed By: Debbi Jensen     Weight: 138 lb  HR: 70  BP: 111/73 mmHg  _____________________________________________________________________________  __     Procedure  Complete Portable Echo Adult.      _____________________________________________________________________________  __        Interpretation Summary     There is mod-severe to severe (3-4+) mitral regurgitation.  The mitral regurgitant jet is eccentrically directed.  The mitral valve leaflets are severely thickened.  Hyperdynamic left ventricular function  The visual ejection fraction is estimated at >70%.  The left ventricle is normal in size.  Right ventricular systolic pressure is elevated, consistent with moderate  pulmonary hypertension.  Mild valvular aortic stenosis.  The rhythm was atrial fibrillation.  The right ventricle is normal in structure, function and size.  There is severe biatrial enlargement.     No signficant change since 4/23//2019  _____________________________________________________________________________  __        Left Ventricle  The left ventricle is normal in size. Hyperdynamic left ventricular function.  The visual ejection fraction is estimated at >70%. Left ventricular diastolic  function is indeterminate.     Right Ventricle  The right ventricle is normal in structure, function and size. There is no  mass or thrombus in the right ventricle.     Atria  There is severe biatrial enlargement. There is no atrial shunt seen. The left  atrial appendage is not well visualized.     Mitral Valve  The mitral valve leaflets are severely thickened. There is severe mitral  annular calcification. Moderate mitral valve prolapse, posterior leaflet.  There is mod-severe to severe (3-4+) mitral regurgitation. The mitral  regurgitant jet is eccentrically directed.     Tricuspid Valve  Normal tricuspid valve. The right ventricular systolic pressure is elevated at  49.8 mmHg. Right ventricular systolic pressure is elevated, consistent with  moderate pulmonary hypertension. There is no tricuspid stenosis.        Aortic Valve  There is moderate trileaflet aortic sclerosis. Mild valvular aortic stenosis.  The mean AoV pressure gradient is  12mmHg.     Pulmonic Valve  Normal pulmonic valve. There is no pulmonic valvular regurgitation. There is  no pulmonic valvular stenosis.     Vessels  The aortic root is normal size. Normal size ascending aorta. The inferior vena  cava is normal. The pulmonary artery is normal size.     Pericardium  The pericardium appears normal. There is no pleural effusion.     Rhythm  The rhythm was atrial fibrillation.     _____________________________________________________________________________  __  MMode/2D Measurements & Calculations  IVSd: 1.4 cm  LVIDd: 3.7 cm  LVIDs: 2.3 cm  LVPWd: 1.3 cm  FS: 37.6 %  LV mass(C)d: 181.0 grams  Ao root diam: 3.4 cm     asc Aorta Diam: 3.5 cm  LVOT diam: 2.2 cm  LVOT area: 3.6 cm2  LA Volume (BP): 94.3 ml  RWT: 0.71        Doppler Measurements & Calculations  MV E max bry: 122.0 cm/sec  MV A max bry: 36.1 cm/sec  MV E/A: 3.4  MV dec slope: 681.7 cm/sec2  MV dec time: 0.18 sec  Ao V2 max: 344.1 cm/sec  Ao max P.0 mmHg  Ao V2 mean: 240.2 cm/sec  Ao mean P.2 mmHg  Ao V2 VTI: 79.4 cm  BRITTNI(I,D): 0.63 cm2  BRITTNI(V,D): 0.90 cm2  LV V1 max P.9 mmHg  LV V1 max: 84.5 cm/sec  LV V1 VTI: 13.8 cm  SV(LVOT): 50.3 ml  TR max bry: 352.2 cm/sec  TR max P.8 mmHg  AV Bry Ratio (DI): 0.25  E/E' av.0  Lateral E/e': 21.1  Medial E/e': 42.8              _____________________________________________________________________________  __           Report approved by: Dr. Kevin Manjarrez 10/24/2020 04:43 PM      INR   Result Value Ref Range    INR 2.83 (H) 0.86 - 1.14   Basic metabolic panel   Result Value Ref Range    Sodium 133 133 - 144 mmol/L    Potassium 3.6 3.4 - 5.3 mmol/L    Chloride 100 94 - 109 mmol/L    Carbon Dioxide 27 20 - 32 mmol/L    Anion Gap 6 3 - 14 mmol/L    Glucose 114 (H) 70 - 99 mg/dL    Urea Nitrogen 22 7 - 30 mg/dL    Creatinine 0.78 0.52 - 1.04 mg/dL    GFR Estimate 65 >60 mL/min/[1.73_m2]    GFR Estimate If Black 76 >60 mL/min/[1.73_m2]    Calcium 8.8 8.5 - 10.1  mg/dL

## 2020-10-26 ENCOUNTER — APPOINTMENT (OUTPATIENT)
Dept: PHYSICAL THERAPY | Facility: CLINIC | Age: 85
DRG: 292 | End: 2020-10-26
Attending: INTERNAL MEDICINE
Payer: COMMERCIAL

## 2020-10-26 LAB
ANION GAP SERPL CALCULATED.3IONS-SCNC: 6 MMOL/L (ref 3–14)
BUN SERPL-MCNC: 23 MG/DL (ref 7–30)
CALCIUM SERPL-MCNC: 8.8 MG/DL (ref 8.5–10.1)
CHLORIDE SERPL-SCNC: 99 MMOL/L (ref 94–109)
CO2 SERPL-SCNC: 29 MMOL/L (ref 20–32)
CREAT SERPL-MCNC: 0.84 MG/DL (ref 0.52–1.04)
GFR SERPL CREATININE-BSD FRML MDRD: 59 ML/MIN/{1.73_M2}
GLUCOSE SERPL-MCNC: 106 MG/DL (ref 70–99)
INR PPP: 2.07 (ref 0.86–1.14)
POTASSIUM SERPL-SCNC: 3.5 MMOL/L (ref 3.4–5.3)
SODIUM SERPL-SCNC: 134 MMOL/L (ref 133–144)

## 2020-10-26 PROCEDURE — 250N000013 HC RX MED GY IP 250 OP 250 PS 637: Performed by: INTERNAL MEDICINE

## 2020-10-26 PROCEDURE — 99232 SBSQ HOSP IP/OBS MODERATE 35: CPT | Performed by: INTERNAL MEDICINE

## 2020-10-26 PROCEDURE — 80048 BASIC METABOLIC PNL TOTAL CA: CPT | Performed by: INTERNAL MEDICINE

## 2020-10-26 PROCEDURE — 85610 PROTHROMBIN TIME: CPT | Performed by: INTERNAL MEDICINE

## 2020-10-26 PROCEDURE — 250N000011 HC RX IP 250 OP 636: Performed by: INTERNAL MEDICINE

## 2020-10-26 PROCEDURE — 120N000001 HC R&B MED SURG/OB

## 2020-10-26 PROCEDURE — 97110 THERAPEUTIC EXERCISES: CPT | Mod: GP

## 2020-10-26 PROCEDURE — 97530 THERAPEUTIC ACTIVITIES: CPT | Mod: GP

## 2020-10-26 PROCEDURE — 36415 COLL VENOUS BLD VENIPUNCTURE: CPT | Performed by: INTERNAL MEDICINE

## 2020-10-26 RX ORDER — FUROSEMIDE 20 MG
20 TABLET ORAL
Status: DISCONTINUED | OUTPATIENT
Start: 2020-10-26 | End: 2020-10-27 | Stop reason: HOSPADM

## 2020-10-26 RX ORDER — WARFARIN SODIUM 2.5 MG/1
2.5 TABLET ORAL
Status: COMPLETED | OUTPATIENT
Start: 2020-10-26 | End: 2020-10-26

## 2020-10-26 RX ORDER — AMOXICILLIN 250 MG
1 CAPSULE ORAL 2 TIMES DAILY PRN
Status: DISCONTINUED | OUTPATIENT
Start: 2020-10-26 | End: 2020-10-27 | Stop reason: HOSPADM

## 2020-10-26 RX ORDER — POLYETHYLENE GLYCOL 3350 17 G/17G
17 POWDER, FOR SOLUTION ORAL DAILY PRN
Status: DISCONTINUED | OUTPATIENT
Start: 2020-10-26 | End: 2020-10-27 | Stop reason: HOSPADM

## 2020-10-26 RX ORDER — BISACODYL 10 MG
10 SUPPOSITORY, RECTAL RECTAL DAILY PRN
Status: DISCONTINUED | OUTPATIENT
Start: 2020-10-26 | End: 2020-10-27 | Stop reason: HOSPADM

## 2020-10-26 RX ORDER — AMOXICILLIN 250 MG
2 CAPSULE ORAL 2 TIMES DAILY PRN
Status: DISCONTINUED | OUTPATIENT
Start: 2020-10-26 | End: 2020-10-27 | Stop reason: HOSPADM

## 2020-10-26 RX ADMIN — FUROSEMIDE 20 MG: 10 INJECTION, SOLUTION INTRAVENOUS at 07:58

## 2020-10-26 RX ADMIN — METOPROLOL TARTRATE 12.5 MG: 25 TABLET, FILM COATED ORAL at 07:58

## 2020-10-26 RX ADMIN — FUROSEMIDE 20 MG: 20 TABLET ORAL at 16:02

## 2020-10-26 RX ADMIN — WARFARIN SODIUM 2.5 MG: 2.5 TABLET ORAL at 18:29

## 2020-10-26 RX ADMIN — DIGOXIN 125 MCG: 125 TABLET ORAL at 07:58

## 2020-10-26 RX ADMIN — DOCUSATE SODIUM 50 MG AND SENNOSIDES 8.6 MG 1 TABLET: 8.6; 5 TABLET, FILM COATED ORAL at 16:02

## 2020-10-26 RX ADMIN — OMEPRAZOLE 20 MG: 20 CAPSULE, DELAYED RELEASE ORAL at 07:58

## 2020-10-26 ASSESSMENT — ACTIVITIES OF DAILY LIVING (ADL)
ADLS_ACUITY_SCORE: 18
ADLS_ACUITY_SCORE: 17
ADLS_ACUITY_SCORE: 17
ADLS_ACUITY_SCORE: 18

## 2020-10-26 NOTE — PLAN OF CARE
VSS. A&OX4. Tele a fib with CVR. No complaints. Hard of hearing. Given lasix this AM; good diuresis. HENDERSON, better this afternoon. LS coarse in left base. Min BLE with trace edema. Constipation but passing gas; senna ordered. Purewick in place. Good appetite. Up to chair today with assist of 1 GB and walker. Needs TCU at discharge. Possible discharge tomorrow. Continue to monitor.

## 2020-10-26 NOTE — PLAN OF CARE
A&Ox4. VSS. Denies pain. Tolerating regular diet. Up SBA to BSC. Purewick placed this afternoon.Tolerating regular diet. Small amt blanchable redness to bottom w/ Mepilex in place. Back abrasions covered with mepilex. BLE edema +1-2. Tele: A-fib CVR. Plan for RAHEL/Mitral clip procedure OP & TCU at discharge.

## 2020-10-26 NOTE — UTILIZATION REVIEW
Admission Status; Secondary Review Determination       Under the authority of the Utilization Management Committee, the utilization review process indicated a secondary review on the above patient. The review outcome is based on review of the medical records, discussions with staff, and applying clinical experience noted on the date of the review.     (x) Inpatient Status Appropriate - This patient's medical care is consistent with medical management for inpatient care and reasonable inpatient medical practice.     RATIONALE FOR DETERMINATION   89 yo female with HFpEF, severe mitral valve prolapse, hyponatremia who presented with weakness and shortness of breath, admitted on 10/23 (3 days ago) and was initiated on treatment for acute on chronic HFpEF in setting of valvular heart disease. Has been on lasix IV BID from 10/23 through today 10/26. BNP>9000 and mild troponin elevation in setting of creatinine 0.79. Has been in the hospital 3 midnights so far and was just transitioned to po diuretic today. Not discharging today.     At the time of admission with the information available to the attending physician more than 2 nights hospital complex care was anticipated, based on patient risk of adverse outcome if treated as outpatient and complex care required. Inpatient admission is appropriate based on the Medicare guidelines.     This document was produced using voice recognition software.    The information on this document is developed by the utilization review team in order for the business office to ensure compliance. This only denotes the appropriateness of proper admission status and does not reflect the quality of care rendered.   The definitions of Inpatient Status and Observation Status used in making the determination above are those provided in the CMS Coverage Manual, Chapter 1 and Chapter 6, section 70.4.     Sincerely,   Chary Mendieta MD  Utilization Review  Physician Advisor  [facility]  Services.

## 2020-10-26 NOTE — PROGRESS NOTES
Care Management Follow Up Note    Length of Stay (days) 3  Patient plan of care discussed at Interdisciplinary Rounds: yes  Expected Discharge Date: 10/27/20   Concerns to be Addressed: Discharge to TCU        Anticipated Discharge Disposition:  [facility]  Anticipated Discharge Services:    Anticipated Discharge DME:      Plan:  [facility] called to let SW know patient can admit but will need prior authorization. Expected discharge 10/27/20 in the afternoon.   Spoke with patients daughter, Brittney ([phone]) to update her that patient will likely discharge to [facility] in the afternoon.     PAS-RR    D: Per DHS regulation, SW completed and submitted PAS-RR to [place] [employer] Direct Connect via the Senior LinkAge Line.  PAS-RR confirmation # is : [id number]    I: SW spoke with patient and they are aware a PAS-RR has been submitted.  CELSO reviewed with patientP that they may be contacted for a follow up appointment within 10 days of hospital discharge if their SNF stay is < 30 days.  Contact information for [facility] LinkAge Line was also provided.    A: Patient verbalized understanding.    P: Further questions may be directed to [facility] LinkAge Line at #[phone], option #4 for PAS-RR staff.        .RENATO Paula

## 2020-10-26 NOTE — PROGRESS NOTES
Patient does not wish to proceed with GI procedures. ON warfarin.  Gi will sign off.   Please contact [facility] if any further concerns.    Bess Tariq PA-C  [facility]  [phone]

## 2020-10-26 NOTE — PLAN OF CARE
A&O to self, place. Disoriented to time and situation overnight. Improved this AM. VSS. Denies pain. Tolerating regular diet. Up SBA to BSC. Purwick in place. Mepilex in to coccyx. Back abrasions covered with mepilex. BLE edema +1. Tele: A-fib w/ occ PVCs. Plan for RAHEL/Mitral clip procedure OP & TCU at discharge.

## 2020-10-26 NOTE — PROGRESS NOTES
[facility]    Cardiology Progress Note    Date of Service (when I saw the patient): 10/26/2020            Assessment and Plan:   1. 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  2. Acute on chronic heart failure with preserved ejection fraction in the setting of significant valvular heart disease and diastolic dysfunction   3. Moderate TR and pulmonary HTN (estimaited pulmonary artery systolic pressure 50 mmhg)  4. Permanent atrial fibrillation on therapeutic anticoagulation with Coumadin and rate control with digoxin  5. Hypertension  6. Elevated troponin (flat at 0.08.)  7. Elevated liver function tests (mild to moderate intra and extrahepatic biliary duct dilation -unclear if these are age-related normal findings GI to evaluate.  Congestive hepatopathy also on the differential)    PLAN/RECOMMENDATIONS:  -Stop IV lasix.  Start lasix 20 mg PO BID  -I spoke with Elizabeth and her daughter.  They remain interested in mitraclip evaluation if ultimately a candidate.  She does have significant mitral annular calcification and leaflets are quite thickened so not clear she would be a candidate or how successful the procedures might be.  She has also been under treated with diuretics in recently.   -Regardless, recommend outpatient cardiology follow up after she has had a period of additional rehab and TCU.      Tiffany Galvan MD [facility] Heart        Interval History:     Feels better, no shortness of breath.          Medications:       digoxin  125 mcg Oral Daily     furosemide  20 mg Oral BID     metoprolol tartrate  12.5 mg Oral BID     omeprazole  20 mg Oral QAM AC     sodium chloride (PF)  3 mL Intracatheter Q8H     warfarin ANTICOAGULANT  2.5 mg Oral ONCE at 18:00              Physical Exam:   Blood pressure 109/62, pulse 63, temperature 95.6  F (35.3  C), temperature source Oral, resp. rate 24, weight 60.4 kg (133 lb 1.6  oz), SpO2 98 %, not currently breastfeeding.  Vitals:    10/24/20 0559 10/26/20 0647   Weight: 62.7 kg (138 lb 3.2 oz) 60.4 kg (133 lb 1.6 oz)       Intake/Output Summary (Last 24 hours) at 10/26/2020 1312  Last data filed at 10/26/2020 1043  Gross per 24 hour   Intake 1521 ml   Output 2750 ml   Net -1229 ml           Vital Sign Ranges  Temperature Temp  Av.7  F (35.4  C)  Min: 95.3  F (35.2  C)  Max: 95.9  F (35.5  C)   Blood pressure Systolic (24hrs), Av , Min:109 , Max:139        Diastolic (24hrs), Av, Min:49, Max:89      Pulse Pulse  Av.4  Min: 63  Max: 84   Respirations Resp  Av.3  Min: 14  Max: 24   Pulse oximetry SpO2  Av.5 %  Min: 90 %  Max: 98 %         EXAM:    Constitutional:    in no apparent distress    Skin:    normal    Head:    Normocephalic, atramatic    Eyes:    pupils equal, round and reactive to light, extra ocular muscles intact, sclera clear, conjunctiva normal    Neck:    No JVD   Lungs:    CTAB   Cardiovascular:    S1, S2 Irregularly irregular  II/VI holosystolic murmur   Abdomen:    normal bowel sounds    Extremities and Back:    no cyanosis or clubbing and No edema    Neurological:    No gross or focal neurologic abnormalities               Data:     Recent Labs   Lab Test 10/26/20  0704 10/25/20  0555 10/23/20  1429 10/23/20  1429 10/09/20  0927 10/09/20  09   WBC  --   --   --  8.8  --  9.8   HGB  --   --   --  13.9  --  13.5   MCV  --   --   --  92  --  94   PLT  --   --   --  179  --  175   INR 2.07* 2.83*   < > 3.49*   < > 3.12*    < > = values in this interval not displayed.      Recent Labs   Lab Test 10/26/20  0704 10/25/20  0555    133   POTASSIUM 3.5 3.6   CHLORIDE 99 100   BUN 23 22   CR 0.84 0.78     Recent Labs   Lab 10/26/20  07 10/25/20  0555 10/24/20  0551 10/23/20  1429   * 114* 92 105*     Recent Labs   Lab Test 10/25/20  0555 10/24/20  0551   ALT 93* 110*   AST 57* 68*     Troponin I ES   Date Value Ref Range Status    10/24/2020 0.085 (H) 0.000 - 0.045 ug/L Final     Comment:     The 99th percentile for upper reference range is 0.045 ug/L.  Troponin values   in the range of 0.045 - 0.120 ug/L may be associated with risks of adverse   clinical events.     10/24/2020 0.089 (H) 0.000 - 0.045 ug/L Final     Comment:     The 99th percentile for upper reference range is 0.045 ug/L.  Troponin values   in the range of 0.045 - 0.120 ug/L may be associated with risks of adverse   clinical events.     10/23/2020 0.082 (H) 0.000 - 0.045 ug/L Final     Comment:     The 99th percentile for upper reference range is 0.045 ug/L.  Troponin values   in the range of 0.045 - 0.120 ug/L may be associated with risks of adverse   clinical events.     10/23/2020 0.072 (H) 0.000 - 0.045 ug/L Final     Comment:     The 99th percentile for upper reference range is 0.045 ug/L.  Troponin values   in the range of 0.045 - 0.120 ug/L may be associated with risks of adverse   clinical events.     04/29/2019 0.023 0.000 - 0.045 ug/L Final     Comment:     The 99th percentile for upper reference range is 0.045 ug/L.  Troponin values   in the range of 0.045 - 0.120 ug/L may be associated with risks of adverse   clinical events.           Recent Labs   Lab Test 07/27/20  0700 04/29/19  0815   MAG 2.0 2.0              TSH   Date Value Ref Range Status   10/09/2020 2.22 0.40 - 4.00 mU/L Final           Tiffany Galvan MD, FACC  Cardiology

## 2020-10-26 NOTE — PROGRESS NOTES
[facility]    Hospitalist Progress Note      Assessment & Plan       Elizabeth Ruggiero is a 89 year old female who was admitted on 10/23/2020. She presented with weakness and some shortness of breath.     Acute on chronic HFpEF in the setting of valvular heart disease + diastolic dysfunction  Parox afib  Severe MVR  Flat troponin elevation, not felt NSTEMI:  -  Overall improved, Lasix IV changed to PO 20mg BID  -  Continue warfarin, pharmacy assisting in monitoring.  -  Continue rate control meds (beta blocker + digoxin)  -  Appreciate cardiology assistance., Per cards note, patient/family interested in mitral clip. Has significant mitral annular calcification and leaflet are quite thickened, unclear if she would be candidate.  recommend outpatient cardiology follow up after TCU.      Elevated liver function tests:  -   mild to moderate intra and extrahepatic biliary duct dilation -unclear if these are age-related normal findings.  Congestive hepatopathy also on the differential.  Appreciate GI consult.  Patient not interested in procedures.   Monitor for now.     Generalized weakness:  -  Felt likely mainly related to CHF.  Treat CHF and monitor/have therapy work with her.  - plan to discharge to TCU     Hyponatremia: resolved  -  Improved, likely multifactorial with CHF contributing.       DVT Prophylaxis: Warfarin  Code Status: Full Code    Disposition: Expected discharge tomorrow to TCU    Daughter, Brittney, updated by phone.  Discussed with RN    Audi Diez MD  Text Page  (7am to 6pm)    Interval History   Patient feels ok. Denies chest pain, abdominal pain, nausea or vomiting. States her breathing is fine.     -Data reviewed today: I reviewed all new labs and imaging results over the last 24 hours. I personally reviewed no images or EKG's today.    Physical Exam   Temp: 95.6  F (35.3  C) Temp src: Oral BP: 109/62 Pulse: 63   Resp: 24 SpO2: 98 % O2 Device: None (Room air)     Vitals:    10/24/20 0559 10/26/20 0647   Weight: 62.7 kg (138 lb 3.2 oz) 60.4 kg (133 lb 1.6 oz)     Vital Signs with Ranges  Temp:  [95.3  F (35.2  C)-95.9  F (35.5  C)] 95.6  F (35.3  C)  Pulse:  [63-84] 63  Resp:  [14-24] 24  BP: (109-139)/(49-89) 109/62  SpO2:  [90 %-98 %] 98 %  I/O last 3 completed shifts:  In: 801 [P.O.:680; I.V.:121]  Out: 2750 [Urine:2750]    Constitutional: Alert, awake and no apparent distress  Respiratory: Diminished BS at the bases, upper lungs clear  Cardiovascular: irregularly irregular, +murmur  GI: soft and non-tender  Skin/Integumen: warm an ddry        Medications     sodium chloride 10 mL/hr at 10/25/20 1313     Warfarin Therapy Reminder         digoxin  125 mcg Oral Daily     furosemide  20 mg Oral BID     metoprolol tartrate  12.5 mg Oral BID     omeprazole  20 mg Oral QAM AC     sodium chloride (PF)  3 mL Intracatheter Q8H     warfarin ANTICOAGULANT  2.5 mg Oral ONCE at 18:00       Data   Recent Labs   Lab 10/26/20  0704 10/25/20  0555 10/24/20  1412 10/24/20  0551 10/23/20  2234 10/23/20  1429   WBC  --   --   --   --   --  8.8   HGB  --   --   --   --   --  13.9   MCV  --   --   --   --   --  92   PLT  --   --   --   --   --  179   INR 2.07* 2.83*  --  3.44*  --  3.49*    133  --  129*  --  127*   POTASSIUM 3.5 3.6  --  4.2  --  4.3   CHLORIDE 99 100  --  97  --  95   CO2 29 27  --  24  --  22   BUN 23 22  --  21  --  20   CR 0.84 0.78  --  0.88  --  0.79   ANIONGAP 6 6  --  8  --  10   CHRIS 8.8 8.8  --  8.7  --  9.0   * 114*  --  92  --  105*   ALBUMIN  --  3.0*  --  3.0*  --  3.3*   PROTTOTAL  --  5.8*  --  5.9*  --  6.6*   BILITOTAL  --  1.6*  --  1.8*  --  2.3*   ALKPHOS  --  76  --  77  --  89   ALT  --  93*  --  110*  --  123*   AST  --  57*  --  68*  --  77*   LIPASE  --   --   --   --   --  157   TROPI  --   --  0.085* 0.089* 0.082* 0.072*       No results found for this or any previous visit (from the past 24 hour(s)).

## 2020-10-27 VITALS
SYSTOLIC BLOOD PRESSURE: 118 MMHG | TEMPERATURE: 95.7 F | RESPIRATION RATE: 18 BRPM | DIASTOLIC BLOOD PRESSURE: 63 MMHG | OXYGEN SATURATION: 94 % | WEIGHT: 131 LBS | HEART RATE: 66 BPM | BODY MASS INDEX: 21.14 KG/M2

## 2020-10-27 LAB
INR PPP: 1.93 (ref 0.86–1.14)
POTASSIUM SERPL-SCNC: 3.2 MMOL/L (ref 3.4–5.3)

## 2020-10-27 PROCEDURE — 250N000013 HC RX MED GY IP 250 OP 250 PS 637: Performed by: INTERNAL MEDICINE

## 2020-10-27 PROCEDURE — 36415 COLL VENOUS BLD VENIPUNCTURE: CPT | Performed by: INTERNAL MEDICINE

## 2020-10-27 PROCEDURE — 85610 PROTHROMBIN TIME: CPT | Performed by: INTERNAL MEDICINE

## 2020-10-27 PROCEDURE — 99232 SBSQ HOSP IP/OBS MODERATE 35: CPT | Performed by: NURSE PRACTITIONER

## 2020-10-27 PROCEDURE — 99239 HOSP IP/OBS DSCHRG MGMT >30: CPT | Performed by: INTERNAL MEDICINE

## 2020-10-27 PROCEDURE — 84132 ASSAY OF SERUM POTASSIUM: CPT | Performed by: INTERNAL MEDICINE

## 2020-10-27 RX ORDER — ALENDRONATE SODIUM 70 MG/1
70 TABLET ORAL
Status: DISCONTINUED | OUTPATIENT
Start: 2020-10-27 | End: 2020-10-27

## 2020-10-27 RX ORDER — POTASSIUM CHLORIDE 7.45 MG/ML
10 INJECTION INTRAVENOUS
Status: DISCONTINUED | OUTPATIENT
Start: 2020-10-27 | End: 2020-10-27 | Stop reason: CLARIF

## 2020-10-27 RX ORDER — POTASSIUM CHLORIDE 1.5 G/1.58G
20-40 POWDER, FOR SOLUTION ORAL
Status: DISCONTINUED | OUTPATIENT
Start: 2020-10-27 | End: 2020-10-27 | Stop reason: HOSPADM

## 2020-10-27 RX ORDER — POTASSIUM CL/LIDO/0.9 % NACL 10MEQ/0.1L
10 INTRAVENOUS SOLUTION, PIGGYBACK (ML) INTRAVENOUS
Status: DISCONTINUED | OUTPATIENT
Start: 2020-10-27 | End: 2020-10-27 | Stop reason: HOSPADM

## 2020-10-27 RX ORDER — POTASSIUM CHLORIDE 1500 MG/1
20-40 TABLET, EXTENDED RELEASE ORAL
Status: DISCONTINUED | OUTPATIENT
Start: 2020-10-27 | End: 2020-10-27 | Stop reason: HOSPADM

## 2020-10-27 RX ORDER — POTASSIUM CHLORIDE 1500 MG/1
20 TABLET, EXTENDED RELEASE ORAL DAILY
DISCHARGE
Start: 2020-10-27 | End: 2020-11-06

## 2020-10-27 RX ORDER — POTASSIUM CHLORIDE 29.8 MG/ML
20 INJECTION INTRAVENOUS
Status: DISCONTINUED | OUTPATIENT
Start: 2020-10-27 | End: 2020-10-27 | Stop reason: CLARIF

## 2020-10-27 RX ORDER — FUROSEMIDE 20 MG
20 TABLET ORAL
DISCHARGE
Start: 2020-10-27 | End: 2020-11-06

## 2020-10-27 RX ADMIN — METOPROLOL TARTRATE 12.5 MG: 25 TABLET, FILM COATED ORAL at 09:08

## 2020-10-27 RX ADMIN — DIGOXIN 125 MCG: 125 TABLET ORAL at 09:07

## 2020-10-27 RX ADMIN — DOCUSATE SODIUM 50 MG AND SENNOSIDES 8.6 MG 1 TABLET: 8.6; 5 TABLET, FILM COATED ORAL at 09:16

## 2020-10-27 RX ADMIN — OMEPRAZOLE 20 MG: 20 CAPSULE, DELAYED RELEASE ORAL at 06:45

## 2020-10-27 RX ADMIN — FUROSEMIDE 20 MG: 20 TABLET ORAL at 09:08

## 2020-10-27 RX ADMIN — POTASSIUM CHLORIDE 40 MEQ: 1500 TABLET, EXTENDED RELEASE ORAL at 09:53

## 2020-10-27 ASSESSMENT — ACTIVITIES OF DAILY LIVING (ADL)
ADLS_ACUITY_SCORE: 21
ADLS_ACUITY_SCORE: 17
ADLS_ACUITY_SCORE: 17
ADLS_ACUITY_SCORE: 21

## 2020-10-27 NOTE — PROGRESS NOTES
[facility]  Cardiology Progress Note  Date of Service: 10/27/2020  Primary Cardiologist: Dr. Galvan (last saw 6/2019)    Assessment & Plan    Elizabeth Ruggiero is a 89 year old female with past medical history significant for HF with recurrent admissions, permanent atrial fibrillation, DVT, moderate to severe MR with bileaflet prolapse with left atrial enlargement admitted on 10/23/2020 with generalized weakness.     Assessment:   1. Severe mitral valve regurgitation     secondary to myxomatous thickened leaflets and bileaflet prolapse.    2. Acute on chronic heart failure with preserved ejection fraction     in the setting of significant valvular heart disease and diastolic dysfunction     3. Moderate TR and pulmonary HTN     estimaited pulmonary artery systolic pressure 50 mmhg    4. Permanent atrial fibrillation     Anticoagulation with Coumadin and rate control with digoxin    5. Hypertension    Well controlled    6. Elevated troponin     (flat at 0.08.)    7. Elevated liver function tests     mild to moderate intra and extrahepatic biliary duct dilation -unclear if these are age-related normal findings GI to evaluate.  Congestive hepatopathy also on the differential    Plan:  1. Will have Dr. Mccoy to review patient's echocardiogram and weigh in on potential MitraClip.   2. Patient discharged to TCU and will obtain outpatient follow up.     SENIA BAIRES CNP  Pager:  [phone]   (7am - 5pm, M-F)    Physical Exam   Temp: 95.7  F (35.4  C) Temp src: Oral BP: 118/63 Pulse: 66   Resp: 18 SpO2: 94 % O2 Device: None (Room air)    Vitals:    10/24/20 0559 10/26/20 0647 10/27/20 0624   Weight: 62.7 kg (138 lb 3.2 oz) 60.4 kg (133 lb 1.6 oz) 59.4 kg (131 lb)     Exam deferred as patient was transferring to TCU.      Medications     sodium chloride 10 mL/hr at 10/25/20 1313     Warfarin Therapy Reminder         digoxin  125 mcg Oral Daily     furosemide  20 mg Oral BID     metoprolol  tartrate  12.5 mg Oral BID     omeprazole  20 mg Oral QAM AC     sodium chloride (PF)  3 mL Intracatheter Q8H       Data     Most Recent 3 CBC's:  Recent Labs   Lab Test 10/23/20  1429 10/09/20  0927 08/03/20  1230   WBC 8.8 9.8 10.5   HGB 13.9 13.5 13.9   MCV 92 94 98    175 228     Most Recent 3 BMP's:  Recent Labs   Lab Test 10/27/20  0748 10/26/20  0704 10/25/20  0555 10/24/20  0551   NA  --  134 133 129*   POTASSIUM 3.2* 3.5 3.6 4.2   CHLORIDE  --  99 100 97   CO2  --  29 27 24   BUN  --  23 22 21   CR  --  0.84 0.78 0.88   ANIONGAP  --  6 6 8   CHRIS  --  8.8 8.8 8.7   GLC  --  106* 114* 92     Most Recent 2 LFT's:  Recent Labs   Lab Test 10/25/20  0555 10/24/20  0551   AST 57* 68*   ALT 93* 110*   ALKPHOS 76 77   BILITOTAL 1.6* 1.8*     Most Recent 3 Troponin's:  Recent Labs   Lab Test 10/24/20  1412 10/24/20  0551 10/23/20  2234   TROPI 0.085* 0.089* 0.082*     Most Recent 3 BNP's:  Recent Labs   Lab Test 10/23/20  1429 07/26/20  0644 04/29/19  0815   NTBNPI 9,146* 5,964* 6,838*     Most Recent D-dimer:No lab results found.  Most Recent Cholesterol Panel:No lab results found.

## 2020-10-27 NOTE — DISCHARGE SUMMARY
Patient discharged at 12:30PM to [facility].  IV was discontinued. No pain at time of discharge. Belongings returned to patient.  Discharge instructions and medications reviewed with patient and daughter, Brittney.  Patient verbalized understanding and all questions were answered.  Prescriptions given to patient.  At time of discharge, patient condition was stable and left the unit via [facility] transport NA.

## 2020-10-27 NOTE — DISCHARGE SUMMARY
[facility]  Hospitalist Discharge Summary      Date of Admission:  10/23/2020  Date of Discharge:  10/27/2020  Discharging Provider: Audi Diez MD      Discharge Diagnoses   Acute on chronic HFpEF in the setting of valvular heart disease + diastolic dysfunction  Severe mitral valve regurgitation  Flat troponin elevation, not felt to be NSTEMI  Permanent atrial fibrillation  Elevated liver function  Generalized weakness  Hyponatremia    Follow-ups Needed After Discharge   Follow-up Appointments     Follow Up and recommended labs and tests      Follow up with [facility] physician.  The following labs/tests are   recommended: Basic metabolic panel and INR check in 3 days.  TCU to schedule follow up with cardiology clinic at [facility].             Unresulted Labs Ordered in the Past 30 Days of this Admission     No orders found from 9/23/2020 to 10/24/2020.          Discharge Disposition   Discharged to short-term care facility  Condition at discharge: Stable  Patient ready to discharge to a skilled nursing facility as soon as possible in order to create capacity for patients related to the COVID-19 pandemic.    Hospital Course   Elizabeth Ruggiero is a 89 year old female who was admitted on 10/23/2020. She presented with weakness and some shortness of breath.  Hospital course as follows by problem:     Acute on chronic HFpEF in the setting of valvular heart disease + diastolic dysfunction  Severe MVR  Flat troponin elevation, not felt NSTEMI:  Patient presented with generalized weakness and some shortness of breath.  N-terminal proBNP elevated to 9146.  Troponin noted to be flat at 0.8.  EKG showed atrial fibrillation.  Chest x-ray showed hazy opacity right lower lobe may represent atelectasis or pneumonia, enlarged heart with bilateral pleural effusions likely related to CHF.  Procalcitonin was less than 0.05, the symptoms felt to be related to CHF.  -TTE-LVEF greater than 70%, moderate  pulmonary hypertension, moderate-severe to severe mitral regurgitation, the mitral valve leaflets are severely thickened.  -Cardiology consulted, patient was diuresed with IV Lasix 20 mg twice daily in the hospital.  This is transition to Lasix 20 mg p.o. twice daily per cardiology recommendation.  - will continue Lasix 20 mg p.o. twice daily plus KCl 20 mEq daily  -Potassium will be replaced prior to discharge  -Recommend follow-up BMP in 3 days to ensure lites remained stable  - Per cards note, patient/family interested in mitral clip. Has significant mitral annular calcification and leaflet are quite thickened, unclear if she would be candidate.  recommend outpatient cardiology follow up after TCU.     Permanent atrial fibrillation  Patient on metoprolol 12.5 mg twice daily and digoxin 125 mcg daily on outpatient basis.  She is anticoagulated with warfarin.  -Continue with metoprolol and digoxin per prior to admission regimen.  -INR this morning 1.93.  Patient will resume prior to admission warfarin dosing  -INR check in 3 days.  Goal INR 2-3    Elevated liver function tests:  -  mild to moderate intra and extrahepatic biliary duct dilation -unclear if these are age-related normal findings.  Congestive hepatopathy also on the differential.  Appreciate GI consult.  Patient not interested in procedures.   Monitor for now.     Generalized weakness:  -  Felt likely mainly related to CHF.  Treat CHF and monitor/have therapy work with her.  - plan to discharge to TCU     Hyponatremia: resolved  -  Improved, likely multifactorial with CHF contributing.       Osteoporosis  -Receives alendronate weekly on Sundays.  Per discussion with pharmacist, this medication is not stocked in  the hospital pharmacy.  Patient could resume after discharge    Daughter, Brittney,updated by phone    Consultations This Hospital Stay   CARE MANAGEMENT / SOCIAL WORK IP CONSULT  PHYSICAL THERAPY ADULT IP CONSULT  GASTROENTEROLOGY IP  CONSULT  PHARMACY IP CONSULT  PHARMACY TO DOSE WARFARIN  GASTROENTEROLOGY IP CONSULT  CARDIOLOGY IP CONSULT  CARDIOLOGY IP CONSULT  PHYSICAL THERAPY ADULT IP CONSULT  OCCUPATIONAL THERAPY ADULT IP CONSULT    Code Status   Full Code    Time Spent on this Encounter   I, Audi Diez MD, personally saw the patient today and spent 35 minutes discharging this patient.       Audi Diez MD  [place] [id number] [facility]  [address]  [facility] [address]  Phone: [phone]  ______________________________________________________________________    Physical Exam   Vital Signs: Temp: 95.7  F (35.4  C) Temp src: Oral BP: 118/63 Pulse: 66   Resp: 18 SpO2: 94 % O2 Device: None (Room air)    Weight: 131 lbs 0 oz  General Appearance: Alert, awake and no apparent distress  Respiratory: clear to ausculation bilaterally, no wheezing  Cardiovascular: irregularly irregular, +murmur  GI: soft and non-tender  Skin: warm and dry, chronic skin discoloration in left shin          Primary Care Physician   Gelacio Downs    Discharge Orders      General info for SNF    Length of Stay Estimate: Short Term Care: Estimated # of Days <30  Condition at Discharge: Stable  Level of care:skilled   Rehabilitation Potential: Good  Admission H&P remains valid and up-to-date: Yes  Recent Chemotherapy: N/A  Use Nursing Home Standing Orders: Yes     Mantoux instructions    Give two-step Mantoux (PPD) Per Facility Policy Yes     Activity - Up with assistive device     Activity - Up with nursing assistance     Follow Up and recommended labs and tests    Follow up with long term physician.  The following labs/tests are recommended: Basic metabolic panel and INR check in 3 days.  TCU to schedule follow up with cardiology clinic at [facility].     Full Code     Physical Therapy Adult Consult    Evaluate and treat as clinically indicated.    Reason:  Physical deconditioning     Occupational Therapy Adult Consult     Evaluate and treat as clinically indicated.    Reason:  Physical deconditioning     Advance Diet as Tolerated    Follow this diet upon discharge: Low salt diet       Significant Results and Procedures   Most Recent 3 CBC's:  Recent Labs   Lab Test 10/23/20  1429 10/09/20  0927 08/03/20  1230   WBC 8.8 9.8 10.5   HGB 13.9 13.5 13.9   MCV 92 94 98    175 228     Most Recent 3 BMP's:  Recent Labs   Lab Test 10/27/20  0748 10/26/20  0704 10/25/20  0555 10/24/20  0551   NA  --  134 133 129*   POTASSIUM 3.2* 3.5 3.6 4.2   CHLORIDE  --  99 100 97   CO2  --  29 27 24   BUN  --  23 22 21   CR  --  0.84 0.78 0.88   ANIONGAP  --  6 6 8   CHRIS  --  8.8 8.8 8.7   GLC  --  106* 114* 92     Most Recent 2 LFT's:  Recent Labs   Lab Test 10/25/20  0555 10/24/20  0551   AST 57* 68*   ALT 93* 110*   ALKPHOS 76 77   BILITOTAL 1.6* 1.8*     Most Recent INR's and Anticoagulation Dosing History:  Anticoagulation Dose History     Recent Dosing and Labs Latest Ref Rng & Units 10/9/2020 10/10/2020 10/11/2020 10/23/2020 10/24/2020 10/25/2020 10/26/2020    Warfarin 1 mg - 1 mg - - - - - -    Warfarin 1.25 mg - - 1.25 mg - - - - -    Warfarin 2 mg - - - - - - 2 mg -    Warfarin 2.5 mg - - - - - - - 2.5 mg    INR 0.86 - 1.14 3.12(H) 2.54(H) 2.26(H) 3.49(H) 3.44(H) 2.83(H) 2.07(H)      ,   Results for orders placed or performed during the hospital encounter of 10/23/20   Chest XR,  PA & LAT    Narrative    CHEST TWO VIEWS 10/23/2020 4:37 PM     HISTORY: Generalized weakness.    COMPARISON: 4/29/2019    FINDINGS: The heart is enlarged. There are bilateral pleural  effusions, right greater than left. Hazy airspace opacity present in  the right lower lobe. No pneumothorax.       Impression    IMPRESSION: Hazy opacity right lower lobe may represent atelectasis or  pneumonia. Enlarged heart with bilateral pleural effusions likely  related to CHF.     ALEJANDRA CASTRO MD   Echocardiogram Complete    Narrative     [id number]  [id number]  [id number]  [id number]^HERBERT^KRISHNA^DANG           [facility]  Echocardiography Laboratory  [address] [facility] [place] [address]        Name: CHOLO TA  MRN: [id number]  : 1926  Study Date: 10/24/2020 03:32 PM  Age: 89 yrs  Gender: Female  Patient Location: [facility]  Reason For Study: CHF  Ordering Physician: KRISHNA GODINEZ  Performed By: Debbi Jensen     Weight: 138 lb  HR: 70  BP: 111/73 mmHg  _____________________________________________________________________________  __     Procedure  Complete Portable Echo Adult.     _____________________________________________________________________________  __        Interpretation Summary     There is mod-severe to severe (3-4+) mitral regurgitation.  The mitral regurgitant jet is eccentrically directed.  The mitral valve leaflets are severely thickened.  Hyperdynamic left ventricular function  The visual ejection fraction is estimated at >70%.  The left ventricle is normal in size.  Right ventricular systolic pressure is elevated, consistent with moderate  pulmonary hypertension.  Mild valvular aortic stenosis.  The rhythm was atrial fibrillation.  The right ventricle is normal in structure, function and size.  There is severe biatrial enlargement.     No signficant change since 2019  _____________________________________________________________________________  __        Left Ventricle  The left ventricle is normal in size. Hyperdynamic left ventricular function.  The visual ejection fraction is estimated at >70%. Left ventricular diastolic  function is indeterminate.     Right Ventricle  The right ventricle is normal in structure, function and size. There is no  mass or thrombus in the right ventricle.     Atria  There is severe biatrial enlargement. There is no atrial shunt seen. The left  atrial appendage is not well visualized.     Mitral Valve  The mitral valve leaflets are severely thickened. There is severe  mitral  annular calcification. Moderate mitral valve prolapse, posterior leaflet.  There is mod-severe to severe (3-4+) mitral regurgitation. The mitral  regurgitant jet is eccentrically directed.     Tricuspid Valve  Normal tricuspid valve. The right ventricular systolic pressure is elevated at  49.8 mmHg. Right ventricular systolic pressure is elevated, consistent with  moderate pulmonary hypertension. There is no tricuspid stenosis.        Aortic Valve  There is moderate trileaflet aortic sclerosis. Mild valvular aortic stenosis.  The mean AoV pressure gradient is 12mmHg.     Pulmonic Valve  Normal pulmonic valve. There is no pulmonic valvular regurgitation. There is  no pulmonic valvular stenosis.     Vessels  The aortic root is normal size. Normal size ascending aorta. The inferior vena  cava is normal. The pulmonary artery is normal size.     Pericardium  The pericardium appears normal. There is no pleural effusion.     Rhythm  The rhythm was atrial fibrillation.     _____________________________________________________________________________  __  MMode/2D Measurements & Calculations  IVSd: 1.4 cm  LVIDd: 3.7 cm  LVIDs: 2.3 cm  LVPWd: 1.3 cm  FS: 37.6 %  LV mass(C)d: 181.0 grams  Ao root diam: 3.4 cm     asc Aorta Diam: 3.5 cm  LVOT diam: 2.2 cm  LVOT area: 3.6 cm2  LA Volume (BP): 94.3 ml  RWT: 0.71        Doppler Measurements & Calculations  MV E max bry: 122.0 cm/sec  MV A max bry: 36.1 cm/sec  MV E/A: 3.4  MV dec slope: 681.7 cm/sec2  MV dec time: 0.18 sec  Ao V2 max: 344.1 cm/sec  Ao max P.0 mmHg  Ao V2 mean: 240.2 cm/sec  Ao mean P.2 mmHg  Ao V2 VTI: 79.4 cm  BRITTNI(I,D): 0.63 cm2  BRITTNI(V,D): 0.90 cm2  LV V1 max P.9 mmHg  LV V1 max: 84.5 cm/sec  LV V1 VTI: 13.8 cm  SV(LVOT): 50.3 ml  TR max bry: 352.2 cm/sec  TR max P.8 mmHg  AV Bry Ratio (DI): 0.25  E/E' av.0  Lateral E/e': 21.1  Medial E/e': 42.8               _____________________________________________________________________________  __           Report approved by: Dr. Kevin Manjarrez 10/24/2020 04:43 PM            Discharge Medications   Current Discharge Medication List      START taking these medications    Details   potassium chloride ER (K-TAB) 20 MEQ CR tablet Take 1 tablet (20 mEq) by mouth daily    Associated Diagnoses: Diastolic dysfunction with chronic heart failure (H)         CONTINUE these medications which have CHANGED    Details   furosemide (LASIX) 20 MG tablet Take 1 tablet (20 mg) by mouth 2 times daily  Qty:      Associated Diagnoses: Diastolic dysfunction with chronic heart failure (H)         CONTINUE these medications which have NOT CHANGED    Details   acetaminophen (TYLENOL) 325 MG tablet Take 2 tablets (650 mg) by mouth 3 times daily    Associated Diagnoses: Intractable low back pain      alendronate (FOSAMAX) 70 MG tablet Take 70 mg by mouth every 7 days SUNDAYS at 7:30am      alum & mag hydroxide-simethicone (MAALOX) 200-200-20 MG/5ML SUSP suspension Take 30 mLs by mouth 4 times daily as needed for indigestion      calcium carbonate-vitamin D (OSCAL W/D) 500-200 MG-UNIT tablet Take 1 tablet by mouth daily       digoxin (LANOXIN) 125 MCG tablet Take 1 tablet (125 mcg) by mouth daily  Qty: 30 tablet, Refills: 11    Associated Diagnoses: Atrial fibrillation, unspecified type (H)      melatonin 3 MG tablet Take 1 tablet (3 mg) by mouth At Bedtime  Qty: 30 tablet, Refills: 0    Associated Diagnoses: Insomnia, unspecified type      metoprolol tartrate (LOPRESSOR) 12.5 mg TABS half-tab Take 12.5 mg by mouth 2 times daily      Multiple Vitamins-Minerals (PRESERVISION AREDS 2+MULTI VIT) CAPS Take 1 tablet by mouth 2 times daily      multivitamin w/minerals (MULTI-VITAMIN) tablet Take 1 tablet by mouth daily       omeprazole (PRILOSEC) 20 MG DR capsule Take 20 mg by mouth daily before breakfast      senna (SENOKOT) 8.6 MG tablet Take 1 tablet by  mouth 2 times daily as needed for constipation      VITAMIN D, CHOLECALCIFEROL, PO Take 1,000 Units by mouth daily       !! warfarin ANTICOAGULANT (COUMADIN) 2.5 MG tablet Take 2.5 mg by mouth See Admin Instructions Tuesday, Friday, Sunday evenings      !! warfarin ANTICOAGULANT (COUMADIN) 2.5 MG tablet Take 1.25 mg by mouth See Admin Instructions Monday, Wednesday, Thursday, Saturday evenings       !! - Potential duplicate medications found. Please discuss with provider.        Allergies   No Known Allergies

## 2020-10-27 NOTE — PLAN OF CARE
A&Ox4. VSS. Denies pain. Tolerating regular diet. Up SBA to BSC. Purwick in place; good UOP. Mepilex in to coccyx. Back abrasions covered with mepilex; CDI. BLE edema +1. Tele: A-fib . Plan for RAHEL/Mitral clip procedure OP & discharge tomorrow to [facility].

## 2020-10-27 NOTE — PROGRESS NOTES
Care Management Discharge Note    Discharge Planning:  Expected Discharge Date: 10/27/20     Concerns to be Addressed:    Discharge Planning     Anticipated Discharge Disposition:  [facility]  Anticipated Discharge Services:    Anticipated Discharge DME:      Patient/family educated on Medicare website which has current facility and service quality ratings:    Referrals Placed by CM/SW:    Education Provided on the Discharge Plan:  Yes  Patient/Family in Agreement with the Plan:  Yes     Disposition Comments:      Selected Continued Care - Admitted Since 10/23/2020     Destination Coordination complete    Service Provider Selected Services Address Phone Fax Patient Preferred Last Updated    [facility] - Referral Only (SNF)  Skilled Nursing [address] [place] [address] [phone] -- Sher Anne LSW 10/27/2020 1013            Selected Continued Care - Prior Encounters Includes selections from prior encounters from 7/25/2020 to 10/27/2020    Discharged on 7/28/2020 Admission date: 7/25/2020 - Discharge disposition: Skilled Nursing Facility    Destination     Service Provider Selected Services Address Phone Fax Patient Preferred Last Updated    [facility] - Referral Only (SNF)  Skilled Nursing [address] [place] [address] [phone] -- Aaliyah Almeida LSW 7/28/2020 1408                      Additional Information:  Patient has been accepted by [facility] and insurance authorization has been received.  Received discharge orders and faxed to facility.  [facility] aide will provide transport for patient via [facility] at 12:30 today.  Updated patient's daughter Brittney and she is in agreement with discharge.      JONEL Russ

## 2020-10-27 NOTE — PLAN OF CARE
A&O. VSS. Denies pain. Purewick positional, incontinent at times. Dressings to coccyx and back CDI. Tele Afib CVR. Discharge to [facility] this afternoon.

## 2020-10-28 ENCOUNTER — NURSING HOME VISIT (OUTPATIENT)
Dept: GERIATRICS | Facility: CLINIC | Age: 85
End: 2020-10-28
Payer: COMMERCIAL

## 2020-10-28 ENCOUNTER — CARE COORDINATION (OUTPATIENT)
Dept: CARDIOLOGY | Facility: CLINIC | Age: 85
End: 2020-10-28

## 2020-10-28 VITALS
HEART RATE: 68 BPM | WEIGHT: 133.2 LBS | SYSTOLIC BLOOD PRESSURE: 126 MMHG | OXYGEN SATURATION: 94 % | TEMPERATURE: 98.6 F | DIASTOLIC BLOOD PRESSURE: 86 MMHG | HEIGHT: 66 IN | BODY MASS INDEX: 21.41 KG/M2 | RESPIRATION RATE: 16 BRPM

## 2020-10-28 DIAGNOSIS — J90 PLEURAL EFFUSION: ICD-10-CM

## 2020-10-28 DIAGNOSIS — M62.81 GENERALIZED MUSCLE WEAKNESS: ICD-10-CM

## 2020-10-28 DIAGNOSIS — E87.1 HYPONATREMIA: ICD-10-CM

## 2020-10-28 DIAGNOSIS — I50.33 ACUTE ON CHRONIC HEART FAILURE WITH PRESERVED EJECTION FRACTION (H): Primary | ICD-10-CM

## 2020-10-28 DIAGNOSIS — M81.0 AGE-RELATED OSTEOPOROSIS WITHOUT CURRENT PATHOLOGICAL FRACTURE: ICD-10-CM

## 2020-10-28 DIAGNOSIS — I48.21 PERMANENT ATRIAL FIBRILLATION (H): ICD-10-CM

## 2020-10-28 DIAGNOSIS — I27.20 PULMONARY HYPERTENSION (H): ICD-10-CM

## 2020-10-28 DIAGNOSIS — R79.89 ELEVATED LIVER FUNCTION TESTS: ICD-10-CM

## 2020-10-28 DIAGNOSIS — I34.0 SEVERE MITRAL VALVE REGURGITATION: ICD-10-CM

## 2020-10-28 PROCEDURE — 99309 SBSQ NF CARE MODERATE MDM 30: CPT | Performed by: NURSE PRACTITIONER

## 2020-10-28 ASSESSMENT — MIFFLIN-ST. JEOR: SCORE: 1020.94

## 2020-10-28 NOTE — PLAN OF CARE
Physical Therapy Discharge Summary    Reason for therapy discharge:    Discharged to transitional care facility.    Progress towards therapy goal(s). See goals on Care Plan in [facility] electronic health record for goal details.  Goals not met.  Barriers to achieving goals:   discharge from facility.    Therapy recommendation(s):    Continued therapy is recommended.  Rationale/Recommendations:  Pt will continue PT at TCU to maximize functional mobility .

## 2020-10-28 NOTE — PROGRESS NOTES
[facility]  PRIMARY CARE PROVIDER AND CLINIC:  Gelacio Downs MD, [facility] [address] / [place] [address]  Chief Complaint   Patient presents with     Hospital F/U     [facility] Record Number:  [id number]  Place of Service where encounter took place:  [facility] [[id number]]    Elizabeth Ruggiero  is a 89 year old  (6/19/1926), admitted to the above facility from  [facility]. Hospital stay 10/23/20 through 10/27/20..  Admitted to this facility for  rehab, medical management and nursing care.    HPI:    HPI information obtained from: facility chart records, facility staff, patient report and [facility] chart review.   Brief Summary of Hospital Course:   89 year old female hospitalized with weakness and some shortness of breath. Noted to have acute on chronic HFpEF, severe MVR: troponin flat, EKG with a fib. Chest x-ray showed hazy opacity right lower lobe may represent atelectasis or pneumonia, enlarged heart with bilateral pleural effusions likely related to CHF.  Procalcitonin was less than 0.05, the symptoms felt to be related to CHF. TTE-LVEF greater than 70%, moderate pulmonary hypertension, moderate-severe to severe mitral regurgitation, the mitral valve leaflets are severely thickened.  Saw cardiology: treated with IV lasix, now on PO 20 mg twice daily as well as KCL. Needs outpatient cardiology f/u after TCU to discuss interest in mitral clip. A fib managed with metoprolol and digoxin, as well as Coumadin. Had elevated LFTs:  mild to moderate intra and extrahepatic biliary duct dilation -unclear if these are age-related normal findings.  Congestive hepatopathy also on the differential. GI consulted: monitor. Hyponatremia improved. On alendronate weekly: OK to resume after discharge. To TCU due to weakness.     Updates on Status Since Skilled nursing Admission:   Patient seen for initial TCU visit. Reports no new concerns. No  headaches, chest pain, dyspnea, bowel or bladder issues. Per EMR review note BP range 103-126/72-86 and sats 94% on room air. ACP documents in chart reviewed: Full Code per POLST. PTA lives at [facility].     CODE STATUS/ADVANCE DIRECTIVES DISCUSSION:   CPR/Full code   Patient's living condition: lives in an assisted living facility  ALLERGIES: Patient has no known allergies.  PAST MEDICAL HISTORY:  has a past medical history of (HFpEF) heart failure with preserved ejection fraction (H), Acute diastolic CHF (congestive heart failure) (H), Acute left hemiparesis (H) (2/9/2019), Arthritis, Atrial fibrillation (H), Atrial fibrillation with RVR (H), Atrial flutter (H), Atrial flutter with rapid ventricular response (H) (4/23/2019), Community acquired pneumonia, Diastolic CHF (H) (04/29/2019), DVT of lower extremity (deep venous thrombosis) (H), Hepatic congestion, HTN (hypertension), Hyperlipidemia, Mitral valve insufficiency, Pneumonia, Postmenopausal bleeding (01/2020), Severe mitral regurgitation, SOB (shortness of breath), TIA (transient ischemic attack), and Vitamin D deficiency.  PAST SURGICAL HISTORY:   has a past surgical history that includes repair left femur; appendectomy; and fracture surgery.  FAMILY HISTORY: family history includes Breast Cancer in her sister; Colon Cancer in her mother; Diabetes in her sister.  SOCIAL HISTORY:   reports that she has never smoked. She has never used smokeless tobacco. She reports that she does not drink alcohol or use drugs.    Post Discharge Medication Reconciliation Status: discharge medications reconciled and changed, per note/orders  Current Outpatient Medications   Medication Sig Dispense Refill     acetaminophen (TYLENOL) 325 MG tablet Take 2 tablets (650 mg) by mouth 3 times daily       alendronate (FOSAMAX) 70 MG tablet Take 70 mg by mouth every 7 days SUNDAYS at 7:30am       alum & mag hydroxide-simethicone (MAALOX) 200-200-20 MG/5ML SUSP suspension Take 30 mLs by  "mouth 4 times daily as needed for indigestion       calcium carbonate-vitamin D (OSCAL W/D) 500-200 MG-UNIT tablet Take 1 tablet by mouth daily        digoxin (LANOXIN) 125 MCG tablet Take 1 tablet (125 mcg) by mouth daily 30 tablet 11     furosemide (LASIX) 20 MG tablet Take 1 tablet (20 mg) by mouth 2 times daily       melatonin 3 MG tablet Take 1 tablet (3 mg) by mouth At Bedtime 30 tablet 0     metoprolol tartrate (LOPRESSOR) 12.5 mg TABS half-tab Take 12.5 mg by mouth 2 times daily       Multiple Vitamins-Minerals (PRESERVISION AREDS 2+MULTI VIT) CAPS Take 1 tablet by mouth 2 times daily       multivitamin w/minerals (MULTI-VITAMIN) tablet Take 1 tablet by mouth daily        omeprazole (PRILOSEC) 20 MG DR capsule Take 20 mg by mouth daily before breakfast       potassium chloride ER (K-TAB) 20 MEQ CR tablet Take 1 tablet (20 mEq) by mouth daily       senna (SENOKOT) 8.6 MG tablet Take 1 tablet by mouth 2 times daily as needed for constipation       VITAMIN D, CHOLECALCIFEROL, PO Take 1,000 Units by mouth daily        warfarin ANTICOAGULANT (COUMADIN) 2.5 MG tablet Take 2.5 mg by mouth See Admin Instructions Tuesday, Friday, Sunday evenings       warfarin ANTICOAGULANT (COUMADIN) 2.5 MG tablet Take 1.25 mg by mouth See Admin Instructions Monday, Wednesday, Thursday, Saturday evenings         ROS:  4 point ROS including Respiratory, CV, GI and , other than that noted in the HPI,  is negative    Vitals:  /86   Pulse 68   Temp 98.6  F (37  C)   Resp 16   Ht 1.676 m (5' 6\")   Wt 60.4 kg (133 lb 3.2 oz)   SpO2 94%   BMI 21.50 kg/m    Exam:  Exam is limited due to COVID-19 precautions  GENERAL APPEARANCE:  Sleepy, in no distress, cooperative  ENT:  Mouth normal, moist mucous membranes, [place]  EYES:  Conjunctiva and lids normal  RESP:  no respiratory distress, on room air and LSC  CV: HRR, no edema  NEURO:   No facial asymmetry, speech clear  PSYCH:  oriented X self and place, affect and mood normal "     Lab/Diagnostic data:  Most Recent 3 CBC's:  Recent Labs   Lab Test 10/23/20  1429 10/09/20  0927 08/03/20  1230   WBC 8.8 9.8 10.5   HGB 13.9 13.5 13.9   MCV 92 94 98    175 228     Most Recent 3 BMP's:  Recent Labs   Lab Test 10/27/20  0748 10/26/20  0704 10/25/20  0555 10/24/20  0551   NA  --  134 133 129*   POTASSIUM 3.2* 3.5 3.6 4.2   CHLORIDE  --  99 100 97   CO2  --  29 27 24   BUN  --  23 22 21   CR  --  0.84 0.78 0.88   ANIONGAP  --  6 6 8   HCRIS  --  8.8 8.8 8.7   GLC  --  106* 114* 92     Most Recent 2 LFT's:  Recent Labs   Lab Test 10/25/20  0555 10/24/20  0551   AST 57* 68*   ALT 93* 110*   ALKPHOS 76 77   BILITOTAL 1.6* 1.8*     Most Recent TSH and T4:  Recent Labs   Lab Test 10/09/20  0927 07/26/20  0644   TSH 2.22 6.24*   T4  --  1.11       ASSESSMENT/PLAN:  Acute on chronic heart failure with preserved ejection fraction (H)  Severe mitral valve regurgitation  Pleural effusion  Pulmonary hypertension (H)  Permanent atrial fibrillation (H)  Acute on chronic issues. Continue digoxin, lasix, metoprolol and kcl as well as Coumadin as ordered. Cardiology f/u as recommended. F/U with VS, wt next visit and INR along with CBC and CMP on 10/30.     Elevated liver function tests  Noted at hospital - CMP on 10/30 and review results.     Hyponatremia  Resolved last check - BMP on 10/30.     Age-related osteoporosis without current pathological fracture  Chronic, continue weekly fosamax and daily calcium with vit d.     Generalized muscle weakness  Acute - therapies, f/u with progress next visit.      Orders written by provider at facility  1. Full Code  2. INR on 10/30 diagnosis a fib  3. CBC, CMP on 10/30 diagnosis HF    Electronically signed by:  SENIA Mims CNP

## 2020-10-28 NOTE — PROGRESS NOTES
Dr. Galvan requested Dr. Mccoy review patients echocardiogram that was done when she was in hospital on 10/24/20.  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  She does have significant mitral annular calcification.  Dr. Mccoy is concerned about the amount of MAC.  Will present her case to our conference tomorrow.  This information was given to patient's daughter, Brittney.

## 2020-10-29 NOTE — PROGRESS NOTES
[facility]  INITIAL VISIT NOTE  October 30, 2020    PRIMARY CARE PROVIDER AND CLINIC:  [facility] [address] / [place] [address]    CHIEF COMPLAINT:  Hospital follow-up/Initial visit    HPI:    Elizabeth Ruggiero is a 89 year old  (6/19/1926) female who was seen at [facility] on October 30, 2020 for an initial visit.     Medical history is notable for permanent atrial fibrillation, chronic heart failure with preserved ejection fraction, mild aortic stenosis, moderately severe to severe mitral regurgitation, low back pain, TIA, hypertension, dyslipidemia, osteoporosis, and pneumonia.    Summary of hospital course:  Patient was hospitalized at [facility] from October 23 through October 27, 2020 after she presented with weakness and some shortness of breath.  CBC was unremarkable.  BMP showed low sodium of 127. BNP was elevated at 9146.  Troponin I levels were 0.072, 0.082, and 0.089 with no trajectory. UA was unremarkable.  Test for COVID-19 was negative.  Digoxin level was 1.3.  EKG showed atrial fibrillation with a heart rate of 66 bpm.  Chest x-ray was significant for hazy opacity in the right lower lobe, enlarged heart, and bilateral pleural effusions, likely related to CHF.  Procalcitonin level was less than 0.05.  Echocardiogram showed LVEF greater than 70%, moderate pulmonary hypertension, mild aortic stenosis, and 3-4+ mitral regurgitation.  Cardiology was consulted and the patient was treated with IV furosemide 20 mg twice daily for acute CHF decompensation.  It eventually transitioned back to furosemide 20 mg p.o. twice daily. Hyponatremia improved with diuretic therapy.    Notably, chemistry profile revealed mildly elevated transaminases with ALT of 123 and AST of 77.  Recent abdominal ultrasound on October 9, 2020 had revealed minimal extrahepatic bile duct enlargement at 7 mm, probably related to age.  GI was counseled and recommended  further work-up including ERCP/EUS but patient was not interested.  Of note, congestive hepatopathy was in the differential.    Patient is admitted to this facility for medical management, nursing care, and rehab.     Today's visit:  Patient was seen in her room, while sitting on the edge of the bed.  She is frail appearing but comfortable.  She states that her leg weakness is improving.  She reports no fever, chills, chest pain, dyspnea, nausea, vomiting, abdominal pain, or urinary symptoms.  At times she feels skipped beats in her chest.  She does complain of constipation but had a bowel movement 2 days ago.  Today's INR is 1.8.    CODE STATUS:   CPR/Full code     PAST MEDICAL HISTORY:   Chronic heart failure with preserved ejection fraction; LVEF more than 70% on October 24, 2020  Permanent atrial fibrillation, on warfarin  Recurrent DVT, on warfarin  Atrial flutter  Hypertension  Dyslipidemia  3-4+ MR  Mild aortic stenosis  Moderate pulmonary hypertension  TIA  Low back pain  Lumbar spine degenerative disc disease  PMR  Pneumonia  Osteoporosis  T11 and L1 compression fractures  Vitamin D deficiency    Past Medical History:   Diagnosis Date     (HFpEF) heart failure with preserved ejection fraction (H)      Acute diastolic CHF (congestive heart failure) (H)      Acute left hemiparesis (H) 2/9/2019     Arthritis      Atrial fibrillation (H)      Atrial fibrillation with RVR (H)      Atrial flutter (H)      Atrial flutter with rapid ventricular response (H) 4/23/2019     Community acquired pneumonia      Diastolic CHF (H) 04/29/2019     DVT of lower extremity (deep venous thrombosis) (H)     recurrent     Hepatic congestion     improved with diuretics for CHF     HTN (hypertension)      Hyperlipidemia      Mitral valve insufficiency      Pneumonia      Postmenopausal bleeding 01/2020    [facility]     Severe mitral regurgitation      SOB (shortness of breath)      TIA (transient ischemic  attack)      Vitamin D deficiency        PAST SURGICAL HISTORY:   Past Surgical History:   Procedure Laterality Date     APPENDECTOMY       FRACTURE SURGERY       repair left femur         FAMILY HISTORY:   Family History   Problem Relation Age of Onset     Colon Cancer Mother      Breast Cancer Sister      Diabetes Sister        SOCIAL HISTORY:  Patient is  and lives at the [facility] apartment independently. She does use a walker occasionally for ambulation.    Social History     Tobacco Use     Smoking status: Never Smoker     Smokeless tobacco: Never Used   Substance Use Topics     Alcohol use: No       MEDICATIONS:  Current Outpatient Medications   Medication Sig Dispense Refill     acetaminophen (TYLENOL) 325 MG tablet Take 2 tablets (650 mg) by mouth 3 times daily       alendronate (FOSAMAX) 70 MG tablet Take 70 mg by mouth every 7 days SUNDAYS at 7:30am       alum & mag hydroxide-simethicone (MAALOX) 200-200-20 MG/5ML SUSP suspension Take 30 mLs by mouth 4 times daily as needed for indigestion       calcium carbonate-vitamin D (OSCAL W/D) 500-200 MG-UNIT tablet Take 1 tablet by mouth daily        digoxin (LANOXIN) 125 MCG tablet Take 1 tablet (125 mcg) by mouth daily 30 tablet 11     furosemide (LASIX) 20 MG tablet Take 1 tablet (20 mg) by mouth 2 times daily       melatonin 3 MG tablet Take 1 tablet (3 mg) by mouth At Bedtime 30 tablet 0     metoprolol tartrate (LOPRESSOR) 12.5 mg TABS half-tab Take 12.5 mg by mouth 2 times daily       Multiple Vitamins-Minerals (PRESERVISION AREDS 2+MULTI VIT) CAPS Take 1 tablet by mouth 2 times daily       multivitamin w/minerals (MULTI-VITAMIN) tablet Take 1 tablet by mouth daily        omeprazole (PRILOSEC) 20 MG DR capsule Take 20 mg by mouth daily before breakfast       potassium chloride ER (K-TAB) 20 MEQ CR tablet Take 1 tablet (20 mEq) by mouth daily       senna (SENOKOT) 8.6 MG tablet Take 1 tablet by mouth 2 times daily as needed for constipation        VITAMIN D, CHOLECALCIFEROL, PO Take 1,000 Units by mouth daily        warfarin ANTICOAGULANT (COUMADIN) 2.5 MG tablet Take 2.5 mg by mouth See Admin Instructions Tuesday, Friday, Sunday evenings       warfarin ANTICOAGULANT (COUMADIN) 2.5 MG tablet Take 1.25 mg by mouth See Admin Instructions Monday, Wednesday, Thursday, Saturday evenings         ALLERGIES:  No Known Allergies    ROS:  10 point ROS neg other than the symptoms noted above in the HPI.    PHYSICAL EXAM:  Vital signs were reviewed in the chart.  Blood pressure 118/77, heart rate 68, respiratory rate 16, temperature 97.5  F, oxygen saturation 98%, weight 131 LBS  Gen: Weak and frail appearing but in no acute distress  HEENT: Normocephalic; oropharynx clear  Card: Normal S1, S2, irregularly irregular rhythm; 3+ systolic murmur  Resp: Lungs clear to auscultation bilaterally  GI: Abdomen soft, non-tender, non-distended, +BS  Ext: 2+ bilateral LE edema  Neuro: CX II-XII grossly intact; ROM in all four extremities grossly intact  Psych: Alert and oriented x3; normal affect  Skin: No acute rash    LABORATORY/IMAGING DATA:    Most Recent 3 CBC's:  Recent Labs   Lab Test 10/23/20  1429 10/09/20  0927 08/03/20  1230   WBC 8.8 9.8 10.5   HGB 13.9 13.5 13.9   MCV 92 94 98    175 228     Most Recent 3 BMP's:  Recent Labs   Lab Test 10/27/20  0748 10/26/20  0704 10/25/20  0555 10/24/20  0551   NA  --  134 133 129*   POTASSIUM 3.2* 3.5 3.6 4.2   CHLORIDE  --  99 100 97   CO2  --  29 27 24   BUN  --  23 22 21   CR  --  0.84 0.78 0.88   ANIONGAP  --  6 6 8   CHRIS  --  8.8 8.8 8.7   GLC  --  106* 114* 92     Most Recent 2 LFT's:  Recent Labs   Lab Test 10/25/20  0555 10/24/20  0551   AST 57* 68*   ALT 93* 110*   ALKPHOS 76 77   BILITOTAL 1.6* 1.8*     Most Recent 3 INR's:  Recent Labs   Lab Test 10/27/20  0748 10/26/20  0704 10/25/20  0555   INR 1.93* 2.07* 2.83*     Most Recent 3 BNP's:  Recent Labs   Lab Test 10/23/20  1429 07/26/20  0644 04/29/19  0815    NTBNPI 9,146* 5,964* 6,838*     Most Recent Cholesterol Panel:No lab results found.  Most Recent TSH and T4:  Recent Labs   Lab Test 10/09/20  0927 07/26/20  0644   TSH 2.22 6.24*   T4  --  1.11     Most Recent Hemoglobin A1c:No lab results found.    ASSESSMENT/PLAN:  Acute on chronic heart failure with preserved ejection fraction, LVEF more than 70%,  Moderately severe to severe mitral regurgitation,  Mild aortic stenosis,  Hypertension.  Patient was diuresed with IV furosemide and discharged on furosemide 20 mg p.o. twice daily.    She currently weighs at  131 LBS.  Her blood pressure at times is running soft.  She is overall stable today.  Plan:  Continue metoprolol 12.5 mg p.o. twice daily and furosemide 20 mg p.o. daily  Continue lower extremity wraps  Monitor weight, volume status, and renal function  Monitor blood pressure  Follow-up with cardiology as instructed     Hyponatremia.  Due to acute CHF.  Normalized with diuretic therapy.  Plan  Monitor sodium level periodically     Permanent atrial fibrillation,  History of recurrent DVT.  Heart rate is controlled.  Today's INR is 1.8.  Plan:  Continue metoprolol 12.5 mg p.o. twice daily and digoxin 125 mcg p.o. daily  Continue anticoagulation therapy with warfarin as ordered   Monitor INR for target range of 2-3  Monitor heart rate    Abnormal LFT's.  Likely due to congestive hepatopathy.  It is improving.  Patient is not interested in further work-up.  Plan:  Monitor LFT periodically     Physical deconditioning,  Generalized muscle weakness.  Plan:  Continue PT/OT evaluation and therapy         Orders written by provider at facility:  Warfarin 2.5 mg p.o. every evening on Sundays, Tuesdays, Fridays, and 1.25 mg p.o. every evening on Mondays, Wednesdays, Thursdays, and Saturdays.      Addendum:  Labs from today were reviewed.  CMP shows sodium of 135, potassium of 3.9, creatinine of 0.89, calcium of 9.1, glucose of 155, ALT of 63, AST of 44, and total  bilirubin of 1.6.  CBC is unremarkable with white count of 6.1, hemoglobin of 13.6, and platelet count of 196,000.    Electronically signed by:  Brenda Núñez MD

## 2020-10-29 NOTE — PROGRESS NOTES
Patient was presented today at our structural heart conference regarding her mitral regurg.  Dr. Mccoy, Dr. Simmons and Dr. Morfin all agreed posterior leaflet is heavily calcified and could be a difficult mitraclip procedure.    RAHEL is needed to get a better idea of posterior leaflet to see if amendable to mitraclip.  I called and explained this to her daugher, Brittney.  She is going to explain this to her sisters and her mom.  She will get back to me with their decision.

## 2020-10-30 ENCOUNTER — HOSPITAL LABORATORY (OUTPATIENT)
Dept: OTHER | Facility: CLINIC | Age: 85
End: 2020-10-30

## 2020-10-30 ENCOUNTER — NURSING HOME VISIT (OUTPATIENT)
Dept: GERIATRICS | Facility: CLINIC | Age: 85
End: 2020-10-30
Payer: COMMERCIAL

## 2020-10-30 VITALS
TEMPERATURE: 97.3 F | WEIGHT: 131 LBS | DIASTOLIC BLOOD PRESSURE: 77 MMHG | HEART RATE: 68 BPM | OXYGEN SATURATION: 94 % | SYSTOLIC BLOOD PRESSURE: 118 MMHG | BODY MASS INDEX: 21.05 KG/M2 | RESPIRATION RATE: 16 BRPM | HEIGHT: 66 IN

## 2020-10-30 DIAGNOSIS — I48.21 PERMANENT ATRIAL FIBRILLATION (H): ICD-10-CM

## 2020-10-30 DIAGNOSIS — E87.1 HYPONATREMIA: ICD-10-CM

## 2020-10-30 DIAGNOSIS — M62.81 GENERALIZED MUSCLE WEAKNESS: ICD-10-CM

## 2020-10-30 DIAGNOSIS — I50.33 ACUTE ON CHRONIC HEART FAILURE WITH PRESERVED EJECTION FRACTION (H): Primary | ICD-10-CM

## 2020-10-30 DIAGNOSIS — I34.0 SEVERE MITRAL VALVE REGURGITATION: ICD-10-CM

## 2020-10-30 DIAGNOSIS — Z86.718 HISTORY OF DEEP VENOUS THROMBOSIS: ICD-10-CM

## 2020-10-30 DIAGNOSIS — R53.81 PHYSICAL DECONDITIONING: ICD-10-CM

## 2020-10-30 DIAGNOSIS — I10 ESSENTIAL HYPERTENSION: ICD-10-CM

## 2020-10-30 DIAGNOSIS — I35.0 AORTIC VALVE STENOSIS, ETIOLOGY OF CARDIAC VALVE DISEASE UNSPECIFIED: ICD-10-CM

## 2020-10-30 DIAGNOSIS — R79.89 ABNORMAL LFTS: ICD-10-CM

## 2020-10-30 LAB
ALBUMIN SERPL-MCNC: 3.4 G/DL (ref 3.4–5)
ALP SERPL-CCNC: 76 U/L (ref 40–150)
ALT SERPL W P-5'-P-CCNC: 63 U/L (ref 0–50)
ANION GAP SERPL CALCULATED.3IONS-SCNC: 5 MMOL/L (ref 3–14)
AST SERPL W P-5'-P-CCNC: 44 U/L (ref 0–45)
BILIRUB SERPL-MCNC: 1.6 MG/DL (ref 0.2–1.3)
BUN SERPL-MCNC: 25 MG/DL (ref 7–30)
CALCIUM SERPL-MCNC: 9.1 MG/DL (ref 8.5–10.1)
CHLORIDE SERPL-SCNC: 100 MMOL/L (ref 94–109)
CO2 SERPL-SCNC: 30 MMOL/L (ref 20–32)
CREAT SERPL-MCNC: 0.89 MG/DL (ref 0.52–1.04)
ERYTHROCYTE [DISTWIDTH] IN BLOOD BY AUTOMATED COUNT: 14.4 % (ref 10–15)
GFR SERPL CREATININE-BSD FRML MDRD: 55 ML/MIN/{1.73_M2}
GLUCOSE SERPL-MCNC: 155 MG/DL (ref 70–99)
HCT VFR BLD AUTO: 41.4 % (ref 35–47)
HGB BLD-MCNC: 13.6 G/DL (ref 11.7–15.7)
MCH RBC QN AUTO: 30.8 PG (ref 26.5–33)
MCHC RBC AUTO-ENTMCNC: 32.9 G/DL (ref 31.5–36.5)
MCV RBC AUTO: 94 FL (ref 78–100)
PLATELET # BLD AUTO: 196 10E9/L (ref 150–450)
POTASSIUM SERPL-SCNC: 3.9 MMOL/L (ref 3.4–5.3)
PROT SERPL-MCNC: 6.8 G/DL (ref 6.8–8.8)
RBC # BLD AUTO: 4.42 10E12/L (ref 3.8–5.2)
SODIUM SERPL-SCNC: 135 MMOL/L (ref 133–144)
WBC # BLD AUTO: 6.1 10E9/L (ref 4–11)

## 2020-10-30 PROCEDURE — 99305 1ST NF CARE MODERATE MDM 35: CPT | Mod: AI | Performed by: INTERNAL MEDICINE

## 2020-10-30 ASSESSMENT — MIFFLIN-ST. JEOR: SCORE: 1010.96

## 2020-11-02 ENCOUNTER — NURSING HOME VISIT (OUTPATIENT)
Dept: GERIATRICS | Facility: CLINIC | Age: 85
End: 2020-11-02
Payer: COMMERCIAL

## 2020-11-02 VITALS
HEART RATE: 74 BPM | HEIGHT: 66 IN | BODY MASS INDEX: 20.44 KG/M2 | OXYGEN SATURATION: 94 % | WEIGHT: 127.2 LBS | SYSTOLIC BLOOD PRESSURE: 117 MMHG | RESPIRATION RATE: 16 BRPM | TEMPERATURE: 98.3 F | DIASTOLIC BLOOD PRESSURE: 63 MMHG

## 2020-11-02 DIAGNOSIS — M62.81 GENERALIZED MUSCLE WEAKNESS: ICD-10-CM

## 2020-11-02 DIAGNOSIS — I48.20 CHRONIC ATRIAL FIBRILLATION (H): ICD-10-CM

## 2020-11-02 DIAGNOSIS — L29.9 PRURITIC DISORDER: ICD-10-CM

## 2020-11-02 DIAGNOSIS — R53.81 PHYSICAL DECONDITIONING: ICD-10-CM

## 2020-11-02 DIAGNOSIS — I27.20 PULMONARY HYPERTENSION (H): ICD-10-CM

## 2020-11-02 DIAGNOSIS — I50.33 ACUTE ON CHRONIC HEART FAILURE WITH PRESERVED EJECTION FRACTION (H): Primary | ICD-10-CM

## 2020-11-02 DIAGNOSIS — I35.0 MILD AORTIC STENOSIS: ICD-10-CM

## 2020-11-02 DIAGNOSIS — R79.89 ABNORMAL LFTS: ICD-10-CM

## 2020-11-02 DIAGNOSIS — Z86.718 PERSONAL HISTORY OF DVT (DEEP VEIN THROMBOSIS): ICD-10-CM

## 2020-11-02 DIAGNOSIS — J90 PLEURAL EFFUSION: ICD-10-CM

## 2020-11-02 DIAGNOSIS — I34.0 SEVERE MITRAL VALVE REGURGITATION: ICD-10-CM

## 2020-11-02 DIAGNOSIS — E87.1 HYPONATREMIA: ICD-10-CM

## 2020-11-02 PROCEDURE — 99309 SBSQ NF CARE MODERATE MDM 30: CPT | Performed by: NURSE PRACTITIONER

## 2020-11-02 ASSESSMENT — MIFFLIN-ST. JEOR: SCORE: 993.73

## 2020-11-02 NOTE — PROGRESS NOTES
[facility]  [place] Medical Record Number:  [id number]  Place of Service where encounter took place:   TCU - [facility] ([facility]) [[id number]]  Chief Complaint   Patient presents with     RECHECK       HPI:    Elizabeth Ruggiero  is a 89 year old (6/19/1926), who is being seen today for an episodic care visit.  HPI information obtained from: facility chart records, facility staff, patient report and [facility] chart review.     Per recent TCU provider progress notes:   89 year old female hospitalized with weakness and some shortness of breath. Noted to have acute on chronic HFpEF, severe MVR: troponin flat, EKG with a fib. Chest x-ray showed hazy opacity right lower lobe may represent atelectasis or pneumonia, enlarged heart with bilateral pleural effusions likely related to CHF.  Procalcitonin was less than 0.05, the symptoms felt to be related to CHF. TTE-LVEF greater than 70%, moderate pulmonary hypertension, moderate-severe to severe mitral regurgitation, the mitral valve leaflets are severely thickened.  Saw cardiology: treated with IV lasix, now on PO 20 mg twice daily as well as KCL. Needs outpatient cardiology f/u after TCU to discuss interest in mitral clip. A fib managed with metoprolol and digoxin, as well as Coumadin. Had elevated LFTs:  mild to moderate intra and extrahepatic biliary duct dilation -unclear if these are age-related normal findings.  Congestive hepatopathy also on the differential. GI consulted: monitor. Hyponatremia improved. On alendronate weekly: OK to resume after discharge. To TCU due to weakness.    Today's concern is:  Patient seen for episodic TCU visit. Reports doing OK. No headaches, dizziness, chest pain, dyspnea or bowel/bladder concerns. Per EMR review note weight down 5 lbs since admission. BP range 106-126/63-86 and sats 94% on room air. C/O pruritis    Past Medical and Surgical History reviewed in Epic today.    MEDICATIONS:  Current Outpatient  "Medications   Medication Sig Dispense Refill     acetaminophen (TYLENOL) 325 MG tablet Take 2 tablets (650 mg) by mouth 3 times daily       alendronate (FOSAMAX) 70 MG tablet Take 70 mg by mouth every 7 days SUNDAYS at 7:30am       alum & mag hydroxide-simethicone (MAALOX) 200-200-20 MG/5ML SUSP suspension Take 30 mLs by mouth 4 times daily as needed for indigestion       calcium carbonate-vitamin D (OSCAL W/D) 500-200 MG-UNIT tablet Take 1 tablet by mouth daily        digoxin (LANOXIN) 125 MCG tablet Take 1 tablet (125 mcg) by mouth daily 30 tablet 11     furosemide (LASIX) 20 MG tablet Take 1 tablet (20 mg) by mouth 2 times daily       metoprolol tartrate (LOPRESSOR) 12.5 mg TABS half-tab Take 12.5 mg by mouth 2 times daily       Multiple Vitamins-Minerals (PRESERVISION AREDS 2+MULTI VIT) CAPS Take 1 tablet by mouth 2 times daily       multivitamin w/minerals (MULTI-VITAMIN) tablet Take 1 tablet by mouth daily        omeprazole (PRILOSEC) 20 MG DR capsule Take 20 mg by mouth daily before breakfast       potassium chloride ER (K-TAB) 20 MEQ CR tablet Take 1 tablet (20 mEq) by mouth daily       senna (SENOKOT) 8.6 MG tablet Take 1 tablet by mouth 2 times daily as needed for constipation       VITAMIN D, CHOLECALCIFEROL, PO Take 1,000 Units by mouth daily        melatonin 3 MG tablet Take 1 tablet (3 mg) by mouth At Bedtime 30 tablet 0     warfarin ANTICOAGULANT (COUMADIN) 2.5 MG tablet Take 2.5 mg by mouth See Admin Instructions Tuesday, Friday, Sunday evenings       warfarin ANTICOAGULANT (COUMADIN) 2.5 MG tablet Take 1.25 mg by mouth See Admin Instructions Monday, Wednesday, Thursday, Saturday evenings         REVIEW OF SYSTEMS:  4 point ROS including Respiratory, CV, GI and , other than that noted in the HPI,  is negative    Objective:  /63   Pulse 74   Temp 98.3  F (36.8  C)   Resp 16   Ht 1.676 m (5' 6\")   Wt 57.7 kg (127 lb 3.2 oz)   SpO2 94%   BMI 20.53 kg/m    Exam:  Exam is limited due to " COVID-19 precautions  GENERAL APPEARANCE:  Alert, in no distress, cooperative  ENT:  Mouth normal, moist mucous membranes, normal hearing acuity  EYES:  Conjunctiva and lids normal  RESP:  no respiratory distress, on room air and LSC  CV: no LE edema  SKIN: few dry scabs and scratches on back, skin dry. No redness.   NEURO:   No facial asymmetry, speech clear  PSYCH:  oriented X 3, affect and mood normal     Labs:   Most Recent 3 CBC's:  Recent Labs   Lab Test 10/30/20  0845 10/23/20  1429 10/09/20  0927   WBC 6.1 8.8 9.8   HGB 13.6 13.9 13.5   MCV 94 92 94    179 175     Most Recent 3 BMP's:  Recent Labs   Lab Test 10/30/20  0845 10/27/20  0748 10/26/20  0704 10/25/20  0555     --  134 133   POTASSIUM 3.9 3.2* 3.5 3.6   CHLORIDE 100  --  99 100   CO2 30  --  29 27   BUN 25  --  23 22   CR 0.89  --  0.84 0.78   ANIONGAP 5  --  6 6   CHRIS 9.1  --  8.8 8.8   *  --  106* 114*       ASSESSMENT/PLAN:  Acute on chronic heart failure with preserved ejection fraction, LVEF more than 70%  Moderately severe to severe mitral regurgitation  Mild aortic stenosis  Hypertension  Pleural effusion  Pulmonary hypertension  Acute on chronic, appears stable at this time. Continue metoprolol 12.5 mg p.o. twice daily and furosemide 20 mg p.o. daily. Monitor vs, wt and BMP. F/U with status next visit and cardiology as planned.      Hyponatremia  Normalized with diuretic therapy. F/U with BMP next check.      Permanent atrial fibrillation  History of recurrent DVT  Chronic, on coumadin. Continue metoprolol 12.5 mg p.o. twice daily and digoxin 125 mcg p.o. daily. Continue anticoagulation therapy with warfarin as ordered, see new coumadin and INR orders below.     Abnormal LFT's  Acute due to congestive hepatopathy and improved last check. Monitor LFTs PRN.      Physical deconditioning  Generalized muscle weakness  Acute on chronic: therapies, f/u with progress next visit.     Pruritic disorder  New onset - appears to have  dry skin, no rash but c/o itchy back. See lotion and HC orders below. Staff to update provider if not effective.     Orders written by provider at facility:  1.  INR 2.3 diagnosis a fib. Coumadin 2.5 mg PO Tue, Fri and Sun and 1.25 mg PO other days. INR on 11/10  2.  Lotion to back BID diagnosis dry skin  3.  HC 1% cream to rash on back BID x 7 days    Electronically signed by:  SENIA Mims CNP

## 2020-11-04 ENCOUNTER — CARE COORDINATION (OUTPATIENT)
Dept: CARDIOLOGY | Facility: CLINIC | Age: 85
End: 2020-11-04

## 2020-11-04 NOTE — PROGRESS NOTES
Patient was presented at our structural heart conference regarding her mitral regurg.   Dr. Mccoy, Dr. Simmons and Dr. Morfin all agreed posterior leaflet is heavily calcified and could be a difficult mitraclip procedure.     RAHEL is needed to get a better idea of posterior leaflet to see if amendable to mitraclip.   I called and explained this to her daugher, Brittney.   She is going to explain this to her sisters and her mom.   She will get back to me with their decision.   Please call if you have any questions.     Daughter called back today and states her mom is not eager to proceed with RAHEL at this time.  She would like her mom to follow up with Dr. Galvan post hospitalization.  Will schedule appointment with Dr. Galvan for next week.

## 2020-11-05 NOTE — PROGRESS NOTES
[facility] DISCHARGE SUMMARY  PATIENT'S NAME: Elizabeth Ruggiero  YOB: 1926  MEDICAL RECORD NUMBER:  [id number]  Place of Service where encounter took place[facility] ([facility]) [[id number]]    PRIMARY CARE PROVIDER AND CLINIC RESPONSIBLE AFTER TRANSFER:   Gelacio Downs MD, [facility] [address] / [place] [address]    Non-[facility] Provider     Transferring providers: SENIA Mims CNP, Dr Niya MD  Recent Hospitalization/ED:  [facility] stay 10/23 to 10/27/20.  Date of SNF Admission: October / 27 / 2020  Date of SNF (anticipated) Discharge: November / 11 / 2020  Discharged to: previous assisted living  Cognitive Scores: not available  Physical Function: Ambulating 90 ft with 2ww and SBA  DME: [facility]    CODE STATUS/ADVANCE DIRECTIVES DISCUSSION:  Full Code   ALLERGIES: Patient has no known allergies.    Per recent TCU provider progress notes:   89 year old female hospitalized with weakness and some shortness of breath. Noted to have acute on chronic HFpEF, severe MVR: troponin flat, EKG with a fib. Chest x-ray showed hazy opacity right lower lobe may represent atelectasis or pneumonia, enlarged heart with bilateral pleural effusions likely related to CHF.  Procalcitonin was less than 0.05, the symptoms felt to be related to CHF. TTE-LVEF greater than 70%, moderate pulmonary hypertension, moderate-severe to severe mitral regurgitation, the mitral valve leaflets are severely thickened.  Saw cardiology: treated with IV lasix, now on PO 20 mg twice daily as well as KCL. Needs outpatient cardiology f/u after TCU to discuss interest in mitral clip. A fib managed with metoprolol and digoxin, as well as Coumadin. Had elevated LFTs:  mild to moderate intra and extrahepatic biliary duct dilation -unclear if these are age-related normal findings.  Congestive hepatopathy also on the differential. GI consulted: monitor. Hyponatremia  improved. On alendronate weekly: OK to resume after discharge. To TCU due to weakness. Dry skin and scratches/pruritis on back: added lotion and short term HC cream.     Today reports doing well, less itchy and no new complaints. Per EMR review note weight down 8 lbs since admission and Body mass index is 20.18 kg/m . BP range /48-82 and sats 96% on room air. Ready to discharge to [place] next week with [facility] outpatient PT.    DISCHARGE DIAGNOSIS/NURSING FACILITY COURSE:  Acute on chronic heart failure with preserved ejection fraction, LVEF more than 70%  Moderately severe to severe mitral regurgitation  Mild aortic stenosis  Hypertension  Pleural effusion  Pulmonary hypertension  Acute on chronic, appears stable at this time. Continue metoprolol 12.5 mg p.o. twice daily and furosemide 20 mg p.o. daily. Monitor vs, wt and BMP. F/U with cardiology as recommended.       Hyponatremia  Normalized with diuretic therapy. F/U with BMP as needed.     Permanent atrial fibrillation  History of recurrent DVT  Chronic, on coumadin. Continue metoprolol 12.5 mg p.o. twice daily and digoxin 125 mcg p.o. daily. Continue anticoagulation therapy with warfarin as ordered. Currently taking Coumadin 2.5 mg Tue, Friday and Sunday and 1.25 mg PO other days with INR check due 11/10. This will be addressed by TCU NP prior to discharge - after discharge management of anticoagulation per PCP.     Abnormal LFT's  Acute due to congestive hepatopathy and improved last check. Monitor LFTs PRN.      Physical deconditioning  Generalized muscle weakness  Acute on chronic: therapies, ready to discharge next week with outpatient PT.    Pruritic disorder  New onset - appears to have dry skin, no rash but c/o itchy back. See lotion and HC orders below. Improved today.       Past Medical History:  has a past medical history of (HFpEF) heart failure with preserved ejection fraction (H), Acute diastolic CHF (congestive heart failure) (H), Acute left  hemiparesis (H) (2/9/2019), Arthritis, Atrial fibrillation (H), Atrial fibrillation with RVR (H), Atrial flutter (H), Atrial flutter with rapid ventricular response (H) (4/23/2019), Community acquired pneumonia, Diastolic CHF (H) (04/29/2019), DVT of lower extremity (deep venous thrombosis) (H), Hepatic congestion, HTN (hypertension), Hyperlipidemia, Mitral valve insufficiency, Pneumonia, Postmenopausal bleeding (01/2020), Severe mitral regurgitation, SOB (shortness of breath), TIA (transient ischemic attack), and Vitamin D deficiency.    Discharge Medications:  Current Outpatient Medications   Medication Sig Dispense Refill     acetaminophen (TYLENOL) 325 MG tablet Take 2 tablets (650 mg) by mouth 3 times daily       alendronate (FOSAMAX) 70 MG tablet Take 70 mg by mouth every 7 days SUNDAYS at 7:30am       alum & mag hydroxide-simethicone (MAALOX) 200-200-20 MG/5ML SUSP suspension Take 30 mLs by mouth 4 times daily as needed for indigestion       calcium carbonate-vitamin D (OSCAL W/D) 500-200 MG-UNIT tablet Take 1 tablet by mouth daily        digoxin (LANOXIN) 125 MCG tablet Take 1 tablet (125 mcg) by mouth daily 30 tablet 11     furosemide (LASIX) 20 MG tablet Take 1 tablet (20 mg) by mouth 2 times daily       metoprolol tartrate (LOPRESSOR) 12.5 mg TABS half-tab Take 12.5 mg by mouth 2 times daily       Multiple Vitamins-Minerals (PRESERVISION AREDS 2+MULTI VIT) CAPS Take 1 tablet by mouth 2 times daily       multivitamin w/minerals (MULTI-VITAMIN) tablet Take 1 tablet by mouth daily        omeprazole (PRILOSEC) 20 MG DR capsule Take 20 mg by mouth daily before breakfast       potassium chloride ER (K-TAB) 20 MEQ CR tablet Take 1 tablet (20 mEq) by mouth daily       senna (SENOKOT) 8.6 MG tablet Take 1 tablet by mouth 2 times daily as needed for constipation       VITAMIN D, CHOLECALCIFEROL, PO Take 1,000 Units by mouth daily        warfarin ANTICOAGULANT (COUMADIN) 2.5 MG tablet Take 2.5 mg by mouth See Admin  "Instructions Tuesday, Friday, Sunday evenings       warfarin ANTICOAGULANT (COUMADIN) 2.5 MG tablet Take 1.25 mg by mouth See Admin Instructions Monday, Wednesday, Thursday, Saturday evenings         Medication Changes/Rationale:     none    Controlled medications sent with patient:   not applicable/none     ROS:   4 point ROS including Respiratory, CV, GI and , other than that noted in the HPI,  is negative    Physical Exam:   Vitals: /71   Pulse 75   Temp 97.5  F (36.4  C)   Resp 16   Ht 1.676 m (5' 6\")   Wt 56.7 kg (125 lb)   SpO2 96%   BMI 20.18 kg/m    BMI= Body mass index is 20.18 kg/m .  Exam is limited due to COVID-19 precautions  GENERAL APPEARANCE:  Alert, in no distress, cooperative  ENT:  Mouth normal, moist mucous membranes, normal hearing acuity  EYES:  Conjunctiva and lids normal  RESP:  no respiratory distress, on room air and LSC  CV: no LE edema  SKIN: few dry scabs and scratches on back, skin dry. No redness.   NEURO:   No facial asymmetry, speech clear  PSYCH:  oriented X 3, affect and mood normal     SNF labs:   Most Recent 3 CBC's:  Recent Labs   Lab Test 10/30/20  0845 10/23/20  1429 10/09/20  0927   WBC 6.1 8.8 9.8   HGB 13.6 13.9 13.5   MCV 94 92 94    179 175     Most Recent 3 BMP's:  Recent Labs   Lab Test 10/30/20  0845 10/27/20  0748 10/26/20  0704 10/25/20  0555     --  134 133   POTASSIUM 3.9 3.2* 3.5 3.6   CHLORIDE 100  --  99 100   CO2 30  --  29 27   BUN 25  --  23 22   CR 0.89  --  0.84 0.78   ANIONGAP 5  --  6 6   CHRIS 9.1  --  8.8 8.8   *  --  106* 114*     Most Recent 2 LFT's:  Recent Labs   Lab Test 10/30/20  0845 10/25/20  0555   AST 44 57*   ALT 63* 93*   ALKPHOS 76 76   BILITOTAL 1.6* 1.6*     Most Recent TSH and T4:  Recent Labs   Lab Test 10/09/20  0927 07/26/20  0644   TSH 2.22 6.24*   T4  --  1.11       DISCHARGE PLAN:    Follow up labs: INR to be checked 11/10 and further orders addressed by TCU provider. After discharge monitoring and " orders via PCP    Medical Follow Up:      Follow up with primary care provider in 2-3 weeks  Follow up Cardiology at [facility] heart to discuss interest in mitral clip    MTM referral needed and placed by this provider: No    Current [place] scheduled appointments:  Next 5 appointments (look out 90 days)    Nov 09, 2020  1:15 PM  Return Visit with Tiffany Galvan MD  [facility] [address] [place] [id number] [phone] OPT 2          Discharge Services: [facility] outpatient PT    Instructions Verbalized to Patient at Discharge:     None      TOTAL DISCHARGE TIME:   Greater than 30 minutes  Electronically signed by:  SENIA Mims CNP

## 2020-11-06 ENCOUNTER — DISCHARGE SUMMARY NURSING HOME (OUTPATIENT)
Dept: GERIATRICS | Facility: CLINIC | Age: 85
End: 2020-11-06
Payer: COMMERCIAL

## 2020-11-06 VITALS
HEART RATE: 75 BPM | BODY MASS INDEX: 20.09 KG/M2 | WEIGHT: 125 LBS | OXYGEN SATURATION: 96 % | DIASTOLIC BLOOD PRESSURE: 71 MMHG | SYSTOLIC BLOOD PRESSURE: 138 MMHG | RESPIRATION RATE: 16 BRPM | TEMPERATURE: 97.5 F | HEIGHT: 66 IN

## 2020-11-06 DIAGNOSIS — M62.81 GENERALIZED MUSCLE WEAKNESS: ICD-10-CM

## 2020-11-06 DIAGNOSIS — I34.0 SEVERE MITRAL VALVE REGURGITATION: ICD-10-CM

## 2020-11-06 DIAGNOSIS — Z86.718 HISTORY OF DEEP VENOUS THROMBOSIS: ICD-10-CM

## 2020-11-06 DIAGNOSIS — I48.21 PERMANENT ATRIAL FIBRILLATION (H): ICD-10-CM

## 2020-11-06 DIAGNOSIS — I50.33 ACUTE ON CHRONIC HEART FAILURE WITH PRESERVED EJECTION FRACTION (H): Primary | ICD-10-CM

## 2020-11-06 DIAGNOSIS — I48.91 ATRIAL FIBRILLATION, UNSPECIFIED TYPE (H): ICD-10-CM

## 2020-11-06 DIAGNOSIS — J90 PLEURAL EFFUSION: ICD-10-CM

## 2020-11-06 DIAGNOSIS — E87.1 HYPONATREMIA: ICD-10-CM

## 2020-11-06 DIAGNOSIS — R79.89 ABNORMAL LFTS: ICD-10-CM

## 2020-11-06 DIAGNOSIS — I50.32 DIASTOLIC DYSFUNCTION WITH CHRONIC HEART FAILURE (H): ICD-10-CM

## 2020-11-06 DIAGNOSIS — K30 INDIGESTION: ICD-10-CM

## 2020-11-06 DIAGNOSIS — M81.0 OSTEOPOROSIS, UNSPECIFIED OSTEOPOROSIS TYPE, UNSPECIFIED PATHOLOGICAL FRACTURE PRESENCE: Primary | ICD-10-CM

## 2020-11-06 DIAGNOSIS — R53.81 PHYSICAL DECONDITIONING: ICD-10-CM

## 2020-11-06 DIAGNOSIS — L29.9 PRURITIC DISORDER: ICD-10-CM

## 2020-11-06 DIAGNOSIS — I27.20 PULMONARY HYPERTENSION (H): ICD-10-CM

## 2020-11-06 DIAGNOSIS — I10 ESSENTIAL HYPERTENSION: ICD-10-CM

## 2020-11-06 DIAGNOSIS — I35.0 MILD AORTIC STENOSIS: ICD-10-CM

## 2020-11-06 PROCEDURE — 99316 NF DSCHRG MGMT 30 MIN+: CPT | Performed by: NURSE PRACTITIONER

## 2020-11-06 RX ORDER — MV-MIN/FA/VIT K/LUTEIN/ZEAXANT 200MCG-5MG
1 CAPSULE ORAL 2 TIMES DAILY
Qty: 60 CAPSULE | Refills: 0 | Status: ON HOLD | OUTPATIENT
Start: 2020-11-06 | End: 2023-01-01

## 2020-11-06 RX ORDER — POTASSIUM CHLORIDE 1500 MG/1
20 TABLET, EXTENDED RELEASE ORAL DAILY
Qty: 30 TABLET | Refills: 0 | Status: SHIPPED | OUTPATIENT
Start: 2020-11-06 | End: 2023-01-01

## 2020-11-06 RX ORDER — ALENDRONATE SODIUM 70 MG/1
70 TABLET ORAL
Qty: 4 TABLET | Refills: 0 | Status: ON HOLD | OUTPATIENT
Start: 2020-11-06 | End: 2023-01-01

## 2020-11-06 RX ORDER — MAGNESIUM HYDROXIDE/ALUMINUM HYDROXICE/SIMETHICONE 120; 1200; 1200 MG/30ML; MG/30ML; MG/30ML
30 SUSPENSION ORAL 4 TIMES DAILY PRN
Qty: 355 ML | Refills: 0 | Status: SHIPPED | OUTPATIENT
Start: 2020-11-06 | End: 2022-01-10

## 2020-11-06 RX ORDER — WARFARIN SODIUM 2.5 MG/1
TABLET ORAL
Qty: 30 TABLET | Refills: 0 | Status: SHIPPED | OUTPATIENT
Start: 2020-11-06 | End: 2022-05-11

## 2020-11-06 RX ORDER — FUROSEMIDE 20 MG
20 TABLET ORAL
Qty: 60 TABLET | Refills: 0 | Status: SHIPPED | OUTPATIENT
Start: 2020-11-06 | End: 2022-05-14

## 2020-11-06 RX ORDER — METOPROLOL TARTRATE 25 MG/1
12.5 TABLET, FILM COATED ORAL 2 TIMES DAILY
Qty: 30 TABLET | Refills: 0 | Status: SHIPPED | OUTPATIENT
Start: 2020-11-06 | End: 2020-11-09

## 2020-11-06 RX ORDER — DIGOXIN 125 MCG
125 TABLET ORAL DAILY
Qty: 30 TABLET | Refills: 0 | Status: ON HOLD | OUTPATIENT
Start: 2020-11-06 | End: 2023-01-01

## 2020-11-06 ASSESSMENT — MIFFLIN-ST. JEOR: SCORE: 983.75

## 2020-11-08 ENCOUNTER — HOSPITAL LABORATORY (OUTPATIENT)
Dept: OTHER | Facility: CLINIC | Age: 85
End: 2020-11-08

## 2020-11-09 ENCOUNTER — OFFICE VISIT (OUTPATIENT)
Dept: CARDIOLOGY | Facility: CLINIC | Age: 85
End: 2020-11-09
Payer: COMMERCIAL

## 2020-11-09 VITALS
WEIGHT: 123.2 LBS | BODY MASS INDEX: 19.8 KG/M2 | SYSTOLIC BLOOD PRESSURE: 121 MMHG | HEIGHT: 66 IN | HEART RATE: 81 BPM | DIASTOLIC BLOOD PRESSURE: 74 MMHG

## 2020-11-09 DIAGNOSIS — I34.0 NONRHEUMATIC MITRAL VALVE REGURGITATION: Primary | ICD-10-CM

## 2020-11-09 PROCEDURE — 99214 OFFICE O/P EST MOD 30 MIN: CPT | Performed by: INTERNAL MEDICINE

## 2020-11-09 RX ORDER — BENZOCAINE/MENTHOL 6 MG-10 MG
LOZENGE MUCOUS MEMBRANE 2 TIMES DAILY
COMMUNITY
End: 2021-09-25

## 2020-11-09 ASSESSMENT — MIFFLIN-ST. JEOR: SCORE: 975.58

## 2020-11-09 NOTE — LETTER
11/9/2020    Gelacio Downs MD  [facility] [address]  [facility] [address]    RE: Elizabeth Ruggiero       Dear Colleague,    I had the pleasure of seeing Elizabeth Ruggiero in the [facility].    CARDIOLOGY CLINIC VISIT  DATE OF SERVICE:  November 9, 2020      PRIMARY CARE PHYSICIAN:  Gelacio Downs    HISTORY OF PRESENT ILLNESS:  Ms. Ruggiero is a 89 y/o woman with  a past medical history significant for chronic atrial fibrillation/flutter, diastolic heart failure, hepatic congestion, deep vein thrombosis, hypertension, severe mitral regurgitation, history of TIA, and osteoporosis who presents to clinic today for follow up to recent hospitalization for heart failure.  She is accompanied by her daughter.    She was recently admitted 10/23/2020-10/27/2020 for acute CHF exacerbation and volume overload. Her diuretics had recently been cut back.  She was diuresed with IV lasix with an excellent result.  Her sodium improved.  She was discharged to [facility] on lasix 20 mg PO BID.  She states she has continued to lose weight and is now back down to her baseline weight of around 123-126 pounds.  She reports feeling better.  She denies shortness of breath, orthopnea, PND.  She is hoping for discharge back to her assisted living home this week.    During previous hospitalizations the possibility of miraclip was raised.  The structural team reviewed her TTE images and was concerned about the degree of calcification present.  A RAHEL could be considered, however, after discussion with family, Elizabeth prefers medical management.  She is symptomatically much improved with uptitration of her diuretics.         PAST MEDICAL HISTORY:  Past Medical History:   Diagnosis Date     (HFpEF) heart failure with preserved ejection fraction (H)      Acute diastolic CHF (congestive heart failure) (H)      Acute left hemiparesis (H) 2/9/2019     Arthritis      Atrial fibrillation (H)      Atrial fibrillation with RVR  (H)      Atrial flutter (H)      Atrial flutter with rapid ventricular response (H) 4/23/2019     Community acquired pneumonia      Diastolic CHF (H) 04/29/2019     DVT of lower extremity (deep venous thrombosis) (H)     recurrent     Hepatic congestion     improved with diuretics for CHF     HTN (hypertension)      Hyperlipidemia      Mitral valve insufficiency      Pneumonia      Postmenopausal bleeding 01/2020    [facility]     Severe mitral regurgitation      SOB (shortness of breath)      TIA (transient ischemic attack)      Vitamin D deficiency        MEDICATIONS:  Current Outpatient Medications   Medication     acetaminophen (TYLENOL) 325 MG tablet     alendronate (FOSAMAX) 70 MG tablet     alum & mag hydroxide-simethicone (MAALOX) 200-200-20 MG/5ML SUSP suspension     calcium carbonate-vitamin D (OSCAL W/D) 500-200 MG-UNIT tablet     digoxin (LANOXIN) 125 MCG tablet     furosemide (LASIX) 20 MG tablet     hydrocortisone (CORTAID) 1 % external cream     metoprolol tartrate (LOPRESSOR) 12.5 mg TABS half-tab     Multiple Vitamins-Minerals (PRESERVISION AREDS 2+MULTI VIT) CAPS     multivitamin w/minerals (MULTI-VITAMIN) tablet     omeprazole (PRILOSEC) 20 MG DR capsule     potassium chloride ER (K-TAB) 20 MEQ CR tablet     senna (SENOKOT) 8.6 MG tablet     VITAMIN D, CHOLECALCIFEROL, PO     warfarin ANTICOAGULANT (COUMADIN) 2.5 MG tablet     warfarin ANTICOAGULANT (COUMADIN) 2.5 MG tablet     No current facility-administered medications for this visit.        ALLERGIES:  No Known Allergies    SOCIAL HISTORY:  I have reviewed this patient's social history and updated it with pertinent information if needed. Elizabeth Ruggiero  reports that she has never smoked. She has never used smokeless tobacco. She reports that she does not drink alcohol or use drugs.    FAMILY HISTORY:  I have reviewed this patient's family history and updated it with pertinent information if needed.   Family History    Problem Relation Age of Onset     Colon Cancer Mother      Breast Cancer Sister      Diabetes Sister        REVIEW OF SYSTEMS:  A complete ROS was obtained and the pertinent positives are outlined in the history of present illness above.  The remainder of systems is negative.    PHYSICAL EXAM:      BP: 121/74 Pulse: 81            Vital Signs with Ranges  Temp:  [98.9  F (37.2  C)] 98.9  F (37.2  C)  Pulse:  [81-89] 89  Resp:  [18] 18  BP: ()/(66-74) 97/66  SpO2:  [92 %] 92 %  123 lbs 3.2 oz    Constitutional: awake, alert, no distress  Eyes: PERRL, sclera nonicteric  ENT: trachea midline  Respiratory: CTAB  Cardiovascular: Irregularly irregular II/VI holosystolic murmur  GI: nondistended, nontender, bowel sounds present  Lymph/Hematologic: no lymphadenopathy  Skin: dry, no rash  Musculoskeletal: good muscle tone, compression stockings in place, no edema bilaterally  Neurologic: no focal deficits  Neuropsychiatric: appropriate affact    DATA:  Reviewed in EPIC    ASSESSMENT:  1. 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  Significant annular calcification would make successful mitraclip unlikely  2. Acute on chronic heart failure with preserved ejection fraction in the setting of significant valvular heart disease and diastolic dysfunction; under treated with diuretics in the outpatient setting  3. Moderate TR and pulmonary HTN (estimaited pulmonary artery systolic pressure 50 mmhg)  4. Permanent atrial fibrillation on therapeutic anticoagulation with Coumadin and rate control with digoxin  5. Hypertension    PLAN/RECOMMENDATIONS:  -She has responded well to uptitration of diuretics.  She prefers medical management and I agree this is the best option for her.  Continue lasix 20 mg PO BID.  BMP today  -Follow up in 8 weeks or before than as necessary.        Tiffany Galvan MD  Cardiology - [facility]  November 9, 2020      Thank you for allowing me to  participate in the care of your patient.    Sincerely,     Tiffany Galvan MD     [facility]

## 2020-11-09 NOTE — PATIENT INSTRUCTIONS
-Continue lasix 20 mg twice daily (take afternoon dose at 3pm)  -Lab work today  -Follow up with Dr. Galvan in 8 weeks

## 2020-11-10 ENCOUNTER — NURSING HOME VISIT (OUTPATIENT)
Dept: GERIATRICS | Facility: CLINIC | Age: 85
End: 2020-11-10
Payer: COMMERCIAL

## 2020-11-10 VITALS
WEIGHT: 127.4 LBS | RESPIRATION RATE: 18 BRPM | HEIGHT: 66 IN | HEART RATE: 89 BPM | DIASTOLIC BLOOD PRESSURE: 66 MMHG | TEMPERATURE: 98.9 F | OXYGEN SATURATION: 92 % | SYSTOLIC BLOOD PRESSURE: 97 MMHG | BODY MASS INDEX: 20.48 KG/M2

## 2020-11-10 DIAGNOSIS — Z79.01 LONG TERM (CURRENT) USE OF ANTICOAGULANTS: ICD-10-CM

## 2020-11-10 DIAGNOSIS — I48.21 PERMANENT ATRIAL FIBRILLATION (H): Primary | ICD-10-CM

## 2020-11-10 DIAGNOSIS — M19.049 HAND ARTHRITIS: ICD-10-CM

## 2020-11-10 DIAGNOSIS — Z51.81 ENCOUNTER FOR THERAPEUTIC DRUG MONITORING: ICD-10-CM

## 2020-11-10 LAB
SARS-COV-2 RNA SPEC QL NAA+PROBE: NOT DETECTED
SPECIMEN SOURCE: NORMAL

## 2020-11-10 PROCEDURE — 99308 SBSQ NF CARE LOW MDM 20: CPT | Performed by: NURSE PRACTITIONER

## 2020-11-10 ASSESSMENT — MIFFLIN-ST. JEOR: SCORE: 994.63

## 2020-11-10 NOTE — PROGRESS NOTES
"[facility]  [place] Medical Record Number:  [id number]  Place of Service where encounter took place: [facility] [[id number]]    HPI:    Elizabeth Ruggiero is a 89 year old  (6/19/1926), who is being seen today for an episodic care visit at the above location.   HPI information obtained from: facility chart records, facility staff, patient report and [facility] chart review. Today's concern is INR/Coumadin management for A. Fib    ROS/Subjective:  Bleeding Signs/Symptoms:  None  Thromboembolic Signs/Symptoms:  None  Medication Changes:  No  Dietary Changes:  No  Activity Changes: Yes: improved mobility with therapies  Bacterial/Viral Infection:  No  Missed Coumadin Doses:  None  On ASA: No  Other Concerns:  Yes: patient reported arthritic pain left hand today which improved with PRN tylenol use    OBJECTIVE:  BP 97/66   Pulse 89   Temp 98.9  F (37.2  C)   Resp 18   Ht 1.676 m (5' 6\")   Wt 57.8 kg (127 lb 6.4 oz)   SpO2 92%   BMI 20.56 kg/m     Awake female, pleasant, [place]  Left hand: arthritic changes of joints of fingers, no redness or warmth.     Last INR: 2.3 one week ago  INR Today:  2.5  Current Dose:  Coumadin 2.5 mg PO on Tue, Fri and Sun and 1.25 mg PO other days.     ASSESSMENT:  Encounter for therapeutic drug monitoring  Long term (current) use of anticoagulants  Permanent atrial fibrillation (H)  Therapeutic INR for goal of 2-3    Hand arthritis  Newly reported- no redness or warmth of hand. Improved with tylenol. Monitor.     PLAN:   Orders written by provider at facility  New Dose: Coumadin 2.5 mg Tue, Fri and Sun and 1.25 mg PO other days    Next INR: 11/17/20    Electronically signed by:  SENIA Mims CNP       "

## 2020-11-10 NOTE — PROGRESS NOTES
CARDIOLOGY CLINIC VISIT  DATE OF SERVICE:  November 9, 2020      PRIMARY CARE PHYSICIAN:  Gelacio Downs    HISTORY OF PRESENT ILLNESS:  Ms. Ruggiero is a 89 y/o woman with  a past medical history significant for chronic atrial fibrillation/flutter, diastolic heart failure, hepatic congestion, deep vein thrombosis, hypertension, severe mitral regurgitation, history of TIA, and osteoporosis who presents to clinic today for follow up to recent hospitalization for heart failure.  She is accompanied by her daughter.    She was recently admitted 10/23/2020-10/27/2020 for acute CHF exacerbation and volume overload. Her diuretics had recently been cut back.  She was diuresed with IV lasix with an excellent result.  Her sodium improved.  She was discharged to [facility] on lasix 20 mg PO BID.  She states she has continued to lose weight and is now back down to her baseline weight of around 123-126 pounds.  She reports feeling better.  She denies shortness of breath, orthopnea, PND.  She is hoping for discharge back to her assisted living home this week.    During previous hospitalizations the possibility of miraclip was raised.  The structural team reviewed her TTE images and was concerned about the degree of calcification present.  A RAHEL could be considered, however, after discussion with family, Elizabeth prefers medical management.  She is symptomatically much improved with uptitration of her diuretics.         PAST MEDICAL HISTORY:  Past Medical History:   Diagnosis Date     (HFpEF) heart failure with preserved ejection fraction (H)      Acute diastolic CHF (congestive heart failure) (H)      Acute left hemiparesis (H) 2/9/2019     Arthritis      Atrial fibrillation (H)      Atrial fibrillation with RVR (H)      Atrial flutter (H)      Atrial flutter with rapid ventricular response (H) 4/23/2019     Community acquired pneumonia      Diastolic CHF (H) 04/29/2019     DVT of lower extremity (deep venous thrombosis) (H)     recurrent      Hepatic congestion     improved with diuretics for CHF     HTN (hypertension)      Hyperlipidemia      Mitral valve insufficiency      Pneumonia      Postmenopausal bleeding 01/2020    [facility]     Severe mitral regurgitation      SOB (shortness of breath)      TIA (transient ischemic attack)      Vitamin D deficiency        MEDICATIONS:  Current Outpatient Medications   Medication     acetaminophen (TYLENOL) 325 MG tablet     alendronate (FOSAMAX) 70 MG tablet     alum & mag hydroxide-simethicone (MAALOX) 200-200-20 MG/5ML SUSP suspension     calcium carbonate-vitamin D (OSCAL W/D) 500-200 MG-UNIT tablet     digoxin (LANOXIN) 125 MCG tablet     furosemide (LASIX) 20 MG tablet     hydrocortisone (CORTAID) 1 % external cream     metoprolol tartrate (LOPRESSOR) 12.5 mg TABS half-tab     Multiple Vitamins-Minerals (PRESERVISION AREDS 2+MULTI VIT) CAPS     multivitamin w/minerals (MULTI-VITAMIN) tablet     omeprazole (PRILOSEC) 20 MG DR capsule     potassium chloride ER (K-TAB) 20 MEQ CR tablet     senna (SENOKOT) 8.6 MG tablet     VITAMIN D, CHOLECALCIFEROL, PO     warfarin ANTICOAGULANT (COUMADIN) 2.5 MG tablet     warfarin ANTICOAGULANT (COUMADIN) 2.5 MG tablet     No current facility-administered medications for this visit.        ALLERGIES:  No Known Allergies    SOCIAL HISTORY:  I have reviewed this patient's social history and updated it with pertinent information if needed. Elizabeth Ruggiero  reports that she has never smoked. She has never used smokeless tobacco. She reports that she does not drink alcohol or use drugs.    FAMILY HISTORY:  I have reviewed this patient's family history and updated it with pertinent information if needed.   Family History   Problem Relation Age of Onset     Colon Cancer Mother      Breast Cancer Sister      Diabetes Sister        REVIEW OF SYSTEMS:  A complete ROS was obtained and the pertinent positives are outlined in the history of present illness  above.  The remainder of systems is negative.    PHYSICAL EXAM:      BP: 121/74 Pulse: 81            Vital Signs with Ranges  Temp:  [98.9  F (37.2  C)] 98.9  F (37.2  C)  Pulse:  [81-89] 89  Resp:  [18] 18  BP: ()/(66-74) 97/66  SpO2:  [92 %] 92 %  123 lbs 3.2 oz    Constitutional: awake, alert, no distress  Eyes: PERRL, sclera nonicteric  ENT: trachea midline  Respiratory: CTAB  Cardiovascular: Irregularly irregular II/VI holosystolic murmur  GI: nondistended, nontender, bowel sounds present  Lymph/Hematologic: no lymphadenopathy  Skin: dry, no rash  Musculoskeletal: good muscle tone, compression stockings in place, no edema bilaterally  Neurologic: no focal deficits  Neuropsychiatric: appropriate affact    DATA:  Reviewed in EPIC    ASSESSMENT:  1. 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  Significant annular calcification would make successful mitraclip unlikely  2. Acute on chronic heart failure with preserved ejection fraction in the setting of significant valvular heart disease and diastolic dysfunction; under treated with diuretics in the outpatient setting  3. Moderate TR and pulmonary HTN (estimaited pulmonary artery systolic pressure 50 mmhg)  4. Permanent atrial fibrillation on therapeutic anticoagulation with Coumadin and rate control with digoxin  5. Hypertension    PLAN/RECOMMENDATIONS:  -She has responded well to uptitration of diuretics.  She prefers medical management and I agree this is the best option for her.  Continue lasix 20 mg PO BID.  BMP today  -Follow up in 8 weeks or before than as necessary.        Tiffany Galvan MD  Cardiology - [facility]  November 9, 2020

## 2020-11-17 ENCOUNTER — RECORDS - HEALTHEAST (OUTPATIENT)
Dept: LAB | Facility: CLINIC | Age: 85
End: 2020-11-17

## 2020-11-18 LAB
ANION GAP SERPL CALCULATED.3IONS-SCNC: 8 MMOL/L (ref 5–18)
BUN SERPL-MCNC: 22 MG/DL (ref 8–28)
CALCIUM SERPL-MCNC: 10 MG/DL (ref 8.5–10.5)
CHLORIDE BLD-SCNC: 102 MMOL/L (ref 98–107)
CO2 SERPL-SCNC: 29 MMOL/L (ref 22–31)
CREAT SERPL-MCNC: 0.87 MG/DL (ref 0.6–1.1)
GFR SERPL CREATININE-BSD FRML MDRD: >60 ML/MIN/1.73M2
GLUCOSE BLD-MCNC: 79 MG/DL (ref 70–125)
INR PPP: 2.36 (ref 0.9–1.1)
POTASSIUM BLD-SCNC: 4.4 MMOL/L (ref 3.5–5)
SODIUM SERPL-SCNC: 139 MMOL/L (ref 136–145)

## 2020-12-01 ENCOUNTER — RECORDS - HEALTHEAST (OUTPATIENT)
Dept: LAB | Facility: CLINIC | Age: 85
End: 2020-12-01

## 2020-12-02 LAB — INR PPP: 2.08 (ref 0.9–1.1)

## 2020-12-27 ENCOUNTER — RECORDS - HEALTHEAST (OUTPATIENT)
Dept: LAB | Facility: CLINIC | Age: 85
End: 2020-12-27

## 2020-12-30 LAB — INR PPP: 1.79 (ref 0.9–1.1)

## 2021-01-13 ENCOUNTER — RECORDS - HEALTHEAST (OUTPATIENT)
Dept: LAB | Facility: CLINIC | Age: 86
End: 2021-01-13

## 2021-01-13 LAB — INR PPP: 2.26 (ref 0.9–1.1)

## 2021-02-09 ENCOUNTER — RECORDS - HEALTHEAST (OUTPATIENT)
Dept: LAB | Facility: CLINIC | Age: 86
End: 2021-02-09

## 2021-02-10 LAB — INR PPP: 2.88 (ref 0.9–1.1)

## 2021-03-01 ENCOUNTER — VIRTUAL VISIT (OUTPATIENT)
Dept: CARDIOLOGY | Facility: CLINIC | Age: 86
End: 2021-03-01
Payer: COMMERCIAL

## 2021-03-01 DIAGNOSIS — I34.0 SEVERE MITRAL REGURGITATION: ICD-10-CM

## 2021-03-01 DIAGNOSIS — R60.9 EDEMA, UNSPECIFIED TYPE: ICD-10-CM

## 2021-03-01 DIAGNOSIS — I48.92 ATRIAL FLUTTER WITH RAPID VENTRICULAR RESPONSE (H): ICD-10-CM

## 2021-03-01 DIAGNOSIS — I48.91 ATRIAL FIBRILLATION WITH RVR (H): ICD-10-CM

## 2021-03-01 DIAGNOSIS — I34.0 NONRHEUMATIC MITRAL VALVE REGURGITATION: Primary | ICD-10-CM

## 2021-03-01 DIAGNOSIS — I50.31 ACUTE DIASTOLIC CONGESTIVE HEART FAILURE (H): ICD-10-CM

## 2021-03-01 PROCEDURE — 99214 OFFICE O/P EST MOD 30 MIN: CPT | Mod: 95 | Performed by: INTERNAL MEDICINE

## 2021-03-01 NOTE — PROGRESS NOTES
Elizabeth is a 89 year old who is being evaluated via a billable video visit.      How would you like to obtain your AVS? Mail a copy  If the video visit is dropped, the invitation should be resent by: Text to cell phone: [phone]  Will anyone else be joining your video visit? No          CARDIOLOGY clinic visit  Date of service: March 1, 2021      PRIMARY CARE PHYSICIAN:  Gelacio Downs    HISTORY OF PRESENT ILLNESS:  Ms. Ruggiero is a 89 y/o woman with  a past medical history significant for chronic atrial fibrillation/flutter, diastolic heart failure, hepatic congestion, deep vein thrombosis, hypertension, severe mitral regurgitation, history of TIA, and osteoporosis.  She was last seen by me in 11/2020.  Due to the COVID-19 pandemic this visit is conducted via video with Elizabeth's consent.  She was recently admitted 10/23/2020-10/27/2020 for acute CHF exacerbation and volume overload. Her diuretics had recently been cut back.  She was diuresed with IV lasix with an excellent result.  Her sodium improved.  She was discharged to [facility] on lasix 20 mg PO BID.  She states she has continued to lose weight and is now back down to her baseline weight of around 123-126 pounds.    She is now back at her senior living apartment.  She continues to feel well from a cardiac standpoint.  Her weights have been stable.  She denies any chest pain, chest discomfort or shortness of breath.  She denies any orthopnea, PND or lower extremity edema.  She does comment that she has back issues related to a prior injury.         PAST MEDICAL HISTORY:  Past Medical History:   Diagnosis Date     (HFpEF) heart failure with preserved ejection fraction (H)      Acute diastolic CHF (congestive heart failure) (H)      Acute left hemiparesis (H) 2/9/2019     Arthritis      Atrial fibrillation (H)      Atrial fibrillation with RVR (H)      Atrial flutter (H)      Atrial flutter with rapid ventricular response (H) 4/23/2019     Community acquired  pneumonia      Diastolic CHF (H) 04/29/2019     DVT of lower extremity (deep venous thrombosis) (H)     recurrent     Hepatic congestion     improved with diuretics for CHF     HTN (hypertension)      Hyperlipidemia      Mitral valve insufficiency      Pneumonia      Postmenopausal bleeding 01/2020    [facility]     Severe mitral regurgitation      SOB (shortness of breath)      TIA (transient ischemic attack)      Vitamin D deficiency        MEDICATIONS:  Current Outpatient Medications   Medication     acetaminophen (TYLENOL) 325 MG tablet     alendronate (FOSAMAX) 70 MG tablet     alum & mag hydroxide-simethicone (MAALOX) 200-200-20 MG/5ML SUSP suspension     calcium carbonate-vitamin D (OSCAL W/D) 500-200 MG-UNIT tablet     COMPRESSION STOCKINGS     digoxin (LANOXIN) 125 MCG tablet     furosemide (LASIX) 20 MG tablet     hydrocortisone (CORTAID) 1 % external cream     metoprolol tartrate (LOPRESSOR) 12.5 mg TABS half-tab     Multiple Vitamins-Minerals (PRESERVISION AREDS 2+MULTI VIT) CAPS     multivitamin w/minerals (MULTI-VITAMIN) tablet     omeprazole (PRILOSEC) 20 MG DR capsule     potassium chloride ER (K-TAB) 20 MEQ CR tablet     senna (SENOKOT) 8.6 MG tablet     VITAMIN D, CHOLECALCIFEROL, PO     warfarin ANTICOAGULANT (COUMADIN) 2.5 MG tablet     warfarin ANTICOAGULANT (COUMADIN) 2.5 MG tablet     No current facility-administered medications for this visit.        ALLERGIES:  No Known Allergies    SOCIAL HISTORY:  I have reviewed this patient's social history and updated it with pertinent information if needed. Elizabeth Ruggiero  reports that she has never smoked. She has never used smokeless tobacco. She reports that she does not drink alcohol or use drugs.    FAMILY HISTORY:  I have reviewed this patient's family history and updated it with pertinent information if needed.   Family History   Problem Relation Age of Onset     Colon Cancer Mother      Breast Cancer Sister       "Diabetes Sister        REVIEW OF SYSTEMS:  A complete review of systems is obtained and the pertinent positives outlined in the history of present illness above.  The remainder systems is negative.  +Would like Rx for compression socks+  Review Of Systems  Skin: NEGATIVE  Eyes:Ears/Nose/Throat: Sinus drainage  Respiratory: NEGATIVE  Cardiovascular:NEGATIVE  Gastrointestinal: Occasional heart burn  Genitourinary:NEGATIVE  Musculoskeletal: Arthritis  Neurologic: NEGATIVE  Psychiatric: NEGATIVE  Hematologic/Lymphatic/Immunologic: NEGATIVE  Endocrine:  NEGATIVE    PHYSICAL EXAM:  Vitals - Patient Reported  Systolic (Patient Reported): 102  Diastolic (Patient Reported): 53  Blood pressure taken with manual cuff (will exclude from quality measure): Yes  Weight (Patient Reported): 52.7 kg (116 lb 3.2 oz)  Height (Patient Reported): 167.6 cm (5' 6\")  BMI (Based on Pt Reported Ht/Wt): 18.75  SpO2 (Patient Reported): 97  Pulse (Patient Reported): 67      General:  no apparent distress, normal body habitus, sitting upright.  ENT/Mouth:  membranes moist, no nasal discharge.  Normal head shape, no apparent injury or laceration.  Eyes:  no scleral icterus, normal conjunctivae.  No observed jaundice.  Neck:  no apparent neck swelling.   Chest/Lungs:  No breathing difficulty while speaking.  No audible wheezing.  No cough during conversation.  Cardiovascular:  No obviously elevated jugular venous pressure.  No apparent edema bilaterally in LE.   Abdomen:  no obvious abdominal distention.   Extremities:  no apparent cyanosis.  Skin:  no xanthelasma.  No facial lacerations.  Neurologic:  Normal arm motion bilateral, no tremors.    Psychiatric:  Alert and oriented x3, calm demeanor    The rest of the comprehensive physical examination is deferred due to public health emergency video visit restrictions.            ASSESSMENT:  1. 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  " Significant annular calcification would make successful mitraclip unlikely  2. Acute on chronic heart failure with preserved ejection fraction in the setting of significant valvular heart disease and diastolic dysfunction; euvolemic and minimally symptomatic on lasix 20 mg BID  3. Moderate TR and pulmonary HTN (estimaited pulmonary artery systolic pressure 50 mmhg)  4. Permanent atrial fibrillation on therapeutic anticoagulation with Coumadin and rate control with digoxin  5. Hypertension     PLAN/RECOMMENDATIONS:  -She has responded well to uptitration of diuretics.  She prefers medical management and I agree this is the best option for her.  Continue lasix 20 mg PO BID.   -Lab work (BMP)  -Follow up with cardiology in 3 months      Tiffany Galvan MD  Cardiology - [facility]  March 2, 2021      Video-Visit Details    Type of service:  Video Visit  Video Start Time: 3:15 PM  Video End Time: 3:27 PM    Originating Location (pt. Location): Home  Distant Location (provider location):  Home office  Platform used for Video Visit: Shantelle

## 2021-03-01 NOTE — LETTER
3/1/2021    Gelacio Downs MD  [facility] [address]  [facility] [address]    RE: Elizabeth Ruggiero       Dear Colleague,    I had the pleasure of seeing Elizabeth Ruggiero in the [facility].    Elizabeth is a 89 year old who is being evaluated via a billable video visit.      How would you like to obtain your AVS? Mail a copy  If the video visit is dropped, the invitation should be resent by: Text to cell phone: [phone]  Will anyone else be joining your video visit? No          CARDIOLOGY clinic visit  Date of service: March 1, 2021      PRIMARY CARE PHYSICIAN:  Gelacio Downs    HISTORY OF PRESENT ILLNESS:  Ms. Ruggiero is a 89 y/o woman with  a past medical history significant for chronic atrial fibrillation/flutter, diastolic heart failure, hepatic congestion, deep vein thrombosis, hypertension, severe mitral regurgitation, history of TIA, and osteoporosis.  She was last seen by me in 11/2020.  Due to the COVID-19 pandemic this visit is conducted via video with Elizabeth's consent.  She was recently admitted 10/23/2020-10/27/2020 for acute CHF exacerbation and volume overload. Her diuretics had recently been cut back.  She was diuresed with IV lasix with an excellent result.  Her sodium improved.  She was discharged to [place] on lasix 20 mg PO BID.  She states she has continued to lose weight and is now back down to her baseline weight of around 123-126 pounds.    She is now back at her senior living apartment.  She continues to feel well from a cardiac standpoint.  Her weights have been stable.  She denies any chest pain, chest discomfort or shortness of breath.  She denies any orthopnea, PND or lower extremity edema.  She does comment that she has back issues related to a prior injury.         PAST MEDICAL HISTORY:  Past Medical History:   Diagnosis Date     (HFpEF) heart failure with preserved ejection fraction (H)      Acute diastolic CHF (congestive  heart failure) (H)      Acute left hemiparesis (H) 2/9/2019     Arthritis      Atrial fibrillation (H)      Atrial fibrillation with RVR (H)      Atrial flutter (H)      Atrial flutter with rapid ventricular response (H) 4/23/2019     Community acquired pneumonia      Diastolic CHF (H) 04/29/2019     DVT of lower extremity (deep venous thrombosis) (H)     recurrent     Hepatic congestion     improved with diuretics for CHF     HTN (hypertension)      Hyperlipidemia      Mitral valve insufficiency      Pneumonia      Postmenopausal bleeding 01/2020    [facility]     Severe mitral regurgitation      SOB (shortness of breath)      TIA (transient ischemic attack)      Vitamin D deficiency        MEDICATIONS:  Current Outpatient Medications   Medication     acetaminophen (TYLENOL) 325 MG tablet     alendronate (FOSAMAX) 70 MG tablet     alum & mag hydroxide-simethicone (MAALOX) 200-200-20 MG/5ML SUSP suspension     calcium carbonate-vitamin D (OSCAL W/D) 500-200 MG-UNIT tablet     COMPRESSION STOCKINGS     digoxin (LANOXIN) 125 MCG tablet     furosemide (LASIX) 20 MG tablet     hydrocortisone (CORTAID) 1 % external cream     metoprolol tartrate (LOPRESSOR) 12.5 mg TABS half-tab     Multiple Vitamins-Minerals (PRESERVISION AREDS 2+MULTI VIT) CAPS     multivitamin w/minerals (MULTI-VITAMIN) tablet     omeprazole (PRILOSEC) 20 MG DR capsule     potassium chloride ER (K-TAB) 20 MEQ CR tablet     senna (SENOKOT) 8.6 MG tablet     VITAMIN D, CHOLECALCIFEROL, PO     warfarin ANTICOAGULANT (COUMADIN) 2.5 MG tablet     warfarin ANTICOAGULANT (COUMADIN) 2.5 MG tablet     No current facility-administered medications for this visit.        ALLERGIES:  No Known Allergies    SOCIAL HISTORY:  I have reviewed this patient's social history and updated it with pertinent information if needed. Elizabeth Ruggiero  reports that she has never smoked. She has never used smokeless tobacco. She reports that she does not  "drink alcohol or use drugs.    FAMILY HISTORY:  I have reviewed this patient's family history and updated it with pertinent information if needed.   Family History   Problem Relation Age of Onset     Colon Cancer Mother      Breast Cancer Sister      Diabetes Sister        REVIEW OF SYSTEMS:  A complete review of systems is obtained and the pertinent positives outlined in the history of present illness above.  The remainder systems is negative.  +Would like Rx for compression socks+  Review Of Systems  Skin: NEGATIVE  Eyes:Ears/Nose/Throat: Sinus drainage  Respiratory: NEGATIVE  Cardiovascular:NEGATIVE  Gastrointestinal: Occasional heart burn  Genitourinary:NEGATIVE  Musculoskeletal: Arthritis  Neurologic: NEGATIVE  Psychiatric: NEGATIVE  Hematologic/Lymphatic/Immunologic: NEGATIVE  Endocrine:  NEGATIVE    PHYSICAL EXAM:  Vitals - Patient Reported  Systolic (Patient Reported): 102  Diastolic (Patient Reported): 53  Blood pressure taken with manual cuff (will exclude from quality measure): Yes  Weight (Patient Reported): 52.7 kg (116 lb 3.2 oz)  Height (Patient Reported): 167.6 cm (5' 6\")  BMI (Based on Pt Reported Ht/Wt): 18.75  SpO2 (Patient Reported): 97  Pulse (Patient Reported): 67      General:  no apparent distress, normal body habitus, sitting upright.  ENT/Mouth:  membranes moist, no nasal discharge.  Normal head shape, no apparent injury or laceration.  Eyes:  no scleral icterus, normal conjunctivae.  No observed jaundice.  Neck:  no apparent neck swelling.   Chest/Lungs:  No breathing difficulty while speaking.  No audible wheezing.  No cough during conversation.  Cardiovascular:  No obviously elevated jugular venous pressure.  No apparent edema bilaterally in LE.   Abdomen:  no obvious abdominal distention.   Extremities:  no apparent cyanosis.  Skin:  no xanthelasma.  No facial lacerations.  Neurologic:  Normal arm motion bilateral, no tremors.    Psychiatric:  Alert and oriented x3, calm demeanor    The " rest of the comprehensive physical examination is deferred due to public health emergency video visit restrictions.            ASSESSMENT:  1. 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  Significant annular calcification would make successful mitraclip unlikely  2. Acute on chronic heart failure with preserved ejection fraction in the setting of significant valvular heart disease and diastolic dysfunction; euvolemic and minimally symptomatic on lasix 20 mg BID  3. Moderate TR and pulmonary HTN (estimaited pulmonary artery systolic pressure 50 mmhg)  4. Permanent atrial fibrillation on therapeutic anticoagulation with Coumadin and rate control with digoxin  5. Hypertension     PLAN/RECOMMENDATIONS:  -She has responded well to uptitration of diuretics.  She prefers medical management and I agree this is the best option for her.  Continue lasix 20 mg PO BID.   -Lab work (BMP)  -Follow up with cardiology in 3 months      Tiffany Galvan MD  Cardiology - [facility]  March 2, 2021      Video-Visit Details    Type of service:  Video Visit  Video Start Time: 3:15 PM  Video End Time: 3:27 PM    Originating Location (pt. Location): Home  Distant Location (provider location):  Home office  Platform used for Video Visit: Shantelle      Thank you for allowing me to participate in the care of your patient.      Sincerely,     Tiffany Galvan MD     [facility]    cc:   Tiffany Galvan MD  [facility] AT [place]  [address] [place] [id number]

## 2021-03-09 ENCOUNTER — RECORDS - HEALTHEAST (OUTPATIENT)
Dept: LAB | Facility: CLINIC | Age: 86
End: 2021-03-09

## 2021-03-10 LAB
ANION GAP SERPL CALCULATED.3IONS-SCNC: 8 MMOL/L (ref 5–18)
BUN SERPL-MCNC: 28 MG/DL (ref 8–28)
CALCIUM SERPL-MCNC: 9.5 MG/DL (ref 8.5–10.5)
CHLORIDE BLD-SCNC: 100 MMOL/L (ref 98–107)
CO2 SERPL-SCNC: 29 MMOL/L (ref 22–31)
CREAT SERPL-MCNC: 1.02 MG/DL (ref 0.6–1.1)
GFR SERPL CREATININE-BSD FRML MDRD: 50 ML/MIN/1.73M2
GLUCOSE BLD-MCNC: 85 MG/DL (ref 70–125)
INR PPP: 2.28 (ref 0.9–1.1)
POTASSIUM BLD-SCNC: 4.2 MMOL/L (ref 3.5–5)
SODIUM SERPL-SCNC: 137 MMOL/L (ref 136–145)

## 2021-04-06 ENCOUNTER — RECORDS - HEALTHEAST (OUTPATIENT)
Dept: LAB | Facility: CLINIC | Age: 86
End: 2021-04-06

## 2021-04-07 LAB — INR PPP: 2.78 (ref 0.9–1.1)

## 2021-05-04 ENCOUNTER — RECORDS - HEALTHEAST (OUTPATIENT)
Dept: LAB | Facility: CLINIC | Age: 86
End: 2021-05-04

## 2021-05-05 LAB — INR PPP: 2.12 (ref 0.9–1.1)

## 2021-06-01 ENCOUNTER — RECORDS - HEALTHEAST (OUTPATIENT)
Dept: LAB | Facility: CLINIC | Age: 86
End: 2021-06-01

## 2021-06-02 LAB — INR PPP: 2.37 (ref 0.9–1.1)

## 2021-06-29 ENCOUNTER — RECORDS - HEALTHEAST (OUTPATIENT)
Dept: LAB | Facility: CLINIC | Age: 86
End: 2021-06-29

## 2021-06-30 LAB — INR PPP: 2.82 (ref 0.9–1.1)

## 2021-07-28 ENCOUNTER — LAB REQUISITION (OUTPATIENT)
Dept: LAB | Facility: CLINIC | Age: 86
End: 2021-07-28
Payer: COMMERCIAL

## 2021-07-28 DIAGNOSIS — I48.91 UNSPECIFIED ATRIAL FIBRILLATION (H): ICD-10-CM

## 2021-07-29 LAB — INR PPP: 1.27 (ref 0.85–1.15)

## 2021-07-29 PROCEDURE — P9604 ONE-WAY ALLOW PRORATED TRIP: HCPCS | Mod: ORL | Performed by: FAMILY MEDICINE

## 2021-07-29 PROCEDURE — 36415 COLL VENOUS BLD VENIPUNCTURE: CPT | Mod: ORL | Performed by: FAMILY MEDICINE

## 2021-07-29 PROCEDURE — 85610 PROTHROMBIN TIME: CPT | Mod: ORL | Performed by: FAMILY MEDICINE

## 2021-08-03 ENCOUNTER — LAB REQUISITION (OUTPATIENT)
Dept: LAB | Facility: CLINIC | Age: 86
End: 2021-08-03
Payer: COMMERCIAL

## 2021-08-03 DIAGNOSIS — I48.91 UNSPECIFIED ATRIAL FIBRILLATION (H): ICD-10-CM

## 2021-08-04 LAB — INR PPP: 1.89 (ref 0.85–1.15)

## 2021-08-04 PROCEDURE — 85610 PROTHROMBIN TIME: CPT | Mod: ORL | Performed by: FAMILY MEDICINE

## 2021-08-04 PROCEDURE — P9603 ONE-WAY ALLOW PRORATED MILES: HCPCS | Mod: ORL | Performed by: FAMILY MEDICINE

## 2021-08-04 PROCEDURE — 36415 COLL VENOUS BLD VENIPUNCTURE: CPT | Mod: ORL | Performed by: FAMILY MEDICINE

## 2021-08-16 ENCOUNTER — LAB REQUISITION (OUTPATIENT)
Dept: LAB | Facility: CLINIC | Age: 86
End: 2021-08-16
Payer: COMMERCIAL

## 2021-08-16 DIAGNOSIS — I48.91 UNSPECIFIED ATRIAL FIBRILLATION (H): ICD-10-CM

## 2021-08-18 LAB — INR PPP: 2.43 (ref 0.85–1.15)

## 2021-08-18 PROCEDURE — 85610 PROTHROMBIN TIME: CPT | Mod: ORL | Performed by: FAMILY MEDICINE

## 2021-08-18 PROCEDURE — P9603 ONE-WAY ALLOW PRORATED MILES: HCPCS | Mod: ORL | Performed by: FAMILY MEDICINE

## 2021-08-18 PROCEDURE — 36415 COLL VENOUS BLD VENIPUNCTURE: CPT | Mod: ORL | Performed by: FAMILY MEDICINE

## 2021-09-14 ENCOUNTER — LAB REQUISITION (OUTPATIENT)
Dept: LAB | Facility: CLINIC | Age: 86
End: 2021-09-14
Payer: COMMERCIAL

## 2021-09-14 DIAGNOSIS — I48.20 CHRONIC ATRIAL FIBRILLATION, UNSPECIFIED (H): ICD-10-CM

## 2021-09-15 LAB — INR PPP: 2.34 (ref 0.85–1.15)

## 2021-09-15 PROCEDURE — 85610 PROTHROMBIN TIME: CPT | Mod: ORL | Performed by: FAMILY MEDICINE

## 2021-09-15 PROCEDURE — 36415 COLL VENOUS BLD VENIPUNCTURE: CPT | Mod: ORL | Performed by: FAMILY MEDICINE

## 2021-09-15 PROCEDURE — P9603 ONE-WAY ALLOW PRORATED MILES: HCPCS | Mod: ORL | Performed by: FAMILY MEDICINE

## 2021-09-25 ENCOUNTER — TELEPHONE (OUTPATIENT)
Facility: CLINIC | Age: 86
End: 2021-09-25

## 2021-09-25 ENCOUNTER — HOSPITAL ENCOUNTER (OUTPATIENT)
Facility: CLINIC | Age: 86
Setting detail: OBSERVATION
Discharge: HOME | End: 2021-09-26
Attending: EMERGENCY MEDICINE | Admitting: INTERNAL MEDICINE
Payer: COMMERCIAL

## 2021-09-25 ENCOUNTER — APPOINTMENT (OUTPATIENT)
Dept: ULTRASOUND IMAGING | Facility: CLINIC | Age: 86
End: 2021-09-25
Attending: EMERGENCY MEDICINE
Payer: COMMERCIAL

## 2021-09-25 ENCOUNTER — APPOINTMENT (OUTPATIENT)
Dept: GENERAL RADIOLOGY | Facility: CLINIC | Age: 86
End: 2021-09-25
Attending: EMERGENCY MEDICINE
Payer: COMMERCIAL

## 2021-09-25 DIAGNOSIS — M62.81 GENERALIZED MUSCLE WEAKNESS: Primary | ICD-10-CM

## 2021-09-25 DIAGNOSIS — M71.22 BAKER'S CYST OF KNEE, LEFT: ICD-10-CM

## 2021-09-25 DIAGNOSIS — Z86.79 HISTORY OF CHF (CONGESTIVE HEART FAILURE): ICD-10-CM

## 2021-09-25 DIAGNOSIS — I50.32 DIASTOLIC DYSFUNCTION WITH CHRONIC HEART FAILURE (H): ICD-10-CM

## 2021-09-25 DIAGNOSIS — E87.5 HYPERKALEMIA: ICD-10-CM

## 2021-09-25 DIAGNOSIS — Z79.01 LONG TERM CURRENT USE OF ANTICOAGULANT THERAPY: ICD-10-CM

## 2021-09-25 DIAGNOSIS — M79.89 LEG SWELLING: ICD-10-CM

## 2021-09-25 DIAGNOSIS — J90 PLEURAL EFFUSION: ICD-10-CM

## 2021-09-25 LAB
ALBUMIN SERPL-MCNC: 3.6 G/DL (ref 3.4–5)
ALBUMIN SERPL-MCNC: 3.6 G/DL (ref 3.4–5)
ALP SERPL-CCNC: 80 U/L (ref 40–150)
ALP SERPL-CCNC: 80 U/L (ref 40–150)
ALT SERPL W P-5'-P-CCNC: 48 U/L (ref 0–50)
ALT SERPL W P-5'-P-CCNC: 48 U/L (ref 0–50)
ANION GAP SERPL CALCULATED.3IONS-SCNC: 10 MMOL/L (ref 3–14)
AST SERPL W P-5'-P-CCNC: 59 U/L (ref 0–45)
AST SERPL W P-5'-P-CCNC: 59 U/L (ref 0–45)
ATRIAL RATE - MUSE: 86 BPM
BASOPHILS # BLD AUTO: 0 10E3/UL (ref 0–0.2)
BASOPHILS NFR BLD AUTO: 1 %
BILIRUB DIRECT SERPL-MCNC: 0.5 MG/DL (ref 0–0.2)
BILIRUB SERPL-MCNC: 2.3 MG/DL (ref 0.2–1.3)
BILIRUB SERPL-MCNC: 2.3 MG/DL (ref 0.2–1.3)
BUN SERPL-MCNC: 28 MG/DL (ref 7–30)
CALCIUM SERPL-MCNC: 8.9 MG/DL (ref 8.5–10.1)
CHLORIDE BLD-SCNC: 97 MMOL/L (ref 94–109)
CO2 SERPL-SCNC: 23 MMOL/L (ref 20–32)
CREAT SERPL-MCNC: 0.98 MG/DL (ref 0.52–1.04)
DIASTOLIC BLOOD PRESSURE - MUSE: NORMAL MMHG
DIGOXIN SERPL-MCNC: 1.3 UG/L
EOSINOPHIL # BLD AUTO: 0.1 10E3/UL (ref 0–0.7)
EOSINOPHIL NFR BLD AUTO: 1 %
ERYTHROCYTE [DISTWIDTH] IN BLOOD BY AUTOMATED COUNT: 15.6 % (ref 10–15)
GFR SERPL CREATININE-BSD FRML MDRD: 49 ML/MIN/1.73M2
GLUCOSE BLD-MCNC: 132 MG/DL (ref 70–99)
GLUCOSE BLDC GLUCOMTR-MCNC: 136 MG/DL (ref 70–99)
GLUCOSE BLDC GLUCOMTR-MCNC: 66 MG/DL (ref 70–99)
HCT VFR BLD AUTO: 40.2 % (ref 35–47)
HGB BLD-MCNC: 13.2 G/DL (ref 11.7–15.7)
HOLD SPECIMEN: NORMAL
IMM GRANULOCYTES # BLD: 0 10E3/UL
IMM GRANULOCYTES NFR BLD: 0 %
INR PPP: 2.58 (ref 0.85–1.15)
INTERPRETATION ECG - MUSE: NORMAL
LYMPHOCYTES # BLD AUTO: 1 10E3/UL (ref 0.8–5.3)
LYMPHOCYTES NFR BLD AUTO: 13 %
MCH RBC QN AUTO: 30.6 PG (ref 26.5–33)
MCHC RBC AUTO-ENTMCNC: 32.8 G/DL (ref 31.5–36.5)
MCV RBC AUTO: 93 FL (ref 78–100)
MONOCYTES # BLD AUTO: 0.6 10E3/UL (ref 0–1.3)
MONOCYTES NFR BLD AUTO: 8 %
NEUTROPHILS # BLD AUTO: 5.6 10E3/UL (ref 1.6–8.3)
NEUTROPHILS NFR BLD AUTO: 77 %
NRBC # BLD AUTO: 0 10E3/UL
NRBC BLD AUTO-RTO: 0 /100
NT-PROBNP SERPL-MCNC: 8794 PG/ML (ref 0–1800)
P AXIS - MUSE: NORMAL DEGREES
PLATELET # BLD AUTO: 148 10E3/UL (ref 150–450)
POTASSIUM BLD-SCNC: 5.6 MMOL/L (ref 3.4–5.3)
PR INTERVAL - MUSE: NORMAL MS
PROT SERPL-MCNC: 7.4 G/DL (ref 6.8–8.8)
PROT SERPL-MCNC: 7.4 G/DL (ref 6.8–8.8)
QRS DURATION - MUSE: 76 MS
QT - MUSE: 378 MS
QTC - MUSE: 383 MS
R AXIS - MUSE: 99 DEGREES
RBC # BLD AUTO: 4.31 10E6/UL (ref 3.8–5.2)
SARS-COV-2 RNA RESP QL NAA+PROBE: NEGATIVE
SODIUM SERPL-SCNC: 130 MMOL/L (ref 133–144)
SYSTOLIC BLOOD PRESSURE - MUSE: NORMAL MMHG
T AXIS - MUSE: 31 DEGREES
TROPONIN I SERPL-MCNC: 0.03 UG/L (ref 0–0.04)
VENTRICULAR RATE- MUSE: 62 BPM
WBC # BLD AUTO: 7.3 10E3/UL (ref 4–11)

## 2021-09-25 PROCEDURE — 258N000003 HC RX IP 258 OP 636: Performed by: EMERGENCY MEDICINE

## 2021-09-25 PROCEDURE — G0378 HOSPITAL OBSERVATION PER HR: HCPCS

## 2021-09-25 PROCEDURE — 85025 COMPLETE CBC W/AUTO DIFF WBC: CPT | Performed by: EMERGENCY MEDICINE

## 2021-09-25 PROCEDURE — 36415 COLL VENOUS BLD VENIPUNCTURE: CPT | Performed by: INTERNAL MEDICINE

## 2021-09-25 PROCEDURE — 84484 ASSAY OF TROPONIN QUANT: CPT | Performed by: EMERGENCY MEDICINE

## 2021-09-25 PROCEDURE — 99220 PR INITIAL OBSERVATION CARE,LEVEL III: CPT | Performed by: INTERNAL MEDICINE

## 2021-09-25 PROCEDURE — 36415 COLL VENOUS BLD VENIPUNCTURE: CPT | Performed by: EMERGENCY MEDICINE

## 2021-09-25 PROCEDURE — 96375 TX/PRO/DX INJ NEW DRUG ADDON: CPT

## 2021-09-25 PROCEDURE — 250N000012 HC RX MED GY IP 250 OP 636 PS 637: Mod: GY | Performed by: EMERGENCY MEDICINE

## 2021-09-25 PROCEDURE — 250N000013 HC RX MED GY IP 250 OP 250 PS 637: Mod: GY | Performed by: INTERNAL MEDICINE

## 2021-09-25 PROCEDURE — 80162 ASSAY OF DIGOXIN TOTAL: CPT | Performed by: EMERGENCY MEDICINE

## 2021-09-25 PROCEDURE — 85610 PROTHROMBIN TIME: CPT | Performed by: EMERGENCY MEDICINE

## 2021-09-25 PROCEDURE — 80076 HEPATIC FUNCTION PANEL: CPT | Performed by: EMERGENCY MEDICINE

## 2021-09-25 PROCEDURE — 96374 THER/PROPH/DIAG INJ IV PUSH: CPT

## 2021-09-25 PROCEDURE — 83880 ASSAY OF NATRIURETIC PEPTIDE: CPT | Performed by: EMERGENCY MEDICINE

## 2021-09-25 PROCEDURE — 87635 SARS-COV-2 COVID-19 AMP PRB: CPT | Performed by: EMERGENCY MEDICINE

## 2021-09-25 PROCEDURE — 71046 X-RAY EXAM CHEST 2 VIEWS: CPT

## 2021-09-25 PROCEDURE — 93970 EXTREMITY STUDY: CPT

## 2021-09-25 PROCEDURE — C9803 HOPD COVID-19 SPEC COLLECT: HCPCS

## 2021-09-25 PROCEDURE — 258N000001 HC RX 258: Performed by: EMERGENCY MEDICINE

## 2021-09-25 PROCEDURE — 250N000011 HC RX IP 250 OP 636: Performed by: EMERGENCY MEDICINE

## 2021-09-25 PROCEDURE — 82947 ASSAY GLUCOSE BLOOD QUANT: CPT | Performed by: EMERGENCY MEDICINE

## 2021-09-25 PROCEDURE — 93005 ELECTROCARDIOGRAM TRACING: CPT

## 2021-09-25 PROCEDURE — 99285 EMERGENCY DEPT VISIT HI MDM: CPT | Mod: 25

## 2021-09-25 RX ORDER — WARFARIN SODIUM 2.5 MG/1
2.5 TABLET ORAL
Status: COMPLETED | OUTPATIENT
Start: 2021-09-25 | End: 2021-09-25

## 2021-09-25 RX ORDER — AMOXICILLIN 250 MG
2 CAPSULE ORAL 2 TIMES DAILY PRN
Status: DISCONTINUED | OUTPATIENT
Start: 2021-09-25 | End: 2021-09-26 | Stop reason: HOSPADM

## 2021-09-25 RX ORDER — AMOXICILLIN 250 MG
1 CAPSULE ORAL 2 TIMES DAILY PRN
Status: DISCONTINUED | OUTPATIENT
Start: 2021-09-25 | End: 2021-09-26 | Stop reason: HOSPADM

## 2021-09-25 RX ORDER — ONDANSETRON 2 MG/ML
4 INJECTION INTRAMUSCULAR; INTRAVENOUS EVERY 6 HOURS PRN
Status: DISCONTINUED | OUTPATIENT
Start: 2021-09-25 | End: 2021-09-26 | Stop reason: HOSPADM

## 2021-09-25 RX ORDER — DEXTROSE MONOHYDRATE 25 G/50ML
25 INJECTION, SOLUTION INTRAVENOUS ONCE
Status: COMPLETED | OUTPATIENT
Start: 2021-09-25 | End: 2021-09-25

## 2021-09-25 RX ORDER — ACETAMINOPHEN 500 MG
1000 TABLET ORAL 3 TIMES DAILY
Status: ON HOLD | COMMUNITY
End: 2023-01-01

## 2021-09-25 RX ORDER — METOPROLOL SUCCINATE 25 MG/1
12.5 TABLET, EXTENDED RELEASE ORAL EVERY EVENING
Status: ON HOLD | COMMUNITY
End: 2023-01-01

## 2021-09-25 RX ORDER — FUROSEMIDE 10 MG/ML
40 INJECTION INTRAMUSCULAR; INTRAVENOUS ONCE
Status: COMPLETED | OUTPATIENT
Start: 2021-09-25 | End: 2021-09-25

## 2021-09-25 RX ORDER — ONDANSETRON 4 MG/1
4 TABLET, ORALLY DISINTEGRATING ORAL EVERY 6 HOURS PRN
Status: DISCONTINUED | OUTPATIENT
Start: 2021-09-25 | End: 2021-09-26 | Stop reason: HOSPADM

## 2021-09-25 RX ORDER — FUROSEMIDE 10 MG/ML
40 INJECTION INTRAMUSCULAR; INTRAVENOUS ONCE
Status: COMPLETED | OUTPATIENT
Start: 2021-09-26 | End: 2021-09-26

## 2021-09-25 RX ORDER — LIDOCAINE 40 MG/G
CREAM TOPICAL
Status: DISCONTINUED | OUTPATIENT
Start: 2021-09-25 | End: 2021-09-26 | Stop reason: HOSPADM

## 2021-09-25 RX ORDER — ACETAMINOPHEN 325 MG/1
650 TABLET ORAL EVERY 6 HOURS PRN
Status: DISCONTINUED | OUTPATIENT
Start: 2021-09-25 | End: 2021-09-26 | Stop reason: HOSPADM

## 2021-09-25 RX ORDER — METOPROLOL TARTRATE 25 MG/1
12.5 TABLET, FILM COATED ORAL DAILY
Status: ON HOLD | COMMUNITY
End: 2021-09-26

## 2021-09-25 RX ORDER — TRIAMCINOLONE ACETONIDE 1 MG/G
CREAM TOPICAL 2 TIMES DAILY
Status: ON HOLD | COMMUNITY
End: 2023-01-01

## 2021-09-25 RX ORDER — ACETAMINOPHEN 650 MG/1
650 SUPPOSITORY RECTAL EVERY 6 HOURS PRN
Status: DISCONTINUED | OUTPATIENT
Start: 2021-09-25 | End: 2021-09-26 | Stop reason: HOSPADM

## 2021-09-25 RX ADMIN — FUROSEMIDE 40 MG: 10 INJECTION, SOLUTION INTRAVENOUS at 20:13

## 2021-09-25 RX ADMIN — WARFARIN SODIUM 2.5 MG: 2.5 TABLET ORAL at 22:07

## 2021-09-25 RX ADMIN — METOPROLOL SUCCINATE 12.5 MG: 25 TABLET, EXTENDED RELEASE ORAL at 22:07

## 2021-09-25 RX ADMIN — DEXTROSE MONOHYDRATE 25 G: 500 INJECTION PARENTERAL at 20:35

## 2021-09-25 RX ADMIN — SODIUM CHLORIDE 5.2 UNITS: 9 INJECTION, SOLUTION INTRAVENOUS at 20:35

## 2021-09-25 ASSESSMENT — MIFFLIN-ST. JEOR: SCORE: 933.39

## 2021-09-25 NOTE — ED TRIAGE NOTES
Daughter states spoke to cardiologist today.  Hx of CHF.  Feeling more weak, edema to legs, dry mouth, swelling to right ankle.  Feels tightness.  Frequent urination.  Daughter states decreased alertness, sleeping a lot.

## 2021-09-25 NOTE — ED PROVIDER NOTES
History     Chief Complaint:  Leg Swelling and Generalized Weakness      The history is provided by a relative and the patient. History limited by: Kai historian.   History supplemented by electronic chart review     Elizabeth Ruggiero is a 89 year old female, on Lasix twice daily and anticoagulated on coumadin, with a history of CHF, atrial fibrillation, hypertension, and DVT, among others, who presents with bilateral leg swelling, left worse than right, and with associated difficulty walking and a 5 lb weight gain in the past week. She also complains of dry mouth, fatigue, and generalized weakness. The patient denies any chest pain, shortness of breath, cough, fever, or vomiting. She reports that her urination and bowel movements have been normal. She lives in assisted living and uses a walker at baseline. Notably, she was hospitalized in October 2020 for water retention with a 20 lb weight gain.    Review of Systems   Reason unable to perform ROS: Kai varelaian.       Allergies:  The patient has no known allergies.    Medications:    Fosamax  Maalox  Lanoxin  Lasix  Cortaid  Lopressor  Prilosec  Senokot  Coumadin    Past Medical History:    Congestive heart failure  Acute left hemiparesis  Arthritis  Atrial fibrillation  Atrial flutter  Pneumonia  DVT of lower extremity  Hepatic congestion  Hypertension  Hyperlipidemia  Mitral valve insufficiency  Severe mitral regurgitation  TIA  Hyponatremia  Diastolic dysfunction with chronic heart failure  Osteoporosis  Right lumbar radiculitis  Intertrochanteric fracture of left femur  Displacement of lumbar   Spinal stenosis  Lumbosacral radiculitis  Lumbosacral spondylosis without myelopathy  Rheumatic disorders of both mitral and aortic valves  Gastritis  Colitis     Past Surgical History:    Appendectomy  Fracture surgery  Left femur repair    Family History:    Colon cancer - mother  Breast cancer - sister  Diabetes - sister    Social History:  Patient presents with  "daughter. She lives alone in assisted living.    Physical Exam     Patient Vitals for the past 24 hrs:   BP Temp Temp src Pulse Resp SpO2 Height Weight   09/25/21 2100 133/74 97.3  F (36.3  C) Axillary 84 18 96 % -- --   09/25/21 2030 -- -- -- 86 22 97 % -- --   09/25/21 2000 -- -- -- 74 21 -- -- --   09/25/21 1928 115/59 -- -- 55 20 96 % -- --   09/25/21 1730 115/67 -- -- -- -- -- -- --   09/25/21 1642 -- -- -- -- 20 -- -- --   09/25/21 1551 133/70 97.8  F (36.6  C) Temporal 74 -- 95 % 1.676 m (5' 6\") 52.2 kg (115 lb)     Physical Exam  General: Nontoxic appearing woman sitting upright in room 6, daughter at bedside  HENT: mucous membranes moist  CV: rate as above, slightly irregular rhythm, 2+ symmetric lower extremity edema perhaps slightly greater on the left, no JVD, palpable symmetric foot pulses, compartments soft in both legs  Resp: normal effort, speaks in full phrases, no stridor, no cough observed, slightly decreased at right base  GI: abdomen soft and nontender, no guarding  MSK: no bony tenderness to lower extremities  Skin: appropriately warm and dry, no erythema, no ecchymosis  Neuro: alert, clear speech, oriented though poor historian, CHRISTY  Psych: cooperative      Emergency Department Course   ECG:  ECG taken at 1627, ECG read at 1632  Atrial fibrillation  Septal infarct, old  Lateral infarct, old  Changes noted above as compared to prior dated 10/23/2020.  Rate 62 bpm. NC interval * ms. QRS duration 76 ms. QT/QTc 378/383 ms. P-R-T axes * 99 31.     Imaging:  US Lower Extremity Venous Duplex Bilateral  1.  No deep venous thrombosis in either lower extremity.   2.  Probable Baker's cyst in left popliteal fossa.  As read by Radiology.    XR Chest 2 Views  1. Lungs are persistently hyperaerated.   2. Small to moderate size right pleural fluid collection may have slightly decreased since the prior study.   3. Marked cardiomegaly is again noted.   4. Mitral valve annular calcifications are again noted. "   5. No definite pulmonary edema to confirm congestive heart failure  As read by Radiology.    Laboratory:  BNP: 8794 (H)    CMP: sodium 130 (L), potassium 5.6 (H), glucose 132 (H), AST 59 (H), Bilirubin 2.3 (H), GFR estimate 49 (L), o/w WNL (Creatinine 0.98)    Glucose (1954): 66 (L)    Glucose (2034): 136 (H)    Troponin (Collected 1617): 0.032    Hepatic panel: Bilirubin total 2.3 (H), Bilirubin direct 0.5 (H), AST 59 (H)    CBC: WBC 7.3, HGB 13.2,  (L)    Digoxin: 1.3    INR: 2.58 (H)    Asymptomatic Covid PCR: negative    UA with microscopic: ordered    Emergency Department Course:    Reviewed:  I reviewed nursing notes, vitals, past history and care everywhere    Assessments:  1637 I obtained history and examined the patient as noted above.     Consults:   1950 I spoke with Dr. Diez, admitting hospitalist.    Interventions:  2013 Lasix, 40 mg, IV  2035 Dextrose 50% injection, 25 g IV  2035 Insulin, 5.2 units, IV    Disposition:  The patient was admitted to the hospital under the care of Dr. Diez.    Impression & Plan      Medical Decision Making:  Bilateral leg swelling and weight gain can be explained by some degree of CHF exacerbation, noting that she is already on diuretics, and does have a pleural effusion, though she does not have acute respiratory failure at this time.  She describes generalized weakness and her daughter expresses concern about her safety if she were to be discharged, and furthermore I think that she would benefit from close monitoring and further treatment of her mild hyperkalemia.  Interestingly, daughter states that she is on supplemental potassium, and this will need to be discontinued at least for now.  No indication for immediate dialysis.  I have arranged for admission to a monitored bed under the care of the hospitalist service.    Diagnosis:    ICD-10-CM    1. Leg swelling  M79.89    2. History of CHF (congestive heart failure)  Z86.79    3. Hyperkalemia  E87.5     4. Pleural effusion  J90    5. Baker's cyst of knee, left  M71.22    6. Long term current use of anticoagulant therapy  Z79.01        Scribe Disclosure:  I, Martha Cota (trainee) and Kane Saravia (), am serving as a scribe at 4:35 PM on 9/25/2021 to document services personally performed by Moustapha Márquez MD based on my observations and the provider's statements to me.     This note was completed in part using Dragon voice recognition software. Although reviewed after completion, some word and grammatical errors may occur.       Moustapha Márquez MD  09/26/21 0031

## 2021-09-25 NOTE — TELEPHONE ENCOUNTER
Telephone call:   Patient's daughter calls.  The patient apparently is having lower extremity edema, mild shortness of breath, fatigue, weight gain.    Patient is known to have severe mitral regurgitation.  There was some work-up for a potential intervention on this and the patient did not want to proceed with a transesophageal echocardiogram which was a prerequisite for proceeding with mitral valve intervention.    I spoke with the daughter.  Unfortunately is difficult for me to assess the patient appropriately as I am only interacting with him over the telephone.  I advised that the patient go to the emergency department for an evaluation.  She may need to be admitted for diuresis.  However this is difficult to know without an evaluation.

## 2021-09-26 ENCOUNTER — APPOINTMENT (OUTPATIENT)
Dept: PHYSICAL THERAPY | Facility: CLINIC | Age: 86
End: 2021-09-26
Attending: INTERNAL MEDICINE
Payer: COMMERCIAL

## 2021-09-26 VITALS
DIASTOLIC BLOOD PRESSURE: 68 MMHG | WEIGHT: 120.37 LBS | SYSTOLIC BLOOD PRESSURE: 122 MMHG | BODY MASS INDEX: 19.35 KG/M2 | TEMPERATURE: 98.3 F | RESPIRATION RATE: 16 BRPM | HEIGHT: 66 IN | HEART RATE: 69 BPM | OXYGEN SATURATION: 98 %

## 2021-09-26 LAB
ALBUMIN SERPL-MCNC: 3.2 G/DL (ref 3.4–5)
ALBUMIN UR-MCNC: NEGATIVE MG/DL
ALP SERPL-CCNC: 71 U/L (ref 40–150)
ALT SERPL W P-5'-P-CCNC: 36 U/L (ref 0–50)
ANION GAP SERPL CALCULATED.3IONS-SCNC: 8 MMOL/L (ref 3–14)
APPEARANCE UR: CLEAR
AST SERPL W P-5'-P-CCNC: 34 U/L (ref 0–45)
BACTERIA #/AREA URNS HPF: ABNORMAL /HPF
BILIRUB SERPL-MCNC: 1.7 MG/DL (ref 0.2–1.3)
BILIRUB UR QL STRIP: NEGATIVE
BUN SERPL-MCNC: 26 MG/DL (ref 7–30)
CALCIUM SERPL-MCNC: 8.9 MG/DL (ref 8.5–10.1)
CHLORIDE BLD-SCNC: 97 MMOL/L (ref 94–109)
CO2 SERPL-SCNC: 26 MMOL/L (ref 20–32)
COLOR UR AUTO: ABNORMAL
CREAT SERPL-MCNC: 0.94 MG/DL (ref 0.52–1.04)
GFR SERPL CREATININE-BSD FRML MDRD: 52 ML/MIN/1.73M2
GLUCOSE BLD-MCNC: 80 MG/DL (ref 70–99)
GLUCOSE BLDC GLUCOMTR-MCNC: 79 MG/DL (ref 70–99)
GLUCOSE BLDC GLUCOMTR-MCNC: 80 MG/DL (ref 70–99)
GLUCOSE BLDC GLUCOMTR-MCNC: 84 MG/DL (ref 70–99)
GLUCOSE UR STRIP-MCNC: NEGATIVE MG/DL
HGB UR QL STRIP: ABNORMAL
INR PPP: 2.73 (ref 0.85–1.15)
KETONES UR STRIP-MCNC: NEGATIVE MG/DL
LEUKOCYTE ESTERASE UR QL STRIP: ABNORMAL
MUCOUS THREADS #/AREA URNS LPF: PRESENT /LPF
NITRATE UR QL: NEGATIVE
PH UR STRIP: 6.5 [PH] (ref 5–7)
POTASSIUM BLD-SCNC: 3.7 MMOL/L (ref 3.4–5.3)
POTASSIUM BLD-SCNC: 3.8 MMOL/L (ref 3.4–5.3)
PROT SERPL-MCNC: 6.4 G/DL (ref 6.8–8.8)
RBC URINE: 5 /HPF
SODIUM SERPL-SCNC: 131 MMOL/L (ref 133–144)
SP GR UR STRIP: 1 (ref 1–1.03)
UROBILINOGEN UR STRIP-MCNC: NORMAL MG/DL
WBC URINE: 0 /HPF

## 2021-09-26 PROCEDURE — 84132 ASSAY OF SERUM POTASSIUM: CPT | Mod: 59 | Performed by: INTERNAL MEDICINE

## 2021-09-26 PROCEDURE — 85610 PROTHROMBIN TIME: CPT | Performed by: INTERNAL MEDICINE

## 2021-09-26 PROCEDURE — 80053 COMPREHEN METABOLIC PANEL: CPT | Performed by: INTERNAL MEDICINE

## 2021-09-26 PROCEDURE — 99207 PR APP CREDIT; MD BILLING SHARED VISIT: CPT | Performed by: PHYSICIAN ASSISTANT

## 2021-09-26 PROCEDURE — 250N000011 HC RX IP 250 OP 636: Performed by: INTERNAL MEDICINE

## 2021-09-26 PROCEDURE — 81001 URINALYSIS AUTO W/SCOPE: CPT | Performed by: INTERNAL MEDICINE

## 2021-09-26 PROCEDURE — 84132 ASSAY OF SERUM POTASSIUM: CPT | Performed by: INTERNAL MEDICINE

## 2021-09-26 PROCEDURE — 97116 GAIT TRAINING THERAPY: CPT | Mod: GP | Performed by: PHYSICAL THERAPIST

## 2021-09-26 PROCEDURE — 97161 PT EVAL LOW COMPLEX 20 MIN: CPT | Mod: GP | Performed by: PHYSICAL THERAPIST

## 2021-09-26 PROCEDURE — 250N000013 HC RX MED GY IP 250 OP 250 PS 637: Performed by: INTERNAL MEDICINE

## 2021-09-26 PROCEDURE — 96376 TX/PRO/DX INJ SAME DRUG ADON: CPT

## 2021-09-26 PROCEDURE — G0378 HOSPITAL OBSERVATION PER HR: HCPCS

## 2021-09-26 PROCEDURE — 97530 THERAPEUTIC ACTIVITIES: CPT | Mod: GP | Performed by: PHYSICAL THERAPIST

## 2021-09-26 PROCEDURE — 36415 COLL VENOUS BLD VENIPUNCTURE: CPT | Performed by: INTERNAL MEDICINE

## 2021-09-26 RX ORDER — FUROSEMIDE 20 MG
20 TABLET ORAL DAILY
COMMUNITY
Start: 2021-09-26 | End: 2022-03-08

## 2021-09-26 RX ORDER — WARFARIN SODIUM 2.5 MG/1
2.5 TABLET ORAL
Status: DISCONTINUED | OUTPATIENT
Start: 2021-09-26 | End: 2021-09-26 | Stop reason: HOSPADM

## 2021-09-26 RX ADMIN — ACETAMINOPHEN 650 MG: 325 TABLET, FILM COATED ORAL at 12:28

## 2021-09-26 RX ADMIN — ACETAMINOPHEN 650 MG: 325 TABLET, FILM COATED ORAL at 03:51

## 2021-09-26 RX ADMIN — FUROSEMIDE 40 MG: 10 INJECTION, SOLUTION INTRAVENOUS at 07:42

## 2021-09-26 ASSESSMENT — MIFFLIN-ST. JEOR: SCORE: 957.75

## 2021-09-26 NOTE — CONSULTS
"Will fax orders to [facility] [phone] (done)    Care Management Initial Consult    General Information  Assessment completed with: PatientElizabeth  Type of CM/SW Visit: Initial Assessment  Primary Care Provider verified and updated as needed: Yes ([facility] Dr. Gelacio Downs)   Readmission within the last 30 days:  (no)      Reason for Consult: discharge planning  Advance Care Planning:    POLST dated 2/15/19 on file in EPIC     Communication Assessment  Patient's communication style: spoken language (English or Bilingual)    Hearing Difficulty or Deaf: yes   Wear Glasses or Blind: no    Cognitive  Cognitive/Neuro/Behavioral: WDL                      Living Environment:   People in home: alone, facility resident  n/a  Current living Arrangements: assisted living  Name of Facility: [facility] [address]    Able to return to prior arrangements: yes  Living Arrangement Comments: per therapy assessment \"dependent at baseline with all ADLs including cooking, cleaning, bathing, and dressing. Pt reports ambulating at baseline with a FWW. Pt reports being able to ambulate ~200' w/ FWW before needing a rest break. Pt gets assist with errand management.\"     Family/Social Support:  Care provided by: self (with support of facility staff )  Provides care for:  (no one )  Marital Status:   Support system: Facility resident(s)/Staff, Children          Description of Support System:    Supportive, involved    Current Resources:   Patient receiving home care services: No   Community Resources:  ([facility] support )  Equipment currently used at home: walker, standard  Supplies currently used at home:  (n/a)    Employment/Financial:  Employment Status: retired      Financial Concerns: No concerns identified    Finance Comments: active [facility] [employer] ADV[employer] insurance     Lifestyle & Psychosocial Needs:  Outpatient \"social determinant of health\" assessment not done .    Functional " Status:  Assesssment of Functional Status:  (see therapy assessment )    Mental Health Status:  Mental Health Status: No Current Concerns       Chemical Dependency Status:  Chemical Dependency Status: No Current Concerns        Values/Beliefs:  Spiritual, Cultural Beliefs, Judaism Practices, Values that affect care: no          Values/Beliefs Comment: Jessie    Additional Information:  Met with patient via phone call in room, introduced self and role in discharge planning.  Patient confirms she is aware Home Health therapies are recommended; she used an agency last year that was affiliated with her [facility].  She does not recall the name but would like to use them again.  Left Voicemail with [facility] ([phone] and nursing via [phone]) to inquire the name of the agency, and if they need me to fax orders to a second location.  No callback yet.  Per documentation 10/11/2020 the company is [employer].  The [facility] website indicates they do contract therapies but the phone [phone] indicates their phone lines are closed on the weekend.      Chioma Garcia RN, BSN, PHN  [facility]  Inpatient Care Management - FLOAT  Mobile: [phone] 09/26/21 until 4pm  (after today's date, please call the patient's unit)

## 2021-09-26 NOTE — PROGRESS NOTES
RECEIVING UNIT ED HANDOFF REVIEW    ED Nurse Handoff Report was reviewed by: Nik Bell RN on September 25, 2021 at 8:24 PM

## 2021-09-26 NOTE — PROGRESS NOTES
"   09/26/21 0900   Quick Adds   Quick Adds Certification   Type of Visit Initial PT Evaluation       Present no   Living Environment   People in home facility resident   Current Living Arrangements assisted living   Home Accessibility no concerns   Transportation Anticipated family or friend will provide   Living Environment Comments Pt is a resident at an [facility]. No concerns regarding stairs. Pt's daughter will pick her up upon discharge. Pt has 24/7 assist as needed.   Self-Care   Usual Activity Tolerance moderate   Current Activity Tolerance fair   Regular Exercise No   Equipment Currently Used at Home walker, standard   Activity/Exercise/Self-Care Comment Pt reports being dependent at baseline with all ADLs including cooking, cleaning, bathing, and dressing. Pt reports ambulating at baseline with a FWW. Pt reports being able to ambulate ~200' w/ FWW before needing a rest break. Pt gets assist with errand management.   Disability/Function   Hearing Difficulty or Deaf yes   Patient's preferred means of communication English speaker with hearing loss, no speech problems.   Describe hearing loss bilateral hearing loss   Use of hearing assistive devices bilateral hearing aids   Wear Glasses or Blind no   Concentrating, Remembering or Making Decisions Difficulty no   Difficulty Communicating no   Walking or Climbing Stairs Difficulty yes   Walking or Climbing Stairs ambulation difficulty, requires equipment   Doing Errands Independently Difficulty (such as shopping) no   Fall history within last six months no   Change in Functional Status Since Onset of Current Illness/Injury no   General Information   Onset of Illness/Injury or Date of Surgery 09/26/21   Referring Physician Audi Diez MD   Patient/Family Therapy Goals Statement (PT) \"To go home\"   Pertinent History of Current Problem (include personal factors and/or comorbidities that impact the POC) Per Chart: Elizabeth Ruggiero is a 89 year " old female with past medical history of chronic atrial fibrillation on anticoagulation, diastolic heart failure, hypertension, severe mitral regurgitation, history of TIA who presents for evaluation of generalized weakness and leg swelling. She is admitted under observation for suspected acute on chronic HFpEF causing leg edema and monitoring of hyperkalemia.    Existing Precautions/Restrictions fall   Weight-Bearing Status - LLE full weight-bearing   Weight-Bearing Status - RLE full weight-bearing   Cognition   Orientation Status (Cognition) oriented x 3   Pain Assessment   Patient Currently in Pain No   Posture    Posture Forward head position;Protracted shoulders   Range of Motion (ROM)   ROM Quick Adds ROM WFL   Strength   Manual Muscle Testing Quick Adds Able to perform R SLR;Able to perform L SLR   Strength Comments R and L Hip Flexion: 3/5   Bed Mobility   Comment (Bed Mobility) Supine>sit w/ CGA   Transfers   Transfer Safety Comments Sit>stand w/ FWW and CGA   Gait/Stairs (Locomotion)   [place] Level (Gait) contact guard   Assistive Device (Gait) walker, front-wheeled   Distance in Feet (Required for LE Total Joints) 40'  (10' eval)   Comment (Gait/Stairs) Pt ambulated ~40' w/ FWW and CGA. Pt ambulated with decreased gait speed, downward gaze, decreased step length, and FWW out too far in front of pt. Verbal cues for upright gaze and posture, to increase step length, and to keep FWW within close proximity of pt at all times for added stability and support. Pt had no LOB during ambulation.   Balance   Balance Comments Pt able to sit at EOB unsupported without LOB. Pt ambulates using a FWW for added stability and support.   Sensory Examination   Sensory Perception patient reports no sensory changes   Clinical Impression   Criteria for Skilled Therapeutic Intervention yes, treatment indicated   PT Diagnosis (PT) Impaired gait   Influenced by the following impairments Decreased activity tolerance; decreased  balance; decreased strength   Functional limitations due to impairments Impaired functional mobility   Clinical Presentation Stable/Uncomplicated   Clinical Presentation Rationale Clinical Judgement   Clinical Decision Making (Complexity) low complexity   Therapy Frequency (PT) Daily   Predicted Duration of Therapy Intervention (days/wks) 5 days   Planned Therapy Interventions (PT) balance training;bed mobility training;gait training;patient/family education;strengthening;transfer training   Risk & Benefits of therapy have been explained evaluation/treatment results reviewed;care plan/treatment goals reviewed;risks/benefits reviewed;current/potential barriers reviewed;participants voiced agreement with care plan;participants included;patient   PT Discharge Planning    PT Discharge Recommendation (DC Rec) home with home care physical therapy;home with assist   PT Rationale for DC Rec Pt is below baseline but is improving. Anticipate by time of discharge pt will be SBA for bed mobility, functional transfers, and gait w/ FWW. Pt will have 24/7 assist at [facility] as needed. Pt would benefit from HHPT to address deficits in activity tolerance, balance, and strength. Pt does not drive and would take a taxing effort to attend OP PT.   PT Brief overview of current status  Supine>sit w/ CGA; sit>stand w/ FWW and CGA; gait w/ FWW and CGA   Therapy Certification   Start of care date 09/26/21   Certification date from 09/26/21   Certification date to 10/03/21   Medical Diagnosis Acute Pancreatitis   Total Evaluation Time   Total Evaluation Time (Minutes) 10

## 2021-09-26 NOTE — PROGRESS NOTES
Discharge criteria met. Discharge order received. All discharge instructions reviewed with pt. Pt verbalized understanding. All questions answered. PIV removed. All personal belongs returned to pt. Pt left the unit via W/C in stable condition accompanied with daughter.

## 2021-09-26 NOTE — PROGRESS NOTES
"New admit from ED. A&OX4. [place]. A-1. [place]. Daughter will bring pt's glasses and hearing aids from home in AM. [place]. +2 edema on BLE. Denies CP or SOB. Tele: A-fib CVR. No fever. VSS. Latest Vitals: Blood pressure 133/74, pulse 84, temperature 97.3  F (36.3  C), temperature source Axillary, resp. rate 18, height 1.676 m (5' 6\"), weight 52.2 kg (115 lb), SpO2 96 %, not currently breastfeeding.          "

## 2021-09-26 NOTE — PHARMACY-ANTICOAGULATION SERVICE
Clinical Pharmacy - Warfarin Dosing Consult     Pharmacy has been consulted to manage this patient s warfarin therapy.  Indication: Atrial Fibrillation  Therapy Goal: INR 2-3  Warfarin Prior to Admission: Yes  Warfarin PTA Regimen: 1.25 mg Tues/Thurs, 2.5 mg all other days  Recent documented change in oral intake/nutrition: Unknown  Dose Comments: 2.5 mg    INR   Date Value Ref Range Status   09/25/2021 2.58 (H) 0.85 - 1.15 Final     Comment:     Effective 7/11/2021, the reference range for this assay has changed.   09/15/2021 2.34 (H) 0.85 - 1.15 Final     Comment:     Effective 7/11/2021, the reference range for this assay has changed.       Recommend warfarin 2.5 mg today.  Pharmacy will monitor Elizabeth Ruggiero daily and order warfarin doses to achieve specified goal.      Please contact pharmacy as soon as possible if the warfarin needs to be held for a procedure or if the warfarin goals change.      Emely Henao, LeniD, BCPS

## 2021-09-26 NOTE — PLAN OF CARE
AVSS; tele A-Fib with CVR; neck and back pain controlled with tylenol, warm packs, and repositioning; purewick in place with good urine output; K+ recheck 3.7; blood glucoses 86 and 80; UA sent and MD notified with no further orders received; CM, SW, and PT consults ordered.

## 2021-09-26 NOTE — PLAN OF CARE
[facility]      OUTPATIENT PHYSICAL THERAPY EVALUATION  PLAN OF TREATMENT FOR OUTPATIENT REHABILITATION  (COMPLETE FOR INITIAL CLAIMS ONLY)  Patient's Last Name, First Name, M.I.  YOB: 1926  Elizabeth Ruggiero                           Provider's Name  [facility] Medical Record No.  [id number]                               Onset Date:  09/26/21   Start of Care Date:  09/26/21      Type:     _X_PT   ___OT   ___SLP Medical Diagnosis:  Acute Pancreatitis                        PT Diagnosis:  Impaired gait   Visits from SOC:  1   _________________________________________________________________________________  Plan of Treatment/Functional Goals    Planned Interventions: balance training, bed mobility training, gait training, patient/family education, strengthening, transfer training     Goals: See Physical Therapy Goals on Care Plan in [facility] electronic health record.    Therapy Frequency: Daily  Predicted Duration of Therapy Intervention: 5 days  _________________________________________________________________________________    I CERTIFY THE NEED FOR THESE SERVICES FURNISHED UNDER        THIS PLAN OF TREATMENT AND WHILE UNDER MY CARE     (Physician co-signature of this document indicates review and certification of the therapy plan).              Certification date from: 09/26/21, Certification date to: 10/03/21    Referring Physician: Audi Diez MD            Initial Assessment        See Physical Therapy evaluation dated 09/26/21 in Epic electronic health record.

## 2021-09-26 NOTE — H&P
[facility]    History and Physical - Hospitalist Service       Date of Admission:  9/25/2021    Assessment & Plan      Elizabeth Ruggiero is a 89 year old female with past medical history of chronic atrial fibrillation on anticoagulation, diastolic heart failure, hypertension, severe mitral regurgitation, history of TIA who presents for evaluation of generalized weakness and leg swelling. She is admitted under observation for suspected acute on chronic HFpEF causing leg edema and monitoring of hyperkalemia.     Probably Acute on chronic HFpEF  Severe mitral regurgitation  HTN  Presents with feeling generalized weakness. She is noted to have elevated n-terminal proBNP 8794 ( 9100 in 10/2020, but 5-6K range previously.) has trace LE edema.  Reported 5Ibs wt gain over the last 2 weeks. No reported dyspnea. CXR shows  Improved small right sided pleural effusion. Troponin 0.032. She is not hypoxic.   LE US is negative for DVT. Has received Lasix 40mg IV x 1 in Ed.   - ordered another dose of Lasix 40mg IV x 1 for the morning, reasses further IV needs  - monitor I/Os and daily weight  - continue PTA       Hyperkalemia  - cr is normal range.  She is on K supplement which may be contributing. She has received insulin/dextrose and lasix in ED.  - tele  Ordered  - recheck potassium after admission to ensure downtrend  - hold PTA KCl supplement    Hyponatremia  Suspect this may be related to CHF as above.   Na 130 on admission. Has received Lasix 40mg IV x 1 in ED  - f/u labs in AM    Generalized weakness  Suspect this could be related to CHF, or electrolyte abnormality. Management of electrolytes as above  - will check UA to r/o urinary tract infection  - PT consulted  - care transition team for discharge planning    Atrial fibrillation  - continue with warfarin per pharmD dosing  - digoxin level 1.3  - hold off digoxin for now until K normalizes, can be resumed in AM  - resume PTA Metoprolol    GERD  -  continue with PTA PPI    Elevated bilirubing/AST  Has had previous elevation and it was felt that this was possible related to hepatic congestion. She was seen by GI in 10/2020 and appears as she did not wish procedures. She does not have right upper quadrant tenderness  - chadd is 2.3 (previously ranged from 1.5-2.4) and AST 59   - monitor     Diet: Combination Diet Low Saturated Fat Na <2400mg Diet  DVT Prophylaxis: Warfarin  Wesley Catheter: Not present  Central Lines: None  Code Status: Full Code(discussed with patient and her daughter at bedside, there is also POLST in chart from 2019)    Clinically Significant Risk Factors Present on Admission        # Hyperkalemia: K = 5.6 mmol/L (Ref range: 3.4 - 5.3 mmol/L) on admission, will monitor as appropriate  # Hyponatremia: Na = 130 mmol/L (Ref range: 133 - 144 mmol/L) on admission, will monitor as appropriate     # Coagulation Defect: home medication list includes an anticoagulant medication       Disposition Plan   Expected discharge:  24-48 hours recommended to prior living arrangement once safe disposition plan/ TCU bed available and stable electrolytes, PO diuretics started, PT Evaluation.     The patient's care was discussed with the Patient and Patient's Family.    Audi Diez MD  [facility]  Securely message with the [employer] Web Console (learn more here)  Text page via [employer] Paging/Directory      ______________________________________________________________________    Chief Complaint   Generalized weakness    History is obtained from the patient, patient's daughter, discussion with ED MD and chart review    History of Present Illness   Elizabeth Ruggiero is a 89 year old female with past medical history of chronic atrial fibrillation on anticoagulation, diastolic heart failure, hypertension, severe mitral regurgitation, history of TIA who presents for evaluation of generalized weakness and leg swelling. Per patient, her legs have  been feeling week the since Tuesday for about the last 4-5 days. Her [facility] staff noted some ankle swelling this morning. Patient/dtr also report about 5Ibs wt gain over the last 2 week period. Patient otherwise denies chest pain, shortness of breath, cough, nausea, vomiting, abdominal pain, fever, chills, denies dysuria.  States struggles with constipation generally, but that is currently an  Issue. She also complains of dry mouth.   No recent change in Lasix dosing. She also takes potassium supplement with lasix.     In the ED, she is afebrile, blood pressure 115/59, heart rate 55 and saturating 96% on room air.  Laboratory shows sodium of 130, potassium of 5.6, creatinine of 0.98.  Total bili 2.3, AST 59, ALT within normal range.  N-terminal proBNP 8794, troponin 0 0.032.  Hemoglobin is 13.2.  Digoxin level 1.3.  Lower extremity ultrasound negative for DVT.  EKG shows atrial fibrillation, septal age undetermined.  Chest x-ray shows a small to moderate size right pleural fluid collection may have slightly decreased since the prior study, marked cardiomegaly, no definite pulmonary edema.  Patient is given Lasix 40 mg IV x1 for possible CHF and given dextrose/insulin for hyperkalemia.  Admission under observation requested for further management.     Review of Systems    The 10 point Review of Systems is negative other than noted in the HPI    Past Medical History    I have reviewed this patient's medical history and updated it with pertinent information if needed.   Past Medical History:   Diagnosis Date     (HFpEF) heart failure with preserved ejection fraction (H)      Acute diastolic CHF (congestive heart failure) (H)      Acute left hemiparesis (H) 2/9/2019     Arthritis      Atrial fibrillation (H)      Atrial fibrillation with RVR (H)      Atrial flutter (H)      Atrial flutter with rapid ventricular response (H) 4/23/2019     Community acquired pneumonia      Diastolic CHF (H) 04/29/2019     DVT of lower  extremity (deep venous thrombosis) (H)     recurrent     Hepatic congestion     improved with diuretics for CHF     HTN (hypertension)      Hyperlipidemia      Mitral valve insufficiency      Pneumonia      Postmenopausal bleeding 2020    [facility]     Severe mitral regurgitation      SOB (shortness of breath)      TIA (transient ischemic attack)      Vitamin D deficiency        Past Surgical History   I have reviewed this patient's surgical history and updated it with pertinent information if needed.  Past Surgical History:   Procedure Laterality Date     APPENDECTOMY       FRACTURE SURGERY       repair left femur         Social History   I have reviewed this patient's social history and updated it with pertinent information if needed.  Social History     Tobacco Use     Smoking status: Never Smoker     Smokeless tobacco: Never Used   Substance Use Topics     Alcohol use: No     Drug use: No       Family History   I have reviewed this patient's family history and updated it with pertinent information if needed.  Family History   Problem Relation Age of Onset     Colon Cancer Mother      Breast Cancer Sister      Diabetes Sister        Prior to Admission Medications   Prior to Admission Medications   Prescriptions Last Dose Informant Patient Reported? Taking?   COMPRESSION STOCKINGS  Nursing Home No No   Si each daily   Multiple Vitamins-Minerals (PRESERVISION AREDS 2+MULTI VIT) CAPS 2021 at AM Nursing Home No Yes   Sig: Take 1 tablet by mouth 2 times daily   acetaminophen (TYLENOL) 500 MG tablet 2021 at 1400  Yes Yes   Sig: Take 1,000 mg by mouth 3 times daily 2 tablets tid at 6am, 2pm, 10pm   alendronate (FOSAMAX) 70 MG tablet 2021 Nursing Home No Yes   Sig: Take 1 tablet (70 mg) by mouth every 7 days SUNDAYS at 7:30am   alum & mag hydroxide-simethicone (MAALOX) 200-200-20 MG/5ML SUSP suspension prn at prn Nursing Home No Yes   Sig: Take 30 mLs by mouth 4 times  daily as needed for indigestion   calcium carbonate-vitamin D (OSCAL W/D) 500-200 MG-UNIT tablet 9/25/2021 at Unknown time Nursing Home Yes Yes   Sig: Take 1 tablet by mouth daily    cholecalciferol 25 MCG (1000 UT) TABS 9/25/2021 at Unknown time Nursing Home Yes Yes   Sig: Take 1 tablet by mouth daily   digoxin (LANOXIN) 125 MCG tablet 9/25/2021 at Unknown time long-term No Yes   Sig: Take 1 tablet (125 mcg) by mouth daily   furosemide (LASIX) 20 MG tablet 9/25/2021 at 14:00 Nursing Home No Yes   Sig: Take 1 tablet (20 mg) by mouth 2 times daily   Patient taking differently: Take 20 mg by mouth 2 times daily @ 10am and 2pm   melatonin 5 MG tablet 9/24/2021 at Unknown time Nursing Home Yes Yes   Sig: Take 5 mg by mouth At Bedtime   metoprolol succinate ER (TOPROL-XL) 25 MG 24 hr tablet 9/25/2021 at 10am  Yes Yes   Sig: Take 12.5 mg by mouth 2 times daily 1/2 tablet at 10am, 8pm   multivitamin w/minerals (MULTI-VITAMIN) tablet 9/25/2021 at Unknown time Nursing Home Yes Yes   Sig: Take 1 tablet by mouth daily    omeprazole (PRILOSEC) 20 MG DR capsule 9/25/2021 at Unknown time Nursing Home Yes Yes   Sig: Take 20 mg by mouth daily before breakfast   potassium chloride ER (K-TAB) 20 MEQ CR tablet 9/25/2021 at Unknown time long-term No Yes   Sig: Take 1 tablet (20 mEq) by mouth daily   senna (SENOKOT) 8.6 MG tablet 9/25/2021 at Unknown time Nursing Home Yes Yes   Sig: Take 1 tablet by mouth 2 times daily as needed for constipation   warfarin ANTICOAGULANT (COUMADIN) 2.5 MG tablet 9/23/2021 at Unknown time Nursing Home Yes Yes   Sig: Take 1.25 mg by mouth See Admin Instructions Take 1/2 tablet on Tuesdays, Thursdays @ 8pm, 2.5mg on other days of the week   warfarin ANTICOAGULANT (COUMADIN) 2.5 MG tablet 9/24/2021 at Unknown time long-term No Yes   Sig: Take 2.5 mg on Tuesday, Friday, Sunday evenings and 1.25 mg other days of the week   Patient taking differently: 2.5 mg Take 2.5 mg on Monday, Wednesday, Friday,  Saturday, Sunday evenings @ 8pm and 1.25 mg other days of the week      Facility-Administered Medications: None     Allergies   No Known Allergies    Physical Exam   Vital Signs: Temp: 97.3  F (36.3  C) Temp src: Axillary BP: 133/74 Pulse: 84   Resp: 18 SpO2: 96 % O2 Device: None (Room air)    Weight: 115 lbs 0 oz    General Appearance: alert, awake and no apparent distress. Hard of hearing  HEENT: Normocephalic, atraumatic, oral mucosa dry, no pharngeal erythema  Respiratory: decreased BS in the right lung base, left lung and upper lungs CTAB, no wheezing  Cardiovascular: irregularly irregular, trace LE edema bilaterally  GI: soft, mild distension, non-tender  Skin: warm and dry, varicose veins in b/l LE   Musculoskeletal: no obvious joint effusion or swelling  Neurologic: alert, and oriented, moves ext spontaneously  Psychiatric:  Mood and affect are normal    Data   Data reviewed today: I reviewed all medications, new labs and imaging results over the last 24 hours. I personally reviewed the chest x-ray image(s) showing as above.    Recent Labs   Lab 09/25/21 2034 09/25/21 1954 09/25/21  1617 09/25/21  1616   WBC  --   --   --  7.3   HGB  --   --   --  13.2   MCV  --   --   --  93   PLT  --   --   --  148*   INR  --   --   --  2.58*   NA  --   --  130*  --    POTASSIUM  --   --  5.6*  --    CHLORIDE  --   --  97  --    CO2  --   --  23  --    BUN  --   --  28  --    CR  --   --  0.98  --    ANIONGAP  --   --  10  --    CHRIS  --   --  8.9  --    * 66* 132*  --    ALBUMIN  --   --  3.6  3.6  --    PROTTOTAL  --   --  7.4  7.4  --    BILITOTAL  --   --  2.3*  2.3*  --    ALKPHOS  --   --  80  80  --    ALT  --   --  48  48  --    AST  --   --  59*  59*  --    TROPONIN  --   --  0.032  --

## 2021-09-26 NOTE — DISCHARGE INSTRUCTIONS
HOMECARE NOTE:   Your doctor has ordered home care to help you after your hospital stay.  You wanted to use the agency affiliated with your Assisted Living, which  you used last year.  Thus, the Home Care Orders were sent to the nurses at [facility] [address].  The hospital care coordinator believes the home care company was [employer] but this agency's phone lines were closed on the weekend ([employer] [phone]) so the [facility] nurses should forward the orders to the home care.

## 2021-09-26 NOTE — PROVIDER NOTIFICATION
MD Notification    Notified Person: MD    Notified Person Name: Dr. Mendieta    Notification Date/Time: 09/26/21 0230    Notification Interaction: text paged    Purpose of Notification: FYI UA results back; collected using [employer].    Orders Received: No orders received    Comments:

## 2021-09-26 NOTE — PROGRESS NOTES
Observation Goals:    -diagnostic tests and consults completed and resulted - not met  -vital signs normal or at patient baseline - met  -safe disposition plan has been identified - not met

## 2021-09-26 NOTE — PROGRESS NOTES
Observation goals PRIOR TO DISCHARGE     Comments:   -diagnostic tests and consults completed and resulted - not met    -vital signs normal or at patient baseline - met    -safe disposition plan has been identified - met    Nurse to notify provider when observation goals have been met and patient is ready for discharge.

## 2021-09-26 NOTE — PHARMACY-ADMISSION MEDICATION HISTORY
Pharmacy Medication History  Admission medication history interview status for the 9/25/2021  admission is complete. See EPIC admission navigator for prior to admission medications     Location of Interview: Phone  Medication history sources: Patient's family/friend (daughter), [employer] and [facility] (nursing home)    Significant changes made to the medication list:  Changed warfarin schedule to 2.5mg M/W/F/S/S, 1.25mg T/Thu  APAP 500mg 2 tablets tid    Addendum: Received updated list from [facility]. New orders for metoprolol were placed 9/7/21 for metoprolol ER 12.5 mg every evening. Patient had previous orders for metoprolol IR 12.5 mg every morning. [facility] has been giving Elizabeth both formulations. It is not exactly clear which formulation Elizabeth should be on, I have made the hospitalist team aware of this issue.    In the past week, patient estimated taking medication this percent of the time: greater than 90%    Medication reconciliation completed by provider prior to medication history? No    Time spent in this activity: 15min    Prior to Admission medications    Medication Sig Last Dose Taking? Auth Provider   acetaminophen (TYLENOL) 500 MG tablet Take 1,000 mg by mouth 3 times daily 2 tablets tid at 6am, 2pm, 10pm 9/25/2021 at 1400 Yes Unknown, Entered By History   alendronate (FOSAMAX) 70 MG tablet Take 1 tablet (70 mg) by mouth every 7 days SUNDAYS at 7:30am 9/19/2021 Yes Kat Campos APRN CNP   alum & mag hydroxide-simethicone (MAALOX) 200-200-20 MG/5ML SUSP suspension Take 30 mLs by mouth 4 times daily as needed for indigestion prn at prn Yes Kat Campos APRN CNP   calcium carbonate-vitamin D (OSCAL W/D) 500-200 MG-UNIT tablet Take 1 tablet by mouth daily  9/25/2021 at Unknown time Yes Reported, Patient   cholecalciferol 25 MCG (1000 UT) TABS Take 1 tablet by mouth daily 9/25/2021 at Unknown time Yes Unknown, Entered By History   digoxin (LANOXIN) 125 MCG tablet Take 1 tablet (125 mcg) by  mouth daily 9/25/2021 at Unknown time Yes Kat Campos APRN CNP   furosemide (LASIX) 20 MG tablet Take 1 tablet (20 mg) by mouth 2 times daily  Patient taking differently: Take 20 mg by mouth 2 times daily @ 10am and 2pm 9/25/2021 at 14:00 Yes Kat Campos APRN CNP   melatonin 5 MG tablet Take 5 mg by mouth At Bedtime 9/24/2021 at Unknown time Yes Unknown, Entered By History   metoprolol succinate ER (TOPROL-XL) 25 MG 24 hr tablet Take 12.5 mg by mouth 2 times daily 1/2 tablet at 10am, 8pm 9/25/2021 at 10am Yes Unknown, Entered By History   Multiple Vitamins-Minerals (PRESERVISION AREDS 2+MULTI VIT) CAPS Take 1 tablet by mouth 2 times daily 9/25/2021 at AM Yes Kat Campos APRN CNP   multivitamin w/minerals (MULTI-VITAMIN) tablet Take 1 tablet by mouth daily  9/25/2021 at Unknown time Yes Reported, Patient   omeprazole (PRILOSEC) 20 MG DR capsule Take 20 mg by mouth daily before breakfast 9/25/2021 at Unknown time Yes Unknown, Entered By History   potassium chloride ER (K-TAB) 20 MEQ CR tablet Take 1 tablet (20 mEq) by mouth daily 9/25/2021 at Unknown time Yes Kat Campos APRN CNP   senna (SENOKOT) 8.6 MG tablet Take 1 tablet by mouth 2 times daily as needed for constipation 9/25/2021 at Unknown time Yes Unknown, Entered By History   warfarin ANTICOAGULANT (COUMADIN) 2.5 MG tablet Take 2.5 mg on Tuesday, Friday, Sunday evenings and 1.25 mg other days of the week  Patient taking differently: 2.5 mg Take 2.5 mg on Monday, Wednesday, Friday, Saturday, Sunday evenings @ 8pm and 1.25 mg other days of the week 9/24/2021 at Unknown time Yes Kat Campos APRN CNP   warfarin ANTICOAGULANT (COUMADIN) 2.5 MG tablet Take 1.25 mg by mouth See Admin Instructions Take 1/2 tablet on Tuesdays, Thursdays @ 8pm, 2.5mg on other days of the week 9/23/2021 at Unknown time Yes Unknown, Entered By History   COMPRESSION STOCKINGS 1 each daily   Tiffany Galvan MD       The information provided in this note  is only as accurate as the sources available at the time of update(s)     Korey Jerry, Pharmacy Intern    -----  I have reviewed the pharmacy intern's medication history documentation. It is accurate and up to date to the best of my knowledge.    Emely Henao, PharmD, BCPS

## 2021-09-26 NOTE — PLAN OF CARE
Physical Therapy Discharge Summary    Reason for therapy discharge:    Discharged to home with home therapy.    Progress towards therapy goal(s). See goals on Care Plan in [facility] electronic health record for goal details.  Goals partially met.  Barriers to achieving goals:   discharge from facility.    Therapy recommendation(s):    Continued therapy is recommended.  Rationale/Recommendations:  Pt would benefit from continued skilled PT services via HHPT to improve activity tolerance, balance, and IND with safety and functional mobility.

## 2021-09-26 NOTE — ED NOTES
"[facility]  ED Nurse Handoff Report    ED Chief complaint: Leg Swelling and Generalized Weakness      ED Diagnosis:   Final diagnoses:   None       Code Status: Full Code    Allergies: No Known Allergies    Patient Story: pt with increased swelling in legs, increased sob and feeling bloated. Pt lives in independent living. Here with daughter today, alert and oriented.  Focused Assessment:  See above    Treatments and/or interventions provided: see chart  Patient's response to treatments and/or interventions: see chart    To be done/followed up on inpatient unit:  none    Does this patient have any cognitive concerns?: none    Activity level - Baseline/Home:  Independent  Activity Level - Current:   Stand with Assist    Patient's Preferred language: English   Needed?: No    Isolation: None  Infection: Not Applicable  Patient tested for COVID 19 prior to admission: YES  Bariatric?: No    Vital Signs:   Vitals:    09/25/21 1551 09/25/21 1642   BP: 133/70    Pulse: 74    Resp:  20   Temp: 97.8  F (36.6  C)    TempSrc: Temporal    SpO2: 95%    Weight: 52.2 kg (115 lb)    Height: 1.676 m (5' 6\")        Cardiac Rhythm:     Was the PSS-3 completed:   Yes  What interventions are required if any?               Family Comments: daughter here  OBS brochure/video discussed/provided to patient/family: N/A              Name of person given brochure if not patient: na              Relationship to patient: na    For the majority of the shift this patient's behavior was Green.   Behavioral interventions performed were none.    ED NURSE PHONE NUMBER: [phone]       "

## 2021-09-27 ENCOUNTER — PATIENT OUTREACH (OUTPATIENT)
Dept: CARE COORDINATION | Facility: CLINIC | Age: 86
End: 2021-09-27

## 2021-09-27 DIAGNOSIS — Z71.89 OTHER SPECIFIED COUNSELING: ICD-10-CM

## 2021-09-27 NOTE — PROGRESS NOTES
"Clinic Care Coordination Contact  [facility]: Post-Discharge Note  SITUATION                                                      Admission:    Admission Date: 09/25/21   Reason for Admission: Acute on chronic HFpEF  Discharge:   Discharge Date: 09/26/21  Discharge Diagnosis: Acute on chronic HFpEF    BACKGROUND                                                      Elizabeth Ruggiero is a very pleasant 89 year old female with past medical history of chronic atrial fibrillation on anticoagulation, diastolic heart failure, hypertension, severe mitral regurgitation, history of TIA who presents for evaluation of generalized weakness and leg swelling. She is admitted under observation for suspected acute on chronic HFpEF causing leg edema and monitoring of hyperkalemia.     ASSESSMENT           Discharge Assessment  How are you doing now that you are home?: \"I'm doing fine and I have no questions or concerns\"  How are your symptoms? (Red Flag symptoms escalate to triage hotline per guidelines): Improved  Do you feel your condition is stable enough to be safe at home until your provider visit?: Yes  Does the patient have their discharge instructions? : Yes  Does the patient have questions regarding their discharge instructions? : No  Were you started on any new medications or were there changes to any of your previous medications? : Yes  Does the patient have all of their medications?: Yes  Do you have questions regarding any of your medications? : No  Do you have all of your needed medical supplies or equipment (DME)?  (i.e. oxygen tank, CPAP, cane, etc.): Yes                  PLAN                                                      Outpatient Plan:  Follow up with primary care provider, Gelacio Downs, within 7 days to evaluate medication change, and for routine  hospital follow- up.  The following labs/tests are recommended: BMP.  Please call [phone] and ask for \"hospital followup, with lab--BMP\"    No future " appointments.      For any urgent concerns, please contact our 24 hour nurse triage line: [phone] ([phone])         Patient hung up a little early before I could finish questions so I wasn't able to ask about follow up.     Karen Chambers  Community Health Worker  [facility]  [phone]

## 2021-09-27 NOTE — PROGRESS NOTES
CELSO received request from charge nurse that pt discharged yesterday to [facility] [address] and they did not receive pt discharge orders.  CELSO faxed discharge orders to [phone] as requested by [facility].  This is a different fax number then where orders were faxed yesterday 9/26/21.  CELSO called and informed Brittney [phone] that orders were faxed to [phone] as requested.  Brittney states that she did not know that the other number was to an out of order fax machine.

## 2021-10-06 NOTE — PROGRESS NOTES
Cardiology Clinic Progress Note    Service Date: October 7, 2021    Primary Cardiologist: Dr. Galvan       Reason for Visit: Hospital follow up    HPI:   I had the pleasure of meeting Ms. Elizabeth Ruggiero in the clinic today. She is a very pleasant 89 year old female with a past medical history notable for chronic atrial fibrillation/flutter, chronic diastolic heart failure, hepatic congestion, deep vein thrombosis, hypertension, severe mitral regurgitation, history of TIA, and osteoporosis. She has followed with Dr. Galvan for her cardiology care. She was last seen via virtual visit in March 2021. She was doing well from a cardiac standpoint at that time. Routine follow up was recommended in about 3 months but did not end up happening for unclear reasons.    She has severe mitral valve regurgitation secondary to myxomatous thickened leaflets and bileaflet prolapse. Intervention was considered in the past, however, she has significant annular calcification which would make successful mitraclip unlikely. Given her advanced age, she has not been felt to be a good surgical candidate. She was also noted to have declined RAHEL previously for further evaluation with preference for continued medical management.     More recently, the patient was admitted to [facility] on 09/25/21 after presenting with bilateral leg swelling, generalized weakness, and around 5 lb weight gain over about 1 week. She was found to have a mild exacerbation of HFpEF with NT pro BNP was elevated at 8,794 (previously closer to the 2038-5608 range at baseline). Chest x-ray showed small to moderate sized right sided pleural effusion. She was treated with IV diuretics and subsequently discharged with slight increase in p.o. Lasix for 5 days at 40 mg in the morning, 20 mg in the evening and then instructed to revert back to her prior to admision Lasix dose of 20 mg twice daily.    Today, Ms. Ruggiero presents to the clinic  accompanied by her daughter in follow up of her recent hospitalization. She tells me that she has been feeling well since she has been home from the hospital. She lost about 5 lbs since she has been home from the hospital with a weight in the clinic today at 115 lbs. With this, her lower extremity edema has reportedly been improved. She denies concerns for significant shortness of breath, dyspnea on exertion, orthopnea, or PND. She also denies chest pain,  palpitations, dizziness, presyncope, or syncope.     ASSESSMENT AND PLAN:  1. Chronic heart failure with preserved ejection fraction (HFpEF)   - Most recent TTE in 10/2020 with normal LVEF.  - Appears euvolemic on exam today without signs or symptoms to suggest CHF. Will continue with her current diuretic regimen with lasix 20 mg BID. She has not had repeat labs yet so will check a BMP today to ensure potassium and renal function is stable.    2. Severe mitral valve regurgitation  - Noted as mod-severe to severe (3-4+) by TTE 10/2020.  - Not likely amenable to MitraClip due to significant mitral annular calcification and not likely a surgical candidate given her advanced age, so continued medical management has been recommended.     3. Moderate tricuspid regurgitation and pulmonary hypertension    4. Chronic atrial fibrillation/flutter   - Anticoagulated on warfarin for CVA prophylaxis and rate controlled with metoprolol succinate 12.5 mg daily and digoxin 125 mcg daily.    5. Essential hypertension, well-controlled    Thank you for the opportunity to participate in this pleasant patient's care. We will update the patient and her daughter on the lab results after the visit today by phone. She will see Dr. Galvan in follow up in about 3 months as previously scheduled.  I encouraged the patient or her daughter to call the clinic with questions or concerns in the meantime, particularly if she notices signs of fluid retention including weight gain of over 2-3 pounds  in 1 day or over 5 pounds in 1 week, worsening lower extremity edema, or worsening shortness of breath, and we would be happy to see her sooner if needed.     45 total minutes was spent today including chart review, precharting, history and exam, post visit documentation, and reviewing studies as outlined above.     SENIA Butler, CNP   Nurse Practitioner  [facility]  Pager: [phone]  Text Page  (8am - 5pm, M-F)    Orders this Visit:  No orders of the defined types were placed in this encounter.    No orders of the defined types were placed in this encounter.    There are no discontinued medications.  Encounter Diagnoses   Name Primary?     Chronic heart failure with preserved ejection fraction (HFpEF) (H) Yes     Nonrheumatic mitral valve regurgitation      Moderate tricuspid regurgitation      Chronic atrial fibrillation (H)      Essential hypertension      CURRENT MEDICATIONS:  Current Outpatient Medications   Medication Sig Dispense Refill     acetaminophen (TYLENOL) 500 MG tablet Take 1,000 mg by mouth 3 times daily 2 tablets tid at 6am, 2pm, 10pm       alendronate (FOSAMAX) 70 MG tablet Take 1 tablet (70 mg) by mouth every 7 days SUNDAYS at 7:30am 4 tablet 0     alum & mag hydroxide-simethicone (MAALOX) 200-200-20 MG/5ML SUSP suspension Take 30 mLs by mouth 4 times daily as needed for indigestion 355 mL 0     calcium carbonate-vitamin D (OSCAL W/D) 500-200 MG-UNIT tablet Take 1 tablet by mouth daily        cholecalciferol 25 MCG (1000 UT) TABS Take 1 tablet by mouth daily       COMPRESSION STOCKINGS 1 each daily 1 each 1     digoxin (LANOXIN) 125 MCG tablet Take 1 tablet (125 mcg) by mouth daily 30 tablet 0     furosemide (LASIX) 20 MG tablet Take 1 tablet (20 mg) by mouth 2 times daily (Patient taking differently: Take 20 mg by mouth 2 times daily @ 10am and 2pm) 60 tablet 0     melatonin 5 MG tablet Take 5 mg by mouth At Bedtime       metoprolol succinate ER (TOPROL-XL) 25 MG 24  hr tablet Take 12.5 mg by mouth every evening        Multiple Vitamins-Minerals (PRESERVISION AREDS 2+MULTI VIT) CAPS Take 1 tablet by mouth 2 times daily 60 capsule 0     multivitamin w/minerals (MULTI-VITAMIN) tablet Take 1 tablet by mouth daily        omeprazole (PRILOSEC) 20 MG DR capsule Take 20 mg by mouth daily before breakfast       potassium chloride ER (K-TAB) 20 MEQ CR tablet Take 1 tablet (20 mEq) by mouth daily 30 tablet 0     senna (SENOKOT) 8.6 MG tablet Take 1 tablet by mouth 2 times daily as needed for constipation       triamcinolone (KENALOG) 0.1 % external cream Apply topically 2 times daily To back for itching       warfarin ANTICOAGULANT (COUMADIN) 2.5 MG tablet Take 1.25 mg by mouth See Admin Instructions Take 1/2 tablet on ,  @ 8pm, 2.5mg on other days of the week       furosemide (LASIX) 20 MG tablet Take 1 tablet (20 mg) by mouth daily Every morning in addition to 20 mg already prescribed a total of 40 mg until 10/1, then check with PCP for dosing recommendations. (Patient not taking: Reported on 10/7/2021)       warfarin ANTICOAGULANT (COUMADIN) 2.5 MG tablet Take 2.5 mg on Tuesday, Friday,  evenings and 1.25 mg other days of the week (Patient not taking: Reported on 10/7/2021) 30 tablet 0     ALLERGIES  No Known Allergies    PAST MEDICAL, SURGICAL, FAMILY HISTORY:  History was reviewed and updated as needed, see medical record.    SOCIAL HISTORY:  Social History     Socioeconomic History     Marital status:      Spouse name: Not on file     Number of children: 6     Years of education: through 4 years college     Highest education level: Not on file   Occupational History     Not on file   Tobacco Use     Smoking status: Never Smoker     Smokeless tobacco: Never Used   Substance and Sexual Activity     Alcohol use: No     Drug use: No     Sexual activity: Not Currently     Comment:   one year ago no other partners   Other Topics Concern      "Parent/sibling w/ CABG, MI or angioplasty before 65F 55M? Not Asked   Social History Narrative     Not on file     Social Determinants of Health     Financial Resource Strain:      Difficulty of Paying Living Expenses:    Food Insecurity:      Worried About Running Out of Food in the Last Year:      Ran Out of Food in the Last Year:    Transportation Needs:      Lack of Transportation (Medical):      Lack of Transportation (Non-Medical):    Physical Activity:      Days of Exercise per Week:      Minutes of Exercise per Session:    Stress:      Feeling of Stress :    Social Connections:      Frequency of Communication with Friends and Family:      Frequency of Social Gatherings with Friends and Family:      Attends Alevism Services:      Active Member of Clubs or Organizations:      Attends Club or Organization Meetings:      Marital Status:    Intimate Partner Violence:      Fear of Current or Ex-Partner:      Emotionally Abused:      Physically Abused:      Sexually Abused:      Review of Systems:  Skin:  Positive for itching   Eyes:  Negative    ENT:  Negative    Respiratory:  Negative shortness of breath;dyspnea on exertion;cough  Cardiovascular:  chest pain;Negative;palpitations;dizziness;lightheadedness;fatigue edema;Positive for  Gastroenterology: Negative    Genitourinary:  Negative    Musculoskeletal:  Positive for back pain  Neurologic:  Negative headaches  Psychiatric:  Negative    Heme/Lymph/Imm:  Negative allergies  Endocrine:  Negative thyroid disorder;diabetes     Physical Exam:  Vitals: /61   Pulse 70   Ht 1.676 m (5' 6\")   Wt 52.3 kg (115 lb 6.4 oz)   BMI 18.63 kg/m     Wt Readings from Last 4 Encounters:   10/07/21 52.3 kg (115 lb 6.4 oz)   09/26/21 54.6 kg (120 lb 5.9 oz)   11/10/20 57.8 kg (127 lb 6.4 oz)   11/09/20 55.9 kg (123 lb 3.2 oz)     CONSTITUTIONAL: Frail elderly woman sitting in a wheelchair and in no acute distress.  HEENT: Pupils equal, round. Sclerae nonicteric.    NECK: " Supple, no masses or JVD appreciated.   C/V: Irregularly irregular rhythm, controlled rate, grade II/VI holosystolic murmur, no rub or gallop.  RESP: Respirations are unlabored. Lungs are clear to auscultation bilaterally without wheezing, rales, or rhonchi.  EXTREM: Trace lower extremity edema of the LLE, no RLE edema. No clubbing or cyanosis.   NEURO: Alert and oriented, cooperative. Gait steady. No gross focal deficits.   PSYCH: Affect appropriate. Mentation normal. Responds to questions appropriately.  SKIN: Warm and dry.     Recent Lab Results reviewed today:  LIVER ENZYME RESULTS:  Lab Results   Component Value Date    AST 34 09/26/2021    AST 44 10/30/2020    ALT 36 09/26/2021    ALT 63 (H) 10/30/2020     CBC RESULTS:  Lab Results   Component Value Date    WBC 7.3 09/25/2021    WBC 6.1 10/30/2020    RBC 4.31 09/25/2021    RBC 4.42 10/30/2020    HGB 13.2 09/25/2021    HGB 13.6 10/30/2020    HCT 40.2 09/25/2021    HCT 41.4 10/30/2020    MCV 93 09/25/2021    MCV 94 10/30/2020    MCH 30.6 09/25/2021    MCH 30.8 10/30/2020    MCHC 32.8 09/25/2021    MCHC 32.9 10/30/2020    RDW 15.6 (H) 09/25/2021    RDW 14.4 10/30/2020     (L) 09/25/2021     10/30/2020     BMP RESULTS:  Lab Results   Component Value Date     (L) 09/26/2021     10/30/2020    POTASSIUM 3.8 09/26/2021    POTASSIUM 3.9 10/30/2020    CHLORIDE 97 09/26/2021    CHLORIDE 100 10/30/2020    CO2 26 09/26/2021    CO2 30 10/30/2020    ANIONGAP 8 09/26/2021    ANIONGAP 5 10/30/2020    GLC 79 09/26/2021    GLC 80 09/26/2021     (H) 10/30/2020    BUN 26 09/26/2021    BUN 25 10/30/2020    CR 0.94 09/26/2021    CR 0.89 10/30/2020    GFRESTIMATED 52 (L) 09/26/2021    GFRESTIMATED 50 (L) 03/10/2021    GFRESTIMATED 55 (L) 10/30/2020    GFRESTBLACK >60 03/10/2021    GFRESTBLACK 64 10/30/2020    CHRIS 8.9 09/26/2021    CHRIS 9.1 10/30/2020      CC  Tiffany Galvan MD  [id number] [place] [id number]     This note was completed in part  using Dragon voice recognition software. Although reviewed after completion, some word and grammatical errors may occur.

## 2021-10-06 NOTE — PATIENT INSTRUCTIONS
Thank you for your visit with the [facility] today.    Today's plan:   1. Continue with your current medications for now.   2. Check labs today to keep an eye on your electrolytes and kidney function.   Continue to check your weights daily and try to stick to a low sodium diet (under 2,000 mg/day).  Call the clinic if you have signs concerning for fluid retention, including weight gain of over 3 pounds in 1 night or over 5 pounds in 1 week, worsening shortness of breath, or worsening swelling in the legs or abdomen.   Follow up with Dr. Galvan as planned in January.    If you have questions or concerns, please do not hesitate to call my nurse, Nilda, at [phone].     Scheduling phone number: [phone]    It was a pleasure seeing you today!     SENIA Butler, CNP  Nurse Practitioner  [facility]  October 7, 2021  ________________________________________________________

## 2021-10-07 ENCOUNTER — HOSPITAL ENCOUNTER (EMERGENCY)
Facility: CLINIC | Age: 86
End: 2021-10-07
Payer: COMMERCIAL

## 2021-10-07 ENCOUNTER — LAB (OUTPATIENT)
Dept: LAB | Facility: CLINIC | Age: 86
End: 2021-10-07
Payer: COMMERCIAL

## 2021-10-07 ENCOUNTER — OFFICE VISIT (OUTPATIENT)
Dept: CARDIOLOGY | Facility: CLINIC | Age: 86
End: 2021-10-07
Payer: COMMERCIAL

## 2021-10-07 VITALS
WEIGHT: 115.4 LBS | HEART RATE: 70 BPM | HEIGHT: 66 IN | DIASTOLIC BLOOD PRESSURE: 61 MMHG | BODY MASS INDEX: 18.54 KG/M2 | SYSTOLIC BLOOD PRESSURE: 105 MMHG

## 2021-10-07 DIAGNOSIS — I10 ESSENTIAL HYPERTENSION: ICD-10-CM

## 2021-10-07 DIAGNOSIS — I50.32 CHRONIC HEART FAILURE WITH PRESERVED EJECTION FRACTION (HFPEF) (H): ICD-10-CM

## 2021-10-07 DIAGNOSIS — I34.0 NONRHEUMATIC MITRAL VALVE REGURGITATION: ICD-10-CM

## 2021-10-07 DIAGNOSIS — I07.1 MODERATE TRICUSPID REGURGITATION: ICD-10-CM

## 2021-10-07 DIAGNOSIS — I50.32 CHRONIC HEART FAILURE WITH PRESERVED EJECTION FRACTION (HFPEF) (H): Primary | ICD-10-CM

## 2021-10-07 DIAGNOSIS — I48.20 CHRONIC ATRIAL FIBRILLATION (H): ICD-10-CM

## 2021-10-07 LAB
ANION GAP SERPL CALCULATED.3IONS-SCNC: 8 MMOL/L (ref 3–14)
BUN SERPL-MCNC: 35 MG/DL (ref 7–30)
CALCIUM SERPL-MCNC: 9 MG/DL (ref 8.5–10.1)
CHLORIDE BLD-SCNC: 100 MMOL/L (ref 94–109)
CO2 SERPL-SCNC: 26 MMOL/L (ref 20–32)
CREAT SERPL-MCNC: 1.19 MG/DL (ref 0.52–1.04)
GFR SERPL CREATININE-BSD FRML MDRD: 39 ML/MIN/1.73M2
GLUCOSE BLD-MCNC: 96 MG/DL (ref 70–99)
POTASSIUM BLD-SCNC: 4.5 MMOL/L (ref 3.4–5.3)
SODIUM SERPL-SCNC: 134 MMOL/L (ref 133–144)

## 2021-10-07 PROCEDURE — 36415 COLL VENOUS BLD VENIPUNCTURE: CPT

## 2021-10-07 PROCEDURE — 999N000104 HC STATISTIC NO CHARGE

## 2021-10-07 PROCEDURE — 80048 BASIC METABOLIC PNL TOTAL CA: CPT

## 2021-10-07 PROCEDURE — 99215 OFFICE O/P EST HI 40 MIN: CPT | Performed by: NURSE PRACTITIONER

## 2021-10-07 ASSESSMENT — MIFFLIN-ST. JEOR: SCORE: 935.2

## 2021-10-07 NOTE — LETTER
10/7/2021    Gelacio Downs MD  [facility] [address]  [facility] [address]    RE: Elizabeth Ruggiero       Dear Colleague,    I had the pleasure of seeing Elizabeth Ruggiero in the [facility].      Cardiology Clinic Progress Note    Service Date: October 7, 2021    Primary Cardiologist: Dr. Galvan       Reason for Visit: Hospital follow up    HPI:   I had the pleasure of meeting Ms. Elizabeth Ruggiero in the clinic today. She is a very pleasant 89 year old female with a past medical history notable for chronic atrial fibrillation/flutter, chronic diastolic heart failure, hepatic congestion, deep vein thrombosis, hypertension, severe mitral regurgitation, history of TIA, and osteoporosis. She has followed with Dr. Galvan for her cardiology care. She was last seen via virtual visit in March 2021. She was doing well from a cardiac standpoint at that time. Routine follow up was recommended in about 3 months but did not end up happening for unclear reasons.    She has severe mitral valve regurgitation secondary to myxomatous thickened leaflets and bileaflet prolapse. Intervention was considered in the past, however, she has significant annular calcification which would make successful mitraclip unlikely. Given her advanced age, she has not been felt to be a good surgical candidate. She was also noted to have declined RAHEL previously for further evaluation with preference for continued medical management.     More recently, the patient was admitted to [facility] on 09/25/21 after presenting with bilateral leg swelling, generalized weakness, and around 5 lb weight gain over about 1 week. She was found to have a mild exacerbation of HFpEF with NT pro BNP was elevated at 8,794 (previously closer to the 3248-8931 range at baseline). Chest x-ray showed small to moderate sized right sided pleural effusion. She was treated with IV diuretics and  subsequently discharged with slight increase in p.o. Lasix for 5 days at 40 mg in the morning, 20 mg in the evening and then instructed to revert back to her prior to admision Lasix dose of 20 mg twice daily.    Today, Ms. Ruggiero presents to the clinic accompanied by her daughter in follow up of her recent hospitalization. She tells me that she has been feeling well since she has been home from the hospital. She lost about 5 lbs since she has been home from the hospital with a weight in the clinic today at 115 lbs. With this, her lower extremity edema has reportedly been improved. She denies concerns for significant shortness of breath, dyspnea on exertion, orthopnea, or PND. She also denies chest pain,  palpitations, dizziness, presyncope, or syncope.     ASSESSMENT AND PLAN:  1. Chronic heart failure with preserved ejection fraction (HFpEF)   - Most recent TTE in 10/2020 with normal LVEF.  - Appears euvolemic on exam today without signs or symptoms to suggest CHF. Will continue with her current diuretic regimen with lasix 20 mg BID. She has not had repeat labs yet so will check a BMP today to ensure potassium and renal function is stable.    2. Severe mitral valve regurgitation  - Noted as mod-severe to severe (3-4+) by TTE 10/2020.  - Not likely amenable to MitraClip due to significant mitral annular calcification and not likely a surgical candidate given her advanced age, so continued medical management has been recommended.     3. Moderate tricuspid regurgitation and pulmonary hypertension    4. Chronic atrial fibrillation/flutter   - Anticoagulated on warfarin for CVA prophylaxis and rate controlled with metoprolol succinate 12.5 mg daily and digoxin 125 mcg daily.    5. Essential hypertension, well-controlled    Thank you for the opportunity to participate in this pleasant patient's care. We will update the patient and her daughter on the lab results after the visit today by phone. She will see Dr. Galvan in  follow up in about 3 months as previously scheduled.  I encouraged the patient or her daughter to call the clinic with questions or concerns in the meantime, particularly if she notices signs of fluid retention including weight gain of over 2-3 pounds in 1 day or over 5 pounds in 1 week, worsening lower extremity edema, or worsening shortness of breath, and we would be happy to see her sooner if needed.     45 total minutes was spent today including chart review, precharting, history and exam, post visit documentation, and reviewing studies as outlined above.     SENIA Butler, CNP   Nurse Practitioner  [facility]  Pager: [phone]  Text Page  (8am - 5pm, M-F)    Orders this Visit:  No orders of the defined types were placed in this encounter.    No orders of the defined types were placed in this encounter.    There are no discontinued medications.  Encounter Diagnoses   Name Primary?     Chronic heart failure with preserved ejection fraction (HFpEF) (H) Yes     Nonrheumatic mitral valve regurgitation      Moderate tricuspid regurgitation      Chronic atrial fibrillation (H)      Essential hypertension      CURRENT MEDICATIONS:  Current Outpatient Medications   Medication Sig Dispense Refill     acetaminophen (TYLENOL) 500 MG tablet Take 1,000 mg by mouth 3 times daily 2 tablets tid at 6am, 2pm, 10pm       alendronate (FOSAMAX) 70 MG tablet Take 1 tablet (70 mg) by mouth every 7 days SUNDAYS at 7:30am 4 tablet 0     alum & mag hydroxide-simethicone (MAALOX) 200-200-20 MG/5ML SUSP suspension Take 30 mLs by mouth 4 times daily as needed for indigestion 355 mL 0     calcium carbonate-vitamin D (OSCAL W/D) 500-200 MG-UNIT tablet Take 1 tablet by mouth daily        cholecalciferol 25 MCG (1000 UT) TABS Take 1 tablet by mouth daily       COMPRESSION STOCKINGS 1 each daily 1 each 1     digoxin (LANOXIN) 125 MCG tablet Take 1 tablet (125 mcg) by mouth daily 30 tablet 0     furosemide (LASIX) 20 MG  tablet Take 1 tablet (20 mg) by mouth 2 times daily (Patient taking differently: Take 20 mg by mouth 2 times daily @ 10am and 2pm) 60 tablet 0     melatonin 5 MG tablet Take 5 mg by mouth At Bedtime       metoprolol succinate ER (TOPROL-XL) 25 MG 24 hr tablet Take 12.5 mg by mouth every evening        Multiple Vitamins-Minerals (PRESERVISION AREDS 2+MULTI VIT) CAPS Take 1 tablet by mouth 2 times daily 60 capsule 0     multivitamin w/minerals (MULTI-VITAMIN) tablet Take 1 tablet by mouth daily        omeprazole (PRILOSEC) 20 MG DR capsule Take 20 mg by mouth daily before breakfast       potassium chloride ER (K-TAB) 20 MEQ CR tablet Take 1 tablet (20 mEq) by mouth daily 30 tablet 0     senna (SENOKOT) 8.6 MG tablet Take 1 tablet by mouth 2 times daily as needed for constipation       triamcinolone (KENALOG) 0.1 % external cream Apply topically 2 times daily To back for itching       warfarin ANTICOAGULANT (COUMADIN) 2.5 MG tablet Take 1.25 mg by mouth See Admin Instructions Take 1/2 tablet on Tuesdays, Thursdays @ 8pm, 2.5mg on other days of the week       furosemide (LASIX) 20 MG tablet Take 1 tablet (20 mg) by mouth daily Every morning in addition to 20 mg already prescribed a total of 40 mg until 10/1, then check with PCP for dosing recommendations. (Patient not taking: Reported on 10/7/2021)       warfarin ANTICOAGULANT (COUMADIN) 2.5 MG tablet Take 2.5 mg on Tuesday, Friday, Sunday evenings and 1.25 mg other days of the week (Patient not taking: Reported on 10/7/2021) 30 tablet 0     ALLERGIES  No Known Allergies    PAST MEDICAL, SURGICAL, FAMILY HISTORY:  History was reviewed and updated as needed, see medical record.    SOCIAL HISTORY:  Social History     Socioeconomic History     Marital status:      Spouse name: Not on file     Number of children: 6     Years of education: through 4 years college     Highest education level: Not on file   Occupational History     Not on file   Tobacco Use     Smoking  "status: Never Smoker     Smokeless tobacco: Never Used   Substance and Sexual Activity     Alcohol use: No     Drug use: No     Sexual activity: Not Currently     Comment:   one year ago no other partners   Other Topics Concern     Parent/sibling w/ CABG, MI or angioplasty before 65F 55M? Not Asked   Social History Narrative     Not on file     Social Determinants of Health     Financial Resource Strain:      Difficulty of Paying Living Expenses:    Food Insecurity:      Worried About Running Out of Food in the Last Year:      Ran Out of Food in the Last Year:    Transportation Needs:      Lack of Transportation (Medical):      Lack of Transportation (Non-Medical):    Physical Activity:      Days of Exercise per Week:      Minutes of Exercise per Session:    Stress:      Feeling of Stress :    Social Connections:      Frequency of Communication with Friends and Family:      Frequency of Social Gatherings with Friends and Family:      Attends Alevism Services:      Active Member of Clubs or Organizations:      Attends Club or Organization Meetings:      Marital Status:    Intimate Partner Violence:      Fear of Current or Ex-Partner:      Emotionally Abused:      Physically Abused:      Sexually Abused:      Review of Systems:  Skin:  Positive for itching   Eyes:  Negative    ENT:  Negative    Respiratory:  Negative shortness of breath;dyspnea on exertion;cough  Cardiovascular:  chest pain;Negative;palpitations;dizziness;lightheadedness;fatigue edema;Positive for  Gastroenterology: Negative    Genitourinary:  Negative    Musculoskeletal:  Positive for back pain  Neurologic:  Negative headaches  Psychiatric:  Negative    Heme/Lymph/Imm:  Negative allergies  Endocrine:  Negative thyroid disorder;diabetes     Physical Exam:  Vitals: /61   Pulse 70   Ht 1.676 m (5' 6\")   Wt 52.3 kg (115 lb 6.4 oz)   BMI 18.63 kg/m     Wt Readings from Last 4 Encounters:   10/07/21 52.3 kg (115 lb 6.4 oz)   21 " 54.6 kg (120 lb 5.9 oz)   11/10/20 57.8 kg (127 lb 6.4 oz)   11/09/20 55.9 kg (123 lb 3.2 oz)     CONSTITUTIONAL: Frail elderly woman sitting in a wheelchair and in no acute distress.  HEENT: Pupils equal, round. Sclerae nonicteric.    NECK: Supple, no masses or JVD appreciated.   C/V: Irregularly irregular rhythm, controlled rate, grade II/VI holosystolic murmur, no rub or gallop.  RESP: Respirations are unlabored. Lungs are clear to auscultation bilaterally without wheezing, rales, or rhonchi.  EXTREM: Trace lower extremity edema of the LLE, no RLE edema. No clubbing or cyanosis.   NEURO: Alert and oriented, cooperative. Gait steady. No gross focal deficits.   PSYCH: Affect appropriate. Mentation normal. Responds to questions appropriately.  SKIN: Warm and dry.     Recent Lab Results reviewed today:  LIVER ENZYME RESULTS:  Lab Results   Component Value Date    AST 34 09/26/2021    AST 44 10/30/2020    ALT 36 09/26/2021    ALT 63 (H) 10/30/2020     CBC RESULTS:  Lab Results   Component Value Date    WBC 7.3 09/25/2021    WBC 6.1 10/30/2020    RBC 4.31 09/25/2021    RBC 4.42 10/30/2020    HGB 13.2 09/25/2021    HGB 13.6 10/30/2020    HCT 40.2 09/25/2021    HCT 41.4 10/30/2020    MCV 93 09/25/2021    MCV 94 10/30/2020    MCH 30.6 09/25/2021    MCH 30.8 10/30/2020    MCHC 32.8 09/25/2021    MCHC 32.9 10/30/2020    RDW 15.6 (H) 09/25/2021    RDW 14.4 10/30/2020     (L) 09/25/2021     10/30/2020     BMP RESULTS:  Lab Results   Component Value Date     (L) 09/26/2021     10/30/2020    POTASSIUM 3.8 09/26/2021    POTASSIUM 3.9 10/30/2020    CHLORIDE 97 09/26/2021    CHLORIDE 100 10/30/2020    CO2 26 09/26/2021    CO2 30 10/30/2020    ANIONGAP 8 09/26/2021    ANIONGAP 5 10/30/2020    GLC 79 09/26/2021    GLC 80 09/26/2021     (H) 10/30/2020    BUN 26 09/26/2021    BUN 25 10/30/2020    CR 0.94 09/26/2021    CR 0.89 10/30/2020    GFRESTIMATED 52 (L) 09/26/2021    GFRESTIMATED 50 (L)  03/10/2021    GFRESTIMATED 55 (L) 10/30/2020    GFRESTBLACK >60 03/10/2021    GFRESTBLACK 64 10/30/2020    CHRIS 8.9 09/26/2021    CHRIS 9.1 10/30/2020      CC  Tiffany Galvan MD  [id number] [place] [id number]     This note was completed in part using Dragon voice recognition software. Although reviewed after completion, some word and grammatical errors may occur.      Thank you for allowing me to participate in the care of your patient.      Sincerely,     Carroll Christy NP     [facility]  cc:   No referring provider defined for this encounter.         3

## 2021-10-08 ENCOUNTER — TELEPHONE (OUTPATIENT)
Dept: CARDIOLOGY | Facility: CLINIC | Age: 86
End: 2021-10-08

## 2021-10-08 NOTE — TELEPHONE ENCOUNTER
Component      Latest Ref Rng & Units 10/7/2021   Sodium      133 - 144 mmol/L 134   Potassium      3.4 - 5.3 mmol/L 4.5   Chloride      94 - 109 mmol/L 100   Carbon Dioxide      20 - 32 mmol/L 26   Anion Gap      3 - 14 mmol/L 8   Urea Nitrogen      7 - 30 mg/dL 35 (H)   Creatinine      0.52 - 1.04 mg/dL 1.19 (H)   Calcium      8.5 - 10.1 mg/dL 9.0   Glucose      70 - 99 mg/dL 96   GFR Estimate      >60 mL/min/1.73m2 39 (L)         ----- Message from Carroll Christy NP sent at 10/8/2021  8:40 AM CDT -----  Please update the patient or her daughter that her labs from yesterday look good with stable electrolytes. Creatinine is up slightly but is overall stable and acceptable for a 89 year old. She can continue her current medications and follow up as planned with Dr. Galvan in January.     Thank you!    Unruly Christy, APRN, CNP       Call received from pt's daughter Brittney to review results. Understanding verbalized.   MAYNOR Bolton RN, BSN. 10/08/21 2:38 PM

## 2021-10-11 ENCOUNTER — LAB REQUISITION (OUTPATIENT)
Dept: LAB | Facility: CLINIC | Age: 86
End: 2021-10-11
Payer: COMMERCIAL

## 2021-10-11 DIAGNOSIS — I48.20 CHRONIC ATRIAL FIBRILLATION, UNSPECIFIED (H): ICD-10-CM

## 2021-10-12 PROCEDURE — 999N000104 HC STATISTIC NO CHARGE

## 2021-10-13 PROCEDURE — P9603 ONE-WAY ALLOW PRORATED MILES: HCPCS | Mod: ORL | Performed by: FAMILY MEDICINE

## 2021-10-13 PROCEDURE — 36415 COLL VENOUS BLD VENIPUNCTURE: CPT | Mod: ORL | Performed by: FAMILY MEDICINE

## 2021-10-13 PROCEDURE — 85610 PROTHROMBIN TIME: CPT | Mod: ORL | Performed by: FAMILY MEDICINE

## 2021-10-14 LAB — INR PPP: 2.43 (ref 0.85–1.15)

## 2021-10-16 ENCOUNTER — LAB REQUISITION (OUTPATIENT)
Dept: LAB | Facility: CLINIC | Age: 86
End: 2021-10-16
Payer: COMMERCIAL

## 2021-10-16 DIAGNOSIS — I48.20 CHRONIC ATRIAL FIBRILLATION, UNSPECIFIED (H): ICD-10-CM

## 2021-11-09 ENCOUNTER — LAB REQUISITION (OUTPATIENT)
Dept: LAB | Facility: CLINIC | Age: 86
End: 2021-11-09
Payer: COMMERCIAL

## 2021-11-09 DIAGNOSIS — I48.20 CHRONIC ATRIAL FIBRILLATION, UNSPECIFIED (H): ICD-10-CM

## 2021-11-10 LAB — INR PPP: 2.38 (ref 0.85–1.15)

## 2021-11-10 PROCEDURE — 36415 COLL VENOUS BLD VENIPUNCTURE: CPT | Mod: ORL | Performed by: FAMILY MEDICINE

## 2021-11-10 PROCEDURE — 85610 PROTHROMBIN TIME: CPT | Mod: ORL | Performed by: FAMILY MEDICINE

## 2021-11-10 PROCEDURE — 85610 PROTHROMBIN TIME: CPT | Mod: ORL

## 2021-11-10 PROCEDURE — P9603 ONE-WAY ALLOW PRORATED MILES: HCPCS | Mod: ORL | Performed by: FAMILY MEDICINE

## 2021-11-10 PROCEDURE — 36415 COLL VENOUS BLD VENIPUNCTURE: CPT | Mod: ORL

## 2021-11-10 PROCEDURE — P9603 ONE-WAY ALLOW PRORATED MILES: HCPCS | Mod: ORL

## 2021-12-07 ENCOUNTER — LAB REQUISITION (OUTPATIENT)
Dept: LAB | Facility: CLINIC | Age: 86
End: 2021-12-07
Payer: COMMERCIAL

## 2021-12-08 LAB — INR PPP: 2.5 (ref 0.85–1.15)

## 2021-12-08 PROCEDURE — P9603 ONE-WAY ALLOW PRORATED MILES: HCPCS | Mod: ORL | Performed by: FAMILY MEDICINE

## 2021-12-08 PROCEDURE — 85610 PROTHROMBIN TIME: CPT | Mod: ORL | Performed by: FAMILY MEDICINE

## 2021-12-08 PROCEDURE — 36415 COLL VENOUS BLD VENIPUNCTURE: CPT | Mod: ORL | Performed by: FAMILY MEDICINE

## 2022-01-01 ENCOUNTER — LAB REQUISITION (OUTPATIENT)
Dept: LAB | Facility: CLINIC | Age: 87
End: 2022-01-01
Payer: COMMERCIAL

## 2022-01-01 ENCOUNTER — TELEPHONE (OUTPATIENT)
Dept: CARDIOLOGY | Facility: CLINIC | Age: 87
End: 2022-01-01

## 2022-01-01 ENCOUNTER — VIRTUAL VISIT (OUTPATIENT)
Dept: CARDIOLOGY | Facility: CLINIC | Age: 87
End: 2022-01-01
Attending: NURSE PRACTITIONER
Payer: COMMERCIAL

## 2022-01-01 ENCOUNTER — TRANSCRIBE ORDERS (OUTPATIENT)
Dept: OTHER | Age: 87
End: 2022-01-01

## 2022-01-01 ENCOUNTER — HOSPITAL ENCOUNTER (EMERGENCY)
Facility: CLINIC | Age: 87
Discharge: HOME | End: 2022-06-29
Attending: EMERGENCY MEDICINE | Admitting: EMERGENCY MEDICINE
Payer: COMMERCIAL

## 2022-01-01 VITALS
SYSTOLIC BLOOD PRESSURE: 116 MMHG | WEIGHT: 102 LBS | OXYGEN SATURATION: 99 % | RESPIRATION RATE: 16 BRPM | HEIGHT: 66 IN | TEMPERATURE: 98.5 F | DIASTOLIC BLOOD PRESSURE: 61 MMHG | HEART RATE: 70 BPM | BODY MASS INDEX: 16.39 KG/M2

## 2022-01-01 VITALS
SYSTOLIC BLOOD PRESSURE: 112 MMHG | BODY MASS INDEX: 16.23 KG/M2 | HEIGHT: 66 IN | HEART RATE: 70 BPM | WEIGHT: 101 LBS | DIASTOLIC BLOOD PRESSURE: 72 MMHG | TEMPERATURE: 97.8 F | OXYGEN SATURATION: 99 %

## 2022-01-01 DIAGNOSIS — R33.9 URINARY RETENTION: ICD-10-CM

## 2022-01-01 DIAGNOSIS — I50.9 HEART FAILURE, UNSPECIFIED (H): ICD-10-CM

## 2022-01-01 DIAGNOSIS — I50.32 CHRONIC HEART FAILURE WITH PRESERVED EJECTION FRACTION (HFPEF) (H): ICD-10-CM

## 2022-01-01 DIAGNOSIS — H35.30 AGE-RELATED MACULAR DEGENERATION: Primary | ICD-10-CM

## 2022-01-01 DIAGNOSIS — R35.0 FREQUENCY OF MICTURITION: ICD-10-CM

## 2022-01-01 DIAGNOSIS — Z96.1 PSEUDOPHAKIA, BOTH EYES: ICD-10-CM

## 2022-01-01 DIAGNOSIS — R39.0 EXTRAVASATION OF URINE: ICD-10-CM

## 2022-01-01 DIAGNOSIS — I48.19 OTHER PERSISTENT ATRIAL FIBRILLATION (H): ICD-10-CM

## 2022-01-01 LAB
ALBUMIN UR-MCNC: 20 MG/DL
ALBUMIN UR-MCNC: NEGATIVE MG/DL
ANION GAP SERPL CALCULATED.3IONS-SCNC: 12 MMOL/L (ref 7–15)
ANION GAP SERPL CALCULATED.3IONS-SCNC: 5 MMOL/L (ref 3–14)
APPEARANCE UR: CLEAR
APPEARANCE UR: CLEAR
BACTERIA UR CULT: NORMAL
BACTERIA UR CULT: NORMAL
BASOPHILS # BLD AUTO: 0.1 10E3/UL (ref 0–0.2)
BASOPHILS NFR BLD AUTO: 1 %
BILIRUB UR QL STRIP: NEGATIVE
BILIRUB UR QL STRIP: NEGATIVE
BUN SERPL-MCNC: 33 MG/DL (ref 7–30)
BUN SERPL-MCNC: 34.8 MG/DL (ref 8–23)
CALCIUM SERPL-MCNC: 9.4 MG/DL (ref 8.2–9.6)
CALCIUM SERPL-MCNC: 9.5 MG/DL (ref 8.5–10.1)
CHLORIDE BLD-SCNC: 100 MMOL/L (ref 94–109)
CHLORIDE SERPL-SCNC: 100 MMOL/L (ref 98–107)
CO2 SERPL-SCNC: 29 MMOL/L (ref 20–32)
COLOR UR AUTO: ABNORMAL
COLOR UR AUTO: ABNORMAL
CREAT SERPL-MCNC: 0.86 MG/DL (ref 0.51–0.95)
CREAT SERPL-MCNC: 0.88 MG/DL (ref 0.52–1.04)
DEPRECATED HCO3 PLAS-SCNC: 28 MMOL/L (ref 22–29)
DIGOXIN SERPL-MCNC: 1.2 NG/ML (ref 0.6–2)
EOSINOPHIL # BLD AUTO: 0.1 10E3/UL (ref 0–0.7)
EOSINOPHIL NFR BLD AUTO: 1 %
ERYTHROCYTE [DISTWIDTH] IN BLOOD BY AUTOMATED COUNT: 18.8 % (ref 10–15)
GFR SERPL CREATININE-BSD FRML MDRD: 60 ML/MIN/1.73M2
GFR SERPL CREATININE-BSD FRML MDRD: 61 ML/MIN/1.73M2
GLUCOSE BLD-MCNC: 69 MG/DL (ref 70–99)
GLUCOSE SERPL-MCNC: 80 MG/DL (ref 70–99)
GLUCOSE UR STRIP-MCNC: NEGATIVE MG/DL
GLUCOSE UR STRIP-MCNC: NEGATIVE MG/DL
HCT VFR BLD AUTO: 39.1 % (ref 35–47)
HGB BLD-MCNC: 12.3 G/DL (ref 11.7–15.7)
HGB UR QL STRIP: ABNORMAL
HGB UR QL STRIP: ABNORMAL
HYALINE CASTS: 1 /LPF
HYALINE CASTS: 5 /LPF
IMM GRANULOCYTES # BLD: 0 10E3/UL
IMM GRANULOCYTES NFR BLD: 0 %
KETONES UR STRIP-MCNC: NEGATIVE MG/DL
KETONES UR STRIP-MCNC: NEGATIVE MG/DL
LEUKOCYTE ESTERASE UR QL STRIP: ABNORMAL
LEUKOCYTE ESTERASE UR QL STRIP: ABNORMAL
LYMPHOCYTES # BLD AUTO: 1.2 10E3/UL (ref 0.8–5.3)
LYMPHOCYTES NFR BLD AUTO: 16 %
MCH RBC QN AUTO: 30.2 PG (ref 26.5–33)
MCHC RBC AUTO-ENTMCNC: 31.5 G/DL (ref 31.5–36.5)
MCV RBC AUTO: 96 FL (ref 78–100)
MONOCYTES # BLD AUTO: 0.9 10E3/UL (ref 0–1.3)
MONOCYTES NFR BLD AUTO: 11 %
NEUTROPHILS # BLD AUTO: 5.6 10E3/UL (ref 1.6–8.3)
NEUTROPHILS NFR BLD AUTO: 71 %
NITRATE UR QL: NEGATIVE
NITRATE UR QL: NEGATIVE
NRBC # BLD AUTO: 0 10E3/UL
NRBC BLD AUTO-RTO: 0 /100
PH UR STRIP: 5.5 [PH] (ref 5–7)
PH UR STRIP: 6 [PH] (ref 5–7)
PLATELET # BLD AUTO: 149 10E3/UL (ref 150–450)
POTASSIUM BLD-SCNC: 3.4 MMOL/L (ref 3.4–5.3)
POTASSIUM SERPL-SCNC: 4.4 MMOL/L (ref 3.4–5.3)
RBC # BLD AUTO: 4.07 10E6/UL (ref 3.8–5.2)
RBC URINE: 22 /HPF
RBC URINE: 3 /HPF
SODIUM SERPL-SCNC: 134 MMOL/L (ref 133–144)
SODIUM SERPL-SCNC: 140 MMOL/L (ref 136–145)
SP GR UR STRIP: 1.02 (ref 1–1.03)
SP GR UR STRIP: 1.02 (ref 1–1.03)
SQUAMOUS EPITHELIAL: <1 /HPF
TRANSITIONAL EPI: <1 /HPF
UROBILINOGEN UR STRIP-MCNC: NORMAL MG/DL
UROBILINOGEN UR STRIP-MCNC: NORMAL MG/DL
WBC # BLD AUTO: 7.9 10E3/UL (ref 4–11)
WBC URINE: 10 /HPF
WBC URINE: 4 /HPF

## 2022-01-01 PROCEDURE — 36415 COLL VENOUS BLD VENIPUNCTURE: CPT | Performed by: EMERGENCY MEDICINE

## 2022-01-01 PROCEDURE — 81001 URINALYSIS AUTO W/SCOPE: CPT | Mod: ORL | Performed by: FAMILY MEDICINE

## 2022-01-01 PROCEDURE — 99213 OFFICE O/P EST LOW 20 MIN: CPT | Mod: TEL | Performed by: INTERNAL MEDICINE

## 2022-01-01 PROCEDURE — 51798 US URINE CAPACITY MEASURE: CPT

## 2022-01-01 PROCEDURE — 82310 ASSAY OF CALCIUM: CPT | Performed by: EMERGENCY MEDICINE

## 2022-01-01 PROCEDURE — 80048 BASIC METABOLIC PNL TOTAL CA: CPT | Mod: ORL | Performed by: INTERNAL MEDICINE

## 2022-01-01 PROCEDURE — 36415 COLL VENOUS BLD VENIPUNCTURE: CPT | Mod: ORL | Performed by: INTERNAL MEDICINE

## 2022-01-01 PROCEDURE — 36415 COLL VENOUS BLD VENIPUNCTURE: CPT | Mod: ORL | Performed by: FAMILY MEDICINE

## 2022-01-01 PROCEDURE — 85018 HEMOGLOBIN: CPT | Performed by: EMERGENCY MEDICINE

## 2022-01-01 PROCEDURE — 87086 URINE CULTURE/COLONY COUNT: CPT | Mod: ORL | Performed by: FAMILY MEDICINE

## 2022-01-01 PROCEDURE — P9604 ONE-WAY ALLOW PRORATED TRIP: HCPCS | Mod: ORL | Performed by: FAMILY MEDICINE

## 2022-01-01 PROCEDURE — 80162 ASSAY OF DIGOXIN TOTAL: CPT | Mod: ORL | Performed by: FAMILY MEDICINE

## 2022-01-01 PROCEDURE — 99284 EMERGENCY DEPT VISIT MOD MDM: CPT

## 2022-01-01 ASSESSMENT — ENCOUNTER SYMPTOMS
ABDOMINAL PAIN: 0
DIFFICULTY URINATING: 1

## 2022-01-03 ENCOUNTER — LAB REQUISITION (OUTPATIENT)
Dept: LAB | Facility: CLINIC | Age: 87
End: 2022-01-03
Payer: COMMERCIAL

## 2022-01-03 DIAGNOSIS — I48.20 CHRONIC ATRIAL FIBRILLATION, UNSPECIFIED (H): ICD-10-CM

## 2022-01-05 LAB — INR PPP: 2.28 (ref 0.85–1.15)

## 2022-01-05 PROCEDURE — 36415 COLL VENOUS BLD VENIPUNCTURE: CPT | Mod: ORL | Performed by: FAMILY MEDICINE

## 2022-01-05 PROCEDURE — P9603 ONE-WAY ALLOW PRORATED MILES: HCPCS | Mod: ORL | Performed by: FAMILY MEDICINE

## 2022-01-05 PROCEDURE — 85610 PROTHROMBIN TIME: CPT | Mod: ORL | Performed by: FAMILY MEDICINE

## 2022-01-10 ENCOUNTER — VIRTUAL VISIT (OUTPATIENT)
Dept: CARDIOLOGY | Facility: CLINIC | Age: 87
End: 2022-01-10
Attending: INTERNAL MEDICINE
Payer: COMMERCIAL

## 2022-01-10 DIAGNOSIS — I34.0 SEVERE MITRAL REGURGITATION: ICD-10-CM

## 2022-01-10 PROCEDURE — 99213 OFFICE O/P EST LOW 20 MIN: CPT | Mod: 95 | Performed by: INTERNAL MEDICINE

## 2022-01-10 NOTE — PROGRESS NOTES
"  Elizabeth is a 89 year old who is being evaluated via a billable video visit.      How would you like to obtain your AVS? Mail a copy  If the video visit is dropped, the invitation should be resent by: Text to cell phone: [phone]  Will anyone else be joining your video visit? Yes: brittney. How would they like to receive their invitation? Text to cell phone: [phone]      Video-Visit Details    Type of service:  Video Visit      Video Start Time: 2:21 pm  Video End Time: 2:35 pm    Originating Location (pt. Location): Home    Distant Location (provider location):  [email or web address]     Platform used for Video Visit: Doximity      Vitals - Patient Reported  Weight (Patient Reported): 48.5 kg (107 lb)  Height (Patient Reported): 167.6 cm (5' 6\")  BMI (Based on Pt Reported Ht/Wt): 17.27  SpO2 (Patient Reported): 99  Pulse (Patient Reported): 74        CARDIOLOGY CLINIC VISIT  DATE OF SERVICE:  January 10, 2022    PRIMARY CARE PHYSICIAN:  Gelacio Downs    HISTORY OF PRESENT ILLNESS:  Ms. Ruggiero is a pleasant 89 year old female with a past medical history notable for chronic atrial fibrillation/flutter, chronic diastolic heart failure, hepatic congestion, deep vein thrombosis, hypertension, severe mitral regurgitation, history of TIA, and osteoporosis. She was last seen by Urnuly Christy CNP in 10/2021 and presents today for follow up.  Due to the COVID 19 pandemic, this visit is conducted via video, with Ms. Ruggiero's consent.      In brief review, she has severe mitral valve regurgitation secondary to myxomatous thickened leaflets and bileaflet prolapse. Intervention was considered in the past, however, she has significant annular calcification which would make successful mitraclip unlikely. Given her advanced age, she has not been felt to be a good surgical candidate. She was also noted to have declined RAHEL previously for further evaluation with preference for continued medical management.      More " recently, the patient was admitted to [facility] on 09/25/21 after presenting with bilateral leg swelling, generalized weakness, and around 5 lb weight gain over about 1 week. She was found to have a mild exacerbation of HFpEF with NT pro BNP was elevated at 8,794 (previously closer to the 5657-6962 range at baseline). Chest x-ray showed small to moderate sized right sided pleural effusion. She was treated with IV diuretics and subsequently discharged with slight increase in p.o. Lasix for 5 days at 40 mg in the morning, 20 mg in the evening and then instructed to revert back to her prior to admision Lasix dose of 20 mg twice daily.       In follow up today, Ms. Ruggiero reports feeling well. She denies any shortness of breath, orthopnea, PND or lower extremity edema.  She does wake up at night to use the bathroom.  She weighs herself daily and has been consistently about 107 pounds.  She is without cardiovascular complaints.    PAST MEDICAL HISTORY:  Past Medical History:   Diagnosis Date     (HFpEF) heart failure with preserved ejection fraction (H)      Acute diastolic CHF (congestive heart failure) (H)      Acute left hemiparesis (H) 2/9/2019     Arthritis      Atrial fibrillation (H)      Atrial fibrillation with RVR (H)      Atrial flutter (H)      Atrial flutter with rapid ventricular response (H) 4/23/2019     Community acquired pneumonia      Diastolic CHF (H) 04/29/2019     DVT of lower extremity (deep venous thrombosis) (H)     recurrent     Hepatic congestion     improved with diuretics for CHF     HTN (hypertension)      Hyperlipidemia      Mitral valve insufficiency      Pneumonia      Postmenopausal bleeding 01/2020    [facility]     Severe mitral regurgitation      SOB (shortness of breath)      TIA (transient ischemic attack)      Vitamin D deficiency        MEDICATIONS:  Current Outpatient Medications   Medication     acetaminophen (TYLENOL) 500  MG tablet     alendronate (FOSAMAX) 70 MG tablet     alum & mag hydroxide-simethicone (MAALOX) 200-200-20 MG/5ML SUSP suspension     calcium carbonate-vitamin D (OSCAL W/D) 500-200 MG-UNIT tablet     cholecalciferol 25 MCG (1000 UT) TABS     COMPRESSION STOCKINGS     digoxin (LANOXIN) 125 MCG tablet     furosemide (LASIX) 20 MG tablet     furosemide (LASIX) 20 MG tablet     melatonin 5 MG tablet     metoprolol succinate ER (TOPROL-XL) 25 MG 24 hr tablet     Multiple Vitamins-Minerals (PRESERVISION AREDS 2+MULTI VIT) CAPS     multivitamin w/minerals (MULTI-VITAMIN) tablet     omeprazole (PRILOSEC) 20 MG DR capsule     potassium chloride ER (K-TAB) 20 MEQ CR tablet     senna (SENOKOT) 8.6 MG tablet     triamcinolone (KENALOG) 0.1 % external cream     warfarin ANTICOAGULANT (COUMADIN) 2.5 MG tablet     warfarin ANTICOAGULANT (COUMADIN) 2.5 MG tablet     No current facility-administered medications for this visit.       ALLERGIES:  No Known Allergies    SOCIAL HISTORY:  I have reviewed this patient's social history and updated it with pertinent information if needed. Elizabeth Ruggiero  reports that she has never smoked. She has never used smokeless tobacco. She reports that she does not drink alcohol and does not use drugs.    FAMILY HISTORY:  I have reviewed this patient's family history and updated it with pertinent information if needed.   Family History   Problem Relation Age of Onset     Colon Cancer Mother      Breast Cancer Sister      Diabetes Sister        REVIEW OF SYSTEMS:  A complete ROS was obtained and the pertinent positives are outlined in the history of present illness above.  The remainder of systems is negative.    Review Of Systems  Skin: NEGATIVE  Eyes:Ears/Nose/Throat: NEGATIVE  Respiratory: NEGATIVE  Cardiovascular:NEGATIVE  Gastrointestinal: NEGATIVE  Genitourinary:NEGATIVE  Musculoskeletal: spinal stenosis  Neurologic: NEGATIVE  Psychiatric: NEGATIVE  Hematologic/Lymphatic/Immunologic:  NEGATIVE  Endocrine:  NEGATIVE      PHYSICAL EXAM:                     Vital Signs with Ranges     0 lbs 0 oz  105/61 HR 71  Weight 107 poumds      General:  no apparent distress, normal body habitus, sitting upright.  ENT/Mouth:  membranes moist, no nasal discharge.  Normal head shape, no apparent injury or laceration.  Eyes:  no scleral icterus, normal conjunctivae.  No observed jaundice.  Neck:  no apparent neck swelling.   Chest/Lungs:  No breathing difficulty while speaking.  No audible wheezing.  No cough during conversation.  Cardiovascular:  No obviously elevated jugular venous pressure.  No apparent edema bilaterally in LE.   Abdomen:  no obvious abdominal distention.   Extremities:  no apparent cyanosis.  Skin:  no xanthelasma.  No facial lacerations.  Neurologic:  Normal arm motion bilateral, no tremors.    Psychiatric:  Alert and oriented x3, calm demeanor    The rest of the comprehensive physical examination is deferred due to public health emergency video visit restrictions.          ASSESSMENT:  1. Chronic heart failure with preserved ejection fraction (HFpEF)   - Most recent TTE in 10/2020 with normal LVEF.  - Euvolemic as assessed by home weights; 107 pounds.  No symptoms concerning for CHF exacerbation       2. Severe mitral valve regurgitation  - Noted as mod-severe to severe (3-4+) by TTE 10/2020.  - Not likely amenable to MitraClip due to significant mitral annular calcification and not likely a surgical candidate given her advanced age, so continued medical management has been recommended.      3. Moderate tricuspid regurgitation and pulmonary hypertension     4. Chronic atrial fibrillation/flutter   - Anticoagulated on warfarin for CVA prophylaxis and rate controlled with metoprolol succinate 12.5 mg daily and digoxin 125 mcg daily.     5. Essential hypertension, well-controlled        RECOMMENDATIONS:  1.   Due for BMP  2.   Continue current meds including lasix 20 mg twice daily  3.  Plan  for follow up in 3 months or before than as necessary      Tiffany Galvan MD [facility]  January 10, 2022

## 2022-01-10 NOTE — LETTER
"1/10/2022    Gelacio Downs MD  [facility] [address]  [facility] [address]    RE: Elizabeth Ruggiero       Dear Colleague,     I had the pleasure of seeing Elizabeth Ruggiero in the [facility].    Vitals - Patient Reported  Weight (Patient Reported): 48.5 kg (107 lb)  Height (Patient Reported): 167.6 cm (5' 6\")  BMI (Based on Pt Reported Ht/Wt): 17.27  SpO2 (Patient Reported): 99  Pulse (Patient Reported): 74      CARDIOLOGY CLINIC VISIT  DATE OF SERVICE:  January 10, 2022    PRIMARY CARE PHYSICIAN:  Gelacio Downs    HISTORY OF PRESENT ILLNESS:  Ms. Ruggiero is a pleasant 89 year old female with a past medical history notable for chronic atrial fibrillation/flutter, chronic diastolic heart failure, hepatic congestion, deep vein thrombosis, hypertension, severe mitral regurgitation, history of TIA, and osteoporosis. She was last seen by Unruly Christy CNP in 10/2021 and presents today for follow up.  Due to the COVID 19 pandemic, this visit is conducted via video, with Ms. Ruggiero's consent.      In brief review, she has severe mitral valve regurgitation secondary to myxomatous thickened leaflets and bileaflet prolapse. Intervention was considered in the past, however, she has significant annular calcification which would make successful mitraclip unlikely. Given her advanced age, she has not been felt to be a good surgical candidate. She was also noted to have declined RAHEL previously for further evaluation with preference for continued medical management.      More recently, the patient was admitted to [facility] on 09/25/21 after presenting with bilateral leg swelling, generalized weakness, and around 5 lb weight gain over about 1 week. She was found to have a mild exacerbation of HFpEF with NT pro BNP was elevated at 8,794 (previously closer to the 2746-0081 range at baseline). Chest x-ray showed small to moderate sized right sided pleural effusion. She was treated with " IV diuretics and subsequently discharged with slight increase in p.o. Lasix for 5 days at 40 mg in the morning, 20 mg in the evening and then instructed to revert back to her prior to admision Lasix dose of 20 mg twice daily.       In follow up today, Ms. Ruggiero reports feeling well. She denies any shortness of breath, orthopnea, PND or lower extremity edema.  She does wake up at night to use the bathroom.  She weighs herself daily and has been consistently about 107 pounds.  She is without cardiovascular complaints.    PAST MEDICAL HISTORY:  Past Medical History:   Diagnosis Date     (HFpEF) heart failure with preserved ejection fraction (H)      Acute diastolic CHF (congestive heart failure) (H)      Acute left hemiparesis (H) 2/9/2019     Arthritis      Atrial fibrillation (H)      Atrial fibrillation with RVR (H)      Atrial flutter (H)      Atrial flutter with rapid ventricular response (H) 4/23/2019     Community acquired pneumonia      Diastolic CHF (H) 04/29/2019     DVT of lower extremity (deep venous thrombosis) (H)     recurrent     Hepatic congestion     improved with diuretics for CHF     HTN (hypertension)      Hyperlipidemia      Mitral valve insufficiency      Pneumonia      Postmenopausal bleeding 01/2020    [facility]     Severe mitral regurgitation      SOB (shortness of breath)      TIA (transient ischemic attack)      Vitamin D deficiency        MEDICATIONS:  Current Outpatient Medications   Medication     acetaminophen (TYLENOL) 500 MG tablet     alendronate (FOSAMAX) 70 MG tablet     alum & mag hydroxide-simethicone (MAALOX) 200-200-20 MG/5ML SUSP suspension     calcium carbonate-vitamin D (OSCAL W/D) 500-200 MG-UNIT tablet     cholecalciferol 25 MCG (1000 UT) TABS     COMPRESSION STOCKINGS     digoxin (LANOXIN) 125 MCG tablet     furosemide (LASIX) 20 MG tablet     furosemide (LASIX) 20 MG tablet     melatonin 5 MG tablet     metoprolol succinate ER (TOPROL-XL) 25  MG 24 hr tablet     Multiple Vitamins-Minerals (PRESERVISION AREDS 2+MULTI VIT) CAPS     multivitamin w/minerals (MULTI-VITAMIN) tablet     omeprazole (PRILOSEC) 20 MG DR capsule     potassium chloride ER (K-TAB) 20 MEQ CR tablet     senna (SENOKOT) 8.6 MG tablet     triamcinolone (KENALOG) 0.1 % external cream     warfarin ANTICOAGULANT (COUMADIN) 2.5 MG tablet     warfarin ANTICOAGULANT (COUMADIN) 2.5 MG tablet     No current facility-administered medications for this visit.       ALLERGIES:  No Known Allergies    SOCIAL HISTORY:  I have reviewed this patient's social history and updated it with pertinent information if needed. Elizabeth Ruggiero  reports that she has never smoked. She has never used smokeless tobacco. She reports that she does not drink alcohol and does not use drugs.    FAMILY HISTORY:  I have reviewed this patient's family history and updated it with pertinent information if needed.   Family History   Problem Relation Age of Onset     Colon Cancer Mother      Breast Cancer Sister      Diabetes Sister        REVIEW OF SYSTEMS:  A complete ROS was obtained and the pertinent positives are outlined in the history of present illness above.  The remainder of systems is negative.    Review Of Systems  Skin: NEGATIVE  Eyes:Ears/Nose/Throat: NEGATIVE  Respiratory: NEGATIVE  Cardiovascular:NEGATIVE  Gastrointestinal: NEGATIVE  Genitourinary:NEGATIVE  Musculoskeletal: spinal stenosis  Neurologic: NEGATIVE  Psychiatric: NEGATIVE  Hematologic/Lymphatic/Immunologic: NEGATIVE  Endocrine:  NEGATIVE      PHYSICAL EXAM:                     Vital Signs with Ranges     0 lbs 0 oz  105/61 HR 71  Weight 107 poumds      General:  no apparent distress, normal body habitus, sitting upright.  ENT/Mouth:  membranes moist, no nasal discharge.  Normal head shape, no apparent injury or laceration.  Eyes:  no scleral icterus, normal conjunctivae.  No observed jaundice.  Neck:  no apparent neck swelling.   Chest/Lungs:  No  breathing difficulty while speaking.  No audible wheezing.  No cough during conversation.  Cardiovascular:  No obviously elevated jugular venous pressure.  No apparent edema bilaterally in LE.   Abdomen:  no obvious abdominal distention.   Extremities:  no apparent cyanosis.  Skin:  no xanthelasma.  No facial lacerations.  Neurologic:  Normal arm motion bilateral, no tremors.    Psychiatric:  Alert and oriented x3, calm demeanor    The rest of the comprehensive physical examination is deferred due to public health emergency video visit restrictions.        ASSESSMENT:  1. Chronic heart failure with preserved ejection fraction (HFpEF)   - Most recent TTE in 10/2020 with normal LVEF.  - Euvolemic as assessed by home weights; 107 pounds.  No symptoms concerning for CHF exacerbation       2. Severe mitral valve regurgitation  - Noted as mod-severe to severe (3-4+) by TTE 10/2020.  - Not likely amenable to MitraClip due to significant mitral annular calcification and not likely a surgical candidate given her advanced age, so continued medical management has been recommended.      3. Moderate tricuspid regurgitation and pulmonary hypertension     4. Chronic atrial fibrillation/flutter   - Anticoagulated on warfarin for CVA prophylaxis and rate controlled with metoprolol succinate 12.5 mg daily and digoxin 125 mcg daily.     5. Essential hypertension, well-controlled        RECOMMENDATIONS:  1.   Due for BMP  2.   Continue current meds including lasix 20 mg twice daily  3.  Plan for follow up in 3 months or before than as necessary      Tiffany Galvan MD [facility]  January 10, 2022

## 2022-01-20 ENCOUNTER — TELEPHONE (OUTPATIENT)
Dept: CARDIOLOGY | Facility: CLINIC | Age: 87
End: 2022-01-20
Payer: COMMERCIAL

## 2022-01-20 NOTE — TELEPHONE ENCOUNTER
Dr. Galvan's last OV note:    RECOMMENDATIONS:  1.   Due for BMP  2.   Continue current meds including lasix 20 mg twice daily  3.  Plan for follow up in 3 months or before than as necessary        Tiffany Galvan MD [facility]  January 10, 2022    Will fax BMP order to [phone].

## 2022-01-20 NOTE — TELEPHONE ENCOUNTER
[facility] Call Center    Phone Message    May a detailed message be left on voicemail: yes     Reason for Call: Other: Brittney called in stating that Dr. Galvan wants labs for pt. These need to be faxed to the [facility] [address] at [phone].      Action Taken: Message routed to:  Other: [facility]    Travel Screening: Not Applicable

## 2022-01-24 ENCOUNTER — TELEPHONE (OUTPATIENT)
Dept: CARDIOLOGY | Facility: CLINIC | Age: 87
End: 2022-01-24
Payer: COMMERCIAL

## 2022-02-01 ENCOUNTER — LAB REQUISITION (OUTPATIENT)
Dept: LAB | Facility: CLINIC | Age: 87
End: 2022-02-01
Payer: COMMERCIAL

## 2022-02-01 DIAGNOSIS — I25.10 ATHEROSCLEROTIC HEART DISEASE OF NATIVE CORONARY ARTERY WITHOUT ANGINA PECTORIS: ICD-10-CM

## 2022-02-04 LAB
ANION GAP SERPL CALCULATED.3IONS-SCNC: 11 MMOL/L (ref 5–18)
BUN SERPL-MCNC: 35 MG/DL (ref 8–28)
CALCIUM SERPL-MCNC: 10.1 MG/DL (ref 8.5–10.5)
CHLORIDE BLD-SCNC: 101 MMOL/L (ref 98–107)
CO2 SERPL-SCNC: 26 MMOL/L (ref 22–31)
CREAT SERPL-MCNC: 1.04 MG/DL (ref 0.6–1.1)
GFR SERPL CREATININE-BSD FRML MDRD: 49 ML/MIN/1.73M2
GLUCOSE BLD-MCNC: 137 MG/DL (ref 70–125)
INR PPP: 2.47 (ref 0.85–1.15)
POTASSIUM BLD-SCNC: 4.5 MMOL/L (ref 3.5–5)
SODIUM SERPL-SCNC: 138 MMOL/L (ref 136–145)

## 2022-02-04 PROCEDURE — 80048 BASIC METABOLIC PNL TOTAL CA: CPT | Mod: ORL | Performed by: FAMILY MEDICINE

## 2022-02-04 PROCEDURE — 85610 PROTHROMBIN TIME: CPT | Mod: ORL | Performed by: FAMILY MEDICINE

## 2022-02-04 PROCEDURE — P9604 ONE-WAY ALLOW PRORATED TRIP: HCPCS | Mod: ORL | Performed by: FAMILY MEDICINE

## 2022-02-04 PROCEDURE — 36415 COLL VENOUS BLD VENIPUNCTURE: CPT | Mod: ORL | Performed by: FAMILY MEDICINE

## 2022-03-01 ENCOUNTER — LAB REQUISITION (OUTPATIENT)
Dept: LAB | Facility: CLINIC | Age: 87
End: 2022-03-01
Payer: COMMERCIAL

## 2022-03-01 DIAGNOSIS — I48.20 CHRONIC ATRIAL FIBRILLATION, UNSPECIFIED (H): ICD-10-CM

## 2022-03-02 LAB — INR PPP: 2.61 (ref 0.85–1.15)

## 2022-03-02 PROCEDURE — 36415 COLL VENOUS BLD VENIPUNCTURE: CPT | Mod: ORL | Performed by: SPECIALIST

## 2022-03-02 PROCEDURE — 85610 PROTHROMBIN TIME: CPT | Mod: ORL | Performed by: SPECIALIST

## 2022-03-02 PROCEDURE — P9603 ONE-WAY ALLOW PRORATED MILES: HCPCS | Mod: ORL | Performed by: SPECIALIST

## 2022-03-07 ENCOUNTER — TELEPHONE (OUTPATIENT)
Dept: CARDIOLOGY | Facility: CLINIC | Age: 87
End: 2022-03-07
Payer: COMMERCIAL

## 2022-03-07 NOTE — TELEPHONE ENCOUNTER
[facility] Call Center    Phone Message    May a detailed message be left on voicemail: yes     Reason for Call: Other: PT daughter stated the care team at the living facility will not take BP for pt before her appt with out an order. Please fax order to [phone] TAYLOR layton , any questions please reach out to Brittney     Action Taken: Message routed to:  [facility] Cardio    Travel Screening: Not Applicable

## 2022-03-08 ENCOUNTER — VIRTUAL VISIT (OUTPATIENT)
Dept: CARDIOLOGY | Facility: CLINIC | Age: 87
End: 2022-03-08
Payer: COMMERCIAL

## 2022-03-08 VITALS
OXYGEN SATURATION: 98 % | BODY MASS INDEX: 17.36 KG/M2 | HEART RATE: 57 BPM | SYSTOLIC BLOOD PRESSURE: 118 MMHG | HEIGHT: 66 IN | DIASTOLIC BLOOD PRESSURE: 76 MMHG | WEIGHT: 108 LBS

## 2022-03-08 DIAGNOSIS — I50.32 CHRONIC HEART FAILURE WITH PRESERVED EJECTION FRACTION (HFPEF) (H): Primary | ICD-10-CM

## 2022-03-08 PROCEDURE — 99213 OFFICE O/P EST LOW 20 MIN: CPT | Mod: 95 | Performed by: INTERNAL MEDICINE

## 2022-03-08 NOTE — PROGRESS NOTES
Elizabeth is a 89 year old who is being evaluated via a billable video visit.      How would you like to obtain your AVS? Mail a copy     If the video visit is dropped, the invitation should be resent by: Text to cell phone: [phone]     Will anyone else be joining your video visit? No         CARDIOLOGY CLINIC VISIT  DATE OF SERVICE:  March 8, 2022    PRIMARY CARE PHYSICIAN:  Gelacio Downs    HISTORY OF PRESENT ILLNESS:  Ms. Ruggiero is a pleasant 89 year old female with a past medical history notable for chronic atrial fibrillation/flutter, chronic diastolic heart failure, hepatic congestion, deep vein thrombosis, hypertension, severe mitral regurgitation, history of TIA, and osteoporosis. She was last seen by me in 1/2022.  Due to the COVID 19 pandemic, this visit is conducted via video, with Ms. Ruggiero's consent.       In brief review, she has severe mitral valve regurgitation secondary to myxomatous thickened leaflets and bileaflet prolapse. Intervention was considered in the past, however, she has significant annular calcification which would make successful mitraclip unlikely. Given her advanced age, she has not been felt to be a good surgical candidate. She was also noted to have declined RAHEL previously for further evaluation with preference for continued medical management.      More recently, the patient was admitted to [facility] on 09/25/21 after presenting with bilateral leg swelling, generalized weakness, and around 5 lb weight gain over about 1 week. She was found to have a mild exacerbation of HFpEF with NT pro BNP was elevated at 8,794 (previously closer to the 1711-6284 range at baseline). Chest x-ray showed small to moderate sized right sided pleural effusion. She was treated with IV diuretics and subsequently discharged with slight increase in p.o. Lasix for 5 days at 40 mg in the morning, 20 mg in the evening and then instructed to revert back to her prior to  admision Lasix dose of 20 mg twice daily.     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  She has lost weight as a result.  She also notes a rash on her back and has been seeing a dermatologist.         PAST MEDICAL HISTORY:  Past Medical History:   Diagnosis Date     (HFpEF) heart failure with preserved ejection fraction (H)      Acute diastolic CHF (congestive heart failure) (H)      Acute left hemiparesis (H) 2/9/2019     Arthritis      Atrial fibrillation (H)      Atrial fibrillation with RVR (H)      Atrial flutter (H)      Atrial flutter with rapid ventricular response (H) 4/23/2019     Community acquired pneumonia      Diastolic CHF (H) 04/29/2019     DVT of lower extremity (deep venous thrombosis) (H)     recurrent     Hepatic congestion     improved with diuretics for CHF     HTN (hypertension)      Hyperlipidemia      Mitral valve insufficiency      Pneumonia      Postmenopausal bleeding 01/2020    [facility]     Severe mitral regurgitation      SOB (shortness of breath)      TIA (transient ischemic attack)      Vitamin D deficiency        MEDICATIONS:  Current Outpatient Medications   Medication     acetaminophen (TYLENOL) 500 MG tablet     alendronate (FOSAMAX) 70 MG tablet     calcium carbonate-vitamin D (OSCAL W/D) 500-200 MG-UNIT tablet     cholecalciferol 25 MCG (1000 UT) TABS     COMPRESSION STOCKINGS     digoxin (LANOXIN) 125 MCG tablet     furosemide (LASIX) 20 MG tablet     melatonin 5 MG tablet     metoprolol succinate ER (TOPROL-XL) 25 MG 24 hr tablet     Multiple Vitamins-Minerals (PRESERVISION AREDS 2+MULTI VIT) CAPS     multivitamin w/minerals (MULTI-VITAMIN) tablet     omeprazole (PRILOSEC) 20 MG DR capsule     potassium chloride ER (K-TAB) 20 MEQ CR tablet     senna (SENOKOT) 8.6 MG tablet     triamcinolone (KENALOG) 0.1 % external cream     warfarin ANTICOAGULANT (COUMADIN) 2.5 MG tablet     warfarin ANTICOAGULANT (COUMADIN) 2.5 MG tablet      No current facility-administered medications for this visit.       ALLERGIES:  No Known Allergies    SOCIAL HISTORY:  I have reviewed this patient's social history and updated it with pertinent information if needed. Elizabeth Ruggiero  reports that she has never smoked. She has never used smokeless tobacco. She reports that she does not drink alcohol and does not use drugs.    FAMILY HISTORY:  I have reviewed this patient's family history and updated it with pertinent information if needed.   Family History   Problem Relation Age of Onset     Colon Cancer Mother      Breast Cancer Sister      Diabetes Sister        REVIEW OF SYSTEMS:  A complete ROS was obtained and the pertinent positives are outlined in the history of present illness above.  The remainder of systems is negative.      PHYSICAL EXAM:  Vitals - Patient Reported  Systolic (Patient Reported): 118  Diastolic (Patient Reported): 76  Weight (Patient Reported): 49 kg (108 lb)  SpO2 (Patient Reported): 98  Pulse (Patient Reported): 57    General:  no apparent distress, normal body habitus, sitting upright.  ENT/Mouth:  membranes moist, no nasal discharge.  Normal head shape, no apparent injury or laceration.  Eyes:  no scleral icterus, normal conjunctivae.  No observed jaundice.  Neck:  no apparent neck swelling.   Chest/Lungs:  No breathing difficulty while speaking.  No audible wheezing.  No cough during conversation.  Cardiovascular:  No obviously elevated jugular venous pressure.  No apparent edema bilaterally in LE.   Abdomen:  no obvious abdominal distention.   Extremities:  no apparent cyanosis.  Skin:  no xanthelasma.  No facial lacerations.  Neurologic:  Normal arm motion bilateral, no tremors.    Psychiatric:  Alert and oriented x3, calm demeanor    The rest of the comprehensive physical examination is deferred due to public health emergency video visit restrictions.          ASSESSMENT:  1. Chronic heart failure with preserved ejection fraction  (HFpEF)   - Most recent TTE in 10/2020 with normal LVEF.  - Euvolemic as assessed by home weights; 108 pounds.  No symptoms concerning for CHF exacerbation        2. Severe mitral valve regurgitation  - Noted as mod-severe to severe (3-4+) by TTE 10/2020.  - Not likely amenable to MitraClip due to significant mitral annular calcification and not likely a surgical candidate given her advanced age, so continued medical management has been recommended.      3. Moderate tricuspid regurgitation and pulmonary hypertension     4. Chronic atrial fibrillation/flutter   - Anticoagulated on warfarin for CVA prophylaxis and rate controlled with metoprolol succinate 12.5 mg daily and digoxin 125 mcg daily.     5. Essential hypertension, well-controlled          RECOMMENDATIONS:  1. Continue current meds; no changes made today  2.  Follow up in 3 months or before than as necessary.      Tiffany Galvan MD [facility]  March 10, 2022                      Vitals - Patient Reported  Systolic (Patient Reported): 118  Diastolic (Patient Reported): 76  Weight (Patient Reported): 49 kg (108 lb)  SpO2 (Patient Reported): 98  Pulse (Patient Reported): 57    SHAILESH Dominguez    Telephone number of patient: [phone]        Video-Visit Details    Type of service:  Video Visit  [employer]  Video Start Time: 3:17 pm  Video End Time: 3:27 pm    Originating Location (pt. Location):  Home    Distant Location (provider location):  [facility]     Platform used for Video Visit: [employer]

## 2022-03-08 NOTE — LETTER
3/8/2022    Gelacio Downs MD  [facility] [address]  [facility] [address]    RE: Elizabeth Bahman       Dear Colleague,     I had the pleasure of seeing Elizabeth Ruggiero in the [facility].  Elizabeth is a 89 year old who is being evaluated via a billable video visit.      How would you like to obtain your AVS? Mail a copy     If the video visit is dropped, the invitation should be resent by: Text to cell phone: [phone]     Will anyone else be joining your video visit? No         CARDIOLOGY CLINIC VISIT  DATE OF SERVICE:  March 8, 2022    PRIMARY CARE PHYSICIAN:  Gelacio oDwns    HISTORY OF PRESENT ILLNESS:  Ms. Ruggiero is a pleasant 89 year old female with a past medical history notable for chronic atrial fibrillation/flutter, chronic diastolic heart failure, hepatic congestion, deep vein thrombosis, hypertension, severe mitral regurgitation, history of TIA, and osteoporosis. She was last seen by me in 1/2022.  Due to the COVID 19 pandemic, this visit is conducted via video, with Ms. Ruggiero's consent.       In brief review, she has severe mitral valve regurgitation secondary to myxomatous thickened leaflets and bileaflet prolapse. Intervention was considered in the past, however, she has significant annular calcification which would make successful mitraclip unlikely. Given her advanced age, she has not been felt to be a good surgical candidate. She was also noted to have declined RAHEL previously for further evaluation with preference for continued medical management.      More recently, the patient was admitted to [facility] on 09/25/21 after presenting with bilateral leg swelling, generalized weakness, and around 5 lb weight gain over about 1 week. She was found to have a mild exacerbation of HFpEF with NT pro BNP was elevated at 8,794 (previously closer to the 9886-0164 range at baseline). Chest x-ray showed small to moderate sized right sided pleural  effusion. She was treated with IV diuretics and subsequently discharged with slight increase in p.o. Lasix for 5 days at 40 mg in the morning, 20 mg in the evening and then instructed to revert back to her prior to admision Lasix dose of 20 mg twice daily.     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  She has lost weight as a result.  She also notes a rash on her back and has been seeing a dermatologist.         PAST MEDICAL HISTORY:  Past Medical History:   Diagnosis Date     (HFpEF) heart failure with preserved ejection fraction (H)      Acute diastolic CHF (congestive heart failure) (H)      Acute left hemiparesis (H) 2/9/2019     Arthritis      Atrial fibrillation (H)      Atrial fibrillation with RVR (H)      Atrial flutter (H)      Atrial flutter with rapid ventricular response (H) 4/23/2019     Community acquired pneumonia      Diastolic CHF (H) 04/29/2019     DVT of lower extremity (deep venous thrombosis) (H)     recurrent     Hepatic congestion     improved with diuretics for CHF     HTN (hypertension)      Hyperlipidemia      Mitral valve insufficiency      Pneumonia      Postmenopausal bleeding 01/2020    [facility]     Severe mitral regurgitation      SOB (shortness of breath)      TIA (transient ischemic attack)      Vitamin D deficiency        MEDICATIONS:  Current Outpatient Medications   Medication     acetaminophen (TYLENOL) 500 MG tablet     alendronate (FOSAMAX) 70 MG tablet     calcium carbonate-vitamin D (OSCAL W/D) 500-200 MG-UNIT tablet     cholecalciferol 25 MCG (1000 UT) TABS     COMPRESSION STOCKINGS     digoxin (LANOXIN) 125 MCG tablet     furosemide (LASIX) 20 MG tablet     melatonin 5 MG tablet     metoprolol succinate ER (TOPROL-XL) 25 MG 24 hr tablet     Multiple Vitamins-Minerals (PRESERVISION AREDS 2+MULTI VIT) CAPS     multivitamin w/minerals (MULTI-VITAMIN) tablet     omeprazole (PRILOSEC) 20 MG DR capsule     potassium chloride  ER (K-TAB) 20 MEQ CR tablet     senna (SENOKOT) 8.6 MG tablet     triamcinolone (KENALOG) 0.1 % external cream     warfarin ANTICOAGULANT (COUMADIN) 2.5 MG tablet     warfarin ANTICOAGULANT (COUMADIN) 2.5 MG tablet     No current facility-administered medications for this visit.       ALLERGIES:  No Known Allergies    SOCIAL HISTORY:  I have reviewed this patient's social history and updated it with pertinent information if needed. Elizabeth Ruggiero  reports that she has never smoked. She has never used smokeless tobacco. She reports that she does not drink alcohol and does not use drugs.    FAMILY HISTORY:  I have reviewed this patient's family history and updated it with pertinent information if needed.   Family History   Problem Relation Age of Onset     Colon Cancer Mother      Breast Cancer Sister      Diabetes Sister        REVIEW OF SYSTEMS:  A complete ROS was obtained and the pertinent positives are outlined in the history of present illness above.  The remainder of systems is negative.      PHYSICAL EXAM:  Vitals - Patient Reported  Systolic (Patient Reported): 118  Diastolic (Patient Reported): 76  Weight (Patient Reported): 49 kg (108 lb)  SpO2 (Patient Reported): 98  Pulse (Patient Reported): 57    General:  no apparent distress, normal body habitus, sitting upright.  ENT/Mouth:  membranes moist, no nasal discharge.  Normal head shape, no apparent injury or laceration.  Eyes:  no scleral icterus, normal conjunctivae.  No observed jaundice.  Neck:  no apparent neck swelling.   Chest/Lungs:  No breathing difficulty while speaking.  No audible wheezing.  No cough during conversation.  Cardiovascular:  No obviously elevated jugular venous pressure.  No apparent edema bilaterally in LE.   Abdomen:  no obvious abdominal distention.   Extremities:  no apparent cyanosis.  Skin:  no xanthelasma.  No facial lacerations.  Neurologic:  Normal arm motion bilateral, no tremors.    Psychiatric:  Alert and oriented x3, calm  gerrieanor    The rest of the comprehensive physical examination is deferred due to public health emergency video visit restrictions.          ASSESSMENT:  1. Chronic heart failure with preserved ejection fraction (HFpEF)   - Most recent TTE in 10/2020 with normal LVEF.  - Euvolemic as assessed by home weights; 108 pounds.  No symptoms concerning for CHF exacerbation        2. Severe mitral valve regurgitation  - Noted as mod-severe to severe (3-4+) by TTE 10/2020.  - Not likely amenable to MitraClip due to significant mitral annular calcification and not likely a surgical candidate given her advanced age, so continued medical management has been recommended.      3. Moderate tricuspid regurgitation and pulmonary hypertension     4. Chronic atrial fibrillation/flutter   - Anticoagulated on warfarin for CVA prophylaxis and rate controlled with metoprolol succinate 12.5 mg daily and digoxin 125 mcg daily.     5. Essential hypertension, well-controlled          RECOMMENDATIONS:  1. Continue current meds; no changes made today  2.  Follow up in 3 months or before than as necessary.      Tiffany Galvan MD [facility]  March 10, 2022                      Vitals - Patient Reported  Systolic (Patient Reported): 118  Diastolic (Patient Reported): 76  Weight (Patient Reported): 49 kg (108 lb)  SpO2 (Patient Reported): 98  Pulse (Patient Reported): 57    Duyen Wagoner HILLARY    Telephone number of patient: [phone]        Video-Visit Details    Type of service:  Video Visit  DOX  Video Start Time: 3:17 pm  Video End Time: 3:27 pm    Originating Location (pt. Location):  Home    Distant Location (provider location):  [facility]     Platform used for Video Visit: Shantelle    Thank you for allowing me to participate in the care of your patient.      Sincerely,     Tiffany Galvan MD     [facility]  cc:   Tiffany MENDENHALL  MD Cole  [facility] [place]  [address]  [place] [address]

## 2022-03-28 ENCOUNTER — LAB REQUISITION (OUTPATIENT)
Dept: LAB | Facility: CLINIC | Age: 87
End: 2022-03-28
Payer: COMMERCIAL

## 2022-03-28 DIAGNOSIS — I48.20 CHRONIC ATRIAL FIBRILLATION, UNSPECIFIED (H): ICD-10-CM

## 2022-03-30 LAB — INR PPP: 2.51 (ref 0.85–1.15)

## 2022-03-30 PROCEDURE — 85610 PROTHROMBIN TIME: CPT | Mod: ORL | Performed by: FAMILY MEDICINE

## 2022-03-30 PROCEDURE — 36415 COLL VENOUS BLD VENIPUNCTURE: CPT | Mod: ORL | Performed by: FAMILY MEDICINE

## 2022-03-30 PROCEDURE — P9604 ONE-WAY ALLOW PRORATED TRIP: HCPCS | Mod: ORL | Performed by: FAMILY MEDICINE

## 2022-04-24 ENCOUNTER — HOSPITAL ENCOUNTER (EMERGENCY)
Facility: CLINIC | Age: 87
Discharge: HOME | End: 2022-04-24
Attending: PHYSICIAN ASSISTANT | Admitting: PHYSICIAN ASSISTANT
Payer: COMMERCIAL

## 2022-04-24 ENCOUNTER — APPOINTMENT (OUTPATIENT)
Dept: ULTRASOUND IMAGING | Facility: CLINIC | Age: 87
End: 2022-04-24
Attending: PHYSICIAN ASSISTANT
Payer: COMMERCIAL

## 2022-04-24 ENCOUNTER — APPOINTMENT (OUTPATIENT)
Dept: CT IMAGING | Facility: CLINIC | Age: 87
End: 2022-04-24
Attending: PHYSICIAN ASSISTANT
Payer: COMMERCIAL

## 2022-04-24 VITALS
RESPIRATION RATE: 14 BRPM | HEART RATE: 72 BPM | HEIGHT: 66 IN | TEMPERATURE: 97.7 F | WEIGHT: 110 LBS | DIASTOLIC BLOOD PRESSURE: 72 MMHG | BODY MASS INDEX: 17.68 KG/M2 | SYSTOLIC BLOOD PRESSURE: 110 MMHG | OXYGEN SATURATION: 99 %

## 2022-04-24 DIAGNOSIS — N39.0 URINARY TRACT INFECTION WITH HEMATURIA, SITE UNSPECIFIED: ICD-10-CM

## 2022-04-24 DIAGNOSIS — R31.9 URINARY TRACT INFECTION WITH HEMATURIA, SITE UNSPECIFIED: ICD-10-CM

## 2022-04-24 LAB
ALBUMIN SERPL-MCNC: 3.5 G/DL (ref 3.4–5)
ALBUMIN UR-MCNC: 50 MG/DL
ALP SERPL-CCNC: 71 U/L (ref 40–150)
ALT SERPL W P-5'-P-CCNC: 39 U/L (ref 0–50)
ANION GAP SERPL CALCULATED.3IONS-SCNC: 8 MMOL/L (ref 3–14)
APPEARANCE UR: ABNORMAL
AST SERPL W P-5'-P-CCNC: 35 U/L (ref 0–45)
ATRIAL RATE - MUSE: 61 BPM
BACTERIA #/AREA URNS HPF: ABNORMAL /HPF
BASOPHILS # BLD AUTO: 0.1 10E3/UL (ref 0–0.2)
BASOPHILS NFR BLD AUTO: 1 %
BILIRUB SERPL-MCNC: 1.9 MG/DL (ref 0.2–1.3)
BILIRUB UR QL STRIP: NEGATIVE
BUN SERPL-MCNC: 35 MG/DL (ref 7–30)
CALCIUM SERPL-MCNC: 9.2 MG/DL (ref 8.5–10.1)
CHLORIDE BLD-SCNC: 99 MMOL/L (ref 94–109)
CO2 SERPL-SCNC: 27 MMOL/L (ref 20–32)
COLOR UR AUTO: YELLOW
CREAT SERPL-MCNC: 1.06 MG/DL (ref 0.52–1.04)
DIASTOLIC BLOOD PRESSURE - MUSE: NORMAL MMHG
EOSINOPHIL # BLD AUTO: 0.1 10E3/UL (ref 0–0.7)
EOSINOPHIL NFR BLD AUTO: 1 %
ERYTHROCYTE [DISTWIDTH] IN BLOOD BY AUTOMATED COUNT: 13.6 % (ref 10–15)
GFR SERPL CREATININE-BSD FRML MDRD: 48 ML/MIN/1.73M2
GLUCOSE BLD-MCNC: 121 MG/DL (ref 70–99)
GLUCOSE UR STRIP-MCNC: NEGATIVE MG/DL
HCT VFR BLD AUTO: 39.8 % (ref 35–47)
HGB BLD-MCNC: 12.5 G/DL (ref 11.7–15.7)
HGB UR QL STRIP: ABNORMAL
HOLD SPECIMEN: NORMAL
IMM GRANULOCYTES # BLD: 0 10E3/UL
IMM GRANULOCYTES NFR BLD: 0 %
INTERPRETATION ECG - MUSE: NORMAL
KETONES UR STRIP-MCNC: NEGATIVE MG/DL
LACTATE SERPL-SCNC: 1.7 MMOL/L (ref 0.7–2)
LEUKOCYTE ESTERASE UR QL STRIP: ABNORMAL
LIPASE SERPL-CCNC: 80 U/L (ref 73–393)
LYMPHOCYTES # BLD AUTO: 1 10E3/UL (ref 0.8–5.3)
LYMPHOCYTES NFR BLD AUTO: 13 %
MCH RBC QN AUTO: 30.9 PG (ref 26.5–33)
MCHC RBC AUTO-ENTMCNC: 31.4 G/DL (ref 31.5–36.5)
MCV RBC AUTO: 98 FL (ref 78–100)
MONOCYTES # BLD AUTO: 0.5 10E3/UL (ref 0–1.3)
MONOCYTES NFR BLD AUTO: 7 %
MUCOUS THREADS #/AREA URNS LPF: PRESENT /LPF
NEUTROPHILS # BLD AUTO: 5.8 10E3/UL (ref 1.6–8.3)
NEUTROPHILS NFR BLD AUTO: 78 %
NITRATE UR QL: NEGATIVE
NRBC # BLD AUTO: 0 10E3/UL
NRBC BLD AUTO-RTO: 0 /100
P AXIS - MUSE: -29 DEGREES
PH UR STRIP: 8 [PH] (ref 5–7)
PLATELET # BLD AUTO: 151 10E3/UL (ref 150–450)
POTASSIUM BLD-SCNC: 4 MMOL/L (ref 3.4–5.3)
PR INTERVAL - MUSE: 330 MS
PROT SERPL-MCNC: 7 G/DL (ref 6.8–8.8)
QRS DURATION - MUSE: 148 MS
QT - MUSE: 372 MS
QTC - MUSE: 374 MS
R AXIS - MUSE: 125 DEGREES
RBC # BLD AUTO: 4.05 10E6/UL (ref 3.8–5.2)
RBC URINE: 16 /HPF
SODIUM SERPL-SCNC: 134 MMOL/L (ref 133–144)
SP GR UR STRIP: 1.02 (ref 1–1.03)
SYSTOLIC BLOOD PRESSURE - MUSE: NORMAL MMHG
T AXIS - MUSE: 10 DEGREES
UROBILINOGEN UR STRIP-MCNC: NORMAL MG/DL
VENTRICULAR RATE- MUSE: 61 BPM
WBC # BLD AUTO: 7.4 10E3/UL (ref 4–11)
WBC URINE: 22 /HPF

## 2022-04-24 PROCEDURE — 85025 COMPLETE CBC W/AUTO DIFF WBC: CPT | Performed by: PHYSICIAN ASSISTANT

## 2022-04-24 PROCEDURE — 250N000011 HC RX IP 250 OP 636: Performed by: PHYSICIAN ASSISTANT

## 2022-04-24 PROCEDURE — 93970 EXTREMITY STUDY: CPT

## 2022-04-24 PROCEDURE — 83690 ASSAY OF LIPASE: CPT | Performed by: PHYSICIAN ASSISTANT

## 2022-04-24 PROCEDURE — 36415 COLL VENOUS BLD VENIPUNCTURE: CPT | Performed by: PHYSICIAN ASSISTANT

## 2022-04-24 PROCEDURE — 74177 CT ABD & PELVIS W/CONTRAST: CPT

## 2022-04-24 PROCEDURE — 83605 ASSAY OF LACTIC ACID: CPT | Performed by: PHYSICIAN ASSISTANT

## 2022-04-24 PROCEDURE — 81001 URINALYSIS AUTO W/SCOPE: CPT | Performed by: PHYSICIAN ASSISTANT

## 2022-04-24 PROCEDURE — 250N000009 HC RX 250: Performed by: PHYSICIAN ASSISTANT

## 2022-04-24 PROCEDURE — 99285 EMERGENCY DEPT VISIT HI MDM: CPT | Mod: 25

## 2022-04-24 PROCEDURE — 87088 URINE BACTERIA CULTURE: CPT | Mod: 59 | Performed by: PHYSICIAN ASSISTANT

## 2022-04-24 PROCEDURE — 80053 COMPREHEN METABOLIC PANEL: CPT | Performed by: PHYSICIAN ASSISTANT

## 2022-04-24 PROCEDURE — 93005 ELECTROCARDIOGRAM TRACING: CPT

## 2022-04-24 RX ORDER — CEPHALEXIN 500 MG/1
500 CAPSULE ORAL 3 TIMES DAILY
Qty: 21 CAPSULE | Refills: 0 | Status: SHIPPED | OUTPATIENT
Start: 2022-04-24 | End: 2022-05-01

## 2022-04-24 RX ORDER — IOPAMIDOL 755 MG/ML
55 INJECTION, SOLUTION INTRAVASCULAR ONCE
Status: COMPLETED | OUTPATIENT
Start: 2022-04-24 | End: 2022-04-24

## 2022-04-24 RX ADMIN — IOPAMIDOL 55 ML: 755 INJECTION, SOLUTION INTRAVENOUS at 12:27

## 2022-04-24 RX ADMIN — SODIUM CHLORIDE 60 ML: 900 INJECTION INTRAVENOUS at 12:27

## 2022-04-24 ASSESSMENT — ENCOUNTER SYMPTOMS
FEVER: 0
CONSTIPATION: 0
NUMBNESS: 0
SHORTNESS OF BREATH: 0
DIARRHEA: 0
ABDOMINAL PAIN: 1
BACK PAIN: 0
WEAKNESS: 0
NAUSEA: 1
VOMITING: 0

## 2022-04-24 NOTE — ED PROVIDER NOTES
"  History   Chief Complaint:  Urinary Retention     HPI   Elizabeth Ruggiero is a 89 year old female anticoagulated on Eliquis with history of hypertension, congestive heart failure, deep vein thrombosis, and atrial fibrillation who presents with urinary retention. The patient reports decreased urination since last night accompanied by generalized abdominal pain, \"gassiness\", and nausea. She has a chronic, unchanged cough, and her ankles seem more swollen from baseline. Her bowel movements have been normal, with her last being this morning. She had been taking Coumadin for years due to remote deep vein thrombosis and history of atrial fibrillation, and she was switched to Eliquis this weekend by her new PCP. Denies fever, chest pain, dyspnea, vomiting, diarrhea, back pain, or numbness or weakness to extremities or genitals.     Review of Systems   Constitutional: Negative for fever.   Respiratory: Negative for shortness of breath.    Cardiovascular: Positive for leg swelling. Negative for chest pain.   Gastrointestinal: Positive for abdominal pain and nausea. Negative for constipation, diarrhea and vomiting.   Genitourinary: Positive for decreased urine volume.   Musculoskeletal: Negative for back pain.   Skin:        Chronic wounds to back   Neurological: Negative for weakness and numbness.   All other systems reviewed and are negative.    Allergies:  No known drug allergies.    Medications:  Eliquis  Alendronate  Digoxin  Furosemide  Potassium chloride  Cholecalciferol  Metoprolol  Omeprazole  Senna    Past Medical History:     Atrial fibrillation with RVR  Heart failure with preserved ejection fraction  Congestive heart failure  Left hemiparesis  Arthritis  Atrial flutter with rapid ventricular response  Community acquired pneumonia  Deep vein thrombosis  Hepatic congestion  Hyperlipidemia  Hypertension  Mitral valve insufficiency  Severe mitral regurgitation  Transient ischemic attack  Vitamin D " "deficiency  Displacement of lumbar intervertebral disc without myelopathy  Osteoporosis  Spinal stenosis  Lumbosacral radiculitis and spondylosis  Intertrochanteric fracture of left femur  Hyponatremia  Baker's cyst of knee  Pleural effusion    Past Surgical History:    Left femur repair  Appendectomy    Family History:    Mother- colon cancer  Sister- breast cancer, diabetes mellitus     Social History:  PCP established  Lives in assisted living  Daughter at bedside    Physical Exam     Patient Vitals for the past 24 hrs:   BP Temp Temp src Pulse Resp SpO2 Height Weight   04/24/22 1200 110/72 -- -- 72 -- 97 % -- --   04/24/22 1130 113/64 -- -- -- -- 98 % -- --   04/24/22 1052 129/79 97.7  F (36.5  C) Temporal 85 14 92 % 1.676 m (5' 6\") 49.9 kg (110 lb)       Physical Exam  Vitals and nursing note reviewed.   Constitutional:       General: She is not in acute distress.     Appearance: Normal appearance. She is not ill-appearing, toxic-appearing or diaphoretic.   HENT:      Head: Normocephalic.      Right Ear: External ear normal.      Left Ear: External ear normal.      Mouth/Throat:      Comments: Mask in place. Clear speech.   Eyes:      General: No scleral icterus.     Conjunctiva/sclera: Conjunctivae normal.   Cardiovascular:      Rate and Rhythm: Normal rate and regular rhythm.      Pulses: Normal pulses.      Heart sounds: Normal heart sounds.   Pulmonary:      Effort: Pulmonary effort is normal. No respiratory distress.      Breath sounds: Normal breath sounds.   Abdominal:      General: Abdomen is flat. There is no distension.      Palpations: Abdomen is soft.      Tenderness: There is no abdominal tenderness. There is no right CVA tenderness, left CVA tenderness, guarding or rebound.   Musculoskeletal:         General: Swelling present. Normal range of motion.      Cervical back: Normal range of motion and neck supple.      Right lower leg: Edema present.      Left lower leg: Edema present.      Comments: " Mild edema to ankles.  No TTP.  Ranging ankles with ease.  Sensation grossly intact to light touch.  Venous status changes to legs.  Palpable DP pulse bilat.     No TTP to back.  Healing wounds to back that do not appear infected.    Skin:     General: Skin is warm.      Capillary Refill: Capillary refill takes less than 2 seconds.   Neurological:      General: No focal deficit present.      Mental Status: She is alert.      Comments: CAR  DF/PF equal and 5/5  Sensation grossly intact to light touch to extremities and to inner thighs/groin.    Psychiatric:         Mood and Affect: Mood normal.         Behavior: Behavior normal.         Thought Content: Thought content normal.         Judgment: Judgment normal.       Emergency Department Course   ECG  ECG obtained at 1200, ECG read at 1204  Sinus rhythm with 1st degree AV block with premature supraventricular complexes; right axis deviation; nonspecific intraventricular block; baseline artifact   No prior ECG for comparison  Rate 61 bpm. WI interval 330 ms. QRS duration 148 ms. QT/QTc 372/374 ms. P-R-T axes -29 125 10.     Imaging:  US Lower Extremity Venous Duplex Bilateral   Final Result   IMPRESSION:   1.  No deep venous thrombosis in the bilateral lower extremities.   2.  Similar size and appearance of likely left Baker's cyst.      CT Abdomen Pelvis w Contrast   Final Result   IMPRESSION:    1.  Small right pleural effusion and associated compressive atelectasis.   2.  No acute process demonstrated in the abdomen and pelvis.           Report per radiology    Laboratory:  Labs Ordered and Resulted from Time of ED Arrival to Time of ED Departure   COMPREHENSIVE METABOLIC PANEL - Abnormal       Result Value    Sodium 134      Potassium 4.0      Chloride 99      Carbon Dioxide (CO2) 27      Anion Gap 8      Urea Nitrogen 35 (*)     Creatinine 1.06 (*)     Calcium 9.2      Glucose 121 (*)     Alkaline Phosphatase 71      AST 35      ALT 39      Protein Total 7.0       Albumin 3.5      Bilirubin Total 1.9 (*)     GFR Estimate 48 (*)    ROUTINE UA WITH MICROSCOPIC REFLEX TO CULTURE - Abnormal    Color Urine Yellow      Appearance Urine Slightly Cloudy (*)     Glucose Urine Negative      Bilirubin Urine Negative      Ketones Urine Negative      Specific Gravity Urine 1.023      Blood Urine Trace (*)     pH Urine 8.0 (*)     Protein Albumin Urine 50  (*)     Urobilinogen Urine Normal      Nitrite Urine Negative      Leukocyte Esterase Urine Large (*)     Bacteria Urine Moderate (*)     Mucus Urine Present (*)     RBC Urine 16 (*)     WBC Urine 22 (*)    CBC WITH PLATELETS AND DIFFERENTIAL - Abnormal    WBC Count 7.4      RBC Count 4.05      Hemoglobin 12.5      Hematocrit 39.8      MCV 98      MCH 30.9      MCHC 31.4 (*)     RDW 13.6      Platelet Count 151      % Neutrophils 78      % Lymphocytes 13      % Monocytes 7      % Eosinophils 1      % Basophils 1      % Immature Granulocytes 0      NRBCs per 100 WBC 0      Absolute Neutrophils 5.8      Absolute Lymphocytes 1.0      Absolute Monocytes 0.5      Absolute Eosinophils 0.1      Absolute Basophils 0.1      Absolute Immature Granulocytes 0.0      Absolute NRBCs 0.0     LACTIC ACID WHOLE BLOOD - Normal    Lactic Acid 1.7     LIPASE - Normal    Lipase 80     URINE CULTURE      Emergency Department Course:    Reviewed:  I reviewed nursing notes, vitals and past medical history    Assessments:  1122: I obtained history and examined the patient as noted above.   1306: Patient at Ultrasound. Updated daughter with lab and CT results. Awaiting urinalysis and ultrasound.   1426: I rechecked the patient. Ultrasound noted. Urine collected.  1500: I rechecked the patient. Discussed dispo plan. Daughter remains at BS    Interventions:  Medications   iopamidol (ISOVUE-370) solution 55 mL (55 mLs Intravenous Given 4/24/22 1227)   Saline (60 mLs As instructed Given 4/24/22 1227)     Disposition:  The patient was discharged to home.      Impression & Plan     Medical Decision Making:  Very pleasant 89 y/o female presents with daughter for evaluation of decreased urination with nonspecific abd discomfort and nausea since last night.  Daughter also concerned that coumadin was changed to elliquis yesterday by new PCP and she feels ankles look more swollen that typical.      On exam, pt alert and not distressed.  Vitals noted, reassuring.  Will check CBC for leukocytosis/penia, anemia, and abnl platelets.  CMP to assess electrolytes and renal/liver function.  Lipase for pancreatitis.  Lactate as mesenteric ischemia considered with nonspecific abd pain and age, although felt less likely.  Will check UA for infection.  Will check CT to assess for SBO, mass, etc.  Will check bilat U/S LE for reassurance against DVT.  Pt and daughter are comfortable with plan.     Update: Reassuring eval here.  Pt remains alert and with no new complaints.  Daughter remains at BS. Discussed CT results reassuring.  Does have small right sided pleural effusion, no dyspnea or increased work of breathing or fevers and pt reports this is known.  This is felt incidental today and not related to today's symptoms.  No DVTs on U/S.  UA does appear infected.  Pt is able to urinate albeit smaller amounts that typical which I suspect is related to UTI.  Discussed not felt chen needed at this time and that she is felt stable for discharge as I do not suspect urosepsis with no fevers, reassuring lactate and WBC and vitals and no vomiting.  Will F/U with PCP this week. Pt and daughter at  educated on S/S that should prompt ED re-eval.  Questions answered. Verbalized understanding. Comfortable with plan and appreciative.     Diagnosis:    ICD-10-CM    1. Urinary tract infection with hematuria, site unspecified  N39.0     R31.9      Discharge Medications:  New Prescriptions    CEPHALEXIN (KEFLEX) 500 MG CAPSULE    Take 1 capsule (500 mg) by mouth 3 times daily for 7 days     Scribe  Disclosure:  I, Za Healy, am serving as a scribe at 11:19 AM on 4/24/2022 to document services personally performed by Shivani Talbot PA-C based on my observations and the provider's statements to me.            Shivani Talbot PA-C  04/24/22 1515

## 2022-04-24 NOTE — ED TRIAGE NOTES
Changed to Eliquis from Coumadin on Saturday per new PMD recommendation. Yesterday had nausea with abdomen pain. Woke up this morning having difficulty urinating. Patient states last voided last night.

## 2022-04-26 ENCOUNTER — LAB REQUISITION (OUTPATIENT)
Dept: LAB | Facility: CLINIC | Age: 87
End: 2022-04-26
Payer: COMMERCIAL

## 2022-04-26 DIAGNOSIS — I48.20 CHRONIC ATRIAL FIBRILLATION, UNSPECIFIED (H): ICD-10-CM

## 2022-04-26 LAB
BACTERIA UR CULT: ABNORMAL
BACTERIA UR CULT: ABNORMAL

## 2022-04-27 ENCOUNTER — HOSPITAL ENCOUNTER (EMERGENCY)
Facility: CLINIC | Age: 87
Discharge: HOME | End: 2022-04-27
Attending: PHYSICIAN ASSISTANT | Admitting: PHYSICIAN ASSISTANT
Payer: COMMERCIAL

## 2022-04-27 ENCOUNTER — NURSE TRIAGE (OUTPATIENT)
Dept: NURSING | Facility: CLINIC | Age: 87
End: 2022-04-27
Payer: COMMERCIAL

## 2022-04-27 ENCOUNTER — TELEPHONE (OUTPATIENT)
Dept: EMERGENCY MEDICINE | Facility: CLINIC | Age: 87
End: 2022-04-27
Payer: COMMERCIAL

## 2022-04-27 VITALS
SYSTOLIC BLOOD PRESSURE: 109 MMHG | DIASTOLIC BLOOD PRESSURE: 65 MMHG | HEART RATE: 68 BPM | OXYGEN SATURATION: 97 % | TEMPERATURE: 98.8 F | RESPIRATION RATE: 16 BRPM

## 2022-04-27 DIAGNOSIS — N39.0 URINARY TRACT INFECTION WITH HEMATURIA, SITE UNSPECIFIED: ICD-10-CM

## 2022-04-27 DIAGNOSIS — R31.9 URINARY TRACT INFECTION WITH HEMATURIA, SITE UNSPECIFIED: ICD-10-CM

## 2022-04-27 PROCEDURE — 99282 EMERGENCY DEPT VISIT SF MDM: CPT

## 2022-04-27 RX ORDER — APIXABAN 2.5 MG/1
2.5 TABLET, FILM COATED ORAL 2 TIMES DAILY
Status: ON HOLD | COMMUNITY
Start: 2022-04-22 | End: 2023-01-01

## 2022-04-27 RX ORDER — CEFDINIR 300 MG/1
300 CAPSULE ORAL DAILY
Qty: 10 CAPSULE | Refills: 0 | Status: SHIPPED | OUTPATIENT
Start: 2022-04-27 | End: 2022-05-07

## 2022-04-27 ASSESSMENT — ENCOUNTER SYMPTOMS
DIARRHEA: 0
CHILLS: 0
VOMITING: 0
FEVER: 0
ABDOMINAL PAIN: 0
FREQUENCY: 1

## 2022-04-27 NOTE — TELEPHONE ENCOUNTER
Daughter calling. She was in ER on Sunday and put on antibiotic for UTI. Got a call from lab. RN said there are a couple of strains of bacteria that aren't covered by antibiotic. Told to go to the ER for expedited treatment. Talked with her MD and he said for faster results to go to the ER. He gave the option of doing either. When administering antibiotic how long does it take? She is quite weak. I explained an antibiotic can take 30 minutes to an hour to infuse but it takes time for the MD to see her, the pharmacy to send up the med after the MD orders it and then to infuse it. I told her it can take at least 3 hours to get all of that done. As to whether or not they keep her in observation over night, is up to the ER as well. If they infuse more than one antibiotic, they may keep her longer. Her daughter then wanted to know if she should take her in today or tomorrow? I told her, based on what the previous RN and MD recommended, I would advise she bring her mother into the ER as soon as possible. Her daughter understands.  Wendy Jo RN  [facility]    Additional Information    Negative: Drug overdose and triager unable to answer question    Negative: Caller requesting information unrelated to medicine    Negative: Caller requesting a prescription for Strep throat and has a positive culture result    Negative: Rash while taking a medication or within 3 days of stopping it    Negative: Immunization reaction suspected    Negative: Asthma and having symptoms of asthma (cough, wheezing, etc.)    Negative: Breastfeeding questions about mother's medicines and diet    Negative: MORE THAN A DOUBLE DOSE of a prescription or over-the-counter (OTC) drug    Negative: DOUBLE DOSE (an extra dose or lesser amount) of over-the-counter (OTC) drug and any symptoms (e.g., dizziness, nausea, pain, sleepiness)    Negative: DOUBLE DOSE (an extra dose or lesser amount) of prescription drug and any symptoms (e.g.,  dizziness, nausea, pain, sleepiness)    Negative: Took another person's prescription drug    Negative: DOUBLE DOSE (an extra dose or lesser amount) of prescription drug and NO symptoms (Exception: a double dose of antibiotics)    Negative: Diabetes drug error or overdose (e.g., took wrong type of insulin or took extra dose)    Negative: Caller has medication question about med not prescribed by PCP and triager unable to answer question (e.g., compatibility with other med, storage)    Negative: Request for URGENT new prescription or refill of 'essential' medication (i.e., likelihood of harm to patient if not taken) and triager unable to fill per department policy    Negative: Prescription not at pharmacy and was prescribed today by PCP    Negative: Pharmacy calling with prescription questions and triager unable to answer question    Negative: Caller has urgent medication question about med that PCP prescribed and triager unable to answer question    Negative: Caller has NON-URGENT medication question about med that PCP prescribed and triager unable to answer question    Negative: Caller requesting a NON-URGENT new prescription or refill and triager unable to refill per department policy    Negative: DOUBLE DOSE (an extra dose or lesser amount) of over-the-counter (OTC) drug and NO symptoms    Negative: DOUBLE DOSE (an extra dose or lesser amount) of antibiotic drug and NO symptoms    Negative: Caller has medication question only, adult not sick, and triager answers question    Caller has medication question, adult has minor symptoms, caller declines triage, and triager answers question    Protocols used: MEDICATION QUESTION CALL-A-OH

## 2022-04-27 NOTE — ED PROVIDER NOTES
History     Chief Complaint:  Medication Refill       HPI   Elizabeth Ruggiero is a 89 year old female with a history of A. fib, on Eliquis, presents emergency room today for evaluation for medication change.  She was seen here several days ago, diagnosed with UTI, prescribed Keflex, and discharged home.  She had a significant work-up including a CT of her abdomen and lab work.  Work-up unrevealing if aside from a UTI which grew 2 Klebsiella species, 1 of which not sensitive to Keflex.  She since has been feeling about the same.  She feels weak and gassy.  She has brought here by her daughter.  She lives in assisted living.  She denies any new abdominal pain, fevers, back pain or other.  She was told to come here for a new antibiotic.    ROS:  Review of Systems   Constitutional: Negative for chills and fever.   Gastrointestinal: Negative for abdominal pain, diarrhea and vomiting.   Genitourinary: Positive for decreased urine volume, frequency and urgency.      All other systems reviewed and are negative.    Allergies:  No Known Allergies     Medications:    cefdinir (OMNICEF) 300 MG capsule  acetaminophen (TYLENOL) 500 MG tablet  alendronate (FOSAMAX) 70 MG tablet  calcium carbonate-vitamin D (OSCAL W/D) 500-200 MG-UNIT tablet  cephALEXin (KEFLEX) 500 MG capsule  cholecalciferol 25 MCG (1000 UT) TABS  COMPRESSION STOCKINGS  digoxin (LANOXIN) 125 MCG tablet  ELIQUIS ANTICOAGULANT 2.5 MG tablet  furosemide (LASIX) 20 MG tablet  melatonin 5 MG tablet  metoprolol succinate ER (TOPROL-XL) 25 MG 24 hr tablet  Multiple Vitamins-Minerals (PRESERVISION AREDS 2+MULTI VIT) CAPS  multivitamin w/minerals (MULTI-VITAMIN) tablet  omeprazole (PRILOSEC) 20 MG DR capsule  potassium chloride ER (K-TAB) 20 MEQ CR tablet  senna (SENOKOT) 8.6 MG tablet  triamcinolone (KENALOG) 0.1 % external cream  warfarin ANTICOAGULANT (COUMADIN) 2.5 MG tablet  warfarin ANTICOAGULANT (COUMADIN) 2.5 MG tablet        Past Medical History:    Past Medical  History:   Diagnosis Date     (HFpEF) heart failure with preserved ejection fraction (H)      Acute diastolic CHF (congestive heart failure) (H)      Acute left hemiparesis (H) 2/9/2019     Arthritis      Atrial fibrillation (H)      Atrial fibrillation with RVR (H)      Atrial flutter (H)      Atrial flutter with rapid ventricular response (H) 4/23/2019     Community acquired pneumonia      Diastolic CHF (H) 04/29/2019     DVT of lower extremity (deep venous thrombosis) (H)      Hepatic congestion      HTN (hypertension)      Hyperlipidemia      Mitral valve insufficiency      Pneumonia      Postmenopausal bleeding 01/2020     Severe mitral regurgitation      SOB (shortness of breath)      TIA (transient ischemic attack)      Vitamin D deficiency      Patient Active Problem List   Diagnosis     Acute left hemiparesis (H)     Atrial fibrillation (H)     Displacement of lumbar intervertebral disc without myelopathy     History of atrial fibrillation     History of recurrent deep vein thrombosis     Hyperlipidemia     Intertrochanteric fracture of left femur (H)     Long term current use of anticoagulant therapy     Spinal stenosis     Lumbosacral radiculitis     Lumbosacral spondylosis without myelopathy     Neck pain     Osteoporosis     Postoperative anemia due to acute blood loss     Right lumbar radiculitis     Severe mitral regurgitation     Vitamin D deficiency     Right middle lobe pneumonia     Atrial flutter with rapid ventricular response (H)     CHF (congestive heart failure) (H)     Back pain     Diastolic dysfunction with chronic heart failure (H)     Essential hypertension     Hyponatremia     Generalized muscle weakness     Abnormal liver function tests     Elevated troponin     Hyperkalemia     Pleural effusion     Leg swelling     History of CHF (congestive heart failure)     Baker's cyst of knee, left        Past Surgical History:    Past Surgical History:   Procedure Laterality Date      APPENDECTOMY       FRACTURE SURGERY       repair left femur          Family History:    family history includes Breast Cancer in her sister; Colon Cancer in her mother; Diabetes in her sister.    Social History:   reports that she has never smoked. She has never used smokeless tobacco. She reports that she does not drink alcohol and does not use drugs.  PCP: Sonu Reece     Physical Exam     Patient Vitals for the past 24 hrs:   BP Temp Temp src Pulse Resp SpO2   04/27/22 1445 109/65 98.8  F (37.1  C) Temporal 68 16 97 %        Physical Exam  General: Well appearing, pleasant female, resting on exam bed  HEENT: No evidence of trauma.  Conjunctive are clear.  Extraocular eye movements intact.  Neck range of motion intact.  Nose and throat clear.  Respiratory: Good breath sounds bilaterally  Cardiovascular: Normal rate and rhythm   Gastrointestinal: Soft, nontender.   Musculoskeletal: Atraumatic  Skin: Exposed skin clear.  Neurologic: Alert.  Psych:  Patient is cooperative, with normal affect.      Emergency Department Course   ECG:  none    Imaging:  No orders to display        Laboratory:  Labs Ordered and Resulted from Time of ED Arrival to Time of ED Departure - No data to display     Interventions:  Medications - No data to display     Impression & Plan      Medical Decision Making:  Elizabeth Ruggiero is a 89 year old female with a hx of A. fib, on Eliquis, heart failure, presents to the emergency room today for evaluation of a medication change since her urine culture grew Klebsiella species resistant to Keflex.  See HPI.  Her vitals are unremarkable.  She is well-appearing and does not want to be in the emergency room.  She wants to leave.  She has persistent symptoms that she came in with several days ago.  She had a significant work-up that was largely unremarkable.  We will change her antibiotic to cefdinir after consultation with pharmacy in the ED.  We did dose adjust her to 300 mg once a day given her age,  and borderline creatinine function.  Her creatinine clearance is 24.  Encouraged primary care follow-up in the next couple days.  She is instructed to return with new or worsening symptoms.  No further questions and agrees with plan.  Discharged home with daughter who is in full agreement of the plan.    Diagnosis:    ICD-10-CM    1. Urinary tract infection with hematuria, site unspecified  N39.0     R31.9         Discharge Medications:  New Prescriptions    CEFDINIR (OMNICEF) 300 MG CAPSULE    Take 1 capsule (300 mg) by mouth daily for 10 days        4/27/2022   Sixto Tarango PA-C Cyr, Matthew R, PA-C  04/27/22 1624

## 2022-05-03 ENCOUNTER — LAB REQUISITION (OUTPATIENT)
Dept: LAB | Facility: CLINIC | Age: 87
End: 2022-05-03
Payer: COMMERCIAL

## 2022-05-03 DIAGNOSIS — N39.0 URINARY TRACT INFECTION, SITE NOT SPECIFIED: ICD-10-CM

## 2022-05-03 LAB
ALBUMIN UR-MCNC: 30 MG/DL
APPEARANCE UR: CLEAR
BILIRUB UR QL STRIP: NEGATIVE
COLOR UR AUTO: YELLOW
GLUCOSE UR STRIP-MCNC: NEGATIVE MG/DL
HGB UR QL STRIP: NEGATIVE
KETONES UR STRIP-MCNC: NEGATIVE MG/DL
LEUKOCYTE ESTERASE UR QL STRIP: NEGATIVE
NITRATE UR QL: NEGATIVE
PH UR STRIP: 6 [PH] (ref 5–7)
RBC URINE: 21 /HPF
SP GR UR STRIP: 1.02 (ref 1–1.03)
UROBILINOGEN UR STRIP-MCNC: <2 MG/DL
WBC URINE: 1 /HPF

## 2022-05-03 PROCEDURE — 81001 URINALYSIS AUTO W/SCOPE: CPT | Mod: ORL | Performed by: FAMILY MEDICINE

## 2022-05-08 ENCOUNTER — TELEPHONE (OUTPATIENT)
Dept: EMERGENCY MEDICINE | Facility: CLINIC | Age: 87
End: 2022-05-08
Payer: COMMERCIAL

## 2022-05-08 NOTE — TELEPHONE ENCOUNTER
[facility] Emergency Department Lab result notification     Patient/parent Name  Brittney (PAT whatley, present at ER visit)    Reason for call  Patient has general question regarding an imaging test done on 4/24/22    Lab Result  Brittney is asking what imaging test was done and if it included the bladder.  Upon review of chart a CT abdomen and pelvis with contrast was done.  Bladder result per radiologist KIDNEYS/BLADDER: No significant mass, stone, or hydronephrosis.  Read verbatim to daughter. She stated understanding and will relay to her mothers provider to see if this is what the provider wanted done.    Contact your PCP clinic or return to the Emergency department if your:    Symptoms return.    Symptoms do not improved after 3 days on antibiotic.    Symptoms do not resolve after completing antibiotic.    Symptoms worsen or other concerning symptom's.        Nasreen Burroughs, SIGRID  [facility] [employer] [facility]  Emergency Dept Lab Result RN  Ph# [phone]

## 2022-05-10 ENCOUNTER — LAB REQUISITION (OUTPATIENT)
Dept: LAB | Facility: CLINIC | Age: 87
End: 2022-05-10
Payer: COMMERCIAL

## 2022-05-10 DIAGNOSIS — R21 RASH AND OTHER NONSPECIFIC SKIN ERUPTION: ICD-10-CM

## 2022-05-11 ENCOUNTER — HOSPITAL ENCOUNTER (OUTPATIENT)
Facility: CLINIC | Age: 87
Setting detail: OBSERVATION
Discharge: HOME HEALTH | End: 2022-05-14
Attending: EMERGENCY MEDICINE | Admitting: HOSPITALIST
Payer: COMMERCIAL

## 2022-05-11 ENCOUNTER — APPOINTMENT (OUTPATIENT)
Dept: GENERAL RADIOLOGY | Facility: CLINIC | Age: 87
End: 2022-05-11
Attending: STUDENT IN AN ORGANIZED HEALTH CARE EDUCATION/TRAINING PROGRAM
Payer: COMMERCIAL

## 2022-05-11 DIAGNOSIS — R34 DECREASED URINE OUTPUT: ICD-10-CM

## 2022-05-11 DIAGNOSIS — R22.43 LOCALIZED SWELLING OF BOTH LOWER LEGS: ICD-10-CM

## 2022-05-11 DIAGNOSIS — I50.32 CHRONIC HEART FAILURE WITH PRESERVED EJECTION FRACTION (HFPEF) (H): Primary | ICD-10-CM

## 2022-05-11 DIAGNOSIS — I34.0 NONRHEUMATIC MITRAL VALVE REGURGITATION: ICD-10-CM

## 2022-05-11 DIAGNOSIS — I50.33 ACUTE ON CHRONIC DIASTOLIC CONGESTIVE HEART FAILURE (H): ICD-10-CM

## 2022-05-11 DIAGNOSIS — I36.1 NONRHEUMATIC TRICUSPID VALVE REGURGITATION: ICD-10-CM

## 2022-05-11 DIAGNOSIS — I27.20 PULMONARY HYPERTENSION (H): ICD-10-CM

## 2022-05-11 PROBLEM — I50.9 CHF EXACERBATION (H): Status: ACTIVE | Noted: 2022-05-11

## 2022-05-11 LAB
ALBUMIN SERPL-MCNC: 3.2 G/DL (ref 3.4–5)
ALBUMIN UR-MCNC: 10 MG/DL
ALP SERPL-CCNC: 69 U/L (ref 40–150)
ALT SERPL W P-5'-P-CCNC: 45 U/L (ref 0–50)
ANION GAP SERPL CALCULATED.3IONS-SCNC: 7 MMOL/L (ref 3–14)
APPEARANCE UR: CLEAR
AST SERPL W P-5'-P-CCNC: 45 U/L (ref 0–45)
ATRIAL RATE - MUSE: 52 BPM
BASOPHILS # BLD AUTO: 0.1 10E3/UL (ref 0–0.2)
BASOPHILS NFR BLD AUTO: 1 %
BILIRUB SERPL-MCNC: 2.7 MG/DL (ref 0.2–1.3)
BILIRUB UR QL STRIP: NEGATIVE
BUN SERPL-MCNC: 32 MG/DL (ref 7–30)
CALCIUM SERPL-MCNC: 9.1 MG/DL (ref 8.5–10.1)
CHLORIDE BLD-SCNC: 101 MMOL/L (ref 94–109)
CO2 SERPL-SCNC: 24 MMOL/L (ref 20–32)
COLOR UR AUTO: YELLOW
CREAT SERPL-MCNC: 0.88 MG/DL (ref 0.52–1.04)
DIASTOLIC BLOOD PRESSURE - MUSE: NORMAL MMHG
EOSINOPHIL # BLD AUTO: 0 10E3/UL (ref 0–0.7)
EOSINOPHIL NFR BLD AUTO: 1 %
ERYTHROCYTE [DISTWIDTH] IN BLOOD BY AUTOMATED COUNT: 14 % (ref 10–15)
GFR SERPL CREATININE-BSD FRML MDRD: 60 ML/MIN/1.73M2
GLUCOSE BLD-MCNC: 106 MG/DL (ref 70–99)
GLUCOSE UR STRIP-MCNC: NEGATIVE MG/DL
HCT VFR BLD AUTO: 35.9 % (ref 35–47)
HGB BLD-MCNC: 11.4 G/DL (ref 11.7–15.7)
HGB UR QL STRIP: NEGATIVE
HOLD SPECIMEN: NORMAL
HYALINE CASTS: 1 /LPF
IMM GRANULOCYTES # BLD: 0 10E3/UL
IMM GRANULOCYTES NFR BLD: 0 %
INTERPRETATION ECG - MUSE: NORMAL
KETONES UR STRIP-MCNC: NEGATIVE MG/DL
LEUKOCYTE ESTERASE UR QL STRIP: NEGATIVE
LYMPHOCYTES # BLD AUTO: 0.9 10E3/UL (ref 0.8–5.3)
LYMPHOCYTES NFR BLD AUTO: 15 %
MCH RBC QN AUTO: 29.2 PG (ref 26.5–33)
MCHC RBC AUTO-ENTMCNC: 31.8 G/DL (ref 31.5–36.5)
MCV RBC AUTO: 92 FL (ref 78–100)
MONOCYTES # BLD AUTO: 0.6 10E3/UL (ref 0–1.3)
MONOCYTES NFR BLD AUTO: 9 %
MUCOUS THREADS #/AREA URNS LPF: PRESENT /LPF
NEUTROPHILS # BLD AUTO: 4.7 10E3/UL (ref 1.6–8.3)
NEUTROPHILS NFR BLD AUTO: 74 %
NITRATE UR QL: NEGATIVE
NRBC # BLD AUTO: 0 10E3/UL
NRBC BLD AUTO-RTO: 0 /100
NT-PROBNP SERPL-MCNC: 6913 PG/ML (ref 0–1800)
P AXIS - MUSE: NORMAL DEGREES
PH UR STRIP: 5.5 [PH] (ref 5–7)
PLATELET # BLD AUTO: 149 10E3/UL (ref 150–450)
POTASSIUM BLD-SCNC: 3.6 MMOL/L (ref 3.4–5.3)
POTASSIUM BLD-SCNC: 3.8 MMOL/L (ref 3.4–5.3)
PR INTERVAL - MUSE: NORMAL MS
PROT SERPL-MCNC: 6.5 G/DL (ref 6.8–8.8)
QRS DURATION - MUSE: 76 MS
QT - MUSE: 390 MS
QTC - MUSE: 386 MS
R AXIS - MUSE: 102 DEGREES
RBC # BLD AUTO: 3.91 10E6/UL (ref 3.8–5.2)
RBC URINE: 2 /HPF
SARS-COV-2 RNA RESP QL NAA+PROBE: NEGATIVE
SODIUM SERPL-SCNC: 132 MMOL/L (ref 133–144)
SP GR UR STRIP: 1.02 (ref 1–1.03)
SYSTOLIC BLOOD PRESSURE - MUSE: NORMAL MMHG
T AXIS - MUSE: 51 DEGREES
UROBILINOGEN UR STRIP-MCNC: NORMAL MG/DL
VENTRICULAR RATE- MUSE: 59 BPM
WBC # BLD AUTO: 6.3 10E3/UL (ref 4–11)
WBC URINE: <1 /HPF

## 2022-05-11 PROCEDURE — 120N000001 HC R&B MED SURG/OB

## 2022-05-11 PROCEDURE — 96374 THER/PROPH/DIAG INJ IV PUSH: CPT

## 2022-05-11 PROCEDURE — 36415 COLL VENOUS BLD VENIPUNCTURE: CPT | Performed by: STUDENT IN AN ORGANIZED HEALTH CARE EDUCATION/TRAINING PROGRAM

## 2022-05-11 PROCEDURE — 80053 COMPREHEN METABOLIC PANEL: CPT | Performed by: STUDENT IN AN ORGANIZED HEALTH CARE EDUCATION/TRAINING PROGRAM

## 2022-05-11 PROCEDURE — 83880 ASSAY OF NATRIURETIC PEPTIDE: CPT | Performed by: STUDENT IN AN ORGANIZED HEALTH CARE EDUCATION/TRAINING PROGRAM

## 2022-05-11 PROCEDURE — 71046 X-RAY EXAM CHEST 2 VIEWS: CPT

## 2022-05-11 PROCEDURE — 36415 COLL VENOUS BLD VENIPUNCTURE: CPT | Performed by: INTERNAL MEDICINE

## 2022-05-11 PROCEDURE — 84132 ASSAY OF SERUM POTASSIUM: CPT | Performed by: INTERNAL MEDICINE

## 2022-05-11 PROCEDURE — C9803 HOPD COVID-19 SPEC COLLECT: HCPCS

## 2022-05-11 PROCEDURE — 93005 ELECTROCARDIOGRAM TRACING: CPT

## 2022-05-11 PROCEDURE — 99285 EMERGENCY DEPT VISIT HI MDM: CPT | Mod: 25

## 2022-05-11 PROCEDURE — 81001 URINALYSIS AUTO W/SCOPE: CPT | Performed by: STUDENT IN AN ORGANIZED HEALTH CARE EDUCATION/TRAINING PROGRAM

## 2022-05-11 PROCEDURE — 99223 1ST HOSP IP/OBS HIGH 75: CPT | Mod: AI | Performed by: INTERNAL MEDICINE

## 2022-05-11 PROCEDURE — 85025 COMPLETE CBC W/AUTO DIFF WBC: CPT | Performed by: STUDENT IN AN ORGANIZED HEALTH CARE EDUCATION/TRAINING PROGRAM

## 2022-05-11 PROCEDURE — U0005 INFEC AGEN DETEC AMPLI PROBE: HCPCS | Performed by: STUDENT IN AN ORGANIZED HEALTH CARE EDUCATION/TRAINING PROGRAM

## 2022-05-11 PROCEDURE — 250N000013 HC RX MED GY IP 250 OP 250 PS 637: Performed by: INTERNAL MEDICINE

## 2022-05-11 PROCEDURE — 250N000011 HC RX IP 250 OP 636: Performed by: STUDENT IN AN ORGANIZED HEALTH CARE EDUCATION/TRAINING PROGRAM

## 2022-05-11 RX ORDER — PANTOPRAZOLE SODIUM 40 MG/1
40 TABLET, DELAYED RELEASE ORAL
Status: DISCONTINUED | OUTPATIENT
Start: 2022-05-12 | End: 2022-05-14 | Stop reason: HOSPADM

## 2022-05-11 RX ORDER — AMOXICILLIN 250 MG
1 CAPSULE ORAL 2 TIMES DAILY PRN
Status: DISCONTINUED | OUTPATIENT
Start: 2022-05-11 | End: 2022-05-14 | Stop reason: HOSPADM

## 2022-05-11 RX ORDER — AMOXICILLIN 250 MG
2 CAPSULE ORAL 2 TIMES DAILY PRN
Status: DISCONTINUED | OUTPATIENT
Start: 2022-05-11 | End: 2022-05-14 | Stop reason: HOSPADM

## 2022-05-11 RX ORDER — ACETAMINOPHEN 325 MG/1
650 TABLET ORAL EVERY 6 HOURS PRN
Status: DISCONTINUED | OUTPATIENT
Start: 2022-05-11 | End: 2022-05-14 | Stop reason: HOSPADM

## 2022-05-11 RX ORDER — ONDANSETRON 2 MG/ML
4 INJECTION INTRAMUSCULAR; INTRAVENOUS EVERY 6 HOURS PRN
Status: DISCONTINUED | OUTPATIENT
Start: 2022-05-11 | End: 2022-05-14 | Stop reason: HOSPADM

## 2022-05-11 RX ORDER — CETIRIZINE HYDROCHLORIDE 10 MG/1
10 TABLET ORAL AT BEDTIME
Status: ON HOLD | COMMUNITY
End: 2023-01-01

## 2022-05-11 RX ORDER — HYDROXYZINE HYDROCHLORIDE 25 MG/1
25 TABLET, FILM COATED ORAL AT BEDTIME
Status: DISCONTINUED | OUTPATIENT
Start: 2022-05-11 | End: 2022-05-14 | Stop reason: HOSPADM

## 2022-05-11 RX ORDER — FUROSEMIDE 10 MG/ML
60 INJECTION INTRAMUSCULAR; INTRAVENOUS ONCE
Status: COMPLETED | OUTPATIENT
Start: 2022-05-11 | End: 2022-05-11

## 2022-05-11 RX ORDER — MUPIROCIN 20 MG/G
OINTMENT TOPICAL 2 TIMES DAILY
Status: ON HOLD | COMMUNITY
End: 2023-01-01

## 2022-05-11 RX ORDER — ONDANSETRON 4 MG/1
4 TABLET, ORALLY DISINTEGRATING ORAL EVERY 6 HOURS PRN
Status: DISCONTINUED | OUTPATIENT
Start: 2022-05-11 | End: 2022-05-14 | Stop reason: HOSPADM

## 2022-05-11 RX ORDER — FUROSEMIDE 10 MG/ML
60 INJECTION INTRAMUSCULAR; INTRAVENOUS 2 TIMES DAILY
Status: DISCONTINUED | OUTPATIENT
Start: 2022-05-12 | End: 2022-05-12

## 2022-05-11 RX ORDER — ACETAMINOPHEN 650 MG/1
650 SUPPOSITORY RECTAL EVERY 6 HOURS PRN
Status: DISCONTINUED | OUTPATIENT
Start: 2022-05-11 | End: 2022-05-14 | Stop reason: HOSPADM

## 2022-05-11 RX ORDER — HYDROXYZINE HYDROCHLORIDE 25 MG/1
25 TABLET, FILM COATED ORAL AT BEDTIME
COMMUNITY
End: 2023-01-01

## 2022-05-11 RX ORDER — SIMETHICONE 80 MG
80 TABLET,CHEWABLE ORAL 4 TIMES DAILY PRN
Status: ON HOLD | COMMUNITY
End: 2023-01-01

## 2022-05-11 RX ORDER — LIDOCAINE 40 MG/G
CREAM TOPICAL
Status: DISCONTINUED | OUTPATIENT
Start: 2022-05-11 | End: 2022-05-14 | Stop reason: HOSPADM

## 2022-05-11 RX ORDER — DIGOXIN 125 MCG
125 TABLET ORAL DAILY
Status: DISCONTINUED | OUTPATIENT
Start: 2022-05-12 | End: 2022-05-14 | Stop reason: HOSPADM

## 2022-05-11 RX ADMIN — APIXABAN 2.5 MG: 2.5 TABLET, FILM COATED ORAL at 20:35

## 2022-05-11 RX ADMIN — FUROSEMIDE 60 MG: 10 INJECTION, SOLUTION INTRAVENOUS at 13:03

## 2022-05-11 RX ADMIN — METOPROLOL SUCCINATE 12.5 MG: 25 TABLET, EXTENDED RELEASE ORAL at 20:35

## 2022-05-11 RX ADMIN — HYDROXYZINE HYDROCHLORIDE 25 MG: 25 TABLET, FILM COATED ORAL at 21:07

## 2022-05-11 ASSESSMENT — ACTIVITIES OF DAILY LIVING (ADL)
ADLS_ACUITY_SCORE: 35
HEARING_DIFFICULTY_OR_DEAF: YES
USE_OF_HEARING_ASSISTIVE_DEVICES: BILATERAL HEARING AIDS
CHANGE_IN_FUNCTIONAL_STATUS_SINCE_ONSET_OF_CURRENT_ILLNESS/INJURY: NO
ADLS_ACUITY_SCORE: 22
DRESSING/BATHING_DIFFICULTY: NO
WEAR_GLASSES_OR_BLIND: NO
DIFFICULTY_COMMUNICATING: NO
WALKING_OR_CLIMBING_STAIRS_DIFFICULTY: YES
WALKING_OR_CLIMBING_STAIRS: AMBULATION DIFFICULTY, DEPENDENT
EQUIPMENT_CURRENTLY_USED_AT_HOME: WALKER, STANDARD
PATIENT'S_PREFERRED_MEANS_OF_COMMUNICATION: ENGLISH SPEAKER WITH HEARING LOSS, NO SPEECH PROBLEMS.
ADLS_ACUITY_SCORE: 22
DOING_ERRANDS_INDEPENDENTLY_DIFFICULTY: NO
ADLS_ACUITY_SCORE: 35
TOILETING_ISSUES: NO
WERE_AUXILIARY_AIDS_OFFERED?: NO
DESCRIBE_HEARING_LOSS: BILATERAL HEARING LOSS
DIFFICULTY_EATING/SWALLOWING: NO
THE_FOLLOWING_AIDS_WERE_PROVIDED;: PATIENT DECLINED OFFER OF AUXILIARY AIDS
ADLS_ACUITY_SCORE: 22
CONCENTRATING,_REMEMBERING_OR_MAKING_DECISIONS_DIFFICULTY: NO
FALL_HISTORY_WITHIN_LAST_SIX_MONTHS: NO

## 2022-05-11 ASSESSMENT — ENCOUNTER SYMPTOMS
FEVER: 0
BLOOD IN STOOL: 0
VOMITING: 0
DYSURIA: 0
DIARRHEA: 0
RHINORRHEA: 0
DIZZINESS: 0
NAUSEA: 0
ABDOMINAL PAIN: 0
HEMATURIA: 0
CHILLS: 0
UNEXPECTED WEIGHT CHANGE: 1
SHORTNESS OF BREATH: 0
PALPITATIONS: 0
LIGHT-HEADEDNESS: 0
SORE THROAT: 0
COUGH: 0

## 2022-05-11 NOTE — ED TRIAGE NOTES
Pt unable to void other than some dribbling this am. Pt has gained approx 7-8 lbs in the past few weeks and daughter noticed bilat ankle swelling for the past few days. Unable to get shoes on this am.      Triage Assessment     Row Name 05/11/22 0938       Triage Assessment (Adult)    Airway WDL WDL       Respiratory WDL    Respiratory WDL WDL       Peripheral/Neurovascular WDL    Peripheral Neurovascular WDL WDL

## 2022-05-11 NOTE — H&P
[facility] [place]    History and Physical - Hospitalist Service       Date of Admission:  5/11/2022    Assessment & Plan      Elizabeth Ruggiero is a 89 year old female with a history of HFpEF, atrial fibrillation, HTN and HLP who presented to the ED on 5/11/2022 with 1-2 weeks of worsening lower extremity edema and weight gain     Decompensated HFpEF  Chronic atrial fibrillation   HTN/HLP   Moderate to severe MR   Patient reports 1-2 weeks of gradually worsening leg swelling and weight gain.  She notes approximately 7-8 pounds of weight gain.  No shortness of breath, HENDERSON or orthopnea.  Upon arrival to the ED the patient has 2-3+ pitting edema to bilateral lower extremities.  Her BNP is elevated 6,913.  CXR showed cardiomegaly but no obvious vascular congestion.  She was given IV Lasix 60 mg x1 in the ED and was already have good urine output by the time I saw her.  EKG showed A fib with slow ventricular response  Last echocardiogram was in 2020 which showed mod-severe MR, EF >70%  - Admit to [facility]  - Monitor on telemetry  - Strict I/Os and daily weights   - Lasix 60 mg IV BID tomorrow  - Daily renal panels  - Replete electrolytes as needed  - Echocardiogram ordered  - PTA Eliquis  - PTA Digoxin   - PTA Toprol XL   - Will hold off on cardiology evaluation for now     Low urine output   Recent UTI   Patient recently was diagnosed with a UTI.  Since then has had some urgency but notes this is not new.  She also started to notice that when she tried to urinate very little urine would come out.  In the ED she had urgency to void and was cathed for approximately only 100 mL.  At this time suspect her decreased urine output is likely due to her 3rd spacing her fluids   Of note the patient did just finish a course of antibiotics for a UTI.  UA in the ED was not suggestive of UTI  - Continue to monitor     Mild hyponatremia   Sodium minimally low at 132.  Appears to run in the mid 130s.  Suspect due to  hypovolemia  - Recheck tomorrow    Mild hyperbilirubinemia  Bilirubin 2.7 on admission.  She has a history of congestive hepatopathy and this may be the cause of this slightly elevated bilirubin.  Patient does not appear jaundiced.  OTher LFTs are normal   - Repeat LFT in AM         Diet:   Cardiac diet   DVT Prophylaxis: DOAC  Wesley Catheter: Not present  Central Lines: None  Cardiac Monitoring: None  Code Status:   DNR/DNI     Clinically Significant Risk Factors Present on Admission         # Hyponatremia: Na = 132 mmol/L (Ref range: 133 - 144 mmol/L) on admission, will monitor as appropriate     # Hypoalbuminemia: Albumin = 3.2 g/dL (Ref range: 3.4 - 5.0 g/dL) on admission, will monitor as appropriate   # Coagulation Defect: home medication list includes an anticoagulant medication        Disposition Plan   Expected Discharge:    Anticipated discharge location:  Awaiting care coordination huddle  Delays:         The patient's care was discussed with the Patient, Patient's Family and ED provider.    Aleksandr Horvath DO  Hospitalist Service  [facility]  Securely message with the Vocera Web Console (learn more here)  Text page via [employer] Paging/Directory         ______________________________________________________________________    Chief Complaint   Leg swelling and weight gain     History is obtained from the patient    History of Present Illness   Elizabeth Ruggiero is a 89 year old female with a history of HFpEF, atrial fibrillation, HTN and HLP who presented to the ED on 5/11/2022 with 1-2 weeks of worsening lower extremity edema and weight gain.  Patient reports 1-2 weeks of gradually worsening leg swelling and weight gain.  She notes approximately 7-8 pounds of weight gain.  No shortness of breath, HENDERSON or orthopnea.  Patient recently was diagnosed with a UTI.  Since then has had some urgency but notes this is not new.  She also started to notice that when she tried to urinate very  little urine would come out.  In the ED she had urgency to void and was cathed for approximately only 100 mL.  She denies any fevers, chills, cough, sore throat, chest pain, abdominal pain, N/V/D or bloody stools.     Review of Systems    The 10 point Review of Systems is negative other than noted in the HPI    Past Medical History    I have reviewed this patient's medical history and updated it with pertinent information if needed.   Past Medical History:   Diagnosis Date     (HFpEF) heart failure with preserved ejection fraction (H)      Acute diastolic CHF (congestive heart failure) (H)      Acute left hemiparesis (H) 2/9/2019     Arthritis      Atrial fibrillation (H)      Atrial fibrillation with RVR (H)      Atrial flutter (H)      Atrial flutter with rapid ventricular response (H) 4/23/2019     Community acquired pneumonia      Diastolic CHF (H) 04/29/2019     DVT of lower extremity (deep venous thrombosis) (H)     recurrent     Hepatic congestion     improved with diuretics for CHF     HTN (hypertension)      Hyperlipidemia      Mitral valve insufficiency      Pneumonia      Postmenopausal bleeding 01/2020    [facility]     Severe mitral regurgitation      SOB (shortness of breath)      TIA (transient ischemic attack)      Vitamin D deficiency        Past Surgical History   I have reviewed this patient's surgical history and updated it with pertinent information if needed.  Past Surgical History:   Procedure Laterality Date     APPENDECTOMY       FRACTURE SURGERY       repair left femur         Social History   I have reviewed this patient's social history and updated it with pertinent information if needed.  Social History     Tobacco Use     Smoking status: Never Smoker     Smokeless tobacco: Never Used   Substance Use Topics     Alcohol use: No     Drug use: No       Family History   I have reviewed this patient's family history and updated it with pertinent information if  needed.  Family History   Problem Relation Age of Onset     Colon Cancer Mother      Breast Cancer Sister      Diabetes Sister        Prior to Admission Medications   Prior to Admission Medications   Prescriptions Last Dose Informant Patient Reported? Taking?   COMPRESSION STOCKINGS  Nursing Home No No   Si each daily   ELIQUIS ANTICOAGULANT 2.5 MG tablet 2022 at am  Yes Yes   Sig: Take 2.5 mg by mouth 2 times daily   Multiple Vitamins-Minerals (PRESERVISION AREDS 2+MULTI VIT) CAPS 2022 at am Nursing Home No Yes   Sig: Take 1 tablet by mouth 2 times daily   NONFORMULARY 5/10/2022 at hs  Yes Yes   Sig: Diltiazem 2% ointment - apply pea-sized amount to the anus TID x6 weeks.  Pt to self administer.  Compounded by [facility], [address]   acetaminophen (TYLENOL) 500 MG tablet 2022 at 0600  Yes Yes   Sig: Take 1,000 mg by mouth 3 times daily 2 tablets tid at 6am, 2pm, 10pm   alendronate (FOSAMAX) 70 MG tablet 2022 Nursing Home No Yes   Sig: Take 1 tablet (70 mg) by mouth every 7 days SUNDAYS at 7:30am   aluminum & magnesium hydroxide (MAG-AL) 200-200 MG/5ML supsension   Yes Yes   Sig: Take 30 mLs by mouth 4 times daily as needed for indigestion   calcium carbonate-vitamin D (OSCAL W/D) 500-200 MG-UNIT tablet 2022 at am Nursing Home Yes Yes   Sig: Take 1 tablet by mouth daily    cetirizine (ZYRTEC) 10 MG tablet  at new not started  Yes No   Sig: Take 10 mg by mouth At Bedtime   cholecalciferol 25 MCG (1000 UT) TABS 2022 at am Nursing Home Yes Yes   Sig: Take 1 tablet by mouth daily   digoxin (LANOXIN) 125 MCG tablet 2022 at 1000 Nursing Home No Yes   Sig: Take 1 tablet (125 mcg) by mouth daily   furosemide (LASIX) 20 MG tablet 5/10/2022 at 1600 Nursing Home No Yes   Sig: Take 1 tablet (20 mg) by mouth 2 times daily   Patient taking differently: Take 20 mg by mouth 2 times daily @ 10am and 4pm   hydrOXYzine (ATARAX) 25 MG tablet  at new not started  Yes No   Sig: Take 25 mg by  mouth At Bedtime   melatonin 5 MG tablet 5/10/2022 at  Nursing Home Yes Yes   Sig: Take 5 mg by mouth At Bedtime   metoprolol succinate ER (TOPROL-XL) 25 MG 24 hr tablet 5/11/2022 at am  Yes Yes   Sig: Take 12.5 mg by mouth every evening    multivitamin w/minerals (THERA-VIT-M) tablet 5/11/2022 at am Nursing Home Yes Yes   Sig: Take 1 tablet by mouth daily    mupirocin (BACTROBAN) 2 % external ointment  at new not started  Yes No   Sig: Apply topically 2 times daily To open sores on back   omeprazole (PRILOSEC) 20 MG DR capsule 5/11/2022 at am Nursing Home Yes Yes   Sig: Take 20 mg by mouth daily before breakfast   potassium chloride ER (K-TAB) 20 MEQ CR tablet 5/11/2022 at am Nursing Home No Yes   Sig: Take 1 tablet (20 mEq) by mouth daily   senna (SENOKOT) 8.6 MG tablet  at prn Nursing Home Yes Yes   Sig: Take 1 tablet by mouth 2 times daily as needed for constipation   simethicone (MYLICON) 80 MG chewable tablet   Yes Yes   Sig: Take 80 mg by mouth 4 times daily as needed for flatulence or cramping   triamcinolone (KENALOG) 0.1 % external cream  at on hold  Yes No   Sig: Apply topically 2 times daily To back for itching      Facility-Administered Medications: None     Allergies   No Known Allergies    Physical Exam   Vital Signs: Temp: 98.2  F (36.8  C) Temp src: Oral BP: 112/71 Pulse: 85   Resp: 16 SpO2: 96 % O2 Device: None (Room air)    Weight: 115 lbs 0 oz    General Appearance: Resting comfortably.  NAD   Eyes: EOMI.  Normal conjuctiva   HEENT: NC/AT.  Moist mucous membranes  Respiratory: Clear to auscultation.  No respiratory distress  Cardiovascular: Sounds RRR.  Faint murmur noted  GI: Soft. Non-distended   Skin: No obvious rashes or cyanosis to exposed skin  Musculoskeletal: 2-3+ pitting edema to bilateral lower extremities.  No bony abnormalities  Neurologic: CN appear intact.  Moving all extremities grossly   Psychiatric: Alert and pleasant     Data   Data reviewed today: I reviewed all medications,  new labs and imaging results over the last 24 hours. I personally reviewed   EKG:  Atrial fibrillation with slow ventricular response.  Rate 59 BPM  CXR: Cardiomegaly. No obvious pulmonary congestion     Recent Labs   Lab 05/11/22  1054   WBC 6.3   HGB 11.4*   MCV 92   *   *   POTASSIUM 3.8   CHLORIDE 101   CO2 24   BUN 32*   CR 0.88   ANIONGAP 7   CHRIS 9.1   *   ALBUMIN 3.2*   PROTTOTAL 6.5*   BILITOTAL 2.7*   ALKPHOS 69   ALT 45   AST 45     Recent Results (from the past 24 hour(s))   XR Chest 2 Views    Narrative    CHEST TWO VIEWS  5/11/2022 11:06 AM     HISTORY:  Lower extremity swelling.    COMPARISON: 9/25/2021.      Impression    IMPRESSION: Cardiac enlargement has a similar appearance to the  previous exam, given differences in technique. Pulmonary vascularity  is within normal limits. Infiltrate and/or atelectasis at the right  lung base medially, new since the previous exam. There is a small  right pleural effusion. Calcification of the mitral annulus. No  pneumothorax.

## 2022-05-11 NOTE — ED TRIAGE NOTES
Triage Assessment     Row Name 05/11/22 0943       Triage Assessment (Adult)    Airway WDL WDL       Respiratory WDL    Respiratory WDL WDL       Cardiac WDL    Cardiac WDL WDL       Cognitive/Neuro/Behavioral WDL    Cognitive/Neuro/Behavioral WDL WDL    Row Name 05/11/22 0938       Triage Assessment (Adult)    Airway WDL WDL       Respiratory WDL    Respiratory WDL WDL       Peripheral/Neurovascular WDL    Peripheral Neurovascular WDL WDL

## 2022-05-11 NOTE — ED PROVIDER NOTES
History   Chief Complaint:  Weight gain, leg swelling, urinary retention     HPI   Elizabeth Ruggiero is a 89 year old female with history of CHF, atrial fibrillation, on Eliquis, and recent UTI, who presents with urinary retention, weight gain, and leg swelling.  Patient reports that she has had an 8 pound weight gain over the past approximately 2 weeks.  She also has noted some ankle swelling, and she was unable to get her shoes on this morning.  She denies chest pain or shortness of breath.  She denies orthopnea. Patient also has been treated for a UTI, which was diagnosed on 4/24/2022.  She was initially treated with Keflex, this was switched to cefdinir on 4/27/2022, as her culture grew Klebsiella resistant to Keflex.  Patient reports that since being diagnosed with her UTI, she has only been able to urinate small dribbles a few times daily.  She denies hematuria or dysuria.  Last bowel movement was yesterday and was normal.  Denies fevers, chills, nausea, vomiting, or weakness.  Per daughter, patient has seemed more sleepy the past few days.     Patient's daughter reports that patient was post to have a bladder ultrasound completed today as an outpatient.    Of note, patient also has a rash on her back, for which she is being treated by dermatology.  She had an appointment yesterday.    Echocardiogram result from echo dated 10/23/2020:   Interpretation Summary     There is mod-severe to severe (3-4+) mitral regurgitation.  The mitral regurgitant jet is eccentrically directed.  The mitral valve leaflets are severely thickened.  Hyperdynamic left ventricular function  The visual ejection fraction is estimated at >70%.  The left ventricle is normal in size.  Right ventricular systolic pressure is elevated, consistent with moderate  pulmonary hypertension.  Mild valvular aortic stenosis.  The rhythm was atrial fibrillation.  The right ventricle is normal in structure, function and size.  There is severe biatrial  "enlargement.     No signficant change since 4/23//2019    Review of Systems   Constitutional: Positive for unexpected weight change. Negative for chills and fever.   HENT: Negative for ear pain, rhinorrhea and sore throat.    Eyes: Negative for visual disturbance.   Respiratory: Negative for cough and shortness of breath.    Cardiovascular: Positive for leg swelling. Negative for chest pain and palpitations.   Gastrointestinal: Negative for abdominal pain, blood in stool, diarrhea, nausea and vomiting.   Genitourinary: Positive for decreased urine volume. Negative for dysuria and hematuria.   Skin: Positive for rash.   Neurological: Negative for dizziness, syncope and light-headedness.   All other systems reviewed and are negative.        Allergies:  The patient has no known allergies.     Medications:  Fosamax  Lanoxin  Lasix  K-Tab  Eliquis  Toprol-XL  Prilosec  Senokot    Past Medical History:     HFpEF  Acute diastolic CHF  Acute left hemiparesis  Arthritis  Atrial fibrillation (with RVR)  Atrial flutter (with RVR)  Pneumonia  DVT  Hepatic congestion  Hypertension  Hyperlipidemia  Mitral valve insufficiency  Severe mitral regurgitation  TIA  Vitamin D deficiency   Lumbosacral radiculitis  Spinal stenosis  Lumbosacral spondylosis  PMR  Spinal stenosis  Osteopenia  Anemia    Past Surgical History:    Appendectomy  Fracture surgery  Repair left femur      Family History:    Mother: colon cancer  Sister: breast cancer, diabetes     Social History:  The patient presents to the ED with a family member.   PCP: Sonu Reece MD.    Physical Exam     Patient Vitals for the past 24 hrs:   BP Temp Temp src Pulse Resp SpO2 Height Weight   05/11/22 0944 112/71 98.2  F (36.8  C) -- 85 16 96 % 1.676 m (5' 6\") 52.2 kg (115 lb)   05/11/22 0942 -- -- Oral -- -- -- -- --       Physical Exam  Vitals: Reviewed, as above.    General: Alert and oriented, in mild distress. Resting on bed. Closes eyes after being asked several " questions, but easily arouseable.  Skin: Warm and well-perfused. Diffuse rash over back with bandages in place.   HEENT: Head: Normocephalic, atraumatic. Facial features symmetric. Eyes: Conjunctiva pink, sclera white. EOMs grossly intact. Ears: Auricles without lesion, erythema, or edema. Mouth and throat: Lips are moist with no chapping, lesions, or edema, Buccal mucosa is pink and moist without lesions. Oropharyngeal mucosa is pink and moist with no erythema, edema, or exudate.   Neck: Supple with no lymphadenopathy. Full ROM.   Pulmonary: Chest wall expansion symmetric with no increased work of breathing. Lungs clear to auscultation bilaterally.   Cardiovascular: Heart RRR with II/VI systolic murmur HBA LLSB. 2+ radial and tibialis posterior pulses bilaterally. 2+ pitting edema bilaterally to the knees.  Abdominal: No hernias, scars, lesions, striae, or distension. Bowel sounds present and physiologic. Abdomen is soft and nontender to light and deep palpation in all 4 quadrants with no guarding or rebound. No masses or organomegaly.   Musculoskeletal: Moves all extremities spontaneously.  Psych: Affect appropriate.  Answers questions appropriately, with some repetition. Patient appears calm.      Emergency Department Course   ECG  ECG results from 05/11/22   EKG 12-lead, tracing only     Value    Systolic Blood Pressure     Diastolic Blood Pressure     Ventricular Rate 59    Atrial Rate 52    VT Interval     QRS Duration 76        QTc 386    P Axis     R AXIS 102    T Axis 51    Interpretation ECG      Atrial fibrillation with slow ventricular response  Lateral infarct , age undetermined  Abnormal ECG  When compared with ECG of 24-APR-2022 12:00,  Atrial fibrillation has replaced Sinus rhythm  QRS duration has decreased  Lateral infarct is now Present  Non-specific change in ST segment in Inferior leads  ST no longer elevated in Lateral leads  Inverted T waves have replaced nonspecific T wave abnormality  in Anterior leads  Confirmed by GENERATED REPORT, COMPUTER ([id number]),  Israel Hough ([id number]) on 5/11/2022 10:36:10 AM         Imaging:  XR Chest 2 Views   Preliminary Result   IMPRESSION: Cardiac enlargement has a similar appearance to the   previous exam, given differences in technique. Pulmonary vascularity   is within normal limits. Infiltrate and/or atelectasis at the right   lung base medially, new since the previous exam. There is a small   right pleural effusion. Calcification of the mitral annulus. No   pneumothorax.        Report per radiology    Laboratory:  Labs Ordered and Resulted from Time of ED Arrival to Time of ED Departure   ROUTINE UA WITH MICROSCOPIC REFLEX TO CULTURE - Abnormal       Result Value    Color Urine Yellow      Appearance Urine Clear      Glucose Urine Negative      Bilirubin Urine Negative      Ketones Urine Negative      Specific Gravity Urine 1.023      Blood Urine Negative      pH Urine 5.5      Protein Albumin Urine 10  (*)     Urobilinogen Urine Normal      Nitrite Urine Negative      Leukocyte Esterase Urine Negative      Mucus Urine Present (*)     RBC Urine 2      WBC Urine <1      Hyaline Casts Urine 1     COMPREHENSIVE METABOLIC PANEL - Abnormal    Sodium 132 (*)     Potassium 3.8      Chloride 101      Carbon Dioxide (CO2) 24      Anion Gap 7      Urea Nitrogen 32 (*)     Creatinine 0.88      Calcium 9.1      Glucose 106 (*)     Alkaline Phosphatase 69      AST 45      ALT 45      Protein Total 6.5 (*)     Albumin 3.2 (*)     Bilirubin Total 2.7 (*)     GFR Estimate 60 (*)    NT PROBNP INPATIENT - Abnormal    N terminal Pro BNP Inpatient 6,913 (*)    CBC WITH PLATELETS AND DIFFERENTIAL - Abnormal    WBC Count 6.3      RBC Count 3.91      Hemoglobin 11.4 (*)     Hematocrit 35.9      MCV 92      MCH 29.2      MCHC 31.8      RDW 14.0      Platelet Count 149 (*)     % Neutrophils 74      % Lymphocytes 15      % Monocytes 9      % Eosinophils 1      % Basophils 1      %  Immature Granulocytes 0      NRBCs per 100 WBC 0      Absolute Neutrophils 4.7      Absolute Lymphocytes 0.9      Absolute Monocytes 0.6      Absolute Eosinophils 0.0      Absolute Basophils 0.1      Absolute Immature Granulocytes 0.0      Absolute NRBCs 0.0     COVID-19 VIRUS (CORONAVIRUS) BY PCR          Emergency Department Course:         Reviewed:  I reviewed nursing notes, vitals, past medical history and Care Everywhere    Assessments/ Consults:  ED Course as of 05/11/22 1402   Wed May 11, 2022   0921 I obtained history and examined the patient, as noted above.     1240 I spoke with Dr. Horvath, hospitalist, regarding patient's presentation, findings, plan of care.  He will be accepting the patient for admission.        Interventions:  Medications   furosemide (LASIX) injection 60 mg (60 mg Intravenous Given 5/11/22 1303)        Disposition:  The patient was admitted to the hospital under the care of Dr. Horvath.     Impression & Plan     CMS Diagnoses: None      Medical Decision Making:  Elizabeth is a 89 year old female with history of CHF, atrial fibrillation, on Eliquis, and recent UTI, who presents with urinary retention, weight gain, and leg swelling.  Differential diagnosis included CHF exacerbation, UTI, acute kidney injury, among others.  Laboratory evaluation revealed elevated BNP.  Urinalysis was unremarkable. Remainder of laboratory evaluation was largely unremarkable. Patient was unable to urinate and only had 100 cc of urine in her bladder on straight cath.  Chest x-ray does reveal a small pleural effusion and possible atelectasis versus infiltrate right lower lobe.  Initiated 60 mg of Lasix IV.  Patient lives in an assisted living facility, however her main assistance consists of medication management in the mornings, she does not have assistance during the day.  Due to this, inpatient status is more appropriate for the patient.  In consultation with hospitalist team, patient will be admitted to  the hospital for further evaluation and care.      Diagnosis:    ICD-10-CM    1. Decreased urine output  R34    2. Acute on chronic diastolic congestive heart failure (H)  I50.33    3. Localized swelling of both lower legs  R22.43        Discharge Medications:  New Prescriptions    No medications on file       Scribe Disclosure:  Rozina DEMPSEY, am serving as a scribe at 9:45 AM on 5/11/2022 to document services personally performed by Anjali Ferguson PA-C based on my observations and the provider's statements to me.      Anjali Ferguson PA-C  05/11/22 1403

## 2022-05-11 NOTE — ED NOTES
Bed: ED30  Expected date:   Expected time:   Means of arrival:   Comments:  Triage - urinary retention

## 2022-05-11 NOTE — ED PROVIDER NOTES
Emergency Department Attending Supervision Note  5/11/2022  9:46 AM      I evaluated this patient in conjunction with SAMANTHA Longoria      Briefly, the patient presented with concerns that her shoes did not fit this morning.  The patient complains of increasing weight gain and leg swelling.      On my exam,  General: Alert, No distress. Nontoxic appearance  Head: No signs of trauma.   Mouth/Throat: Oropharynx moist.   Eyes: Conjunctivae are normal. Pupils are equal..   Neck: Normal range of motion.    CV: Appears well perfused.  Resp:No respiratory distress.   MSK: Normal range of motion. No obvious deformity.  Bilateral lower extremity edema.  Neuro: The patient is alert and interactive. CAR. Speech normal. GCS 15  Skin: No lesion or sign of trauma noted.   Psych: normal mood and affect. behavior is normal.       Results:  ECG  ECG taken at 1030, ECG read at 1031  Atrial fibrillation with slow ventricular response. Lateral infarct, age undetermined.    Rate 59 bpm. NH interval * ms. QRS duration 76 ms. QT/QTc 390/386 ms. P-R-T axes * 102 51.     Laboratory:  Labs Ordered and Resulted from Time of ED Arrival to Time of ED Departure   ROUTINE UA WITH MICROSCOPIC REFLEX TO CULTURE - Abnormal       Result Value    Color Urine Yellow      Appearance Urine Clear      Glucose Urine Negative      Bilirubin Urine Negative      Ketones Urine Negative      Specific Gravity Urine 1.023      Blood Urine Negative      pH Urine 5.5      Protein Albumin Urine 10  (*)     Urobilinogen Urine Normal      Nitrite Urine Negative      Leukocyte Esterase Urine Negative      Mucus Urine Present (*)     RBC Urine 2      WBC Urine <1      Hyaline Casts Urine 1     COMPREHENSIVE METABOLIC PANEL - Abnormal    Sodium 132 (*)     Potassium 3.8      Chloride 101      Carbon Dioxide (CO2) 24      Anion Gap 7      Urea Nitrogen 32 (*)     Creatinine 0.88      Calcium 9.1      Glucose 106 (*)     Alkaline Phosphatase 69      AST 45      ALT 45       Protein Total 6.5 (*)     Albumin 3.2 (*)     Bilirubin Total 2.7 (*)     GFR Estimate 60 (*)    NT PROBNP INPATIENT - Abnormal    N terminal Pro BNP Inpatient 6,913 (*)    CBC WITH PLATELETS AND DIFFERENTIAL - Abnormal    WBC Count 6.3      RBC Count 3.91      Hemoglobin 11.4 (*)     Hematocrit 35.9      MCV 92      MCH 29.2      MCHC 31.8      RDW 14.0      Platelet Count 149 (*)     % Neutrophils 74      % Lymphocytes 15      % Monocytes 9      % Eosinophils 1      % Basophils 1      % Immature Granulocytes 0      NRBCs per 100 WBC 0      Absolute Neutrophils 4.7      Absolute Lymphocytes 0.9      Absolute Monocytes 0.6      Absolute Eosinophils 0.0      Absolute Basophils 0.1      Absolute Immature Granulocytes 0.0      Absolute NRBCs 0.0     COVID-19 VIRUS (CORONAVIRUS) BY PCR - Normal    SARS CoV2 PCR Negative         Imaging:  XR Chest 2 Views   Preliminary Result   IMPRESSION: Cardiac enlargement has a similar appearance to the   previous exam, given differences in technique. Pulmonary vascularity   is within normal limits. Infiltrate and/or atelectasis at the right   lung base medially, new since the previous exam. There is a small   right pleural effusion. Calcification of the mitral annulus. No   pneumothorax.      Report per radiology.     ED course:  Interventions:  Medications   furosemide (LASIX) injection 60 mg (60 mg Intravenous Given 5/11/22 1303)     Disposition:  The patient was admitted to the hospital under the care of Dr. Horvath.       My impression is acute on chronic CHF with fluid retention        Diagnosis    ICD-10-CM    1. Decreased urine output  R34    2. Acute on chronic diastolic congestive heart failure (H)  I50.33    3. Localized swelling of both lower legs  R22.43          Shiva Castrejon MD Joing, Todd Roger, MD  05/12/22 0969

## 2022-05-11 NOTE — PHARMACY-ADMISSION MEDICATION HISTORY
Pharmacy Medication History  Admission medication history interview status for the 5/11/2022  admission is complete. See EPIC admission navigator for prior to admission medications     Location of Interview: Patient room  Medication history sources: Patient's family/friend (daughter) and daughter brought in med list from facility    Significant changes made to the medication list:  Added diltiazem ointment, Bactroban ointment, Zyrtec, and hydroxyzine.  Removed warfarin    In the past week, patient estimated taking medication this percent of the time: greater than 90%    Additional medication history information:   Bactroban, Zyrtec and hydroxyzine were prescribed by dermatology on 5/10 and have not been started yet.      Medication reconciliation completed by provider prior to medication history? No    Time spent in this activity: 25 min    Prior to Admission medications    Medication Sig Last Dose Taking? Auth Provider   acetaminophen (TYLENOL) 500 MG tablet Take 1,000 mg by mouth 3 times daily 2 tablets tid at 6am, 2pm, 10pm 5/11/2022 at 0600 Yes Unknown, Entered By History   alendronate (FOSAMAX) 70 MG tablet Take 1 tablet (70 mg) by mouth every 7 days SUNDAYS at 7:30am 5/8/2022 Yes Kat Campos APRN CNP   aluminum & magnesium hydroxide (MAG-AL) 200-200 MG/5ML supsension Take 30 mLs by mouth 4 times daily as needed for indigestion  Yes Unknown, Entered By History   calcium carbonate-vitamin D (OSCAL W/D) 500-200 MG-UNIT tablet Take 1 tablet by mouth daily  5/11/2022 at am Yes Reported, Patient   cholecalciferol 25 MCG (1000 UT) TABS Take 1 tablet by mouth daily 5/11/2022 at am Yes Unknown, Entered By History   digoxin (LANOXIN) 125 MCG tablet Take 1 tablet (125 mcg) by mouth daily 5/11/2022 at 1000 Yes Kat Campos APRN CNP   ELIQUIS ANTICOAGULANT 2.5 MG tablet Take 2.5 mg by mouth 2 times daily 5/11/2022 at am Yes Reported, Patient   furosemide (LASIX) 20 MG tablet Take 1 tablet (20 mg) by mouth 2  times daily  Patient taking differently: Take 20 mg by mouth 2 times daily @ 10am and 4pm 5/10/2022 at 1600 Yes Kat Campos APRN CNP   melatonin 5 MG tablet Take 5 mg by mouth At Bedtime 5/10/2022 at hs Yes Unknown, Entered By History   metoprolol succinate ER (TOPROL-XL) 25 MG 24 hr tablet Take 12.5 mg by mouth every evening  5/11/2022 at am Yes Unknown, Entered By History   Multiple Vitamins-Minerals (PRESERVISION AREDS 2+MULTI VIT) CAPS Take 1 tablet by mouth 2 times daily 5/11/2022 at am Yes Kat Campos APRN CNP   multivitamin w/minerals (THERA-VIT-M) tablet Take 1 tablet by mouth daily  5/11/2022 at am Yes Reported, Patient   NONFORMULARY Diltiazem 2% ointment - apply pea-sized amount to the anus TID x6 weeks.  Pt to self administer.  Compounded by [facility], [address] [place] 5/10/2022 at hs Yes Unknown, Entered By History   omeprazole (PRILOSEC) 20 MG DR capsule Take 20 mg by mouth daily before breakfast 5/11/2022 at am Yes Unknown, Entered By History   potassium chloride ER (K-TAB) 20 MEQ CR tablet Take 1 tablet (20 mEq) by mouth daily 5/11/2022 at am Yes Kat Campos APRN CNP   senna (SENOKOT) 8.6 MG tablet Take 1 tablet by mouth 2 times daily as needed for constipation  at prn Yes Unknown, Entered By History   simethicone (MYLICON) 80 MG chewable tablet Take 80 mg by mouth 4 times daily as needed for flatulence or cramping  Yes Unknown, Entered By History   cetirizine (ZYRTEC) 10 MG tablet Take 10 mg by mouth At Bedtime  at new not started  Unknown, Entered By History   COMPRESSION STOCKINGS 1 each daily   Tiffany Galvan MD   hydrOXYzine (ATARAX) 25 MG tablet Take 25 mg by mouth At Bedtime  at new not started  Unknown, Entered By History   mupirocin (BACTROBAN) 2 % external ointment Apply topically 2 times daily To open sores on back  at new not started  Unknown, Entered By History   triamcinolone (KENALOG) 0.1 % external cream Apply topically 2 times daily To back for itching  at on  hold  Unknown, Entered By History       The information provided in this note is only as accurate as the sources available at the time of update(s)

## 2022-05-11 NOTE — ED NOTES
"[facility]  ED Nurse Handoff Report    ED Chief complaint: Urinary Retention      ED Diagnosis:   Final diagnoses:   Decreased urine output   Acute on chronic diastolic congestive heart failure (H)   Localized swelling of both lower legs       Code Status: Full Code    Allergies: No Known Allergies    Patient Story: Julieta comes in with bilateral leg swelling. Labs and Xrays show CHF.   Focused Assessment:    Neuro: Alert and oriented   Cards: WDL   Pulm: HENDERSON     Treatments and/or interventions provided: Lasix   Patient's response to treatments and/or interventions: increased urine output     To be done/followed up on inpatient unit:      Does this patient have any cognitive concerns?: none    Activity level - Baseline/Home:  Independent  Activity Level - Current:   Stand with Assist    Patient's Preferred language: English   Needed?: No    Isolation: None  Infection: Not Applicable  Patient tested for COVID 19 prior to admission: YES  Bariatric?: No    Vital Signs:   Vitals:    05/11/22 0942 05/11/22 0944   BP:  112/71   Pulse:  85   Resp:  16   Temp:  98.2  F (36.8  C)   TempSrc: Oral    SpO2:  96%   Weight:  52.2 kg (115 lb)   Height:  1.676 m (5' 6\")       Cardiac Rhythm:     Was the PSS-3 completed:   Yes  What interventions are required if any?               Family Comments: daughter at bedside   OBS brochure/video discussed/provided to patient/family: N/A              Name of person given brochure if not patient:               Relationship to patient:     For the majority of the shift this patient's behavior was Green.   Behavioral interventions performed were .    ED NURSE PHONE NUMBER: *[id number]         "

## 2022-05-11 NOTE — PROGRESS NOTES
RECEIVING UNIT ED HANDOFF REVIEW    ED Nurse Handoff Report was reviewed by: Gustavo Oquendo RN on May 11, 2022 at 3:30 PM

## 2022-05-12 ENCOUNTER — APPOINTMENT (OUTPATIENT)
Dept: OCCUPATIONAL THERAPY | Facility: CLINIC | Age: 87
End: 2022-05-12
Attending: INTERNAL MEDICINE
Payer: COMMERCIAL

## 2022-05-12 ENCOUNTER — APPOINTMENT (OUTPATIENT)
Dept: CARDIOLOGY | Facility: CLINIC | Age: 87
End: 2022-05-12
Attending: INTERNAL MEDICINE
Payer: COMMERCIAL

## 2022-05-12 LAB
ALBUMIN SERPL-MCNC: 3 G/DL (ref 3.4–5)
ALP SERPL-CCNC: 68 U/L (ref 40–150)
ALT SERPL W P-5'-P-CCNC: 39 U/L (ref 0–50)
ANION GAP SERPL CALCULATED.3IONS-SCNC: 6 MMOL/L (ref 3–14)
AST SERPL W P-5'-P-CCNC: 39 U/L (ref 0–45)
BILIRUB DIRECT SERPL-MCNC: 0.6 MG/DL (ref 0–0.2)
BILIRUB SERPL-MCNC: 1.9 MG/DL (ref 0.2–1.3)
BUN SERPL-MCNC: 30 MG/DL (ref 7–30)
CALCIUM SERPL-MCNC: 9 MG/DL (ref 8.5–10.1)
CHLORIDE BLD-SCNC: 102 MMOL/L (ref 94–109)
CO2 SERPL-SCNC: 27 MMOL/L (ref 20–32)
CREAT SERPL-MCNC: 0.88 MG/DL (ref 0.52–1.04)
GFR SERPL CREATININE-BSD FRML MDRD: 60 ML/MIN/1.73M2
GLUCOSE BLD-MCNC: 86 MG/DL (ref 70–99)
LVEF ECHO: NORMAL
POTASSIUM BLD-SCNC: 3.8 MMOL/L (ref 3.4–5.3)
PROT SERPL-MCNC: 6.2 G/DL (ref 6.8–8.8)
SODIUM SERPL-SCNC: 135 MMOL/L (ref 133–144)

## 2022-05-12 PROCEDURE — 93306 TTE W/DOPPLER COMPLETE: CPT | Mod: 26 | Performed by: INTERNAL MEDICINE

## 2022-05-12 PROCEDURE — 250N000013 HC RX MED GY IP 250 OP 250 PS 637: Performed by: INTERNAL MEDICINE

## 2022-05-12 PROCEDURE — 93306 TTE W/DOPPLER COMPLETE: CPT

## 2022-05-12 PROCEDURE — 120N000001 HC R&B MED SURG/OB

## 2022-05-12 PROCEDURE — 97530 THERAPEUTIC ACTIVITIES: CPT | Mod: GO

## 2022-05-12 PROCEDURE — 82310 ASSAY OF CALCIUM: CPT | Performed by: INTERNAL MEDICINE

## 2022-05-12 PROCEDURE — 97165 OT EVAL LOW COMPLEX 30 MIN: CPT | Mod: GO

## 2022-05-12 PROCEDURE — 82248 BILIRUBIN DIRECT: CPT | Performed by: INTERNAL MEDICINE

## 2022-05-12 PROCEDURE — 99215 OFFICE O/P EST HI 40 MIN: CPT | Mod: FS | Performed by: INTERNAL MEDICINE

## 2022-05-12 PROCEDURE — 250N000011 HC RX IP 250 OP 636: Performed by: PHYSICIAN ASSISTANT

## 2022-05-12 PROCEDURE — 99233 SBSQ HOSP IP/OBS HIGH 50: CPT | Performed by: PHYSICIAN ASSISTANT

## 2022-05-12 PROCEDURE — 36415 COLL VENOUS BLD VENIPUNCTURE: CPT | Performed by: INTERNAL MEDICINE

## 2022-05-12 PROCEDURE — 250N000013 HC RX MED GY IP 250 OP 250 PS 637: Performed by: PHYSICIAN ASSISTANT

## 2022-05-12 PROCEDURE — 97535 SELF CARE MNGMENT TRAINING: CPT | Mod: GO

## 2022-05-12 PROCEDURE — 250N000011 HC RX IP 250 OP 636: Performed by: INTERNAL MEDICINE

## 2022-05-12 RX ORDER — SIMETHICONE 80 MG
80 TABLET,CHEWABLE ORAL 4 TIMES DAILY PRN
Status: DISCONTINUED | OUTPATIENT
Start: 2022-05-12 | End: 2022-05-14 | Stop reason: HOSPADM

## 2022-05-12 RX ORDER — FUROSEMIDE 10 MG/ML
60 INJECTION INTRAMUSCULAR; INTRAVENOUS 2 TIMES DAILY
Status: DISCONTINUED | OUTPATIENT
Start: 2022-05-12 | End: 2022-05-13

## 2022-05-12 RX ORDER — POTASSIUM CHLORIDE 750 MG/1
20 TABLET, EXTENDED RELEASE ORAL DAILY
Status: DISCONTINUED | OUTPATIENT
Start: 2022-05-12 | End: 2022-05-14 | Stop reason: HOSPADM

## 2022-05-12 RX ORDER — MUPIROCIN 20 MG/G
OINTMENT TOPICAL 2 TIMES DAILY
Status: DISCONTINUED | OUTPATIENT
Start: 2022-05-12 | End: 2022-05-14 | Stop reason: HOSPADM

## 2022-05-12 RX ORDER — SENNOSIDES A AND B 8.6 MG/1
1 TABLET, FILM COATED ORAL 2 TIMES DAILY PRN
Status: DISCONTINUED | OUTPATIENT
Start: 2022-05-12 | End: 2022-05-12

## 2022-05-12 RX ADMIN — FUROSEMIDE 60 MG: 10 INJECTION, SOLUTION INTRAVENOUS at 14:42

## 2022-05-12 RX ADMIN — DIGOXIN 125 MCG: 125 TABLET ORAL at 08:23

## 2022-05-12 RX ADMIN — FUROSEMIDE 60 MG: 10 INJECTION, SOLUTION INTRAVENOUS at 08:23

## 2022-05-12 RX ADMIN — MUPIROCIN: 20 OINTMENT TOPICAL at 21:23

## 2022-05-12 RX ADMIN — PANTOPRAZOLE SODIUM 40 MG: 40 TABLET, DELAYED RELEASE ORAL at 08:23

## 2022-05-12 RX ADMIN — METOPROLOL SUCCINATE 12.5 MG: 25 TABLET, EXTENDED RELEASE ORAL at 21:03

## 2022-05-12 RX ADMIN — APIXABAN 2.5 MG: 2.5 TABLET, FILM COATED ORAL at 08:22

## 2022-05-12 RX ADMIN — POTASSIUM CHLORIDE 20 MEQ: 750 TABLET, EXTENDED RELEASE ORAL at 14:40

## 2022-05-12 RX ADMIN — MUPIROCIN: 20 OINTMENT TOPICAL at 15:40

## 2022-05-12 RX ADMIN — HYDROXYZINE HYDROCHLORIDE 25 MG: 25 TABLET, FILM COATED ORAL at 21:25

## 2022-05-12 RX ADMIN — APIXABAN 2.5 MG: 2.5 TABLET, FILM COATED ORAL at 21:04

## 2022-05-12 ASSESSMENT — ACTIVITIES OF DAILY LIVING (ADL)
ADLS_ACUITY_SCORE: 30
ADLS_ACUITY_SCORE: 24
ADLS_ACUITY_SCORE: 30
ADLS_ACUITY_SCORE: 24
ADLS_ACUITY_SCORE: 22
ADLS_ACUITY_SCORE: 22
ADLS_ACUITY_SCORE: 26
ADLS_ACUITY_SCORE: 24
ADLS_ACUITY_SCORE: 26
ADLS_ACUITY_SCORE: 30
ADLS_ACUITY_SCORE: 26
ADLS_ACUITY_SCORE: 30

## 2022-05-12 NOTE — PROGRESS NOTES
[facility]    Medicine Progress Note - Hospitalist Service    Date of Admission:  5/11/2022    Assessment & Plan        Elizabeth Ruggiero is a 89 year old female with a history of HFpEF, atrial fibrillation, HTN and HLP who presented to the ED on 5/11/2022 with 1-2 weeks of worsening lower extremity edema and weight gain. Registered under inpatient status for further evaluation and treatment of CHF exacerbation.      Decompensated HFpEF  Chronic atrial fibrillation/flutter   HTN, hyperlipidemia   Severe mitral regurgitation, not amenable to MitraClip due to significant mitral annular calcification and not likely a surgical candidate given her advanced age. Medically managed.    Moderate tricuspid regurgitation and pulmonary hypertension: Follows with Dr. Galvan, note televisit 3/8/22. Note hospitalization for CHF in 9/2021. Reports 1-2 weeks of gradually worsening leg swelling and weight gain.  She notes approximately 7-8 pounds of weight gain. No shortness of breath, HENDERSON or orthopnea. Upon arrival to the ED the patient has 2-3+ pitting edema to bilateral lower extremities. Reports that she feels best ~108 lbs and weight was up to 115 lbs prior to admission. Compliant with PTA Lasix. No excessive salt intake.   [Digoxin 125 mcg po every day, Eliquis 2.5 mg BID, Lasix 20 mg BID, potassium 20 mEq daily, Toprol XL 12.5 mg po every day]  * BNP 6,913.  CXR showed cardiomegaly but no obvious vascular congestion, notes infiltrate and/or atelectasis at right lung base medially (favor atelectasis). Small right pleural effusion.  EKG showed A fib with slow ventricular response  * Echo 2020 showed mod-severe MR, EF >70%  * Given IV Lasix 60 mg x1 in the ED with good urine output.    - Monitor on telemetry  - Strict I/Os and daily weights   - Lasix 60 mg IV BID (0900, 1430) today, re-evalute in the AM an consider transition to oral regimen. Pt reports DAVID improving, good urine output documented    -  Continue PTA potassium chloride   - Daily renal panels  - Replete electrolytes as needed  - Echocardiogram ordered  - Continue PTA Eliquis, Digoxin, Toprol XL  - PT/OT consulted   - CC/SW consulted   - Cardiology consulted, family requesting. Discussed diuretic game plan as above and reviewed most recent Cardiology note including discussion regarding valve disease and fact that pt is not a surgical candidate. Discussed plan for medical management. Appreciate input regarding diuresis.     Intake/Output Summary (Last 24 hours) at 5/12/2022 0804  Last data filed at 5/12/2022 0258  Gross per 24 hour   Intake 240 ml   Output 1300 ml   Net -1060 ml     Vitals:    05/11/22 0944 05/12/22 0658   Weight: 52.2 kg (115 lb) 51.5 kg (113 lb 8 oz)     Wt Readings from Last 10 Encounters:   05/12/22 51.5 kg (113 lb 8 oz)   04/24/22 49.9 kg (110 lb)   03/08/22 49 kg (108 lb)   10/07/21 52.3 kg (115 lb 6.4 oz)   09/26/21 54.6 kg (120 lb 5.9 oz)   11/10/20 57.8 kg (127 lb 6.4 oz)   11/09/20 55.9 kg (123 lb 3.2 oz)   11/06/20 56.7 kg (125 lb)   11/02/20 57.7 kg (127 lb 3.2 oz)   10/30/20 59.4 kg (131 lb)      Low urine output, improved  Recent UTI, >100,000 colonies klebsiella oxytoca (2 strains)   Patient recently was diagnosed with a UTI after ED visit 4/24 prescribed Keflex 500 mg TID X7 days; seen again 4/27 for persistent sx, cx with klebsiella oxytoca with one strain resistant to cefazolin, switched to omnicef 300 mg po every day X10 days.  Since then has had some urgency but notes this is not new.  She also started to notice that when she tried to urinate very little urine would come out.  In the ED she had urgency to void and was cathed for approximately only 100 mL.  At this time suspect her decreased urine output is likely due to her 3rd spacing her fluids   * Of note the patient did just finish a course of antibiotics for a UTI.  UA in the ED was not suggestive of UTI  - Continue to monitor      Mild hyponatremia, resolved:  Sodium minimally low at 132.  Appears to run in the mid 130s.  Suspect due to hypervolemia.   - Monitor while on diuresis      Mild hyperbilirubinemia  Bilirubin 2.7 on admission.  She has a history of congestive hepatopathy and this may be the cause of this slightly elevated bilirubin.  Patient does not appear jaundiced. LFTs normal.   * Repeat Tbili 1.9, direct bili 0.6. Pt without abdominal pain, nausea, vomiting.   - Monitor     Hx of DVT: Eliquis as above.     Hypoalbuminemia: Albumin = 3.2 g/dL (Ref range: 3.4 - 5.0 g/dL) on admission, will monitor as appropriate     Coagulation Defect: home medication list includes an anticoagulant medication       Diet: Combination Diet Low Saturated Fat Diet; No Caffeine for 24 hours (once tests completed, may have caffeine)    DVT Prophylaxis: DOAC  [place] Catheter: Not present  Central Lines: None  Cardiac Monitoring: ACTIVE order. Indication: Acute decompensated heart failure (48 hours)  Code Status: No CPR- Do NOT Intubate      Disposition Plan Expected Discharge: 5/14 pending transition to oral diuretic      The patient's care was discussed with the Attending Physician, Dr. Tavarez, Bedside Nurse, Patient and Patient's Family, daughter Brittney.     Trish Brandt PA-C  Hospitalist Service  [facility]  Securely message with the Vocera Web Console (learn more here)  Text page via [facility] Paging/Directory   ___________________________________________________________________    Interval History   Reports DAVID improving. No SOB.  Reports that she feels best ~108 lbs and weight was up to 115 lbs prior to admission. Compliant with PTA Lasix. No excessive salt intake. Discussed plan for another day of IV diuresis and echocardiogram today with pt and daughter, Brittney.     Discussed mitral valve disease. Reviewed Dr. Waldron's note. Medically managed. Discussed that if echo showed progressive valve disease, could explain recent exacerbation,  but would not  as pt not a surgical candidate. Pt and family voiced understanding.    Data reviewed today: I reviewed all medications, new labs and imaging results over the last 24 hours. I personally reviewed all labs and imaging to date.     Physical Exam   Vital Signs: Temp: 97.5  F (36.4  C) Temp src: Oral BP: 105/59 Pulse: 65   Resp: 16 SpO2: 93 % O2 Device: None (Room air)    Weight: 113 lbs 8 oz    CONSTITUTIONAL: Pt sitting upright in chair, appears much younger than stated age, dressed in hospital garb. Appears comfortable. Cooperative with interview. Accompanied by daughter, Brittney at bedside.   HEENT: Normocephalic, atraumatic.   CARDIOVASCULAR: RRR, 2+ systolic murmur in mitral region. No rubs or extra heart sounds appreciated. Pulses +2/4 and regular in upper and lower extremities, bilaterally.   RESPIRATORY: No increased work of breathing.  CTA, bilat; no wheezes, rales, or rhonchi appreciated.  GASTROINTESTINAL:  Abdomen soft, non-distended. BS auscultated in all four quadrants. Negative for tenderness to percussion or light and deep palpation.  No masses or organomegaly noted.  MUSCULOSKELETAL: No gross deformities noted. Normal muscle tone.   HEMATOLOGIC/LYMPHATIC/IMMUNOLOGIC: Negative for lower extremity edema, bilaterally.  NEUROLOGIC: Alert and oriented to person, place, and time.  strength intact. No focal neuro deficits   SKIN: Warm, dry, intact.    Data   Recent Labs   Lab 05/12/22  0759 05/11/22  1814 05/11/22  1054   WBC  --   --  6.3   HGB  --   --  11.4*   MCV  --   --  92   PLT  --   --  149*     --  132*   POTASSIUM 3.8 3.6 3.8   CHLORIDE 102  --  101   CO2 27  --  24   BUN 30  --  32*   CR 0.88  --  0.88   ANIONGAP 6  --  7   CHRIS 9.0  --  9.1   GLC 86  --  106*   ALBUMIN 3.0*  --  3.2*   PROTTOTAL 6.2*  --  6.5*   BILITOTAL 1.9*  --  2.7*   ALKPHOS 68  --  69   ALT 39  --  45   AST 39  --  45     No results found for this or any previous visit (from the past  24 hour(s)).  Medications     - MEDICATION INSTRUCTIONS -         apixaban ANTICOAGULANT  2.5 mg Oral BID     digoxin  125 mcg Oral Daily     furosemide  60 mg Intravenous BID     hydrOXYzine  25 mg Oral At Bedtime     metoprolol succinate ER  12.5 mg Oral QPM     mupirocin   Topical BID     pantoprazole  40 mg Oral QAM AC     potassium chloride ER  20 mEq Oral Daily     sodium chloride (PF)  3 mL Intracatheter Q8H

## 2022-05-12 NOTE — PROGRESS NOTES
05/12/22 0853   Quick Adds   Type of Visit Initial Occupational Therapy Evaluation   Living Environment   People in Home alone   Current Living Arrangements assisted living   Home Accessibility no concerns   Transportation Anticipated family or friend will provide   Living Environment Comments Lives alone in apartment, grab bars in bathroom   Self-Care   Usual Activity Tolerance moderate   Current Activity Tolerance moderate   Equipment Currently Used at Home walker, standard   Fall history within last six months no   Activity/Exercise/Self-Care Comment Indep with functional mobility and self cares. Pt does own laundry. Facility cleans. Pt reports her daughters bring her food as she does not like the menu.   General Information   Onset of Illness/Injury or Date of Surgery 05/11/22   Referring Physician Aleksandr Horvath,    Additional Occupational Profile Info/Pertinent History of Current Problem Elizabeth Ruggiero is a 89 year old female with a history of HFpEF, atrial fibrillation, HTN and HLP who presented to the ED on 5/11/2022 with 1-2 weeks of worsening lower extremity edema and weight gain   Existing Precautions/Restrictions fall   Limitations/Impairments hearing  ([place])   Cognitive Status Examination   Orientation Status orientation to person, place and time   Follows Commands follows one-step commands;over 90% accuracy   Visual Perception   Visual Impairment/Limitations corrective lenses full-time   Integumentary/Edema   Integumentary/Edema other (describe)   Integumentary/Edema Comments Pt reports B LE edema   Range of Motion Comprehensive   General Range of Motion upper extremity range of motion deficits identified   Comment, General Range of Motion limited into shld flex chadd, likely d/t age related changes   Strength Comprehensive (MMT)   General Manual Muscle Testing (MMT) Assessment upper extremity strength deficits identified   Comment, General Manual Muscle Testing (MMT) Assessment generalizes  weakness at 4/5 strength throughouyt   Coordination   Upper Extremity Coordination No deficits were identified   Bed Mobility   Bed Mobility supine-sit   Supine-Sit [place] (Bed Mobility) modified independence   Transfers   Transfers sit-stand transfer;toilet transfer   Transfer Comments SBA with use of FWW   Toilet Transfer   Type (Toilet Transfer) sit-stand;stand-sit   [place] Level (Toilet Transfer) supervision   Assistive Device (Toilet Transfer) walker, standard;grab bars/safety frame   Activities of Daily Living   BADL Assessment/Intervention lower body dressing;grooming;toileting   Lower Body Dressing Assessment/Training   [place] Level (Lower Body Dressing) set up;supervision   Grooming Assessment/Training   Position (Grooming) sink side   [place] Level (Grooming) supervision   Toileting   [place] Level (Toileting) supervision   Clinical Impression   Criteria for Skilled Therapeutic Interventions Met (OT) Yes, treatment indicated   OT Diagnosis Impaired functional mobility and self cares   OT Problem List-Impairments impacting ADL problems related to;activity tolerance impaired;mobility;strength   Assessment of Occupational Performance 1-3 Performance Deficits   Identified Performance Deficits functional mobliity, home management   Planned Therapy Interventions (OT) ADL retraining;strengthening;transfer training;home program guidelines;progressive activity/exercise   Clinical Decision Making Complexity (OT) low complexity   Risk & Benefits of therapy have been explained evaluation/treatment results reviewed;care plan/treatment goals reviewed   OT Discharge Planning   OT Discharge Recommendation (DC Rec) home with assist;home with home care occupational therapy   OT Rationale for DC Rec Pt would benefit from returning home with services in place (assist for meals and cleaning). Home OT to addres activity tolerance deficits.   Total Evaluation Time (Minutes)   Total Evaluation Time  (Minutes) 10   OT Goals   Therapy Frequency (OT) Daily   OT Predicted Duration/Target Date for Goal Attainment 05/19/22   OT Goals Hygiene/Grooming;Upper Body Dressing;Lower Body Dressing;Toilet Transfer/Toileting   OT: Hygiene/Grooming modified independent   OT: Upper Body Dressing Modified independent;including set-up/clothing retrieval   OT: Lower Body Dressing Modified independent;including set-up/clothing retrieval   OT: Toilet Transfer/Toileting Modified independent;cleaning and garment management

## 2022-05-12 NOTE — PROGRESS NOTES
Pleasant 89 year old female A&O x 4 with CHF who was unable to void other than some dribbling this morning. Pt has gained approx 7-8 lbs in the past few weeks and daughter noticed bilat ankle swelling for the past few days. Unable to get shoes on this morning.  VSS on room air. Patient diuresed this morning with Lasix and she produced over 1000 mL urine this evening.  On Tele: Afib with SVR.  HR 65.  Patient denies pain and SOB.   Incontinent of bladder periwick in place.  Patient saw dermatologist yesterday and had 13 sores on her back treated.  Daughter called writer and requested that I wash her back with soap and water and replace bandaids which I did.  Tomorrow daughter will bring in Muciporin ointment which dermatologist prescribed for her to apply to sores.

## 2022-05-12 NOTE — CONSULTS
"[facility]    Cardiology Consultation     Primary Cardiologist: Dr. Galvan    Date of Admission: 05/11/2022  Service Date: 05/12/2022    Summary:   Ms. Elizabeth Ruggiero is a very pleasant 89 year old female with a past medical history of chronic atrial fibrillation/flutter, chronic HFpEF, hepatic congestion, deep vein thrombosis, hypertension, hyperlipidemia, severe mitral regurgitation, history of TIA, and osteoporosis who was admitted on 5/11/2022 after presenting with 1-2 weeks of worsening lower extremity edema and weight gain. I was asked to see the patient for further evaluation and management of acute decompensated HFpEF.    Assessment & Plan   1. Acute decompensated HFpEF  - PTA on lasix 20 mg BID.  - NT pro BNP at 6,913 upon presentation. CXR showing infiltrate and/or atelectasis at the right lung base medially, new since the previous exam per report. Small right pleural effusion also noted.  - Weight at 115 upon presentation. Baseline \"dry\" weight closer to 108 lbs.    2. Severe mitral regurgitation  - Noted as mod-severe to severe (3-4+) by TTE 10/2020.  - Not likely amenable to MitraClip due to significant mitral annular calcification and not likely a surgical candidate given her advanced age, so continued medical management has been recommended.     3. Moderate tricuspid regurgitation and pulmonary hypertension    4.Chronic atrial fibrillation/flutter   - Anticoagulated on warfarin for CVA prophylaxis and rate controlled with metoprolol succinate 12.5 mg daily and digoxin 125 mcg daily.    5. Essential hypertension, well-controlled    Plan:   1. Repeat echocardiogram ordered and pending.  2. Continue with diuresis with IV lasix.  3. Continue with daily weights, strict I/Os, low sodium diet, and close monitoring of renal function and electrolytes.  4. Continue with metoprolol succinate 12.5 mg daily, digoxin 125 mcg daily, and eliquis 2.5 mg BID.   5. Reviewed with the patient " and her daughter, Cecy, that they can call the clinic in the future if she develops signs/symptoms concerning for fluid retention, including weight gain of over 3 pounds in 1 night or over 5 pounds in 1 week, worsening shortness of breath, or worsening swelling in the legs or abdomen. In the future, we could get her in for sooner follow-up, adjust her p.o. diuretics, or consider arranging outpatient diuretic infusion clinic if needed to help prevent hospitalizations.  6. Cardiology will continue to follow along. Anticipate she may need 1-2 days for diuresis prior to discharge. Could consider switching from PTA lasix to p.o. torsemide prior to discharge and possibly adding spironolactone     I spent 50 minutes face-to-face or coordinating care of Elizabeth Ruggiero. Over 50% of our time on the unit was spent counseling the patient and/or coordinating care.    Thank you for the opportunity to participate in this pleasant patient's care.     SENIA Butler, CNP   Nurse Practitioner  [facility]  Pager: [phone]  Text Page  (8am - 5pm, M-F)    Code Status    No CPR- Do NOT Intubate    Reason for Consult   Reason for consult: I was asked by Trish Brandt PA-C to evaluate this patient for acute HFpEF    Primary Care Physician   Dr. Sonu Reece    Chief Complaint   Swelling     History is obtained from the patient and electronic health record    History of Present Illness   Ms. Elizabeth Ruggiero is a very pleasant 89 year old female with a past medical history of chronic atrial fibrillation/flutter, chronic HFpEF, hepatic congestion, deep vein thrombosis, hypertension, hyperlipidemia, severe mitral regurgitation, history of TIA, and osteoporosis. She follows with Dr. Audra Waldron for her cardiology care and is actually known to me as I have seen her in follow-up in the clinic as well.    She lives at the [facility] assisted living [facility] and has some assisted living services such as help with  medication set up. She was admitted on 5/11/2022 after presenting with 1-2 weeks of worsening lower extremity edema and weight gain of around 5 to 7 pounds. She was recently diagnosed with a UTI and felt that her urine output has been decreasing some over the last couple of weeks.  She otherwise denies symptoms of shortness of breath, dyspnea on exertion, orthopnea, chest pain, or palpitations.  She has not had issues with dizziness, lightheadedness, presyncope, or syncope.    Most recent echocardiogram was on 10/24/2020 showing LVEF estimated at >70%, moderate-severe to severe (3-4+) mitral regurgitation with severe mitral annular calcification and severely thickened valve leaflets.  She has also previously been noted to have at least mild to moderate (1-2+ tricuspid regurgitation).    She has been managed on an outpatient diuretic regimen of Lasix at 20 mg twice daily and typically feels best when her weight is around 108 pounds.  Upon presentation, NT pro BNP was mildly elevated at 6,913.  Chest x-ray shows infiltrate and/or atelectasis at the right lung base medially with a small right pleural effusion per report.  Weight upon presentation was 115 pounds.  She was started on IV Lasix at 60 mg and has received a few doses with this. She has already noticed improvement in redness discomfort she was experiencing in her legs.    Past Medical History   I have reviewed this patient's medical history and updated it with pertinent information if needed.   Past Medical History:   Diagnosis Date     (HFpEF) heart failure with preserved ejection fraction (H)      Acute diastolic CHF (congestive heart failure) (H)      Acute left hemiparesis (H) 2/9/2019     Arthritis      Atrial fibrillation (H)      Atrial fibrillation with RVR (H)      Atrial flutter (H)      Atrial flutter with rapid ventricular response (H) 4/23/2019     Community acquired pneumonia      Diastolic CHF (H) 04/29/2019     DVT of lower extremity (deep  venous thrombosis) (H)     recurrent     Hepatic congestion     improved with diuretics for CHF     HTN (hypertension)      Hyperlipidemia      Mitral valve insufficiency      Pneumonia      Postmenopausal bleeding 2020    [facility]     Severe mitral regurgitation      SOB (shortness of breath)      TIA (transient ischemic attack)      Vitamin D deficiency      Past Surgical History   I have reviewed this patient's surgical history and updated it with pertinent information if needed.  Past Surgical History:   Procedure Laterality Date     APPENDECTOMY       FRACTURE SURGERY       repair left femur         Prior to Admission Medications   Prior to Admission Medications   Prescriptions Last Dose Informant Patient Reported? Taking?   COMPRESSION STOCKINGS  Nursing Home No No   Si each daily   ELIQUIS ANTICOAGULANT 2.5 MG tablet 2022 at am  Yes Yes   Sig: Take 2.5 mg by mouth 2 times daily   Multiple Vitamins-Minerals (PRESERVISION AREDS 2+MULTI VIT) CAPS 2022 at am Nursing Home No Yes   Sig: Take 1 tablet by mouth 2 times daily   NONFORMULARY 5/10/2022 at hs  Yes Yes   Sig: Diltiazem 2% ointment - apply pea-sized amount to the anus TID x6 weeks.  Pt to self administer.  Compounded by [facility], [address]   acetaminophen (TYLENOL) 500 MG tablet 2022 at 0600  Yes Yes   Sig: Take 1,000 mg by mouth 3 times daily 2 tablets tid at 6am, 2pm, 10pm   alendronate (FOSAMAX) 70 MG tablet 2022 Nursing Home No Yes   Sig: Take 1 tablet (70 mg) by mouth every 7 days SUNDAYS at 7:30am   aluminum & magnesium hydroxide (MAG-AL) 200-200 MG/5ML supsension   Yes Yes   Sig: Take 30 mLs by mouth 4 times daily as needed for indigestion   calcium carbonate-vitamin D (OSCAL W/D) 500-200 MG-UNIT tablet 2022 at am Nursing Home Yes Yes   Sig: Take 1 tablet by mouth daily    cetirizine (ZYRTEC) 10 MG tablet  at new not started  Yes No   Sig: Take 10 mg by mouth At Bedtime    cholecalciferol 25 MCG (1000 UT) TABS 5/11/2022 at am Nursing Home Yes Yes   Sig: Take 1 tablet by mouth daily   digoxin (LANOXIN) 125 MCG tablet 5/11/2022 at 1000 Nursing Home No Yes   Sig: Take 1 tablet (125 mcg) by mouth daily   furosemide (LASIX) 20 MG tablet 5/10/2022 at 1600 Nursing Home No Yes   Sig: Take 1 tablet (20 mg) by mouth 2 times daily   Patient taking differently: Take 20 mg by mouth 2 times daily @ 10am and 4pm   hydrOXYzine (ATARAX) 25 MG tablet  at new not started  Yes No   Sig: Take 25 mg by mouth At Bedtime   melatonin 5 MG tablet 5/10/2022 at hs Nursing Home Yes Yes   Sig: Take 5 mg by mouth At Bedtime   metoprolol succinate ER (TOPROL-XL) 25 MG 24 hr tablet 5/11/2022 at am  Yes Yes   Sig: Take 12.5 mg by mouth every evening    multivitamin w/minerals (THERA-VIT-M) tablet 5/11/2022 at am Nursing Home Yes Yes   Sig: Take 1 tablet by mouth daily    mupirocin (BACTROBAN) 2 % external ointment  at new not started  Yes No   Sig: Apply topically 2 times daily To open sores on back   omeprazole (PRILOSEC) 20 MG DR capsule 5/11/2022 at am Nursing Home Yes Yes   Sig: Take 20 mg by mouth daily before breakfast   potassium chloride ER (K-TAB) 20 MEQ CR tablet 5/11/2022 at am Nursing Home No Yes   Sig: Take 1 tablet (20 mEq) by mouth daily   senna (SENOKOT) 8.6 MG tablet  at prn Nursing Home Yes Yes   Sig: Take 1 tablet by mouth 2 times daily as needed for constipation   simethicone (MYLICON) 80 MG chewable tablet   Yes Yes   Sig: Take 80 mg by mouth 4 times daily as needed for flatulence or cramping   triamcinolone (KENALOG) 0.1 % external cream  at on hold  Yes No   Sig: Apply topically 2 times daily To back for itching      Facility-Administered Medications: None     Allergies   No Known Allergies    Social History   I have reviewed this patient's social history and updated it with pertinent information if needed. Elizabeth Ruggiero  reports that she has never smoked. She has never used smokeless tobacco.  She reports that she does not drink alcohol and does not use drugs.    Family History   I have reviewed this patient's family history and updated it with pertinent information if needed.   Family History   Problem Relation Age of Onset     Colon Cancer Mother      Breast Cancer Sister      Diabetes Sister      Review of Systems   The 10 point Review of Systems is negative other than noted in the HPI or here.     Physical Exam   Temp: 97.5  F (36.4  C) Temp src: Oral BP: 112/66 Pulse: 71   Resp: 16 SpO2: 93 % O2 Device: None (Room air)    Vital Signs with Ranges  Temp:  [97.5  F (36.4  C)-98  F (36.7  C)] 97.5  F (36.4  C)  Pulse:  [61-72] 71  Resp:  [16] 16  BP: (102-124)/(55-68) 112/66  SpO2:  [93 %-95 %] 93 %  113 lbs 8 oz    Constitutional: Appears her stated age, well nourished, and in no acute distress.  Eyes: Pupils equal, round. Sclerae anicteric.   HEENT: Normocephalic, atraumatic.   Neck: Supple. JVP appears mildly elevated  Respiratory: Breathing non-labored. Lungs clear to auscultation bilaterally. No crackles or wheezes appreciated.  Cardiovascular: Irregularly irregular rhythm, controlled rate. Grade 2/6 holosystolic murmur heard most prominently at the apex. No rub or gallop.  Skin: Warm, dry. No apparent rashes  Musculoskeletal/Extremities: Moves all extremities well and symmetrically. 2+ pitting lower extremity edema to the knees bilaterally.   Neurologic: No gross focal deficits. Alert, cooperative. [place].  Psychiatric: Affect appropriate. Responds to questions appropriately.    Data   Results for orders placed or performed during the hospital encounter of 05/11/22 (from the past 24 hour(s))   Potassium   Result Value Ref Range    Potassium 3.6 3.4 - 5.3 mmol/L   Basic metabolic panel   Result Value Ref Range    Sodium 135 133 - 144 mmol/L    Potassium 3.8 3.4 - 5.3 mmol/L    Chloride 102 94 - 109 mmol/L    Carbon Dioxide (CO2) 27 20 - 32 mmol/L    Anion Gap 6 3 - 14 mmol/L    Urea Nitrogen 30 7 - 30  mg/dL    Creatinine 0.88 0.52 - 1.04 mg/dL    Calcium 9.0 8.5 - 10.1 mg/dL    Glucose 86 70 - 99 mg/dL    GFR Estimate 60 (L) >60 mL/min/1.73m2   Hepatic panel   Result Value Ref Range    Bilirubin Total 1.9 (H) 0.2 - 1.3 mg/dL    Bilirubin Direct 0.6 (H) 0.0 - 0.2 mg/dL    Protein Total 6.2 (L) 6.8 - 8.8 g/dL    Albumin 3.0 (L) 3.4 - 5.0 g/dL    Alkaline Phosphatase 68 40 - 150 U/L    AST 39 0 - 45 U/L    ALT 39 0 - 50 U/L     Recent Labs   Lab 05/11/22  1054   NTBNPI 6,913*     No results for input(s): TROPONIN, TROPI, TROPR, TROPONINIS in the last 168 hours.    Invalid input(s): TROP, TROPONINIES, TNIH     This note was completed in part using Dragon voice recognition software. Although reviewed after completion, some word and grammatical errors may occur.    Clinically Significant Risk Factors Present on Admission              Cardiovascular: Non-Rheumatic Valve Disease: Mitral valve insufficiency  Tricuspid valve insufficiency    Fluid & Electrolyte Disorders: Fluid overload, unspecified

## 2022-05-12 NOTE — PLAN OF CARE
Goal Outcome Evaluation:  A&O x 4. SBA-GB/W,  has gained approx 7-8 lbs in the past few weeks and noticed bilat ankle swelling for the past few days.  VSS on room air. Ambulate x2 to the bathroom- purewick removed per pt request.  On Tele: Afib with SVR. C/o pain on R- arm- refused tylenol. Per report , patient saw dermatologist yesterday and had 13 sores on her back treated. Daughter will bring in Muciporin ointment which dermatologist prescribed for her to apply to sores. Discharge pending on improvement

## 2022-05-12 NOTE — CONSULTS
Care Management Initial Consult    General Information  Assessment completed with: Patient, Children, Elizabeth & Cecy(daughter)  Type of CM/SW Visit: Initial Assessment    Primary Care Provider verified and updated as needed: Yes   Readmission within the last 30 days: no previous admission in last 30 days      Reason for Consult: discharge planning  Advance Care Planning:            Communication Assessment  Patient's communication style: spoken language (English or Bilingual)    Hearing Difficulty or Deaf: yes   Wear Glasses or Blind: no    Cognitive  Cognitive/Neuro/Behavioral: WDL  Level of Consciousness: alert  Arousal Level: opens eyes spontaneously  Orientation: oriented x 4  Mood/Behavior: cooperative, calm  Best Language: 0 - No aphasia  Speech: clear, spontaneous    Living Environment:   People in home: alone     Current living Arrangements: assisted living  Name of Facility: [facility] [address]   Able to return to prior arrangements: yes       Family/Social Support:  Care provided by: self, child(sandeep), other (see comments) ([facility] Staff)  Provides care for:    Marital Status:   Children, Facility resident(s)/Staff          Description of Support System: Supportive, Involved         Current Resources:   Patient receiving home care services: No     Community Resources: None  Equipment currently used at home: walker, standard  Supplies currently used at home: None    Employment/Financial:  Employment Status:          Financial Concerns:             Lifestyle & Psychosocial Needs:  Social Determinants of Health     Tobacco Use: Low Risk      Smoking Tobacco Use: Never Smoker     Smokeless Tobacco Use: Never Used   Alcohol Use: Not on file   Financial Resource Strain: Not on file   Food Insecurity: Not on file   Transportation Needs: Not on file   Physical Activity: Not on file   Stress: Not on file   Social Connections: Not on file   Intimate Partner Violence: Not on file   Depression: Not on file   Housing  Stability: Not on file       Functional Status:  Prior to admission patient needed assistance:              Mental Health Status:          Chemical Dependency Status:                Values/Beliefs:  Spiritual, Cultural Beliefs, Tenriism Practices, Values that affect care:                 Additional Information:  Per consult for discharge planning, met with pt and pt's daughter Cecy to discuss discharge plans.  Per Cecy, pt resides in the Senior/Independent Living at [facility] on [address] [facility].  Patient receives medication administration only for services.  Cecy shared that pt has been independent with all ADLs and has been ambulating with a walker.  Patient has access to a pendent to call for assistance 24/7.  Per Cecy, she and her sister Brittney have been bringing food into pt's apt each week as pt isn't on meal plan.  Discussed that OT is recommending home care at discharge.  Cecy shared that pt has been open to [facility] for RN, PT/OT services in the past and would like to use this agency again for HC services for pt.  Cecy recommended pt's daughter Brittney be called for more information regarding pt.  Called Brittney to discuss discharge plan.  Per Gin Lugo is the  at the [facility] [address] and would be the best one to talk with regarding HC agency as [facility] is contracted with this [facility].  Brittney provided fax number [phone] to send discharge orders.  Called Gin [phone] and left message for return call.   Care Coordinator will continue to follow for discharge needs.  Mckenna Terry, RN  Mckenna Terry RN, BSN, OCN   Inpatient Care Coordination [address]  Office: [phone]

## 2022-05-13 ENCOUNTER — APPOINTMENT (OUTPATIENT)
Dept: OCCUPATIONAL THERAPY | Facility: CLINIC | Age: 87
End: 2022-05-13
Payer: COMMERCIAL

## 2022-05-13 DIAGNOSIS — I50.32 CHRONIC HEART FAILURE WITH PRESERVED EJECTION FRACTION (HFPEF) (H): Primary | ICD-10-CM

## 2022-05-13 PROBLEM — R22.43 LOCALIZED SWELLING OF BOTH LOWER LEGS: Status: ACTIVE | Noted: 2022-05-13

## 2022-05-13 PROBLEM — R34 DECREASED URINE OUTPUT: Status: ACTIVE | Noted: 2022-05-13

## 2022-05-13 PROBLEM — I27.20 PULMONARY HYPERTENSION (H): Status: ACTIVE | Noted: 2022-05-13

## 2022-05-13 PROBLEM — I34.0 NONRHEUMATIC MITRAL VALVE REGURGITATION: Status: ACTIVE | Noted: 2022-05-13

## 2022-05-13 PROBLEM — I50.33 ACUTE ON CHRONIC DIASTOLIC CONGESTIVE HEART FAILURE (H): Status: ACTIVE | Noted: 2022-05-13

## 2022-05-13 PROBLEM — I36.1 NONRHEUMATIC TRICUSPID VALVE REGURGITATION: Status: ACTIVE | Noted: 2022-05-13

## 2022-05-13 LAB
ANION GAP SERPL CALCULATED.3IONS-SCNC: 7 MMOL/L (ref 3–14)
BUN SERPL-MCNC: 37 MG/DL (ref 7–30)
CALCIUM SERPL-MCNC: 9.1 MG/DL (ref 8.5–10.1)
CHLORIDE BLD-SCNC: 101 MMOL/L (ref 94–109)
CO2 SERPL-SCNC: 29 MMOL/L (ref 20–32)
CREAT SERPL-MCNC: 0.94 MG/DL (ref 0.52–1.04)
GFR SERPL CREATININE-BSD FRML MDRD: 56 ML/MIN/1.73M2
GLUCOSE BLD-MCNC: 101 MG/DL (ref 70–99)
POTASSIUM BLD-SCNC: 3.6 MMOL/L (ref 3.4–5.3)
SODIUM SERPL-SCNC: 137 MMOL/L (ref 133–144)

## 2022-05-13 PROCEDURE — 250N000013 HC RX MED GY IP 250 OP 250 PS 637: Performed by: INTERNAL MEDICINE

## 2022-05-13 PROCEDURE — 99207 PR CDG-CODE CATEGORY CHANGED: CPT | Performed by: HOSPITALIST

## 2022-05-13 PROCEDURE — 250N000013 HC RX MED GY IP 250 OP 250 PS 637: Performed by: HOSPITALIST

## 2022-05-13 PROCEDURE — G0378 HOSPITAL OBSERVATION PER HR: HCPCS

## 2022-05-13 PROCEDURE — 250N000011 HC RX IP 250 OP 636: Performed by: PHYSICIAN ASSISTANT

## 2022-05-13 PROCEDURE — 36415 COLL VENOUS BLD VENIPUNCTURE: CPT | Performed by: INTERNAL MEDICINE

## 2022-05-13 PROCEDURE — 250N000013 HC RX MED GY IP 250 OP 250 PS 637: Performed by: PHYSICIAN ASSISTANT

## 2022-05-13 PROCEDURE — 97535 SELF CARE MNGMENT TRAINING: CPT | Mod: GO

## 2022-05-13 PROCEDURE — 99215 OFFICE O/P EST HI 40 MIN: CPT | Performed by: NURSE PRACTITIONER

## 2022-05-13 PROCEDURE — 99225 PR SUBSEQUENT OBSERVATION CARE,LEVEL II: CPT | Performed by: HOSPITALIST

## 2022-05-13 PROCEDURE — 80048 BASIC METABOLIC PNL TOTAL CA: CPT | Performed by: INTERNAL MEDICINE

## 2022-05-13 RX ORDER — POTASSIUM CHLORIDE 20MEQ/15ML
10 LIQUID (ML) ORAL ONCE
Status: COMPLETED | OUTPATIENT
Start: 2022-05-13 | End: 2022-05-13

## 2022-05-13 RX ORDER — SENNOSIDES 8.6 MG
1-2 TABLET ORAL 2 TIMES DAILY PRN
Status: DISCONTINUED | OUTPATIENT
Start: 2022-05-13 | End: 2022-05-14 | Stop reason: HOSPADM

## 2022-05-13 RX ORDER — POLYETHYLENE GLYCOL 3350 17 G/17G
17 POWDER, FOR SOLUTION ORAL 2 TIMES DAILY PRN
Status: DISCONTINUED | OUTPATIENT
Start: 2022-05-13 | End: 2022-05-14 | Stop reason: HOSPADM

## 2022-05-13 RX ORDER — TORSEMIDE 20 MG/1
20 TABLET ORAL DAILY
Status: DISCONTINUED | OUTPATIENT
Start: 2022-05-14 | End: 2022-05-14 | Stop reason: HOSPADM

## 2022-05-13 RX ADMIN — POLYETHYLENE GLYCOL 3350 17 G: 17 POWDER, FOR SOLUTION ORAL at 14:01

## 2022-05-13 RX ADMIN — FUROSEMIDE 60 MG: 10 INJECTION, SOLUTION INTRAVENOUS at 09:31

## 2022-05-13 RX ADMIN — PANTOPRAZOLE SODIUM 40 MG: 40 TABLET, DELAYED RELEASE ORAL at 06:34

## 2022-05-13 RX ADMIN — MUPIROCIN: 20 OINTMENT TOPICAL at 09:32

## 2022-05-13 RX ADMIN — APIXABAN 2.5 MG: 2.5 TABLET, FILM COATED ORAL at 20:15

## 2022-05-13 RX ADMIN — SENNOSIDES AND DOCUSATE SODIUM 2 TABLET: 50; 8.6 TABLET ORAL at 18:19

## 2022-05-13 RX ADMIN — HYDROXYZINE HYDROCHLORIDE 25 MG: 25 TABLET, FILM COATED ORAL at 22:11

## 2022-05-13 RX ADMIN — METOPROLOL SUCCINATE 12.5 MG: 25 TABLET, EXTENDED RELEASE ORAL at 20:15

## 2022-05-13 RX ADMIN — DIGOXIN 125 MCG: 125 TABLET ORAL at 09:32

## 2022-05-13 RX ADMIN — POTASSIUM CHLORIDE 20 MEQ: 750 TABLET, EXTENDED RELEASE ORAL at 09:32

## 2022-05-13 RX ADMIN — POTASSIUM CHLORIDE 10 MEQ: 20 SOLUTION ORAL at 17:19

## 2022-05-13 RX ADMIN — MUPIROCIN: 20 OINTMENT TOPICAL at 20:15

## 2022-05-13 RX ADMIN — APIXABAN 2.5 MG: 2.5 TABLET, FILM COATED ORAL at 09:32

## 2022-05-13 ASSESSMENT — ACTIVITIES OF DAILY LIVING (ADL)
ADLS_ACUITY_SCORE: 26

## 2022-05-13 NOTE — PLAN OF CARE
Goal Outcome Evaluation:    1246-0200: AOx4, [place]. VSS on RA ex soft BPs. Tele Afib CVR. Up SBA GB/W, up in recliner for most of shift. Tolerating cardiac diet. Denies pain and N/V. Reports constipation, PRN senna given. Blanchable redness to coccyx, barrier cream applied.  Bandaids on bactroban sites on back, CDI. R PIV SL. K+ 3.6, PO replacement given, recheck tomorrow AM. IV lasix, voiding frequently in BR, will start PO torsemide tomorrow. Cardiology signed off. Possible discharge in 1-2 days to [facility] with homecare.

## 2022-05-13 NOTE — PLAN OF CARE
PT: Orders received. Chart reviewed and discussed with care team. Patient admitted with CHF.  PT is not indicated as patient is completing functional mobility with SBA. Plan for OT to continue to address activity tolerance and ambulation during hospitalization.  Defer discharge recommendations to OT.  Will complete orders.

## 2022-05-13 NOTE — PROVIDER NOTIFICATION
MD Notification    Notified Person: MD    Notified Person Name: Carmen    Notification Date/Time: May 12, 2022  @  7:31 PM    Notification Interaction: web-page    Purpose of Notification: pt c/o itching all over back. Can she get an order for Benadryl before bedtime?    Orders Received: Try bedtime order for hydroxazine first. Possible order for benadryl later in night if itching does not improve.    Comments:

## 2022-05-13 NOTE — DISCHARGE INSTRUCTIONS
Emily COULTER will be following you for Home Care RN, PT/OT services.  You will receive a call with your scheduled home care visit date & time.  Please call [phone] if you have any questions or concerns.

## 2022-05-13 NOTE — UTILIZATION REVIEW
"  Admission Status; Secondary Review Determination         Under the authority of the Utilization Management Committee, the utilization review process indicated a secondary review on the above patient.  The review outcome is based on review of the medical records, discussions with staff, and applying clinical experience noted on the date of the review.          (x) Observation Status Appropriate - This patient does not meet hospital inpatient criteria and is placed in observation status. If this patient's primary payer is Medicare and was admitted as an inpatient, Condition Code 44 should be used and patient status changed to \"observation\".     RATIONALE FOR DETERMINATION-insurance denied inpatient level of care    89 year old female with a past medical history of chronic atrial fibrillation/flutter, chronic HFpEF, hepatic congestion, deep vein thrombosis, hypertension, hyperlipidemia, severe mitral regurgitation, history of TIA, and osteoporosis who was admitted on 5/11/2022 after presenting with 1-2 weeks of worsening lower extremity edema and weight gain. I was asked to see the patient for further evaluation and management of acute decompensated HFpEF.  Treated with IV Lasix initially cardiology recommending switching to oral torsemide and signed off today.   The severity of illness, intensity of service provided, expected LOS and risk for adverse outcome make the care appropriate for further observation; however, doesn't meet criteria for hospital inpatient admission. Dr Tavarez notified of this determination.    This document was produced using voice recognition software.      The information on this document is developed by the utilization review team in order for the business office to ensure compliance.  This only denotes the appropriateness of proper admission status and does not reflect the quality of care rendered.         The definitions of Inpatient Status and Observation Status used in making the " determination above are those provided in the CMS Coverage Manual, Chapter 1 and Chapter 6, section 70.4.      Sincerely,     CHAD JUAN MD    System Medical Director  Utilization Management  [facility].

## 2022-05-13 NOTE — PLAN OF CARE
A/Ox 4. VSS on RA. Tele Afib CVR. Denies pain. Up SBA GB/W. BLE edema. Scattered scabs to back, covered with band aids after bacitracin applied. Tolerating cardiac diet. Complaints of constipation, miralax given. Likely discharge tomorrow 5/14 after another dose of IV diuresis.

## 2022-05-13 NOTE — PROGRESS NOTES
[facility]    Medicine Progress Note - Hospitalist Service    Date of Admission:  5/11/2022    Assessment & Plan        Elizabeth Ruggiero is a 89 year old female with a history of HFpEF, atrial fibrillation, HTN and HLP who presented to the ED on 5/11/2022 with 1-2 weeks of worsening lower extremity edema and weight gain. Registered under inpatient status for further evaluation and treatment of CHF exacerbation.      Decompensated HFpEF  Chronic atrial fibrillation/flutter   HTN, hyperlipidemia   Severe mitral regurgitation, not amenable to MitraClip due to significant mitral annular calcification and not likely a surgical candidate given her advanced age. Medically managed.    Moderate tricuspid regurgitation and pulmonary hypertension: Follows with Dr. Galvan, note televisit 3/8/22. Note hospitalization for CHF in 9/2021. Reports 1-2 weeks of gradually worsening leg swelling and weight gain.  She notes approximately 7-8 pounds of weight gain. No shortness of breath, HENDERSON or orthopnea. Upon arrival to the ED the patient has 2-3+ pitting edema to bilateral lower extremities. Reports that she feels best ~108 lbs and weight was up to 115 lbs prior to admission. Compliant with PTA Lasix. No excessive salt intake.   [Digoxin 125 mcg po every day, Eliquis 2.5 mg BID, Lasix 20 mg BID, potassium 20 mEq daily, Toprol XL 12.5 mg po every day]  * BNP 6,913.  CXR showed cardiomegaly but no obvious vascular congestion, notes infiltrate and/or atelectasis at right lung base medially (favor atelectasis). Small right pleural effusion.  EKG showed A fib with slow ventricular response  * Echo 2020 showed mod-severe MR, EF >70%  * Given IV Lasix 60 mg x1 in the ED with good urine output.    - Monitor on telemetry  - Strict I/Os and daily weights   - Lasix 60 mg IV BID administered.  She has responded well to IV diuresis.  Transitioned to p.o. torsemide per cardiology recommendations.  - Continue PTA potassium  chloride   - Daily renal panels  - Replete electrolytes as needed  - Echocardiogram ordered-did not show any significant changes from prior.  -Cardiology consulted per family request: Agreed with IV diuresis and switch to p.o. torsemide for discharge.  Plan for discharge tomorrow.  She will follow-up with cardiology clinic.  - Continue PTA Eliquis, Digoxin, Toprol XL  - PT/OT consulted   - CC/SW consulted       Low urine output, improved  Recent UTI, >100,000 colonies klebsiella oxytoca (2 strains)   Patient recently was diagnosed with a UTI after ED visit 4/24 prescribed Keflex 500 mg TID X7 days; seen again 4/27 for persistent sx, cx with klebsiella oxytoca with one strain resistant to cefazolin, switched to omnicef 300 mg po every day X10 days.  Since then has had some urgency but notes this is not new.  She also started to notice that when she tried to urinate very little urine would come out.  In the ED she had urgency to void and was cathed for approximately only 100 mL.  At this time suspect her decreased urine output is likely due to her 3rd spacing her fluids   * Of note the patient did just finish a course of antibiotics for a UTI.  UA in the ED was not suggestive of UTI  - Continue to monitor      Mild hyponatremia, resolved: Sodium minimally low at 132.  Appears to run in the mid 130s.  Suspect due to hypervolemia.   - Monitor while on diuresis      Mild hyperbilirubinemia  Bilirubin 2.7 on admission.  She has a history of congestive hepatopathy and this may be the cause of this slightly elevated bilirubin.  Patient does not appear jaundiced. LFTs normal.   * Repeat Tbili 1.9, direct bili 0.6. Pt without abdominal pain, nausea, vomiting.   - Monitor     Hx of DVT: Eliquis as above.     Hypoalbuminemia: Albumin = 3.2 g/dL (Ref range: 3.4 - 5.0 g/dL) on admission, will monitor as appropriate     Coagulation Defect: home medication list includes an anticoagulant medication       Diet: Combination Diet Low  Saturated Fat Diet; No Caffeine for 24 hours (once tests completed, may have caffeine)    DVT Prophylaxis: DOAC  [place] Catheter: Not present  Central Lines: None  Cardiac Monitoring: ACTIVE order. Indication: Acute decompensated heart failure (48 hours)  Code Status: No CPR- Do NOT Intubate      Disposition Plan Expected Discharge: 5/14 pending transition to oral diuretic        Latosha Tavarez MD  Hospitalist Service  [facility]  Securely message with the Vocera Web Console (learn more here)  Text page via Digital Folio Paging/Directory   ___________________________________________________________________    Interval History   Stable overnight.  Diuresed well.  Weight is down to 107 pounds which is close to her baseline.  Discussed with her daughter Brittney on the phone.  They are pleased with her progress.  She denies any pain or shortness of breath.  We will transition to oral diuretic and plan for discharge home tomorrow.    Data reviewed today: I reviewed all medications, new labs and imaging results over the last 24 hours. I personally reviewed all labs and imaging to date.     Physical Exam   Vital Signs: Temp: 97.3  F (36.3  C) Temp src: Oral BP: 92/52 Pulse: 70   Resp: 18 SpO2: 96 % O2 Device: None (Room air)    Weight: 107 lbs 4.8 oz    CONSTITUTIONAL: Pt sitting upright in chair, comfortable, hard of hearing  HEENT: Normocephalic, atraumatic.   CARDIOVASCULAR: RRR, 2+ systolic murmur noted  RESPIRATORY: No increased work of breathing.  CTA, bilat; no wheezes, rales, or rhonchi appreciated.  GASTROINTESTINAL:  Abdomen soft, non-distended.  NEUROLOGIC: Alert and oriented to person, place, and time.  Moving all extremities.  SKIN: Warm, dry, intact.    Data   Recent Labs   Lab 05/13/22  0856 05/12/22  0759 05/11/22  1814 05/11/22  1054   WBC  --   --   --  6.3   HGB  --   --   --  11.4*   MCV  --   --   --  92   PLT  --   --   --  149*    135  --  132*   POTASSIUM 3.6 3.8 3.6 3.8    CHLORIDE 101 102  --  101   CO2 29 27  --  24   BUN 37* 30  --  32*   CR 0.94 0.88  --  0.88   ANIONGAP 7 6  --  7   CHRIS 9.1 9.0  --  9.1   * 86  --  106*   ALBUMIN  --  3.0*  --  3.2*   PROTTOTAL  --  6.2*  --  6.5*   BILITOTAL  --  1.9*  --  2.7*   ALKPHOS  --  68  --  69   ALT  --  39  --  45   AST  --  39  --  45     Recent Results (from the past 24 hour(s))   Echocardiogram Complete   Result Value    LVEF  >70%    [facility]    [id number]  [id number]  [id number]  [id number]^YUMIKO^MAIK^AMANUEL     [facility]  Echocardiography Laboratory  [address] [place] [address]     Name: CHOLO TA  MRN: [id number]  : 1926  Study Date: 2022 03:07 PM  Age: 89 yrs  Gender: Female  Patient Location: [facility]  Reason For Study: Heart Failure  Ordering Physician: MAIK CALDERON  Referring Physician: MAIK CALDERON  Performed By: Eliecer Wright     BSA: 1.6 m2  Height: 66 in  Weight: 115 lb  HR: 73  ______________________________________________________________________________  Procedure  Complete Echo Adult.  ______________________________________________________________________________  Interpretation Summary     There is moderate to severe concentric left ventricular hypertrophy.  Hyperdynamic left ventricular function  The visual ejection fraction is >70%.  Diastolic Doppler findings (E/E' ratio and/or other parameters) suggest left  ventricular filling pressures are increased.  The right ventricle is normal in structure, function and size.  Severe biatrial enlargement.  There is prolapse of the posterior mitral leaflet and moderate mitral annular  calcification. Moderate-severe MR. Very eccentric jet. Cannot exclude  component of systolic anterior motion of mitral valve due to acclerated flow  accross LVOT.  There is moderate to mod-severe (2-3+) tricuspid regurgitation.  Severe (>55mmHg) pulmonary hypertension is present.  Moderate valvular aortic stenosis.  DVI 0.32. BRITTNI 1.5 cm2 by continuity. Mean  gradient 20 mmHg.  Dilation of the inferior vena cava is present with abnormal respiratory  variation in diameter.  Small posterior pericardial effusion.     Compared to prior study 10/24/2020, there is moderate aortic stenosis (from  mild), likely severe MR, moderate-severe TR. Left ventricular function appears  similar.  ______________________________________________________________________________  Left Ventricle  There is moderate to severe concentric left ventricular hypertrophy.  Hyperdynamic left ventricular function. The visual ejection fraction is >70%.  Left ventricular diastolic function is indeterminate. Diastolic Doppler  findings (E/E' ratio and/or other parameters) suggest left ventricular filling  pressures are increased. Normal left ventricular wall motion.     Right Ventricle  The right ventricle is normal in structure, function and size.     Atria  The left atrium is severely dilated. The right atrium is severely dilated.  Intact atrial septum.     Mitral Valve  There is moderate mitral annular calcification. There is prolapse of the  posterior mitral leaflet. There is moderately severe (3+) mitral  regurgitation.     Tricuspid Valve  The tricuspid valve is not well visualized. Severe (>55mmHg) pulmonary  hypertension is present. The right ventricular systolic pressure is  approximated at 47mmHg plus the right atrial pressure. There is moderate to  mod-severe (2-3+) tricuspid regurgitation.     Aortic Valve  There is severe trileaflet aortic sclerosis. There is trace aortic  regurgitation. The mean AoV pressure gradient is 19.8 mmHg. Moderate valvular  aortic stenosis. DVI 0.32. BRITTNI by continuity is 1.5 cm2.     Pulmonic Valve  The pulmonic valve is not well seen, but is grossly normal. There is mild (1+)  pulmonic valvular regurgitation.     Vessels  The aortic root is normal size. Normal size ascending aorta. Dilation of the  inferior vena cava is  present with abnormal respiratory variation in diameter.  IVC diameter >2.1 cm collapsing <50% with sniff suggests a high RA pressure  estimated at 15 mmHg or greater.     Pericardium  Small posterior pericardial effusion.     ______________________________________________________________________________  MMode/2D Measurements & Calculations  IVSd: 1.3 cm  LVIDd: 3.5 cm  LVPWd: 1.5 cm  LV mass(C)d: 176.8 grams  LV mass(C)dI: 111.8 grams/m2     Ao root diam: 3.5 cm  asc Aorta Diam: 3.6 cm  LVOT diam: 2.4 cm  LVOT area: 4.5 cm2  LA Volume (BP): 136.0 ml  LA Volume Index (BP): 86.1 ml/m2  RWT: 0.88     Doppler Measurements & Calculations  MV E max bry: 117.0 cm/sec  MV max PG: 10.7 mmHg  MV mean P.8 mmHg  MV V2 VTI: 40.5 cm  MVA(VTI): 2.2 cm2     MV P1/2t max bry: 166.0 cm/sec  MV P1/2t: 92.8 msec  MVA(P1/2t): 2.4 cm2  MV dec slope: 524.0 cm/sec2  Ao V2 max: 293.3 cm/sec  Ao max P.0 mmHg  Ao V2 mean: 208.5 cm/sec  Ao mean P.8 mmHg  Ao V2 VTI: 53.3 cm  BRITTNI(I,D): 1.7 cm2  BRITTNI(V,D): 1.4 cm2  LV V1 max PG: 3.2 mmHg  LV V1 max: 88.8 cm/sec  LV V1 VTI: 19.9 cm  SV(LVOT): 89.0 ml  SI(LVOT): 56.3 ml/m2  PA V2 max: 87.3 cm/sec  PA max PG: 3.0 mmHg  TR max bry: 339.0 cm/sec  TR max P.6 mmHg  AV Bry Ratio (DI): 0.30  BRITTNI Index (cm2/m2): 1.1  E/E' av.6  Lateral E/e': 16.8  Medial E/e': 22.4     ______________________________________________________________________________  Report approved by: Niad Rivero 2022 05:43 PM           Medications     - MEDICATION INSTRUCTIONS -         apixaban ANTICOAGULANT  2.5 mg Oral BID     digoxin  125 mcg Oral Daily     hydrOXYzine  25 mg Oral At Bedtime     metoprolol succinate ER  12.5 mg Oral QPM     mupirocin   Topical BID     pantoprazole  40 mg Oral QAM AC     potassium chloride ER  20 mEq Oral Daily     sodium chloride (PF)  3 mL Intracatheter Q8H     [START ON 2022] torsemide  20 mg Oral Daily

## 2022-05-13 NOTE — PROGRESS NOTES
Patient is A/O x 4, vss, a-febrile, denies pain, up to the bathroom with A1 GB and walker, tele A-fib with CVR, patient has scabs all over back, cream applied, possible discharge tomorrow.

## 2022-05-13 NOTE — PLAN OF CARE
Goal Outcome Evaluation:        5/12/22 [id number]  Pt A/O x4, [place]. VSS on RA. On tele - Afib w/ CVR. PIV SL. Billateral ankle edema. Bandages covering back sores. Cont B/B, Up SBA to BR w/ GB + W. On Low sat fat diet. Pt daughter Brittney would like to be called w/ updates about pt discharge plans. Possible discharge for 5/13 pending, home w/ home care.

## 2022-05-13 NOTE — PLAN OF CARE
Goal Outcome Evaluation:        A&Ox4, VSS on RA. Tele - Afib w/ CVR. No c/o pain or nausea this shift. Up SBA with GB/W to bathroom frequently. Occasional urine incontinence with frequency. Strict I&O as allowed. Trace swelling to bilateral ankles/feet. K+ protocol, 3.8 this AM - replacement not given as pt started on Potassium chloride ER 20mEq daily today - recheck for tomorrow AM.  Muciporin ointment brought in by pt's daughter to apply to back sores. Ointment applied and bandages changed this shift as well. Pt's daughter Brittney would like a call as soon as information regarding discharge are available as she needs to cancel appointments in morning if pt gets the okay to discharge. Discharge possible as early as tomorrow pending improvements.

## 2022-05-13 NOTE — PROGRESS NOTES
Care Management Follow Up    Length of Stay (days): 2    Expected Discharge Date: 05/14/2022     Concerns to be Addressed:       Patient plan of care discussed at interdisciplinary rounds: Yes    Anticipated Discharge Disposition: Assisted Living, Home, Home Care     Anticipated Discharge Services: None  Anticipated Discharge DME: None    Patient/family educated on Medicare website which has current facility and service quality ratings:    Education Provided on the Discharge Plan:    Patient/Family in Agreement with the Plan: yes    Referrals Placed by CM/SW: Internal Clinic Care Coordination, Homecare  Private pay costs discussed: Not applicable    Additional Information:  Received call back for Lizzeth -Interim Nursing Director at [facility] [address] [facility] [phone].  Per Lizzeth, the [facility] uses [facility](formerly [facility]) and recommend to call Nichelle Santa at [facility].  Lizzeth requested RNCC call [facility] at [phone] when pt is ready for discharge and fax discharge orders to [phone].  Called Arina [phone] and spoke to Arina regarding pt's need for HC RN, PT/OT.  Arina requested referral be faxed to [phone].  Referral faxed.  Await confirmation from Ar[facility] regarding acceptance of home care for pt.    Received call back from [facility]  Liaison stated [facility] is able to accept pt for HC RN, PT/OT services.  She requested discharge orders be faxed to [phone].    Care Coordinator will continue to follow for discharge needs.  Mckenna Terry, RN  Mckenna Terry RN, BSN, OCN   Inpatient Care Coordination [address]  Office: [phone]

## 2022-05-13 NOTE — CONSULTS
[facility] CORE [facility]       Received CORE Clinic Consult from Aleksandr Horvath.     Per record review patient admitted w/weight gain ind progressive LE swelling. ECHO done this admit shows EF >70%. Patient does not meet criteria for CORE enrollment. She will continue to be followed by general cardiology at our clinic. I have messaged our schedulers to contact patient to arrange post hospital follow up.       Please call with questions.          Gail Duran RN   CORE Clinic RN Care Coordinator   [facility]   [phone]   CORE Clinic: Cardiomyopathy, Optimization, Rehabilitation, Education

## 2022-05-14 VITALS
BODY MASS INDEX: 16.91 KG/M2 | HEART RATE: 71 BPM | TEMPERATURE: 97.4 F | OXYGEN SATURATION: 95 % | WEIGHT: 105.2 LBS | RESPIRATION RATE: 18 BRPM | HEIGHT: 66 IN | SYSTOLIC BLOOD PRESSURE: 96 MMHG | DIASTOLIC BLOOD PRESSURE: 60 MMHG

## 2022-05-14 LAB
ANION GAP SERPL CALCULATED.3IONS-SCNC: 6 MMOL/L (ref 3–14)
BUN SERPL-MCNC: 30 MG/DL (ref 7–30)
CALCIUM SERPL-MCNC: 8.9 MG/DL (ref 8.5–10.1)
CHLORIDE BLD-SCNC: 97 MMOL/L (ref 94–109)
CO2 SERPL-SCNC: 30 MMOL/L (ref 20–32)
CREAT SERPL-MCNC: 0.82 MG/DL (ref 0.52–1.04)
GFR SERPL CREATININE-BSD FRML MDRD: 66 ML/MIN/1.73M2
GLUCOSE BLD-MCNC: 93 MG/DL (ref 70–99)
POTASSIUM BLD-SCNC: 4.2 MMOL/L (ref 3.4–5.3)
SODIUM SERPL-SCNC: 133 MMOL/L (ref 133–144)

## 2022-05-14 PROCEDURE — 80048 BASIC METABOLIC PNL TOTAL CA: CPT | Performed by: INTERNAL MEDICINE

## 2022-05-14 PROCEDURE — 36415 COLL VENOUS BLD VENIPUNCTURE: CPT | Performed by: INTERNAL MEDICINE

## 2022-05-14 PROCEDURE — 250N000013 HC RX MED GY IP 250 OP 250 PS 637: Performed by: INTERNAL MEDICINE

## 2022-05-14 PROCEDURE — G0378 HOSPITAL OBSERVATION PER HR: HCPCS

## 2022-05-14 PROCEDURE — 250N000013 HC RX MED GY IP 250 OP 250 PS 637: Performed by: NURSE PRACTITIONER

## 2022-05-14 PROCEDURE — 250N000013 HC RX MED GY IP 250 OP 250 PS 637: Performed by: PHYSICIAN ASSISTANT

## 2022-05-14 PROCEDURE — 99217 PR OBSERVATION CARE DISCHARGE: CPT | Performed by: HOSPITALIST

## 2022-05-14 RX ORDER — TORSEMIDE 20 MG/1
20 TABLET ORAL DAILY
Qty: 30 TABLET | Refills: 0 | Status: SHIPPED | OUTPATIENT
Start: 2022-05-15 | End: 2022-06-10

## 2022-05-14 RX ADMIN — MUPIROCIN: 20 OINTMENT TOPICAL at 13:50

## 2022-05-14 RX ADMIN — POTASSIUM CHLORIDE 20 MEQ: 750 TABLET, EXTENDED RELEASE ORAL at 10:52

## 2022-05-14 RX ADMIN — APIXABAN 2.5 MG: 2.5 TABLET, FILM COATED ORAL at 10:50

## 2022-05-14 RX ADMIN — DIGOXIN 125 MCG: 125 TABLET ORAL at 10:52

## 2022-05-14 RX ADMIN — TORSEMIDE 20 MG: 20 TABLET ORAL at 10:52

## 2022-05-14 RX ADMIN — PANTOPRAZOLE SODIUM 40 MG: 40 TABLET, DELAYED RELEASE ORAL at 06:48

## 2022-05-14 NOTE — PROGRESS NOTES
Care Management Discharge Note    Discharge Date: 05/14/2022       Discharge Disposition: Assisted Living, Home, Home Care    Discharge Services: None    Discharge DME: None    Discharge Transportation: family or friend will provide    Private pay costs discussed: Not applicable    PAS Confirmation Code:    Patient/family educated on Medicare website which has current facility and service quality ratings:      Education Provided on the Discharge Plan:  yes  Persons Notified of Discharge Plans: patient  Patient/Family in Agreement with the Plan: yes    Handoff Referral Completed: Yes    Additional Information:  Discharge orders received. Patient going back to the [facility] [address] independent living [place] with Emily COULTER RN, PT/OT. Writer faxed orders to [phone] (Emily ) and to [phone] ([facility]. [facility] notified of patient going back.    JONEL Aldridge

## 2022-05-14 NOTE — PLAN OF CARE
Goal Outcome Evaluation:           AOx4, [place]. VSS on RA ex soft BPs. Tele Afib CVR. Up SBA GB/W, Tolerating cardiac diet. Denies pain and N/V. Blanchable redness to coccyx, barrier cream applied.  Bandaids on bactroban sites on back,  Cardiology signed off. Possible discharge in 1-2 days to [facility] with homecare.

## 2022-05-14 NOTE — DISCHARGE SUMMARY
Discharge Summary  Hospitalist    Date of Admission:  5/11/2022  Date of Discharge:  5/14/2022  Discharging Provider: Latosha Tavarez MD, MD  Date of Service (when I saw the patient): 05/14/22    Discharge Diagnoses   Acute on chronic HFpEF  Chronic atrial fibrillation/flutter   HTN, hyperlipidemia   Severe mitral regurgitation    History of Present Illness   Please refer H & P for details.      Hospital Course   Elizabeth Ruggiero is a 89 year old female with a history of HFpEF, atrial fibrillation, HTN and HLP who presented to the ED on 5/11/2022 with 1-2 weeks of worsening lower extremity edema and weight gain. Registered under inpatient status for further evaluation and treatment of CHF exacerbation.      Acute on chronic HFpEF  Chronic atrial fibrillation/flutter   HTN, hyperlipidemia   Severe mitral regurgitation, not amenable to MitraClip due to significant mitral annular calcification and not likely a surgical candidate given her advanced age. Medically managed.    Moderate tricuspid regurgitation and pulmonary hypertension: Follows with Dr. Galvan, note televisit 3/8/22. Note hospitalization for CHF in 9/2021. Reports 1-2 weeks of gradually worsening leg swelling and weight gain.  She notes approximately 7-8 pounds of weight gain. No shortness of breath, HENDERSON or orthopnea. Upon arrival to the ED the patient has 2-3+ pitting edema to bilateral lower extremities. Reports that she feels best ~108 lbs and weight was up to 115 lbs prior to admission. Compliant with PTA Lasix. No excessive salt intake.   [Digoxin 125 mcg po every day, Eliquis 2.5 mg BID, Lasix 20 mg BID, potassium 20 mEq daily, Toprol XL 12.5 mg po every day]  * BNP 6,913.  CXR showed cardiomegaly but no obvious vascular congestion, notes infiltrate and/or atelectasis at right lung base medially (favor atelectasis). Small right pleural effusion.  EKG showed A fib with slow ventricular response  * Echo 2020 showed mod-severe MR, EF >70%  * Given IV  Lasix 60 mg x1 in the ED with good urine output.    - Monitor on telemetry  - Strict I/Os and daily weights   - Lasix 60 mg IV BID administered.  She has responded well to IV diuresis.  Transitioned to p.o. torsemide per cardiology recommendations.  - Continue PTA potassium chloride   - Replete electrolytes as needed  - Echocardiogram ordered-did not show any significant changes from prior.  -Cardiology consulted per family request: Agreed with IV diuresis and switch to p.o. torsemide for discharge.   She will follow-up with cardiology clinic.  - Continue PTA Eliquis, Digoxin, Toprol XL  - PT/OT consulted-cleared for discharge home with home therapies and home RN.  - CC/SW consulted and helped arrange home therapies.        Low urine output, improved  Recent UTI, >100,000 colonies klebsiella oxytoca (2 strains)   Patient recently was diagnosed with a UTI after ED visit 4/24 prescribed Keflex 500 mg TID X7 days; seen again 4/27 for persistent sx, cx with klebsiella oxytoca with one strain resistant to cefazolin, switched to omnicef 300 mg po every day X10 days.  Since then has had some urgency but notes this is not new.  She also started to notice that when she tried to urinate very little urine would come out.  In the ED she had urgency to void and was cathed for approximately only 100 mL.  At this time suspect her decreased urine output is likely due to her 3rd spacing her fluids   * Of note the patient did just finish a course of antibiotics for a UTI.  UA in the ED was not suggestive of UTI  - Continue to monitor      Mild hyponatremia, resolved: Sodium minimally low at 132.  Appears to run in the mid 130s.  Suspect due to hypervolemia.   - Monitor while on diuresis      Mild hyperbilirubinemia  Bilirubin 2.7 on admission.  She has a history of congestive hepatopathy and this may be the cause of this slightly elevated bilirubin.  Patient does not appear jaundiced. LFTs normal.   * Repeat Tbili 1.9, direct bili  0.6. Pt without abdominal pain, nausea, vomiting.   - Monitor      Hx of DVT: Eliquis as above.     Hypoalbuminemia: Albumin = 3.2 g/dL (Ref range: 3.4 - 5.0 g/dL) on admission, will monitor as appropriate      Stable to discharge home.      Latosha Tavarez MD, MD      Pending Results   These results will be followed up by Hospitalist team.  Unresulted Labs Ordered in the Past 30 Days of this Admission     No orders found from 4/11/2022 to 5/12/2022.          Code Status   DNR / DNI       Primary Care Physician   Sonu Reece    Follow-ups Needed After Discharge   Follow-up Appointments     Follow-up and recommended labs and tests       Follow up with primary care provider, Sonu Reece, within 7 days for   hospital follow- up.  No follow up labs or test are needed.             Physical Exam   Temp: 97.4  F (36.3  C) Temp src: Oral BP: 96/60 Pulse: 71   Resp: 18 SpO2: 95 % O2 Device: None (Room air)    Vitals:    05/12/22 0658 05/13/22 0639 05/14/22 0651   Weight: 51.5 kg (113 lb 8 oz) 48.7 kg (107 lb 4.8 oz) 47.7 kg (105 lb 3.2 oz)     Vital Signs with Ranges  Temp:  [97.3  F (36.3  C)-97.8  F (36.6  C)] 97.4  F (36.3  C)  Pulse:  [70-80] 71  Resp:  [18] 18  BP: ()/(52-68) 96/60  SpO2:  [94 %-96 %] 95 %  I/O last 3 completed shifts:  In: 440 [P.O.:440]  Out: 850 [Urine:850]        CONSTITUTIONAL: Pt sitting upright in chair, comfortable, hard of hearing  HEENT: Normocephalic, atraumatic.   CARDIOVASCULAR: RRR, 2+ systolic murmur noted  RESPIRATORY: No increased work of breathing.  CTA, bilat; no wheezes, rales, or rhonchi appreciated.  GASTROINTESTINAL:  Abdomen soft, non-distended.  NEUROLOGIC: Alert and oriented to person, place, and time.  Moving all extremities.  SKIN: Warm, dry, intact.    Discharge Disposition   Discharged to home  Condition at discharge: Stable    Consultations This Hospital Stay   CORE CLINIC EVALUATION IP CONSULT  OCCUPATIONAL THERAPY ADULT IP CONSULT  NUTRITION SERVICES ADULT  IP CONSULT  CARE MANAGEMENT / SOCIAL WORK IP CONSULT  PHYSICAL THERAPY ADULT IP CONSULT  CARDIOLOGY IP CONSULT    Time Spent on this Encounter   ILatosha MD, personally saw the patient today and spent greater than 30 minutes discharging this patient.    Discharge Orders      Basic metabolic panel     N terminal pro BNP outpatient     Follow-Up with Cardiology [facility]      Home Care Referral      Reason for your hospital stay    Heart failure exacerbation     Follow-up and recommended labs and tests     Follow up with primary care provider, Sonu Reece, within 7 days for hospital follow- up.  No follow up labs or test are needed.     Activity    Your activity upon discharge: activity as tolerated     When to contact your care team    Call your primary doctor if you have any of the following: worsening leg swelling, shortness of breath, chest pain, fever, dizziness     Monitor and record    blood pressure daily x 2 weeks and bring record to next appointment     Diet    Follow this diet upon discharge: Orders Placed This Encounter      Combination Diet Low Saturated Fat Diet; No Caffeine for 24 hours (once tests completed, may have caffeine)     Discharge Medications   Current Discharge Medication List      START taking these medications    Details   torsemide (DEMADEX) 20 MG tablet Take 1 tablet (20 mg) by mouth daily  Qty: 30 tablet, Refills: 0    Associated Diagnoses: Acute on chronic diastolic congestive heart failure (H)         CONTINUE these medications which have NOT CHANGED    Details   acetaminophen (TYLENOL) 500 MG tablet Take 1,000 mg by mouth 3 times daily 2 tablets tid at 6am, 2pm, 10pm      alendronate (FOSAMAX) 70 MG tablet Take 1 tablet (70 mg) by mouth every 7 days SUNDAYS at 7:30am  Qty: 4 tablet, Refills: 0    Associated Diagnoses: Osteoporosis, unspecified osteoporosis type, unspecified pathological fracture presence      aluminum & magnesium hydroxide (MAG-AL) 200-200 MG/5ML supsension  Take 30 mLs by mouth 4 times daily as needed for indigestion      calcium carbonate-vitamin D (OSCAL W/D) 500-200 MG-UNIT tablet Take 1 tablet by mouth daily       cholecalciferol 25 MCG (1000 UT) TABS Take 1 tablet by mouth daily      digoxin (LANOXIN) 125 MCG tablet Take 1 tablet (125 mcg) by mouth daily  Qty: 30 tablet, Refills: 0    Associated Diagnoses: Atrial fibrillation, unspecified type (H)      ELIQUIS ANTICOAGULANT 2.5 MG tablet Take 2.5 mg by mouth 2 times daily      melatonin 5 MG tablet Take 5 mg by mouth At Bedtime      metoprolol succinate ER (TOPROL-XL) 25 MG 24 hr tablet Take 12.5 mg by mouth every evening       Multiple Vitamins-Minerals (PRESERVISION AREDS 2+MULTI VIT) CAPS Take 1 tablet by mouth 2 times daily  Qty: 60 capsule, Refills: 0    Associated Diagnoses: Osteoporosis, unspecified osteoporosis type, unspecified pathological fracture presence      multivitamin w/minerals (THERA-VIT-M) tablet Take 1 tablet by mouth daily       NONFORMULARY Diltiazem 2% ointment - apply pea-sized amount to the anus TID x6 weeks.  Pt to self administer.  Compounded by [facility], [address] [place]      omeprazole (PRILOSEC) 20 MG DR capsule Take 20 mg by mouth daily before breakfast      potassium chloride ER (K-TAB) 20 MEQ CR tablet Take 1 tablet (20 mEq) by mouth daily  Qty: 30 tablet, Refills: 0    Associated Diagnoses: Diastolic dysfunction with chronic heart failure (H)      senna (SENOKOT) 8.6 MG tablet Take 1 tablet by mouth 2 times daily as needed for constipation      simethicone (MYLICON) 80 MG chewable tablet Take 80 mg by mouth 4 times daily as needed for flatulence or cramping      cetirizine (ZYRTEC) 10 MG tablet Take 10 mg by mouth At Bedtime      COMPRESSION STOCKINGS 1 each daily  Qty: 1 each, Refills: 1    Comments: 20-30 mmHg, Knee high, on in the morning and off at night.  Associated Diagnoses: Edema, unspecified type      hydrOXYzine (ATARAX) 25 MG tablet Take 25 mg by mouth At Bedtime       mupirocin (BACTROBAN) 2 % external ointment Apply topically 2 times daily To open sores on back      triamcinolone (KENALOG) 0.1 % external cream Apply topically 2 times daily To back for itching         STOP taking these medications       furosemide (LASIX) 20 MG tablet Comments:   Reason for Stopping:             Allergies   No Known Allergies  Data   Most Recent 3 CBC's:  Recent Labs   Lab Test 05/11/22  1054 04/24/22  1135 09/25/21  1616   WBC 6.3 7.4 7.3   HGB 11.4* 12.5 13.2   MCV 92 98 93   * 151 148*      Most Recent 3 BMP's:  Recent Labs   Lab Test 05/14/22  1226 05/13/22  0856 05/12/22  0759    137 135   POTASSIUM 4.2 3.6 3.8   CHLORIDE 97 101 102   CO2 30 29 27   BUN 30 37* 30   CR 0.82 0.94 0.88   ANIONGAP 6 7 6   CHRIS 8.9 9.1 9.0   GLC 93 101* 86     Most Recent 2 LFT's:  Recent Labs   Lab Test 05/12/22  0759 05/11/22  1054   AST 39 45   ALT 39 45   ALKPHOS 68 69   BILITOTAL 1.9* 2.7*     Most Recent INR's and Anticoagulation Dosing History:  Anticoagulation Dose History     Recent Dosing and Labs Latest Ref Rng & Units 10/13/2021 11/10/2021 12/8/2021 1/5/2022 2/4/2022 3/2/2022 3/30/2022    INR 0.85 - 1.15 2.43(H) 2.38(H) 2.50(H) 2.28(H) 2.47(H) 2.61(H) 2.51(H)        Most Recent 3 Troponin's:  Recent Labs   Lab Test 09/25/21  1617 10/24/20  1412 10/24/20  0551 10/23/20  2234   TROPI  --  0.085* 0.089* 0.082*   TROPONIN 0.032  --   --   --      Most Recent Cholesterol Panel:No lab results found.  Most Recent 6 Bacteria Isolates From Any Culture (See EPIC Reports for Culture Details):  Recent Labs   Lab Test 04/29/19  1038 04/29/19  0850 04/29/19  0849 04/23/19  1715 04/23/19  1713 04/23/19  1630   CULT 10,000 to 50,000 colonies/mL  Enterococcus faecium  *  <10,000 colonies/mL  urogenital charles   No growth No growth No growth No growth No growth     Most Recent TSH, T4 and A1c Labs:  Recent Labs   Lab Test 10/09/20  0927 07/26/20  0644   TSH 2.22 6.24*   T4  --  1.11       Results for  orders placed or performed during the hospital encounter of 22   XR Chest 2 Views    Narrative    CHEST TWO VIEWS  2022 11:06 AM     HISTORY:  Lower extremity swelling.    COMPARISON: 2021.      Impression    IMPRESSION: Cardiac enlargement has a similar appearance to the  previous exam, given differences in technique. Pulmonary vascularity  is within normal limits. Infiltrate and/or atelectasis at the right  lung base medially, new since the previous exam. There is a small  right pleural effusion. Calcification of the mitral annulus. No  pneumothorax.    PHANI PERAZA MD         SYSTEM ID:  [id number]   Echocardiogram Complete     Value    LVEF  >70%    Narrative    [id number]  [id number]  [id number]  [id number]^YUMIKO^MAIK^AMANUEL     [facility]  Echocardiography Laboratory  [address] [place] [address]     Name: CHOLO TA  MRN: [id number]  : 1926  Study Date: 2022 03:07 PM  Age: 89 yrs  Gender: Female  Patient Location: [facility]  Reason For Study: Heart Failure  Ordering Physician: MAIK CALDERON  Referring Physician: MAIK CALDERON  Performed By: Eliecer Wright     BSA: 1.6 m2  Height: 66 in  Weight: 115 lb  HR: 73  ______________________________________________________________________________  Procedure  Complete Echo Adult.  ______________________________________________________________________________  Interpretation Summary     There is moderate to severe concentric left ventricular hypertrophy.  Hyperdynamic left ventricular function  The visual ejection fraction is >70%.  Diastolic Doppler findings (E/E' ratio and/or other parameters) suggest left  ventricular filling pressures are increased.  The right ventricle is normal in structure, function and size.  Severe biatrial enlargement.  There is prolapse of the posterior mitral leaflet and moderate mitral annular  calcification. Moderate-severe MR. Very eccentric jet. Cannot  exclude  component of systolic anterior motion of mitral valve due to acclerated flow  accross LVOT.  There is moderate to mod-severe (2-3+) tricuspid regurgitation.  Severe (>55mmHg) pulmonary hypertension is present.  Moderate valvular aortic stenosis. DVI 0.32. BRITTNI 1.5 cm2 by continuity. Mean  gradient 20 mmHg.  Dilation of the inferior vena cava is present with abnormal respiratory  variation in diameter.  Small posterior pericardial effusion.     Compared to prior study 10/24/2020, there is moderate aortic stenosis (from  mild), likely severe MR, moderate-severe TR. Left ventricular function appears  similar.  ______________________________________________________________________________  Left Ventricle  There is moderate to severe concentric left ventricular hypertrophy.  Hyperdynamic left ventricular function. The visual ejection fraction is >70%.  Left ventricular diastolic function is indeterminate. Diastolic Doppler  findings (E/E' ratio and/or other parameters) suggest left ventricular filling  pressures are increased. Normal left ventricular wall motion.     Right Ventricle  The right ventricle is normal in structure, function and size.     Atria  The left atrium is severely dilated. The right atrium is severely dilated.  Intact atrial septum.     Mitral Valve  There is moderate mitral annular calcification. There is prolapse of the  posterior mitral leaflet. There is moderately severe (3+) mitral  regurgitation.     Tricuspid Valve  The tricuspid valve is not well visualized. Severe (>55mmHg) pulmonary  hypertension is present. The right ventricular systolic pressure is  approximated at 47mmHg plus the right atrial pressure. There is moderate to  mod-severe (2-3+) tricuspid regurgitation.     Aortic Valve  There is severe trileaflet aortic sclerosis. There is trace aortic  regurgitation. The mean AoV pressure gradient is 19.8 mmHg. Moderate valvular  aortic stenosis. DVI 0.32. BRITTNI by continuity is 1.5  cm2.     Pulmonic Valve  The pulmonic valve is not well seen, but is grossly normal. There is mild (1+)  pulmonic valvular regurgitation.     Vessels  The aortic root is normal size. Normal size ascending aorta. Dilation of the  inferior vena cava is present with abnormal respiratory variation in diameter.  IVC diameter >2.1 cm collapsing <50% with sniff suggests a high RA pressure  estimated at 15 mmHg or greater.     Pericardium  Small posterior pericardial effusion.     ______________________________________________________________________________  MMode/2D Measurements & Calculations  IVSd: 1.3 cm  LVIDd: 3.5 cm  LVPWd: 1.5 cm  LV mass(C)d: 176.8 grams  LV mass(C)dI: 111.8 grams/m2     Ao root diam: 3.5 cm  asc Aorta Diam: 3.6 cm  LVOT diam: 2.4 cm  LVOT area: 4.5 cm2  LA Volume (BP): 136.0 ml  LA Volume Index (BP): 86.1 ml/m2  RWT: 0.88     Doppler Measurements & Calculations  MV E max bry: 117.0 cm/sec  MV max PG: 10.7 mmHg  MV mean P.8 mmHg  MV V2 VTI: 40.5 cm  MVA(VTI): 2.2 cm2     MV P1/2t max bry: 166.0 cm/sec  MV P1/2t: 92.8 msec  MVA(P1/2t): 2.4 cm2  MV dec slope: 524.0 cm/sec2  Ao V2 max: 293.3 cm/sec  Ao max P.0 mmHg  Ao V2 mean: 208.5 cm/sec  Ao mean P.8 mmHg  Ao V2 VTI: 53.3 cm  BRITTNI(I,D): 1.7 cm2  BRITTNI(V,D): 1.4 cm2  LV V1 max PG: 3.2 mmHg  LV V1 max: 88.8 cm/sec  LV V1 VTI: 19.9 cm  SV(LVOT): 89.0 ml  SI(LVOT): 56.3 ml/m2  PA V2 max: 87.3 cm/sec  PA max PG: 3.0 mmHg  TR max bry: 339.0 cm/sec  TR max P.6 mmHg  AV Bry Ratio (DI): 0.30  BRITTNI Index (cm2/m2): 1.1  E/E' av.6  Lateral E/e': 16.8  Medial E/e': 22.4     ______________________________________________________________________________  Report approved by: Nida Rivero 2022 05:43 PM

## 2022-05-15 NOTE — PLAN OF CARE
Goal Outcome Evaluation:    Plan of Care Reviewed With: patient, daughter     Overall Patient Progress: improving    Patient AxOx4, [place]  VSS on room air, lungs are clear, except BP runs soft, denies lightheadedness.  No c/o pain.  Started on torsemide, lasix discontinued.  Tolerated regular diet well, up with SBA to bathroom with walker.  Had 2 medium BM's.  Back band aids removed, washed and cream and band aids reapplied.  Mild +2 swelling in bilateral LE's. Discharge instructions and medications reviewed with daughter Brittney. Discharged with all personal belongings to private transportation home provided by daughters, no further questions.

## 2022-05-16 ENCOUNTER — PATIENT OUTREACH (OUTPATIENT)
Dept: CARE COORDINATION | Facility: CLINIC | Age: 87
End: 2022-05-16
Payer: COMMERCIAL

## 2022-05-16 DIAGNOSIS — Z71.89 OTHER SPECIFIED COUNSELING: ICD-10-CM

## 2022-05-16 NOTE — PROGRESS NOTES
"Clinic Care Coordination Contact  [facility]: Post-Discharge Note  SITUATION                                                      Admission:    Admission Date: 05/11/22   Reason for Admission: Worsening lower extremity edema, Weight gain  Discharge:   Discharge Date: 05/14/22  Discharge Diagnosis: Heart failure exacerbation    BACKGROUND                                                      Per hospital discharge summary and inpatient provider notes:    Elizabeth Ruggiero is a 89 year old female with a history of HFpEF, atrial fibrillation, HTN and HLP who presented to the ED on 5/11/2022 with 1-2 weeks of worsening lower extremity edema and weight gain. Registered under inpatient status for further evaluation and treatment of CHF exacerbation.    ASSESSMENT      Enrollment  Primary Care Care Coordination Status: Not a Candidate    Discharge Assessment  How are you doing now that you are home?: \"Shse seems to be doing well. She slept for a full 8 hours last night so yeah she seems to be doing just fine.\"  How are your symptoms? (Red Flag symptoms escalate to triage hotline per guidelines): Improved  Do you feel your condition is stable enough to be safe at home until your provider visit?: Yes  Does the patient have their discharge instructions? : Yes  Does the patient have questions regarding their discharge instructions? : No  Were you started on any new medications or were there changes to any of your previous medications? : Yes  Does the patient have all of their medications?: Yes  Do you have questions regarding any of your medications? : No  Do you have all of your needed medical supplies or equipment (DME)?  (i.e. oxygen tank, CPAP, cane, etc.): Yes  Discharge follow-up appointment scheduled within 14 calendar days? : Yes  Discharge Follow Up Appointment Date: 06/03/22  Discharge Follow Up Appointment Scheduled with?: Specialty Care Provider (Cardiology. Patient's daughter also plans to schedule a PCP f/u appt " for patient today.)    Post-op ([facility]A Only)  If the patient had a surgery or procedure, do they have any questions for a nurse?: No      PLAN                                                      Outpatient Plan:      Follow-up and recommended labs and tests  Follow up with primary care provider, Sonu Reece, within 7 days for  hospital follow- up. No follow up labs or test are needed.    Future Appointments   Date Time Provider Department Center   6/3/2022 10:50 AM [facility]         For any urgent concerns, please contact our 24 hour nurse triage line: [phone] ([phone])         JESSICA Alexander  [phone]  [facility]

## 2022-05-16 NOTE — PLAN OF CARE
Occupational Therapy Discharge Summary    Reason for therapy discharge:    Discharged to [facility] with Home Health PT/OT    Progress towards therapy goal(s). See goals on Care Plan in [facility] electronic health record for goal details.  Goals partially met.  Barriers to achieving goals:   discharge from facility.    Therapy recommendation(s):    Continued therapy is recommended.  Rationale/Recommendations:  Pt would benefit from returning home with services in place (assist for meals and cleaning). Home OT to addres activity tolerance deficits..

## 2022-05-18 PROCEDURE — P9604 ONE-WAY ALLOW PRORATED TRIP: HCPCS | Mod: ORL | Performed by: DERMATOLOGY

## 2022-05-18 PROCEDURE — 86231 EMA EACH IG CLASS: CPT | Mod: ORL | Performed by: DERMATOLOGY

## 2022-05-18 PROCEDURE — 86364 TISS TRNSGLTMNASE EA IG CLAS: CPT | Mod: ORL | Performed by: DERMATOLOGY

## 2022-05-18 PROCEDURE — 36415 COLL VENOUS BLD VENIPUNCTURE: CPT | Mod: ORL | Performed by: DERMATOLOGY

## 2022-05-18 PROCEDURE — 86258 DGP ANTIBODY EACH IG CLASS: CPT | Mod: ORL | Performed by: DERMATOLOGY

## 2022-05-19 ENCOUNTER — TELEPHONE (OUTPATIENT)
Dept: CARDIOLOGY | Facility: CLINIC | Age: 87
End: 2022-05-19
Payer: COMMERCIAL

## 2022-05-19 NOTE — TELEPHONE ENCOUNTER
Patient was evaluated by cardiology while inpatient for increased BLE edema weight gain-acute decompensated HFpEF. PMH: chronic atrial fibrillation/flutter, chronic HFpEF, hepatic congestion, deep vein thrombosis, hypertension, hyperlipidemia, severe mitral regurgitation, history of TIA, and osteoporosis. 5/12/22: Echo showed EF of >70% with moderate mitral annular calcification and prolapse of the posterior mitral leaflet. Continued medical management of MR. ANDRIY Lasix diuresed. Returned home to [facility]. Started on Demedex and PTA Lasix was discontinued at time of discharge. Called patient to discuss any post hospital d/c questions, review discharge medication, and confirm f/u appts, and call answered by her daughter, Brittney. She denied any questions regarding new or changes to pt's PTA medications. Patient has had no c/o SOB, wt gain, chest pain, edema, or light headedness. RN confirmed with daughter that patient has a VV scheduled on 6/3/22 at 1050 scheduled with [facility] Mignon Morgan. States pt saw her PMD yesterday. Pt does log her daily weights self every AM, and knows to call if weight gain of 2 lbs overnight or 5 lbs in a week. Low Na+ diet encouraged. Instructed to have daily wt/BP diary available for VV. Advised to call clinic with any cardiac related questions or concerns prior to his appt, and she verbalized understanding and agreed with plan. MARQUITA Das RN.

## 2022-05-21 LAB — ENDOMYSIUM IGA TITR SER IF: NORMAL {TITER}

## 2022-05-24 LAB
GLIADIN IGA SER-ACNC: 1.6 U/ML
GLIADIN IGG SER-ACNC: <0.6 U/ML
TTG IGA SER-ACNC: 0.5 U/ML
TTG IGG SER-ACNC: 1.4 U/ML

## 2022-05-26 NOTE — PLAN OF CARE
[facility]      OUTPATIENT OCCUPATIONAL THERAPY  EVALUATION  PLAN OF TREATMENT FOR OUTPATIENT REHABILITATION  (COMPLETE FOR INITIAL CLAIMS ONLY)  Patient's Last Name, First Name, M.I.  YOB: 1926  Elizabeth Ruggiero                             Provider's Name  [facility] Medical Record No.  [id number]                               Onset Date:  05/11/22   Start of Care Date:  05/13/22     Type:     ___PT   _X_OT   ___SLP Medical Diagnosis:  Impaired ADLs and funcitonal mobility                        OT Diagnosis:  Impaired functional mobility and self cares   Visits from SOC:  1   _________________________________________________________________________________  Plan of Treatment/Functional Goals    Planned Interventions: ADL retraining, strengthening, transfer training, home program guidelines, progressive activity/exercise   Goals: See Occupational Therapy Goals on Care Plan in [employer] electronic health record.    Therapy Frequency: 5 times/wk  Predicted Duration of Therapy Intervention: 05/19/22  _________________________________________________________________________________    I CERTIFY THE NEED FOR THESE SERVICES FURNISHED UNDER        THIS PLAN OF TREATMENT AND WHILE UNDER MY CARE     (Physician co-signature of this document indicates review and certification of the therapy plan).                Certification date from: 05/15/22, Certification date to: 05/15/22    Referring Physician: Aleksandr Horvath, DO            Initial Assessment        See Occupational Therapy evaluation dated 05/13/22 in [employer] electronic health record.

## 2022-06-03 ENCOUNTER — VIRTUAL VISIT (OUTPATIENT)
Dept: CARDIOLOGY | Facility: CLINIC | Age: 87
End: 2022-06-03
Attending: INTERNAL MEDICINE

## 2022-06-03 VITALS — WEIGHT: 103.4 LBS | BODY MASS INDEX: 16.69 KG/M2

## 2022-06-03 DIAGNOSIS — I50.32 CHRONIC HEART FAILURE WITH PRESERVED EJECTION FRACTION (HFPEF) (H): Primary | ICD-10-CM

## 2022-06-03 PROCEDURE — 99214 OFFICE O/P EST MOD 30 MIN: CPT | Mod: 95 | Performed by: NURSE PRACTITIONER

## 2022-06-03 NOTE — PATIENT INSTRUCTIONS
Today's Plan:      1) Get BMP and NTpBNP checked, we will fax orders [facility].     2) Follow-up with Dr. Galvan in 3 months.     If you have questions or concerns please call my nurse team at [phone].     Scheduling phone number: [phone]    Reminder: Please bring in all current medications, over the counter supplements and vitamin bottles to your next appointment.-    It was a pleasure seeing you today!     Mignon Morgan CNP  6/3/2022    -

## 2022-06-03 NOTE — LETTER
6/3/2022    Sonu Reece MD  [facility] [address]  [facility] [address]    RE: Elizabeth Ruggiero       Dear Colleague,     I had the pleasure of seeing Elizabeth Ruggiero in the [facility].  Elizabeth is a 89 year old who is being evaluated via a billable video visit.      How would you like to obtain your AVS? Mail a copy  If the video visit is dropped, the invitation should be resent by: Text to cell phone: [phone] (Brittney: pt daughter)  Will anyone else be joining your video visit? No         Review Of Systems  Skin: negative  Eyes: negative  Ears/Nose/Throat: negative  Respiratory: No shortness of breath, dyspnea on exertion, cough, or hemoptysis  Cardiovascular: negative for Edema  Gastrointestinal: negative  Genitourinary: negative  Musculoskeletal: negative  Neurologic: negative  Psychiatric: negative  Hematologic/Lymphatic/Immunologic: negative  Endocrine: negative      Patient's phone #: [phone] (Brittney: pt's daughter)    Shivani Samayoa LPN      Video Start Time: 1106  Video-Visit Details    Type of service:  Video Visit    Video End Time:1123    Originating Location (pt. Location): Home    Distant Location (provider location):  [facility]     Platform used for Video Visit: [facility]     Cardiology Clinic Progress Note  Elizabeth Ruggiero MRN# [id number]   YOB: 1926 Age: 89 year old     Primary cardiologist: Dr. Galvan     Reason for visit: 3 month follow-up     History of presenting illness:    Elizabeth Ruggiero is a pleasant 89 year old patient with past medical history significant for chronic atrial fibrillation/flutter, chronic diastolic heart failure, hepatic congestion, deep vein thrombosis, hypertension, severe mitral regurgitation, history of TIA, and osteoporosis.  She has been followed closely by Dr. Waldron and was last seen in March 2022.  Due to the COVID-19 pandemic, this video is conducted via video.    In  brief review, she has severe mitral valve regurgitation secondary to myxomatous thickened leaflets and bileaflet prolapse.  Intervention was considered in the past, however, she has significant annular calcification which would make successful MitraClip unlikely.  Given her advanced age, she has not been felt to be a good surgical candidate.  She was also noted to have declined RAHEL previously for further evaluation with preference for continued conservative medical management.    More recently she was admitted from 5/11/2022 to 5/14/2022 for acute decompensated HFpEF.  Echocardiogram showed moderate to severe mitral regurgitation and moderate to severe tricuspid regurgitation and pulmonary hypertension.  LVEF > 70%. This is unchanged from prior imaging.  She diuresed 7 pounds with IV Lasix.  Her discharge weight was 107 lbs. Her oral furosemide was switched to torsemide 20 mg daily at time of discharge.    Today patient reports doing well since being discharged from the hospital.  She has no cardiopulmonary complaints.  She denies chest pain, shortness of breath, syncope or near syncope.  No PND or orthopnea.  She has no lower extremity edema.  Her weight is stable at 103 pounds.  Her BMP and NT proBNP were not drawn prior to this visit.  She gets her blood pressure taken at her assisted living every Wednesday and this has reportedly been normal.         Assessment and Plan:     ASSESSMENT:    1. Chronic heart failure with preserved ejection fraction    Recent hospitalization from 5/11/2022 to 5/14/2022 for acute decompensated HFpEF    Most recent TTE (5/2022) with normal LVEF.    Oral diuretics were changed to torsemide 20 mg daily.    Euvolemic as assessed by home weights, most recent weight 103 lbs.  No clinical signs of heart failure.    2. Moderate to severe mitral valve regurgitation    Noted as moderate to severe by TTE 5/2022, unchanged     Not likely amenable to MitraClip due to significant mitral annular  calcification and not likely a surgical candidate given her advanced age, so continued medical management has been recommended.    3. Moderate tricuspid regurgitation and pulmonary hypertension    4. Chronic atrial fibrillation/flutter    On apixaban 2.5 mg twice daily for anticoagulation    Rate controlled with Toprol-XL 12.5 mg daily    5. Essential hypertension, well controlled    PLAN:     1. Continue current medical regimen, no changes were made today.  2. Will need to obtain repeat BMP and NT proBNP within 1 week, will fax orders to the [facility].  3. Follow-up with Dr. Watkins in 3 months, sooner if needed.         Mignon Morgan, DNP, APRN, CNP  Page: [phone] (8a-5p M-F)    Orders this Visit:  Orders Placed This Encounter   Procedures     Follow-Up with Cardiology     No orders of the defined types were placed in this encounter.    There are no discontinued medications.    Today's clinic visit entailed:  Review of the result(s) of each unique test - echo, EKG, labs  Ordering of each unique test  Prescription drug management  45 minutes spent on the date of the encounter doing chart review, review of test results, interpretation of tests, patient visit, documentation and discussion with family   Provider  Link to [facility] Help Grid     The level of medical decision making during this visit was of moderate complexity.           Medications:     Current Outpatient Medications   Medication Sig Dispense Refill     acetaminophen (TYLENOL) 500 MG tablet Take 1,000 mg by mouth 3 times daily 2 tablets tid at 6am, 2pm, 10pm       alendronate (FOSAMAX) 70 MG tablet Take 1 tablet (70 mg) by mouth every 7 days SUNDAYS at 7:30am 4 tablet 0     aluminum & magnesium hydroxide (MAG-AL) 200-200 MG/5ML supsension Take 30 mLs by mouth 4 times daily as needed for indigestion       calcium carbonate-vitamin D (OSCAL W/D) 500-200 MG-UNIT tablet Take 1 tablet by mouth daily        cetirizine (ZYRTEC) 10 MG tablet Take 10 mg by mouth  At Bedtime       cholecalciferol 25 MCG (1000 UT) TABS Take 1 tablet by mouth daily       COMPRESSION STOCKINGS 1 each daily 1 each 1     digoxin (LANOXIN) 125 MCG tablet Take 1 tablet (125 mcg) by mouth daily 30 tablet 0     ELIQUIS ANTICOAGULANT 2.5 MG tablet Take 2.5 mg by mouth 2 times daily       hydrOXYzine (ATARAX) 25 MG tablet Take 25 mg by mouth At Bedtime       melatonin 5 MG tablet Take 5 mg by mouth At Bedtime       metoprolol succinate ER (TOPROL-XL) 25 MG 24 hr tablet Take 12.5 mg by mouth every evening        Multiple Vitamins-Minerals (PRESERVISION AREDS 2+MULTI VIT) CAPS Take 1 tablet by mouth 2 times daily 60 capsule 0     multivitamin w/minerals (THERA-VIT-M) tablet Take 1 tablet by mouth daily        mupirocin (BACTROBAN) 2 % external ointment Apply topically 2 times daily To open sores on back       NONFORMULARY Diltiazem 2% ointment - apply pea-sized amount to the anus TID x6 weeks.  Pt to self administer.  Compounded by [facility], [address] [place]       omeprazole (PRILOSEC) 20 MG DR capsule Take 20 mg by mouth daily before breakfast       potassium chloride ER (K-TAB) 20 MEQ CR tablet Take 1 tablet (20 mEq) by mouth daily 30 tablet 0     senna (SENOKOT) 8.6 MG tablet Take 1 tablet by mouth 2 times daily as needed for constipation       simethicone (MYLICON) 80 MG chewable tablet Take 80 mg by mouth 4 times daily as needed for flatulence or cramping       torsemide (DEMADEX) 20 MG tablet Take 1 tablet (20 mg) by mouth daily 30 tablet 0     triamcinolone (KENALOG) 0.1 % external cream Apply topically 2 times daily To back for itching         Family History   Problem Relation Age of Onset     Colon Cancer Mother      Breast Cancer Sister      Diabetes Sister        Social History     Socioeconomic History     Marital status:      Spouse name: Not on file     Number of children: 6     Years of education: through 4 years college     Highest education level: Not on file   Occupational History      Not on file   Tobacco Use     Smoking status: Never Smoker     Smokeless tobacco: Never Used   Substance and Sexual Activity     Alcohol use: No     Drug use: No     Sexual activity: Not Currently     Comment:   one year ago no other partners   Other Topics Concern     Parent/sibling w/ CABG, MI or angioplasty before 65F 55M? Not Asked   Social History Narrative     Not on file     Social Determinants of Health     Financial Resource Strain: Not on file   Food Insecurity: Not on file   Transportation Needs: Not on file   Physical Activity: Not on file   Stress: Not on file   Social Connections: Not on file   Intimate Partner Violence: Not on file   Housing Stability: Not on file            Past Medical History:     Past Medical History:   Diagnosis Date     (HFpEF) heart failure with preserved ejection fraction (H)      Acute diastolic CHF (congestive heart failure) (H)      Acute left hemiparesis (H) 2019     Arthritis      Atrial fibrillation (H)      Atrial fibrillation with RVR (H)      Atrial flutter (H)      Atrial flutter with rapid ventricular response (H) 2019     Community acquired pneumonia      Diastolic CHF (H) 2019     DVT of lower extremity (deep venous thrombosis) (H)     recurrent     Hepatic congestion     improved with diuretics for CHF     HTN (hypertension)      Hyperlipidemia      Mitral valve insufficiency      Pneumonia      Postmenopausal bleeding 2020    [facility]     Severe mitral regurgitation      SOB (shortness of breath)      TIA (transient ischemic attack)      Vitamin D deficiency               Past Surgical History:     Past Surgical History:   Procedure Laterality Date     APPENDECTOMY       FRACTURE SURGERY       repair left femur                Allergies:   Patient has no known allergies.       Data:   All laboratory data reviewed:    Recent Labs   Lab Test 10/09/20  0927 20  0644   TSH 2.22 6.24*       Lab  Results   Component Value Date    WBC 6.3 05/11/2022    WBC 6.1 10/30/2020    RBC 3.91 05/11/2022    RBC 4.42 10/30/2020    HGB 11.4 (L) 05/11/2022    HGB 13.6 10/30/2020    HCT 35.9 05/11/2022    HCT 41.4 10/30/2020    MCV 92 05/11/2022    MCV 94 10/30/2020    MCH 29.2 05/11/2022    MCH 30.8 10/30/2020    MCHC 31.8 05/11/2022    MCHC 32.9 10/30/2020    RDW 14.0 05/11/2022    RDW 14.4 10/30/2020     (L) 05/11/2022     10/30/2020       Lab Results   Component Value Date     05/14/2022     10/30/2020    POTASSIUM 4.2 05/14/2022    POTASSIUM 3.9 10/30/2020    CHLORIDE 97 05/14/2022    CHLORIDE 100 10/30/2020    CO2 30 05/14/2022    CO2 30 10/30/2020    ANIONGAP 6 05/14/2022    ANIONGAP 5 10/30/2020    GLC 93 05/14/2022     (H) 10/30/2020    BUN 30 05/14/2022    BUN 25 10/30/2020    CR 0.82 05/14/2022    CR 0.89 10/30/2020    GFRESTIMATED 66 05/14/2022    GFRESTIMATED 50 (L) 03/10/2021    GFRESTIMATED 55 (L) 10/30/2020    GFRESTBLACK >60 03/10/2021    GFRESTBLACK 64 10/30/2020    CHRIS 8.9 05/14/2022    CHRIS 9.1 10/30/2020      Lab Results   Component Value Date    AST 39 05/12/2022    AST 44 10/30/2020    ALT 39 05/12/2022    ALT 63 (H) 10/30/2020       No results found for: A1C    Lab Results   Component Value Date    INR 2.51 (H) 03/30/2022    INR 2.61 (H) 03/02/2022    INR 1.93 (H) 10/27/2020    INR 2.07 (H) 10/26/2020     Thank you for allowing me to participate in the care of your patient.      Sincerely,     Mignon Morgan CNP     [facility]  cc:   Tiffany Galvan MD  [facility] [[email or web address]] [facility] AT [place]  [address]  [place]  [place] [address]

## 2022-06-03 NOTE — PROGRESS NOTES
Elizabeth is a 89 year old who is being evaluated via a billable video visit.      How would you like to obtain your AVS? Mail a copy  If the video visit is dropped, the invitation should be resent by: Text to cell phone: [phone] (Brittney: pt daughter)  Will anyone else be joining your video visit? No         Review Of Systems  Skin: negative  Eyes: negative  Ears/Nose/Throat: negative  Respiratory: No shortness of breath, dyspnea on exertion, cough, or hemoptysis  Cardiovascular: negative for Edema  Gastrointestinal: negative  Genitourinary: negative  Musculoskeletal: negative  Neurologic: negative  Psychiatric: negative  Hematologic/Lymphatic/Immunologic: negative  Endocrine: negative      Patient's phone #: [phone] (Brittney: pt's daughter)    Shivani Samayoa LPN      Video Start Time: 1106  Video-Visit Details    Type of service:  Video Visit    Video End Time:1123    Originating Location (pt. Location): Home    Distant Location (provider location):  [facility]     Platform used for Video Visit: [facility]     Cardiology Clinic Progress Note  Elizabeth Ruggiero MRN# [id number]   YOB: 1926 Age: 89 year old     Primary cardiologist: Dr. Galvan     Reason for visit: 3 month follow-up     History of presenting illness:    Elizabeth Ruggiero is a pleasant 89 year old patient with past medical history significant for chronic atrial fibrillation/flutter, chronic diastolic heart failure, hepatic congestion, deep vein thrombosis, hypertension, severe mitral regurgitation, history of TIA, and osteoporosis.  She has been followed closely by Dr. Waldron and was last seen in March 2022.  Due to the COVID-19 pandemic, this video is conducted via video.    In brief review, she has severe mitral valve regurgitation secondary to myxomatous thickened leaflets and bileaflet prolapse.  Intervention was considered in the past, however, she has significant annular calcification which would make successful  MitraClip unlikely.  Given her advanced age, she has not been felt to be a good surgical candidate.  She was also noted to have declined RAHEL previously for further evaluation with preference for continued conservative medical management.    More recently she was admitted from 5/11/2022 to 5/14/2022 for acute decompensated HFpEF.  Echocardiogram showed moderate to severe mitral regurgitation and moderate to severe tricuspid regurgitation and pulmonary hypertension.  LVEF > 70%. This is unchanged from prior imaging.  She diuresed 7 pounds with IV Lasix.  Her discharge weight was 107 lbs. Her oral furosemide was switched to torsemide 20 mg daily at time of discharge.    Today patient reports doing well since being discharged from the hospital.  She has no cardiopulmonary complaints.  She denies chest pain, shortness of breath, syncope or near syncope.  No PND or orthopnea.  She has no lower extremity edema.  Her weight is stable at 103 pounds.  Her BMP and NT proBNP were not drawn prior to this visit.  She gets her blood pressure taken at her assisted living every Wednesday and this has reportedly been normal.         Assessment and Plan:     ASSESSMENT:    1. Chronic heart failure with preserved ejection fraction    Recent hospitalization from 5/11/2022 to 5/14/2022 for acute decompensated HFpEF    Most recent TTE (5/2022) with normal LVEF.    Oral diuretics were changed to torsemide 20 mg daily.    Euvolemic as assessed by home weights, most recent weight 103 lbs.  No clinical signs of heart failure.    2. Moderate to severe mitral valve regurgitation    Noted as moderate to severe by TTE 5/2022, unchanged     Not likely amenable to MitraClip due to significant mitral annular calcification and not likely a surgical candidate given her advanced age, so continued medical management has been recommended.    3. Moderate tricuspid regurgitation and pulmonary hypertension    4. Chronic atrial fibrillation/flutter    On  apixaban 2.5 mg twice daily for anticoagulation    Rate controlled with Toprol-XL 12.5 mg daily    5. Essential hypertension, well controlled    PLAN:     1. Continue current medical regimen, no changes were made today.  2. Will need to obtain repeat BMP and NT proBNP within 1 week, will fax orders to the [facility].  3. Follow-up with Dr. Watkins in 3 months, sooner if needed.         Mignon Morgan, DNP, APRN, CNP  Page: [phone] (8a-5p M-F)    Orders this Visit:  Orders Placed This Encounter   Procedures     Follow-Up with Cardiology     No orders of the defined types were placed in this encounter.    There are no discontinued medications.    Today's clinic visit entailed:  Review of the result(s) of each unique test - echo, EKG, labs  Ordering of each unique test  Prescription drug management  45 minutes spent on the date of the encounter doing chart review, review of test results, interpretation of tests, patient visit, documentation and discussion with family   Provider  Link to [facility] Help Grid     The level of medical decision making during this visit was of moderate complexity.           Medications:     Current Outpatient Medications   Medication Sig Dispense Refill     acetaminophen (TYLENOL) 500 MG tablet Take 1,000 mg by mouth 3 times daily 2 tablets tid at 6am, 2pm, 10pm       alendronate (FOSAMAX) 70 MG tablet Take 1 tablet (70 mg) by mouth every 7 days SUNDAYS at 7:30am 4 tablet 0     aluminum & magnesium hydroxide (MAG-AL) 200-200 MG/5ML supsension Take 30 mLs by mouth 4 times daily as needed for indigestion       calcium carbonate-vitamin D (OSCAL W/D) 500-200 MG-UNIT tablet Take 1 tablet by mouth daily        cetirizine (ZYRTEC) 10 MG tablet Take 10 mg by mouth At Bedtime       cholecalciferol 25 MCG (1000 UT) TABS Take 1 tablet by mouth daily       COMPRESSION STOCKINGS 1 each daily 1 each 1     digoxin (LANOXIN) 125 MCG tablet Take 1 tablet (125 mcg) by mouth daily 30 tablet 0     ELIQUIS  ANTICOAGULANT 2.5 MG tablet Take 2.5 mg by mouth 2 times daily       hydrOXYzine (ATARAX) 25 MG tablet Take 25 mg by mouth At Bedtime       melatonin 5 MG tablet Take 5 mg by mouth At Bedtime       metoprolol succinate ER (TOPROL-XL) 25 MG 24 hr tablet Take 12.5 mg by mouth every evening        Multiple Vitamins-Minerals (PRESERVISION AREDS 2+MULTI VIT) CAPS Take 1 tablet by mouth 2 times daily 60 capsule 0     multivitamin w/minerals (THERA-VIT-M) tablet Take 1 tablet by mouth daily        mupirocin (BACTROBAN) 2 % external ointment Apply topically 2 times daily To open sores on back       NONFORMULARY Diltiazem 2% ointment - apply pea-sized amount to the anus TID x6 weeks.  Pt to self administer.  Compounded by [facility], [address] [place]       omeprazole (PRILOSEC) 20 MG DR capsule Take 20 mg by mouth daily before breakfast       potassium chloride ER (K-TAB) 20 MEQ CR tablet Take 1 tablet (20 mEq) by mouth daily 30 tablet 0     senna (SENOKOT) 8.6 MG tablet Take 1 tablet by mouth 2 times daily as needed for constipation       simethicone (MYLICON) 80 MG chewable tablet Take 80 mg by mouth 4 times daily as needed for flatulence or cramping       torsemide (DEMADEX) 20 MG tablet Take 1 tablet (20 mg) by mouth daily 30 tablet 0     triamcinolone (KENALOG) 0.1 % external cream Apply topically 2 times daily To back for itching         Family History   Problem Relation Age of Onset     Colon Cancer Mother      Breast Cancer Sister      Diabetes Sister        Social History     Socioeconomic History     Marital status:      Spouse name: Not on file     Number of children: 6     Years of education: through 4 years college     Highest education level: Not on file   Occupational History     Not on file   Tobacco Use     Smoking status: Never Smoker     Smokeless tobacco: Never Used   Substance and Sexual Activity     Alcohol use: No     Drug use: No     Sexual activity: Not Currently     Comment:   one  year ago no other partners   Other Topics Concern     Parent/sibling w/ CABG, MI or angioplasty before 65F 55M? Not Asked   Social History Narrative     Not on file     Social Determinants of Health     Financial Resource Strain: Not on file   Food Insecurity: Not on file   Transportation Needs: Not on file   Physical Activity: Not on file   Stress: Not on file   Social Connections: Not on file   Intimate Partner Violence: Not on file   Housing Stability: Not on file            Past Medical History:     Past Medical History:   Diagnosis Date     (HFpEF) heart failure with preserved ejection fraction (H)      Acute diastolic CHF (congestive heart failure) (H)      Acute left hemiparesis (H) 2/9/2019     Arthritis      Atrial fibrillation (H)      Atrial fibrillation with RVR (H)      Atrial flutter (H)      Atrial flutter with rapid ventricular response (H) 4/23/2019     Community acquired pneumonia      Diastolic CHF (H) 04/29/2019     DVT of lower extremity (deep venous thrombosis) (H)     recurrent     Hepatic congestion     improved with diuretics for CHF     HTN (hypertension)      Hyperlipidemia      Mitral valve insufficiency      Pneumonia      Postmenopausal bleeding 01/2020    [facility]     Severe mitral regurgitation      SOB (shortness of breath)      TIA (transient ischemic attack)      Vitamin D deficiency               Past Surgical History:     Past Surgical History:   Procedure Laterality Date     APPENDECTOMY       FRACTURE SURGERY       repair left femur                Allergies:   Patient has no known allergies.       Data:   All laboratory data reviewed:    Recent Labs   Lab Test 10/09/20  0927 07/26/20  0644   TSH 2.22 6.24*       Lab Results   Component Value Date    WBC 6.3 05/11/2022    WBC 6.1 10/30/2020    RBC 3.91 05/11/2022    RBC 4.42 10/30/2020    HGB 11.4 (L) 05/11/2022    HGB 13.6 10/30/2020    HCT 35.9 05/11/2022    HCT 41.4 10/30/2020    MCV 92  05/11/2022    MCV 94 10/30/2020    MCH 29.2 05/11/2022    MCH 30.8 10/30/2020    MCHC 31.8 05/11/2022    MCHC 32.9 10/30/2020    RDW 14.0 05/11/2022    RDW 14.4 10/30/2020     (L) 05/11/2022     10/30/2020       Lab Results   Component Value Date     05/14/2022     10/30/2020    POTASSIUM 4.2 05/14/2022    POTASSIUM 3.9 10/30/2020    CHLORIDE 97 05/14/2022    CHLORIDE 100 10/30/2020    CO2 30 05/14/2022    CO2 30 10/30/2020    ANIONGAP 6 05/14/2022    ANIONGAP 5 10/30/2020    GLC 93 05/14/2022     (H) 10/30/2020    BUN 30 05/14/2022    BUN 25 10/30/2020    CR 0.82 05/14/2022    CR 0.89 10/30/2020    GFRESTIMATED 66 05/14/2022    GFRESTIMATED 50 (L) 03/10/2021    GFRESTIMATED 55 (L) 10/30/2020    GFRESTBLACK >60 03/10/2021    GFRESTBLACK 64 10/30/2020    CHRIS 8.9 05/14/2022    CHRIS 9.1 10/30/2020      Lab Results   Component Value Date    AST 39 05/12/2022    AST 44 10/30/2020    ALT 39 05/12/2022    ALT 63 (H) 10/30/2020       No results found for: A1C    Lab Results   Component Value Date    INR 2.51 (H) 03/30/2022    INR 2.61 (H) 03/02/2022    INR 1.93 (H) 10/27/2020    INR 2.07 (H) 10/26/2020

## 2022-06-10 DIAGNOSIS — I50.33 ACUTE ON CHRONIC DIASTOLIC CONGESTIVE HEART FAILURE (H): ICD-10-CM

## 2022-06-10 RX ORDER — TORSEMIDE 20 MG/1
20 TABLET ORAL DAILY
Qty: 90 TABLET | Refills: 0 | Status: ON HOLD | OUTPATIENT
Start: 2022-06-10 | End: 2023-01-01

## 2022-06-21 NOTE — DISCHARGE SUMMARY
Assumed care at 1925, received report from Allan Cohen, patient awaiting CT scan, A&Ox4, VSS, no distress noted. [facility]    Discharge Summary  Hospitalist    Date of Admission:  9/25/2021  Date of Discharge:  9/26/2021  Discharging Provider: Des Biggs  Date of Service (when I saw the patient): 09/26/21    Discharge Diagnoses   1. Acute on chronic HFpEF  2. Severe mitral regurgitation  3. Hypertension  4. Hyperkalemia, resolved  5. Hyponatremia, improving  6. Generalized weakness  7. Atrial fibrillation  8. GERD  9. Elevated bilirubin/AST    History of Present Illness   Elizabeth Ruggiero is a very pleasant 89 year old female with past medical history of chronic atrial fibrillation on anticoagulation, diastolic heart failure, hypertension, severe mitral regurgitation, history of TIA who presents for evaluation of generalized weakness and leg swelling. She is admitted under observation for suspected acute on chronic HFpEF causing leg edema and monitoring of hyperkalemia.     Hospital Course   Acute on chronic HFpEF  Severe mitral regurgitation  HTN  Presents with feeling generalized weakness. She is noted to have elevated proBNP 8794 (9100 in 10/2020, but 5-6K range previously.) Has trace LE edema.  Reported 5Ibs wt gain over the last 2 weeks. No reported dyspnea. CXR shows improved small right sided pleural effusion. Troponin 0.032. She is not hypoxic. Last TTE was about a year ago that showed 3-4+ MR, EF greater than 70%, moderate pulmonary hypertension.  Also with known A. fib.  LE US is negative for DVT. Has received Lasix 40mg IV x 1 in Ed.   --Given another dose of Lasix 40mg IV x 1 this morning with good response.  --Leg edema has improved/resolved.  Patient denies any dyspnea, orthopnea, chest pain or other signs/symptoms of CHF  --Plan to discharge and slight increase in p.o. Lasix for the next 5 days: 40 mg in the morning, 20 mg in the evening.  After that she can revert back to her PTA Lasix dose of 20 mg twice daily.  --Recommend follow-up labs in a week and follow-up with  PCP  --Patient stable for discharge back to [facility] today.  Patient's daughter updated by phone.  --PT eval completed, recommend home health care PT/OT at discharge.  Referral sent.     Hyperkalemia  --Cr is normal range.  She is on K supplement which may be contributing. She has received insulin/dextrose and lasix in ED.  --Repeat potassium 3.8  --Resume prior to admission potassium supplement at discharge.  --Recommend follow-up BMP in 1 week     Hyponatremia  Suspect this may be related to CHF..   Na 130 on admission. Has received Lasix 40mg IV x 1 in ED  --Sodium has improved slightly with diuresis, Na 131  --BMP in 1 week as above     Generalized weakness  Suspect this could be related to CHF, or electrolyte abnormality. Management of electrolytes as above  --UA with small leukocyte esterase, 0 WBCs, many bacteria.  Given lack of symptoms, not convincing for infection.  --Patient seems to be improving, nearly back to baseline  --PT consulted, recommending home health PT  --SW/CC consulted for disposition planning, patient returning to [facility] at discharge.     Atrial fibrillation  --Continue PTA warfarin.  INR 2.73  --Digoxin level 1.3, continue PTA digoxin  --Continue PTA metoprolol.  Pharmacy notes that patient had duplicate metoprolol orders, but is supposed to be on Toprol-XL 12.5 mg every evening only.  Discharge medication orders adjusted to reflect this.     GERD  --Continue with PTA PPI     Elevated bilirubin/AST  Has had previous elevation and it was felt that this was possible related to hepatic congestion. She was seen by GI in 10/2020 and appears as she did not wish procedures. She does not have right upper quadrant tenderness  --chadd is 2.3 (previously ranged from 1.5-2.4) and AST 59   --Recommend intermittent monitoring with primary care provider      Des Biggs PA-C    Significant Results and Procedures   No procedures.  Results as documented above.    Pending Results   None    Code Status  "  Full Code       Primary Care Physician   Gelacio Downs    Physical Exam   Temp: 98.3  F (36.8  C) Temp src: Oral BP: 122/68 Pulse: 69   Resp: 16 SpO2: 98 % O2 Device: None (Room air)    Vitals:    09/25/21 1551 09/26/21 0617   Weight: 52.2 kg (115 lb) 54.6 kg (120 lb 5.9 oz)     Vital Signs with Ranges  Temp:  [97.3  F (36.3  C)-98.3  F (36.8  C)] 98.3  F (36.8  C)  Pulse:  [55-86] 69  Resp:  [15-22] 16  BP: (100-133)/(57-74) 122/68  SpO2:  [93 %-98 %] 98 %  I/O last 3 completed shifts:  In: 150 [P.O.:150]  Out: 1075 [Urine:1075]    Constitutional:  Well-appearing adult, lying in bed.  Alert, cooperative, oriented to person, place, date, situation.  In no apparent distress.  Respiratory:  Lungs CTAB.  Decreased sounds in the right lung base. No crackles or wheezing, no labored breathing.  Cardiovascular:  Heart irregularly irregular, no murmur, trace LE edema to ankles bilaterally.  GI:  Abdomen soft, NT/ND and with normoactive BS  Skin/Integumen:  Warm, dry, non-diaphoretic.  MSK: CMS x4 intact.  No effusions, limbs atraumatic.    Discharge Disposition   Discharged to home  Condition at discharge: Stable    Consultations This Hospital Stay   PHARMACY TO DOSE WARFARIN  CARE MANAGEMENT / SOCIAL WORK IP CONSULT  PHYSICAL THERAPY ADULT IP CONSULT    Discharge Orders      Home Care PT Referral for Hospital Discharge      Home Care OT Referral for Hospital Discharge      Reason for your hospital stay    Acute on chronic heart failure exacerbation, hyperkalemia, hyponatremia.     Follow-up and recommended labs and tests     Follow up with primary care provider, Gelacio Downs, within 7 days to evaluate medication change, and for routine  hospital follow- up.  The following labs/tests are recommended: BMP.  Please call [phone] and ask for \"hospital followup, with lab--BMP\"     Activity    Your activity upon discharge: activity as tolerated     MD face to face encounter    Documentation of Face to Face and " Certification for Home Health Services    I certify that patient: Elizabeth Ruggiero is under my care and that I, or a nurse practitioner or physician's assistant working with me, had a face-to-face encounter that meets the physician face-to-face encounter requirements with this patient on: 9/26/2021.    This encounter with the patient was in whole, or in part, for the following medical condition, which is the primary reason for home health care: Acute CHF exacerbation, physical deconditioning.    I certify that, based on my findings, the following services are medically necessary home health services: Occupational Therapy and Physical Therapy.    My clinical findings support the need for the above services because: Occupational Therapy Services are needed to assess and address ADL safety due to acute CHF exacerbation and physical deconditioning. Physical Therapy Services are needed to assess and treat the following functional impairments: Acute CHF exacerbation and physical deconditioning.    Further, I certify that my clinical findings support that this patient is homebound (i.e. absences from home require considerable and taxing effort and are for medical reasons or [employer] services or infrequently or of short duration when for other reasons) because: Requires assistance of another person or specialized equipment to access medical services because patient: Requires supervision of another for safe transfer...    Based on the above findings. I certify that this patient is confined to the home and needs intermittent skilled nursing care, physical therapy and/or speech therapy.  The patient is under my care, and I have initiated the establishment of the plan of care.  This patient will be followed by a physician who will periodically review the plan of care.  Physician/Provider to provide follow up care: Gelacio Downs    Attending hospital physician (the Medicare certified [place] provider): Dr. Latosha Tavarez MD  Physician  Signature: See electronic signature associated with these discharge orders.  Date: 9/26/2021     Diet    Follow this diet upon discharge: Low-sodium diet, less than 2400 mg daily     Discharge Medications   Discharge Medication List as of 9/26/2021 11:01 AM      CONTINUE these medications which have NOT CHANGED    Details   acetaminophen (TYLENOL) 500 MG tablet Take 1,000 mg by mouth 3 times daily 2 tablets tid at 6am, 2pm, 10pm, Historical      alendronate (FOSAMAX) 70 MG tablet Take 1 tablet (70 mg) by mouth every 7 days SUNDAYS at 7:30am, Disp-4 tablet, R-0, E-Prescribe      alum & mag hydroxide-simethicone (MAALOX) 200-200-20 MG/5ML SUSP suspension Take 30 mLs by mouth 4 times daily as needed for indigestion, Disp-355 mL, R-0, E-Prescribe      calcium carbonate-vitamin D (OSCAL W/D) 500-200 MG-UNIT tablet Take 1 tablet by mouth daily , Historical      cholecalciferol 25 MCG (1000 UT) TABS Take 1 tablet by mouth daily, Historical      digoxin (LANOXIN) 125 MCG tablet Take 1 tablet (125 mcg) by mouth daily, Disp-30 tablet, R-0, E-Prescribe      !! furosemide (LASIX) 20 MG tablet Take 1 tablet (20 mg) by mouth daily Every morning in addition to 20 mg already prescribed a total of 40 mg until 10/1, then check with PCP for dosing recommendations., Historical      !! furosemide (LASIX) 20 MG tablet Take 1 tablet (20 mg) by mouth 2 times daily, Disp-60 tablet, R-0, E-Prescribe      melatonin 5 MG tablet Take 5 mg by mouth At Bedtime, Historical      metoprolol succinate ER (TOPROL-XL) 25 MG 24 hr tablet Take 12.5 mg by mouth every evening , Historical      Multiple Vitamins-Minerals (PRESERVISION AREDS 2+MULTI VIT) CAPS Take 1 tablet by mouth 2 times daily, Disp-60 capsule, R-0, E-Prescribe      multivitamin w/minerals (MULTI-VITAMIN) tablet Take 1 tablet by mouth daily , Historical      omeprazole (PRILOSEC) 20 MG DR capsule Take 20 mg by mouth daily before breakfast, Historical      potassium chloride ER (K-TAB) 20  MEQ CR tablet Take 1 tablet (20 mEq) by mouth daily, Disp-30 tablet, R-0, E-Prescribe      senna (SENOKOT) 8.6 MG tablet Take 1 tablet by mouth 2 times daily as needed for constipation, Historical      triamcinolone (KENALOG) 0.1 % external cream Apply topically 2 times daily To back for itchingHistorical      !! warfarin ANTICOAGULANT (COUMADIN) 2.5 MG tablet Take 2.5 mg on Tuesday, Friday, Sunday evenings and 1.25 mg other days of the week, Disp-30 tablet, R-0, E-Prescribe      !! warfarin ANTICOAGULANT (COUMADIN) 2.5 MG tablet Take 1.25 mg by mouth See Admin Instructions Take 1/2 tablet on Tuesdays, Thursdays @ 8pm, 2.5mg on other days of the week, Historical      COMPRESSION STOCKINGS 1 each daily, Disp-1 each, R-1, Local [id number] mmHg, Knee high, on in the morning and off at night.       !! - Potential duplicate medications found. Please discuss with provider.      STOP taking these medications       metoprolol tartrate (LOPRESSOR) 25 MG tablet Comments:   Reason for Stopping:             Allergies   No Known Allergies  Data   Most Recent 3 CBC's:  Recent Labs   Lab Test 09/25/21  1616 10/30/20  0845 10/23/20  1429   WBC 7.3 6.1 8.8   HGB 13.2 13.6 13.9   MCV 93 94 92   * 196 179      Most Recent 3 BMP's:  Recent Labs   Lab Test 09/26/21  0859 09/26/21  0642 09/26/21  0210 09/26/21  0207 09/25/21  1954 09/25/21  1617 03/10/21  1320 03/10/21  1320   NA  --  131*  --   --   --  130*  --  137   POTASSIUM  --  3.8 3.7  --   --  5.6*   < > 4.2   CHLORIDE  --  97  --   --   --  97  --  100   CO2  --  26  --   --   --  23  --  29   BUN  --  26  --   --   --  28  --  28   CR  --  0.94  --   --   --  0.98  --  1.02   ANIONGAP  --  8  --   --   --  10  --  8   CHRIS  --  8.9  --   --   --  8.9  --  9.5   GLC 79 80  --  80   < > 132*   < > 85    < > = values in this interval not displayed.     Most Recent 2 LFT's:  Recent Labs   Lab Test 09/26/21  0642 09/25/21  1617   AST 34 59*  59*   ALT 36 48  48   ALKPHOS 71  80  80   BILITOTAL 1.7* 2.3*  2.3*     Most Recent INR's and Anticoagulation Dosing History:  Anticoagulation Dose History     Recent Dosing and Labs Latest Ref Rng & Units 6/30/2021 7/29/2021 8/4/2021 8/18/2021 9/15/2021 9/25/2021 9/26/2021    Warfarin 2.5 mg - - - - - - 2.5 mg -    INR 0.85 - 1.15 2.82(H) 1.27(H) 1.89(H) 2.43(H) 2.34(H) 2.58(H) 2.73(H)        Most Recent 3 Troponin's:  Recent Labs   Lab Test 09/25/21  1617 10/24/20  1412 10/24/20  0551 10/23/20  2234   TROPI  --  0.085* 0.089* 0.082*   TROPONIN 0.032  --   --   --      Most Recent Cholesterol Panel:No lab results found.  Most Recent 6 Bacteria Isolates From Any Culture (See EPIC Reports for Culture Details):  Recent Labs   Lab Test 04/29/19  1038 04/29/19  0850 04/29/19  0849 04/23/19  1715 04/23/19  1713 04/23/19  1630   CULT 10,000 to 50,000 colonies/mL  Enterococcus faecium  *  <10,000 colonies/mL  urogenital charles   No growth No growth No growth No growth No growth     Most Recent TSH, T4 and A1c Labs:  Recent Labs   Lab Test 10/09/20  0927 07/26/20  0644 07/26/20  0644   TSH 2.22   < > 6.24*   T4  --   --  1.11    < > = values in this interval not displayed.     Results for orders placed or performed during the hospital encounter of 09/25/21   US Lower Extremity Venous Duplex Bilateral    Narrative    EXAM: US LOWER EXTREMITY VENOUS DUPLEX BILATERAL  LOCATION: [facility]  DATE/TIME: 9/25/2021 6:42 PM    INDICATION: Leg swelling, eval for DVT.  COMPARISON: None.  TECHNIQUE: Venous Duplex ultrasound of bilateral lower extremities with and without compression, augmentation and duplex. Color flow and spectral Doppler with waveform analysis performed.    FINDINGS: Exam includes the common femoral, femoral, popliteal veins as well as segmentally visualized deep calf veins and greater saphenous vein.     RIGHT: No deep vein thrombosis. No superficial thrombophlebitis. No popliteal cyst.    LEFT: No deep vein thrombosis.  No superficial thrombophlebitis. There is a 2.6 x 2.2 x 0.7 cm mostly simple appearing cystic structure in the left popliteal fossa likely representing a Baker's cyst.      Impression    IMPRESSION:  1.  No deep venous thrombosis in either lower extremity.  2.  Probable Baker's cyst in left popliteal fossa.   XR Chest 2 Views    Narrative    EXAM: XR CHEST 2 VW  LOCATION: [facility]  DATE/TIME: 9/25/2021 4:51 PM    INDICATION: Bilateral leg swelling, no fever/cough, h/o CHF  COMPARISON: Limited abdominal ultrasound dated 10/09/2020, chest x-rays dated 10/23/2020.      Impression    IMPRESSION:   1. Lungs are persistently hyperaerated.  2. Small to moderate size right pleural fluid collection may have slightly decreased since the prior study.  3. Marked cardiomegaly is again noted.  4. Mitral valve annular calcifications are again noted.  5. No definite pulmonary edema to confirm congestive heart failure.

## 2022-06-29 NOTE — ED TRIAGE NOTES
Pt states that she has not been able to go to the bathroom recently;  Is not sure when she last went.

## 2022-06-29 NOTE — ED PROVIDER NOTES
"  History   Chief Complaint:  Urinary Retention     The history is provided by the patient and a relative.      Elizabeth Ruggiero is a 89 year old female on Eliquis with history of Afib, DVT, hypertension and hyperlipidemia who presents with urinary retention. Patient reports that she has been unable to urinate for the last few days. She admits to a history of similar symptoms. States that she tries to drink a sufficient amount of water. Denies abdominal pain.    Review of Systems   Gastrointestinal: Negative for abdominal pain.   Genitourinary: Positive for difficulty urinating.   All other systems reviewed and are negative.    Allergies:  No Known Allergies    Medications:  Alendronate  Digoxin  Eliquis   Hydroxyzine  Metoprolol succinate ER  Omeprazole  Potassium chloride ER  Senna  Simethicone  Torsemide    Past Medical History:     Acute left hemiparesis  Afib  DVT  Hyperlipidemia  Femur fracture  Spinal stenosis  Osteoporosis  Lumbar radiculitis  CHF  Hypertension  Baker's cyst of left knee  Leg swelling   Mitral valve insufficiency  TIA    Past Surgical History:    Appendectomy  Repair left femur    Family History:    Mother - colon cancer    Social History:  Presents with her daughter  Presents via private vehicle  Lives at the [facility]    Physical Exam     Patient Vitals for the past 24 hrs:   BP Temp Temp src Pulse Resp SpO2 Height Weight   06/29/22 0940 116/61 98.5  F (36.9  C) Temporal 70 16 99 % 1.676 m (5' 6\") 46.3 kg (102 lb)       Physical Exam  Constitutional: Elderly white female, supine, no respiratory distress.   HENT: No signs of trauma. Oropharynx moist.   Eyes: EOM are normal. Pupils are equal, round, and reactive to light.   Neck: Normal range of motion. No JVD present. No cervical adenopathy.  Cardiovascular: Regular rhythm.  Exam reveals no gallop and no friction rub. 4/6 systolic murmur over apex.   Pulmonary/Chest: Bilateral breath sounds normal. No wheezes, rhonchi or rales.  Abdominal: Soft. " No tenderness. No rebound or guarding. 2+ femoral pulses.   Musculoskeletal: No edema. No tenderness. Venous stasis changes to left leg.  Lymphadenopathy: No lymphadenopathy.   Neurological: Alert and oriented to person, place, and time. Normal strength. Coordination normal.   Skin: Skin is warm and dry. No rash noted. No erythema.     Emergency Department Course   Laboratory:  Labs Ordered and Resulted from Time of ED Arrival to Time of ED Departure   BASIC METABOLIC PANEL - Abnormal       Result Value    Sodium 134      Potassium 3.4      Chloride 100      Carbon Dioxide (CO2) 29      Anion Gap 5      Urea Nitrogen 33 (*)     Creatinine 0.88      Calcium 9.5      Glucose 69 (*)     GFR Estimate 60 (*)    CBC WITH PLATELETS AND DIFFERENTIAL - Abnormal    WBC Count 7.9      RBC Count 4.07      Hemoglobin 12.3      Hematocrit 39.1      MCV 96      MCH 30.2      MCHC 31.5      RDW 18.8 (*)     Platelet Count 149 (*)     % Neutrophils 71      % Lymphocytes 16      % Monocytes 11      % Eosinophils 1      % Basophils 1      % Immature Granulocytes 0      NRBCs per 100 WBC 0      Absolute Neutrophils 5.6      Absolute Lymphocytes 1.2      Absolute Monocytes 0.9      Absolute Eosinophils 0.1      Absolute Basophils 0.1      Absolute Immature Granulocytes 0.0      Absolute NRBCs 0.0        Emergency Department Course:     Reviewed:  I reviewed nursing notes, vitals, past medical history and Care Everywhere    Assessments:  1020 I obtained history and examined the patient as noted above.   1130 I rechecked the patient and explained findings. Amenable to discharge.     Disposition:  The patient was discharged to home.     Impression & Plan   Medical Decision Making:  Elizabeth Ruggiero is a 89 year old who comes in with her daughter because she has been unable to void. Patient is on a dieuretics for heart failure and she is taking fluids. When I first went in to see the patient, she needed to go to the bathroom right away. We let  her go to the bathroom. She did not have a bowel movement, but she did urinate. Her post void residual showed only 120 ml in the bladder. On my exam, there were no other acute findings. We did obtain electrolytes and her renal function is better than it was in May. At that time, the BUN was 94, Creatinine 0.37. Now the BUN is 32 and the Creatinine was 0.88. She is not currently dehydrated, on exam or by labs. She does not have signs of significant retention at this time. This may be a transient retention problem. If this persists, she needs to follow with urology to see if medications would be helpful. I do not think she needs any acute emergent interventions at this time. Of note is that the patient's daughter noticed that the patient had some blood after wiping after having a bowel movement. She does have a history of an anal fissure. I did do a brief anal exam. There was no blood on initial exam and there was no blood externally. I also did a single finger vaginal exam, which again did not show any blood. Patient will be discharged home with urology follow up as needed.     Diagnosis:    ICD-10-CM    1. Urinary retention  R33.9      Scribe Disclosure:  IShabbir, am serving as a scribe at 9:44 AM on 6/29/2022 to document services personally performed by Kirk Corey MD based on my observations and the provider's statements to me.          Kirk Corey MD  06/29/22 [id number]

## 2022-11-21 NOTE — PROGRESS NOTES
Elizabeth is a 89 year old who is being evaluated via a billable telephone visit.      How would you like to obtain your AVS? Mail a copy           CARDIOLOGY CLINIC VISIT  DATE OF SERVICE:  November 21, 2022    PRIMARY CARE PHYSICIAN:  Sonu Reece    HISTORY OF PRESENT ILLNESS:  Ms. Elizabeth Ruggiero is a pleasant 89 year old patient with past medical history significant for chronic atrial fibrillation/flutter, chronic diastolic heart failure, hepatic congestion, deep vein thrombosis, hypertension, severe mitral regurgitation, history of TIA, and osteoporosis.  She was last seen virtually by Mignon Morgan CNP.  This visit is conducted via telephone due to the ongoing COVID 19 pandemic.    In brief review, she has severe mitral valve regurgitation secondary to myxomatous thickened leaflets and bileaflet prolapse.  Intervention was considered in the past, however, she has significant annular calcification which would make successful MitraClip unlikely.  Given her advanced age, she has not been felt to be a good surgical candidate.  She was also noted to have declined RAHEL previously for further evaluation with preference for continued conservative medical management.    More recently she was admitted from 5/11/2022 to 5/14/2022 for acute decompensated HFpEF.  Echocardiogram showed moderate to severe mitral regurgitation and moderate to severe tricuspid regurgitation and pulmonary hypertension.  LVEF > 70%. This is unchanged from prior imaging.  She diuresed 7 pounds with IV Lasix.  Her discharge weight was 107 lbs. Her oral furosemide was switched to torsemide 20 mg daily at time of discharge.    Today Elizabeth reports feeling well.  She denies any exertional chest pain, chest discomfort or shortness of breath.  Her weights have been stable.  She notes just very mild lower extremity edema.  She has ongoing back issues and discomfort which is chronic.  She is without cardiovascular complaints today.       PAST MEDICAL  HISTORY:  Past Medical History:   Diagnosis Date     (HFpEF) heart failure with preserved ejection fraction (H)      Acute diastolic CHF (congestive heart failure) (H)      Acute left hemiparesis (H) 2/9/2019     Arthritis      Atrial fibrillation (H)      Atrial fibrillation with RVR (H)      Atrial flutter (H)      Atrial flutter with rapid ventricular response (H) 4/23/2019     Community acquired pneumonia      Diastolic CHF (H) 04/29/2019     DVT of lower extremity (deep venous thrombosis) (H)     recurrent     Hepatic congestion     improved with diuretics for CHF     HTN (hypertension)      Hyperlipidemia      Mitral valve insufficiency      Pneumonia      Postmenopausal bleeding 01/2020    [facility]     Severe mitral regurgitation      SOB (shortness of breath)      TIA (transient ischemic attack)      Vitamin D deficiency        MEDICATIONS:  Current Outpatient Medications   Medication     acetaminophen (TYLENOL) 500 MG tablet     alendronate (FOSAMAX) 70 MG tablet     aluminum & magnesium hydroxide (MAG-AL) 200-200 MG/5ML supsension     calcium carbonate-vitamin D (OSCAL W/D) 500-200 MG-UNIT tablet     cetirizine (ZYRTEC) 10 MG tablet     cholecalciferol 25 MCG (1000 UT) TABS     COMPRESSION STOCKINGS     digoxin (LANOXIN) 125 MCG tablet     ELIQUIS ANTICOAGULANT 2.5 MG tablet     hydrOXYzine (ATARAX) 25 MG tablet     melatonin 5 MG tablet     metoprolol succinate ER (TOPROL-XL) 25 MG 24 hr tablet     Multiple Vitamins-Minerals (PRESERVISION AREDS 2+MULTI VIT) CAPS     multivitamin w/minerals (THERA-VIT-M) tablet     mupirocin (BACTROBAN) 2 % external ointment     NONFORMULARY     omeprazole (PRILOSEC) 20 MG DR capsule     potassium chloride ER (K-TAB) 20 MEQ CR tablet     senna (SENOKOT) 8.6 MG tablet     simethicone (MYLICON) 80 MG chewable tablet     torsemide (DEMADEX) 20 MG tablet     triamcinolone (KENALOG) 0.1 % external cream     No current facility-administered  medications for this visit.       ALLERGIES:  No Known Allergies    SOCIAL HISTORY:  I have reviewed this patient's social history and updated it with pertinent information if needed. Elizabeth Ruggiero  reports that she has never smoked. She has never used smokeless tobacco. She reports that she does not drink alcohol and does not use drugs.    FAMILY HISTORY:  I have reviewed this patient's family history and updated it with pertinent information if needed.   Family History   Problem Relation Age of Onset     Colon Cancer Mother      Breast Cancer Sister      Diabetes Sister        REVIEW OF SYSTEMS:  A complete ROS was obtained and the pertinent positives are outlined in the history of present illness above.  The remainder of systems is negative.      PHYSICAL EXAM:                     Vital Signs with Ranges     101 lbs 0 oz            ASSESSMENT:  1. Chronic heart failure with preserved ejection fraction    Recent hospitalization from 5/11/2022 to 5/14/2022 for acute decompensated HFpEF    Most recent TTE (5/2022) with normal LVEF.    Oral diuretics were changed to torsemide 20 mg daily    Current weight is 101 pounds, euvolemic    2. Moderate to severe mitral valve regurgitation    Noted as moderate to severe by TTE 5/2022, unchanged     Not likely amenable to MitraClip due to significant mitral annular calcification and not likely a surgical candidate given her advanced age, so continued medical management has been recommended.     3. Moderate tricuspid regurgitation and pulmonary hypertension     4. Chronic atrial fibrillation/flutter    On apixaban 2.5 mg twice daily for anticoagulation    Rate controlled with Toprol-XL 12.5 mg daily and digoxin     Essential hypertension, well controlled    RECOMMENDATIONS:  1. Doing well from a cardiac standpoint. Continue current cardiac meds including torsemide 20 mg daily.  Will update BMP.  2.  Follow up in 3 months.      Tiffany Galvan MD [facility]  Heart  November 21, 2022          Respiratory: NEGATIVE  Cardiovascular:slight edema five days ago, fatigue    Vitals - Patient Reported  Systolic (Patient Reported): 112  Diastolic (Patient Reported): 72  Weight (Patient Reported): 45.8 kg (101 lb)  SpO2 (Patient Reported): 99  Temperature (Patient Reported): 97.8  F (36.6  C)  Pulse (Patient Reported): 70    SHAILESH Dominguez    Telephone number of patient: [phone]    Telephone-Visit Details    Telephone Start Time:  2:45 pm    Telephone End Time 2:52 pm  Originating Location (pt. Location): Home        Distant Location (provider location): [facility]

## 2022-11-21 NOTE — TELEPHONE ENCOUNTER
[facility] Call Center    Phone Message    May a detailed message be left on voicemail: yes     Reason for Call: Order(s): Other:   Reason for requested: Weight & BP  Date needed: 11/21/22  Provider name:     Orders need to be faxed to   [phone]  ([facility] [address], assisted living)      Action Taken: Message routed to:  Other: Cardiology    Travel Screening: Not Applicable     Thank you!  [facility]

## 2022-11-21 NOTE — LETTER
11/21/2022    Sonu Reece MD  [facility] [address]  [facility] [address]    RE: Elizabeth Ruggiero       Dear Colleague,     I had the pleasure of seeing Elizabeth Ruggiero in the [facility].  Elizabeth is a 89 year old who is being evaluated via a billable telephone visit.      How would you like to obtain your AVS? Mail a copy           CARDIOLOGY CLINIC VISIT  DATE OF SERVICE:  November 21, 2022    PRIMARY CARE PHYSICIAN:  Sonu Reece    HISTORY OF PRESENT ILLNESS:  Ms. Elizabeth Ruggiero is a pleasant 89 year old patient with past medical history significant for chronic atrial fibrillation/flutter, chronic diastolic heart failure, hepatic congestion, deep vein thrombosis, hypertension, severe mitral regurgitation, history of TIA, and osteoporosis.  She was last seen virtually by Mignon Morgan CNP.  This visit is conducted via telephone due to the ongoing COVID 19 pandemic.    In brief review, she has severe mitral valve regurgitation secondary to myxomatous thickened leaflets and bileaflet prolapse.  Intervention was considered in the past, however, she has significant annular calcification which would make successful MitraClip unlikely.  Given her advanced age, she has not been felt to be a good surgical candidate.  She was also noted to have declined RAHEL previously for further evaluation with preference for continued conservative medical management.    More recently she was admitted from 5/11/2022 to 5/14/2022 for acute decompensated HFpEF.  Echocardiogram showed moderate to severe mitral regurgitation and moderate to severe tricuspid regurgitation and pulmonary hypertension.  LVEF > 70%. This is unchanged from prior imaging.  She diuresed 7 pounds with IV Lasix.  Her discharge weight was 107 lbs. Her oral furosemide was switched to torsemide 20 mg daily at time of discharge.    Today Elizabeth reports feeling well.  She denies any exertional chest pain, chest  discomfort or shortness of breath.  Her weights have been stable.  She notes just very mild lower extremity edema.  She has ongoing back issues and discomfort which is chronic.  She is without cardiovascular complaints today.       PAST MEDICAL HISTORY:  Past Medical History:   Diagnosis Date     (HFpEF) heart failure with preserved ejection fraction (H)      Acute diastolic CHF (congestive heart failure) (H)      Acute left hemiparesis (H) 2/9/2019     Arthritis      Atrial fibrillation (H)      Atrial fibrillation with RVR (H)      Atrial flutter (H)      Atrial flutter with rapid ventricular response (H) 4/23/2019     Community acquired pneumonia      Diastolic CHF (H) 04/29/2019     DVT of lower extremity (deep venous thrombosis) (H)     recurrent     Hepatic congestion     improved with diuretics for CHF     HTN (hypertension)      Hyperlipidemia      Mitral valve insufficiency      Pneumonia      Postmenopausal bleeding 01/2020    [facility]     Severe mitral regurgitation      SOB (shortness of breath)      TIA (transient ischemic attack)      Vitamin D deficiency        MEDICATIONS:  Current Outpatient Medications   Medication     acetaminophen (TYLENOL) 500 MG tablet     alendronate (FOSAMAX) 70 MG tablet     aluminum & magnesium hydroxide (MAG-AL) 200-200 MG/5ML supsension     calcium carbonate-vitamin D (OSCAL W/D) 500-200 MG-UNIT tablet     cetirizine (ZYRTEC) 10 MG tablet     cholecalciferol 25 MCG (1000 UT) TABS     COMPRESSION STOCKINGS     digoxin (LANOXIN) 125 MCG tablet     ELIQUIS ANTICOAGULANT 2.5 MG tablet     hydrOXYzine (ATARAX) 25 MG tablet     melatonin 5 MG tablet     metoprolol succinate ER (TOPROL-XL) 25 MG 24 hr tablet     Multiple Vitamins-Minerals (PRESERVISION AREDS 2+MULTI VIT) CAPS     multivitamin w/minerals (THERA-VIT-M) tablet     mupirocin (BACTROBAN) 2 % external ointment     NONFORMULARY     omeprazole (PRILOSEC) 20 MG DR capsule     potassium  chloride ER (K-TAB) 20 MEQ CR tablet     senna (SENOKOT) 8.6 MG tablet     simethicone (MYLICON) 80 MG chewable tablet     torsemide (DEMADEX) 20 MG tablet     triamcinolone (KENALOG) 0.1 % external cream     No current facility-administered medications for this visit.       ALLERGIES:  No Known Allergies    SOCIAL HISTORY:  I have reviewed this patient's social history and updated it with pertinent information if needed. Elizabeth Ruggiero  reports that she has never smoked. She has never used smokeless tobacco. She reports that she does not drink alcohol and does not use drugs.    FAMILY HISTORY:  I have reviewed this patient's family history and updated it with pertinent information if needed.   Family History   Problem Relation Age of Onset     Colon Cancer Mother      Breast Cancer Sister      Diabetes Sister        REVIEW OF SYSTEMS:  A complete ROS was obtained and the pertinent positives are outlined in the history of present illness above.  The remainder of systems is negative.      PHYSICAL EXAM:                     Vital Signs with Ranges     101 lbs 0 oz            ASSESSMENT:  1. Chronic heart failure with preserved ejection fraction    Recent hospitalization from 5/11/2022 to 5/14/2022 for acute decompensated HFpEF    Most recent TTE (5/2022) with normal LVEF.    Oral diuretics were changed to torsemide 20 mg daily    Current weight is 101 pounds, euvolemic    2. Moderate to severe mitral valve regurgitation    Noted as moderate to severe by TTE 5/2022, unchanged     Not likely amenable to MitraClip due to significant mitral annular calcification and not likely a surgical candidate given her advanced age, so continued medical management has been recommended.     3. Moderate tricuspid regurgitation and pulmonary hypertension     4. Chronic atrial fibrillation/flutter    On apixaban 2.5 mg twice daily for anticoagulation    Rate controlled with Toprol-XL 12.5 mg daily and digoxin     Essential hypertension,  well controlled    RECOMMENDATIONS:  1. Doing well from a cardiac standpoint. Continue current cardiac meds including torsemide 20 mg daily.  Will update BMP.  2.  Follow up in 3 months.      Tiffany Galvan MD [facility] Heart  November 21, 2022          Respiratory: NEGATIVE  Cardiovascular:slight edema five days ago, fatigue    Vitals - Patient Reported  Systolic (Patient Reported): 112  Diastolic (Patient Reported): 72  Weight (Patient Reported): 45.8 kg (101 lb)  SpO2 (Patient Reported): 99  Temperature (Patient Reported): 97.8  F (36.6  C)  Pulse (Patient Reported): 70    Duyen Wagoner HILLARY    Telephone number of patient: [phone]    Telephone-Visit Details    Telephone Start Time:  2:45 pm    Telephone End Time 2:52 pm  Originating Location (pt. Location): Home    Distant Location (provider location): [facility]    Thank you for allowing me to participate in the care of your patient.      Sincerely,     Tiffany Galvan MD     [facility]  cc:   Mignon Morgan, CNP  [address] [place] [address]

## 2022-11-23 NOTE — TELEPHONE ENCOUNTER
BMP lab order faxed to the [facility] at fax [phone].       ----- Message from Tiffany Galvan MD sent at 11/23/2022  8:06 AM CST -----  Hi All,    Can we please get a BMP drawn on Elizabeth through her assisted living facility, [facility]?    Thanks,  Audra

## 2022-12-02 NOTE — TELEPHONE ENCOUNTER
Tiffany Galvan MD  [facility] Heart Team 4  BMP reviewed; within acceptable limits.     Tiffany Galvan MD     Contacted patient to review results and Dr. Galvan's comments. Patient did not answer. Left detailed message with results, instructed patient to call back with any further questions.

## 2023-01-01 ENCOUNTER — APPOINTMENT (OUTPATIENT)
Dept: CT IMAGING | Facility: CLINIC | Age: 88
DRG: 292 | End: 2023-01-01
Attending: EMERGENCY MEDICINE
Payer: COMMERCIAL

## 2023-01-01 ENCOUNTER — APPOINTMENT (OUTPATIENT)
Dept: CARDIOLOGY | Facility: CLINIC | Age: 88
DRG: 292 | End: 2023-01-01
Attending: HOSPITALIST
Payer: COMMERCIAL

## 2023-01-01 ENCOUNTER — HOSPITAL ENCOUNTER (INPATIENT)
Facility: CLINIC | Age: 88
LOS: 3 days | Discharge: HOSPICE | DRG: 292 | End: 2023-06-09
Attending: INTERNAL MEDICINE | Admitting: HOSPITALIST
Payer: COMMERCIAL

## 2023-01-01 ENCOUNTER — HOSPITAL ENCOUNTER (EMERGENCY)
Facility: CLINIC | Age: 88
Discharge: HOME | End: 2023-05-22
Attending: EMERGENCY MEDICINE | Admitting: EMERGENCY MEDICINE
Payer: COMMERCIAL

## 2023-01-01 ENCOUNTER — APPOINTMENT (OUTPATIENT)
Dept: ULTRASOUND IMAGING | Facility: CLINIC | Age: 88
DRG: 292 | End: 2023-01-01
Attending: HOSPITALIST
Payer: COMMERCIAL

## 2023-01-01 ENCOUNTER — APPOINTMENT (OUTPATIENT)
Dept: GENERAL RADIOLOGY | Facility: CLINIC | Age: 88
DRG: 292 | End: 2023-01-01
Attending: EMERGENCY MEDICINE
Payer: COMMERCIAL

## 2023-01-01 ENCOUNTER — MEDICAL CORRESPONDENCE (OUTPATIENT)
Dept: HEALTH INFORMATION MANAGEMENT | Facility: CLINIC | Age: 88
End: 2023-01-01

## 2023-01-01 ENCOUNTER — LAB REQUISITION (OUTPATIENT)
Dept: LAB | Facility: CLINIC | Age: 88
End: 2023-01-01
Payer: COMMERCIAL

## 2023-01-01 ENCOUNTER — MYC MEDICAL ADVICE (OUTPATIENT)
Dept: CARDIOLOGY | Facility: CLINIC | Age: 88
End: 2023-01-01
Payer: COMMERCIAL

## 2023-01-01 ENCOUNTER — VIRTUAL VISIT (OUTPATIENT)
Dept: CARDIOLOGY | Facility: CLINIC | Age: 88
End: 2023-01-01
Payer: COMMERCIAL

## 2023-01-01 ENCOUNTER — DOCUMENTATION ONLY (OUTPATIENT)
Dept: OTHER | Facility: CLINIC | Age: 88
End: 2023-01-01
Payer: COMMERCIAL

## 2023-01-01 ENCOUNTER — NURSE TRIAGE (OUTPATIENT)
Dept: CARDIOLOGY | Facility: CLINIC | Age: 88
End: 2023-01-01
Payer: COMMERCIAL

## 2023-01-01 ENCOUNTER — TELEPHONE (OUTPATIENT)
Dept: CARDIOLOGY | Facility: CLINIC | Age: 88
End: 2023-01-01
Payer: COMMERCIAL

## 2023-01-01 ENCOUNTER — PATIENT OUTREACH (OUTPATIENT)
Dept: CARE COORDINATION | Facility: CLINIC | Age: 88
End: 2023-01-01
Payer: COMMERCIAL

## 2023-01-01 ENCOUNTER — HOSPITAL ENCOUNTER (INPATIENT)
Facility: CLINIC | Age: 88
LOS: 2 days | Discharge: HOME | DRG: 292 | End: 2023-05-05
Attending: EMERGENCY MEDICINE | Admitting: HOSPITALIST
Payer: COMMERCIAL

## 2023-01-01 ENCOUNTER — HOSPITAL ENCOUNTER (EMERGENCY)
Facility: CLINIC | Age: 88
Discharge: HOME | End: 2023-04-03
Attending: EMERGENCY MEDICINE | Admitting: EMERGENCY MEDICINE
Payer: COMMERCIAL

## 2023-01-01 ENCOUNTER — APPOINTMENT (OUTPATIENT)
Dept: GENERAL RADIOLOGY | Facility: CLINIC | Age: 88
DRG: 292 | End: 2023-01-01
Attending: HOSPITALIST
Payer: COMMERCIAL

## 2023-01-01 ENCOUNTER — APPOINTMENT (OUTPATIENT)
Dept: GENERAL RADIOLOGY | Facility: CLINIC | Age: 88
End: 2023-01-01
Attending: STUDENT IN AN ORGANIZED HEALTH CARE EDUCATION/TRAINING PROGRAM
Payer: COMMERCIAL

## 2023-01-01 ENCOUNTER — APPOINTMENT (OUTPATIENT)
Dept: GENERAL RADIOLOGY | Facility: CLINIC | Age: 88
End: 2023-01-01
Attending: EMERGENCY MEDICINE
Payer: COMMERCIAL

## 2023-01-01 ENCOUNTER — HEALTH MAINTENANCE LETTER (OUTPATIENT)
Age: 88
End: 2023-01-01

## 2023-01-01 VITALS
HEART RATE: 70 BPM | WEIGHT: 105.5 LBS | DIASTOLIC BLOOD PRESSURE: 62 MMHG | OXYGEN SATURATION: 97 % | BODY MASS INDEX: 16.95 KG/M2 | HEIGHT: 66 IN | RESPIRATION RATE: 18 BRPM | SYSTOLIC BLOOD PRESSURE: 105 MMHG | TEMPERATURE: 97.4 F

## 2023-01-01 VITALS
TEMPERATURE: 97.3 F | DIASTOLIC BLOOD PRESSURE: 69 MMHG | OXYGEN SATURATION: 92 % | HEART RATE: 95 BPM | WEIGHT: 107.8 LBS | RESPIRATION RATE: 18 BRPM | BODY MASS INDEX: 17.4 KG/M2 | SYSTOLIC BLOOD PRESSURE: 104 MMHG

## 2023-01-01 VITALS
HEIGHT: 64 IN | SYSTOLIC BLOOD PRESSURE: 113 MMHG | RESPIRATION RATE: 20 BRPM | TEMPERATURE: 97.9 F | HEART RATE: 71 BPM | WEIGHT: 102 LBS | BODY MASS INDEX: 17.42 KG/M2 | OXYGEN SATURATION: 98 % | DIASTOLIC BLOOD PRESSURE: 76 MMHG

## 2023-01-01 VITALS
SYSTOLIC BLOOD PRESSURE: 104 MMHG | HEIGHT: 66 IN | BODY MASS INDEX: 17.84 KG/M2 | OXYGEN SATURATION: 96 % | RESPIRATION RATE: 18 BRPM | TEMPERATURE: 98.2 F | WEIGHT: 111 LBS | DIASTOLIC BLOOD PRESSURE: 46 MMHG | HEART RATE: 77 BPM

## 2023-01-01 DIAGNOSIS — M25.511 CHRONIC PAIN OF BOTH SHOULDERS: ICD-10-CM

## 2023-01-01 DIAGNOSIS — I50.31 ACUTE DIASTOLIC CONGESTIVE HEART FAILURE (H): Primary | ICD-10-CM

## 2023-01-01 DIAGNOSIS — E87.1 HYPONATREMIA: ICD-10-CM

## 2023-01-01 DIAGNOSIS — Z51.5 HOSPICE CARE PATIENT: ICD-10-CM

## 2023-01-01 DIAGNOSIS — I48.91 ATRIAL FIBRILLATION, UNSPECIFIED TYPE (H): ICD-10-CM

## 2023-01-01 DIAGNOSIS — Z86.79 HISTORY OF HEART FAILURE: ICD-10-CM

## 2023-01-01 DIAGNOSIS — I50.32 CHRONIC HEART FAILURE WITH PRESERVED EJECTION FRACTION (HFPEF) (H): Primary | ICD-10-CM

## 2023-01-01 DIAGNOSIS — J90 PLEURAL EFFUSION, RIGHT: ICD-10-CM

## 2023-01-01 DIAGNOSIS — R07.9 CHEST PAIN: ICD-10-CM

## 2023-01-01 DIAGNOSIS — G89.29 CHRONIC PAIN OF BOTH SHOULDERS: ICD-10-CM

## 2023-01-01 DIAGNOSIS — I50.33 ACUTE ON CHRONIC DIASTOLIC CONGESTIVE HEART FAILURE (H): ICD-10-CM

## 2023-01-01 DIAGNOSIS — I27.20 PULMONARY HYPERTENSION (H): Primary | ICD-10-CM

## 2023-01-01 DIAGNOSIS — M25.512 CHRONIC PAIN OF BOTH SHOULDERS: ICD-10-CM

## 2023-01-01 LAB
ALBUMIN SERPL BCG-MCNC: 3.6 G/DL (ref 3.5–5.2)
ALBUMIN SERPL BCG-MCNC: 3.9 G/DL (ref 3.5–5.2)
ALBUMIN SERPL BCG-MCNC: 4 G/DL (ref 3.5–5.2)
ALBUMIN UR-MCNC: NEGATIVE MG/DL
ALP SERPL-CCNC: 66 U/L (ref 35–104)
ALP SERPL-CCNC: 67 U/L (ref 35–104)
ALP SERPL-CCNC: 86 U/L (ref 35–104)
ALT SERPL W P-5'-P-CCNC: 23 U/L (ref 10–35)
ALT SERPL W P-5'-P-CCNC: 25 U/L (ref 10–35)
ALT SERPL W P-5'-P-CCNC: 26 U/L (ref 10–35)
ANION GAP SERPL CALCULATED.3IONS-SCNC: 10 MMOL/L (ref 7–15)
ANION GAP SERPL CALCULATED.3IONS-SCNC: 11 MMOL/L (ref 7–15)
ANION GAP SERPL CALCULATED.3IONS-SCNC: 11 MMOL/L (ref 7–15)
ANION GAP SERPL CALCULATED.3IONS-SCNC: 13 MMOL/L (ref 7–15)
ANION GAP SERPL CALCULATED.3IONS-SCNC: 14 MMOL/L (ref 7–15)
ANION GAP SERPL CALCULATED.3IONS-SCNC: 14 MMOL/L (ref 7–15)
ANION GAP SERPL CALCULATED.3IONS-SCNC: 15 MMOL/L (ref 7–15)
APPEARANCE UR: CLEAR
AST SERPL W P-5'-P-CCNC: 35 U/L (ref 10–35)
AST SERPL W P-5'-P-CCNC: 41 U/L (ref 10–35)
AST SERPL W P-5'-P-CCNC: ABNORMAL U/L
ATRIAL RATE - MUSE: 312 BPM
ATRIAL RATE - MUSE: NORMAL BPM
ATRIAL RATE - MUSE: NORMAL BPM
BACTERIA PLR CULT: NO GROWTH
BACTERIA UR CULT: ABNORMAL
BACTERIA UR CULT: ABNORMAL
BASOPHILS # BLD AUTO: 0 10E3/UL (ref 0–0.2)
BASOPHILS # BLD AUTO: 0.1 10E3/UL (ref 0–0.2)
BASOPHILS NFR BLD AUTO: 0 %
BASOPHILS NFR BLD AUTO: 1 %
BILIRUB SERPL-MCNC: 1.1 MG/DL
BILIRUB SERPL-MCNC: 1.2 MG/DL
BILIRUB SERPL-MCNC: 1.8 MG/DL
BILIRUB UR QL STRIP: NEGATIVE
BUN SERPL-MCNC: 22.3 MG/DL (ref 8–23)
BUN SERPL-MCNC: 22.5 MG/DL (ref 8–23)
BUN SERPL-MCNC: 22.6 MG/DL (ref 8–23)
BUN SERPL-MCNC: 23.7 MG/DL (ref 8–23)
BUN SERPL-MCNC: 25.4 MG/DL (ref 8–23)
BUN SERPL-MCNC: 25.7 MG/DL (ref 8–23)
BUN SERPL-MCNC: 25.9 MG/DL (ref 8–23)
BUN SERPL-MCNC: 28.5 MG/DL (ref 8–23)
BUN SERPL-MCNC: 33 MG/DL (ref 8–23)
CALCIUM SERPL-MCNC: 9.2 MG/DL (ref 8.2–9.6)
CALCIUM SERPL-MCNC: 9.2 MG/DL (ref 8.2–9.6)
CALCIUM SERPL-MCNC: 9.3 MG/DL (ref 8.2–9.6)
CALCIUM SERPL-MCNC: 9.4 MG/DL (ref 8.2–9.6)
CALCIUM SERPL-MCNC: 9.5 MG/DL (ref 8.2–9.6)
CALCIUM SERPL-MCNC: 9.6 MG/DL (ref 8.2–9.6)
CALCIUM SERPL-MCNC: 9.6 MG/DL (ref 8.2–9.6)
CALCIUM SERPL-MCNC: 9.7 MG/DL (ref 8.2–9.6)
CALCIUM SERPL-MCNC: 9.8 MG/DL (ref 8.2–9.6)
CHLORIDE SERPL-SCNC: 101 MMOL/L (ref 98–107)
CHLORIDE SERPL-SCNC: 88 MMOL/L (ref 98–107)
CHLORIDE SERPL-SCNC: 91 MMOL/L (ref 98–107)
CHLORIDE SERPL-SCNC: 92 MMOL/L (ref 98–107)
CHLORIDE SERPL-SCNC: 95 MMOL/L (ref 98–107)
CHLORIDE SERPL-SCNC: 97 MMOL/L (ref 98–107)
CHLORIDE SERPL-SCNC: 98 MMOL/L (ref 98–107)
COLOR UR AUTO: ABNORMAL
CREAT SERPL-MCNC: 0.62 MG/DL (ref 0.51–0.95)
CREAT SERPL-MCNC: 0.65 MG/DL (ref 0.51–0.95)
CREAT SERPL-MCNC: 0.65 MG/DL (ref 0.51–0.95)
CREAT SERPL-MCNC: 0.71 MG/DL (ref 0.51–0.95)
CREAT SERPL-MCNC: 0.72 MG/DL (ref 0.51–0.95)
CREAT SERPL-MCNC: 0.75 MG/DL (ref 0.51–0.95)
CREAT SERPL-MCNC: 0.76 MG/DL (ref 0.51–0.95)
CREAT SERPL-MCNC: 0.82 MG/DL (ref 0.51–0.95)
CREAT SERPL-MCNC: 0.85 MG/DL (ref 0.51–0.95)
DEPRECATED HCO3 PLAS-SCNC: 23 MMOL/L (ref 22–29)
DEPRECATED HCO3 PLAS-SCNC: 24 MMOL/L (ref 22–29)
DEPRECATED HCO3 PLAS-SCNC: 24 MMOL/L (ref 22–29)
DEPRECATED HCO3 PLAS-SCNC: 25 MMOL/L (ref 22–29)
DEPRECATED HCO3 PLAS-SCNC: 26 MMOL/L (ref 22–29)
DEPRECATED HCO3 PLAS-SCNC: 27 MMOL/L (ref 22–29)
DEPRECATED HCO3 PLAS-SCNC: 27 MMOL/L (ref 22–29)
DIASTOLIC BLOOD PRESSURE - MUSE: NORMAL MMHG
DIGOXIN SERPL-MCNC: 1.3 NG/ML (ref 0.6–2)
DIGOXIN SERPL-MCNC: 1.3 NG/ML (ref 0.6–2)
EOSINOPHIL # BLD AUTO: 0.1 10E3/UL (ref 0–0.7)
EOSINOPHIL NFR BLD AUTO: 1 %
ERYTHROCYTE [DISTWIDTH] IN BLOOD BY AUTOMATED COUNT: 13.9 % (ref 10–15)
ERYTHROCYTE [DISTWIDTH] IN BLOOD BY AUTOMATED COUNT: 14.1 % (ref 10–15)
ERYTHROCYTE [DISTWIDTH] IN BLOOD BY AUTOMATED COUNT: 14.2 % (ref 10–15)
ERYTHROCYTE [DISTWIDTH] IN BLOOD BY AUTOMATED COUNT: 14.3 % (ref 10–15)
ERYTHROCYTE [DISTWIDTH] IN BLOOD BY AUTOMATED COUNT: 15.3 % (ref 10–15)
ERYTHROCYTE [DISTWIDTH] IN BLOOD BY AUTOMATED COUNT: 15.3 % (ref 10–15)
ERYTHROCYTE [DISTWIDTH] IN BLOOD BY AUTOMATED COUNT: 15.4 % (ref 10–15)
GFR SERPL CREATININE-BSD FRML MDRD: 62 ML/MIN/1.73M2
GFR SERPL CREATININE-BSD FRML MDRD: 65 ML/MIN/1.73M2
GFR SERPL CREATININE-BSD FRML MDRD: 71 ML/MIN/1.73M2
GFR SERPL CREATININE-BSD FRML MDRD: 72 ML/MIN/1.73M2
GFR SERPL CREATININE-BSD FRML MDRD: 76 ML/MIN/1.73M2
GFR SERPL CREATININE-BSD FRML MDRD: 77 ML/MIN/1.73M2
GFR SERPL CREATININE-BSD FRML MDRD: 80 ML/MIN/1.73M2
GFR SERPL CREATININE-BSD FRML MDRD: 80 ML/MIN/1.73M2
GFR SERPL CREATININE-BSD FRML MDRD: 81 ML/MIN/1.73M2
GLUCOSE BODY FLUID SOURCE: NORMAL
GLUCOSE FLD-MCNC: 116 MG/DL
GLUCOSE SERPL-MCNC: 102 MG/DL (ref 70–99)
GLUCOSE SERPL-MCNC: 110 MG/DL (ref 70–99)
GLUCOSE SERPL-MCNC: 111 MG/DL (ref 70–99)
GLUCOSE SERPL-MCNC: 114 MG/DL (ref 70–99)
GLUCOSE SERPL-MCNC: 116 MG/DL (ref 70–99)
GLUCOSE SERPL-MCNC: 128 MG/DL (ref 70–99)
GLUCOSE SERPL-MCNC: 144 MG/DL (ref 70–99)
GLUCOSE SERPL-MCNC: 83 MG/DL (ref 70–99)
GLUCOSE SERPL-MCNC: 91 MG/DL (ref 70–99)
GLUCOSE UR STRIP-MCNC: NEGATIVE MG/DL
GRAM STAIN RESULT: NORMAL
GRAM STAIN RESULT: NORMAL
HCT VFR BLD AUTO: 34 % (ref 35–47)
HCT VFR BLD AUTO: 34.3 % (ref 35–47)
HCT VFR BLD AUTO: 35.4 % (ref 35–47)
HCT VFR BLD AUTO: 36.6 % (ref 35–47)
HCT VFR BLD AUTO: 37.5 % (ref 35–47)
HCT VFR BLD AUTO: 38.4 % (ref 35–47)
HCT VFR BLD AUTO: 41.5 % (ref 35–47)
HGB BLD-MCNC: 11.2 G/DL (ref 11.7–15.7)
HGB BLD-MCNC: 11.4 G/DL (ref 11.7–15.7)
HGB BLD-MCNC: 12.2 G/DL (ref 11.7–15.7)
HGB BLD-MCNC: 12.2 G/DL (ref 11.7–15.7)
HGB BLD-MCNC: 12.3 G/DL (ref 11.7–15.7)
HGB BLD-MCNC: 12.4 G/DL (ref 11.7–15.7)
HGB BLD-MCNC: 13.5 G/DL (ref 11.7–15.7)
HGB UR QL STRIP: ABNORMAL
HYALINE CASTS: 6 /LPF
IMM GRANULOCYTES # BLD: 0 10E3/UL
IMM GRANULOCYTES NFR BLD: 0 %
INTERPRETATION ECG - MUSE: NORMAL
KETONES UR STRIP-MCNC: NEGATIVE MG/DL
LACTATE SERPL-SCNC: 1.2 MMOL/L (ref 0.7–2)
LD BODY BODY FLUID SOURCE: NORMAL
LDH FLD L TO P-CCNC: 98 U/L
LDH SERPL L TO P-CCNC: 262 U/L (ref 0–250)
LEUKOCYTE ESTERASE UR QL STRIP: ABNORMAL
LIPASE SERPL-CCNC: 22 U/L (ref 13–60)
LIPASE SERPL-CCNC: 28 U/L (ref 13–60)
LVEF ECHO: NORMAL
LYMPHOCYTES # BLD AUTO: 1.1 10E3/UL (ref 0.8–5.3)
LYMPHOCYTES # BLD AUTO: 1.1 10E3/UL (ref 0.8–5.3)
LYMPHOCYTES # BLD AUTO: 1.2 10E3/UL (ref 0.8–5.3)
LYMPHOCYTES # BLD AUTO: 1.4 10E3/UL (ref 0.8–5.3)
LYMPHOCYTES NFR BLD AUTO: 12 %
LYMPHOCYTES NFR BLD AUTO: 16 %
LYMPHOCYTES NFR BLD AUTO: 18 %
LYMPHOCYTES NFR BLD AUTO: 24 %
MAGNESIUM SERPL-MCNC: 2 MG/DL (ref 1.7–2.3)
MAGNESIUM SERPL-MCNC: 2.1 MG/DL (ref 1.7–2.3)
MCH RBC QN AUTO: 30.8 PG (ref 26.5–33)
MCH RBC QN AUTO: 31.1 PG (ref 26.5–33)
MCH RBC QN AUTO: 31.4 PG (ref 26.5–33)
MCH RBC QN AUTO: 31.8 PG (ref 26.5–33)
MCH RBC QN AUTO: 32 PG (ref 26.5–33)
MCH RBC QN AUTO: 32.2 PG (ref 26.5–33)
MCH RBC QN AUTO: 32.6 PG (ref 26.5–33)
MCHC RBC AUTO-ENTMCNC: 32.3 G/DL (ref 31.5–36.5)
MCHC RBC AUTO-ENTMCNC: 32.5 G/DL (ref 31.5–36.5)
MCHC RBC AUTO-ENTMCNC: 32.8 G/DL (ref 31.5–36.5)
MCHC RBC AUTO-ENTMCNC: 32.9 G/DL (ref 31.5–36.5)
MCHC RBC AUTO-ENTMCNC: 33.2 G/DL (ref 31.5–36.5)
MCHC RBC AUTO-ENTMCNC: 33.3 G/DL (ref 31.5–36.5)
MCHC RBC AUTO-ENTMCNC: 34.5 G/DL (ref 31.5–36.5)
MCV RBC AUTO: 101 FL (ref 78–100)
MCV RBC AUTO: 93 FL (ref 78–100)
MCV RBC AUTO: 93 FL (ref 78–100)
MCV RBC AUTO: 94 FL (ref 78–100)
MCV RBC AUTO: 95 FL (ref 78–100)
MCV RBC AUTO: 97 FL (ref 78–100)
MCV RBC AUTO: 98 FL (ref 78–100)
MONOCYTES # BLD AUTO: 0.6 10E3/UL (ref 0–1.3)
MONOCYTES # BLD AUTO: 0.7 10E3/UL (ref 0–1.3)
MONOCYTES NFR BLD AUTO: 10 %
MONOCYTES NFR BLD AUTO: 7 %
MUCOUS THREADS #/AREA URNS LPF: PRESENT /LPF
NEUTROPHILS # BLD AUTO: 3.8 10E3/UL (ref 1.6–8.3)
NEUTROPHILS # BLD AUTO: 4.4 10E3/UL (ref 1.6–8.3)
NEUTROPHILS # BLD AUTO: 5.4 10E3/UL (ref 1.6–8.3)
NEUTROPHILS # BLD AUTO: 7.1 10E3/UL (ref 1.6–8.3)
NEUTROPHILS NFR BLD AUTO: 64 %
NEUTROPHILS NFR BLD AUTO: 70 %
NEUTROPHILS NFR BLD AUTO: 72 %
NEUTROPHILS NFR BLD AUTO: 80 %
NITRATE UR QL: NEGATIVE
NRBC # BLD AUTO: 0 10E3/UL
NRBC BLD AUTO-RTO: 0 /100
NT-PROBNP SERPL-MCNC: 2755 PG/ML (ref 0–1800)
NT-PROBNP SERPL-MCNC: 2823 PG/ML (ref 0–1800)
NT-PROBNP SERPL-MCNC: 3749 PG/ML (ref 0–1800)
NT-PROBNP SERPL-MCNC: 6673 PG/ML (ref 0–1800)
OSMOLALITY SERPL: 271 MMOL/KG (ref 280–301)
OSMOLALITY UR: 219 MMOL/KG (ref 100–1200)
P AXIS - MUSE: NORMAL DEGREES
PH UR STRIP: 6.5 [PH] (ref 5–7)
PHOSPHATE SERPL-MCNC: 3.6 MG/DL (ref 2.5–4.5)
PLATELET # BLD AUTO: 119 10E3/UL (ref 150–450)
PLATELET # BLD AUTO: 121 10E3/UL (ref 150–450)
PLATELET # BLD AUTO: 135 10E3/UL (ref 150–450)
PLATELET # BLD AUTO: 139 10E3/UL (ref 150–450)
PLATELET # BLD AUTO: 144 10E3/UL (ref 150–450)
PLATELET # BLD AUTO: 145 10E3/UL (ref 150–450)
PLATELET # BLD AUTO: 154 10E3/UL (ref 150–450)
POTASSIUM SERPL-SCNC: 3.4 MMOL/L (ref 3.4–5.3)
POTASSIUM SERPL-SCNC: 3.6 MMOL/L (ref 3.4–5.3)
POTASSIUM SERPL-SCNC: 3.7 MMOL/L (ref 3.4–5.3)
POTASSIUM SERPL-SCNC: 3.7 MMOL/L (ref 3.4–5.3)
POTASSIUM SERPL-SCNC: 3.8 MMOL/L (ref 3.4–5.3)
POTASSIUM SERPL-SCNC: 3.8 MMOL/L (ref 3.4–5.3)
POTASSIUM SERPL-SCNC: 3.9 MMOL/L (ref 3.4–5.3)
POTASSIUM SERPL-SCNC: 4.5 MMOL/L (ref 3.4–5.3)
PR INTERVAL - MUSE: NORMAL MS
PROCALCITONIN SERPL IA-MCNC: 0.09 NG/ML
PROCALCITONIN SERPL IA-MCNC: 0.1 NG/ML
PROT FLD-MCNC: 2.2 G/DL
PROT SERPL-MCNC: 6.1 G/DL (ref 6.4–8.3)
PROT SERPL-MCNC: 6.4 G/DL (ref 6.4–8.3)
PROT SERPL-MCNC: 6.6 G/DL (ref 6.4–8.3)
PROT SERPL-MCNC: 6.8 G/DL (ref 6.4–8.3)
PROTEIN BODY FLUID SOURCE: NORMAL
QRS DURATION - MUSE: 72 MS
QRS DURATION - MUSE: 74 MS
QRS DURATION - MUSE: 84 MS
QT - MUSE: 330 MS
QT - MUSE: 370 MS
QT - MUSE: 380 MS
QTC - MUSE: 353 MS
QTC - MUSE: 370 MS
QTC - MUSE: 418 MS
R AXIS - MUSE: 102 DEGREES
R AXIS - MUSE: 121 DEGREES
R AXIS - MUSE: 97 DEGREES
RBC # BLD AUTO: 3.64 10E6/UL (ref 3.8–5.2)
RBC # BLD AUTO: 3.67 10E6/UL (ref 3.8–5.2)
RBC # BLD AUTO: 3.79 10E6/UL (ref 3.8–5.2)
RBC # BLD AUTO: 3.8 10E6/UL (ref 3.8–5.2)
RBC # BLD AUTO: 3.84 10E6/UL (ref 3.8–5.2)
RBC # BLD AUTO: 3.84 10E6/UL (ref 3.8–5.2)
RBC # BLD AUTO: 4.3 10E6/UL (ref 3.8–5.2)
RBC URINE: 13 /HPF
SODIUM SERPL-SCNC: 125 MMOL/L (ref 136–145)
SODIUM SERPL-SCNC: 127 MMOL/L (ref 136–145)
SODIUM SERPL-SCNC: 130 MMOL/L (ref 136–145)
SODIUM SERPL-SCNC: 131 MMOL/L (ref 136–145)
SODIUM SERPL-SCNC: 132 MMOL/L (ref 136–145)
SODIUM SERPL-SCNC: 132 MMOL/L (ref 136–145)
SODIUM SERPL-SCNC: 133 MMOL/L (ref 136–145)
SODIUM SERPL-SCNC: 135 MMOL/L (ref 136–145)
SODIUM SERPL-SCNC: 136 MMOL/L (ref 136–145)
SODIUM SERPL-SCNC: 137 MMOL/L (ref 136–145)
SODIUM SERPL-SCNC: 137 MMOL/L (ref 136–145)
SODIUM UR-SCNC: 76 MMOL/L
SP GR UR STRIP: 1.01 (ref 1–1.03)
SYSTOLIC BLOOD PRESSURE - MUSE: NORMAL MMHG
T AXIS - MUSE: -6 DEGREES
T AXIS - MUSE: -81 DEGREES
T AXIS - MUSE: 100 DEGREES
TROPONIN T SERPL HS-MCNC: 48 NG/L
TROPONIN T SERPL HS-MCNC: 49 NG/L
TROPONIN T SERPL HS-MCNC: 51 NG/L
TROPONIN T SERPL HS-MCNC: 56 NG/L
TROPONIN T SERPL HS-MCNC: 61 NG/L
TSH SERPL DL<=0.005 MIU/L-ACNC: 3.19 UIU/ML (ref 0.3–4.2)
UROBILINOGEN UR STRIP-MCNC: NORMAL MG/DL
VENTRICULAR RATE- MUSE: 60 BPM
VENTRICULAR RATE- MUSE: 69 BPM
VENTRICULAR RATE- MUSE: 73 BPM
WBC # BLD AUTO: 6 10E3/UL (ref 4–11)
WBC # BLD AUTO: 6.3 10E3/UL (ref 4–11)
WBC # BLD AUTO: 6.8 10E3/UL (ref 4–11)
WBC # BLD AUTO: 6.9 10E3/UL (ref 4–11)
WBC # BLD AUTO: 7.5 10E3/UL (ref 4–11)
WBC # BLD AUTO: 7.6 10E3/UL (ref 4–11)
WBC # BLD AUTO: 8.9 10E3/UL (ref 4–11)
WBC URINE: 11 /HPF

## 2023-01-01 PROCEDURE — 36415 COLL VENOUS BLD VENIPUNCTURE: CPT | Performed by: STUDENT IN AN ORGANIZED HEALTH CARE EDUCATION/TRAINING PROGRAM

## 2023-01-01 PROCEDURE — 250N000013 HC RX MED GY IP 250 OP 250 PS 637

## 2023-01-01 PROCEDURE — 36415 COLL VENOUS BLD VENIPUNCTURE: CPT | Performed by: HOSPITALIST

## 2023-01-01 PROCEDURE — 87070 CULTURE OTHR SPECIMN AEROBIC: CPT | Performed by: HOSPITALIST

## 2023-01-01 PROCEDURE — 85025 COMPLETE CBC W/AUTO DIFF WBC: CPT | Performed by: HOSPITALIST

## 2023-01-01 PROCEDURE — 99284 EMERGENCY DEPT VISIT MOD MDM: CPT | Mod: 25

## 2023-01-01 PROCEDURE — 36415 COLL VENOUS BLD VENIPUNCTURE: CPT | Performed by: INTERNAL MEDICINE

## 2023-01-01 PROCEDURE — 250N000013 HC RX MED GY IP 250 OP 250 PS 637: Performed by: INTERNAL MEDICINE

## 2023-01-01 PROCEDURE — 80162 ASSAY OF DIGOXIN TOTAL: CPT | Performed by: EMERGENCY MEDICINE

## 2023-01-01 PROCEDURE — 83880 ASSAY OF NATRIURETIC PEPTIDE: CPT | Performed by: STUDENT IN AN ORGANIZED HEALTH CARE EDUCATION/TRAINING PROGRAM

## 2023-01-01 PROCEDURE — 250N000011 HC RX IP 250 OP 636: Performed by: EMERGENCY MEDICINE

## 2023-01-01 PROCEDURE — 80053 COMPREHEN METABOLIC PANEL: CPT | Performed by: EMERGENCY MEDICINE

## 2023-01-01 PROCEDURE — 80048 BASIC METABOLIC PNL TOTAL CA: CPT | Performed by: STUDENT IN AN ORGANIZED HEALTH CARE EDUCATION/TRAINING PROGRAM

## 2023-01-01 PROCEDURE — 250N000013 HC RX MED GY IP 250 OP 250 PS 637: Performed by: HOSPITALIST

## 2023-01-01 PROCEDURE — 99285 EMERGENCY DEPT VISIT HI MDM: CPT | Mod: 25

## 2023-01-01 PROCEDURE — 84295 ASSAY OF SERUM SODIUM: CPT | Performed by: HOSPITALIST

## 2023-01-01 PROCEDURE — 85014 HEMATOCRIT: CPT | Performed by: INTERNAL MEDICINE

## 2023-01-01 PROCEDURE — 250N000009 HC RX 250: Performed by: EMERGENCY MEDICINE

## 2023-01-01 PROCEDURE — 99232 SBSQ HOSP IP/OBS MODERATE 35: CPT

## 2023-01-01 PROCEDURE — 93306 TTE W/DOPPLER COMPLETE: CPT

## 2023-01-01 PROCEDURE — 258N000003 HC RX IP 258 OP 636: Performed by: INTERNAL MEDICINE

## 2023-01-01 PROCEDURE — 36415 COLL VENOUS BLD VENIPUNCTURE: CPT | Performed by: EMERGENCY MEDICINE

## 2023-01-01 PROCEDURE — 71046 X-RAY EXAM CHEST 2 VIEWS: CPT

## 2023-01-01 PROCEDURE — 258N000003 HC RX IP 258 OP 636

## 2023-01-01 PROCEDURE — 250N000011 HC RX IP 250 OP 636: Performed by: HOSPITALIST

## 2023-01-01 PROCEDURE — 83605 ASSAY OF LACTIC ACID: CPT

## 2023-01-01 PROCEDURE — 250N000013 HC RX MED GY IP 250 OP 250 PS 637: Performed by: EMERGENCY MEDICINE

## 2023-01-01 PROCEDURE — 99233 SBSQ HOSP IP/OBS HIGH 50: CPT | Performed by: INTERNAL MEDICINE

## 2023-01-01 PROCEDURE — 84300 ASSAY OF URINE SODIUM: CPT | Performed by: HOSPITALIST

## 2023-01-01 PROCEDURE — 83615 LACTATE (LD) (LDH) ENZYME: CPT | Performed by: HOSPITALIST

## 2023-01-01 PROCEDURE — 83880 ASSAY OF NATRIURETIC PEPTIDE: CPT | Performed by: EMERGENCY MEDICINE

## 2023-01-01 PROCEDURE — 85025 COMPLETE CBC W/AUTO DIFF WBC: CPT | Performed by: EMERGENCY MEDICINE

## 2023-01-01 PROCEDURE — 210N000002 HC R&B HEART CARE

## 2023-01-01 PROCEDURE — 85027 COMPLETE CBC AUTOMATED: CPT

## 2023-01-01 PROCEDURE — 250N000011 HC RX IP 250 OP 636: Performed by: INTERNAL MEDICINE

## 2023-01-01 PROCEDURE — 80048 BASIC METABOLIC PNL TOTAL CA: CPT | Performed by: HOSPITALIST

## 2023-01-01 PROCEDURE — 84157 ASSAY OF PROTEIN OTHER: CPT | Performed by: HOSPITALIST

## 2023-01-01 PROCEDURE — 82310 ASSAY OF CALCIUM: CPT | Performed by: EMERGENCY MEDICINE

## 2023-01-01 PROCEDURE — 93005 ELECTROCARDIOGRAM TRACING: CPT

## 2023-01-01 PROCEDURE — 84484 ASSAY OF TROPONIN QUANT: CPT | Performed by: EMERGENCY MEDICINE

## 2023-01-01 PROCEDURE — 84484 ASSAY OF TROPONIN QUANT: CPT | Performed by: STUDENT IN AN ORGANIZED HEALTH CARE EDUCATION/TRAINING PROGRAM

## 2023-01-01 PROCEDURE — 83690 ASSAY OF LIPASE: CPT | Performed by: EMERGENCY MEDICINE

## 2023-01-01 PROCEDURE — 87086 URINE CULTURE/COLONY COUNT: CPT

## 2023-01-01 PROCEDURE — 83880 ASSAY OF NATRIURETIC PEPTIDE: CPT | Performed by: HOSPITALIST

## 2023-01-01 PROCEDURE — 96374 THER/PROPH/DIAG INJ IV PUSH: CPT

## 2023-01-01 PROCEDURE — 250N000013 HC RX MED GY IP 250 OP 250 PS 637: Performed by: STUDENT IN AN ORGANIZED HEALTH CARE EDUCATION/TRAINING PROGRAM

## 2023-01-01 PROCEDURE — 80053 COMPREHEN METABOLIC PANEL: CPT | Performed by: HOSPITALIST

## 2023-01-01 PROCEDURE — 85004 AUTOMATED DIFF WBC COUNT: CPT | Performed by: EMERGENCY MEDICINE

## 2023-01-01 PROCEDURE — 99207 PR NO CHARGE LOS: CPT | Mod: VID | Performed by: INTERNAL MEDICINE

## 2023-01-01 PROCEDURE — 120N000001 HC R&B MED SURG/OB

## 2023-01-01 PROCEDURE — 82435 ASSAY OF BLOOD CHLORIDE: CPT | Performed by: INTERNAL MEDICINE

## 2023-01-01 PROCEDURE — 99239 HOSP IP/OBS DSCHRG MGMT >30: CPT | Performed by: INTERNAL MEDICINE

## 2023-01-01 PROCEDURE — 96365 THER/PROPH/DIAG IV INF INIT: CPT

## 2023-01-01 PROCEDURE — 999N000147 HC STATISTIC PT IP EVAL DEFER

## 2023-01-01 PROCEDURE — 99232 SBSQ HOSP IP/OBS MODERATE 35: CPT | Performed by: INTERNAL MEDICINE

## 2023-01-01 PROCEDURE — 93005 ELECTROCARDIOGRAM TRACING: CPT | Mod: RTG

## 2023-01-01 PROCEDURE — 99223 1ST HOSP IP/OBS HIGH 75: CPT | Mod: AI | Performed by: HOSPITALIST

## 2023-01-01 PROCEDURE — 99222 1ST HOSP IP/OBS MODERATE 55: CPT | Performed by: INTERNAL MEDICINE

## 2023-01-01 PROCEDURE — 83735 ASSAY OF MAGNESIUM: CPT | Performed by: EMERGENCY MEDICINE

## 2023-01-01 PROCEDURE — 93306 TTE W/DOPPLER COMPLETE: CPT | Mod: 26 | Performed by: INTERNAL MEDICINE

## 2023-01-01 PROCEDURE — 71045 X-RAY EXAM CHEST 1 VIEW: CPT

## 2023-01-01 PROCEDURE — 83735 ASSAY OF MAGNESIUM: CPT | Performed by: HOSPITALIST

## 2023-01-01 PROCEDURE — 99239 HOSP IP/OBS DSCHRG MGMT >30: CPT | Mod: GW | Performed by: STUDENT IN AN ORGANIZED HEALTH CARE EDUCATION/TRAINING PROGRAM

## 2023-01-01 PROCEDURE — 250N000009 HC RX 250: Performed by: RADIOLOGY

## 2023-01-01 PROCEDURE — 85027 COMPLETE CBC AUTOMATED: CPT | Performed by: HOSPITALIST

## 2023-01-01 PROCEDURE — 82945 GLUCOSE OTHER FLUID: CPT | Performed by: HOSPITALIST

## 2023-01-01 PROCEDURE — 81001 URINALYSIS AUTO W/SCOPE: CPT

## 2023-01-01 PROCEDURE — 93010 ELECTROCARDIOGRAM REPORT: CPT | Performed by: INTERNAL MEDICINE

## 2023-01-01 PROCEDURE — 84155 ASSAY OF PROTEIN SERUM: CPT | Performed by: EMERGENCY MEDICINE

## 2023-01-01 PROCEDURE — 272N000706 US THORACENTESIS

## 2023-01-01 PROCEDURE — 73030 X-RAY EXAM OF SHOULDER: CPT | Mod: RT

## 2023-01-01 PROCEDURE — 99223 1ST HOSP IP/OBS HIGH 75: CPT | Performed by: INTERNAL MEDICINE

## 2023-01-01 PROCEDURE — 99232 SBSQ HOSP IP/OBS MODERATE 35: CPT | Performed by: STUDENT IN AN ORGANIZED HEALTH CARE EDUCATION/TRAINING PROGRAM

## 2023-01-01 PROCEDURE — 87205 SMEAR GRAM STAIN: CPT | Performed by: HOSPITALIST

## 2023-01-01 PROCEDURE — 84145 PROCALCITONIN (PCT): CPT | Performed by: HOSPITALIST

## 2023-01-01 PROCEDURE — 32555 ASPIRATE PLEURA W/ IMAGING: CPT

## 2023-01-01 PROCEDURE — 84132 ASSAY OF SERUM POTASSIUM: CPT | Performed by: INTERNAL MEDICINE

## 2023-01-01 PROCEDURE — 84100 ASSAY OF PHOSPHORUS: CPT | Performed by: HOSPITALIST

## 2023-01-01 PROCEDURE — 0W993ZZ DRAINAGE OF RIGHT PLEURAL CAVITY, PERCUTANEOUS APPROACH: ICD-10-PCS | Performed by: RADIOLOGY

## 2023-01-01 PROCEDURE — 83935 ASSAY OF URINE OSMOLALITY: CPT | Performed by: HOSPITALIST

## 2023-01-01 PROCEDURE — 99442 PR PHYSICIAN TELEPHONE EVALUATION 11-20 MIN: CPT | Mod: 95 | Performed by: INTERNAL MEDICINE

## 2023-01-01 PROCEDURE — 80162 ASSAY OF DIGOXIN TOTAL: CPT | Performed by: HOSPITALIST

## 2023-01-01 PROCEDURE — 36415 COLL VENOUS BLD VENIPUNCTURE: CPT

## 2023-01-01 PROCEDURE — 99233 SBSQ HOSP IP/OBS HIGH 50: CPT | Performed by: STUDENT IN AN ORGANIZED HEALTH CARE EDUCATION/TRAINING PROGRAM

## 2023-01-01 PROCEDURE — 83880 ASSAY OF NATRIURETIC PEPTIDE: CPT

## 2023-01-01 PROCEDURE — 99223 1ST HOSP IP/OBS HIGH 75: CPT | Performed by: HOSPITALIST

## 2023-01-01 PROCEDURE — 84155 ASSAY OF PROTEIN SERUM: CPT | Performed by: HOSPITALIST

## 2023-01-01 PROCEDURE — 84443 ASSAY THYROID STIM HORMONE: CPT | Performed by: HOSPITALIST

## 2023-01-01 PROCEDURE — 74177 CT ABD & PELVIS W/CONTRAST: CPT

## 2023-01-01 PROCEDURE — 83930 ASSAY OF BLOOD OSMOLALITY: CPT | Performed by: HOSPITALIST

## 2023-01-01 RX ORDER — AMOXICILLIN 250 MG
1 CAPSULE ORAL 2 TIMES DAILY PRN
Status: DISCONTINUED | OUTPATIENT
Start: 2023-01-01 | End: 2023-01-01 | Stop reason: HOSPADM

## 2023-01-01 RX ORDER — HYDROMORPHONE HYDROCHLORIDE 2 MG/1
2 TABLET ORAL
Qty: 30 TABLET | Refills: 0 | Status: SHIPPED | OUTPATIENT
Start: 2023-01-01

## 2023-01-01 RX ORDER — ACETAMINOPHEN 650 MG/1
650 SUPPOSITORY RECTAL EVERY 4 HOURS PRN
Qty: 4 SUPPOSITORY | Refills: 0 | Status: SHIPPED | OUTPATIENT
Start: 2023-01-01

## 2023-01-01 RX ORDER — TRAMADOL HYDROCHLORIDE 50 MG/1
50 TABLET ORAL EVERY 6 HOURS PRN
Qty: 10 TABLET | Refills: 0 | Status: ON HOLD | OUTPATIENT
Start: 2023-01-01 | End: 2023-01-01

## 2023-01-01 RX ORDER — LIDOCAINE HYDROCHLORIDE 10 MG/ML
10 INJECTION, SOLUTION EPIDURAL; INFILTRATION; INTRACAUDAL; PERINEURAL ONCE
Status: COMPLETED | OUTPATIENT
Start: 2023-01-01 | End: 2023-01-01

## 2023-01-01 RX ORDER — BUMETANIDE 1 MG/1
1 TABLET ORAL DAILY
Status: DISCONTINUED | OUTPATIENT
Start: 2023-01-01 | End: 2023-01-01 | Stop reason: HOSPADM

## 2023-01-01 RX ORDER — BUMETANIDE 1 MG/1
1 TABLET ORAL DAILY
Qty: 30 TABLET | Refills: 0 | Status: SHIPPED | OUTPATIENT
Start: 2023-01-01

## 2023-01-01 RX ORDER — TORSEMIDE 20 MG/1
20 TABLET ORAL DAILY
Status: DISCONTINUED | OUTPATIENT
Start: 2023-01-01 | End: 2023-01-01 | Stop reason: HOSPADM

## 2023-01-01 RX ORDER — DIGOXIN 125 MCG
62.5 TABLET ORAL DAILY
Qty: 30 TABLET | Refills: 0
Start: 2023-01-01

## 2023-01-01 RX ORDER — PANTOPRAZOLE SODIUM 40 MG/1
40 TABLET, DELAYED RELEASE ORAL
Status: DISCONTINUED | OUTPATIENT
Start: 2023-01-01 | End: 2023-01-01 | Stop reason: HOSPADM

## 2023-01-01 RX ORDER — TORSEMIDE 20 MG/1
20 TABLET ORAL DAILY
Qty: 90 TABLET | Refills: 0
Start: 2023-01-01 | End: 2023-01-01

## 2023-01-01 RX ORDER — NALOXONE HYDROCHLORIDE 0.4 MG/ML
0.4 INJECTION, SOLUTION INTRAMUSCULAR; INTRAVENOUS; SUBCUTANEOUS
Status: DISCONTINUED | OUTPATIENT
Start: 2023-01-01 | End: 2023-01-01 | Stop reason: HOSPADM

## 2023-01-01 RX ORDER — DIGOXIN 125 MCG
125 TABLET ORAL DAILY
Status: DISCONTINUED | OUTPATIENT
Start: 2023-01-01 | End: 2023-01-01 | Stop reason: HOSPADM

## 2023-01-01 RX ORDER — CEFTRIAXONE 2 G/1
2 INJECTION, POWDER, FOR SOLUTION INTRAMUSCULAR; INTRAVENOUS EVERY 24 HOURS
Status: DISCONTINUED | OUTPATIENT
Start: 2023-01-01 | End: 2023-01-01

## 2023-01-01 RX ORDER — FUROSEMIDE 20 MG
20 TABLET ORAL DAILY
Qty: 5 TABLET | Refills: 0 | Status: SHIPPED | OUTPATIENT
Start: 2023-01-01 | End: 2023-01-01

## 2023-01-01 RX ORDER — IOPAMIDOL 755 MG/ML
55 INJECTION, SOLUTION INTRAVASCULAR ONCE
Status: COMPLETED | OUTPATIENT
Start: 2023-01-01 | End: 2023-01-01

## 2023-01-01 RX ORDER — LIDOCAINE 40 MG/G
CREAM TOPICAL
Status: DISCONTINUED | OUTPATIENT
Start: 2023-01-01 | End: 2023-01-01 | Stop reason: HOSPADM

## 2023-01-01 RX ORDER — ACETAMINOPHEN 500 MG
1000 TABLET ORAL 3 TIMES DAILY
Status: DISCONTINUED | OUTPATIENT
Start: 2023-01-01 | End: 2023-01-01 | Stop reason: HOSPADM

## 2023-01-01 RX ORDER — LORAZEPAM 0.5 MG/1
0.5 TABLET ORAL EVERY 4 HOURS PRN
Qty: 15 TABLET | Refills: 0 | Status: SHIPPED | OUTPATIENT
Start: 2023-01-01

## 2023-01-01 RX ORDER — HYDROCORTISONE ACETATE 25 MG/1
25 SUPPOSITORY RECTAL 2 TIMES DAILY
Status: ON HOLD | COMMUNITY
End: 2023-01-01

## 2023-01-01 RX ORDER — NALOXONE HYDROCHLORIDE 0.4 MG/ML
0.2 INJECTION, SOLUTION INTRAMUSCULAR; INTRAVENOUS; SUBCUTANEOUS
Status: DISCONTINUED | OUTPATIENT
Start: 2023-01-01 | End: 2023-01-01 | Stop reason: HOSPADM

## 2023-01-01 RX ORDER — FUROSEMIDE 10 MG/ML
20 INJECTION INTRAMUSCULAR; INTRAVENOUS ONCE
Status: COMPLETED | OUTPATIENT
Start: 2023-01-01 | End: 2023-01-01

## 2023-01-01 RX ORDER — FUROSEMIDE 20 MG
20 TABLET ORAL DAILY
Qty: 5 TABLET | Refills: 0 | Status: ON HOLD | OUTPATIENT
Start: 2023-01-01 | End: 2023-01-01

## 2023-01-01 RX ORDER — FUROSEMIDE 40 MG
40 TABLET ORAL 2 TIMES DAILY
Status: ON HOLD | COMMUNITY
End: 2023-01-01

## 2023-01-01 RX ORDER — FAMOTIDINE 20 MG/1
20 TABLET, FILM COATED ORAL 2 TIMES DAILY
Qty: 28 TABLET | Refills: 0 | Status: SHIPPED | OUTPATIENT
Start: 2023-01-01 | End: 2023-01-01

## 2023-01-01 RX ORDER — HYDROXYZINE HYDROCHLORIDE 25 MG/1
25 TABLET, FILM COATED ORAL AT BEDTIME
Status: DISCONTINUED | OUTPATIENT
Start: 2023-01-01 | End: 2023-01-01 | Stop reason: HOSPADM

## 2023-01-01 RX ORDER — TRAMADOL HYDROCHLORIDE 50 MG/1
50 TABLET ORAL ONCE
Status: COMPLETED | OUTPATIENT
Start: 2023-01-01 | End: 2023-01-01

## 2023-01-01 RX ORDER — LORAZEPAM 2 MG/ML
0.25 CONCENTRATE ORAL EVERY 4 HOURS PRN
Qty: 30 ML | Refills: 0 | Status: SHIPPED | OUTPATIENT
Start: 2023-01-01 | End: 2023-01-01

## 2023-01-01 RX ORDER — KETOROLAC TROMETHAMINE 15 MG/ML
15 INJECTION, SOLUTION INTRAMUSCULAR; INTRAVENOUS ONCE
Status: COMPLETED | OUTPATIENT
Start: 2023-01-01 | End: 2023-01-01

## 2023-01-01 RX ORDER — ONDANSETRON 2 MG/ML
4 INJECTION INTRAMUSCULAR; INTRAVENOUS EVERY 6 HOURS PRN
Status: DISCONTINUED | OUTPATIENT
Start: 2023-01-01 | End: 2023-01-01 | Stop reason: HOSPADM

## 2023-01-01 RX ORDER — TRAMADOL HYDROCHLORIDE 50 MG/1
50 TABLET ORAL EVERY 6 HOURS PRN
Status: DISCONTINUED | OUTPATIENT
Start: 2023-01-01 | End: 2023-01-01 | Stop reason: HOSPADM

## 2023-01-01 RX ORDER — POLYETHYLENE GLYCOL 3350 17 G/17G
17 POWDER, FOR SOLUTION ORAL DAILY PRN
Status: ON HOLD | COMMUNITY
End: 2023-01-01

## 2023-01-01 RX ORDER — ALENDRONATE SODIUM 70 MG/1
70 TABLET ORAL
Status: DISCONTINUED | OUTPATIENT
Start: 2023-01-01 | End: 2023-01-01

## 2023-01-01 RX ORDER — MORPHINE SULFATE 100 MG/5ML
2.5 SOLUTION ORAL
Qty: 30 ML | Refills: 0 | Status: SHIPPED | OUTPATIENT
Start: 2023-01-01 | End: 2023-01-01

## 2023-01-01 RX ORDER — MAGNESIUM HYDROXIDE/ALUMINUM HYDROXICE/SIMETHICONE 120; 1200; 1200 MG/30ML; MG/30ML; MG/30ML
30 SUSPENSION ORAL 4 TIMES DAILY PRN
Status: DISCONTINUED | OUTPATIENT
Start: 2023-01-01 | End: 2023-01-01 | Stop reason: HOSPADM

## 2023-01-01 RX ORDER — MULTIPLE VITAMINS W/ MINERALS TAB 9MG-400MCG
1 TAB ORAL DAILY
Status: DISCONTINUED | OUTPATIENT
Start: 2023-01-01 | End: 2023-01-01 | Stop reason: HOSPADM

## 2023-01-01 RX ORDER — AMOXICILLIN 250 MG
1 CAPSULE ORAL DAILY PRN
Status: DISCONTINUED | OUTPATIENT
Start: 2023-01-01 | End: 2023-01-01 | Stop reason: HOSPADM

## 2023-01-01 RX ORDER — TORSEMIDE 20 MG/1
20 TABLET ORAL DAILY
Qty: 90 TABLET | Refills: 0 | Status: SHIPPED | OUTPATIENT
Start: 2023-01-01 | End: 2023-01-01

## 2023-01-01 RX ORDER — ONDANSETRON 4 MG/1
4 TABLET, ORALLY DISINTEGRATING ORAL EVERY 6 HOURS PRN
Status: DISCONTINUED | OUTPATIENT
Start: 2023-01-01 | End: 2023-01-01 | Stop reason: HOSPADM

## 2023-01-01 RX ORDER — ATROPINE SULFATE 10 MG/ML
1 SOLUTION/ DROPS OPHTHALMIC EVERY 4 HOURS PRN
Qty: 5 ML | Refills: 0 | Status: SHIPPED | OUTPATIENT
Start: 2023-01-01

## 2023-01-01 RX ORDER — POTASSIUM CHLORIDE 1500 MG/1
40 TABLET, EXTENDED RELEASE ORAL ONCE
Status: COMPLETED | OUTPATIENT
Start: 2023-01-01 | End: 2023-01-01

## 2023-01-01 RX ORDER — SENNOSIDES A AND B 8.6 MG/1
1 TABLET, FILM COATED ORAL 2 TIMES DAILY PRN
Status: DISCONTINUED | OUTPATIENT
Start: 2023-01-01 | End: 2023-01-01

## 2023-01-01 RX ORDER — MORPHINE SULFATE 30 MG/1
2.5 TABLET ORAL
Qty: 15 TABLET | Refills: 0 | Status: SHIPPED | OUTPATIENT
Start: 2023-01-01 | End: 2023-01-01

## 2023-01-01 RX ORDER — AMOXICILLIN 250 MG
2 CAPSULE ORAL 2 TIMES DAILY PRN
Status: DISCONTINUED | OUTPATIENT
Start: 2023-01-01 | End: 2023-01-01 | Stop reason: HOSPADM

## 2023-01-01 RX ORDER — CETIRIZINE HYDROCHLORIDE 10 MG/1
10 TABLET ORAL AT BEDTIME
Status: DISCONTINUED | OUTPATIENT
Start: 2023-01-01 | End: 2023-01-01

## 2023-01-01 RX ORDER — AMOXICILLIN 250 MG
1 CAPSULE ORAL DAILY PRN
Status: ON HOLD | COMMUNITY
End: 2023-01-01

## 2023-01-01 RX ORDER — POTASSIUM CHLORIDE 1500 MG/1
20 TABLET, EXTENDED RELEASE ORAL DAILY
Status: DISCONTINUED | OUTPATIENT
Start: 2023-01-01 | End: 2023-01-01 | Stop reason: HOSPADM

## 2023-01-01 RX ORDER — FUROSEMIDE 10 MG/ML
40 INJECTION INTRAMUSCULAR; INTRAVENOUS EVERY 12 HOURS
Status: DISCONTINUED | OUTPATIENT
Start: 2023-01-01 | End: 2023-01-01

## 2023-01-01 RX ORDER — FUROSEMIDE 20 MG
20 TABLET ORAL DAILY
Qty: 90 TABLET | Refills: 3 | Status: ON HOLD | OUTPATIENT
Start: 2023-01-01 | End: 2023-01-01

## 2023-01-01 RX ORDER — FUROSEMIDE 10 MG/ML
40 INJECTION INTRAMUSCULAR; INTRAVENOUS
Status: COMPLETED | OUTPATIENT
Start: 2023-01-01 | End: 2023-01-01

## 2023-01-01 RX ORDER — LACTOBACILLUS RHAMNOSUS GG 10B CELL
1 CAPSULE ORAL DAILY
Status: ON HOLD | COMMUNITY
End: 2023-01-01

## 2023-01-01 RX ORDER — SIMETHICONE 80 MG
80 TABLET,CHEWABLE ORAL 4 TIMES DAILY PRN
Status: DISCONTINUED | OUTPATIENT
Start: 2023-01-01 | End: 2023-01-01 | Stop reason: HOSPADM

## 2023-01-01 RX ORDER — POLYETHYLENE GLYCOL 3350 17 G/17G
17 POWDER, FOR SOLUTION ORAL DAILY PRN
Status: DISCONTINUED | OUTPATIENT
Start: 2023-01-01 | End: 2023-01-01 | Stop reason: HOSPADM

## 2023-01-01 RX ORDER — CEFTRIAXONE 1 G/1
1 INJECTION, POWDER, FOR SOLUTION INTRAMUSCULAR; INTRAVENOUS ONCE
Status: COMPLETED | OUTPATIENT
Start: 2023-01-01 | End: 2023-01-01

## 2023-01-01 RX ORDER — ACETAMINOPHEN 500 MG
1000 TABLET ORAL ONCE
Status: COMPLETED | OUTPATIENT
Start: 2023-01-01 | End: 2023-01-01

## 2023-01-01 RX ORDER — VITAMIN B COMPLEX
25 TABLET ORAL DAILY
Status: DISCONTINUED | OUTPATIENT
Start: 2023-01-01 | End: 2023-01-01 | Stop reason: HOSPADM

## 2023-01-01 RX ORDER — BISACODYL 10 MG
10 SUPPOSITORY, RECTAL RECTAL DAILY PRN
Status: DISCONTINUED | OUTPATIENT
Start: 2023-01-01 | End: 2023-01-01 | Stop reason: HOSPADM

## 2023-01-01 RX ADMIN — ACETAMINOPHEN 1000 MG: 500 TABLET ORAL at 08:13

## 2023-01-01 RX ADMIN — ACETAMINOPHEN 1000 MG: 500 TABLET ORAL at 21:53

## 2023-01-01 RX ADMIN — BUMETANIDE 1 MG: 1 TABLET ORAL at 09:02

## 2023-01-01 RX ADMIN — DIGOXIN 62.5 MCG: 125 TABLET ORAL at 09:02

## 2023-01-01 RX ADMIN — FUROSEMIDE 40 MG: 10 INJECTION, SOLUTION INTRAVENOUS at 23:28

## 2023-01-01 RX ADMIN — ACETAMINOPHEN 1000 MG: 500 TABLET ORAL at 16:15

## 2023-01-01 RX ADMIN — LIDOCAINE HYDROCHLORIDE 10 ML: 10 INJECTION, SOLUTION EPIDURAL; INFILTRATION; INTRACAUDAL; PERINEURAL at 11:50

## 2023-01-01 RX ADMIN — ACETAMINOPHEN 1000 MG: 500 TABLET ORAL at 22:07

## 2023-01-01 RX ADMIN — APIXABAN 2.5 MG: 2.5 TABLET, FILM COATED ORAL at 08:14

## 2023-01-01 RX ADMIN — FUROSEMIDE 5 MG/HR: 10 INJECTION, SOLUTION INTRAMUSCULAR; INTRAVENOUS at 03:52

## 2023-01-01 RX ADMIN — Medication 25 MCG: at 09:16

## 2023-01-01 RX ADMIN — TRAMADOL HYDROCHLORIDE 50 MG: 50 TABLET, COATED ORAL at 21:20

## 2023-01-01 RX ADMIN — APIXABAN 2.5 MG: 2.5 TABLET, FILM COATED ORAL at 09:29

## 2023-01-01 RX ADMIN — ACETAMINOPHEN 1000 MG: 500 TABLET ORAL at 17:41

## 2023-01-01 RX ADMIN — APIXABAN 2.5 MG: 2.5 TABLET, FILM COATED ORAL at 21:53

## 2023-01-01 RX ADMIN — DIGOXIN 125 MCG: 125 TABLET ORAL at 08:16

## 2023-01-01 RX ADMIN — SENNOSIDES AND DOCUSATE SODIUM 1 TABLET: 50; 8.6 TABLET ORAL at 17:18

## 2023-01-01 RX ADMIN — PANTOPRAZOLE SODIUM 40 MG: 40 TABLET, DELAYED RELEASE ORAL at 06:10

## 2023-01-01 RX ADMIN — PANTOPRAZOLE SODIUM 40 MG: 40 TABLET, DELAYED RELEASE ORAL at 07:07

## 2023-01-01 RX ADMIN — FUROSEMIDE 7.5 MG/HR: 10 INJECTION, SOLUTION INTRAMUSCULAR; INTRAVENOUS at 20:31

## 2023-01-01 RX ADMIN — APIXABAN 2.5 MG: 2.5 TABLET, FILM COATED ORAL at 09:43

## 2023-01-01 RX ADMIN — PANTOPRAZOLE SODIUM 40 MG: 40 TABLET, DELAYED RELEASE ORAL at 09:16

## 2023-01-01 RX ADMIN — KETOROLAC TROMETHAMINE 15 MG: 15 INJECTION, SOLUTION INTRAMUSCULAR; INTRAVENOUS at 18:45

## 2023-01-01 RX ADMIN — FUROSEMIDE 40 MG: 10 INJECTION, SOLUTION INTRAVENOUS at 16:12

## 2023-01-01 RX ADMIN — ACETAMINOPHEN 1000 MG: 500 TABLET ORAL at 09:29

## 2023-01-01 RX ADMIN — Medication 25 MCG: at 08:15

## 2023-01-01 RX ADMIN — POTASSIUM CHLORIDE 20 MEQ: 1500 TABLET, EXTENDED RELEASE ORAL at 08:13

## 2023-01-01 RX ADMIN — ACETAMINOPHEN 1000 MG: 500 TABLET ORAL at 23:29

## 2023-01-01 RX ADMIN — APIXABAN 2.5 MG: 2.5 TABLET, FILM COATED ORAL at 09:02

## 2023-01-01 RX ADMIN — DIGOXIN 62.5 MCG: 125 TABLET ORAL at 09:43

## 2023-01-01 RX ADMIN — SODIUM CHLORIDE 60 ML: 9 INJECTION, SOLUTION INTRAVENOUS at 19:43

## 2023-01-01 RX ADMIN — IOPAMIDOL 55 ML: 755 INJECTION, SOLUTION INTRAVENOUS at 19:43

## 2023-01-01 RX ADMIN — APIXABAN 2.5 MG: 2.5 TABLET, FILM COATED ORAL at 21:20

## 2023-01-01 RX ADMIN — METOPROLOL SUCCINATE 12.5 MG: 25 TABLET, EXTENDED RELEASE ORAL at 20:34

## 2023-01-01 RX ADMIN — ACETAMINOPHEN 1000 MG: 500 TABLET ORAL at 09:16

## 2023-01-01 RX ADMIN — DIGOXIN 125 MCG: 125 TABLET ORAL at 09:16

## 2023-01-01 RX ADMIN — TORSEMIDE 20 MG: 20 TABLET ORAL at 09:15

## 2023-01-01 RX ADMIN — PANTOPRAZOLE SODIUM 40 MG: 40 TABLET, DELAYED RELEASE ORAL at 09:43

## 2023-01-01 RX ADMIN — METOPROLOL SUCCINATE 12.5 MG: 25 TABLET, EXTENDED RELEASE ORAL at 21:20

## 2023-01-01 RX ADMIN — ACETAMINOPHEN 1000 MG: 500 TABLET ORAL at 20:34

## 2023-01-01 RX ADMIN — FUROSEMIDE 5 MG/HR: 10 INJECTION, SOLUTION INTRAMUSCULAR; INTRAVENOUS at 10:27

## 2023-01-01 RX ADMIN — MULTIPLE VITAMINS W/ MINERALS TAB 1 TABLET: TAB at 16:15

## 2023-01-01 RX ADMIN — SODIUM CHLORIDE 200 ML: 9 INJECTION, SOLUTION INTRAVENOUS at 01:12

## 2023-01-01 RX ADMIN — ACETAMINOPHEN 1000 MG: 500 TABLET ORAL at 14:24

## 2023-01-01 RX ADMIN — APIXABAN 2.5 MG: 2.5 TABLET, FILM COATED ORAL at 20:34

## 2023-01-01 RX ADMIN — FUROSEMIDE 40 MG: 10 INJECTION, SOLUTION INTRAVENOUS at 08:13

## 2023-01-01 RX ADMIN — TRAMADOL HYDROCHLORIDE 50 MG: 50 TABLET, COATED ORAL at 20:02

## 2023-01-01 RX ADMIN — ACETAMINOPHEN 1000 MG: 500 TABLET ORAL at 17:17

## 2023-01-01 RX ADMIN — POTASSIUM CHLORIDE 20 MEQ: 1500 TABLET, EXTENDED RELEASE ORAL at 09:17

## 2023-01-01 RX ADMIN — PANTOPRAZOLE SODIUM 40 MG: 40 TABLET, DELAYED RELEASE ORAL at 09:02

## 2023-01-01 RX ADMIN — FUROSEMIDE 20 MG: 10 INJECTION, SOLUTION INTRAMUSCULAR; INTRAVENOUS at 18:32

## 2023-01-01 RX ADMIN — ACETAMINOPHEN 1000 MG: 500 TABLET ORAL at 18:32

## 2023-01-01 RX ADMIN — APIXABAN 2.5 MG: 2.5 TABLET, FILM COATED ORAL at 22:35

## 2023-01-01 RX ADMIN — SENNOSIDES AND DOCUSATE SODIUM 2 TABLET: 50; 8.6 TABLET ORAL at 21:39

## 2023-01-01 RX ADMIN — BISACODYL 10 MG: 10 SUPPOSITORY RECTAL at 19:01

## 2023-01-01 RX ADMIN — POTASSIUM CHLORIDE 40 MEQ: 1500 TABLET, EXTENDED RELEASE ORAL at 09:29

## 2023-01-01 RX ADMIN — HYDROXYZINE HYDROCHLORIDE 25 MG: 25 TABLET, FILM COATED ORAL at 20:34

## 2023-01-01 RX ADMIN — METOPROLOL SUCCINATE 12.5 MG: 25 TABLET, EXTENDED RELEASE ORAL at 21:53

## 2023-01-01 RX ADMIN — ACETAMINOPHEN 1000 MG: 500 TABLET ORAL at 09:43

## 2023-01-01 RX ADMIN — METOPROLOL SUCCINATE 12.5 MG: 25 TABLET, EXTENDED RELEASE ORAL at 22:35

## 2023-01-01 RX ADMIN — APIXABAN 2.5 MG: 2.5 TABLET, FILM COATED ORAL at 23:28

## 2023-01-01 RX ADMIN — APIXABAN 2.5 MG: 2.5 TABLET, FILM COATED ORAL at 09:15

## 2023-01-01 RX ADMIN — METOPROLOL SUCCINATE 12.5 MG: 25 TABLET, EXTENDED RELEASE ORAL at 23:29

## 2023-01-01 RX ADMIN — ACETAMINOPHEN 1000 MG: 500 TABLET ORAL at 09:02

## 2023-01-01 RX ADMIN — CEFTRIAXONE SODIUM 1 G: 1 INJECTION, POWDER, FOR SOLUTION INTRAMUSCULAR; INTRAVENOUS at 19:48

## 2023-01-01 RX ADMIN — DIGOXIN 62.5 MCG: 125 TABLET ORAL at 09:29

## 2023-01-01 RX ADMIN — ACETAMINOPHEN 1000 MG: 500 TABLET ORAL at 22:35

## 2023-01-01 RX ADMIN — SENNOSIDES AND DOCUSATE SODIUM 1 TABLET: 50; 8.6 TABLET ORAL at 18:33

## 2023-01-01 RX ADMIN — ALUMINUM HYDROXIDE, MAGNESIUM HYDROXIDE, AND SIMETHICONE 30 ML: 200; 200; 20 SUSPENSION ORAL at 01:59

## 2023-01-01 ASSESSMENT — ENCOUNTER SYMPTOMS
ARTHRALGIAS: 1
COUGH: 0
SHORTNESS OF BREATH: 0
ABDOMINAL PAIN: 1

## 2023-01-01 ASSESSMENT — ACTIVITIES OF DAILY LIVING (ADL)
ADLS_ACUITY_SCORE: 45
ADLS_ACUITY_SCORE: 35
HEARING_DIFFICULTY_OR_DEAF: YES
ADLS_ACUITY_SCORE: 47
ADLS_ACUITY_SCORE: 29
ADLS_ACUITY_SCORE: 45
ADLS_ACUITY_SCORE: 32
ADLS_ACUITY_SCORE: 47
EQUIPMENT_CURRENTLY_USED_AT_HOME: WALKER, STANDARD
DIFFICULTY_COMMUNICATING: NO
THE_FOLLOWING_AIDS_WERE_PROVIDED;: POCKET TALKER
ADLS_ACUITY_SCORE: 47
DESCRIBE_HEARING_LOSS: HEARING LOSS ON RIGHT SIDE;HEARING LOSS ON LEFT SIDE
ADLS_ACUITY_SCORE: 47
WEAR_GLASSES_OR_BLIND: YES
ADLS_ACUITY_SCORE: 47
ADLS_ACUITY_SCORE: 26
DRESSING/BATHING_DIFFICULTY: YES
ADLS_ACUITY_SCORE: 35
ADLS_ACUITY_SCORE: 47
WALKING_OR_CLIMBING_STAIRS_DIFFICULTY: OTHER (SEE COMMENTS)
FALL_HISTORY_WITHIN_LAST_SIX_MONTHS: NO
ADLS_ACUITY_SCORE: 35
ADLS_ACUITY_SCORE: 44
ADLS_ACUITY_SCORE: 33
WALKING_OR_CLIMBING_STAIRS_DIFFICULTY: YES
ADLS_ACUITY_SCORE: 35
DIFFICULTY_EATING/SWALLOWING: NO
ADLS_ACUITY_SCORE: 47
ADLS_ACUITY_SCORE: 35
ADLS_ACUITY_SCORE: 35
FALL_HISTORY_WITHIN_LAST_SIX_MONTHS: NO
ADLS_ACUITY_SCORE: 47
ADLS_ACUITY_SCORE: 43
USE_OF_HEARING_ASSISTIVE_DEVICES: LEFT HEARING AID;RIGHT HEARING AID
ADLS_ACUITY_SCORE: 35
ADLS_ACUITY_SCORE: 45
ADLS_ACUITY_SCORE: 29
ADLS_ACUITY_SCORE: 35
DOING_ERRANDS_INDEPENDENTLY_DIFFICULTY: NO
WALKING_OR_CLIMBING_STAIRS: AMBULATION DIFFICULTY, REQUIRES EQUIPMENT
ADLS_ACUITY_SCORE: 47
TOILETING_ISSUES: OTHER (SEE COMMENTS)
CONCENTRATING,_REMEMBERING_OR_MAKING_DECISIONS_DIFFICULTY: NO
ADLS_ACUITY_SCORE: 44
ADLS_ACUITY_SCORE: 36
EQUIPMENT_CURRENTLY_USED_AT_HOME: WALKER, STANDARD
ADLS_ACUITY_SCORE: 35
TOILETING_ISSUES: NO
ADLS_ACUITY_SCORE: 47
ADLS_ACUITY_SCORE: 45
ADLS_ACUITY_SCORE: 47
DIFFICULTY_EATING/SWALLOWING: NO
ADLS_ACUITY_SCORE: 47
CHANGE_IN_FUNCTIONAL_STATUS_SINCE_ONSET_OF_CURRENT_ILLNESS/INJURY: NO
ADLS_ACUITY_SCORE: 47
ADLS_ACUITY_SCORE: 35
ADLS_ACUITY_SCORE: 28
DRESSING/BATHING_DIFFICULTY: NO
DIFFICULTY_COMMUNICATING: NO
ADLS_ACUITY_SCORE: 43
ADLS_ACUITY_SCORE: 47
ADLS_ACUITY_SCORE: 26
ADLS_ACUITY_SCORE: 45
CONCENTRATING,_REMEMBERING_OR_MAKING_DECISIONS_DIFFICULTY: YES
CHANGE_IN_FUNCTIONAL_STATUS_SINCE_ONSET_OF_CURRENT_ILLNESS/INJURY: NO
TOILETING_ASSISTANCE: TOILETING DIFFICULTY, ASSISTANCE 1 PERSON
DRESSING/BATHING: BATHING DIFFICULTY, ASSISTANCE 1 PERSON
ADLS_ACUITY_SCORE: 32
ADLS_ACUITY_SCORE: 47
PATIENT'S_PREFERRED_MEANS_OF_COMMUNICATION: VERBAL
ADLS_ACUITY_SCORE: 47
WERE_AUXILIARY_AIDS_OFFERED?: YES
ADLS_ACUITY_SCORE: 45
VISION_MANAGEMENT: READERS
ADLS_ACUITY_SCORE: 47
ADLS_ACUITY_SCORE: 35
WEAR_GLASSES_OR_BLIND: YES
ADLS_ACUITY_SCORE: 32
ADLS_ACUITY_SCORE: 45
ADLS_ACUITY_SCORE: 28
ADLS_ACUITY_SCORE: 35
ADLS_ACUITY_SCORE: 32
DOING_ERRANDS_INDEPENDENTLY_DIFFICULTY: YES
ADLS_ACUITY_SCORE: 28
ADLS_ACUITY_SCORE: 47
ADLS_ACUITY_SCORE: 35
ADLS_ACUITY_SCORE: 45
ADLS_ACUITY_SCORE: 40

## 2023-04-03 NOTE — ED PROVIDER NOTES
ED ATTENDING PHYSICIAN NOTE:   I evaluated this patient in conjunction with Renée Bowser PA-C  I have participated in the care of the patient and personally performed key elements of the history, exam, and medical decision making.      HPI:   Elizabeth Ruggiero is a 89 year old female with history of CHF, spinal stenosis, atrial fibrillation on Eliquis and digoxin, DVT, hypertension, and pulmonary hypertension who presents for evaluation after an episode of  chest pain which is now resolved. The patient states that she was at home this morning when she developed aching chest pain after eating. The pain was across the lower chest/upper abdomen. States that the pain encompassed her chest and wraps around her back bilaterally and goes up to her left shoulder. No shortness of breath, abdominal discomfort, nausea, or vomiting. No diaphoresis or urinary symptoms. Patient reports recent increased heartburn recently. Symptoms have now completely resolved.     EXAM:   General: Well appearing, nontoxic. Resting comfortably  Head:  Scalp, face, and head appear normal  Eyes:  Pupils are equal, round    Conjunctivae non-injected and sclerae white  ENT:    The external nose is normal    Pinnae are normal  Neck:  Normal range of motion    There is no rigidity noted    Trachea is in the midline  CV:  Regular rate and rhythm     Normal S1/S2, no S3/S4    4/6 systolic murmur across the precordium. No rub. Radial pulses 2+ bilaterally.  Resp:  Lungs are clear and equal bilaterally  There is no tachypnea    No increased work of breathing    No rales, wheezing, or rhonchi  GI:  Abdomen is soft, no rigidity or guarding    No distension, or mass    No tenderness or rebound tenderness   MS:  Normal muscular tone    Symmetric motor strength    Trace bilateral lower extremity edema  Skin:  No rash or acute skin lesions noted  Neuro: Awake and alert  Speech is normal and fluent  Moves all extremities spontaneously  Psych:  Normal affect.  Appropriate interactions.       MEDICAL DECISION MAKING/ASSESSMENT AND PLAN:   Elizabeth Ruggiero is a 89 year old female who presents for evaluation of an episode of chest and back pain which is now resolved. On my evaluation the patient is well appearing, hemodynamically stable and afebrile. ED evaluation is reassuring. Symptoms were atypical for ACS. ECG without ischemia or dysrhythmia.  Laboratory evaluation is reassuring.  Lipase, LFTs, bilirubin are normal.  CBC is unremarkable.  Patient has known history of congestive heart failure.  Clinically she has no evidence to suggest CHF exacerbation.  No evidence of volume overload or shortness of breath.  BNP is decreased from prior.  High-sensitivity troponin is minimally elevated but stable.  The overall findings are not consistent with an acute coronary syndrome.  No digoxin toxicity.  Chest x-ray without evidence of pneumothorax, worsened pleural effusion, pulmonary edema, pneumonia or other acute findings.  Etiology for the patient's symptoms is favored to be musculoskeletal versus GERD/esophageal spasm.  No indication for hospitalization at this time.  Importance of close outpatient primary care follow-up was stressed.  Patient was discharged in stable condition.     DIAGNOSIS:     ICD-10-CM    1. Chest pain  R07.9           DISPOSITION:   Discharged to home    4/3/2023  [facility] EMERGENCY DEPT    Scribe Disclosure:  I, Birgit Reynolds, am serving as a scribe at 12:54 PM on 4/3/2023 to document services personally performed by Manpreet Marroquin MD based on my observations and the provider's statements to me.     Manpreet Marroquin MD  04/03/23 [id number]

## 2023-04-03 NOTE — ED PROVIDER NOTES
History     Chief Complaint:  Abdominal Pain     HPI   Elizabeth Ruggiero is a 89 year old female with a history of CHF, spinal stenosis, atrial fibrillation on eliquis and digoxin, hx of DVT, HTN, pulmonary HTN, who presents with chest pain. Patient states she was at her home this morning and developed severe achy chest pain after eating. States she thought it was abdominal initially, had normal BM, and pain continued. Denies history of heartburn. States that pain encompasses her chest, wraps around her spine bilaterally, and radiates up into neck. Denies shortness of breath, abdominal discomfort, nausea, vomiting, diaphoresis, urinary symptoms.     Independent Historian:   Daughter - They report that patient's respiratory rate and heartrate were significantly elevated at patient's residential facility. States that patient has history of blood clots and was on coumadin up until recently when she was changed to eliquis.     Review of External Notes: Reviewed cardiology note from 11/21/232    Allergies:  No Known Drug Allergies     Medications:    Alendronate  Calcium carbonate  Cetirizine  Cholecalciferol  Digoxin  Eliquis  Hydroxyzine  Melatonin  Metoprolol succinate  Lasix  Diltiazem  Omeprazole  Potassium chloride  Senna  Simethicone  Torsemide    Past Medical History:    HFpEF  Acute diastolic CHF  Acute left hemiparesis  Arthritis  Atrial fibrillation with RVR  Atrial flutter  Community acquired pneumonia  DVT  Hepatic congestion  Hypertension  Osteoporosis  Hyperlipidemia  Mitral valve insufficiency  Severe mitral regurgitation  TIA  Vitamin D deficiency    Past Surgical History:    Appendectomy  Repair left femur fracture    Family History:    Breast cancer  Colon cancer  Diabetes mellitus    Social History:  The patient presented with daughter  The patient arrived via EMS.  PCP: Sonu Reece     Physical Exam     Patient Vitals for the past 24 hrs:   BP Temp Temp src Pulse Resp SpO2 Height Weight   04/03/23  "1500 113/76 -- -- -- -- -- -- --   04/03/23 1242 105/56 97.9  F (36.6  C) Oral 71 20 98 % 1.626 m (5' 4\") 46.3 kg (102 lb)      Physical Exam  General: Alert and cooperative with exam. Patient in no apparent distress. Normal mentation.  Head:  Scalp is NC/AT  Eyes:  No scleral icterus, PERRL  ENT:  The external nose and ears are normal. The oropharynx is normal and without erythema; mucus membranes are moist. Uvula midline, no evidence of deep space infection.  Neck:  Normal range of motion without rigidity.  CV:  Regular rate and rhythm    Significant systolic murmur noted  Resp:  Breath sounds are clear bilaterally    Non-labored, no retractions or accessory muscle use  GI:  Abdomen is soft, no distension, no tenderness. No peritoneal signs  MS:  No lower extremity edema   Skin:  Warm and dry, No rash or lesions noted.  Neuro:  Oriented x 3. No gross motor deficits.      Emergency Department Course   ECG  ECG results from 04/03/23   EKG 12-lead, tracing only     Value    Systolic Blood Pressure     Diastolic Blood Pressure     Ventricular Rate 60    Atrial Rate     AZ Interval     QRS Duration 72        QTc 370    P Axis     R AXIS 97    T Axis -6    Interpretation ECG      Atrial fibrillation  Lateral infarct (cited on or before 09-OCT-2020)  Abnormal ECG  Nonspecific ST changes     Imaging:  Chest XR,  PA & LAT   Final Result   IMPRESSION: Minimal right pleural effusion similar to previous. There   are no acute infiltrates. The cardiac silhouette is markedly enlarged   but similar to previous. Pulmonary vasculature is unremarkable.      RANJIT KING MD            SYSTEM ID:  [id number]         Report per radiology    Laboratory:  Labs Ordered and Resulted from Time of ED Arrival to Time of ED Departure   COMPREHENSIVE METABOLIC PANEL - Abnormal       Result Value    Sodium 137      Potassium 4.5      Chloride 101      Carbon Dioxide (CO2) 25      Anion Gap 11      Urea Nitrogen 33.0 (*)     Creatinine 0.82  "     Calcium 9.6      Glucose 144 (*)     Alkaline Phosphatase 67      AST        ALT 25      Protein Total 6.6      Albumin 4.0      Bilirubin Total 1.2      GFR Estimate 65     CBC WITH PLATELETS AND DIFFERENTIAL - Abnormal    WBC Count 8.9      RBC Count 3.80      Hemoglobin 12.4      Hematocrit 38.4       (*)     MCH 32.6      MCHC 32.3      RDW 14.1      Platelet Count 154      % Neutrophils 80      % Lymphocytes 12      % Monocytes 7      % Eosinophils 1      % Basophils 0      % Immature Granulocytes 0      NRBCs per 100 WBC 0      Absolute Neutrophils 7.1      Absolute Lymphocytes 1.1      Absolute Monocytes 0.6      Absolute Eosinophils 0.1      Absolute Basophils 0.0      Absolute Immature Granulocytes 0.0      Absolute NRBCs 0.0     TROPONIN T, HIGH SENSITIVITY - Abnormal    Troponin T, High Sensitivity 49 (*)    NT PROBNP INPATIENT - Abnormal    N terminal Pro BNP Inpatient 3,749 (*)    TROPONIN T, HIGH SENSITIVITY - Abnormal    Troponin T, High Sensitivity 48 (*)    LIPASE - Normal    Lipase 28     DIGOXIN LEVEL - Normal    Digoxin 1.3        Emergency Department Course & Assessments:       Independent Interpretation (X-rays, CTs, rhythm strip):  CXR - enlarged heart and slightly reduced effusion    Consultations/Discussion of Management or Tests:  ED Course as of 04/03/23 1657   Mon Apr 03, 2023   1254 I obtained history and examined the patient as noted above.   1311 Initial assessment   1427 Reassessed with Dr. Marroquin     Social Determinants of Health affecting care:   None    Disposition:  The patient was discharged to home.     Impression & Plan      Medical Decision Making:  Elizabeth Ruggiero presents with chest pain.  The work up in the Emergency Department is negative.  The differential diagnosis of chest pain is broad and includes life threatening etiologies such as Acute coronary syndrome, Myocardial infarction, Pulmonary Embolism, and Acute Aortic Dissection.  Other causes may include  pneumonia, pneumothorax, pericarditis, pleurisy, and esophageal spasm.  No serious etiology for the chest pain was detected today during this visit.  CXR negative for worsening effusion or opacities to suggest pneumonia. Initial trop elevated, delta trop stable, likely chronically elevated in setting of CHF. Patient described her pain as more epigastric with upward radiation into chest, could be heartburn as cause of symptoms. Rx for pepcid 2 week course sent to patient's pharmacy. Provided instructions to daughter with reasons to return to ER such as CHF symptoms of shortness of breath or fluid overload, exertional chest pain, or abdominal pain that does not resolve.    Close follow up with primary care is indicated should the pain continue, as further work up may be performed; this was made clear to Elizabeth, who understands.      Diagnosis:    ICD-10-CM    1. Chest pain  R07.9          Discharge Medications:  Discharge Medication List as of 4/3/2023  4:03 PM      START taking these medications    Details   famotidine (PEPCID) 20 MG tablet Take 1 tablet (20 mg) by mouth 2 times daily for 14 days, Disp-28 tablet, R-0, E-Prescribe            Scribe Disclosure:  I, Birgit Reynolds, am serving as a scribe at 12:58 PM on 4/3/2023 to document services personally performed by Renée Bowser PA-C based on my observations and the provider's statements to me.      4/3/2023   JESSICA Parisi Lauren R, PA-C  04/03/23 7524

## 2023-04-03 NOTE — ED TRIAGE NOTES
abd pain across upper abd around to back starting today after eating - pt called daughter who called EMS patient coming from NH - pt denies any nausea vomiting or diarrhea denies any urinary problems        Triage Assessment     Row Name 04/03/23 1237       Triage Assessment (Adult)    Airway WDL WDL       Respiratory WDL    Respiratory WDL WDL       Cardiac WDL    Cardiac WDL WDL       Cognitive/Neuro/Behavioral WDL    Cognitive/Neuro/Behavioral WDL WDL

## 2023-04-03 NOTE — DISCHARGE INSTRUCTIONS
I have sent pepcid to your pharmacy to be taken twice daily for two weeks. These can be taken with or without meals. Please follow up with your PCP for ongoing follow up. If you experience worsening symptoms such as exertional chest pain, lasting pain, severe abdominal pain, please return to ER.

## 2023-05-03 PROBLEM — J90 PLEURAL EFFUSION, RIGHT: Status: ACTIVE | Noted: 2023-01-01

## 2023-05-03 PROBLEM — E87.1 HYPONATREMIA: Status: ACTIVE | Noted: 2020-10-09

## 2023-05-03 NOTE — ED PROVIDER NOTES
History     Chief Complaint:  Leg Swelling and Shoulder Pain (right)     The history is provided by the patient.      Elizabeth Ruggiero is a 89 year old female with a history of CHF, atrial fibrillation, DVT, hypertension, hyperlipidemia, and TIA who presents via EMS from the [facility] with persisting right shoulder pain for the last month and increased leg swelling. The patient was seen earlier today by a provider at the facility who wanted her to be evaluated at the ED due to concern for a possible heart problem. Presently, the patient denies any prior injuries to the shoulder and notes having bilateral leg swelling for some time. She is unsure if it is worse today.  The shoulder pain is no worse today than it has been for the last month.  She also denies any shortness of breath, cough, chest pain or pressure. She has also been having some intermittent abdominal pain but states that this has been ongoing for months.     Independent Historian:   None - Patient Only    Review of External Notes: None    ROS:  Review of Systems   Respiratory: Negative for cough and shortness of breath.    Cardiovascular: Positive for leg swelling. Negative for chest pain (or pressure).   Gastrointestinal: Positive for abdominal pain.   Musculoskeletal: Positive for arthralgias.   All other systems reviewed and are negative.    Allergies:  The patient has no known allergies.    Medications:    Alendronate  Calcium carbonate  Cetirizine  Cholecalciferol  Digoxin  Eliquis  Hydroxyzine  Melatonin  Metoprolol succinate  Lasix  Diltiazem  Omeprazole  Potassium chloride  Senna  Simethicone  Torsemide    Past Medical History:    HFpEF  Acute diastolic CHF  Acute left hemiparesis  Arthritis  Atrial fibrillation with RVR  Atrial flutter  Community acquired pneumonia  DVT  Hepatic congestion  Hypertension  Osteoporosis  Hyperlipidemia  Mitral valve insufficiency  Severe mitral regurgitation  TIA  Vitamin D deficiency    Past Surgical History:   "  Appendectomy  Repair left femur fracture    Family History:    Breast cancer  Colon cancer  Diabetes mellitus    Social History:  The patient presents to the ED alone.  The patient arrived to the ED via EMS.  The patient lives at the [facility].  PCP: Sonu Reece     Physical Exam     Patient Vitals for the past 24 hrs:   BP Temp Temp src Pulse Resp SpO2 Height Weight   05/03/23 2000 119/79 -- -- 69 22 96 % -- --   05/03/23 1716 116/77 -- -- 70 14 93 % -- --   05/03/23 1551 102/68 97.6  F (36.4  C) Oral 68 14 99 % 1.676 m (5' 6\") 50.3 kg (111 lb)      Physical Exam  General: Laying on the ED bed, no distress  HEENT: Normocephalic, atraumatic  Cardiac: Radial pulses 2+, regular rate and rhythm  Pulm: Breathing comfortably, no accessory muscle usage, no conversational dyspnea, and lungs clear bilaterally  GI: Abdomen soft, nontender, no rigidity or guarding  MSK: No deformities, right shoulder and clavicle without bony tenderness or deformity or crepitus and no overlying skin changes or wounds, trace to 1+ pitting edema BLE  Skin: Warm and dry  Neuro: Moves all extremities  Psych: Normal mood and affect     Emergency Department Course   ECG  ECG results from 05/03/23   EKG 12-lead, tracing only     Value    Systolic Blood Pressure     Diastolic Blood Pressure     Ventricular Rate 69    Atrial Rate     MS Interval     QRS Duration 74        QTc 353    P Axis     R AXIS 102    T Axis 100    Interpretation ECG      Atrial fibrillation  Lateral infarct (cited on or before 09-OCT-2020)  Abnormal ECG  When compared with ECG of 03-APR-2023 13:36,  No significant change           Imaging:  CT Chest/Abdomen/Pelvis w Contrast   Final Result   IMPRESSION:    1.  No evidence of malignancy within the chest, abdomen, or pelvis.   2.  Moderate-sized right pleural effusion and tiny left pleural effusion.   3.  Atelectasis versus developing infiltrate within the dependent right lower lobe.   4.  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 An index nodule in the lateral segment measures up to 6 mm and warrants follow-up according to Fleischner criteria, as detailed below.   5.  Distended urinary bladder.   6.  Cardiomegaly.      2017 Fleischner Society Recommendations for Solid Pulmonary Nodules      Nodule size greater than 6 mm up to 8 mm:      Single nodule:      Low risk: CT at 6-12 months, then consider CT at 18-24 months   High risk: CT at 6-12 months, then CT at 18-24 months      Multiple nodules:      Low risk: CT at 3-6 months, then consider CT at 18-24 months   High risk: CT at 3-6 months, then CT at 18-24 months      XR Shoulder Right G/E 3 Views   Final Result   IMPRESSION: No acute fracture or malalignment. There is normal glenohumeral joint spacing. Moderate to severe acromioclavicular joint degenerative changes. Cortical irregularity at the greater tuberosity suggests chronic rotator cuff pathology.    Osteopenia. Aortic atherosclerosis. Partially imaged right pleural effusion. Background of chronic lung changes.      XR Chest 2 Views   Final Result   IMPRESSION: Increase in the size of the right effusion which is now small to moderate in size. Cardiomegaly is unchanged. There is mild pulmonary vascular redistribution without signs of failure. Extensive mitral annular calcifications. No signs of    pneumonia.      Paucity of degenerative changes in either shoulder given age.         Report per radiology    Laboratory:  Labs Ordered and Resulted from Time of ED Arrival to Time of ED Departure   TROPONIN T, HIGH SENSITIVITY - Abnormal       Result Value    Troponin T, High Sensitivity 61 (*)    NT PROBNP INPATIENT - Abnormal    N terminal Pro BNP Inpatient 6,673 (*)    COMPREHENSIVE METABOLIC PANEL - Abnormal    Sodium 125 (*)     Potassium 3.9      Chloride 88 (*)     Carbon Dioxide (CO2) 23      Anion Gap 14      Urea Nitrogen 25.4 (*)     Creatinine 0.75       Calcium 9.8 (*)     Glucose 91      Alkaline Phosphatase 86      AST 41 (*)     ALT 26      Protein Total 6.8      Albumin 3.9      Bilirubin Total 1.8 (*)     GFR Estimate 72     CBC WITH PLATELETS AND DIFFERENTIAL - Abnormal    WBC Count 6.3      RBC Count 3.84      Hemoglobin 12.2      Hematocrit 36.6      MCV 95      MCH 31.8      MCHC 33.3      RDW 14.2      Platelet Count 121 (*)     % Neutrophils 70      % Lymphocytes 18      % Monocytes 10      % Eosinophils 1      % Basophils 1      % Immature Granulocytes 0      NRBCs per 100 WBC 0      Absolute Neutrophils 4.4      Absolute Lymphocytes 1.1      Absolute Monocytes 0.6      Absolute Eosinophils 0.1      Absolute Basophils 0.1      Absolute Immature Granulocytes 0.0      Absolute NRBCs 0.0     TROPONIN T, HIGH SENSITIVITY - Abnormal    Troponin T, High Sensitivity 56 (*)    MAGNESIUM - Normal    Magnesium 2.1     LIPASE - Normal    Lipase 22          Emergency Department Course & Assessments:  Interventions:  Medications   Saline Flush - CT (60 mLs Intravenous $Given 5/3/23 1943)   iopamidol (ISOVUE-370) solution 55 mL (55 mLs Intravenous $Given 5/3/23 1943)   cefTRIAXone (ROCEPHIN) 1 g vial to attach to  mL bag for ADULTS or NS 50 mL bag for PEDS (1 g Intravenous $New Bag 5/3/23 1948)        Assessments:  1559: I performed an exam of the patient and obtained history, as documented above.     Independent Interpretation (X-rays, CTs, rhythm strip):  ED Course as of 05/03/23 2021   Wed May 03, 2023   1725 XR Chest 2 Views  Right-sided pleural effusion on my review of the chest x-ray   1726 XR Shoulder Right G/E 3 Views  No acute bony deformity or injury on my review        Consultations/Discussion of Management or Tests:  I discussed the patient with Dr. Hay, hospitalist, who accepts her for admission.    Social Determinants of Health affecting care:   None    Disposition:  The patient was admitted to the hospital under the care of Dr. Hay.      Impression & Plan    CMS Diagnoses: None    Medical Decision Makin-year-old female presents with right shoulder pain as described above.  It seems that this has been going on for quite some time, no specific injury and no evidence of acute injury or other infection on exam.  Plain film x-rays showed a worsening right-sided pleural effusion but otherwise no acute findings.  EKG is nonischemic.  Troponin is positive but downtrending.  Overall low suspicion for ACS.  She describes intermittent abdominal discomfort for months and does not have a surgical abdomen on exam and does not have any new or worsening symptoms today.  Between the worsening of the right-sided pleural effusion, hyponatremia, I am concerned for the possibility of pneumonia versus paraneoplastic syndrome.  CT chest/abdomen/pelvis showed no evidence of malignancy, but did show a possible developing infiltrate in the right lower lobe.  Patient was given a dose of ceftriaxone.  Plan is for admission to the hospitalist for further care.    Critical Care time:  was 0 minutes for this patient excluding procedures.    Diagnosis:    ICD-10-CM    1. Pleural effusion, right  J90       2. Hyponatremia  E87.1             Scribe Disclosure:  I, Anjaliantionette Clark, am serving as a scribe at 4:03 PM on 5/3/2023 to document services personally performed by Renzo Leonard MD based on my observations and the provider's statements to me.      5/3/2023   Renzo Leonard MD King, Colin, MD  23

## 2023-05-03 NOTE — ED TRIAGE NOTES
Patient BIBA from [facility] with concerns of increased swelling in legs and right shoulder pain.      Triage Assessment     Row Name 05/03/23 4445       Triage Assessment (Adult)    Airway WDL WDL       Respiratory WDL    Respiratory WDL WDL       Skin Circulation/Temperature WDL    Skin Circulation/Temperature WDL WDL       Cardiac WDL    Cardiac WDL WDL       Peripheral/Neurovascular WDL    Peripheral Neurovascular WDL WDL       Cognitive/Neuro/Behavioral WDL    Cognitive/Neuro/Behavioral WDL WDL

## 2023-05-03 NOTE — ED NOTES
Bed: ED07  Expected date:   Expected time:   Means of arrival:   Comments:  [phone] Edema in legs, shoulder pain

## 2023-05-04 NOTE — PROGRESS NOTES
[facility]    Medicine Progress Note - Hospitalist Service       Date of Admission:  5/3/2023    Assessment & Plan   Elizabeth Ruggiero is a 89 year old female with complex PMHx including chronic a-fib and prior DVT on chronic AC with apixaban, CHF, aortic stenosis, mitral valve prolapse, and tricuspid regurgitation who was admitted on 5/3/2023 for acute CHF     # Acute exacerbation of chronic HFpEF  # Moderate aortic stenosis  # Mitral valve prolapse  # Moderate to severe tricuspid regurgitation  # Pulmonary hypertension  * Presented with progressive HENDERSON, LE edema, and weight gain. spO2 mid 90s on room air, but she was placed on 2 lpm/NC for comfort. NTproBNP >6000. CXR showed large right pleural effusion.   * In the ED, she was initiated on IV Lasix and was given a dose of ceftriaxone for possible pneumonia  - Currently on room air  - d/c ceftriaxone due to low suspicion for pneumonia; no fever, leukocytosis, or significant cough  - Continues on Lasix 40 mg IV BID for today  - R thoracentesis ordered  - Repeat TTE ordered  - Cardiology consulted by admitting Hospitalist    Bradycardia  Chronic atrial fibrillation  * PTA regimen: metoprolol 12.5 mg BID, digoxin 125 mg daily, apixaban 2.5 mg BID  - Currently bradycardic to mid 40s; asymptomatic  - Continues on PTA regimen for now, med adjustments as per Cardiology      Hypervolemic hyponatremia, Improved  * Initial sodium 125. Improved with IV Lasix  - Na 132 today, will follow     Mild thrombocytopenia  * Platelets 110s-120k.   - No s/sx of bleeding, will monitor    GERD  * Chronic and stable on PPI     Diet: Combination Diet Low Saturated Fat Na <2400mg Diet, No Caffeine Diet    DVT Prophylaxis: DOAC  Wesley Catheter: Not present  Code Status: No CPR- Do NOT Intubate        Disposition: Expected discharge back to assisted living facility once euvolemic, possibly tomorrow    The patient's care was discussed with the Bedside Nurse and  Patient.    Chary Hickey MD  Hospitalist Service  [facility]  Contact information available via [facility] Paging/Directory    ______________________________________________________________________    Interval History   Admitted last night. Feels much better - offers no complaints. Denies cp/sob, dizziness/lightheadedness, or cough. Right shoulder pain has resolved.    Data reviewed today: I reviewed all medications, new labs and imaging results over the last 24 hours. I personally reviewed no images or EKG's today.    Physical Exam   Vital Signs: Temp: 97.7  F (36.5  C) Temp src: Oral BP: 103/75 Pulse: (!) 47   Resp: 17 SpO2: 98 % O2 Device: Nasal cannula Oxygen Delivery: 2 LPM  Weight: 111 lbs 0 oz  Constitutional: Resting comfortably, NAD  HEENT: Sclera white, MMM  Respiratory: Breathing non-labored. Diminished breath sounds over right lung field  Cardiovascular: Heart irregular rhythm, normal rate. +systolic murmur. 1+ BLE edema  GI: +BS, abd soft/NT  Skin/Integument: No rash  Musculoskeletal: Normal muscle bulk and tone  Neuro: [place]. Alert and appropriate, CAR  Psych: Calm and cooperative    Data   Recent Labs   Lab 05/04/23  1211 05/04/23  0519 05/04/23  0006 05/03/23  1829 05/03/23  1610   WBC  --  6.8  --   --  6.3   HGB  --  12.2  --   --  12.2   MCV  --  93  --   --  95   PLT  --  119*  --   --  121*   * 130* 127*  --  125*   POTASSIUM  --  3.6  --   --  3.9   CHLORIDE  --  92*  --   --  88*   CO2  --  25  --   --  23   BUN  --  22.5  --   --  25.4*   CR  --  0.71  --   --  0.75   ANIONGAP  --  13  --   --  14   CHRIS  --  9.3  --   --  9.8*   GLC  --  83  --   --  91   ALBUMIN  --   --   --   --  3.9   PROTTOTAL  --   --   --  6.4 6.8   BILITOTAL  --   --   --   --  1.8*   ALKPHOS  --   --   --   --  86   ALT  --   --   --   --  26   AST  --   --   --   --  41*   LIPASE  --   --   --   --  22         Recent Results (from the past 24 hour(s))   XR Chest 2 Views    Narrative     EXAM: XR CHEST 2 VIEWS  LOCATION: [facility]  DATE/TIME: 5/3/2023 4:34 PM CDT    INDICATION: R shoulder pain  COMPARISON: 04/03/2023 and older studies      Impression    IMPRESSION: Increase in the size of the right effusion which is now small to moderate in size. Cardiomegaly is unchanged. There is mild pulmonary vascular redistribution without signs of failure. Extensive mitral annular calcifications. No signs of   pneumonia.    Paucity of degenerative changes in either shoulder given age.   XR Shoulder Right G/E 3 Views    Narrative    EXAM: XR SHOULDER RIGHT G/E 3 VIEWS  LOCATION: [facility]  DATE/TIME: 5/3/2023 4:35 PM CDT    INDICATION: R shoulder pain  COMPARISON: None.      Impression    IMPRESSION: No acute fracture or malalignment. There is normal glenohumeral joint spacing. Moderate to severe acromioclavicular joint degenerative changes. Cortical irregularity at the greater tuberosity suggests chronic rotator cuff pathology.   Osteopenia. Aortic atherosclerosis. Partially imaged right pleural effusion. Background of chronic lung changes.   CT Chest/Abdomen/Pelvis w Contrast    Narrative    EXAM: CT CHEST/ABDOMEN/PELVIS W CONTRAST  LOCATION: [facility]  DATE/TIME: 5/3/2023 7:49 PM CDT    INDICATION: Hyponatremia and a right-sided pleural effusion. Clinical suspicion of occult malignancy.  COMPARISON: CT abdomen/pelvis 04/24/2022.  TECHNIQUE: CT scan of the chest, abdomen, and pelvis was performed following injection of IV contrast. Multiplanar reformats were obtained. Dose reduction techniques were used.   CONTRAST: 85 mL Isovue 370.    FINDINGS: Patient was imaged with arm(s) at side, limiting study quality. Study also degraded by motion.    LUNGS AND PLEURA: Trachea and large airways are patent. Patchy infiltrate within the dependent right lower lobe, likely infectious/inflammatory. Scattered nodular opacities within the  right middle lobe, possibly superficial to a chronic bronchiolitis.   Largest index nodule measures up to 6 mm the lateral segment, axial image 143. This warrants continued imaging follow-up according to Fleischner criteria.     No pneumothorax.    Moderate-sized right pleural effusion. Tiny left pleural effusion.     MEDIASTINUM/AXILLAE: No mediastinal/hilar adenopathy.     Fluid within the distal esophagus, suggestive of reflux.    No axillary lymphadenopathy.    Chest wall is unremarkable.    No thoracic aortic aneurysm. Mild atheromatous disease.    Moderate to severe cardiomegaly. Dense calcifications of the mitral annulus. No pericardial effusion.    CORONARY ARTERY CALCIFICATION: Severe.    HEPATOBILIARY: Liver enhances normally and is normal in size.    Gallbladder is normal.    Prominence of the extrahepatic common duct, similar to prior and likely senescent.    PANCREAS: Fatty atrophy of the pancreas.    SPLEEN: Enhances normally. Normal size.    ADRENAL GLANDS: Normal.    KIDNEYS: Both kidneys enhance symmetrically, without hydronephrosis.    No nephroureterolithiasis.    Mild distention of the urinary bladder.    PELVIC ORGANS: No suspicious abnormality.    BOWEL: No evidence of acute gastrointestinal inflammation or obstruction. Colonic diverticulosis.    No intraperitoneal free fluid or free air.    LYMPH NODES: No suspicious abdominal or pelvic lymphadenopathy.    VASCULATURE: No abdominal aortic aneurysm. Moderate atheromatous disease.    MUSCULOSKELETAL: No suspicious abnormality. Compression deformity at L1, unchanged. Healed, instrumented fracture of the intertrochanteric left femur.    OTHER: No additionally significant abnormalities.      Impression    IMPRESSION:   1.  No evidence of malignancy within the chest, abdomen, or pelvis.  2.  Moderate-sized right pleural effusion and tiny left pleural effusion.  3.  Atelectasis versus developing infiltrate within the dependent right lower lobe.  4.   Additional scattered nodules within the right middle lobe, possibly reflecting a chronic bronchiolitis. An index nodule in the lateral segment measures up to 6 mm and warrants follow-up according to Fleischner criteria, as detailed below.  5.  Distended urinary bladder.  6.  Cardiomegaly.    2017 Fleischner Society Recommendations for Solid Pulmonary Nodules    Nodule size greater than 6 mm up to 8 mm:    Single nodule:    Low risk: CT at 6-12 months, then consider CT at 18-24 months  High risk: CT at 6-12 months, then CT at 18-24 months    Multiple nodules:    Low risk: CT at 3-6 months, then consider CT at 18-24 months  High risk: CT at 3-6 months, then CT at 18-24 months   US Thoracentesis    Narrative    EXAM:  1. RIGHT THORACENTESIS  2. ULTRASOUND GUIDANCE  LOCATION: [facility]  DATE/TIME: 5/4/2023 12:10 PM    INDICATION: Pleural effusion.    PROCEDURE: Informed consent obtained. Time out performed. The chest  was prepped and draped in a sterile fashion. 10 mL of 1% lidocaine was  infused into local soft tissues. A 5 Japanese catheter system was  introduced into the pleural effusion under ultrasound guidance.    1 liters of clear fluid were removed and sent to lab if requested.    Patient tolerated procedure well.    Ultrasound images have been permanently captured for documentation.      Impression    IMPRESSION:  Status post ultrasound-guided right thoracentesis.    ARIEL SAEED MD         SYSTEM ID:  [id number]       Medications     - MEDICATION INSTRUCTIONS -         acetaminophen  1,000 mg Oral TID     apixaban ANTICOAGULANT  2.5 mg Oral BID     cefTRIAXone  2 g Intravenous Q24H     digoxin  125 mcg Oral Daily     furosemide  40 mg Intravenous BID     hydrOXYzine  25 mg Oral At Bedtime     metoprolol succinate ER  12.5 mg Oral QPM     pantoprazole  40 mg Oral QAM AC     potassium chloride ER  20 mEq Oral Daily     sodium chloride (PF)  3 mL Intracatheter Q8H     Vitamin D3  25 mcg Oral Daily

## 2023-05-04 NOTE — ED NOTES
"[facility]  ED Nurse Handoff Report    ED Chief complaint: Leg Swelling and Shoulder Pain (right)      ED Diagnosis:   Final diagnoses:   Pleural effusion, right   Hyponatremia       Code Status: DNR / DNI    Allergies: No Known Allergies    Patient Story: Per Dr. Leonard's note, \"Elizabeth Ruggiero is a 89 year old female with a history of CHF, atrial fibrillation, DVT, hypertension, hyperlipidemia, and TIA who presents via EMS from the [facility] with persisting right shoulder pain for the last month and increased leg swelling. The patient was seen earlier today by a provider at the facility who wanted her to be evaluated at the ED due to concern for a possible heart problem. Presently, the patient denies any prior injuries to the shoulder and notes having bilateral leg swelling for some time. She is unsure if it is worse today.  The shoulder pain is no worse today than it has been for the last month.  She also denies any shortness of breath, cough, chest pain or pressure. She has also been having some intermittent abdominal pain but states that this has been ongoing for months.\"  Focused Assessment:  A&O x 4 on arrival, very hard of hearing #18 IV in L forearm, Purewick placed    Treatments and/or interventions provided: see MAR  CT Chest/Abdomen/Pelvis w Contrast   Final Result   IMPRESSION:    1.  No evidence of malignancy within the chest, abdomen, or pelvis.   2.  Moderate-sized right pleural effusion and tiny left pleural effusion.   3.  Atelectasis versus developing infiltrate within the dependent right lower lobe.   4.  Additional scattered nodules within the right middle lobe, possibly reflecting a chronic bronchiolitis. An index nodule in the lateral segment measures up to 6 mm and warrants follow-up according to Fleischner criteria, as detailed below.   5.  Distended urinary bladder.   6.  Cardiomegaly.      2017 Fleischner Society Recommendations for Solid Pulmonary Nodules      Nodule size " greater than 6 mm up to 8 mm:      Single nodule:      Low risk: CT at 6-12 months, then consider CT at 18-24 months   High risk: CT at 6-12 months, then CT at 18-24 months      Multiple nodules:      Low risk: CT at 3-6 months, then consider CT at 18-24 months   High risk: CT at 3-6 months, then CT at 18-24 months      XR Shoulder Right G/E 3 Views   Final Result   IMPRESSION: No acute fracture or malalignment. There is normal glenohumeral joint spacing. Moderate to severe acromioclavicular joint degenerative changes. Cortical irregularity at the greater tuberosity suggests chronic rotator cuff pathology.    Osteopenia. Aortic atherosclerosis. Partially imaged right pleural effusion. Background of chronic lung changes.      XR Chest 2 Views   Final Result   IMPRESSION: Increase in the size of the right effusion which is now small to moderate in size. Cardiomegaly is unchanged. There is mild pulmonary vascular redistribution without signs of failure. Extensive mitral annular calcifications. No signs of    pneumonia.      Paucity of degenerative changes in either shoulder given age.      US Thoracentesis    (Results Pending)   Echocardiogram Complete    (Results Pending)     Labs Ordered and Resulted from Time of ED Arrival to Time of ED Departure   TROPONIN T, HIGH SENSITIVITY - Abnormal       Result Value    Troponin T, High Sensitivity 61 (*)    NT PROBNP INPATIENT - Abnormal    N terminal Pro BNP Inpatient 6,673 (*)    COMPREHENSIVE METABOLIC PANEL - Abnormal    Sodium 125 (*)     Potassium 3.9      Chloride 88 (*)     Carbon Dioxide (CO2) 23      Anion Gap 14      Urea Nitrogen 25.4 (*)     Creatinine 0.75      Calcium 9.8 (*)     Glucose 91      Alkaline Phosphatase 86      AST 41 (*)     ALT 26      Protein Total 6.8      Albumin 3.9      Bilirubin Total 1.8 (*)     GFR Estimate 72     CBC WITH PLATELETS AND DIFFERENTIAL - Abnormal    WBC Count 6.3      RBC Count 3.84      Hemoglobin 12.2      Hematocrit 36.6   "    MCV 95      MCH 31.8      MCHC 33.3      RDW 14.2      Platelet Count 121 (*)     % Neutrophils 70      % Lymphocytes 18      % Monocytes 10      % Eosinophils 1      % Basophils 1      % Immature Granulocytes 0      NRBCs per 100 WBC 0      Absolute Neutrophils 4.4      Absolute Lymphocytes 1.1      Absolute Monocytes 0.6      Absolute Eosinophils 0.1      Absolute Basophils 0.1      Absolute Immature Granulocytes 0.0      Absolute NRBCs 0.0     TROPONIN T, HIGH SENSITIVITY - Abnormal    Troponin T, High Sensitivity 56 (*)    PROCALCITONIN - Abnormal    Procalcitonin 0.10 (*)    LACTATE DEHYDROGENASE - Abnormal    Lactate Dehydrogenase 262 (*)    PROCALCITONIN - Abnormal    Procalcitonin 0.09 (*)    MAGNESIUM - Normal    Magnesium 2.1     LIPASE - Normal    Lipase 22     PROTEIN TOTAL - Normal    Protein Total 6.4     TSH WITH FREE T4 REFLEX - Normal    TSH 3.19     DIGOXIN LEVEL - Normal    Digoxin 1.3     GLUCOSE FLUID   LACTATE DEHYDROGENASE FLUID   PROTEIN FLUID   SODIUM RANDOM URINE   OSMOLALITY   OSMOLALITY, RANDOM URINE   AEROBIC BACTERIAL CULTURE ROUTINE     Patient's response to treatments and/or interventions: questions interventions, but tolerating well    To be done/followed up on inpatient unit: US Thoracentesis, Monitor labs, VS, I&O    Does this patient have any cognitive concerns?: none    Activity level - Baseline/Home:  Walker  Activity Level - Current:   Stand with Assist    Patient's Preferred language: English   Needed?: No    Isolation: None  Infection: Not Applicable  Patient tested for COVID 19 prior to admission: NO  Bariatric?: No    Vital Signs:   Vitals:    05/03/23 1551 05/03/23 1716 05/03/23 2000 05/03/23 2100   BP: 102/68 116/77 119/79 107/70   Pulse: 68 70 69 70   Resp: 14 14 22 27   Temp: 97.6  F (36.4  C)      TempSrc: Oral      SpO2: 99% 93% 96% 95%   Weight: 50.3 kg (111 lb)      Height: 1.676 m (5' 6\")          Cardiac Rhythm:     Was the PSS-3 completed:   " Yes  What interventions are required if any?               Family Comments: daughter aware of admission  For the majority of the shift this patient's behavior was Green.   Behavioral interventions performed were n/a.    ED NURSE PHONE NUMBER: [phone]

## 2023-05-04 NOTE — PROGRESS NOTES
RECEIVING UNIT ED HANDOFF REVIEW    ED Nurse Handoff Report was reviewed by: Kenia Roque RN on May 4, 2023 at 4:52 PM

## 2023-05-04 NOTE — CONSULTS
[facility]    Cardiology Consultation     Date of Admission:  5/3/2023    Assessment & Plan   Elizabeth Ruggiero is a 89 year old female who was admitted on 5/3/2023.    1. Right-sided pleural effusion: Lab testing pending; could theoretically be congestive, infectious, or malignant; s/p thoracentesis  2. Right shoulder pain, due to #1, resolved  3. Acute on chronic valvular HFpEF, mildly intravascularly volume overloaded on exam, now approaching euvolemia  4. 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 Moderate aortic stenosis  6. Chronic AF/AFL  7. Recurrent DVT  8. Prior TIA      It was a pleasure to speak with Elizabeth and her family at the bedside today.  We discussed her right-sided pleural effusion, which is almost certainly the cause of her shoulder pain, and thankfully has now been successfully removed without known complication.  We will await the results of lab testing on her pleural fluid, but I explained that this certainly could be related to back up from her HFpEF, or could be unrelated to her heart and due instead to infection or malignancy.  From a cardiac standpoint, she was likely mildly intravascularly volume up on presentation, but following IV diuresis she is nearing euvolemia at present.  She does have some mild lower extremity edema which per her report usually improves with compression stockings (she is not wearing currently).  Her lungs are now clear, and her JVP is not significantly elevated.  I would suggest transitioning her to oral diuretics tomorrow.  For now, I think that her typical home dose of torsemide 20 mg daily is reasonable, as long as she is weighed regularly and has close follow-up in cardiology clinic to prevent fluid accumulation in the future.      -Follow-up TTE results  -Follow-up pleural fluid analysis  -Continue Lasix 40 mg IV twice daily today; transition to torsemide 20 mg p.o. daily beginning tomorrow  -Continue Eliquis 2.5 mg twice daily  -Continue digoxin 125 mcg  daily  -Continue Toprol-XL 12.5 mg nightly  -Continue potassium repletion as needed  -Standard HF cares (cardiac diet, strict I's/O, daily weights, cardiac telemetry, replete K to > 4.0, Mg to > 2.0.  -Compression stockings, leg elevation  -Close outpatient cardiology follow-up (ordered)      Cardiology will sign off at this time.  Thank you for the interesting consult.  Please feel free to call back at anytime with future questions or concerns peer      High complexity     Abdiaziz Newton MD, MD    Primary Care Physician   Sonu Reece    Reason for Consult   Reason for consult: I was asked by Dr. Hay to evaluate this patient for heart failure.    History of Present Illness   Elizabeth Ruggiero is a 89 year old female who presents with shortness of breath, weight gain, right shoulder pain, and altered mental status.  She follows in the cardiology clinic here with Dr. Galvan.  Her medical history is significant for valvular HFpEF, moderate-severe (3+) eccentric MR due to posterior mitral leaflet prolapse (not a candidate for MitraClip due to significant mitral annular calcification), 2-3+ TR, moderate aortic stenosis (MG 20 mmHg, BRITTNI 1.5 cm ), severe PHTN by echo, chronic AF/AFL on reduced dose Eliquis, recurrent DVT, hypertension, and prior TIA.    Her daughter reports that roughly 2 weeks ago she had some viral symptoms (chills, fever, cough) as there was a virus going around her skilled nursing facility.  Over the past few days, patient has been noted to have declining acuity and altered mental status.  She also has been complaining of right shoulder pain.  In addition, she has been more short of breath and with increased lower extremity swelling.    On admission, she was about 9 pounds up from her dry weight of 102 pounds.  ECG showed atrial fibrillation at 69 bpm with some nonspecific ST/T wave changes.  Labs showed hyponatremia with a sodium of 125, creatinine stable at 0.75, normal potassium, mild AST  elevation of 41, NT proBNP was elevated at 6673, though she does have chronic NT proBNP elevation between 5745-1641 over the past few years in our system.  Troponins have been minimally elevated but flat.  CBC showed mild thrombocytopenia but was otherwise unremarkable.  Chest CT/abdomen/pelvis showed a moderate-sized right pleural effusion and tiny left pleural effusion with atelectasis versus developing infiltrate within the right lower lobe, no obvious evidence of malignancy, and no significant pulmonary edema.  Earlier this morning, she underwent right thoracentesis with successful removal of 1 L of clear fluid, and with this her right shoulder pain resolved and per family her mental status has improved.        Past Medical History   Past Medical History:   Diagnosis Date     (HFpEF) heart failure with preserved ejection fraction (H)      Acute diastolic CHF (congestive heart failure) (H)      Acute left hemiparesis (H) 2/9/2019     Arthritis      Atrial fibrillation (H)      Atrial fibrillation with RVR (H)      Atrial flutter (H)      Atrial flutter with rapid ventricular response (H) 4/23/2019     Community acquired pneumonia      Diastolic CHF (H) 04/29/2019     DVT of lower extremity (deep venous thrombosis) (H)     recurrent     Hepatic congestion     improved with diuretics for CHF     HTN (hypertension)      Hyperlipidemia      Mitral valve insufficiency      Pneumonia      Postmenopausal bleeding 01/2020    [facility]     Severe mitral regurgitation      SOB (shortness of breath)      TIA (transient ischemic attack)      Vitamin D deficiency          Past Surgical History   Past Surgical History:   Procedure Laterality Date     APPENDECTOMY       FRACTURE SURGERY       repair left femur           Prior to Admission Medications   Prior to Admission Medications   Prescriptions Last Dose Informant Patient Reported? Taking?   COMPRESSION STOCKINGS Unknown Nursing Home No Yes    Si each daily   ELIQUIS ANTICOAGULANT 2.5 MG tablet 5/3/2023 at 0800  Yes Yes   Sig: Take 2.5 mg by mouth 2 times daily   Multiple Vitamins-Minerals (PRESERVISION AREDS 2+MULTI VIT) CAPS 5/3/2023 at 0800 Nursing Home No Yes   Sig: Take 1 tablet by mouth 2 times daily   NONFORMULARY 5/3/2023 at 0800  Yes Yes   Sig: Diltiazem 2% ointment - apply pea-sized amount to the anus TID x6 weeks.  Pt to self administer.  Compounded by [facility], [address] [place]   acetaminophen (TYLENOL) 500 MG tablet 5/3/2023 at 1400  Yes Yes   Sig: Take 1,000 mg by mouth 3 times daily 2 tablets tid at 6am, 2pm, 10pm   alendronate (FOSAMAX) 70 MG tablet Past Week Nursing Home No Yes   Sig: Take 1 tablet (70 mg) by mouth every 7 days SUNDAYS at 7:30am   aluminum & magnesium hydroxide (MAG-AL) 200-200 MG/5ML supsension Unknown at prn  Yes Yes   Sig: Take 30 mLs by mouth 4 times daily as needed for indigestion   cetirizine (ZYRTEC) 10 MG tablet  at On HOLD  Yes No   Sig: Take 10 mg by mouth At Bedtime   cholecalciferol 25 MCG (1000 UT) TABS 5/3/2023 Nursing Home Yes Yes   Sig: Take 1 tablet by mouth daily   digoxin (LANOXIN) 125 MCG tablet 5/3/2023 at 1000 Nursing Home No Yes   Sig: Take 1 tablet (125 mcg) by mouth daily   hydrocortisone (ANUSOL-HC) 25 MG suppository 5/3/2023 at 0800  Yes Yes   Sig: Place 25 mg rectally 2 times daily   lactobacillus rhamnosus, GG, (CULTURELL) capsule 5/3/2023 at AM  Yes Yes   Sig: Take 1 capsule by mouth daily   melatonin 5 MG tablet 2023 at 2000 [facility] Yes Yes   Sig: Take 5 mg by mouth At Bedtime   menthol-zinc oxide (CALMOSEPTINE) 0.44-20.6 % OINT ointment 5/3/2023 at 0800  Yes Yes   Sig: Apply topically 2 times daily   metoprolol succinate ER (TOPROL-XL) 25 MG 24 hr tablet 2023 at 2000  Yes Yes   Sig: Take 12.5 mg by mouth every evening    multivitamin w/minerals (THERA-VIT-M) tablet 5/3/2023 at 0800 [facility] Yes Yes   Sig: Take 1 tablet by mouth daily    mupirocin (BACTROBAN) 2 %  external ointment 5/3/2023 at 0800  Yes Yes   Sig: Apply topically 2 times daily To open sores on back   omeprazole (PRILOSEC) 20 MG DR capsule 5/3/2023 at 0800 Nursing Home Yes Yes   Sig: Take 20 mg by mouth daily before breakfast   polyethylene glycol (MIRALAX) 17 GM/Dose powder Unknown  Yes Yes   Sig: Take 17 g by mouth daily as needed for constipation   senna-docusate (SENOKOT-S/PERICOLACE) 8.6-50 MG tablet Unknown at prn  Yes Yes   Sig: Take 1 tablet by mouth daily as needed for constipation   simethicone (MYLICON) 80 MG chewable tablet Unknown at prn  Yes Yes   Sig: Take 80 mg by mouth 4 times daily as needed for flatulence or cramping   torsemide (DEMADEX) 20 MG tablet 5/3/2023 at 0800  No Yes   Sig: Take 1 tablet (20 mg) by mouth daily   Patient taking differently: Take 20 mg by mouth three times a week Mon, Wed, Fri   triamcinolone (KENALOG) 0.1 % external cream 5/3/2023 at 0800  Yes Yes   Sig: Apply topically 2 times daily To back for itching      Facility-Administered Medications: None     Current Facility-Administered Medications   Medication Dose Route Frequency     acetaminophen  1,000 mg Oral TID     apixaban ANTICOAGULANT  2.5 mg Oral BID     digoxin  125 mcg Oral Daily     furosemide  40 mg Intravenous BID     hydrOXYzine  25 mg Oral At Bedtime     metoprolol succinate ER  12.5 mg Oral QPM     pantoprazole  40 mg Oral QAM AC     potassium chloride ER  20 mEq Oral Daily     sodium chloride (PF)  3 mL Intracatheter Q8H     Vitamin D3  25 mcg Oral Daily     Current Facility-Administered Medications   Medication Last Rate     - MEDICATION INSTRUCTIONS -       Allergies   No Known Allergies    Social History    reports that she has never smoked. She has never used smokeless tobacco. She reports that she does not drink alcohol and does not use drugs.      Family History   I have reviewed this patient's family history and updated it with pertinent information if needed.  Family History   Problem Relation  "Age of Onset     Colon Cancer Mother      Breast Cancer Sister      Diabetes Sister           Review of Systems   A comprehensive review of system was performed and is negative other than that noted in the HPI or here.     Physical Exam   Vital Signs with Ranges  Temp:  [97.6  F (36.4  C)-97.9  F (36.6  C)] 97.7  F (36.5  C)  Pulse:  [47-70] 47  Resp:  [10-28] 17  BP: (102-122)/(67-95) 103/75  SpO2:  [93 %-100 %] 98 %  Wt Readings from Last 4 Encounters:   05/03/23 50.3 kg (111 lb)   04/03/23 46.3 kg (102 lb)   11/21/22 45.8 kg (101 lb)   06/29/22 46.3 kg (102 lb)     I/O last 3 completed shifts:  In: -   Out: 1000 [Urine:1000]      Vitals: /75   Pulse (!) 47   Temp 97.7  F (36.5  C) (Oral)   Resp 17   Ht 1.676 m (5' 6\")   Wt 50.3 kg (111 lb)   SpO2 98%   BMI 17.92 kg/m      Physical Exam:   General - Alert and oriented to time place and person in no acute distress  Eyes - No scleral icterus  HEENT - Neck supple, moist mucous membranes  Cardiovascular -irregularly irregular, normal rate, JVP 8-10 cmH2O  Extremities - There is 1+ bilateral lower extremity edema  Respiratory -lungs clear bilaterally  Skin - No pallor or cyanosis  Gastrointestinal - Non tender and non distended without rebound or guarding  Psych - Appropriate affect   Neurological - No gross motor neurological focal deficits    No lab results found in last 7 days.    Invalid input(s): TROPONINIES    Recent Labs   Lab 05/04/23  1211 05/04/23  0519 05/04/23  0006 05/03/23  1829 05/03/23  1610   WBC  --  6.8  --   --  6.3   HGB  --  12.2  --   --  12.2   MCV  --  93  --   --  95   PLT  --  119*  --   --  121*   * 130* 127*  --  125*   POTASSIUM  --  3.6  --   --  3.9   CHLORIDE  --  92*  --   --  88*   CO2  --  25  --   --  23   BUN  --  22.5  --   --  25.4*   CR  --  0.71  --   --  0.75   GFRESTIMATED  --  77  --   --  72   ANIONGAP  --  13  --   --  14   CHRIS  --  9.3  --   --  9.8*   GLC  --  83  --   --  91   ALBUMIN  --   --   --   " --  3.9   PROTTOTAL  --   --   --  6.4 6.8   BILITOTAL  --   --   --   --  1.8*   ALKPHOS  --   --   --   --  86   ALT  --   --   --   --  26   AST  --   --   --   --  41*   LIPASE  --   --   --   --  22     No results for input(s): CHOL, HDL, LDL, TRIG, CHOLHDLRATIO in the last 59440 hours.  Recent Labs   Lab 05/04/23  0519 05/03/23  1610   WBC 6.8 6.3   HGB 12.2 12.2   HCT 35.4 36.6   MCV 93 95   * 121*     No results for input(s): PH, PHV, PO2, PO2V, SAT, PCO2, PCO2V, HCO3, HCO3V in the last 168 hours.  Recent Labs   Lab 05/03/23  1610   NTBNPI 6,673*     No results for input(s): DD in the last 168 hours.  No results for input(s): SED, CRP in the last 168 hours.  Recent Labs   Lab 05/04/23  0519 05/03/23  1610   * 121*     Recent Labs   Lab 05/03/23  2129   TSH 3.19     No results for input(s): COLOR, APPEARANCE, URINEGLC, URINEBILI, URINEKETONE, SG, UBLD, URINEPH, PROTEIN, UROBILINOGEN, NITRITE, LEUKEST, RBCU, WBCU in the last 168 hours.    Imaging:  Recent Results (from the past 48 hour(s))   XR Chest 2 Views    Narrative    EXAM: XR CHEST 2 VIEWS  LOCATION: [facility]  DATE/TIME: 5/3/2023 4:34 PM CDT    INDICATION: R shoulder pain  COMPARISON: 04/03/2023 and older studies      Impression    IMPRESSION: Increase in the size of the right effusion which is now small to moderate in size. Cardiomegaly is unchanged. There is mild pulmonary vascular redistribution without signs of failure. Extensive mitral annular calcifications. No signs of   pneumonia.    Paucity of degenerative changes in either shoulder given age.   XR Shoulder Right G/E 3 Views    Narrative    EXAM: XR SHOULDER RIGHT G/E 3 VIEWS  LOCATION: [facility]  DATE/TIME: 5/3/2023 4:35 PM CDT    INDICATION: R shoulder pain  COMPARISON: None.      Impression    IMPRESSION: No acute fracture or malalignment. There is normal glenohumeral joint spacing. Moderate to severe acromioclavicular  joint degenerative changes. Cortical irregularity at the greater tuberosity suggests chronic rotator cuff pathology.   Osteopenia. Aortic atherosclerosis. Partially imaged right pleural effusion. Background of chronic lung changes.   CT Chest/Abdomen/Pelvis w Contrast    Narrative    EXAM: CT CHEST/ABDOMEN/PELVIS W CONTRAST  LOCATION: [facility]  DATE/TIME: 5/3/2023 7:49 PM CDT    INDICATION: Hyponatremia and a right-sided pleural effusion. Clinical suspicion of occult malignancy.  COMPARISON: CT abdomen/pelvis 04/24/2022.  TECHNIQUE: CT scan of the chest, abdomen, and pelvis was performed following injection of IV contrast. Multiplanar reformats were obtained. Dose reduction techniques were used.   CONTRAST: 85 mL Isovue 370.    FINDINGS: Patient was imaged with arm(s) at side, limiting study quality. Study also degraded by motion.    LUNGS AND PLEURA: Trachea and large airways are patent. Patchy infiltrate within the dependent right lower lobe, likely infectious/inflammatory. Scattered nodular opacities within the right middle lobe, possibly superficial to a chronic bronchiolitis.   Largest index nodule measures up to 6 mm the lateral segment, axial image 143. This warrants continued imaging follow-up according to Fleischner criteria.     No pneumothorax.    Moderate-sized right pleural effusion. Tiny left pleural effusion.     MEDIASTINUM/AXILLAE: No mediastinal/hilar adenopathy.     Fluid within the distal esophagus, suggestive of reflux.    No axillary lymphadenopathy.    Chest wall is unremarkable.    No thoracic aortic aneurysm. Mild atheromatous disease.    Moderate to severe cardiomegaly. Dense calcifications of the mitral annulus. No pericardial effusion.    CORONARY ARTERY CALCIFICATION: Severe.    HEPATOBILIARY: Liver enhances normally and is normal in size.    Gallbladder is normal.    Prominence of the extrahepatic common duct, similar to prior and likely  senescent.    PANCREAS: Fatty atrophy of the pancreas.    SPLEEN: Enhances normally. Normal size.    ADRENAL GLANDS: Normal.    KIDNEYS: Both kidneys enhance symmetrically, without hydronephrosis.    No nephroureterolithiasis.    Mild distention of the urinary bladder.    PELVIC ORGANS: No suspicious abnormality.    BOWEL: No evidence of acute gastrointestinal inflammation or obstruction. Colonic diverticulosis.    No intraperitoneal free fluid or free air.    LYMPH NODES: No suspicious abdominal or pelvic lymphadenopathy.    VASCULATURE: No abdominal aortic aneurysm. Moderate atheromatous disease.    MUSCULOSKELETAL: No suspicious abnormality. Compression deformity at L1, unchanged. Healed, instrumented fracture of the intertrochanteric left femur.    OTHER: No additionally significant abnormalities.      Impression    IMPRESSION:   1.  No evidence of malignancy within the chest, abdomen, or pelvis.  2.  Moderate-sized right pleural effusion and tiny left pleural effusion.  3.  Atelectasis versus developing infiltrate within the dependent right lower lobe.  4.  Additional scattered nodules within the right middle lobe, possibly reflecting a chronic bronchiolitis. An index nodule in the lateral segment measures up to 6 mm and warrants follow-up according to Fleischner criteria, as detailed below.  5.  Distended urinary bladder.  6.  Cardiomegaly.    2017 Fleischner Society Recommendations for Solid Pulmonary Nodules    Nodule size greater than 6 mm up to 8 mm:    Single nodule:    Low risk: CT at 6-12 months, then consider CT at 18-24 months  High risk: CT at 6-12 months, then CT at 18-24 months    Multiple nodules:    Low risk: CT at 3-6 months, then consider CT at 18-24 months  High risk: CT at 3-6 months, then CT at 18-24 months   US Thoracentesis    Narrative    EXAM:  1. RIGHT THORACENTESIS  2. ULTRASOUND GUIDANCE  LOCATION: [facility]  DATE/TIME: 5/4/2023 12:10 PM    INDICATION: Pleural  "effusion.    PROCEDURE: Informed consent obtained. Time out performed. The chest  was prepped and draped in a sterile fashion. 10 mL of 1% lidocaine was  infused into local soft tissues. A 5 [place] catheter system was  introduced into the pleural effusion under ultrasound guidance.    1 liters of clear fluid were removed and sent to lab if requested.    Patient tolerated procedure well.    Ultrasound images have been permanently captured for documentation.      Impression    IMPRESSION:  Status post ultrasound-guided right thoracentesis.    ARIEL SAEED MD         SYSTEM ID:  [id number]       Echo:  No results found for this or any previous visit (from the past 4320 hour(s)).    Clinically Significant Risk Factors Present on Admission         # Hyponatremia: Lowest Na = 125 mmol/L in last 2 days, will monitor as appropriate       # Drug Induced Coagulation Defect: home medication list includes an anticoagulant medication  # Thrombocytopenia: Lowest platelets = 119 in last 2 days, will monitor for bleeding        # Cachexia: Estimated body mass index is 17.92 kg/m  as calculated from the following:    Height as of this encounter: 1.676 m (5' 6\").    Weight as of this encounter: 50.3 kg (111 lb).          Cardiac Arrhythmia: Atrial fibrillation: Chronic  Non-Rheumatic Valve Disease: Aortic valve stenosis  Mitral valve insufficiency  Tricuspid valve insufficiency        hyponatremia and Hypo-osmolality        Thrombocytopenia Including Purpuric, HIT, & Other Platelet Defects: Thrombocytopenia, unspecified        Encephalopathy: Metabolic encephalopathy      Pulmonary Heart Disease (Pulmonary hypertension or Cor pulmonale): Pulmonary Hypertension, unspecified    Chronic Fatigue and Other Debilities: Age-related physical debility  Other reduced mobility        "

## 2023-05-04 NOTE — PHARMACY-ADMISSION MEDICATION HISTORY
Pharmacist Admission Medication History    Admission medication history is complete. The information provided in this note is only as accurate as the sources available at the time of the update.    Medication reconciliation/reorder completed by provider prior to medication history? Yes    Information Source(s): Hospital records, Facility ([facility] [place] medication list/MAR and CareEverywhere/SureScripts via in-person    Pertinent Information: None    Changes made to PTA medication list:    Added: None    Deleted: None    Changed: None    Allergies reviewed with patient and updates made in EHR: yes    Medication History Completed By: Jonathon Mendoza RPH 5/3/2023 9:07 PM    Prior to Admission medications    Medication Sig Last Dose Taking? Auth Provider Long Term End Date   acetaminophen (TYLENOL) 500 MG tablet Take 1,000 mg by mouth 3 times daily 2 tablets tid at 6am, 2pm, 10pm 5/3/2023 at 1400 Yes Unknown, Entered By History     alendronate (FOSAMAX) 70 MG tablet Take 1 tablet (70 mg) by mouth every 7 days SUNDAYS at 7:30am Past Week Yes Kat Campos APRN CNP Yes    aluminum & magnesium hydroxide (MAG-AL) 200-200 MG/5ML supsension Take 30 mLs by mouth 4 times daily as needed for indigestion Unknown at prn Yes Unknown, Entered By History     cholecalciferol 25 MCG (1000 UT) TABS Take 1 tablet by mouth daily 5/3/2023 Yes Unknown, Entered By History     COMPRESSION STOCKINGS 1 each daily Unknown Yes Tfifany Galvan MD     digoxin (LANOXIN) 125 MCG tablet Take 1 tablet (125 mcg) by mouth daily 5/3/2023 at 1000 Yes Kat Campos APRN CNP Yes    ELIQUIS ANTICOAGULANT 2.5 MG tablet Take 2.5 mg by mouth 2 times daily 5/3/2023 at 0800 Yes Reported, Patient     hydrocortisone (ANUSOL-HC) 25 MG suppository Place 25 mg rectally 2 times daily 5/3/2023 at 0800 Yes Unknown, Entered By History     lactobacillus rhamnosus, GG, (CULTURELL) capsule Take 1 capsule by mouth daily 5/3/2023 at AM Yes Unknown, Entered By  History     melatonin 5 MG tablet Take 5 mg by mouth At Bedtime 5/2/2023 at 2000 Yes Unknown, Entered By History     menthol-zinc oxide (CALMOSEPTINE) 0.44-20.6 % OINT ointment Apply topically 2 times daily 5/3/2023 at 0800 Yes Unknown, Entered By History     metoprolol succinate ER (TOPROL-XL) 25 MG 24 hr tablet Take 12.5 mg by mouth every evening  5/2/2023 at 2000 Yes Unknown, Entered By History Yes    Multiple Vitamins-Minerals (PRESERVISION AREDS 2+MULTI VIT) CAPS Take 1 tablet by mouth 2 times daily 5/3/2023 at 0800 Yes Kat Campos APRN CNP     multivitamin w/minerals (THERA-VIT-M) tablet Take 1 tablet by mouth daily  5/3/2023 at 0800 Yes Reported, Patient     mupirocin (BACTROBAN) 2 % external ointment Apply topically 2 times daily To open sores on back 5/3/2023 at 0800 Yes Unknown, Entered By History     NONFORMULARY Diltiazem 2% ointment - apply pea-sized amount to the anus TID x6 weeks.  Pt to self administer.  Compounded by [facility], [address] [place] 5/3/2023 at 0800 Yes Unknown, Entered By History     omeprazole (PRILOSEC) 20 MG DR capsule Take 20 mg by mouth daily before breakfast 5/3/2023 at 0800 Yes Unknown, Entered By History     polyethylene glycol (MIRALAX) 17 GM/Dose powder Take 17 g by mouth daily as needed for constipation Unknown Yes Unknown, Entered By History     senna-docusate (SENOKOT-S/PERICOLACE) 8.6-50 MG tablet Take 1 tablet by mouth daily as needed for constipation Unknown at prn Yes Unknown, Entered By History     simethicone (MYLICON) 80 MG chewable tablet Take 80 mg by mouth 4 times daily as needed for flatulence or cramping Unknown at prn Yes Unknown, Entered By History     torsemide (DEMADEX) 20 MG tablet Take 1 tablet (20 mg) by mouth daily  Patient taking differently: Take 20 mg by mouth three times a week Mon, Wed, Fri 5/3/2023 at 0800 Yes Mignon Morgan, CNP Yes    triamcinolone (KENALOG) 0.1 % external cream Apply topically 2 times daily To back for itching 5/3/2023  at 0800 Yes Unknown, Entered By History     cetirizine (ZYRTEC) 10 MG tablet Take 10 mg by mouth At Bedtime  at On HOLD  Unknown, Entered By History     furosemide (LASIX) 20 MG tablet Take 1 tablet (20 mg) by mouth 2 times daily  Patient taking differently: Take 20 mg by mouth 2 times daily @ 10am and 4pm   Kat Campos APRN CNP Yes 5/14/22

## 2023-05-04 NOTE — H&P
[facility]    History and Physical  Hospitalist       Date of Admission:  5/3/2023    Assessment & Plan   Elizabeth Ruggiero is a 89 year old female with a history of diastolic congestive heart failure, mitral valve prolapse, tricuspid regurgitation, atrial fibrillation on anticoagulation with Eliquis, history of DVT, hypertension and pulmonary hypertension who presents with progressive weakness and shortness of breath and increasing swelling in her legs.  She was seen earlier today at her assisted living facility by a provider who advised her to get into the ED to be evaluated for possible worsening heart failure.    Is very hard of hearing and most of the history was obtained from her daughter.  Her daughter reports that her baseline weight is usually 104 pounds but she has been fluctuating anywhere between 110 to 115 pounds over the last week.  She also reported that her mom has been getting more short of breath with minimal activity.  When patient initially presented she was complaining of right shoulder pain but on further questioning she states that it is no worse than what it is at baseline.    In the ER patient was found to have elevated proBNP and chest x-ray showed large right-sided pleural effusion.  She is currently needing 2 L nasal cannula oxygen.  At baseline she is on room air.  She did receive IV ceftriaxone to cover for community-acquired pneumonia since she was endorsing some cough.  She is being admitted to the hospital for further evaluation of her right-sided pleural effusion and possible worsening diastolic congestive heart failure.    She was also found to have hyponatremia with sodium at 125.    #1 acute on chronic diastolic congestive heart failure  Mitral valve prolapse  Pulmonary hypertension  Moderate to severe tricuspid regurgitation  Moderate aortic stenosis  Right-sided pleural effusion    -Patient has worsening proBNP with new right-sided pleural effusion with  "increasing weight and lower extremity edema  -Hold home torsemide and start on IV Lasix 40 3 times daily  -Strict I's and O's and daily weights  -Cardiology consult placed.  Repeat echo ordered.  -Last echo was on 5/11/2022.  -Baseline troponin elevated 61--- 56.  Patient denies any active chest pain.  -EKG shows baseline heart rate controlled A-fib.  Troponin leak secondary to heart failure.  Continue to monitor.  -Thoracentesis with labs ordered.  -Continue on IV ceftriaxone to cover for community-acquired pneumonia until ruled out.  Patient does have mildly elevated procalcitonin and is endorsing cough.    #2 persistent atrial fibrillation  -Continue home digoxin and metoprolol  -Continue Eliquis 2.5 mg twice daily      3.  GERD-on omeprazole 20 mg daily    4.  Severe hyponatremia-  sodium at 125.,  Differential could be CHF versus Lasix versus SIADH  -Urine sodium, serum osmolarity and urine osmolarity ordered.  -Urine sodium could be falsely elevated due to diuretics.  -Every 4 hours sodium checks ordered.  -Target slow correction at 8 mEq in 24 hours    5.  Cachexia-patient appears very thin and frail.  Had CT chest abdomen pelvis that showed some pulmonary nodules but no acute malignant findings.  Patient does have cardiomegaly.  Has one 6 mm nodule that would need follow-up CT as an outpatient.    Clinically Significant Risk Factors Present on Admission         # Hyponatremia: Lowest Na = 125 mmol/L in last 2 days, will monitor as appropriate       # Drug Induced Coagulation Defect: home medication list includes an anticoagulant medication  # Thrombocytopenia: Lowest platelets = 121 in last 2 days, will monitor for bleeding        # Cachexia: Estimated body mass index is 17.92 kg/m  as calculated from the following:    Height as of this encounter: 1.676 m (5' 6\").    Weight as of this encounter: 50.3 kg (111 lb).            DVT Prophylaxis: DOAC  Code Status: DNR / DNI    Disposition: Expected discharge when " medically stable    Cristiana Hay MD, MD  [phone] (p)  [phone] (c)    Primary Care Physician   Sonu Reece    Chief Complaint   CHF    History is obtained from the patient and review of medical records.    History of Present Illness   Elizabeth Ruggiero is a 89 year old female with a history of diastolic congestive heart failure, mitral valve prolapse, tricuspid regurgitation, atrial fibrillation on anticoagulation with Eliquis, history of DVT, hypertension and pulmonary hypertension who presents with progressive weakness and shortness of breath and increasing swelling in her legs.  She was seen earlier today at her assisted living facility by a provider who advised her to get into the ED to be evaluated for possible worsening heart failure.    Is very hard of hearing and most of the history was obtained from her daughter.  Her daughter reports that her baseline weight is usually 104 pounds but she has been fluctuating anywhere between 110 to 115 pounds over the last week.  She also reported that her mom has been getting more short of breath with minimal activity.  When patient initially presented she was complaining of right shoulder pain but on further questioning she states that it is no worse than what it is at baseline.    In the ER patient was found to have elevated proBNP and chest x-ray showed large right-sided pleural effusion.  She is currently needing 2 L nasal cannula oxygen.  At baseline she is on room air.  She did receive IV ceftriaxone to cover for community-acquired pneumonia since she was endorsing some cough.  She is being admitted to the hospital for further evaluation of her right-sided pleural effusion and possible worsening diastolic congestive heart failure.    She was also found to have hyponatremia with sodium at 125.    Past Medical History    I have reviewed this patient's medical history and updated it with pertinent information if needed.   Past Medical History:   Diagnosis  Date     (HFpEF) heart failure with preserved ejection fraction (H)      Acute diastolic CHF (congestive heart failure) (H)      Acute left hemiparesis (H) 2019     Arthritis      Atrial fibrillation (H)      Atrial fibrillation with RVR (H)      Atrial flutter (H)      Atrial flutter with rapid ventricular response (H) 2019     Community acquired pneumonia      Diastolic CHF (H) 2019     DVT of lower extremity (deep venous thrombosis) (H)     recurrent     Hepatic congestion     improved with diuretics for CHF     HTN (hypertension)      Hyperlipidemia      Mitral valve insufficiency      Pneumonia      Postmenopausal bleeding 2020    [facility] [place]     Severe mitral regurgitation      SOB (shortness of breath)      TIA (transient ischemic attack)      Vitamin D deficiency      Past Surgical History   I have reviewed this patient's surgical history and updated it with pertinent information if needed.  Past Surgical History:   Procedure Laterality Date     APPENDECTOMY       FRACTURE SURGERY       repair left femur       Prior to Admission Medications   Prior to Admission Medications   Prescriptions Last Dose Informant Patient Reported? Taking?   COMPRESSION STOCKINGS Unknown Nursing Home No Yes   Si each daily   ELIQUIS ANTICOAGULANT 2.5 MG tablet 5/3/2023 at 0800  Yes Yes   Sig: Take 2.5 mg by mouth 2 times daily   Multiple Vitamins-Minerals (PRESERVISION AREDS 2+MULTI VIT) CAPS 5/3/2023 at 0800 Nursing Home No Yes   Sig: Take 1 tablet by mouth 2 times daily   NONFORMULARY 5/3/2023 at 0800  Yes Yes   Sig: Diltiazem 2% ointment - apply pea-sized amount to the anus TID x6 weeks.  Pt to self administer.  Compounded by [facility], [address] [place]   acetaminophen (TYLENOL) 500 MG tablet 5/3/2023 at 1400  Yes Yes   Sig: Take 1,000 mg by mouth 3 times daily 2 tablets tid at 6am, 2pm, 10pm   alendronate (FOSAMAX) 70 MG tablet Past Week Nursing Home No Yes   Sig: Take 1 tablet  (70 mg) by mouth every 7 days SUNDAYS at 7:30am   aluminum & magnesium hydroxide (MAG-AL) 200-200 MG/5ML supsension Unknown at prn  Yes Yes   Sig: Take 30 mLs by mouth 4 times daily as needed for indigestion   cetirizine (ZYRTEC) 10 MG tablet  at On HOLD  Yes No   Sig: Take 10 mg by mouth At Bedtime   cholecalciferol 25 MCG (1000 UT) TABS 5/3/2023 Nursing Home Yes Yes   Sig: Take 1 tablet by mouth daily   digoxin (LANOXIN) 125 MCG tablet 5/3/2023 at 1000 Nursing Home No Yes   Sig: Take 1 tablet (125 mcg) by mouth daily   hydrocortisone (ANUSOL-HC) 25 MG suppository 5/3/2023 at 0800  Yes Yes   Sig: Place 25 mg rectally 2 times daily   lactobacillus rhamnosus, GG, (CULTURELL) capsule 5/3/2023 at AM  Yes Yes   Sig: Take 1 capsule by mouth daily   melatonin 5 MG tablet 5/2/2023 at 2000 [facility] Yes Yes   Sig: Take 5 mg by mouth At Bedtime   menthol-zinc oxide (CALMOSEPTINE) 0.44-20.6 % OINT ointment 5/3/2023 at 0800  Yes Yes   Sig: Apply topically 2 times daily   metoprolol succinate ER (TOPROL-XL) 25 MG 24 hr tablet 5/2/2023 at 2000  Yes Yes   Sig: Take 12.5 mg by mouth every evening    multivitamin w/minerals (THERA-VIT-M) tablet 5/3/2023 at 0800 [facility] Home Yes Yes   Sig: Take 1 tablet by mouth daily    mupirocin (BACTROBAN) 2 % external ointment 5/3/2023 at 0800  Yes Yes   Sig: Apply topically 2 times daily To open sores on back   omeprazole (PRILOSEC) 20 MG DR capsule 5/3/2023 at 0800 Nursing Home Yes Yes   Sig: Take 20 mg by mouth daily before breakfast   polyethylene glycol (MIRALAX) 17 GM/Dose powder Unknown  Yes Yes   Sig: Take 17 g by mouth daily as needed for constipation   senna-docusate (SENOKOT-S/PERICOLACE) 8.6-50 MG tablet Unknown at prn  Yes Yes   Sig: Take 1 tablet by mouth daily as needed for constipation   simethicone (MYLICON) 80 MG chewable tablet Unknown at prn  Yes Yes   Sig: Take 80 mg by mouth 4 times daily as needed for flatulence or cramping   torsemide (DEMADEX) 20 MG tablet 5/3/2023 at  0800  No Yes   Sig: Take 1 tablet (20 mg) by mouth daily   Patient taking differently: Take 20 mg by mouth three times a week Mon, Wed, Fri   triamcinolone (KENALOG) 0.1 % external cream 5/3/2023 at 0800  Yes Yes   Sig: Apply topically 2 times daily To back for itching      Facility-Administered Medications: None     Allergies   No Known Allergies  Social History   I have reviewed this patient's social history and updated it with pertinent information if needed.   Elizabeth  reports that she has never smoked. She has never used smokeless tobacco. She reports that she does not drink alcohol and does not use drugs. She     Family History   I have reviewed this patient's family history and updated it with pertinent information if needed.    Problem (# of Occurrences) Relation (Name,Age of Onset)    Diabetes (1) Sister    Breast Cancer (1) Sister    Colon Cancer (1) Mother        Review of Systems   The 10 point Review of Systems is negative other than noted in the HPI or here.     Physical Exam   Temp: 97.6  F (36.4  C) Temp src: Oral BP: 107/70 Pulse: 70   Resp: 27 SpO2: 95 % O2 Device: Nasal cannula Oxygen Delivery: 2 LPM  Vital Signs with Ranges  Temp:  [97.6  F (36.4  C)] 97.6  F (36.4  C)  Pulse:  [68-70] 70  Resp:  [14-27] 27  BP: (102-119)/(68-79) 107/70  SpO2:  [93 %-99 %] 95 %  111 lbs 0 oz    Physical Exam  Constitutional:       Comments: Very thin and frail.  Very hard of hearing.   HENT:      Head: Normocephalic.   Eyes:      Pupils: Pupils are equal, round, and reactive to light.   Cardiovascular:      Rate and Rhythm: Normal rate. Rhythm irregular.      Pulses: Normal pulses.      Heart sounds: Normal heart sounds.   Pulmonary:      Effort: Pulmonary effort is normal. No respiratory distress.      Breath sounds: Normal breath sounds.      Comments: Absent breath sounds in the right lung base.  Abdominal:      General: Abdomen is flat. Bowel sounds are normal. There is no distension.      Tenderness: There is  no abdominal tenderness. There is no guarding.   Musculoskeletal:         General: Normal range of motion.      Cervical back: Normal range of motion.      Right lower leg: Edema present.      Left lower leg: Edema present.      Comments: 1-2+ edema in bilateral lower extremities with varicose veins present   Skin:     General: Skin is warm and dry.   Neurological:      General: No focal deficit present.   Psychiatric:         Mood and Affect: Mood normal.         Data   Data reviewed today:  I personally reviewed the CT chest abdomen pelvis image(s) showing Some small pulmonary nodules suggestive of bronchiolitis and one 6 mm pulmonary nodule that needs follow-up..  Recent Labs   Lab 05/03/23  1829 05/03/23  1610   WBC  --  6.3   HGB  --  12.2   MCV  --  95   PLT  --  121*   NA  --  125*   POTASSIUM  --  3.9   CHLORIDE  --  88*   CO2  --  23   BUN  --  25.4*   CR  --  0.75   ANIONGAP  --  14   CHRIS  --  9.8*   GLC  --  91   ALBUMIN  --  3.9   PROTTOTAL 6.4 6.8   BILITOTAL  --  1.8*   ALKPHOS  --  86   ALT  --  26   AST  --  41*   LIPASE  --  22       Recent Results (from the past 24 hour(s))   XR Chest 2 Views    Narrative    EXAM: XR CHEST 2 VIEWS  LOCATION: [facility]  DATE/TIME: 5/3/2023 4:34 PM CDT    INDICATION: R shoulder pain  COMPARISON: 04/03/2023 and older studies      Impression    IMPRESSION: Increase in the size of the right effusion which is now small to moderate in size. Cardiomegaly is unchanged. There is mild pulmonary vascular redistribution without signs of failure. Extensive mitral annular calcifications. No signs of   pneumonia.    Paucity of degenerative changes in either shoulder given age.   XR Shoulder Right G/E 3 Views    Narrative    EXAM: XR SHOULDER RIGHT G/E 3 VIEWS  LOCATION: [facility]  DATE/TIME: 5/3/2023 4:35 PM CDT    INDICATION: R shoulder pain  COMPARISON: None.      Impression    IMPRESSION: No acute fracture or malalignment.  There is normal glenohumeral joint spacing. Moderate to severe acromioclavicular joint degenerative changes. Cortical irregularity at the greater tuberosity suggests chronic rotator cuff pathology.   Osteopenia. Aortic atherosclerosis. Partially imaged right pleural effusion. Background of chronic lung changes.   CT Chest/Abdomen/Pelvis w Contrast    Narrative    EXAM: CT CHEST/ABDOMEN/PELVIS W CONTRAST  LOCATION: [facility]  DATE/TIME: 5/3/2023 7:49 PM CDT    INDICATION: Hyponatremia and a right-sided pleural effusion. Clinical suspicion of occult malignancy.  COMPARISON: CT abdomen/pelvis 04/24/2022.  TECHNIQUE: CT scan of the chest, abdomen, and pelvis was performed following injection of IV contrast. Multiplanar reformats were obtained. Dose reduction techniques were used.   CONTRAST: 85 mL Isovue 370.    FINDINGS: Patient was imaged with arm(s) at side, limiting study quality. Study also degraded by motion.    LUNGS AND PLEURA: Trachea and large airways are patent. Patchy infiltrate within the dependent right lower lobe, likely infectious/inflammatory. Scattered nodular opacities within the right middle lobe, possibly superficial to a chronic bronchiolitis.   Largest index nodule measures up to 6 mm the lateral segment, axial image 143. This warrants continued imaging follow-up according to Fleischner criteria.     No pneumothorax.    Moderate-sized right pleural effusion. Tiny left pleural effusion.     MEDIASTINUM/AXILLAE: No mediastinal/hilar adenopathy.     Fluid within the distal esophagus, suggestive of reflux.    No axillary lymphadenopathy.    Chest wall is unremarkable.    No thoracic aortic aneurysm. Mild atheromatous disease.    Moderate to severe cardiomegaly. Dense calcifications of the mitral annulus. No pericardial effusion.    CORONARY ARTERY CALCIFICATION: Severe.    HEPATOBILIARY: Liver enhances normally and is normal in size.    Gallbladder is normal.    Prominence  of the extrahepatic common duct, similar to prior and likely senescent.    PANCREAS: Fatty atrophy of the pancreas.    SPLEEN: Enhances normally. Normal size.    ADRENAL GLANDS: Normal.    KIDNEYS: Both kidneys enhance symmetrically, without hydronephrosis.    No nephroureterolithiasis.    Mild distention of the urinary bladder.    PELVIC ORGANS: No suspicious abnormality.    BOWEL: No evidence of acute gastrointestinal inflammation or obstruction. Colonic diverticulosis.    No intraperitoneal free fluid or free air.    LYMPH NODES: No suspicious abdominal or pelvic lymphadenopathy.    VASCULATURE: No abdominal aortic aneurysm. Moderate atheromatous disease.    MUSCULOSKELETAL: No suspicious abnormality. Compression deformity at L1, unchanged. Healed, instrumented fracture of the intertrochanteric left femur.    OTHER: No additionally significant abnormalities.      Impression    IMPRESSION:   1.  No evidence of malignancy within the chest, abdomen, or pelvis.  2.  Moderate-sized right pleural effusion and tiny left pleural effusion.  3.  Atelectasis versus developing infiltrate within the dependent right lower lobe.  4.  Additional scattered nodules within the right middle lobe, possibly reflecting a chronic bronchiolitis. An index nodule in the lateral segment measures up to 6 mm and warrants follow-up according to Fleischner criteria, as detailed below.  5.  Distended urinary bladder.  6.  Cardiomegaly.    2017 Fleischner Society Recommendations for Solid Pulmonary Nodules    Nodule size greater than 6 mm up to 8 mm:    Single nodule:    Low risk: CT at 6-12 months, then consider CT at 18-24 months  High risk: CT at 6-12 months, then CT at 18-24 months    Multiple nodules:    Low risk: CT at 3-6 months, then consider CT at 18-24 months  High risk: CT at 3-6 months, then CT at 18-24 months

## 2023-05-04 NOTE — PROGRESS NOTES
Brief Cardiology Progress Note      Echo shows progression to severe aortic stenosis, as well as worsened MR/TR.  If patient continues to remain stable from a cardiorespiratory standpoint and is otherwise appropriate for discharge per primary team, it is reasonable for her to still be discharged with short-term follow-up in structural heart (TAVR) clinic.  If she requires prolonged admission for non-cardiac issues, initial TAVR eval can be started as inpatient.      Abdiaziz Newton MD  4:41 PM  5/4/2023

## 2023-05-05 NOTE — DISCHARGE SUMMARY
[facility]  Hospitalist Discharge Summary      Date of Admission:  5/3/2023  Date of Discharge:  5/5/2023  Discharging Provider: Chary Hickey MD    Discharge Diagnoses   Acute exacerbation of chronic HFpEF  Severe aortic stenosis  Severe mitral regurgitation due to mitral valve prolapse  Moderate to severe tricuspid regurgitation  Pulmonary hypertension  Bradycardia  Chronic atrial fibrillation  Hypervolemic hyponatremia  Mild thrombocytopenia  GERD    Follow-ups Needed After Discharge   Follow-up Appointments     Follow-up and recommended labs and tests       Follow up with Cardiology as scheduled. They will call you to arrange   follow up           Unresulted Labs Ordered in the Past 30 Days of this Admission     Date and Time Order Name Status Description    5/3/2023  8:43 PM Pleural fluid Aerobic Bacterial Culture Routine with Gram Stain Preliminary       These results will be followed up by me    Discharge Disposition   Discharged to home  Condition at discharge: Stable     Hospital Course   Elizabeth Ruggiero is a 89 year old female with complex PMHx including chronic a-fib and prior DVT on chronic AC with apixaban, CHF, aortic stenosis, mitral valve prolapse, and tricuspid regurgitation who was admitted on 5/3/2023 for acute CHF     # Acute exacerbation of chronic HFpEF  # Cor pulmonale  # Moderate aortic stenosis  # Mitral valve prolapse  # Severe tricuspid regurgitation  # Pulmonary hypertension  * Presented with progressive HENDERSON, LE edema, and weight gain. spO2 mid 90s on room air, but she was placed on 2 lpm/NC for comfort. NTproBNP >6000. CXR showed large right pleural effusion.   * In the ED, she was initiated on IV Lasix and was given a dose of ceftriaxone for possible pneumonia  * Ceftriaxone discontinued after 1 dose due to low suspicion for pneumonia  * Underwent thoracentesis on 5/3 with removal of 1L clear fluid  * TTE this admission showed progression of aortic  stenosis (AV area now 0.74 cm2 from 1.5 cm2 with mean gradient 15 mmHg), MR also progressed, TR also progressed, and now also with moderately decreased RV systolic function  - Breathing comfortably on room air at the time of discharge  - PTA torsemide has been increased from 40 mg MWF to daily  - Not felt to be a candidate for MitraClip due to advanced age  - Cardiology recommending consideration of outpatient TAVR eval though question utility in setting of severe MR and TR. Plan short-term follow up with Cardiology to discuss further    Bradycardia  Chronic atrial fibrillation  * PTA regimen: metoprolol 12.5 mg BID, digoxin 125 mg daily, apixaban 2.5 mg BID  * Asymptomatic bradycardia to mid-40s noted during admission. No med adjustments made by Cardiology  - HR 70s at the time of discharge  - Digoxin decreased to 62.5 mg daily this admission due to GFR  - Continues on PTA metoprolol and apixaban    Hypervolemic hyponatremia, Improved  * Initial sodium 125. Improved with IV Lasix  - Na 131-132 at the time of discharge     Mild thrombocytopenia  * Platelets 110s-135k this admission.     GERD  * Chronic and stable on PPI    Consultations This Hospital Stay   CARDIOLOGY IP CONSULT    Code Status   No CPR- Do NOT Intubate    Time Spent on this Encounter   I, Chary Hickey MD, personally saw the patient today and 40 minutes discharging this patient.       Chary Hickey MD  [place] [address] [facility] [address]  [facility] [place] [address]  Phone: [phone]  ______________________________________________________________________    Physical Exam   Vital Signs: Temp: 98.2  F (36.8  C) Temp src: Oral BP: 104/46 Pulse: 77   Resp: 18 SpO2: 96 % O2 Device: None (Room air)    Weight: 111 lbs 0 oz    Constitutional: Resting comfortably, NAD  HEENT: Sclera white, conjunctiva clear, EOMI, MMM  Respiratory: Breathing non-labored. Diminished breath sounds over bilateral  bases  Cardiovascular: Heart irregular rhythm, +systolic murmur. 1+ BLE edema.   GI: +BS. Abd soft/NT  Skin: Warm and dry. No rash.  Musculoskeletal: Normal muscle bulk and tone  Neurologic: [place]. Alert and appropriate. CAR  Psychiatric: Calm and cooperative    Primary Care Physician   Sonu Reece    Discharge Orders      Follow-Up with Cardiology CONCEPCIÓN      Reason for your hospital stay    Exacerbation of congestive heart failure which     Follow-up and recommended labs and tests     Follow up with Cardiology as scheduled. They will call you to arrange follow up     Activity    Your activity upon discharge: activity as tolerated     Diet    Follow this diet upon discharge: Low salt diet       Significant Results and Procedures   Most Recent 3 BMP's:Recent Labs   Lab Test 05/05/23  0955 05/04/23  1211 05/04/23  0519 05/04/23  0006 05/03/23  1610   * 132* 130*   < > 125*   POTASSIUM 3.7  --  3.6  --  3.9   CHLORIDE 91*  --  92*  --  88*   CO2 25  --  25  --  23   BUN 25.7*  --  22.5  --  25.4*   CR 0.85  --  0.71  --  0.75   ANIONGAP 15  --  13  --  14   CHRIS 9.6  --  9.3  --  9.8*   *  --  83  --  91    < > = values in this interval not displayed.   ,   Results for orders placed or performed during the hospital encounter of 05/03/23   XR Chest 2 Views    Narrative    EXAM: XR CHEST 2 VIEWS  LOCATION: [facility]  DATE/TIME: 5/3/2023 4:34 PM CDT    INDICATION: R shoulder pain  COMPARISON: 04/03/2023 and older studies      Impression    IMPRESSION: Increase in the size of the right effusion which is now small to moderate in size. Cardiomegaly is unchanged. There is mild pulmonary vascular redistribution without signs of failure. Extensive mitral annular calcifications. No signs of   pneumonia.    Paucity of degenerative changes in either shoulder given age.   XR Shoulder Right G/E 3 Views    Narrative    EXAM: XR SHOULDER RIGHT G/E 3 VIEWS  LOCATION: [facility]  DATE/TIME: 5/3/2023 4:35 PM CDT    INDICATION: R shoulder pain  COMPARISON: None.      Impression    IMPRESSION: No acute fracture or malalignment. There is normal glenohumeral joint spacing. Moderate to severe acromioclavicular joint degenerative changes. Cortical irregularity at the greater tuberosity suggests chronic rotator cuff pathology.   Osteopenia. Aortic atherosclerosis. Partially imaged right pleural effusion. Background of chronic lung changes.   CT Chest/Abdomen/Pelvis w Contrast    Narrative    EXAM: CT CHEST/ABDOMEN/PELVIS W CONTRAST  LOCATION: [facility]  DATE/TIME: 5/3/2023 7:49 PM CDT    INDICATION: Hyponatremia and a right-sided pleural effusion. Clinical suspicion of occult malignancy.  COMPARISON: CT abdomen/pelvis 04/24/2022.  TECHNIQUE: CT scan of the chest, abdomen, and pelvis was performed following injection of IV contrast. Multiplanar reformats were obtained. Dose reduction techniques were used.   CONTRAST: 85 mL Isovue 370.    FINDINGS: Patient was imaged with arm(s) at side, limiting study quality. Study also degraded by motion.    LUNGS AND PLEURA: Trachea and large airways are patent. Patchy infiltrate within the dependent right lower lobe, likely infectious/inflammatory. Scattered nodular opacities within the right middle lobe, possibly superficial to a chronic bronchiolitis.   Largest index nodule measures up to 6 mm the lateral segment, axial image 143. This warrants continued imaging follow-up according to Fleischner criteria.     No pneumothorax.    Moderate-sized right pleural effusion. Tiny left pleural effusion.     MEDIASTINUM/AXILLAE: No mediastinal/hilar adenopathy.     Fluid within the distal esophagus, suggestive of reflux.    No axillary lymphadenopathy.    Chest wall is unremarkable.    No thoracic aortic aneurysm. Mild atheromatous disease.    Moderate to severe cardiomegaly. Dense calcifications of the mitral annulus. No pericardial  effusion.    CORONARY ARTERY CALCIFICATION: Severe.    HEPATOBILIARY: Liver enhances normally and is normal in size.    Gallbladder is normal.    Prominence of the extrahepatic common duct, similar to prior and likely senescent.    PANCREAS: Fatty atrophy of the pancreas.    SPLEEN: Enhances normally. Normal size.    ADRENAL GLANDS: Normal.    KIDNEYS: Both kidneys enhance symmetrically, without hydronephrosis.    No nephroureterolithiasis.    Mild distention of the urinary bladder.    PELVIC ORGANS: No suspicious abnormality.    BOWEL: No evidence of acute gastrointestinal inflammation or obstruction. Colonic diverticulosis.    No intraperitoneal free fluid or free air.    LYMPH NODES: No suspicious abdominal or pelvic lymphadenopathy.    VASCULATURE: No abdominal aortic aneurysm. Moderate atheromatous disease.    MUSCULOSKELETAL: No suspicious abnormality. Compression deformity at L1, unchanged. Healed, instrumented fracture of the intertrochanteric left femur.    OTHER: No additionally significant abnormalities.      Impression    IMPRESSION:   1.  No evidence of malignancy within the chest, abdomen, or pelvis.  2.  Moderate-sized right pleural effusion and tiny left pleural effusion.  3.  Atelectasis versus developing infiltrate within the dependent right lower lobe.  4.  Additional scattered nodules within the right middle lobe, possibly reflecting a chronic bronchiolitis. An index nodule in the lateral segment measures up to 6 mm and warrants follow-up according to Fleischner criteria, as detailed below.  5.  Distended urinary bladder.  6.  Cardiomegaly.    2017 Fleischner Society Recommendations for Solid Pulmonary Nodules    Nodule size greater than 6 mm up to 8 mm:    Single nodule:    Low risk: CT at 6-12 months, then consider CT at 18-24 months  High risk: CT at 6-12 months, then CT at 18-24 months    Multiple nodules:    Low risk: CT at 3-6 months, then consider CT at 18-24 months  High risk: CT at 3-6  months, then CT at 18-24 months   US Thoracentesis    Narrative    EXAM:  1. RIGHT THORACENTESIS  2. ULTRASOUND GUIDANCE  LOCATION: [facility]  DATE/TIME: 2023 12:10 PM    INDICATION: Pleural effusion.    PROCEDURE: Informed consent obtained. Time out performed. The chest  was prepped and draped in a sterile fashion. 10 mL of 1% lidocaine was  infused into local soft tissues. A 5 Mongolian catheter system was  introduced into the pleural effusion under ultrasound guidance.    1 liters of clear fluid were removed and sent to lab if requested.    Patient tolerated procedure well.    Ultrasound images have been permanently captured for documentation.      Impression    IMPRESSION:  Status post ultrasound-guided right thoracentesis.    ARIEL SAEED MD         SYSTEM ID:  [id number]   Echocardiogram Complete     Value    LVEF  60-65%    Narrative    [id number]  [id number]  [id number]  [id number]^SERJIO^EMILIO^P     [facility]  Echocardiography Laboratory  [address] [place] [address]     Name: CHOLO TA  MRN: [id number]  : 1926  Study Date: 2023 02:29 PM  Age: 89 yrs  Gender: Female  Patient [facility]  Reason For Study: CHF  Ordering Physician: EMILIO BASSETT  Referring Physician: EMILIO BASSETT  Performed By: VITA Greenfield     BSA: 1.6 m2  Height: 66 in  Weight: 111 lb  HR: 72  BP: 107/70 mmHg  ______________________________________________________________________________  Procedure  Complete Echo Adult.  ______________________________________________________________________________  Interpretation Summary     There is mild concentric left ventricular hypertrophy.  Left ventricular systolic function is normal.  The visual ejection fraction is 60-65%.  Moderately decreased right ventricular systolic function  The right ventricular systolic pressure is approximated at 81mmHg plus the  right atrial pressure. Critical pulmonary hypertension.  Severely  restricted trileaflet aortic valve. Vmax 2.6 m/s, mean gradient 15  mmHg. The SVI severely low at 24 ml/m2. BRITTNI by continuity is 0.74 cm2.  Findings consistent with low flow low gradient severe aortic stenosis.  Visually appears severe.  There is severe (4+) mitral regurgitation from mitral valve prolapse. Mild  mitral stenosis.  Severe tricuspid regurgitation.  Dilated IVC consistent with high right atrial pressure.  Small pericardial effusion     Compared to study dated 5/12/2022, aortic stenosis has progressed. Mitral  regurgitation is severe. Pulmonary pressure is increased.  ______________________________________________________________________________  Left Ventricle  The left ventricle is normal in size. There is mild concentric left  ventricular hypertrophy. Left ventricular systolic function is normal. The  visual ejection fraction is 60-65%. Normal left ventricular wall motion.     Right Ventricle  The right ventricle is normal size. Moderately decreased right ventricular  systolic function.     Atria  The left atrium is severely dilated. The right atrium is severely dilated.     Mitral Valve  There is severe mitral annular calcification. Moderate mitral valve prolapse,  posterior leaflet. There is severe (4+) mitral regurgitation. There is mild  mitral stenosis. The mean mitral valve gradient is 5.5 mmHg.     Tricuspid Valve  There is severe (4+) tricuspid regurgitation. The right ventricular systolic  pressure is approximated at 81mmHg plus the right atrial pressure. Severe  (>55mmHg) pulmonary hypertension is present.     Aortic Valve  The aortic valve is trileaflet with aortic valve sclerosis. Severe valvular  aortic stenosis. Vmax 2.6 m/s, mean gradient 15 mmHg, DVI 0.35. SVI severely  low at 24 ml/m2. BRITTNI by continuity is 0.74 cm2. Findings consistent with low  flow low gradient severe aortic stenosis. Visually appears very severe.     Pulmonic Valve  The pulmonic valve is not well seen, but is  grossly normal. There is mild (1+)  pulmonic valvular regurgitation.     Vessels  The aortic root is normal size. Normal size ascending aorta. Dilation of the  inferior vena cava is present with abnormal respiratory variation in diameter.     Pericardium  Small pericardial effusion.     Rhythm  The rhythm was atrial fibrillation.  ______________________________________________________________________________  MMode/2D Measurements & Calculations  IVSd: 1.0 cm     LVIDd: 3.5 cm  LVIDs: 2.1 cm  LVPWd: 1.0 cm  FS: 38.6 %  LV mass(C)d: 107.1 grams  LV mass(C)dI: 68.8 grams/m2  Ao root diam: 3.2 cm  asc Aorta Diam: 3.5 cm  LVOT diam: 1.9 cm  LVOT area: 3.0 cm2  LA Volume (BP): 91.0 ml  LA Volume Index (BP): 58.3 ml/m2  RV Base: 3.4 cm  RWT: 0.59     TAPSE: 1.0 cm     Doppler Measurements & Calculations  MV E max bry: 103.3 cm/sec  MV max P.3 mmHg  MV mean P.5 mmHg  MV V2 VTI: 37.4 cm  MVA(VTI): 1.5 cm2  MV dec time: 0.26 sec  Ao V2 max: 257.4 cm/sec  Ao max P.0 mmHg  Ao V2 mean: 188.1 cm/sec  Ao mean PG: 15.6 mmHg  Ao V2 VTI: 53.6 cm  BRITTNI(I,D): 1.0 cm2  BRITTNI(V,D): 1.2 cm2  LV V1 max PG: 3.4 mmHg  LV V1 max: 101.4 cm/sec  LV V1 VTI: 18.6 cm  SV(LVOT): 55.1 ml  SI(LVOT): 35.4 ml/m2  PA acc time: 0.12 sec  TR max bry: 388.9 cm/sec  TR max P.1 mmHg  AV Bry Ratio (DI): 0.39  BRITTNI Index (cm2/m2): 0.66  E/E' av.7  Lateral E/e': 13.0  Medial E/e': 16.4     RV S Bry: 10.1 cm/sec     ______________________________________________________________________________  Report approved by: Nida Rivero 2023 04:31 PM               Discharge Medications   Current Discharge Medication List      CONTINUE these medications which have CHANGED    Details   digoxin (LANOXIN) 125 MCG tablet Take 0.5 tablets (62.5 mcg) by mouth daily  Qty: 30 tablet, Refills: 0    Associated Diagnoses: Atrial fibrillation, unspecified type (H)      torsemide (DEMADEX) 20 MG tablet Take 1 tablet (20 mg) by mouth daily  Qty: 90  tablet, Refills: 0    Associated Diagnoses: Acute on chronic diastolic congestive heart failure (H)         CONTINUE these medications which have NOT CHANGED    Details   acetaminophen (TYLENOL) 500 MG tablet Take 1,000 mg by mouth 3 times daily 2 tablets tid at 6am, 2pm, 10pm      alendronate (FOSAMAX) 70 MG tablet Take 1 tablet (70 mg) by mouth every 7 days SUNDAYS at 7:30am  Qty: 4 tablet, Refills: 0    Associated Diagnoses: Osteoporosis, unspecified osteoporosis type, unspecified pathological fracture presence      aluminum & magnesium hydroxide (MAG-AL) 200-200 MG/5ML supsension Take 30 mLs by mouth 4 times daily as needed for indigestion      cholecalciferol 25 MCG (1000 UT) TABS Take 1 tablet by mouth daily      COMPRESSION STOCKINGS 1 each daily  Qty: 1 each, Refills: 1    Comments: 20-30 mmHg, Knee high, on in the morning and off at night.  Associated Diagnoses: Edema, unspecified type      ELIQUIS ANTICOAGULANT 2.5 MG tablet Take 2.5 mg by mouth 2 times daily      hydrocortisone (ANUSOL-HC) 25 MG suppository Place 25 mg rectally 2 times daily      lactobacillus rhamnosus, GG, (CULTURELL) capsule Take 1 capsule by mouth daily      melatonin 5 MG tablet Take 5 mg by mouth At Bedtime      menthol-zinc oxide (CALMOSEPTINE) 0.44-20.6 % OINT ointment Apply topically 2 times daily      metoprolol succinate ER (TOPROL-XL) 25 MG 24 hr tablet Take 12.5 mg by mouth every evening       Multiple Vitamins-Minerals (PRESERVISION AREDS 2+MULTI VIT) CAPS Take 1 tablet by mouth 2 times daily  Qty: 60 capsule, Refills: 0    Associated Diagnoses: Osteoporosis, unspecified osteoporosis type, unspecified pathological fracture presence      multivitamin w/minerals (THERA-VIT-M) tablet Take 1 tablet by mouth daily       mupirocin (BACTROBAN) 2 % external ointment Apply topically 2 times daily To open sores on back      NONFORMULARY Diltiazem 2% ointment - apply pea-sized amount to the anus TID x6 weeks.  Pt to self  administer.  Compounded by [facility], [address]      omeprazole (PRILOSEC) 20 MG DR capsule Take 20 mg by mouth daily before breakfast      polyethylene glycol (MIRALAX) 17 GM/Dose powder Take 17 g by mouth daily as needed for constipation      senna-docusate (SENOKOT-S/PERICOLACE) 8.6-50 MG tablet Take 1 tablet by mouth daily as needed for constipation      simethicone (MYLICON) 80 MG chewable tablet Take 80 mg by mouth 4 times daily as needed for flatulence or cramping      triamcinolone (KENALOG) 0.1 % external cream Apply topically 2 times daily To back for itching      cetirizine (ZYRTEC) 10 MG tablet Take 10 mg by mouth At Bedtime         STOP taking these medications       hydrOXYzine (ATARAX) 25 MG tablet Comments:   Reason for Stopping:         potassium chloride ER (K-TAB) 20 MEQ CR tablet Comments:   Reason for Stopping:             Allergies   No Known Allergies

## 2023-05-05 NOTE — PLAN OF CARE
Summary: Right pleural effusion; Hx of CHF, atrial fibrillation, DVT, hypertension, hyperlipidemia, and TIA   DATE & TIME: 5/4/23 Demetra shift    Cognitive Concerns/ Orientation : A&Ox4, forgetful   BEHAVIOR & AGGRESSION TOOL COLOR: Green  ABNL VS/O2: VSS on RA  MOBILITY: not been OOB; indep with walker at baseline  PAIN MANAGMENT: Denies  DIET: Cardiac, no caffeine  BOWEL/BLADDER: Purewick in place  ABNL LAB/BG: n/a  DRAIN/DEVICES: PIV S/L  SKIN: Protective mepilex on coccxy - skin intact.  TESTS/PROCEDURES: Echo done today  D/C DAY/GOALS/PLACE: Back to assisted living ([facility])  OTHER important info: hard of hearing --left hearing aids at home. Pocket talker provided.

## 2023-05-05 NOTE — PLAN OF CARE
Goal Outcome Evaluation:       Summary: Right pleural effusion; Hx of CHF, atrial fibrillation, DVT, hypertension, hyperlipidemia, and TIA   DATE & TIME: 5/4/23 [id number]  Cognitive Concerns/ Orientation : A&Ox4, forgetful   BEHAVIOR & AGGRESSION TOOL COLOR: Green  ABNL VS/O2: VSS on RA  MOBILITY: not been OOB; indep with walker at baseline  PAIN MANAGMENT: Denies pain, MAALOX given for heartburn  DIET: Cardiac, no caffeine  BOWEL/BLADDER: Purewick in place  ABNL LAB/BG: n/a  DRAIN/DEVICES: PIV S/L  SKIN: Protective mepilex on coccxy - skin intact.  TESTS/PROCEDURES: Echo done yesterday  D/C DAY/GOALS/PLACE: Back to assisted living ([facility])  OTHER important info: hard of hearing --left hearing aids at home. Pocket talker provided.

## 2023-05-05 NOTE — CONSULTS
Care Management Initial Consult    General Information  Assessment completed with: Patient, Care Team Member, Elizabeth Shah RN at [facility]  Type of CM/SW Visit: Initial Assessment    Primary Care Provider verified and updated as needed: Yes   Readmission within the last 30 days: no previous admission in last 30 days      Reason for Consult: discharge planning  Advance Care Planning:            Communication Assessment  Patient's communication style: spoken language (English or Bilingual)    Hearing Difficulty or Deaf: yes   Wear Glasses or Blind: yes    Cognitive  Cognitive/Neuro/Behavioral: WDL                      Living Environment:   People in home: alone     Current living Arrangements: assisted living  Name of Facility: [facility] [address]   Able to return to prior arrangements: yes       Family/Social Support:  Care provided by: self, other (see comments) ([facility] staff)  Provides care for: no one  Marital Status:   Children, Facility resident(s)/Staff          Description of Support System: Supportive, Involved         Current Resources:   Patient receiving home care services: No     Community Resources: None  Equipment currently used at home: walker, standard  Supplies currently used at home: None    Employment/Financial:  Employment Status:          Financial Concerns:            Lifestyle & Psychosocial Needs:  Social Determinants of Health     Tobacco Use: Low Risk  (4/3/2023)    Patient History      Smoking Tobacco Use: Never      Smokeless Tobacco Use: Never      Passive Exposure: Not on file   Alcohol Use: Not on file   Financial Resource Strain: Not on file   Food Insecurity: Not on file   Transportation Needs: Not on file   Physical Activity: Not on file   Stress: Not on file   Social Connections: Not on file   Intimate Partner Violence: Not on file   Depression: Not on file   Housing Stability: Not on file       Functional Status:  Prior to admission patient needed assistance:              Mental  Health Status:          Chemical Dependency Status:                Values/Beliefs:  Spiritual, Cultural Beliefs, [employer] Practices, Values that affect care:                 Additional Information:  Per consult for discharge planning and chart review indicating patient lives at The [address], called the [facility] [phone] and spoke to Alyssa MATTA.  Per Alyssa patient receives the following services:  Medication set up, medication administration at 8am and pt administers the rest of her medication on her own,housekeeping services and meals are provided for pt.  Per Alyssa, pt is independent with all her ADLs and uses a walker with ambulation.  Per Alyssa family assists with laundry.  Discussed that pt has discharge orders.  Per Alyssa any new or changed medication should be filled at  [facility] and to fax discharge orders to [phone].  Called both Brittney and Cecy and left messages.  Await call back.  Per bedside RN, pt's daughter Cecy will transport her home today at 5:30PM.  Met with patient to discuss discharge plan back to [facility] and pt stated understanding of plan and was aware that her daughter will transport her home today.  Care Management Discharge Note    Discharge Date: 05/05/2023       Discharge Disposition: Assisted Living    Discharge Services: None    Discharge DME: None    Discharge Transportation:      Private pay costs discussed: Not applicable      Education Provided on the Discharge Plan:    Persons Notified of Discharge Plans: bedside RN  Patient/Family in Agreement with the Plan: yes    Handoff Referral Completed: Yes    Additional Information:  Patient ready for discharge, faxed discharge orders to [facility] and pt's daughter will transport pt home.        SIGRID Raymundo RN, BSN, OCN   Inpatient Care Coordination [address]  Office: [phone]

## 2023-05-05 NOTE — PLAN OF CARE
Goal Outcome Evaluation:      Plan of Care Reviewed With: patient          Outcome Evaluation: Pt very disoriented with limited ability to comprehend/answer nutrition questions this morning. Pt is emaciated, but not surprising given advanced age. Ordering well but intake% unknown via flow sheets or pt recall.

## 2023-05-05 NOTE — PROGRESS NOTES
Care Management Follow Up    Length of Stay (days): 2    Expected Discharge Date: 05/05/2023     Concerns to be Addressed:       Patient plan of care discussed at interdisciplinary rounds: Yes    Anticipated Discharge Disposition:       Anticipated Discharge Services:    Anticipated Discharge DME:      Patient/family educated on Medicare website which has current facility and service quality ratings:    Education Provided on the Discharge Plan:    Patient/Family in Agreement with the Plan:      Referrals Placed by CM/SW:    Private pay costs discussed: Not applicable    Additional Information:  Per chart review pt has discharge orders for home to [facility] [address]  Called the [facility] [phone] and left message for RN.    Attempted to meet with pt but she was sleeping.  Called both duaghters listed Jennifer and Cecy and left messages.  Await call back from [facility] and daughters to discuss discharge.    Inpatient Care Coordinator will continue to follow for discharge needs.  SIGRID Raymundo RN, BSN, OCN   Inpatient Care Coordination [address]  Office: [phone]

## 2023-05-05 NOTE — PROGRESS NOTES
"CLINICAL NUTRITION SERVICES - ASSESSMENT NOTE     Nutrition Prescription    RECOMMENDATIONS FOR MDs/PROVIDERS TO ORDER:  None at this time    Malnutrition Status:    Severe malnutrition in the context of acute on chronic illness    Recommendations already ordered by Registered Dietitian (RD):  Medical food supplement therapy -  Ensure Enlive BID between meals at 10am and 2pm - Vary flavor  Multivitamin/mineral supplement therapy - Thera-Vit-M    Future/Additional Recommendations:  Monitor po intake/ONS intake and tolerance, wts, lytes, BMs     REASON FOR ASSESSMENT  Elizabeth Ruggiero is a/an 89 year old female assessed by the dietitian for Admission Nutrition Risk Screen for positive    NUTRITION HISTORY  Per ED note: Pt with CHF, atrial fibrillation, DVT, hypertension, hyperlipidemia, and TIA who presents via EMS from the [facility] with persisting right shoulder pain for the last month and increased leg swelling.     Met with pt this am, who was asleep when I entered. Pt seemed disoriented, had difficulty answering my questions, and seemed unsure of writer's role of care. Pt was coughing frequently, had difficulty speaking, and asked questions about her condition that writer couldn't answer. Pt reported feeling ill for the past week, but was unable to assess her po intake during that time.    CURRENT NUTRITION ORDERS  Diet: Low Saturated Fat/2400 mg Sodium and Regular, No Caffeine  Intake/Tolerance: Unable to assess intake d/t lack of updated flow sheets, pt intake recall    LABS  Labs reviewed.    MEDICATIONS  Medications reviewed  Lasix  KCl tablet  Vitamin D3 tablet 25 mcg  Torsemide    ANTHROPOMETRICS  Height: 167.6 cm (5' 6\")  Most Recent Weight: 50.3 kg (111 lb)    IBW: 59.3 kg (130 lb 11.7 oz)  BMI: Underweight BMI <18.5  %IBW: 85%  Weight History:   05/03/23 : 50.3 kg (111 lb)   04/03/23 : 46.3 kg (102 lb)   03/14/23 : (105 lb)   01/11/23 : (104 lb)  11/21/22 : 45.8 kg (101 lb)   06/03/22 : 46.9 kg (103 lb 6.4 " oz)     Dosing Weight: 50.3 kg actual body wt    ASSESSED NUTRITION NEEDS  Estimated Energy Needs: 8518-2807 kcals/day (30 - 35 kcals/kg )  Justification: Repletion  Estimated Protein Needs: 60-75 grams protein/day (1.2 - 1.5 grams of pro/kg)  Justification: Repletion  Estimated Fluid Needs: 5065-7253 mL/day (1 mL/kcal)   Justification: Maintenance    PHYSICAL FINDINGS  See malnutrition section below.  Extensive bruising on both arms    MALNUTRITION  % Intake: Unable to assess d/t poor recall and limited records  % Weight Loss: d/t fluid wt  Subcutaneous Fat Loss: moderate-severe global  Muscle Loss: moderate-severe global  Fluid Accumulation/Edema: Mild in BLE  Malnutrition Diagnosis: Severe malnutrition in the context of acute on chronic illness    NUTRITION DIAGNOSIS  Malnutrition related to altered mental status and advanced age as evidenced by severe muscle and fat wasting.     INTERVENTIONS  Implementation  Collaboration with other nutrition professionals  Medical food supplement therapy -  Ensure Enlive BID  Multivitamin/mineral supplement therapy - Thera-Vit-M    Goals  Patient to consume % of nutritionally adequate meal trays TID, or the equivalent with supplements/snacks.     Monitoring/Evaluation  Progress toward goals will be monitored and evaluated per protocol.    Jack Hanna - Dietetic Intern

## 2023-05-06 NOTE — PLAN OF CARE
Goal Outcome Evaluation:                  Discharge    Patient discharged to Assisted Living facility via w/c with daughter  Care plan note:Pt alert and oriented, [place]. Good appetite Denies pain. Up walking with 1/walker and belt. Given AVS.    Listed belongings gathered and given to patient (including from security/pharmacy). Yes  Care Plan and Patient education resolved: Yes  Prescriptions if needed, hard copies sent with patient  NA  Medication Bin checked and emptied on discharge Yes  SW/care coordinator/charge RN aware of discharge: Yes

## 2023-05-08 NOTE — TELEPHONE ENCOUNTER
Structural Heart Clinic - [facility]    TAVR referral received from:  Dr. Newton (inpatient team following patient)   Spoke with patient's daughter Brittney. They are not interested in pursuing TAVR workup at this time. She notes that it is very difficult to get her mom her for appointments. Instead we will have Elizabeth follow up with Dr. Galvan and be referred back to valve clinic if appropriate.     Nguyen Limon RN  Structural Heart Coordinator  [facility]

## 2023-05-08 NOTE — PROGRESS NOTES
Clinic Care Coordination Contact  [facility]/Voicemail       Clinical Data: Care Coordinator Outreach  Outreach attempted x 2.  Left message on patient's voicemail with call back information and requested return call.  Plan: Care Coordinator will do no further outreaches at this time.    JESSICA Gilbert  [phone]  [facility]

## 2023-05-16 NOTE — LETTER
5/16/2023    Sonu Reece MD  [facility] [address] [address]    RE: Elizabeth WOLF Bahman       Dear Colleague,     I had the pleasure of seeing Elizabeth WOLF Ruggiero in the [facility].  Appointment canceled.  Daughter will reschedule.      Thank you for allowing me to participate in the care of your patient.      Sincerely,     Tiffany Galvan MD     [facility]  cc:   No referring provider defined for this encounter.

## 2023-05-18 NOTE — TELEPHONE ENCOUNTER
[facility] Call Center    Phone Message    May a detailed message be left on voicemail: yes     Reason for Call: Medication Question or concern regarding medication   Prescription Clarification  Name of Medication: furosemide  Prescribing Provider: Cole   Pharmacy:      [id number] - [place], [place] - [email or web address]     What on the order needs clarification? Please fax rx order to the [facility] [address] at [phone] alt fax: [phone]            Action Taken: Other: cardio    Travel Screening: Not Applicable

## 2023-05-18 NOTE — TELEPHONE ENCOUNTER
RN updated med list and sent in new rx. RN updated patient's daughter via [employer].       Absolutely. Stop torsemide and start lasix 20 mg daily (this is an overall equivalent reduction in diuretic so they will need to watch for signs of fluid retention).     Tiffany Galvan MD

## 2023-05-18 NOTE — TELEPHONE ENCOUNTER
Detailed Context Relevant message received from patient's daughter. RN will send to Dr. Galvan to inquire if ok to change patient to alternative diuretic as requested below.       Elizabeth Ruggiero ([birth year]) had been scheduled to meet with you on 5/16. However, Mother had severe constipation, and we rescheduled to 5/23. In the meantime, I wanted to express the significant concern that both Mother's primary care physician, Dr. Sonu Reece, and I have about Mother's prescription for Torsemide 20 MG 1x/day. We both believe that Torsemide at that dosage is directly correlated with Mother's continual constipation, which started after she was first prescribed Torsemide during a hospital stay in 2022. She had previously taken Lasix (furosemide) and had never suffered from constipation before she started Torsemide. When Dr. Reece cut her Torsemide to 20MG 3x /week, her constipation subsided, but she developed fluid in her right lung that was removed during a hospital stay May 3-5, 2023. Her post-hospital order for Torsemide 20MG 1x /day resulted in the worst case of constipation she has ever had, accompanied by three days of excruciating pain and cramping. Mother   absolutely cannot tolerate another serious bout of constipation like this again. Dr. Reece is strongly suggesting that you change her diuretic to either Lasix (furosemide) or Bumex as soon as possible. I totally agree, because Mother just cannot withstand this level of suffering again. It certainly can't be good for her heart.     Please call me (or have someone from your care team call me) at [phone] if you have any questions, concerns or alternate solutions.     Thank you,   Brittney Will     Daughter, POA

## 2023-05-22 NOTE — ED PROVIDER NOTES
History     Chief Complaint:  Shoulder Pain       The history is provided by the patient.      Elizabeth Ruggiero is a 89 year old female who comes from assisted living at the [facility] for bilateral shoulder pain. She was seen earlier this month for the same symptoms, she that today her pain increased. She denies getting any pain medication at assisted living. She also mentions having some abdominal pain when she woke up but is now feeling better. She denies any shortness of breath, chest pain.    Independent Historian:   None - Patient Only    Review of External Notes:   Yes-I thoroughly reviewed her recent hospital admission 2 weeks ago where she underwent CT of her chest/abdomen/pelvis, echocardiogram, and cardiac evaluation.    Medications:    Lanoxin   Eliquis   Lasix   Culturell   Toprol    Prilosec   Miralax   Senokot     Past Medical History:    (HFpEF) heart failure with preserved ejection fraction    Acute diastolic CHF   Acute left hemiparesis    Arthritis   Atrial fibrillation with RVR   Community acquired pneumonia    DVT   Hepatic congestion   Hypertension   Hyperlipidemia   Mitral valve insufficiency   Postmenopausal bleeding   Severe mitral regurgitation    TIA    Vitamin D deficiency   Lumbago   Displacement of lumbar intervertebral disc without myelopathy  Intertrochanteric fracture of left femur   Spinal stenosis  Lumbosacral radiculitis  Osteoporosis  Postoperative anemia due to acute blood loss  Right lumbar radiculitis  Hyponatremia  Generalized muscle weakness  Elevated troponin  Pleural effusion  Baker's cyst of knee, left    Past Surgical History:    Appendectomy   Repair of left femur    Breast biopsy     Physical Exam     Patient Vitals for the past 24 hrs:   BP Temp Temp src Pulse Resp SpO2 Height Weight   05/22/23 1849 -- -- -- -- -- 97 % -- --   05/22/23 1834 -- -- -- -- -- 96 % -- --   05/22/23 1819 -- -- -- -- -- 98 % -- --   05/22/23 1804 -- -- -- -- -- 97 % -- --   05/22/23 1749 -- --  "-- -- -- 96 % -- --   05/22/23 1733 105/62 -- -- -- -- -- -- --   05/22/23 1727 -- 97.4  F (36.3  C) Oral 70 18 96 % 1.676 m (5' 6\") 47.9 kg (105 lb 8 oz)        Physical Exam  Eye:  Pupils are equal, round, and reactive.  Extraocular movements intact.    ENT:  No rhinorrhea.  Moist mucus membranes.  Normal tongue and tonsil.    Cardiac:  Regular rate and rhythm.  No murmurs, gallops, or rubs.    Pulmonary:  Clear to auscultation bilaterally.  No wheezes, rales, or rhonchi.    Abdomen:  Positive bowel sounds.  Abdomen is soft and non-distended, without focal tenderness.    Musculoskeletal:  Normal movement of all extremities without evidence for deficit. Focused exam of bilateral shoulders show no evidence of fracture or dislocation.    Skin:  Warm and dry without rashes.    Neurologic:  Non-focal exam without asymmetric weakness or numbness.     Psychiatric:  Normal affect with appropriate interaction with examiner.     Emergency Department Course     Imaging:  Chest XR,  PA & LAT   Final Result   IMPRESSION: Moderate enlargement of the cardiac silhouette and mild pulmonary venous congestion are unchanged. Small to moderate volume right pleural effusion and the associated passive atelectasis of the basilar right lower lobe have decreased slightly.    Lungs otherwise clear.         Report per radiology    Laboratory:  Labs Ordered and Resulted from Time of ED Arrival to Time of ED Departure   BASIC METABOLIC PANEL - Abnormal       Result Value    Sodium 135 (*)     Potassium 3.6      Chloride 95 (*)     Carbon Dioxide (CO2) 26      Anion Gap 14      Urea Nitrogen 22.6      Creatinine 0.65      Calcium 9.4      Glucose 111 (*)     GFR Estimate 80     CBC WITH PLATELETS AND DIFFERENTIAL - Abnormal    WBC Count 6.0      RBC Count 3.84      Hemoglobin 12.3      Hematocrit 37.5      MCV 98      MCH 32.0      MCHC 32.8      RDW 15.3 (*)     Platelet Count 145 (*)     % Neutrophils 64      % Lymphocytes 24      % Monocytes " 10      % Eosinophils 1      % Basophils 1      % Immature Granulocytes 0      NRBCs per 100 WBC 0      Absolute Neutrophils 3.8      Absolute Lymphocytes 1.4      Absolute Monocytes 0.6      Absolute Eosinophils 0.1      Absolute Basophils 0.1      Absolute Immature Granulocytes 0.0      Absolute NRBCs 0.0     TROPONIN T, HIGH SENSITIVITY - Abnormal    Troponin T, High Sensitivity 51 (*)       Emergency Department Course & Assessments:       Interventions:  Medications   ketorolac (TORADOL) injection 15 mg (15 mg Intravenous $Given 5/22/23 1845)   acetaminophen (TYLENOL) tablet 1,000 mg (1,000 mg Oral Not Given 5/22/23 1846)   traMADol (ULTRAM) tablet 50 mg (50 mg Oral $Given 5/22/23 2002)      Assessments:  1833 I consulted with the patient and obtained history as shown above  1926 I rechecked the patient who's daughter is now present     Independent Interpretation (X-rays, CTs, rhythm strip):  None    Consultations/Discussion of Management or Tests:  None     Social Determinants of Health affecting care:   Age   None    Disposition:  The patient was discharged to home.     Impression & Plan    CMS Diagnoses: None    Medical Decision Making:  This delightful and unfortunate 89-year-old presents to us because of ongoing shoulder pain.  She was just admitted to this hospital 2 weeks ago for similar issues.  She underwent a very thorough evaluation, showing that she has progressive aortic and tricuspid insufficiency and concerns that she may require some type of valve replacement if symptoms worsen.  She had flat troponins throughout her stay and her shoulder pain was more thought to be related to arthritic causes rather than a referred pain from her heart.    Since discharge home, she continues to have bilateral shoulder pain, right greater than left.  It is better on some days and worse on others.  It was more intense today, and when speaking with the nurse at her facility, they sent her in for evaluation.  Her  pain is all reproducible with palpation and with movement of the shoulder.  X-rays of the chest showed no sign of acute abnormality within the shoulders.  Laboratory investigation is all at baseline.    I spoke with the patient and her daughter at length of how to proceed.  I explained that I feel very confident her ongoing shoulder pain is in no way related to her heart.  She is not showing evidence of acute heart failure today.  She does have a known pleural effusion which was tapped during her last admission, though it is not causing any significant symptomatic issue at this time with normal oxygenation and no increased work of breathing.  She was given Toradol and Tylenol with some improvement of her pain we will consider a trial of some tramadol which can be used on days where her pain is more intractable.  Otherwise, I feel she is safe for discharge home with continued outpatient work-up of her chronic issues.  They will return to us for any other emergent concerns.      Diagnosis:    ICD-10-CM    1. Chronic pain of both shoulders  M25.511     G89.29     M25.512       2. History of heart failure  Z86.79            Discharge Medications:  Discharge Medication List as of 5/22/2023  8:18 PM      START taking these medications    Details   traMADol (ULTRAM) 50 MG tablet Take 1 tablet (50 mg) by mouth every 6 hours as needed for severe pain, Disp-10 tablet, R-0, Local Print            Scribe Disclosure:  I, Samm Tomas, am serving as a scribe at 6:37 PM on 5/22/2023 to document services personally performed by Trierweiler, Chad A, MD based on my observations and the provider's statements to me.     5/22/2023   Trierweiler, Chad A, MD Trierweiler, Chad A, MD  05/23/23 5437

## 2023-05-22 NOTE — ED NOTES
Bed: ED02  Expected date:   Expected time:   Means of arrival:   Comments:  414  89 F bilat shoulder pain since this AM  1718

## 2023-05-22 NOTE — ED TRIAGE NOTES
"Pt BIBA from assisted living with bilateral shoulder pain. Was seen here earlier this month with same symptoms. Told needs \"new aorta\". Pain is same as earlier this month. Staff states patient has been put on 2 lbs since yesterday. . No cp, sob.       "

## 2023-05-23 NOTE — DISCHARGE INSTRUCTIONS
As discussed, your shoulder pain is unlikely to be representative of your heart or any other systemic issue.  It is likely due to the significant arthritis in your shoulders.  Continue with Tylenol on a scheduled basis.  You may use the prescribed tramadol as needed for days when your pain is more severe.  Otherwise, do not hesitate to return to us for increased shortness of breath, lightheadedness, fever, or any other emergent concerns.

## 2023-05-23 NOTE — LETTER
"5/23/2023    Sonu Reece MD  [facility] [address]  [place] [address]    RE: Elizabeth Ruggiero       Dear Colleague,     I had the pleasure of seeing Elizabeth Ruggiero in the [facility].  Elizabeth is a 89 year old who is being evaluated via a billable video visit.      How would you like to obtain your AVS? MyChart  If the video visit is dropped, the invitation should be resent by: Text to cell phone: [phone] Brittney's phone  Will anyone else be joining your video visit? No     Review Of Systems  Skin: NEGATIVE  Eyes:Ears/Nose/Throat: NEGATIVE  Respiratory: NEGATIVE  Cardiovascular:NEGATIVE  Gastrointestinal: NEGATIVE  Genitourinary:NEGATIVE   Musculoskeletal: NEGATIVE  Neurologic: NEGATIVE  Psychiatric: NEGATIVE  Hematologic/Lymphatic/Immunologic: NEGATIVE  Endocrine:  NEGATIVE  Vitals - Patient Reported  Systolic (Patient Reported): 105 (taken yesterday 5/22)  Diastolic (Patient Reported): 62 (taken yesterday 5/22)  Weight (Patient Reported): 47.9 kg (105 lb 8 oz)  Height (Patient Reported): 167.6 cm (5' 6\")  BMI (Based on Pt Reported Ht/Wt): 17.03  SpO2 (Patient Reported): 97    Telephone number of patient: [phone] Brittney's phone    Magi Hobson LPN        CARDIOLOGY CLINIC VISIT  DATE OF SERVICE:  May 23, 2023  PRIMARY CARE PHYSICIAN:  Sonu Reece    HISTORY OF PRESENT ILLNESS:  Ms. Elizabeth Ruggiero is a pleasant 89 year old patient with past medical history significant for chronic atrial fibrillation/flutter, chronic diastolic heart failure, hepatic congestion, deep vein thrombosis, hypertension, severe mitral regurgitation, history of TIA, and osteoporosis.  She was last seen  By me in November 2022 and presents today via telephone visit with the assistance of her daughter, Brittney.  Video visit was attempted but internet connection was poor.    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  Intervention was considered in the past, however, she has significant annular calcification which would make successful MitraClip unlikely.  Given her advanced age, she has not been felt to be a good surgical candidate.  She was also noted to have declined RAHEL previously for further evaluation with preference for continued conservative medical management.    She was again hospitalized from 5/3/23 to 5/23 with acute CHF exacerbation.  She was diuresed.  She was discharged on her outpatient dose of torsemide.  Her weight is now down to her dry weight of 105 pounds.  She has had issues with severe constipation which family and reportedly her primary care physician was felt secondary to torsemide.  This was switched to lasix at 20 mg daily (an overall dose reduction) which family states has helped significantly.  She was also seen in the ED yesterday with shoulder pain thought secondary to arthritis.  Today, Elizabeth reports feeling well.  She denies any significant chest pain, chest discomfort or shortness of breath.       Her echocardiogram during her recent admission demonstrates normal LV function with progresion of her aortic stenosis in to the severe range.  There is also severe TR, severe MR and severe pulmonary hypertension.  There was some discussion about referring her to the TAVR clinic but the family canceled this appointment.      PAST MEDICAL HISTORY:  Past Medical History:   Diagnosis Date    (HFpEF) heart failure with preserved ejection fraction (H)     Acute diastolic CHF (congestive heart failure) (H)     Acute left hemiparesis (H) 2/9/2019    Arthritis     Atrial fibrillation (H)     Atrial fibrillation with RVR (H)     Atrial flutter (H)     Atrial flutter with rapid ventricular response (H) 4/23/2019    Community acquired pneumonia     Diastolic CHF (H) 04/29/2019    DVT of lower extremity (deep venous thrombosis) (H)     recurrent    Hepatic congestion     improved  with diuretics for CHF    HTN (hypertension)     Hyperlipidemia     Mitral valve insufficiency     Pneumonia     Postmenopausal bleeding 01/2020    [facility]    Severe mitral regurgitation     SOB (shortness of breath)     TIA (transient ischemic attack)     Vitamin D deficiency        MEDICATIONS:  Current Outpatient Medications   Medication    acetaminophen (TYLENOL) 500 MG tablet    alendronate (FOSAMAX) 70 MG tablet    aluminum & magnesium hydroxide (MAG-AL) 200-200 MG/5ML supsension    calcium carbonate-vitamin D (OSCAL W/D) 500-200 MG-UNIT tablet    cetirizine (ZYRTEC) 10 MG tablet    cholecalciferol 25 MCG (1000 UT) TABS    COMPRESSION STOCKINGS    digoxin (LANOXIN) 125 MCG tablet    ELIQUIS ANTICOAGULANT 2.5 MG tablet    hydrOXYzine (ATARAX) 25 MG tablet    melatonin 5 MG tablet    metoprolol succinate ER (TOPROL-XL) 25 MG 24 hr tablet    Multiple Vitamins-Minerals (PRESERVISION AREDS 2+MULTI VIT) CAPS    multivitamin w/minerals (THERA-VIT-M) tablet    mupirocin (BACTROBAN) 2 % external ointment    NONFORMULARY    omeprazole (PRILOSEC) 20 MG DR capsule    potassium chloride ER (K-TAB) 20 MEQ CR tablet    senna (SENOKOT) 8.6 MG tablet    simethicone (MYLICON) 80 MG chewable tablet    torsemide (DEMADEX) 20 MG tablet    triamcinolone (KENALOG) 0.1 % external cream     No current facility-administered medications for this visit.       ALLERGIES:  No Known Allergies    SOCIAL HISTORY:  I have reviewed this patient's social history and updated it with pertinent information if needed. Elizabeth Ruggiero  reports that she has never smoked. She has never used smokeless tobacco. She reports that she does not drink alcohol and does not use drugs.    FAMILY HISTORY:  I have reviewed this patient's family history and updated it with pertinent information if needed.   Family History   Problem Relation Age of Onset    Colon Cancer Mother     Breast Cancer Sister     Diabetes Sister        REVIEW OF  SYSTEMS:  A complete ROS was obtained and the pertinent positives are outlined in the history of present illness above.  The remainder of systems is negative.      PHYSICAL EXAM:                     Vital Signs with Ranges     105 lbs 0 oz            ASSESSMENT:  Chronic heart failure with preserved ejection fraction  Recent hospitalizationacute decompensated HFpEF  Most recent TTE  with normal LVEF.  Oral diuretics recently changed to lasix 20 mg daily   Euvolemic at 105 pounds  Denies shortness of breath    Severe mitral valve regurgitation  Noted as moderate to severe by TTE 5/2022, unchanged   Not likely amenable to MitraClip due to significant mitral annular calcification and not likely a surgical candidate given her advanced age, so continued medical management has been recommended.     Severe tricuspid regurgitation and pulmonary hypertension     Chronic atrial fibrillation/flutter  On apixaban 2.5 mg twice daily for anticoagulation  Rate controlled with Toprol-XL 12.5 mg daily and digoxin     5.   Essential hypertension, well controlled    6.   Severe aortic stenosis (low flow)    RECOMMENDATIONS:  -I reviewed Elizabeth's recent hospitalization with her and Brittney.   She is feeling back to her baseline and her dry weight appears to be around 105 pounds.  I have reduced her lasix to 20 mg daily per family request and they are happy with this dose.  So far, her weights have been stable.   I discussed the results of her echocardiogram which shows progressive valvular disease with severe aortic stenosis, severe MR/severe TR and severe pulmonary hypertension.  She was deemed not a mitraclip candidate and Elizabeth was also not interested in invasive procedures.  There was some discussion regarding TAVR at her last admission, but this would not address her underlying mitral valve disease and would very unlikely prolong her life in any meainingful way and potentially cause harm.  Brittney and Elizabeth were in agreement.  Plan  to continue current therapy while keeping a close eye on her weights.  They will contact us with any weight gain greater than 3 pounds per day or 5 pounds per week.  -Follow up in 3 months.        Tiffany Galvan MD [facility]  May 23, 2023      Total Visit time:  20  minutes  Total Telephone Visit:  12 minutes    Thank you for allowing me to participate in the care of your patient.      Sincerely,     Tiffany Galvan MD     [facility]  cc:   No referring provider defined for this encounter.

## 2023-05-23 NOTE — PROGRESS NOTES
"Elizabeth is a 89 year old who is being evaluated via a billable video visit.      How would you like to obtain your AVS? MyChart  If the video visit is dropped, the invitation should be resent by: Text to cell phone: [phone] Brittney's phone  Will anyone else be joining your video visit? No     Review Of Systems  Skin: NEGATIVE  Eyes:Ears/Nose/Throat: NEGATIVE  Respiratory: NEGATIVE  Cardiovascular:NEGATIVE  Gastrointestinal: NEGATIVE  Genitourinary:NEGATIVE   Musculoskeletal: NEGATIVE  Neurologic: NEGATIVE  Psychiatric: NEGATIVE  Hematologic/Lymphatic/Immunologic: NEGATIVE  Endocrine:  NEGATIVE  Vitals - Patient Reported  Systolic (Patient Reported): 105 (taken yesterday 5/22)  Diastolic (Patient Reported): 62 (taken yesterday 5/22)  Weight (Patient Reported): 47.9 kg (105 lb 8 oz)  Height (Patient Reported): 167.6 cm (5' 6\")  BMI (Based on Pt Reported Ht/Wt): 17.03  SpO2 (Patient Reported): 97    Telephone number of patient: [phone] Brittney's phone    Magi Hobson LPN        CARDIOLOGY CLINIC VISIT  DATE OF SERVICE:  May 23, 2023  PRIMARY CARE PHYSICIAN:  Sonu Reece    HISTORY OF PRESENT ILLNESS:  Ms. Elizabeth Ruggiero is a pleasant 89 year old patient with past medical history significant for chronic atrial fibrillation/flutter, chronic diastolic heart failure, hepatic congestion, deep vein thrombosis, hypertension, severe mitral regurgitation, history of TIA, and osteoporosis.  She was last seen  By me in November 2022 and presents today via telephone visit with the assistance of her daughter, Brittney.  Video visit was attempted but internet connection was poor.    In brief review, she has severe mitral valve regurgitation secondary to myxomatous thickened leaflets and bileaflet prolapse.  Intervention was considered in the past, however, she has significant annular calcification which would make successful MitraClip unlikely.  Given her advanced age, she has not been felt to be a good surgical candidate. "  She was also noted to have declined RAHEL previously for further evaluation with preference for continued conservative medical management.    She was again hospitalized from 5/3/23 to 5/23 with acute CHF exacerbation.  She was diuresed.  She was discharged on her outpatient dose of torsemide.  Her weight is now down to her dry weight of 105 pounds.  She has had issues with severe constipation which family and reportedly her primary care physician was felt secondary to torsemide.  This was switched to lasix at 20 mg daily (an overall dose reduction) which family states has helped significantly.  She was also seen in the ED yesterday with shoulder pain thought secondary to arthritis.  Today, Elizabeth reports feeling well.  She denies any significant chest pain, chest discomfort or shortness of breath.       Her echocardiogram during her recent admission demonstrates normal LV function with progresion of her aortic stenosis in to the severe range.  There is also severe TR, severe MR and severe pulmonary hypertension.  There was some discussion about referring her to the TAVR clinic but the family canceled this appointment.      PAST MEDICAL HISTORY:  Past Medical History:   Diagnosis Date     (HFpEF) heart failure with preserved ejection fraction (H)      Acute diastolic CHF (congestive heart failure) (H)      Acute left hemiparesis (H) 2/9/2019     Arthritis      Atrial fibrillation (H)      Atrial fibrillation with RVR (H)      Atrial flutter (H)      Atrial flutter with rapid ventricular response (H) 4/23/2019     Community acquired pneumonia      Diastolic CHF (H) 04/29/2019     DVT of lower extremity (deep venous thrombosis) (H)     recurrent     Hepatic congestion     improved with diuretics for CHF     HTN (hypertension)      Hyperlipidemia      Mitral valve insufficiency      Pneumonia      Postmenopausal bleeding 01/2020    [facility]     Severe mitral regurgitation      SOB (shortness  of breath)      TIA (transient ischemic attack)      Vitamin D deficiency        MEDICATIONS:  Current Outpatient Medications   Medication     acetaminophen (TYLENOL) 500 MG tablet     alendronate (FOSAMAX) 70 MG tablet     aluminum & magnesium hydroxide (MAG-AL) 200-200 MG/5ML supsension     calcium carbonate-vitamin D (OSCAL W/D) 500-200 MG-UNIT tablet     cetirizine (ZYRTEC) 10 MG tablet     cholecalciferol 25 MCG (1000 UT) TABS     COMPRESSION STOCKINGS     digoxin (LANOXIN) 125 MCG tablet     ELIQUIS ANTICOAGULANT 2.5 MG tablet     hydrOXYzine (ATARAX) 25 MG tablet     melatonin 5 MG tablet     metoprolol succinate ER (TOPROL-XL) 25 MG 24 hr tablet     Multiple Vitamins-Minerals (PRESERVISION AREDS 2+MULTI VIT) CAPS     multivitamin w/minerals (THERA-VIT-M) tablet     mupirocin (BACTROBAN) 2 % external ointment     NONFORMULARY     omeprazole (PRILOSEC) 20 MG DR capsule     potassium chloride ER (K-TAB) 20 MEQ CR tablet     senna (SENOKOT) 8.6 MG tablet     simethicone (MYLICON) 80 MG chewable tablet     torsemide (DEMADEX) 20 MG tablet     triamcinolone (KENALOG) 0.1 % external cream     No current facility-administered medications for this visit.       ALLERGIES:  No Known Allergies    SOCIAL HISTORY:  I have reviewed this patient's social history and updated it with pertinent information if needed. Elizabeth Ruggiero  reports that she has never smoked. She has never used smokeless tobacco. She reports that she does not drink alcohol and does not use drugs.    FAMILY HISTORY:  I have reviewed this patient's family history and updated it with pertinent information if needed.   Family History   Problem Relation Age of Onset     Colon Cancer Mother      Breast Cancer Sister      Diabetes Sister        REVIEW OF SYSTEMS:  A complete ROS was obtained and the pertinent positives are outlined in the history of present illness above.  The remainder of systems is negative.      PHYSICAL EXAM:                     Vital Signs  with Ranges     105 lbs 0 oz            ASSESSMENT:  1. Chronic heart failure with preserved ejection fraction    Recent hospitalizationacute decompensated HFpEF    Most recent TTE  with normal LVEF.    Oral diuretics recently changed to lasix 20 mg daily     Euvolemic at 105 pounds    Denies shortness of breath    2. Severe mitral valve regurgitation    Noted as moderate to severe by TTE 5/2022, unchanged     Not likely amenable to MitraClip due to significant mitral annular calcification and not likely a surgical candidate given her advanced age, so continued medical management has been recommended.     3. Severe tricuspid regurgitation and pulmonary hypertension     4. Chronic atrial fibrillation/flutter    On apixaban 2.5 mg twice daily for anticoagulation    Rate controlled with Toprol-XL 12.5 mg daily and digoxin     5.   Essential hypertension, well controlled    6.   Severe aortic stenosis (low flow)    RECOMMENDATIONS:  -I reviewed Elizabeth's recent hospitalization with her and Brittney.   She is feeling back to her baseline and her dry weight appears to be around 105 pounds.  I have reduced her lasix to 20 mg daily per family request and they are happy with this dose.  So far, her weights have been stable.   I discussed the results of her echocardiogram which shows progressive valvular disease with severe aortic stenosis, severe MR/severe TR and severe pulmonary hypertension.  She was deemed not a mitraclip candidate and Elizabeth was also not interested in invasive procedures.  There was some discussion regarding TAVR at her last admission, but this would not address her underlying mitral valve disease and would very unlikely prolong her life in any meainingful way and potentially cause harm.  Brittney and Elizabeth were in agreement.  Plan to continue current therapy while keeping a close eye on her weights.  They will contact us with any weight gain greater than 3 pounds per day or 5 pounds per week.  -Follow up in 3  months.        Tiffany Galvan MD [facility]  May 23, 2023      Total Visit time:  20  minutes  Total Telephone Visit:  12 minutes

## 2023-05-25 NOTE — PATIENT INSTRUCTIONS
-Continue current meds  -Daily weights.  Call with weight gain greater than 3 pounds per day or 5 pounds per week  -Follow up in 3 months with Dr. Galvan

## 2023-05-26 NOTE — TELEPHONE ENCOUNTER
june message received. RN followed up to initial message to inquire if patient had any shortness of breath and this was denied, but they did report patient had more complaints of weakness. RN will send updates to Dr. Galvan for further review and recommendation.           Dr. Garland,  Elizabeth Ruggiero's daily weights this week have been:  Tuesday, 5/23/2023 -- 105.5 lbs.  Wednesday, 5/24/2023 -- 107 lbs.  Thursday, 5/25/2023 -- 108.5 lbs.  Friday, 5/26/2023 -- 108 lbs.  Elizabeth's feet and ankles were swollen on Thursday evening, 5/25.  Do you have any concern about Elizabeth's weight gain this week?  Thank you, Brittney Will

## 2023-05-30 NOTE — TELEPHONE ENCOUNTER
Tiffany Galvan MD  [facility] 4 15 hours ago (6:51 PM)     CB  Yes-increase lasix to 40 mg daily.  If this does not result in weight reduction in a couple of days, would recommend temporarily increasing to 40 mg twice daily.     Tiffany Galvan MD          See mychart enc from 5/30/23. Trying Lasix 40 mg twice daily for a few days.

## 2023-06-02 NOTE — TELEPHONE ENCOUNTER
RN called patient's daughter and left VM advising that this RN would call the [facility] facility and provide update to continue current lasix dosing orders through the weekend if no update from Dr. Galvan prior to 4pm as she is rounding in the hospital. RN subsequently called [facility] facility and updated the RN team to continue current lasix dosing 40mg BID through the weekend and Monday AM 6/5/23 until we get updated recommendations from Dr. Galvan. The [facility] facility verbalized understanding and will fax over form to be signed at later date for verbal orders.

## 2023-06-02 NOTE — TELEPHONE ENCOUNTER
Dr. Galvan reviewed with this RN and she confirmed that patient should continue lasix 40mg BID through the weekend and have repeat BMP on Monday. RN called [facility] and gave verbal order for BMP lab draw on Monday. Reminder sent to team 4 to look for labs faxed to us on Monday along with Weight update through the weekend to send to Dr. Galvan to review and advise next steps.

## 2023-06-02 NOTE — TELEPHONE ENCOUNTER
I received a call from Brittney, patient's daughter, regarding a Lasix order for her Mother. I told her that Dr. Galvan's nurse has messaged her and is waiting for a message back. Brittney said she will be unavailable from 12:30- 2:30 today and the [facility] has to receive the Lasix order before 4 PM. I told her I would pass the message on to Dr. Galvan's  Nurse.

## 2023-06-02 NOTE — TELEPHONE ENCOUNTER
Follow up [employer] message received in response to Dr. Galvan's recommendations on 5/30/23 to increase lasix to 40mg BID. RN will send to Dr. Galvan to confirm plan (if patient should continue 40mg BID) as patient has lost 2lbs since starting on 5/30/23, but daughter reports low urine output, no change to edema and SOB. Patient's PMD also has additional recommendations. RN will send to Dr. Galvan to confirm plan.           Dr. Galvan,   Today the [facility] [address] will end Elizabeth Ruggiero's additional 20 MG dosage of furosemide/Lasix (total daily dosage 40 MG) unless they receive an order from you to continue the additional dosage. If the additional dosage is continued, they will need another prescription.   Elizabeth's weights (from a base weight of 102 on 5/6)  this week have been:   5/28 - 109   5/29 - 109   5/30 - 109.5   5/31 - 110.5   6/1 - 109.5   6/2 - 108.5   Elizabeth has not noticed much additional urination on the 40 MG dosage. Her feet and ankles are swollen, and she has some shortness of breath. Elizabeth's primary care physician, Dr. Sonu Reece, suggests increasing furosemide and considering a balloon valvuloplasty. What are your recommendations?   Thank you,   Brittney

## 2023-06-05 NOTE — TELEPHONE ENCOUNTER
RN spoke with Lizzeth MATTA and she advised that BMP lab earliest could be drawn was 6/7/23 Wed. Please see [employer] message from 6/5/23 for next steps.    4 = No assist / stand by assistance

## 2023-06-05 NOTE — TELEPHONE ENCOUNTER
Tiffany Galvan MD  [facility] Team 4 29 minutes ago (12:40 PM)     CB  Continue lasix 40 mg twice daily until BMP drawn Wednesday.  Will determine ongoing dosing based on those results.   Tiffany Galvan MD      Spoke to Lizzeth MATTA at [facility] [address] and she will continue Lasix 40 mg BID until BMP results. BMP will be drawn Wednesday. No further questions or concerns. [email or web address] sent back to patients daughter to update.

## 2023-06-06 NOTE — PLAN OF CARE
Assessment and plan    Reason for admission: CHF.  Coping/psychosocial: calm, cooperative, pleasant.  Cognitive: Pt is alert and oriented x 4, hard of hearing.  Vital signs: stable on room air.  Cardiovascular: denied chest pain.  Pulmonary: In no apparent respiratory distress.   Musculoskeletal: generalized weakness.  Neurologic: WDL.  Pain: generalized pain, managed well with tylenol.   IV Access/drains: saline locked.   Wound/Skin: scattered bruising.  GI/: denied nausea and vomiting, active bowel sounds, feels constipated.  Wesley catheter: external catheter.  Diet: Low sodium diet, thin liquids. 2 L fluid restriction.  Other assessments: Denied fever/chills, numbness or tingling.    Activity: assist of 1 with gait belt and walker.  Safety: alarms on, safety rounds completed.    End of shift summary: IV furosemide administered. Daughter present at bedside. Cardiology consult in place.

## 2023-06-06 NOTE — PHARMACY-ADMISSION MEDICATION HISTORY
Akil Cheatham a nurse from [facility] called and is requesting re-certification of nursing for B12 injection, managing CHF, PVD and COPD. Would like to visit every other week. Please call and advise on a confidential voice mail [phone]. Pharmacist Admission Medication History    Admission medication history is complete. The information provided in this note is only as accurate as the sources available at the time of the update.    Medication reconciliation/reorder completed by provider prior to medication history? Yes    Information Source(s): [facility] via in-person        Changes made to PTA medication list:    Added: None    Deleted: Alendronate    Changed: Mupirocin      Medication History Completed By: Thom Polk [facility] 6/6/2023 6:10 PM    Prior to Admission medications    Medication Sig Last Dose Taking? Auth Provider Long Term End Date   acetaminophen (TYLENOL) 500 MG tablet Take 1,000 mg by mouth 3 times daily 2 tablets tid at 6am, 2pm, 10pm 6/6/2023 at AM Yes Unknown, Entered By History     aluminum & magnesium hydroxide (MAG-AL) 200-200 MG/5ML supsension Take 30 mLs by mouth 4 times daily as needed for indigestion  at PRN Yes Unknown, Entered By History     cholecalciferol 25 MCG (1000 UT) TABS Take 1 tablet by mouth daily 6/6/2023 at AM Yes Unknown, Entered By History     digoxin (LANOXIN) 125 MCG tablet Take 0.5 tablets (62.5 mcg) by mouth daily 6/6/2023 at AM Yes Chary Hickey MD Yes    ELIQUIS ANTICOAGULANT 2.5 MG tablet Take 2.5 mg by mouth 2 times daily 6/6/2023 at AM Yes Reported, Patient     furosemide (LASIX) 40 MG tablet Take 40 mg by mouth 2 times daily Through 6/7/2023 then resume 20 mg daily 6/6/2023 at AM Yes Unknown, Entered By History Yes    hydrocortisone (ANUSOL-HC) 25 MG suppository Place 25 mg rectally 2 times daily 6/6/2023 at AM Yes Unknown, Entered By History     lactobacillus rhamnosus, GG, (CULTURELL) capsule Take 1 capsule by mouth daily 6/6/2023 Yes Unknown, Entered By History     melatonin 5 MG tablet Take 5 mg by mouth At Bedtime 6/5/2023 at HS Yes Unknown, Entered By History     menthol-zinc oxide (CALMOSEPTINE) 0.44-20.6 % OINT ointment Apply topically 2 times daily 6/6/2023 at AM Yes Unknown,  Entered By History     metoprolol succinate ER (TOPROL-XL) 25 MG 24 hr tablet Take 12.5 mg by mouth every evening  6/5/2023 at PM Yes Unknown, Entered By History Yes    Multiple Vitamins-Minerals (PRESERVISION AREDS 2+MULTI VIT) CAPS Take 1 tablet by mouth 2 times daily 6/6/2023 at AM Yes Kat Campos APRN CNP     multivitamin w/minerals (THERA-VIT-M) tablet Take 1 tablet by mouth daily  6/6/2023 at AM Yes Reported, Patient     omeprazole (PRILOSEC) 20 MG DR capsule Take 20 mg by mouth daily before breakfast 6/6/2023 at AM Yes Unknown, Entered By History     senna-docusate (SENOKOT-S/PERICOLACE) 8.6-50 MG tablet Take 1 tablet by mouth daily as needed for constipation  at PRN Yes Unknown, Entered By History     simethicone (MYLICON) 80 MG chewable tablet Take 80 mg by mouth 4 times daily as needed for flatulence or cramping  at PRN Yes Unknown, Entered By History     traMADol (ULTRAM) 50 MG tablet Take 1 tablet (50 mg) by mouth every 6 hours as needed for severe pain  at PRN Yes Trierweiler, Chad A, MD     triamcinolone (KENALOG) 0.1 % external cream Apply topically 2 times daily To back for itching  at AM Yes Unknown, Entered By History     cetirizine (ZYRTEC) 10 MG tablet Take 10 mg by mouth At Bedtime  at on HOLD  Unknown, Entered By History     COMPRESSION STOCKINGS 1 each daily   Tiffany Galvan MD     furosemide (LASIX) 20 MG tablet Take 1 tablet (20 mg) by mouth daily resume 6/8  Tiffany Galvan MD Yes    mupirocin (BACTROBAN) 2 % external ointment Apply topically 2 times daily To wound inside nare   Unknown, Entered By History     NONFORMULARY Diltiazem 2% ointment - apply pea-sized amount to the anus TID x6 weeks.  Pt to self administer.  Compounded by [facility], [address] [place]  at AM  Unknown, Entered By History     polyethylene glycol (MIRALAX) 17 GM/Dose powder Take 17 g by mouth daily as needed for constipation  at PRN  Unknown, Entered By History

## 2023-06-06 NOTE — H&P
[facility]    History and Physical  Hospitalist       Date of Admission:  6/6/2023    Assessment & Plan   Elizabeth Ruggiero is a 89 year old female with complex PMHx including chronic a-fib and prior DVT on chronic AC with apixaban, chronic diastolic CHF, HTN, severe MR, progressive aortic stenosis, mitral valve prolapse, and tricuspid regurgitation who presents as a direct admit for worsening leg swelling and weight gain at the direction of cardiologist.    Acute on chronic diastolic heart failure  Severe MR  Severe TR and pulmonary hypertension  Severe aortic stenosis  Chronic atrial fibrillation/flutter  HTN, now hypotensive  Patient was recently hospitalized at [facility] between 5/3-5/5 for CHF exacerbation, underwent IV diuresis.  Last saw cardiology, Dr. Galvan on 5/23/2023 [video visit].  In brief review, she has severe mitral valve regurgitation secondary to myxomatous thickened leaflets and bileaflet prolapse.  Intervention was considered in the past, however, she has significant annular calcification which would make successful MitraClip unlikely.  Given her advanced age, she has not been felt to be a good surgical candidate.  She was also noted to have declined RAHEL previously for further evaluation with preference for continued conservative medical management.  Apparently she had issues with constipation while on torsemide and hence switched to Lasix at 20 Mg daily.  This was later increased to 40 Mg 2 times daily per Dr. Galvan.  However patient's weight has apparently gone from 105 pounds 2 weeks ago to 113 pounds today and hence presented as direct admit per direction of Dr. Galvan.  Patient's family understands that patient has end-stage valvular disease that is not amenable to intervention but they want to continue with medical management as able and would like to get her to her 89th birthday which is coming up in 2 weeks.  Patient resides at the [facility].  She moves  around with a walker but does not ambulate much.  She is pleasant and alert, answers simple questions.  Denies feeling short of breath, chest pain, fever, cough.  Noted to have worsening leg swelling.  She is not on any oxygen and is breathing comfortably on room air.   Noted to be mildly hypotensive with SBP in the 80s to 90s at admission, afebrile, not hypoxic, other vitals stable.  Labs significant for sodium 133, BUN 28, chloride 95, BNP 2755 which is improved from 6673 on 5/3, Hb 11.2, normal WBC.  CXR shows marked cardiomegaly, moderate right pleural effusion similar to 5/22, decreased compared to 5/3.  Noted that patient underwent thoracentesis at last hospitalization.  *TTE May 2023 admission showed progression of aortic stenosis (AV area now 0.74 cm2 from 1.5 cm2 with mean gradient 15 mmHg), MR also progressed, TR also progressed, and now also with moderately decreased RV systolic function  -Admit as inpatient  -Consult to cardiology  -Ordered 20 Mg IV Lasix this evening [hold if SBP less than 100].  SBP in the 90s at admission.  Renal function normal.  -Continue PTA metoprolol with hold parameters  -Continue PTA digoxin and apixaban.    -Telemetry monitoring.  -2 g sodium diet, 2 L fluid restriction  -Strict I's/O, daily weights  -Ace wrap legs    Mild hyponatremia  Sodium 133 at admission.  Likely due to fluid overload.  -Diurese and monitor.    Mild anemia  -Hb 11.2.  Previously normal.  Likely dilutional due to fluid overload.  Monitor with diuresis.    Goals of care  Family is realistic regarding expected course/prognosis.  They understand patient has end-stage valvular disease that is not amenable to intervention.  But they would still like to trial IV diuresis/medical management and hope to get more time as they feel that patient is still alert, conversant and has some quality of life and they hope to get her to her 89th birthday which is in 2 weeks.  They have been considering hospice but are not at  that point yet.    DVT Prophylaxis: DOAC  Code Status: DNR / DNI     Expected Discharge Date: 06/08/2023               Latosha Tavarez MD    Medical Decision Making       Please see A&P for additional details of medical decision making.         Clinically Significant Risk Factors Present on Admission               # Drug Induced Coagulation Defect: home medication list includes an anticoagulant medication    # Hypertension: Noted on problem list                 Primary Care Physician   Sonu Reece    Chief Complaint   Leg swelling and weight gain    History is mainly obtained from patient's daughters, chart review.  Patient herself does not talk much    History of Present Illness   Elizabeth Ruggiero is a 89 year old female with complex PMHx including chronic a-fib and prior DVT on chronic AC with apixaban, chronic diastolic CHF, HTN, severe MR due to MVP, severe aortic stenosis  and tricuspid regurgitation who presents as a direct admit for worsening leg swelling and weight gain at the direction of cardiologist.  Patient was recently hospitalized at [facility] between 5/3-5/5 for CHF exacerbation, underwent IV diuresis.  Last saw cardiology, Dr. Galvan on 5/23/2023 [video visit].  In brief review, she has severe mitral valve regurgitation secondary to myxomatous thickened leaflets and bileaflet prolapse.  Intervention was considered in the past, however, she has significant annular calcification which would make successful MitraClip unlikely.  Given her advanced age, she has not been felt to be a good surgical candidate.  She was also noted to have declined RAHEL previously for further evaluation with preference for continued conservative medical management.  Apparently she had issues with constipation while on torsemide and hence switched to Lasix at 20 Mg daily.  This was later increased to 40 Mg 2 times daily per Dr. Galvan.  However patient's weight has apparently gone from 105 pounds 2 weeks ago to 113 pounds today  and hence presented as direct admit per direction of Dr. Galvan.  Patient's family understands that patient has end-stage valvular disease that is not amenable to intervention but they want to continue with medical management as able and would like to get her to her 89th birthday which is coming up in 2 weeks.  Patient resides at the [facility].  She moves around with a walker but does not ambulate much.  She is pleasant and alert, answers simple questions.  Denies feeling short of breath, chest pain, fever, cough.  Noted to have worsening leg swelling.  She is not on any oxygen and is breathing comfortably on room air.   Noted to be mildly hypotensive with SBP in the 80s to 90s at admission, afebrile, not hypoxic, other vitals stable.  Labs significant for sodium 133, BUN 28, chloride 95, BNP 2755 which is improved from 6673 on 5/3, Hb 11.2, normal WBC.  CXR shows marked cardiomegaly, moderate right pleural effusion similar to 5/22, decreased compared to 5/3.  Noted that patient underwent thoracentesis at last hospitalization.    Past Medical History    I have reviewed this patient's medical history and updated it with pertinent information if needed.   Past Medical History:   Diagnosis Date     (HFpEF) heart failure with preserved ejection fraction (H)      Acute diastolic CHF (congestive heart failure) (H)      Acute left hemiparesis (H) 2/9/2019     Arthritis      Atrial fibrillation (H)      Atrial fibrillation with RVR (H)      Atrial flutter (H)      Atrial flutter with rapid ventricular response (H) 4/23/2019     Community acquired pneumonia      Diastolic CHF (H) 04/29/2019     DVT of lower extremity (deep venous thrombosis) (H)     recurrent     Hepatic congestion     improved with diuretics for CHF     HTN (hypertension)      Hyperlipidemia      Mitral valve insufficiency      Pneumonia      Postmenopausal bleeding 01/2020    [facility]     Severe mitral  regurgitation      SOB (shortness of breath)      TIA (transient ischemic attack)      Vitamin D deficiency        Past Surgical History   I have reviewed this patient's surgical history and updated it with pertinent information if needed.  Past Surgical History:   Procedure Laterality Date     APPENDECTOMY       FRACTURE SURGERY       repair left femur         Prior to Admission Medications   Prior to Admission Medications   Prescriptions Last Dose Informant Patient Reported? Taking?   COMPRESSION STOCKINGS  Nursing Home No No   Si each daily   ELIQUIS ANTICOAGULANT 2.5 MG tablet 2023 at AM  Yes Yes   Sig: Take 2.5 mg by mouth 2 times daily   Multiple Vitamins-Minerals (PRESERVISION AREDS 2+MULTI VIT) CAPS 2023 at AM Nursing Home No Yes   Sig: Take 1 tablet by mouth 2 times daily   NONFORMULARY  at AM  Yes No   Sig: Diltiazem 2% ointment - apply pea-sized amount to the anus TID x6 weeks.  Pt to self administer.  Compounded by [facility], [address] [place]   acetaminophen (TYLENOL) 500 MG tablet 2023 at AM  Yes Yes   Sig: Take 1,000 mg by mouth 3 times daily 2 tablets tid at 6am, 2pm, 10pm   aluminum & magnesium hydroxide (MAG-AL) 200-200 MG/5ML supsension  at PRN  Yes Yes   Sig: Take 30 mLs by mouth 4 times daily as needed for indigestion   cetirizine (ZYRTEC) 10 MG tablet  at on HOLD  Yes No   Sig: Take 10 mg by mouth At Bedtime   cholecalciferol 25 MCG (1000 UT) TABS 2023 at AM Nursing Home Yes Yes   Sig: Take 1 tablet by mouth daily   digoxin (LANOXIN) 125 MCG tablet 2023 at AM  No Yes   Sig: Take 0.5 tablets (62.5 mcg) by mouth daily   furosemide (LASIX) 20 MG tablet resume   No No   Sig: Take 1 tablet (20 mg) by mouth daily   furosemide (LASIX) 40 MG tablet 2023 at AM  Yes Yes   Sig: Take 40 mg by mouth 2 times daily Through 2023 then resume 20 mg daily   hydrocortisone (ANUSOL-HC) 25 MG suppository 2023 at AM  Yes Yes   Sig: Place 25 mg rectally 2 times daily    lactobacillus rhamnosus, GG, (CULTURELL) capsule 6/6/2023  Yes Yes   Sig: Take 1 capsule by mouth daily   melatonin 5 MG tablet 6/5/2023 at HS Nursing Home Yes Yes   Sig: Take 5 mg by mouth At Bedtime   menthol-zinc oxide (CALMOSEPTINE) 0.44-20.6 % OINT ointment 6/6/2023 at AM  Yes Yes   Sig: Apply topically 2 times daily   metoprolol succinate ER (TOPROL-XL) 25 MG 24 hr tablet 6/5/2023 at PM  Yes Yes   Sig: Take 12.5 mg by mouth every evening    multivitamin w/minerals (THERA-VIT-M) tablet 6/6/2023 at AM Nursing Home Yes Yes   Sig: Take 1 tablet by mouth daily    mupirocin (BACTROBAN) 2 % external ointment   Yes No   Sig: Apply topically 2 times daily To wound inside nare   omeprazole (PRILOSEC) 20 MG DR capsule 6/6/2023 at AM Nursing Home Yes Yes   Sig: Take 20 mg by mouth daily before breakfast   polyethylene glycol (MIRALAX) 17 GM/Dose powder  at PRN  Yes No   Sig: Take 17 g by mouth daily as needed for constipation   senna-docusate (SENOKOT-S/PERICOLACE) 8.6-50 MG tablet  at PRN  Yes Yes   Sig: Take 1 tablet by mouth daily as needed for constipation   simethicone (MYLICON) 80 MG chewable tablet  at PRN  Yes Yes   Sig: Take 80 mg by mouth 4 times daily as needed for flatulence or cramping   traMADol (ULTRAM) 50 MG tablet  at PRN  No Yes   Sig: Take 1 tablet (50 mg) by mouth every 6 hours as needed for severe pain   triamcinolone (KENALOG) 0.1 % external cream  at AM  Yes Yes   Sig: Apply topically 2 times daily To back for itching      Facility-Administered Medications: None     Allergies   No Known Allergies    Social History   I have reviewed this patient's social history and updated it with pertinent information if needed. Elizabeth Ruggiero  reports that she has never smoked. She has never used smokeless tobacco. She reports that she does not drink alcohol and does not use drugs.    Family History   I have reviewed this patient's family history and updated it with pertinent information if needed.   Family  History   Problem Relation Age of Onset     Colon Cancer Mother      Breast Cancer Sister      Diabetes Sister        Review of Systems   The 10 point Review of Systems is negative other than noted in the HPI or here.     Physical Exam   Temp: 97.5  F (36.4  C) Temp src: Oral BP: 97/60 Pulse: 71   Resp: 16 SpO2: 94 % O2 Device: None (Room air)    Vital Signs with Ranges  Temp:  [97.5  F (36.4  C)] 97.5  F (36.4  C)  Pulse:  [71] 71  Resp:  [16] 16  BP: (86-97)/(55-60) 97/60  SpO2:  [94 %] 94 %  113 lbs 6.4 oz    Constitutional: Awake, alert, cooperative, no apparent distress.  Elderly lady, appears frail, laying in bed  Eyes: no icterus, EOMs intact  HEENT: Moist mucous membranes  Respiratory: Breathing appears mildly labored at times during conversation, satting well on room air, some scattered crackles at bases  Cardiovascular: IR IR, normal S1 and S2, systolic murmur noted, 2-3+ pitting bilateral lower extremity edema noted  GI: Soft, non-distended, non-tender, normal bowel sounds.  Skin: No rashes, no cyanosis  Musculoskeletal: No joint swelling, erythema or tenderness.  Neurologic: Alert, oriented and engages in appropriate conversation, no facial asymmetry, moving all extremities, fluent speech  Psychiatric: Calm and pleasant, no obvious anxiety or depression.     Data   Data reviewed today:  I personally reviewed CXR with result as noted above  Recent Labs   Lab 06/06/23  1639   WBC 7.5   HGB 11.2*   MCV 93   *   *   POTASSIUM 3.6   CHLORIDE 95*   CO2 27   BUN 28.5*   CR 0.76   ANIONGAP 11   CHRIS 9.2   *   ALBUMIN 3.6   PROTTOTAL 6.1*   BILITOTAL 1.1   ALKPHOS 66   ALT 23   AST 35       Recent Results (from the past 24 hour(s))   XR Chest Port 1 View    Narrative    EXAM: XR CHEST PORT 1 VIEW  LOCATION: [facility]  DATE/TIME: 6/6/2023 5:32 PM CDT    INDICATION: sob, chf  COMPARISON: 05/22/2023, chest CT 05/03/2023      Impression    IMPRESSION: Marked cardiomegaly.  Moderate right pleural effusion, similar to 05/22/2023 however decreased compared to 05/03/2023 chest CT. There is atelectasis at the right base. Lungs otherwise clear. No pneumothorax.

## 2023-06-06 NOTE — TELEPHONE ENCOUNTER
Per Dr. Galvan coordinated direct admit to hospital for CHF.  Daughter Brittney updated on room and arrival time of 3 pm or later. Family will bring patient to hospital and are aware to check in at welcome desk and let them know that Elizabeth is a direct admit to the heart center.  Family instructed to bring recent MAR, vitals and nursing notes from patient's care center.  Report given to SIGRID Hernandez from the heart center.  Katharine Zaidi RN on 6/6/2023 at 2:32 PM

## 2023-06-07 NOTE — CONSULTS
Care Management Initial Consult    General Information  Assessment completed with: Patient, Care Team Member, Children, Marshall RN at [facility], Bernadette Lugo  Type of CM/SW Visit: Initial Assessment    Primary Care Provider verified and updated as needed: Yes   Readmission within the last 30 days: current reason for admission unrelated to previous admission      Reason for Consult: discharge planning  Advance Care Planning: Advance Care Planning Reviewed: present on chart (POLST on file)          Communication Assessment  Patient's communication style: spoken language (English or Bilingual)             Cognitive  Cognitive/Neuro/Behavioral: WDL                      Living Environment:   People in home: facility resident     Current living Arrangements: assisted living  Name of Facility: [facility] [address]   Able to return to prior arrangements:  (pending on site assessment by [facility])       Family/Social Support:  Care provided by: self  Provides care for: no one  Marital Status:   Children          Description of Support System: Supportive, Involved         Current Resources:   Patient receiving home care services: No     Community Resources: None  Equipment currently used at home:    Supplies currently used at home: None    Employment/Financial:  Employment Status: retired        Financial Concerns: No concerns identified   Referral to Financial Worker: No       Does the patient's insurance plan have a 3 day qualifying hospital stay waiver?  No    Lifestyle & Psychosocial Needs:  Social Determinants of Health     Tobacco Use: Low Risk  (5/23/2023)    Patient History      Smoking Tobacco Use: Never      Smokeless Tobacco Use: Never      Passive Exposure: Not on file   Alcohol Use: Not on file   Financial Resource Strain: Not on file   Food Insecurity: Not on file   Transportation Needs: Not on file   Physical Activity: Not on file   Stress: Not on file   Social Connections: Not on file   Intimate Partner Violence: Not on  file   Depression: Not on file   Housing Stability: Not on file       Functional Status:  Prior to admission patient needed assistance:   Dependent ADLs:: Ambulation-walker  Dependent IADLs:: Medication Management, Transportation, Cooking, Money Management, Laundry, Cleaning     Values/Beliefs:  Spiritual, Cultural Beliefs, Evangelical Practices, Values that affect care: no  Description of Beliefs that Will Affect Care: Jessie andrew            Additional Information:  Writer spoke with Marshall MATTA at [facility] [address] and she stated that patient receives the following services:  Medication management; with administration of morning meds by staff and the rest of the day patient administers meds herself.  She receives all meals and housekeeping services.  Patient indep with walker and with ADL's.  Writer spoke with daughter Brittney, and Brittney stated that once patient is medically ready to discharge from hospital they plan to meet with [facility] on Monday, June 12th to enroll patient in hospice.    Care coordination team will continue to follow and assist with a safe discharge plan.    Ev Lee RN  Care Coordinator  [facility]  [phone] (text or call)

## 2023-06-07 NOTE — PROGRESS NOTES
[facility]  INTERVENTIONAL CARDIOLOGY    OFFICE PROGRESS NOTE      Name:  Coretta Bass  : 1943    Date of consultation:   2020    Referring physician: Luana Enamorado MD    Reason for Visit:  Routine follow up - 6 mo    HPI:   Last seen 6 mo ago, echo ordered.    Doing well, reports compliance to meds, no exertional symptoms.  Denies CP, SOB, palpitations or syncope.    Exercising at home and now at health club without any issues.      CARDIAC HISTORY:   · CAD s/p NSTEMI 2018 - iFR 0.88 LAD-D bifurcation, with adenosine no changes therefore medically treated.  · Echo 2020 - EF normal, mild MR, normal PASP  · HTN    ASSESSMENT AND RECOMMENDATIONS:   · CAD - no ischemic symptoms, will continue medical management of moderate, borderline LAD-diagonal disease  · Start heart rate directed exercise program  · If new symptoms, follow up ASAP    · Hypertension: Controlled on current regimen, no changes      Follow-up:  Clinic: 12 months  Testing/interventions: none     Patient understands he/she can return sooner if any issues should arise.       Lazaro Dunn MD  2020    -------------------------------------    LABS (1 year):     Office Visit on 2020   Component Date Value Ref Range Status   • CHOLESTEROL 2020 135   Final   • TRIGLYCERIDE 2020 106   Final   • HDL 2020 48   Final   • CALCULATED LDL 2020 66   Final   • CALCULATED NON HDL 2020 87   Final   • VLDL, CALC 2020 21   Final   • Fasting Status 2020 yes   Final   • Glucose 2020 83  mg/dL Final   • Sodium 2020 136   Final   • Potassium 2020 4.2   Final   • Chloride 2020 101   Final   • Carbon Dioxide 2020 27.0   Final   • Anion Gap 2020 8   Final   • BUN 2020 21   Final   • Creatinine 2020 0.99  mg/dL Final   • GFR Estimate, Non  2020 55   Final   • GFR Estimate,  2020 64   Final   •  [facility]    Medicine Progress Note - Hospitalist Service    Date of Admission:  6/6/2023    Assessment & Plan   Elizabeth Ruggiero is a 89 year old female with complex PMHx including chronic a-fib and prior DVT on chronic AC with apixaban, chronic diastolic CHF, HTN, severe MR, progressive aortic stenosis, mitral valve prolapse, and tricuspid regurgitation who presents as a direct admit for worsening leg swelling and weight gain.     Acute on chronic diastolic heart failure  Severe MR  Severe TR and pulmonary hypertension  Severe aortic stenosis  Chronic atrial fibrillation/flutter  HTN, now hypotensive  Patient was recently hospitalized at [facility] between 5/3-5/5 for CHF exacerbation, underwent IV diuresis.  Patient's weight has apparently gone from 105 pounds 2 weeks ago to 113 pounds today and hence presented to ED per direction of Dr. Galvan.  Within the ED, noted to be mildly hypotensive with SBP in the 80s to 90s at admission, afebrile, not hypoxic, other vitals stable.  Labs significant for sodium 133, BUN 28, chloride 95, BNP 2755 which is improved from 6673 on 5/3, Hb 11.2, normal WBC.  CXR shows marked cardiomegaly, moderate right pleural effusion similar to 5/22, decreased compared to 5/3.  Noted that patient underwent thoracentesis at last hospitalization.  *TTE May 2023 admission showed progression of aortic stenosis (AV area now 0.74 cm2 from 1.5 cm2 with mean gradient 15 mmHg), MR also progressed, TR also progressed, and now also with moderately decreased RV systolic function    - given small dose of IV lasix yesterday after arrival and her home oral metoprolol however BP did not tolerate, down to 60s systolic over the night with RRT called. Given small fluid bolus back and BP better this morning however still too low to tolerate oral BP medications or IV lasix  - cardiology consulted, appreciate their assistance in guiding management and assisting family with goals of care  -  BUN/Creatinine Ratio 06/29/2020 21.2   Final   • CALCIUM 06/29/2020 9.5   Final   • TOTAL BILIRUBIN 06/29/2020 0.4   Final   • AST/SGOT 06/29/2020 27  Units/L Final   • ALT/SGPT 06/29/2020 32  Units/L Final   • ALK PHOSPHATASE 06/29/2020 35   Final   • TOTAL PROTEIN 06/29/2020 7.1   Final   • Albumin 06/29/2020 3.9   Final   • GLOBULIN 06/29/2020 3.2   Final   • A/G Ratio, Serum 06/29/2020 1.2   Final   • Hemoglobin A1C 06/29/2020 5.7  % Final   • ESTIMATED AVERAGE GLUCOSE 06/29/2020 117   Final   • TSH 06/29/2020 0.162  mcUnits/mL Final       MEDICATIONS:     Current Outpatient Medications   Medication Sig Dispense Refill   • Insulin Pen Needle (PEN NEEDLES) 32G X 4 MM Misc 1 each.     • dicyclomine (BENTYL) 10 MG capsule Take 10 mg by mouth.     • semaglutide,0.25 or 0.5 mg/DOSE, (OZEMPIC) (1.34 mg/ml) 0.25 or 0.5 MG/DOSE injection Inject 0.5 mg into the skin.     • albuterol 108 (90 Base) MCG/ACT inhaler INHALE 2 PUFFS INTO THE LUNGS EVERY 6 HOURS AS NEEDED FOR WHEEZING     • clotrimazole-betamethasone (LOTRISONE) 1-0.05 % cream      • EPINEPHrine 0.3 MG/0.3ML auto-injector Use as directed.     • blood glucose (ONE TOUCH ULTRA TEST) test strip CHECK BLOOD EVERY MORNING     • levocetirizine (XYZAL) 5 MG tablet TAKE 1 TABLET(5 MG) BY MOUTH NIGHTLY     • lisinopril-hydroCHLOROthiazide (PRINZIDE,ZESTORETIC) 20-25 MG per tablet Take 1 tablet by mouth.     • metFORMIN (GLUCOPHAGE) 500 MG tablet TAKE 1 TABLET BY MOUTH THREE TIMES DAILY     • metoPROLOL tartrate (LOPRESSOR) 25 MG tablet TK 1/2 T PO QHS  1   • Multiple Vitamins-Iron (ONCE DAILY/IRON) Tab      • venlafaxine XR (EFFEXOR XR) 37.5 MG 24 hr capsule Take 37.5 mg by mouth at bedtime.     • amLODIPine (NORVASC) 2.5 MG tablet Take 2.5 mg by mouth 2 times daily.     • Ascorbic Acid (VITAMIN C) 500 MG Cap Take 500 mg by mouth daily.     • Cholecalciferol (VITAMIN D) 2000 units capsule Take 2,000 Units by mouth daily.     • Cyanocobalamin (VITAMIN B-12) 500 MCG SL  hold further diuresis today, will plan to get patient up and OOB and follow vitals and symptoms. Patient herself denies really any complaints other than the leg swelling that is not painful.  - continue tele monitoring, fluid restriction and 2gram sodium diet  - continued on PTA eliquis and digoxin    Update  Cardiology started low rate IV lasix infusion and patient seems to be tolerating. CC updated me that family planning to enroll patient in hospice next Monday. Ideally discharge in the next few days     Mild hyponatremia  Sodium 133 at admission.  Likely due to fluid overload.  - 132 today  - monitor for now as we determine plan for diuresis     Mild anemia  -Hb 11.2. Baseline is about 12  - monitor daily     Goals of care  Family is realistic regarding expected course/prognosis.  They understand patient has end-stage valvular disease that is not amenable to intervention.  But they would still like to trial IV diuresis/medical management and hope to get more time as they feel that patient is still alert, conversant and has some quality of life and they hope to get her to her 89th birthday which is in 2 weeks.    - will plan to visit with family today to discuss events since admission and their expectations, family is very [place] but anxious when discussing end of life       Diet: 2 Gram Sodium Diet  Fluid restriction 2000 ML FLUID    DVT Prophylaxis: DOAC  Wesley Catheter: Not present  Lines: None     Cardiac Monitoring: ACTIVE order. Indication: Acute decompensated heart failure (48 hours)  Code Status: No CPR- Do NOT Intubate      Clinically Significant Risk Factors Present on Admission               # Drug Induced Coagulation Defect: home medication list includes an anticoagulant medication    # Hypertension: Noted on problem list               Disposition Plan      Expected Discharge Date: 06/08/2023        Discharge Comments: 6-7 0100 RRT for low BPs          Miranda Larsen DO  Hospitalist Service  [facility]  [facility]  Securely message with Patti (more info)  Text page via [employer] Paging/Directory   ______________________________________________________________________    Interval History   Patient lying in bed and appears comfortable. She denies really any complaints to me and tells me her family wanted to bring her in here. Her only complaints is the swelling in her legs but it isn't too bothersome. She denies dizziness or being aware of low BP but has not gotten out of bed yet today. She is [place]. Updated nurse at bedside    Physical Exam   Vital Signs: Temp: 97.2  F (36.2  C) Temp src: Oral BP: 94/47 Pulse: 64   Resp: 18 SpO2: 92 % O2 Device: None (Room air)    Weight: 114 lbs 9.6 oz    Constitutional: Awake, alert, cooperative, no apparent distress, warm and well perfused  Respiratory: trace crackles in bases  Cardiovascular: Regular rate and rhythm, multiple murmurs evident +2 LE edema up to midshin/knee  GI: Normal bowel sounds, soft, non-distended, non-tender  Skin/Integumen: No rashes, no cyanosis  Other: [place]    Medical Decision Making       50 MINUTES SPENT BY ME on the date of service doing chart review, history, exam, documentation & further activities per the note.      Data     I have personally reviewed the following data over the past 24 hrs:    6.9  \   11.4 (L)   / 139 (L)     132 (L) 95 (L) 25.9 (H) /  102 (H)   3.4 27 0.72 \       ALT: 23 AST: 35 AP: 66 TBILI: 1.1   ALB: 3.6 TOT PROTEIN: 6.1 (L) LIPASE: N/A       Trop: N/A BNP: 2,823 (H)       Procal: N/A CRP: N/A Lactic Acid: 1.2         Imaging results reviewed over the past 24 hrs:   Recent Results (from the past 24 hour(s))   XR Chest Port 1 View    Narrative    EXAM: XR CHEST PORT 1 VIEW  LOCATION: [facility]  DATE/TIME: 6/6/2023 5:32 PM CDT    INDICATION: sob, chf  COMPARISON: 05/22/2023, chest CT 05/03/2023      Impression    IMPRESSION: Marked cardiomegaly. Moderate right pleural effusion, similar to  Tab Take 500 mcg by mouth daily.     • levothyroxine (SYNTHROID, LEVOTHROID) 88 MCG tablet Take 88 mcg by mouth daily.     • atorvastatin (LIPITOR) 40 MG tablet 1 daily at bedtime     • Calcium 600 MG tablet 1 daily     • clopidogrel (PLAVIX) 75 MG tablet 1 daily     • Co-Enzyme Q10 200 MG Cap 1 daily     • Cod Liver Oil 1000 MG Cap 1 daily     • fenofibrate (TRICOR) 145 MG tablet 1 daily     • liothyronine (CYTOMEL) 5 MCG tablet 1 daily at bedtime     • Multiple Vitamins-Minerals (MULTIVITAMIN ADULTS PO) 1 daily with iron     • Omega 3 1200 MG Cap 1 daily     • acetaminophen (TYLENOL 8 HOUR ARTHRITIS PAIN) 650 MG CR tablet as needed     • Vitamin E 400 units Tab 1 daily     • Zinc 50 MG Tab 1 daily     • aspirin 81 MG tablet 1 daily       No current facility-administered medications for this visit.        PAST MEDICAL HISTORY:     Past Medical History:   Diagnosis Date   • Dyslipidemia    • Essential hypertension    • Hyperlipidemia    • Myocardial infarction (CMS/HCC)     and x2       Family and Social History:     Family History:  Family History   Problem Relation Age of Onset   • Heart disease Father         heart disorder   • Coronary Artery Disease Neg Hx         Negative for premature CAD.    • Aneurysm Neg Hx         Negative for AAA.         Social History:  Social History     Tobacco Use   • Smoking status: Never Smoker   • Smokeless tobacco: Never Used   • Tobacco comment: Never used tobacco. denies smoking.    Substance Use Topics   • Alcohol use: Yes     Comment: drinks 1 per day and wine.   • Drug use: Not on file        ROS:   GENERAL HEALTH: no fevers, chills, sweats  SKIN: no unusual skin lesions or rashes  RESPIRATORY: no cough or shortness of breath  CARDIOVASCULAR: no claudication, see HPI  GI: no abdominal pain or heartburn  NEURO: no headaches, no focal weakness or paresthesias  All other systems reviewed and negative.    EXAM:     Visit Vitals  BP 90/40 (BP Location: LUE - Left upper  05/22/2023 however decreased compared to 05/03/2023 chest CT. There is atelectasis at the right base. Lungs otherwise clear. No pneumothorax.      extremity, Patient Position: Sitting, Cuff Size: Regular)   Pulse 66   Ht 5' 3\" (1.6 m)   Wt 51.7 kg (114 lb)   SpO2 99%   BMI 20.19 kg/m²        GENERAL: in no apparent distress  HEENT: atraumatic, normocephalic  NECK: no JVD, normal carotid pulses without bruits  LUNGS: normal excursion, clear to auscultation bilaterally  HEART: RRR, nl S1/S2, no gallop, no murmur, no rub  ABD: soft, nontender, nondistended, normal bowel sounds  EXTREMITIES: no cyanosis, clubbing or edema  SKIN: warm, dry, normal turgor  NEURO: alert, oriented x3, symmetrical face, no dysarthria, no focal deficits  PSYCH: cooperative, calm

## 2023-06-07 NOTE — CODE/RAPID RESPONSE
[facility]  House [facility] RRT Note  6/7/2023   Time called: 0059  RRT called for: Hypotension  Code status: No CPR- Do NOT Intubate    Assessment & Plan    Elizabeth Ruggiero is a 89 year old female with complex PMHx including chronic a-fib and prior DVT on chronic AC with apixaban, chronic diastolic CHF, HTN, severe MR, progressive aortic stenosis, mitral valve prolapse, and tricuspid regurgitation who presents as a direct admit for worsening leg swelling and weight gain at the direction of cardiologist.    I was paged to the bedside to evaluate Ms. Elizabeth Ruggiero for an acute episode of hypotension.     Upon my arrival the patient was sleeping and in no acute distress. Patient was very [place] but pleasant, disoriented to time. Patient was warm and dry with 1+ pulses in extremities. Patient had 2+ edema in BLE. Lung sounds clear. Holosystolic murmur. Patient denied any symptoms and stated that she felt fine.    In this difficult scenario of profound hypotension (67/43 mmHg) but without significant respiratory distress, diaphoresis, or restlessness, I felt that a fluid challenge would present far less risk to the patient than additional diuresis. Patient could be intravascularly dry and need some volume repletion. Will trial with a very conservative bolus of 200 mL, NICOM testing may be inaccurate in this patient given her aortic stenosis and valvular disease but we could still attempt it if she has ongoing BP issues tonight.    Diagnosis:  -- Hypotension 2/2 cardiogenic d/t CHF exacerbation versus hypovolemia  Small fluid challenge with 200 mL NS bolus over an hour. NICOM testing if she remains hypotensive. Adjust BP parameters to maintain MAP > 60mmHg tonight as patient is otherwise asymptomatic.    Plan:  -- Small fluid trial of 200 mL   *Patient's BP is too low for diuresis currently, clinically she is edematous but warm and dry so significant CHF complications causing this degree of hypotension  seem less likely. If ongoing problems tonight then I will obtain NICOM testing to see where she is on [place] curve.  -- Adjust BP parameters overnight to maintain MAP > 60mmHg  -- BNP down to 2755 from 6673 on 5/3    At the conclusion of this RRT patient was hemodynamically stable and will remain on current unit    0500 Addendum:  BP improved to 95/64 but 2 hours after bolus completion the BP was again trending down to 86/52, MAP of 63 and patient still asymptomatic so hold off on intervention for now but patient did seem to positively respond to small fluid bolus.    Her history is significant for:  Past Medical History:   Diagnosis Date     (HFpEF) heart failure with preserved ejection fraction (H)      Acute diastolic CHF (congestive heart failure) (H)      Acute left hemiparesis (H) 2/9/2019     Arthritis      Atrial fibrillation (H)      Atrial fibrillation with RVR (H)      Atrial flutter (H)      Atrial flutter with rapid ventricular response (H) 4/23/2019     Community acquired pneumonia      Diastolic CHF (H) 04/29/2019     DVT of lower extremity (deep venous thrombosis) (H)     recurrent     Hepatic congestion     improved with diuretics for CHF     HTN (hypertension)      Hyperlipidemia      Mitral valve insufficiency      Pneumonia      Postmenopausal bleeding 01/2020    [facility]     Severe mitral regurgitation      SOB (shortness of breath)      TIA (transient ischemic attack)      Vitamin D deficiency      Past Surgical History:   Procedure Laterality Date     APPENDECTOMY       FRACTURE SURGERY       repair left femur         Review of Systems   The 10 point Review of Systems is negative other than noted in the HPI or here.     Allergies   No Known Allergies    Physical Exam   Physical Exam  Constitutional:       General: She is sleeping.   HENT:      Nose: Nose normal.      Mouth/Throat:      Mouth: Mucous membranes are moist.   Eyes:      Pupils: Pupils are equal,  round, and reactive to light.   Cardiovascular:      Rate and Rhythm: Normal rate. Rhythm irregular.      Pulses: Normal pulses.      Heart sounds: Murmur heard.   Pulmonary:      Effort: Pulmonary effort is normal.      Breath sounds: Normal breath sounds.   Abdominal:      General: Abdomen is flat.      Palpations: Abdomen is soft.   Musculoskeletal:      Right lower leg: Edema present.      Left lower leg: Edema present.   Skin:     General: Skin is warm and dry.      Capillary Refill: Capillary refill takes less than 2 seconds.      Coloration: Skin is pale.   Neurological:      General: No focal deficit present.      Mental Status: Mental status is at baseline. She is disoriented.   Psychiatric:         Mood and Affect: Mood normal.         Vital Signs with Ranges:  Temp:  [97.5  F (36.4  C)-97.6  F (36.4  C)] 97.6  F (36.4  C)  Pulse:  [58-78] 61  Resp:  [16-18] 18  BP: ()/(43-66) 86/52  SpO2:  [92 %-95 %] 94 %  I/O last 3 completed shifts:  In: 200 [P.O.:200]  Out: -     Data   Results for orders placed or performed during the hospital encounter of 06/06/23 (from the past 24 hour(s))   Comprehensive metabolic panel   Result Value Ref Range    Sodium 133 (L) 136 - 145 mmol/L    Potassium 3.6 3.4 - 5.3 mmol/L    Chloride 95 (L) 98 - 107 mmol/L    Carbon Dioxide (CO2) 27 22 - 29 mmol/L    Anion Gap 11 7 - 15 mmol/L    Urea Nitrogen 28.5 (H) 8.0 - 23.0 mg/dL    Creatinine 0.76 0.51 - 0.95 mg/dL    Calcium 9.2 8.2 - 9.6 mg/dL    Glucose 116 (H) 70 - 99 mg/dL    Alkaline Phosphatase 66 35 - 104 U/L    AST 35 10 - 35 U/L    ALT 23 10 - 35 U/L    Protein Total 6.1 (L) 6.4 - 8.3 g/dL    Albumin 3.6 3.5 - 5.2 g/dL    Bilirubin Total 1.1 <=1.2 mg/dL    GFR Estimate 71 >60 mL/min/1.73m2   CBC with platelets differential    Narrative    The following orders were created for panel order CBC with platelets differential.  Procedure                               Abnormality         Status                     ---------                                -----------         ------                     CBC with platelets and d...[[id number]]  Abnormal            Final result                 Please view results for these tests on the individual orders.   Nt probnp inpatient   Result Value Ref Range    N terminal Pro BNP Inpatient 2,755 (H) 0 - 1,800 pg/mL   Magnesium   Result Value Ref Range    Magnesium 2.0 1.7 - 2.3 mg/dL   Phosphorus   Result Value Ref Range    Phosphorus 3.6 2.5 - 4.5 mg/dL   CBC with platelets and differential   Result Value Ref Range    WBC Count 7.5 4.0 - 11.0 10e3/uL    RBC Count 3.64 (L) 3.80 - 5.20 10e6/uL    Hemoglobin 11.2 (L) 11.7 - 15.7 g/dL    Hematocrit 34.0 (L) 35.0 - 47.0 %    MCV 93 78 - 100 fL    MCH 30.8 26.5 - 33.0 pg    MCHC 32.9 31.5 - 36.5 g/dL    RDW 15.4 (H) 10.0 - 15.0 %    Platelet Count 144 (L) 150 - 450 10e3/uL    % Neutrophils 72 %    % Lymphocytes 16 %    % Monocytes 10 %    % Eosinophils 1 %    % Basophils 1 %    % Immature Granulocytes 0 %    NRBCs per 100 WBC 0 <1 /100    Absolute Neutrophils 5.4 1.6 - 8.3 10e3/uL    Absolute Lymphocytes 1.2 0.8 - 5.3 10e3/uL    Absolute Monocytes 0.7 0.0 - 1.3 10e3/uL    Absolute Eosinophils 0.1 0.0 - 0.7 10e3/uL    Absolute Basophils 0.1 0.0 - 0.2 10e3/uL    Absolute Immature Granulocytes 0.0 <=0.4 10e3/uL    Absolute NRBCs 0.0 10e3/uL   XR Chest Port 1 View    Narrative    EXAM: XR CHEST PORT 1 VIEW  LOCATION: [facility]  DATE/TIME: 6/6/2023 5:32 PM CDT    INDICATION: sob, chf  COMPARISON: 05/22/2023, chest CT 05/03/2023      Impression    IMPRESSION: Marked cardiomegaly. Moderate right pleural effusion, similar to 05/22/2023 however decreased compared to 05/03/2023 chest CT. There is atelectasis at the right base. Lungs otherwise clear. No pneumothorax.   EKG 12-lead, tracing only   Result Value Ref Range    Systolic Blood Pressure  mmHg    Diastolic Blood Pressure  mmHg    Ventricular Rate 73 BPM    Atrial Rate 312 BPM    AL  Interval  ms    QRS Duration 84 ms     ms    QTc 418 ms    P Axis  degrees    R AXIS 121 degrees    T Axis -81 degrees    Interpretation ECG       Atrial fibrillation with a competing junctional pacemaker  Minimal voltage criteria for LVH, may be normal variant  Lateral infarct (cited on or before 09-OCT-2020)  Abnormal ECG  When compared with ECG of 03-MAY-2023 18:48,  Nonspecific T wave abnormality has replaced inverted T waves in Lateral leads  QT has lengthened       COVID-19 PCR Results        9/25/2021    20:18 5/11/2022    13:03   COVID-19 PCR Results   SARS CoV2 PCR Negative   Negative     COVID-19 Antibody Results, Testing for Immunity         No data to display                Time Spent on this Encounter   I spent 35 minutes on the unit/floor managing the care of Elizabeth BLOOM Ruggiero. 50% of my time was spent at the bedside counseling the patient and/or coordinating care regarding services listed in this note.    SENIA Cook, CNP  Hospitalist - House [facility]  Text me on the [employer] shira for a textback  Text page with web based paging for a callback

## 2023-06-07 NOTE — PROVIDER NOTIFICATION
MD Notification    Notified Person: MD    Notified Person Name: Fernanda    Notification Date/Time:  0050  Notification Interaction:    Purpose of Notification:low BP's in the 70-80/50's, Pt is asymptomatic    Orders Received:call an RRT.    Comments:

## 2023-06-07 NOTE — PLAN OF CARE
Goal Outcome Evaluation:       Neuro- AORX 2- forgetful   Most Recent Vitals- /76   Pulse 64   Temp 97.2  F (36.2  C) (Oral)   Resp 16   Wt 52 kg (114 lb 9.6 oz)   SpO2 92%   BMI 18.50 kg/m      Tele/Cardiac- Afib CVR   Resp-  No SOB, RA,   Activity- Up with assist of 1-2 WGB  Pain- C/o pain to her coccyx, bony prominence, mepilex drsg in place   Drips- Lasix gtt at 5 mg   Drains/Tubes- external cath patent   Skin- Intact but frail   GI/- Voiding adequately, senna given for constipation   Aggression Color- Pleasant and cooperative -Green   Plan - Diurese and offload fluid before discharge home probably as hospice/comfort  Shift summary: Uneventful day,poor appetite, up with assist to chair. Bed and chair alarm. Plan for gentle diuresis.

## 2023-06-07 NOTE — CONSULTS
REASON FOR ASSESSMENT:  CHF Consult for 2 gm NA Diet Education    - Chart reviewed. Noted cardiology comment for possible goals of care discussion this admission, also noted pt was seen for 2g Na education in May of 2022. At the time pt's daughter confirmed that they watch salt intake and don't purchase processed food.   - RD will plan to provide education as needed/appropriate prior to discharge once plan of care more developed.

## 2023-06-07 NOTE — PROGRESS NOTES
SPIRITUAL HEALTH SERVICES  SPIRITUAL ASSESSMENT Progress Note  [facility]     REFERRAL SOURCE: Admission Request    Briefly introduced spiritual health services/role to Elizabeth. She was eating and requested a follow up at another time.    PLAN: Will plan to follow up tomorrow. Spiritual Health remains available for emotional and spiritual support.    Yamile Leon  Associate   [facility] Phone [phone]  [facility] Pager [phone]    [facility] available 24/7 for emergent requests/referrals, either by having the on-call  paged or by entering an ASAP/STAT consult in Epic (this will also page the on-call ).

## 2023-06-07 NOTE — PLAN OF CARE
Pt had problems with hypotension over night but was asymptomatic.  No reports of SOB or chest pain. She had an RRT and received a NS bolus with an increase in her BP to the mid 80/50's with MAPS above 60.  Tele: Afib CVR.  U/O was low at 200 ml's.  She is alert and oriented.  Her plan is TBD.

## 2023-06-07 NOTE — CONSULTS
Cardiology Consultation      Elizabeth Ruggiero MRN# [id number]   YOB: 1926 Age: 89 year old   Date of Admission: 6/6/2023     Reason for consult:  Heart failure with preserved ejection fraction secondary to severe mitral regurgitation/tricuspid regurgitation/moderate to severe aortic stenosis           Assessment and Plan:     89-year-old female with known severe mitral/tricuspid regurgitation, moderate to severe aortic stenosis and severe pulmonary hypertension who was recently admitted to [facility] in May of this year with an acute exacerbation of heart failure with preserved ejection fraction.  She has previously declined a RAHEL for the further evaluation of her valvular disease and her annular anatomy was not felt to be conducive to mitral clip placement in any case due to severe calcification.  She was admitted to [facility] yesterday with worsening lower extremity edema and weight gain that was unresponsive to outpatient diuretic adjustments.  She did receive intravenous Lasix upon admission but subsequently developed profound hypotension that was asymptomatic.  She received an IV fluid bolus with improvement in her blood pressures this morning.    IMPRESSION:    1. Severe mitral valve regurgitation due to probable posterior leaflet prolapse.  Heavily calcified aortic annulus.  2. Severe tricuspid regurgitation with severe pulmonary hypertension.  3. Moderate to severe aortic stenosis.  4. Heart failure with preserved ejection fraction, recurrent and secondary to #1 2 and 3.  5. Hypotension in response to IV diuretic therapy yesterday.    PLAN  -This is a very difficult situation.  She has experienced a 10 pound weight gain over her baseline (approximately 100 to 105 pounds )and clinically appears fluid overloaded.  Unfortunately she appears to be very sensitive to intravenous diuretic therapy with significant hypotension noted overnight.  -I think it is reasonable to reattempt  diuretic therapy with a low-dose furosemide infusion.  Ultimately, however, these are temporizing measures and given the severity of her valvular disease these issues are likely to recur.  -I do think a discussion regarding palliative care options would be appropriate.      >80 minutes of time spent today pre and post charting, reviewing pertinent investigations personally as well as extensive review of previous records and coordinating plans for further evaluation.         Chief Complaint:   No chief complaint on file.           History of Present Illness:   This patient is a 89 year old female who follows with Dr. Galvan in our cardiology clinic.  She has a history of severe mitral/tricuspid regurgitation and severe pulmonary hypertension as well as moderate to severe aortic stenosis.  She has undergone structural heart evaluation for mitral regurgitation and it was felt that her anatomy was not conducive to mitral clip placement given severe annular calcification.  In any event, she declined further evaluation by RAHEL when this was presented to her in the past.    She was recently admitted to [facility] in May of this year with acute heart failure with preserved ejection fraction.  Her symptoms improved with IV diuretic therapy but unfortunately recurred in the past few days.  Despite adjustments to her outpatient furosemide dosage her weight and lower extremity edema continue to increase.  Therefore admission was advised.    She received IV furosemide upon presentation but significant hypotension was noted with systolic blood pressures in the 60s.  She received IV fluids with improvement in her blood pressure.  He was asymptomatic the whole time and today denies any chest discomfort or shortness of breath.    Admission labs are remarkable for an N-terminal BNP that is elevated at 2823.         Physical Exam:   Vitals were reviewed  Blood pressure 94/47, pulse 64, temperature 97.2  F (36.2  C),  temperature source Oral, resp. rate 18, weight 52 kg (114 lb 9.6 oz), SpO2 92 %, not currently breastfeeding.  Temperatures:  Current - Temp: 97.2  F (36.2  C); Max - Temp  Av.4  F (36.3  C)  Min: 97.2  F (36.2  C)  Max: 97.6  F (36.4  C)  Respiration range: Resp  Av.2  Min: 16  Max: 18  Pulse range: Pulse  Av.8  Min: 58  Max: 78  Blood pressure range: Systolic (24hrs), Av , Min:67 , Max:108   ; Diastolic (24hrs), Av, Min:43, Max:66    Pulse oximetry range: SpO2  Av.4 %  Min: 92 %  Max: 95 %    Intake/Output Summary (Last 24 hours) at 2023 0958  Last data filed at 2023 0600  Gross per 24 hour   Intake 200 ml   Output 200 ml   Net 0 ml     Constitutional:   awake, alert, cooperative, no apparent distress, and appears stated age     Eyes:   Lids and lashes normal, pupils equal, round and reactive to light, extra ocular muscles intact, sclera clear, conjunctiva normal     Neck:   supple, symmetrical, trachea midline, JVP elevated to angle of mandible     Back:   symmetric     Lungs:   Decreased breath sounds at right base       Cardiovascular:   Irregular, 3/6 holosystolic murmur at the apex     Abdomen:   non-tender     Musculoskeletal:   motor strength is 5 out of 5 all extremities bilaterally     Neurologic:   Grossly nonfocal     Skin:   no bruising or bleeding     Additional findings:     +2 bilateral lower extremity edema                 Past Medical History:   I have reviewed this patient's past medical history  Past Medical History:   Diagnosis Date     (HFpEF) heart failure with preserved ejection fraction (H)      Acute diastolic CHF (congestive heart failure) (H)      Acute left hemiparesis (H) 2019     Arthritis      Atrial fibrillation (H)      Atrial fibrillation with RVR (H)      Atrial flutter (H)      Atrial flutter with rapid ventricular response (H) 2019     Community acquired pneumonia      Diastolic CHF (H) 2019     DVT of lower extremity (deep  venous thrombosis) (H)     recurrent     Hepatic congestion     improved with diuretics for CHF     HTN (hypertension)      Hyperlipidemia      Mitral valve insufficiency      Pneumonia      Postmenopausal bleeding 01/2020    [facility]     Severe mitral regurgitation      SOB (shortness of breath)      TIA (transient ischemic attack)      Vitamin D deficiency              Past Surgical History:   I have reviewed this patient's past surgical history  Past Surgical History:   Procedure Laterality Date     APPENDECTOMY       FRACTURE SURGERY       repair left femur                 Social History:   I have reviewed this patient's social history  Social History     Tobacco Use     Smoking status: Never     Smokeless tobacco: Never   Vaping Use     Vaping status: Not on file   Substance Use Topics     Alcohol use: No             Family History:   I have reviewed this patient's family history  Family History   Problem Relation Age of Onset     Colon Cancer Mother      Breast Cancer Sister      Diabetes Sister              Allergies:   No Known Allergies          Medications:   I have reviewed this patient's current medications  Medications Prior to Admission   Medication Sig Dispense Refill Last Dose     acetaminophen (TYLENOL) 500 MG tablet Take 1,000 mg by mouth 3 times daily 2 tablets tid at 6am, 2pm, 10pm   6/6/2023 at AM     aluminum & magnesium hydroxide (MAG-AL) 200-200 MG/5ML supsension Take 30 mLs by mouth 4 times daily as needed for indigestion    at PRN     cholecalciferol 25 MCG (1000 UT) TABS Take 1 tablet by mouth daily   6/6/2023 at AM     digoxin (LANOXIN) 125 MCG tablet Take 0.5 tablets (62.5 mcg) by mouth daily 30 tablet 0 6/6/2023 at AM     ELIQUIS ANTICOAGULANT 2.5 MG tablet Take 2.5 mg by mouth 2 times daily   6/6/2023 at AM     furosemide (LASIX) 40 MG tablet Take 40 mg by mouth 2 times daily Through 6/7/2023 then resume 20 mg daily   6/6/2023 at AM     hydrocortisone  (ANUSOL-HC) 25 MG suppository Place 25 mg rectally 2 times daily   6/6/2023 at AM     lactobacillus rhamnosus, GG, (CULTURELL) capsule Take 1 capsule by mouth daily   6/6/2023     melatonin 5 MG tablet Take 5 mg by mouth At Bedtime   6/5/2023 at HS     menthol-zinc oxide (CALMOSEPTINE) 0.44-20.6 % OINT ointment Apply topically 2 times daily   6/6/2023 at AM     metoprolol succinate ER (TOPROL-XL) 25 MG 24 hr tablet Take 12.5 mg by mouth every evening    6/5/2023 at PM     Multiple Vitamins-Minerals (PRESERVISION AREDS 2+MULTI VIT) CAPS Take 1 tablet by mouth 2 times daily 60 capsule 0 6/6/2023 at AM     multivitamin w/minerals (THERA-VIT-M) tablet Take 1 tablet by mouth daily    6/6/2023 at AM     omeprazole (PRILOSEC) 20 MG DR capsule Take 20 mg by mouth daily before breakfast   6/6/2023 at AM     senna-docusate (SENOKOT-S/PERICOLACE) 8.6-50 MG tablet Take 1 tablet by mouth daily as needed for constipation    at PRN     simethicone (MYLICON) 80 MG chewable tablet Take 80 mg by mouth 4 times daily as needed for flatulence or cramping    at PRN     traMADol (ULTRAM) 50 MG tablet Take 1 tablet (50 mg) by mouth every 6 hours as needed for severe pain 10 tablet 0  at PRN     triamcinolone (KENALOG) 0.1 % external cream Apply topically 2 times daily To back for itching    at AM     cetirizine (ZYRTEC) 10 MG tablet Take 10 mg by mouth At Bedtime    at on HOLD     COMPRESSION STOCKINGS 1 each daily 1 each 1      furosemide (LASIX) 20 MG tablet Take 1 tablet (20 mg) by mouth daily 90 tablet 3 resume 6/8     mupirocin (BACTROBAN) 2 % external ointment Apply topically 2 times daily To wound inside nare        NONFORMULARY Diltiazem 2% ointment - apply pea-sized amount to the anus TID x6 weeks.  Pt to self administer.  Compounded by [facility], [address]    at AM     polyethylene glycol (MIRALAX) 17 GM/Dose powder Take 17 g by mouth daily as needed for constipation    at PRN     Current Facility-Administered Medications  Ordered in Epic   Medication Dose Route Frequency Last Rate Last Admin     - MEDICATION INSTRUCTIONS -   Does not apply DOES NOT GO TO MAR         acetaminophen (TYLENOL) tablet 1,000 mg  1,000 mg Oral TID   1,000 mg at 06/06/23 2207     apixaban ANTICOAGULANT (ELIQUIS) tablet 2.5 mg  2.5 mg Oral BID   2.5 mg at 06/06/23 2120     digoxin (LANOXIN) half-tab 62.5 mcg  62.5 mcg Oral Daily         furosemide (LASIX) 100 mg in sodium chloride 0.9 % 100 mL infusion  5 mg/hr Intravenous Continuous         lidocaine (LMX4) cream   Topical Q1H PRN         lidocaine 1 % 0.1-1 mL  0.1-1 mL Other Q1H PRN         melatonin tablet 1 mg  1 mg Oral At Bedtime PRN         metoprolol succinate ER (TOPROL-XL) 24 hr half-tab 12.5 mg  12.5 mg Oral QPM   12.5 mg at 06/06/23 2120     naloxone (NARCAN) injection 0.2 mg  0.2 mg Intravenous Q2 Min PRN        Or     naloxone (NARCAN) injection 0.4 mg  0.4 mg Intravenous Q2 Min PRN        Or     naloxone (NARCAN) injection 0.2 mg  0.2 mg Intramuscular Q2 Min PRN        Or     naloxone (NARCAN) injection 0.4 mg  0.4 mg Intramuscular Q2 Min PRN         pantoprazole (PROTONIX) EC tablet 40 mg  40 mg Oral QAM AC   40 mg at 06/07/23 0610     polyethylene glycol (MIRALAX) powder 17 g  17 g Oral Daily PRN         potassium chloride ER (KLOR-CON M) CR tablet 40 mEq  40 mEq Oral Once         senna-docusate (SENOKOT-S/PERICOLACE) 8.6-50 MG per tablet 1 tablet  1 tablet Oral Daily PRN         sodium chloride (PF) 0.9% PF flush 3 mL  3 mL Intracatheter Q8H   3 mL at 06/06/23 1713     sodium chloride (PF) 0.9% PF flush 3 mL  3 mL Intracatheter q1 min prn         traMADol (ULTRAM) tablet 50 mg  50 mg Oral Q6H PRN   50 mg at 06/06/23 2120     No current [facility]-ordered outpatient medications on file.             Review of Systems:     The 10 point Review of Systems is negative other than noted in the HPI            Data:   All laboratory data reviewed  Results for orders placed or performed during the hospital  encounter of 06/06/23 (from the past 24 hour(s))   Comprehensive metabolic panel   Result Value Ref Range    Sodium 133 (L) 136 - 145 mmol/L    Potassium 3.6 3.4 - 5.3 mmol/L    Chloride 95 (L) 98 - 107 mmol/L    Carbon Dioxide (CO2) 27 22 - 29 mmol/L    Anion Gap 11 7 - 15 mmol/L    Urea Nitrogen 28.5 (H) 8.0 - 23.0 mg/dL    Creatinine 0.76 0.51 - 0.95 mg/dL    Calcium 9.2 8.2 - 9.6 mg/dL    Glucose 116 (H) 70 - 99 mg/dL    Alkaline Phosphatase 66 35 - 104 U/L    AST 35 10 - 35 U/L    ALT 23 10 - 35 U/L    Protein Total 6.1 (L) 6.4 - 8.3 g/dL    Albumin 3.6 3.5 - 5.2 g/dL    Bilirubin Total 1.1 <=1.2 mg/dL    GFR Estimate 71 >60 mL/min/1.73m2   CBC with platelets differential    Narrative    The following orders were created for panel order CBC with platelets differential.  Procedure                               Abnormality         Status                     ---------                               -----------         ------                     CBC with platelets and d...[[id number]]  Abnormal            Final result                 Please view results for these tests on the individual orders.   Nt probnp inpatient   Result Value Ref Range    N terminal Pro BNP Inpatient 2,755 (H) 0 - 1,800 pg/mL   Magnesium   Result Value Ref Range    Magnesium 2.0 1.7 - 2.3 mg/dL   Phosphorus   Result Value Ref Range    Phosphorus 3.6 2.5 - 4.5 mg/dL   CBC with platelets and differential   Result Value Ref Range    WBC Count 7.5 4.0 - 11.0 10e3/uL    RBC Count 3.64 (L) 3.80 - 5.20 10e6/uL    Hemoglobin 11.2 (L) 11.7 - 15.7 g/dL    Hematocrit 34.0 (L) 35.0 - 47.0 %    MCV 93 78 - 100 fL    MCH 30.8 26.5 - 33.0 pg    MCHC 32.9 31.5 - 36.5 g/dL    RDW 15.4 (H) 10.0 - 15.0 %    Platelet Count 144 (L) 150 - 450 10e3/uL    % Neutrophils 72 %    % Lymphocytes 16 %    % Monocytes 10 %    % Eosinophils 1 %    % Basophils 1 %    % Immature Granulocytes 0 %    NRBCs per 100 WBC 0 <1 /100    Absolute Neutrophils 5.4 1.6 - 8.3 10e3/uL     Absolute Lymphocytes 1.2 0.8 - 5.3 10e3/uL    Absolute Monocytes 0.7 0.0 - 1.3 10e3/uL    Absolute Eosinophils 0.1 0.0 - 0.7 10e3/uL    Absolute Basophils 0.1 0.0 - 0.2 10e3/uL    Absolute Immature Granulocytes 0.0 <=0.4 10e3/uL    Absolute NRBCs 0.0 10e3/uL   XR Chest Port 1 View    Narrative    EXAM: XR CHEST PORT 1 VIEW  LOCATION: [facility]  DATE/TIME: 6/6/2023 5:32 PM CDT    INDICATION: sob, chf  COMPARISON: 05/22/2023, chest CT 05/03/2023      Impression    IMPRESSION: Marked cardiomegaly. Moderate right pleural effusion, similar to 05/22/2023 however decreased compared to 05/03/2023 chest CT. There is atelectasis at the right base. Lungs otherwise clear. No pneumothorax.   EKG 12-lead, tracing only   Result Value Ref Range    Systolic Blood Pressure  mmHg    Diastolic Blood Pressure  mmHg    Ventricular Rate 73 BPM    Atrial Rate 312 BPM    WV Interval  ms    QRS Duration 84 ms     ms    QTc 418 ms    P Axis  degrees    R AXIS 121 degrees    T Axis -81 degrees    Interpretation ECG       Atrial fibrillation with a competing junctional pacemaker  Minimal voltage criteria for LVH, may be normal variant  Lateral infarct (cited on or before 09-OCT-2020)  Abnormal ECG  When compared with ECG of 03-MAY-2023 18:48,  Nonspecific T wave abnormality has replaced inverted T waves in Lateral leads  QT has lengthened     Basic metabolic panel   Result Value Ref Range    Sodium 132 (L) 136 - 145 mmol/L    Potassium 3.4 3.4 - 5.3 mmol/L    Chloride 95 (L) 98 - 107 mmol/L    Carbon Dioxide (CO2) 27 22 - 29 mmol/L    Anion Gap 10 7 - 15 mmol/L    Urea Nitrogen 25.9 (H) 8.0 - 23.0 mg/dL    Creatinine 0.72 0.51 - 0.95 mg/dL    Calcium 9.2 8.2 - 9.6 mg/dL    Glucose 102 (H) 70 - 99 mg/dL    GFR Estimate 76 >60 mL/min/1.73m2   Nt probnp inpatient   Result Value Ref Range    N terminal Pro BNP Inpatient 2,823 (H) 0 - 1,800 pg/mL   Lactic acid whole blood   Result Value Ref Range    Lactic Acid 1.2  0.7 - 2.0 mmol/L   CBC with platelets   Result Value Ref Range    WBC Count 6.9 4.0 - 11.0 10e3/uL    RBC Count 3.67 (L) 3.80 - 5.20 10e6/uL    Hemoglobin 11.4 (L) 11.7 - 15.7 g/dL    Hematocrit 34.3 (L) 35.0 - 47.0 %    MCV 94 78 - 100 fL    MCH 31.1 26.5 - 33.0 pg    MCHC 33.2 31.5 - 36.5 g/dL    RDW 15.3 (H) 10.0 - 15.0 %    Platelet Count 139 (L) 150 - 450 10e3/uL     EKG results: Atrial fibrillation

## 2023-06-07 NOTE — PROGRESS NOTES
[id number]:  A&O x4 but very [place]. Q2 hr turns. External catheter. Reddened coccyx, sacral mepilex applied. BLE swelling. C/o 7-8/10 shoulder pain, tramadol and tylenol given. Productive cough.

## 2023-06-08 NOTE — PROGRESS NOTES
Care Management Follow Up    Length of Stay (days): 2    Expected Discharge Date: 06/09/2023     Concerns to be Addressed: discharge planning     Patient plan of care discussed at interdisciplinary rounds: Yes    Anticipated Discharge Disposition: Assisted Living, Hospice     Anticipated Discharge Services: Other (see comment)  Anticipated Discharge DME: None    Patient/family educated on Medicare website which has current facility and service quality ratings:    Education Provided on the Discharge Plan:    Patient/Family in Agreement with the Plan: no    Referrals Placed by CM/SW:    Private pay costs discussed: Not applicable    Additional Information:  CELSO received call from daughter Brittney indicating since patient is likely being discharged tomorrow they are working with [facility] to enroll patient tomorrow. CELSO spoke with Sarah at [facility] and informed they would be available for an early afternoon intake and requested transport around 11-11:30. CELSO spoke with Brittney who is going to touch base with family to determine what time they are able to transport. [facility] will be out today to complete an assessment to verify they can accept patient back at current level. [facility] requested 3 day supply of comfort meds filled no liquid or ranges and bubble packed. will continue to follow.    Addendum: CELSO informed family will transport at 11:30 tomorrow. CELSO spoke with Lizzeth from the [facility] who verified discharge plan/time. CELSO spoke with [facility] who is agreeable to discharge time and will sign patient onto hospice at 1300 tomorrow. Patient will need 3 day supply of comfort meds filled and sent with.       JONEL Adames    [facility]

## 2023-06-08 NOTE — PLAN OF CARE
Goal Outcome Evaluation:      Plan of Care Reviewed With: patient, child    Neuro: Alert to self and place, forgetful to situation. Very [place]  Tele/Cardiac- Afib CVR   Resp-  No SOB, RA,   Activity- Up with assist of 1-2 WGB  Pain- C/o pain to her coccyx, bony prominence, some pink scabbing- per daughter prexisiting, mepilex drsg in place   Drips- Lasix gtt at 7.5 mg , paused x1 for low BP , 90, restarted.   Drains/Tubes- external cath patent   Skin- Intact but frail , bruised  GI/- Voiding adequately, senna given for constipation   Aggression Color- Pleasant and cooperative -Green   Plan - Diurese and offload fluid before discharge home probably as hospice/comfort  Shift summary: Uneventful day,poor appetite, up with assist to chair. Bed and chair alarm. Plan for gentle diuresis. discharge home to hospice tomorrow, ride 11-11:30

## 2023-06-08 NOTE — PROGRESS NOTES
Care Management Follow Up    Length of Stay (days): 2    Expected Discharge Date: 06/10/2023     Concerns to be Addressed: discharge planning     Patient plan of care discussed at interdisciplinary rounds: No    Anticipated Discharge Disposition: Assisted Living, Hospice     Anticipated Discharge Services: Other (see comment)  Anticipated Discharge DME: None    Patient/family educated on Medicare website which has current facility and service quality ratings:    Education Provided on the Discharge Plan:    Patient/Family in Agreement with the Plan: no    Referrals Placed by CM/SW:    Private pay costs discussed: Not applicable    Additional Information:  Writer contacted Marshall MATTA at [facility] and Lizzeth MATTA will be out around noon today to do an on-site assessment prior to patient returning to [facility] tomorrow.  Ev Lee RN  Care Coordinator  [facility]  [phone] (text or call)

## 2023-06-08 NOTE — PROGRESS NOTES
SPIRITUAL HEALTH SERVICES  SPIRITUAL ASSESSMENT Progress Note  [facility]     REFERRAL SOURCE: follow up     Elizabeth was lying down when I arrived, declining a visit by shaking her head. She is aware of spiritual health support, if desired.    PLAN: [facility] remains available for emotional and spiritual support.    Yamile Leon  Associate   [facility] Phone [phone]  [facility] Pager [phone]    [facility] available 24/7 for emergent requests/referrals, either by having the on-call  paged or by entering an ASAP/STAT consult in Epic (this will also page the on-call ).

## 2023-06-08 NOTE — PLAN OF CARE
Neuro: A&Ox4, forgetful, intermittently confused   Tele/Cardiac: A-fib CVR  Resp: WDL  Activity: A1-2 to commode  Pain: denies  Drips/IV: lasix 5mg/hr  GI/: purewick in place- voiding well. BMx2 overnight  Skin: scattered bruising, blanchable redness coccyx  Diet: Diet: 2 Gram Sodium Diet  Fluid restriction 2000 ML FLUID     Test/Procedures: none  Plan: continue to diurese. Discharge home when medically cleared, likely on hospice/comfort cares.

## 2023-06-08 NOTE — PROGRESS NOTES
[facility]    Medicine Progress Note - Hospitalist Service    Date of Admission:  6/6/2023    Assessment & Plan   Elizabeth Ruggiero is a 89 year old female with complex PMHx including chronic a-fib and prior DVT on chronic AC with apixaban, chronic diastolic CHF, HTN, severe MR, progressive aortic stenosis, mitral valve prolapse, and tricuspid regurgitation who presents as a direct admit for worsening leg swelling and weight gain.     Acute on chronic diastolic heart failure  Severe MR  Severe TR and pulmonary hypertension  Severe aortic stenosis  Chronic atrial fibrillation/flutter  HTN, now hypotensive  Patient was recently hospitalized at [facility] between 5/3-5/5 for CHF exacerbation, underwent IV diuresis.  Patient's weight has apparently gone from 105 pounds 2 weeks ago to 113 pounds today and hence presented to ED per direction of Dr. Galvan.  Within the ED, noted to be mildly hypotensive with SBP in the 80s to 90s at admission, afebrile, not hypoxic, other vitals stable.  Labs significant for sodium 133, BUN 28, chloride 95, BNP 2755 which is improved from 6673 on 5/3, Hb 11.2, normal WBC.  CXR shows marked cardiomegaly, moderate right pleural effusion similar to 5/22, decreased compared to 5/3.  Noted that patient underwent thoracentesis at last hospitalization.  *TTE May 2023 admission showed progression of aortic stenosis (AV area now 0.74 cm2 from 1.5 cm2 with mean gradient 15 mmHg), MR also progressed, TR also progressed, and now also with moderately decreased RV systolic function    - started lasix infusion yesterday with low dose metoprolol and seems to be tolerating today. Moderate output with stable vitals. Subjectively patient seems more down with less energy.   - informed by SW that family planning for discharge to home to enroll in hospice tomorrow  - continue tele monitoring, fluid restriction and 2gram sodium diet for now however can be more liberal once enrolled in hospice  -  continued on PTA eliquis and digoxin     Mild hyponatremia-resolved  Sodium 133 at admission.  Likely due to fluid overload.     Mild anemia  -Hb 11.2. Baseline is about 12  - monitor daily     Goals of care  Family is realistic regarding expected course/prognosis.  They understand patient has end-stage valvular disease that is not amenable to intervention.  But they would still like to trial IV diuresis/medical management and hope to get more time as they feel that patient is still alert, conversant and has some quality of life and they hope to get her to her 89th birthday which is in 2 weeks.    - planning for hospice enrollment after discharge tomorrow       Diet: 2 Gram Sodium Diet  Fluid restriction 2000 ML FLUID    DVT Prophylaxis: DOAC  Wesley Catheter: Not present  Lines: None     Cardiac Monitoring: ACTIVE order. Indication: Acute decompensated heart failure (48 hours)  Code Status: No CPR- Do NOT Intubate      Clinically Significant Risk Factors                  # Hypertension: Noted on problem list                 Disposition Plan      Expected Discharge Date: 06/09/2023, 11:30 AM    Destination: assisted living  Discharge Comments: 6-7 0100 RRT for low BPs          Miranda Larsen DO  Hospitalist Service  [facility]  Securely message with [employer] (more info)  Text page via [employer] Paging/Directory   ______________________________________________________________________    Interval History   Patient less interactive today. Appears more tired. Denies specific complaints but wants to go home.     Physical Exam   Vital Signs: Temp: 97.4  F (36.3  C) Temp src: Tympanic BP: 103/68 Pulse: 86   Resp: 18 SpO2: 96 % O2 Device: None (Room air)    Weight: 112 lbs 12.8 oz    Constitutional: Awake, alert, cooperative  Respiratory: trace crackles in bases  Cardiovascular: Regular rate and rhythm, multiple murmurs evident +2 LE edema up to midshin/knee mostly unchange  Other: colder extremities  today however remains well perfused    Medical Decision Making       45 MINUTES SPENT BY ME on the date of service doing chart review, history, exam, documentation & further activities per the note.      Data     I have personally reviewed the following data over the past 24 hrs:    N/A  \   N/A   / N/A     136 97 (L) 22.3 /  114 (H)   3.7 24 0.62 \       Imaging results reviewed over the past 24 hrs:   No results found for this or any previous visit (from the past 24 hour(s)).

## 2023-06-08 NOTE — PROGRESS NOTES
Cardiology Progress Note          Assessment and Plan:         89-year-old female with known severe mitral/tricuspid regurgitation, moderate to severe aortic stenosis and severe pulmonary hypertension who was recently admitted to [facility] in May of this year with an acute exacerbation of heart failure with preserved ejection fraction.  She has previously declined a RAHEL for the further evaluation of her valvular disease and her annular anatomy was not felt to be conducive to mitral clip placement in any case due to severe calcification.  She was admitted to [facility] on 6/7/2023 with worsening lower extremity edema and weight gain that was unresponsive to outpatient diuretic adjustments.  She did receive intravenous Lasix upon admission but subsequently developed profound hypotension that was asymptomatic.    She was subsequently placed on a low-dose furosemide infusion.       IMPRESSION:     1. Severe mitral valve regurgitation due to probable posterior leaflet prolapse.  Heavily calcified aortic annulus.  2. Severe tricuspid regurgitation with severe pulmonary hypertension.  3. Moderate to severe aortic stenosis.  4. Heart failure with preserved ejection fraction, recurrent and secondary to #1 2 and 3.  5. Hypotension in response to IV diuretic therapy at the time of admission.    PLAN  -Responding well to low-dose furosemide infusion, although she is still above her goal weight.  -Consider transition to Bumex tomorrow.  Furosemide has been ineffective and she has previously developed constipation with torsemide.  -I did discuss the plan of care with her daughter by phone.  They are going to seek hospice consultation which I think is very appropriate.            Interval History:     Feels better this morning.  She has diuresed over a liter, and is down 2 pounds since admission.             Review of Systems:   As per subjective, otherwise 5 systems reviewed and negative.           Physical Exam:    Blood pressure 110/82, pulse 102, temperature 97.1  F (36.2  C), temperature source Axillary, resp. rate 16, weight 51.2 kg (112 lb 12.8 oz), SpO2 93 %, not currently breastfeeding.      Vital Sign Ranges  Temperature Temp  Av.2  F (36.2  C)  Min: 97.1  F (36.2  C)  Max: 97.2  F (36.2  C)   Blood pressure Systolic (24hrs), Av , Min:94 , Max:113        Diastolic (24hrs), Av, Min:47, Max:82      Pulse Pulse  Av.3  Min: 64  Max: 102   Respirations Resp  Av  Min: 16  Max: 18   Pulse oximetry SpO2  Av.5 %  Min: 92 %  Max: 93 %         Intake/Output Summary (Last 24 hours) at 2023 0739  Last data filed at 2023 0600  Gross per 24 hour   Intake 120 ml   Output 1500 ml   Net -1380 ml       Constitutional: NAD  Skin: Warm and dry  Neck: No JVD  Lungs: Decreased breath sounds bilaterally  Cardiovascular: 3/6 holosystolic murmur at apex  Abdomen: Soft, non tender.  Extremities and Back: 1+ bilateral lower extremity edema.  Neurological: Nonfocal           Medications:     I have reviewed this patient's current medications.              Data:     Labs reviewed.         Federico Crockett MD, M.D.\

## 2023-06-09 NOTE — PLAN OF CARE
Neuro: A&OX3-4, confused at times  Tele/Cardiac: A-fib CVR  Resp: WDL  Activity: A1 gb/walker to commode   Pain: pt and family complained of anal pain related to anal fissure. No obvious deformities noted upon assessment. Barrier cream applied and no further complaints were noted.   Drips/IV: lasix gtt 7.5mg/hr. drip stopped for aprox. 3 hours for SBP<90. Restarted at 0500.   GI/: purewick in place.   Skin: blanchable redness coccyx  Diet: Diet: 2 Gram Sodium Diet  Fluid restriction 2000 ML FLUID     Test/Procedures: none  Plan: discharge by 1130 with hospice care.

## 2023-06-09 NOTE — PROGRESS NOTES
Care Management Discharge Note    Discharge Date: 06/09/2023       Discharge Disposition: Home, Hospice    Discharge Services: None    Discharge DME: None    Discharge Transportation: family or friend will provide, around 11:30    Private pay costs discussed: Not applicable    Does the patient's insurance plan have a 3 day qualifying hospital stay waiver?  No    PAS Confirmation Code:  N/A  Patient/family educated on Medicare website which has current facility and service quality ratings: no    Education Provided on the Discharge Plan: Yes  Persons Notified of Discharge Plans: patient and daughter's  Patient/Family in Agreement with the Plan: yes    Handoff Referral Completed: No    Additional Information:  Received discharge orders for patient.  Patient is planning on return home to [facility] [address] with [facility] today.  Call placed to update [facility] as to the above information and faxed the discharge orders and the discharge summary.  Call placed to update [facility] and faxed the discharge orders and the discharge summary.  [facility] will be out to see patient today.  Patient and family informed of the plan and in agreement to the plan.  Patient's family will transport around 11:30 today.      RENATO Cordoba, [facility]    [phone]  [facility]

## 2023-06-09 NOTE — PROGRESS NOTES
Cardiology Progress Note          Assessment and Plan:         89-year-old female with known severe mitral/tricuspid regurgitation, moderate to severe aortic stenosis and severe pulmonary hypertension who was recently admitted to [facility] in May of this year with an acute exacerbation of heart failure with preserved ejection fraction.  She has previously declined a RAHEL for the further evaluation of her valvular disease and her annular anatomy was not felt to be conducive to mitral clip placement in any case due to severe calcification.  She was admitted to [facility] on 2023 with worsening lower extremity edema and weight gain that was unresponsive to outpatient diuretic adjustments.  She did receive intravenous Lasix upon admission but subsequently developed profound hypotension that was asymptomatic.    She was subsequently placed on a low-dose furosemide infusion.       IMPRESSION:     1. Severe mitral valve regurgitation due to probable posterior leaflet prolapse.  Heavily calcified aortic annulus.  2. Severe tricuspid regurgitation with severe pulmonary hypertension.  3. Moderate to severe aortic stenosis.  4. Heart failure with preserved ejection fraction, recurrent and secondary to #1 2 and 3.  5. Hypotension in response to IV diuretic therapy at the time of admission.    PLAN  -She has diuresed well on furosemide, will switch to oral Bumex today.  -Okay to discharge from my standpoint.  Hospice follow-up has been arranged.           Interval History:     Continues to diurese well, down 7 pounds since admission.             Review of Systems:   As per subjective, otherwise 5 systems reviewed and negative.           Physical Exam:   Blood pressure 103/64, pulse 95, temperature 98.6  F (37  C), temperature source Oral, resp. rate 18, weight 48.9 kg (107 lb 12.8 oz), SpO2 92 %, not currently breastfeeding.      Vital Sign Ranges  Temperature Temp  Av.2  F (36.2  C)  Min: 97.1  F (36.2   C)  Max: 97.2  F (36.2  C)   Blood pressure Systolic (24hrs), Av , Min:94 , Max:113        Diastolic (24hrs), Av, Min:47, Max:82      Pulse Pulse  Av.3  Min: 64  Max: 102   Respirations Resp  Av  Min: 16  Max: 18   Pulse oximetry SpO2  Av.5 %  Min: 92 %  Max: 93 %         Intake/Output Summary (Last 24 hours) at 2023 0739  Last data filed at 2023 0600  Gross per 24 hour   Intake 120 ml   Output 1500 ml   Net -1380 ml       Constitutional: NAD  Skin: Warm and dry  Neck: No JVD  Lungs: Decreased breath sounds bilaterally  Cardiovascular: 3/6 holosystolic murmur at apex  Abdomen: Soft, non tender.  Extremities and Back: 1+ bilateral lower extremity edema.  Neurological: Nonfocal           Medications:     I have reviewed this patient's current medications.              Data:     Labs reviewed.         Federico Crockett MD, M.D.\

## 2023-06-09 NOTE — CARE PLAN
PT consult received. Chart reviewed. Pt plans to discharge today on hospice services. Will sign off.

## 2023-06-09 NOTE — PLAN OF CARE
Patient discharged at noon to hospice. IV was discontinued. Pain at time of discharge was 0/10. Belongings returned to patient & daughters. Discharge instructions and medications reviewed with patient & daughters. Patient & daughters verbalized understanding and all questions were answered. Prescriptions given to patient. At time of discharge, patient condition was stable and left the unit in wheelchair escorted by ADA.

## 2023-06-09 NOTE — DISCHARGE SUMMARY
"[facility]  Hospitalist Discharge Summary      Date of Admission:  6/6/2023  Date of Discharge:  6/9/2023  Discharging Provider: Miranda Larsen DO  Discharge Service: Hospitalist Service    Discharge Diagnoses   See below    Clinically Significant Risk Factors     # Cachexia: Estimated body mass index is 17.4 kg/m  as calculated from the following:    Height as of 5/22/23: 1.676 m (5' 6\").    Weight as of this encounter: 48.9 kg (107 lb 12.8 oz).       Follow-ups Needed After Discharge   Follow-up Appointments     Follow-up and recommended labs and tests       Follow up with hospice as needed             Unresulted Labs Ordered in the Past 30 Days of this Admission     Date and Time Order Name Status Description    6/8/2023 11:20 AM Urine Culture Preliminary       Patient with abnormal UA positive for esterases and WBC. She had no signs of symptoms of UTI. She will be discharged on hospice. Antibiotics held and intiation can be considered by hospice facility.    Discharge Disposition   Discharged to home  Condition at discharge: Stable    Hospital Course   Elizabeth Ruggiero is a 89 year old female with complex PMHx including chronic a-fib and prior DVT on chronic AC with apixaban, chronic diastolic CHF, HTN, severe MR, progressive aortic stenosis, mitral valve prolapse, and tricuspid regurgitation who presented as a direct admit for worsening leg swelling and weight gain. She was initially started on low dose IV lasix but did not tolerate with BP as low at 60 systolic. Diuresis was changed to low rate IV infusion and patient tolerated over 2 days losing 7 pounds. She was continued on low rate metoprolol during this time. BP remains low 80 and 90s systolic with fatigue. Family made decision to enroll in hospice on discharge. Diuretics were change to bumex which should be continued as long as patient tolerates. Of note UA obtained prior to discharge returned with bacteria however patient with " signs of UTI. Antibiotics were held.      Acute on chronic diastolic heart failure  Severe MR  Severe TR and pulmonary hypertension  Severe aortic stenosis  Chronic atrial fibrillation/flutter  HTN, now hypotensive  Patient was recently hospitalized at [facility] between 5/3-5/5 for CHF exacerbation, underwent IV diuresis.  Patient's weight has apparently gone from 105 pounds 2 weeks ago to 113 pounds today and hence presented to ED per direction of Dr. Galvan.  Within the ED, noted to be mildly hypotensive with SBP in the 80s to 90s at admission, afebrile, not hypoxic, other vitals stable.  Labs significant for sodium 133, BUN 28, chloride 95, BNP 2755 which is improved from 6673 on 5/3, Hb 11.2, normal WBC.  CXR shows marked cardiomegaly, moderate right pleural effusion similar to 5/22, decreased compared to 5/3.  Noted that patient underwent thoracentesis at last hospitalization.  *TTE May 2023 admission showed progression of aortic stenosis (AV area now 0.74 cm2 from 1.5 cm2 with mean gradient 15 mmHg), MR also progressed, TR also progressed, and now also with moderately decreased RV systolic function    - recommend patient stop metoprolol and eliquis due to her her low BP and risk of bleeding. Can continue digoxin for some rate controlling and inotropic effect.   - will discharge with bumex however this should be continue at discretion of hospice     Mild hyponatremia-resolved  Sodium 133 at admission.  Likely due to fluid overload.     Mild anemia  -Hb 11.2. Baseline is about 12  - monitor daily     Abnormal UA  UA obtained by night team returned + for small of amount of esterase and WBCs.  - Patient without symptoms of UTI  - recommend to continue to monitor and hospice can determine need for treatment or repeating UA in outpatient       Consultations This Hospital Stay   NUTRITION SERVICES ADULT IP CONSULT  CARE MANAGEMENT / SOCIAL WORK IP CONSULT  CARDIOLOGY IP CONSULT  PHYSICAL THERAPY ADULT IP CONSULT    Code  Status   No CPR- Do NOT Intubate    Time Spent on this Encounter   I, Miranda Larsen DO, personally saw the patient today and spent greater than 30 minutes discharging this patient.       Miranda Larsen DO  [facility]  [address], [address]  [place] [address]  Phone: [phone]  ______________________________________________________________________    Physical Exam   Vital Signs: Temp: 97.3  F (36.3  C) Temp src: Oral BP: 104/69 Pulse: 95   Resp: 18 SpO2: 92 % O2 Device: None (Room air)    Weight: 107 lbs 12.8 oz       Primary Care Physician   Sonu Reece    Discharge Orders      Basic metabolic panel     Follow-Up with Cardiology [facility]      Reason for your hospital stay    You presented for heart failure and were optimized with IV lasix and will discharge home to start hospice     Follow-up and recommended labs and tests     Follow up with hospice as needed     Activity    Your activity upon discharge: activity as tolerated     Diet    Follow this diet upon discharge: Orders Placed This Encounter      Fluid restriction 2000 ML FLUID      2 Gram Sodium Diet       Significant Results and Procedures   Results for orders placed or performed during the hospital encounter of 06/06/23   XR Chest Port 1 View    Narrative    EXAM: XR CHEST PORT 1 VIEW  LOCATION: [facility]  DATE/TIME: 6/6/2023 5:32 PM CDT    INDICATION: sob, chf  COMPARISON: 05/22/2023, chest CT 05/03/2023      Impression    IMPRESSION: Marked cardiomegaly. Moderate right pleural effusion, similar to 05/22/2023 however decreased compared to 05/03/2023 chest CT. There is atelectasis at the right base. Lungs otherwise clear. No pneumothorax.       Discharge Medications   Current Discharge Medication List      START taking these medications    Details   acetaminophen (TYLENOL) 650 MG suppository Place 1 suppository (650 mg) rectally every 4 hours as needed for fever  Qty: 4  suppository, Refills: 0    Associated Diagnoses: Hospice care patient      atropine 1 % ophthalmic solution Take 1 drop by mouth, place under tongue or place inside cheek every 4 hours as needed for secretions  Qty: 5 mL, Refills: 0    Associated Diagnoses: Hospice care patient      bumetanide (BUMEX) 1 MG tablet Take 1 tablet (1 mg) by mouth daily  Qty: 30 tablet, Refills: 0    Associated Diagnoses: Chronic heart failure with preserved ejection fraction (HFpEF) (H)      HYDROmorphone (DILAUDID) 2 MG tablet Take 1 tablet (2 mg) by mouth every 2 hours as needed for pain or shortness of breath  Qty: 30 tablet, Refills: 0    Comments: Hospice patient. Dispense in quantities of 30 tablets.  Associated Diagnoses: Hospice care patient      LORazepam (ATIVAN) 0.5 MG tablet Take 1 tablet (0.5 mg) by mouth or place under tongue every 4 hours as needed for anxiety (restlessness)  Qty: 15 tablet, Refills: 0    Associated Diagnoses: Hospice care patient         CONTINUE these medications which have NOT CHANGED    Details   digoxin (LANOXIN) 125 MCG tablet Take 0.5 tablets (62.5 mcg) by mouth daily  Qty: 30 tablet, Refills: 0    Associated Diagnoses: Atrial fibrillation, unspecified type (H)      melatonin 5 MG tablet Take 5 mg by mouth At Bedtime      COMPRESSION STOCKINGS 1 each daily  Qty: 1 each, Refills: 1    Comments: 20-30 mmHg, Knee high, on in the morning and off at night.  Associated Diagnoses: Edema, unspecified type         STOP taking these medications       acetaminophen (TYLENOL) 500 MG tablet Comments:   Reason for Stopping:         aluminum & magnesium hydroxide (MAG-AL) 200-200 MG/5ML supsension Comments:   Reason for Stopping:         cetirizine (ZYRTEC) 10 MG tablet Comments:   Reason for Stopping:         cholecalciferol 25 MCG (1000 UT) TABS Comments:   Reason for Stopping:         ELIQUIS ANTICOAGULANT 2.5 MG tablet Comments:   Reason for Stopping:         furosemide (LASIX) 20 MG tablet Comments:   Reason  for Stopping:         furosemide (LASIX) 40 MG tablet Comments:   Reason for Stopping:         hydrocortisone (ANUSOL-HC) 25 MG suppository Comments:   Reason for Stopping:         lactobacillus rhamnosus, GG, (CULTURELL) capsule Comments:   Reason for Stopping:         menthol-zinc oxide (CALMOSEPTINE) 0.44-20.6 % OINT ointment Comments:   Reason for Stopping:         metoprolol succinate ER (TOPROL-XL) 25 MG 24 hr tablet Comments:   Reason for Stopping:         Multiple Vitamins-Minerals (PRESERVISION AREDS 2+MULTI VIT) CAPS Comments:   Reason for Stopping:         multivitamin w/minerals (THERA-VIT-M) tablet Comments:   Reason for Stopping:         mupirocin (BACTROBAN) 2 % external ointment Comments:   Reason for Stopping:         NONFORMULARY Comments:   Reason for Stopping:         omeprazole (PRILOSEC) 20 MG DR capsule Comments:   Reason for Stopping:         polyethylene glycol (MIRALAX) 17 GM/Dose powder Comments:   Reason for Stopping:         senna-docusate (SENOKOT-S/PERICOLACE) 8.6-50 MG tablet Comments:   Reason for Stopping:         simethicone (MYLICON) 80 MG chewable tablet Comments:   Reason for Stopping:         traMADol (ULTRAM) 50 MG tablet Comments:   Reason for Stopping:         triamcinolone (KENALOG) 0.1 % external cream Comments:   Reason for Stopping:             Allergies   No Known Allergies

## 2025-05-02 NOTE — TELEPHONE ENCOUNTER
Patients daughter called stating that they saw Beverly APONTE last week and discussed having her INR management be switched from her PMD to this clinic but after talking it over at home they would prefer to keep INR checks at [facility]D for convenience. INR referral that Beverly placed cancelled. Informed patients daughter to call PMD INR RN and inform her that they will continue doing INR management there. Pts daughter agreed to plan. No further questions or concerns.    4 = No assist / stand by assistance